# Patient Record
Sex: FEMALE | Race: WHITE | NOT HISPANIC OR LATINO | Employment: OTHER | URBAN - METROPOLITAN AREA
[De-identification: names, ages, dates, MRNs, and addresses within clinical notes are randomized per-mention and may not be internally consistent; named-entity substitution may affect disease eponyms.]

---

## 2017-01-17 ENCOUNTER — CONVERSION ENCOUNTER (OUTPATIENT)
Dept: MAMMOGRAPHY | Facility: CLINIC | Age: 62
End: 2017-01-17

## 2017-08-01 ENCOUNTER — CONVERSION ENCOUNTER (OUTPATIENT)
Dept: MAMMOGRAPHY | Facility: CLINIC | Age: 62
End: 2017-08-01

## 2018-05-22 ENCOUNTER — CONVERSION ENCOUNTER (OUTPATIENT)
Dept: RADIOLOGY | Facility: IMAGING CENTER | Age: 63
End: 2018-05-22

## 2018-10-09 ENCOUNTER — HOSPITAL ENCOUNTER (EMERGENCY)
Facility: HOSPITAL | Age: 63
Discharge: HOME | End: 2018-10-09
Attending: EMERGENCY MEDICINE | Admitting: EMERGENCY MEDICINE
Payer: COMMERCIAL

## 2018-10-09 ENCOUNTER — APPOINTMENT (EMERGENCY)
Dept: RADIOLOGY | Facility: HOSPITAL | Age: 63
End: 2018-10-09
Payer: COMMERCIAL

## 2018-10-09 VITALS
OXYGEN SATURATION: 92 % | RESPIRATION RATE: 26 BRPM | SYSTOLIC BLOOD PRESSURE: 141 MMHG | TEMPERATURE: 98.1 F | HEART RATE: 78 BPM | DIASTOLIC BLOOD PRESSURE: 67 MMHG

## 2018-10-09 DIAGNOSIS — D64.9 ANEMIA: Primary | ICD-10-CM

## 2018-10-09 DIAGNOSIS — Z51.89 ENCOUNTER FOR BLOOD TRANSFUSION: ICD-10-CM

## 2018-10-09 DIAGNOSIS — K59.00 CONSTIPATION: ICD-10-CM

## 2018-10-09 LAB
ABO GROUP BLD: NORMAL
ALBUMIN SERPL BCP-MCNC: 2.3 G/DL (ref 3.5–5)
ALP SERPL-CCNC: 224 U/L (ref 46–116)
ALT SERPL W P-5'-P-CCNC: 29 U/L (ref 12–78)
ANION GAP SERPL CALCULATED.3IONS-SCNC: 10 MMOL/L (ref 4–13)
AST SERPL W P-5'-P-CCNC: 17 U/L (ref 5–45)
ATRIAL RATE: 77 BPM
BASOPHILS # BLD MANUAL: 0 THOUSAND/UL (ref 0–0.1)
BASOPHILS NFR MAR MANUAL: 0 % (ref 0–1)
BILIRUB SERPL-MCNC: 0.3 MG/DL (ref 0.2–1)
BLD GP AB SCN SERPL QL: NEGATIVE
BUN SERPL-MCNC: 6 MG/DL (ref 5–25)
CALCIUM SERPL-MCNC: 8.5 MG/DL (ref 8.3–10.1)
CHLORIDE SERPL-SCNC: 96 MMOL/L (ref 100–108)
CO2 SERPL-SCNC: 27 MMOL/L (ref 21–32)
CREAT SERPL-MCNC: 0.91 MG/DL (ref 0.6–1.3)
EOSINOPHIL # BLD MANUAL: 0 THOUSAND/UL (ref 0–0.4)
EOSINOPHIL NFR BLD MANUAL: 0 % (ref 0–6)
ERYTHROCYTE [DISTWIDTH] IN BLOOD BY AUTOMATED COUNT: 15.9 % (ref 11.6–15.1)
GFR SERPL CREATININE-BSD FRML MDRD: 66 ML/MIN/1.73SQ M
GLUCOSE SERPL-MCNC: 127 MG/DL (ref 65–140)
HCT VFR BLD AUTO: 21.6 % (ref 34.8–46.1)
HGB BLD-MCNC: 7 G/DL (ref 11.5–15.4)
INR PPP: 1.38 (ref 0.86–1.17)
LYMPHOCYTES # BLD AUTO: 0.53 THOUSAND/UL (ref 0.6–4.47)
LYMPHOCYTES # BLD AUTO: 25 % (ref 14–44)
MCH RBC QN AUTO: 29.3 PG (ref 26.8–34.3)
MCHC RBC AUTO-ENTMCNC: 32.4 G/DL (ref 31.4–37.4)
MCV RBC AUTO: 90 FL (ref 82–98)
MONOCYTES # BLD AUTO: 0.4 THOUSAND/UL (ref 0–1.22)
MONOCYTES NFR BLD: 19 % (ref 4–12)
NEUTROPHILS # BLD MANUAL: 1.11 THOUSAND/UL (ref 1.85–7.62)
NEUTS BAND NFR BLD MANUAL: 7 % (ref 0–8)
NEUTS SEG NFR BLD AUTO: 46 % (ref 43–75)
NRBC BLD AUTO-RTO: 0 /100 WBCS
P AXIS: 37 DEGREES
PLATELET # BLD AUTO: 276 THOUSANDS/UL (ref 149–390)
PLATELET BLD QL SMEAR: ADEQUATE
PMV BLD AUTO: 11.6 FL (ref 8.9–12.7)
POTASSIUM SERPL-SCNC: 3.8 MMOL/L (ref 3.5–5.3)
PR INTERVAL: 130 MS
PROT SERPL-MCNC: 6 G/DL (ref 6.4–8.2)
PROTHROMBIN TIME: 16.8 SECONDS (ref 11.8–14.2)
QRS AXIS: 72 DEGREES
QRSD INTERVAL: 76 MS
QT INTERVAL: 398 MS
QTC INTERVAL: 450 MS
RBC # BLD AUTO: 2.39 MILLION/UL (ref 3.81–5.12)
RH BLD: NEGATIVE
SODIUM SERPL-SCNC: 133 MMOL/L (ref 136–145)
SPECIMEN EXPIRATION DATE: NORMAL
T WAVE AXIS: 18 DEGREES
T4 FREE SERPL-MCNC: 1.13 NG/DL (ref 0.76–1.46)
TOTAL CELLS COUNTED SPEC: 100
TSH SERPL DL<=0.05 MIU/L-ACNC: 4.07 UIU/ML (ref 0.36–3.74)
VARIANT LYMPHS # BLD AUTO: 3 %
VENTRICULAR RATE: 77 BPM
WBC # BLD AUTO: 2.1 THOUSAND/UL (ref 4.31–10.16)

## 2018-10-09 PROCEDURE — 86901 BLOOD TYPING SEROLOGIC RH(D): CPT | Performed by: EMERGENCY MEDICINE

## 2018-10-09 PROCEDURE — 93005 ELECTROCARDIOGRAM TRACING: CPT

## 2018-10-09 PROCEDURE — 36415 COLL VENOUS BLD VENIPUNCTURE: CPT | Performed by: EMERGENCY MEDICINE

## 2018-10-09 PROCEDURE — 86920 COMPATIBILITY TEST SPIN: CPT

## 2018-10-09 PROCEDURE — 85007 BL SMEAR W/DIFF WBC COUNT: CPT | Performed by: EMERGENCY MEDICINE

## 2018-10-09 PROCEDURE — 71046 X-RAY EXAM CHEST 2 VIEWS: CPT

## 2018-10-09 PROCEDURE — 93010 ELECTROCARDIOGRAM REPORT: CPT | Performed by: INTERNAL MEDICINE

## 2018-10-09 PROCEDURE — 84439 ASSAY OF FREE THYROXINE: CPT | Performed by: EMERGENCY MEDICINE

## 2018-10-09 PROCEDURE — 86900 BLOOD TYPING SEROLOGIC ABO: CPT | Performed by: EMERGENCY MEDICINE

## 2018-10-09 PROCEDURE — 85610 PROTHROMBIN TIME: CPT | Performed by: EMERGENCY MEDICINE

## 2018-10-09 PROCEDURE — 36430 TRANSFUSION BLD/BLD COMPNT: CPT

## 2018-10-09 PROCEDURE — 86850 RBC ANTIBODY SCREEN: CPT | Performed by: EMERGENCY MEDICINE

## 2018-10-09 PROCEDURE — P9021 RED BLOOD CELLS UNIT: HCPCS

## 2018-10-09 PROCEDURE — 85027 COMPLETE CBC AUTOMATED: CPT | Performed by: EMERGENCY MEDICINE

## 2018-10-09 PROCEDURE — 84443 ASSAY THYROID STIM HORMONE: CPT | Performed by: EMERGENCY MEDICINE

## 2018-10-09 PROCEDURE — 99285 EMERGENCY DEPT VISIT HI MDM: CPT

## 2018-10-09 PROCEDURE — 80053 COMPREHEN METABOLIC PANEL: CPT | Performed by: EMERGENCY MEDICINE

## 2018-10-09 RX ORDER — MIRTAZAPINE 15 MG/1
7.5 TABLET, FILM COATED ORAL
COMMUNITY

## 2018-10-09 RX ORDER — OMEPRAZOLE 20 MG/1
20 CAPSULE, DELAYED RELEASE ORAL DAILY
COMMUNITY
End: 2019-09-16 | Stop reason: ALTCHOICE

## 2018-10-09 RX ORDER — CHOLECALCIFEROL (VITAMIN D3) 125 MCG
1 CAPSULE ORAL DAILY
COMMUNITY

## 2018-10-09 RX ORDER — AMLODIPINE BESYLATE 10 MG/1
10 TABLET ORAL DAILY
COMMUNITY
End: 2021-10-07 | Stop reason: HOSPADM

## 2018-10-09 RX ORDER — AMOXICILLIN 250 MG
1 CAPSULE ORAL ONCE
Status: COMPLETED | OUTPATIENT
Start: 2018-10-09 | End: 2018-10-09

## 2018-10-09 RX ORDER — NICOTINE 21 MG/24HR
1 PATCH, TRANSDERMAL 24 HOURS TRANSDERMAL EVERY 24 HOURS
COMMUNITY
End: 2019-01-09 | Stop reason: ALTCHOICE

## 2018-10-09 RX ORDER — ATORVASTATIN CALCIUM 40 MG/1
40 TABLET, FILM COATED ORAL
COMMUNITY

## 2018-10-09 RX ORDER — ASPIRIN 81 MG/1
81 TABLET, CHEWABLE ORAL DAILY
COMMUNITY
End: 2021-10-07 | Stop reason: HOSPADM

## 2018-10-09 RX ORDER — METOPROLOL SUCCINATE 100 MG/1
50 TABLET, EXTENDED RELEASE ORAL DAILY
COMMUNITY
End: 2021-10-07 | Stop reason: HOSPADM

## 2018-10-09 RX ORDER — ESCITALOPRAM OXALATE 5 MG/1
5 TABLET ORAL DAILY
COMMUNITY
End: 2019-09-16 | Stop reason: ALTCHOICE

## 2018-10-09 RX ORDER — LISINOPRIL 40 MG/1
20 TABLET ORAL DAILY
COMMUNITY
End: 2019-09-16 | Stop reason: DRUGHIGH

## 2018-10-09 RX ORDER — AMOXICILLIN 250 MG
1 CAPSULE ORAL DAILY
Qty: 20 TABLET | Refills: 0 | Status: SHIPPED | OUTPATIENT
Start: 2018-10-09

## 2018-10-09 RX ORDER — ACYCLOVIR 800 MG/1
800 TABLET ORAL 2 TIMES DAILY
Status: ON HOLD | COMMUNITY
End: 2018-12-05 | Stop reason: ALTCHOICE

## 2018-10-09 RX ORDER — CHOLECALCIFEROL (VITAMIN D3) 25 MCG
1 TABLET ORAL
COMMUNITY
End: 2022-05-23

## 2018-10-09 RX ADMIN — SENNOSIDES AND DOCUSATE SODIUM 1 TABLET: 8.6; 5 TABLET ORAL at 19:42

## 2018-10-09 NOTE — ED PROVIDER NOTES
History  Chief Complaint   Patient presents with    Extremity Weakness     Pt brought in via EMS for evaluation for leg pain and weakness while on the cammode at home  States she sat there for 45 mins and then couldn't get up  IS currently undergoing chemo for lymphoma at [facility]  EMS reports she was notified today by Anshul Boswell that he Hgb was low, and might need a transfusion  41-year-old female patient currently being treated for cancer at [facility] presents emergency department for evaluation of increased fatigue  The patient was found with outpatient labs to be anemic and told to come here for evaluation  On physical exam patient does have a right-sided facial droop which he states is from a Bell's palsy  She states she has had imaging done for multiple times  The Bell's palsy is incomplete, however, and was initially concerning but since the patient states this is old and unchanged has been present and stable for over a month and a feel that reimaging it would be of benefit the patient  The patient will have a full workup done to assess for possible anemia the patient will be disposition based on labs  Differential diagnosis includes but is not limited to acute coronary syndrome, symptomatic anemia, dehydration  History provided by:  Patient   used: No    Fatigue   Severity:  Mild  Onset quality:  Gradual  Timing:  Constant  Progression:  Worsening  Chronicity:  New  Context: not alcohol use, not change in medication, not decreased sleep, not increased activity and not pinched nerve    Relieved by:  Nothing  Worsened by:  Nothing  Ineffective treatments:  None tried  Associated symptoms: drooling and dysphagia    Associated symptoms: no ataxia, no difficulty walking, no foul-smelling urine, no frequency, no lethargy and no near-syncope        Prior to Admission Medications   Prescriptions Last Dose Informant Patient Reported? Taking?    CHELATED MAGNESIUM PO   Yes Yes Sig: Take 4 tablets by mouth 3 (three) times a day   Cholecalciferol (VITAMIN D3) 2000 units TABS   Yes Yes   Sig: Take 1 tablet by mouth daily   Melatonin ER (MELADOX) 3 MG TBCR   Yes Yes   Sig: Take 1 tablet by mouth daily at bedtime   acyclovir (ZOVIRAX) 800 mg tablet   Yes Yes   Sig: Take 800 mg by mouth every 12 (twelve) hours   amLODIPine (NORVASC) 10 mg tablet   Yes Yes   Sig: Take 10 mg by mouth daily   aspirin 81 mg chewable tablet   Yes Yes   Sig: Chew 81 mg daily   atorvastatin (LIPITOR) 40 mg tablet   Yes Yes   Sig: Take 40 mg by mouth daily at bedtime   escitalopram (LEXAPRO) 5 mg tablet   Yes Yes   Sig: Take 5 mg by mouth daily   lisinopril (ZESTRIL) 40 mg tablet   Yes Yes   Sig: Take 40 mg by mouth daily   metoprolol succinate (TOPROL-XL) 100 mg 24 hr tablet   Yes Yes   Sig: Take 100 mg by mouth daily   mirtazapine (REMERON) 15 mg tablet   Yes Yes   Sig: Take 15 mg by mouth daily at bedtime   nicotine (NICODERM CQ) 14 mg/24hr TD 24 hr patch   Yes Yes   Sig: Place 1 patch on the skin every 24 hours   omeprazole (PriLOSEC) 20 mg delayed release capsule   Yes Yes   Sig: Take 20 mg by mouth daily   rivaroxaban (XARELTO) 10 mg tablet   Yes Yes   Sig: Take 10 mg by mouth daily with dinner      Facility-Administered Medications: None       No past medical history on file  No past surgical history on file  No family history on file  I have reviewed and agree with the history as documented  Social History   Substance Use Topics    Smoking status: Not on file    Smokeless tobacco: Not on file    Alcohol use Not on file        Review of Systems   Constitutional: Positive for fatigue  HENT: Positive for drooling  Cardiovascular: Negative for near-syncope  Gastrointestinal: Positive for dysphagia  Genitourinary: Negative for frequency  All other systems reviewed and are negative  Physical Exam  Physical Exam   Constitutional: She is oriented to person, place, and time   She appears well-developed and well-nourished  HENT:   Head: Normocephalic and atraumatic  Right Ear: External ear normal    Left Ear: External ear normal    Eyes: Conjunctivae and EOM are normal    Neck: No JVD present  No tracheal deviation present  No thyromegaly present  Cardiovascular: Normal rate  Pulmonary/Chest: Effort normal and breath sounds normal  No stridor  Abdominal: Soft  She exhibits no distension and no mass  There is no tenderness  There is no guarding  No hernia  Musculoskeletal: Normal range of motion  She exhibits no edema, tenderness or deformity  Lymphadenopathy:     She has no cervical adenopathy  Neurological: She is alert and oriented to person, place, and time  A cranial nerve deficit is present  She exhibits normal muscle tone  She displays no seizure activity  GCS eye subscore is 4  GCS verbal subscore is 5  GCS motor subscore is 5  Skin: Skin is warm  No rash noted  No erythema  No pallor  Psychiatric: She has a normal mood and affect  Her behavior is normal    Nursing note and vitals reviewed        Vital Signs  ED Triage Vitals [10/09/18 1132]   Temperature Pulse Respirations Blood Pressure SpO2   98 °F (36 7 °C) 79 16 100/51 96 %      Temp Source Heart Rate Source Patient Position - Orthostatic VS BP Location FiO2 (%)   Oral Monitor Sitting Right arm --      Pain Score       3           Vitals:    10/09/18 1728 10/09/18 1754 10/09/18 1929 10/09/18 1930   BP: 125/59 126/60 141/67 141/67   Pulse: 82 83 78 78   Patient Position - Orthostatic VS:           Visual Acuity      ED Medications  Medications   senna-docusate sodium (SENOKOT S) 8 6-50 mg per tablet 1 tablet (1 tablet Oral Given 10/9/18 1942)       Diagnostic Studies  Results Reviewed     Procedure Component Value Units Date/Time    T4, free [[id number]]  (Normal) Collected:  10/09/18 1148    Lab Status:  Final result Specimen:  Blood from Arm, Right Updated:  10/09/18 1852     Free T4 1 13 ng/dL     CBC and differential [[id number]]  (Abnormal) Collected:  10/09/18 1148    Lab Status:  Final result Specimen:  Blood from Arm, Right Updated:  10/09/18 1251     WBC 2 10 (L) Thousand/uL      RBC 2 39 (L) Million/uL      Hemoglobin 7 0 (L) g/dL      Hematocrit 21 6 (L) %      MCV 90 fL      MCH 29 3 pg      MCHC 32 4 g/dL      RDW 15 9 (H) %      MPV 11 6 fL      Platelets 070 Thousands/uL      nRBC 0 /100 WBCs     TSH, 3rd generation with Free T4 reflex [[id number]]  (Abnormal) Collected:  10/09/18 1148    Lab Status:  Final result Specimen:  Blood from Arm, Right Updated:  10/09/18 1224     TSH 3RD GENERATON 4 068 (H) uIU/mL     Narrative:         Patients undergoing fluorescein dye angiography may retain small amounts of fluorescein in the body for 48-72 hours post procedure  Samples containing fluorescein can produce falsely depressed TSH values  If the patient had this procedure,a specimen should be resubmitted post fluorescein clearance            The recommended reference ranges for TSH during pregnancy are as follows:  First trimester 0 1 to 2 5 uIU/mL  Second trimester  0 2 to 3 0 uIU/mL  Third trimester 0 3 to 3 0 uIU/m      Protime-INR [[id number]]  (Abnormal) Collected:  10/09/18 1148    Lab Status:  Final result Specimen:  Blood from Arm, Right Updated:  10/09/18 1218     Protime 16 8 (H) seconds      INR 1 38 (H)    Comprehensive metabolic panel [[id number]]  (Abnormal) Collected:  10/09/18 1148    Lab Status:  Final result Specimen:  Blood from Arm, Right Updated:  10/09/18 1216     Sodium 133 (L) mmol/L      Potassium 3 8 mmol/L      Chloride 96 (L) mmol/L      CO2 27 mmol/L      ANION GAP 10 mmol/L      BUN 6 mg/dL      Creatinine 0 91 mg/dL      Glucose 127 mg/dL      Calcium 8 5 mg/dL      AST 17 U/L      ALT 29 U/L      Alkaline Phosphatase 224 (H) U/L      Total Protein 6 0 (L) g/dL      Albumin 2 3 (L) g/dL      Total Bilirubin 0 30 mg/dL      eGFR 66 ml/min/1 73sq m     Narrative:         National Kidney Disease Education Program recommendations are as follows:  GFR calculation is accurate only with a steady state creatinine  Chronic Kidney disease less than 60 ml/min/1 73 sq  meters  Kidney failure less than 15 ml/min/1 73 sq  meters  XR dual energy   Final Result by Allie Lynn MD (10/09 1722)      No acute cardiopulmonary disease  Previously seen density was likely artifactual             Workstation performed: [id number]         XR chest 2 views   Final Result by Allie Lynn MD (10/09 1533)      Suggestion of focal ill-defined opacity in the left upper lobe  This appearance is not typical for pneumonia and the possibility of a lung nodule is not excluded  Follow-up chest for the x-ray is recommended to confirm a true nodule            I personally discussed this study with Zulma Glez on 10/9/2018 at 3:23 PM                   Workstation performed: [id number]                    Procedures  Procedures       Phone Contacts  ED Phone Contact    ED Course  ED Course as of Oct 12 1803   Tue Oct 09, 2018   1502 Blood consent obtained, signed by me, placed on pts chart                                MDM  Number of Diagnoses or Management Options  Anemia: new and requires workup  Constipation: new and requires workup  Encounter for blood transfusion: new and requires workup     Amount and/or Complexity of Data Reviewed  Clinical lab tests: ordered and reviewed  Tests in the radiology section of CPT®: ordered and reviewed  Decide to obtain previous medical records or to obtain history from someone other than the patient: yes  Review and summarize past medical records: yes    Patient Progress  Patient progress: stable    CritCare Time    Disposition  Final diagnoses:   Anemia   Encounter for blood transfusion   Constipation     Time reflects when diagnosis was documented in both MDM as applicable and the Disposition within this note     Time User Action Codes Description Comment    10/9/2018  6:41 PM Jose Alejandro Petra ALEJO Add [D64 9] Anemia     10/9/2018  6:41 PM Jose Blount Nena Add [Z51 [id number]] Encounter for blood transfusion     10/9/2018  7:33 PM AveryJose moe Nena Add [K59 00] Constipation       ED Disposition     ED Disposition Condition Comment    Discharge  Tia Spearing discharge to home/self care      Condition at discharge: Good        Follow-up Information     Follow up With Specialties Details Why Contact Info Additional [address] [facility] Emergency Department Emergency Medicine  If symptoms worsen: any change in your symptoms, or need for admission, lightheaded, fever/chills, vomiting, pain, etc [address] ED, [address] [place] [address], [place]    follow up w specialists at [facility]               Discharge Medication List as of 10/9/2018  6:43 PM      CONTINUE these medications which have NOT CHANGED    Details   acyclovir (ZOVIRAX) 800 mg tablet Take 800 mg by mouth every 12 (twelve) hours, Historical Med      amLODIPine (NORVASC) 10 mg tablet Take 10 mg by mouth daily, Historical Med      aspirin 81 mg chewable tablet Chew 81 mg daily, Historical Med      atorvastatin (LIPITOR) 40 mg tablet Take 40 mg by mouth daily at bedtime, Historical Med      CHELATED MAGNESIUM PO Take 4 tablets by mouth 3 (three) times a day, Historical Med      Cholecalciferol (VITAMIN D3) 2000 units TABS Take 1 tablet by mouth daily, Historical Med      escitalopram (LEXAPRO) 5 mg tablet Take 5 mg by mouth daily, Historical Med      lisinopril (ZESTRIL) 40 mg tablet Take 40 mg by mouth daily, Historical Med      Melatonin ER (MELADOX) 3 MG TBCR Take 1 tablet by mouth daily at bedtime, Historical Med      metoprolol succinate (TOPROL-XL) 100 mg 24 hr tablet Take 100 mg by mouth daily, Historical Med      mirtazapine (REMERON) 15 mg tablet Take 15 mg by mouth daily at bedtime, Historical Med      nicotine (NICODERM CQ) 14 mg/24hr TD 24 hr patch Place 1 patch on the skin every 24 hours, Historical Med      omeprazole (PriLOSEC) 20 mg delayed release capsule Take 20 mg by mouth daily, Historical Med      rivaroxaban (XARELTO) 10 mg tablet Take 10 mg by mouth daily with dinner, Historical Med           No discharge procedures on file      ED Provider  Electronically Signed by           Beba Erickson DO  10/12/18 9476

## 2018-10-09 NOTE — DISCHARGE INSTRUCTIONS
Anemia   WHAT YOU NEED TO KNOW:   What is anemia? Anemia is a low number of red blood cells or a low amount of hemoglobin in your red blood cells  Hemoglobin is a protein that helps carry oxygen throughout your body  Red blood cells use iron to create hemoglobin  Anemia may develop if your body does not have enough iron  It may also develop if your body does not make enough red blood cells or they die faster than your body can make them  What increases my risk for anemia? · Trauma or surgery that causes massive blood loss    · A gastrointestinal bleed    · A woman's monthly period    · A family history of blood disease or anemia    · Liver or kidney disease, cancer, rheumatoid arthritis, or hyperthyroidism    · Alcohol abuse    · Lack of foods that contain iron, folic acid, or vitamin B12  What are the signs and symptoms of anemia? · Chest pain or a fast heartbeat    · Lightheadedness, dizziness, or shortness of breath    · Cold or pale skin    · Tiredness, weakness, or confusion  How is anemia diagnosed? Blood tests will show if you have anemia  How is anemia treated? Treatment depends on the type of anemia you have  You may need any of the following:  · Iron or folic acid supplements  help increase your red blood cell and hemoglobin levels  · Vitamin B12 injections  may help boost your red blood cell count and decrease your symptoms  · A blood transfusion  may be needed if your body cannot replace the blood you have lost     · Surgery  may be needed to stop bleeding, or if your anemia is severe  How can I prevent anemia? Eat healthy foods rich in iron and vitamin C  Nuts, meat, dark leafy green vegetables, and beans are high in iron and protein  Vitamin C helps your body absorb iron  Foods rich in vitamin C include oranges and other citrus fruits  Ask your healthcare provider for a list of other foods that are high in iron or vitamin C  Ask if you need to be on a special diet     Call 911 or have someone call 911 for any of the following:   · You lose consciousness  · You have severe chest pain  When should I seek immediate care? · You have dark or bloody bowel movements  When should I contact my healthcare provider? · Your symptoms are worse, even after treatment  · You have questions or concerns about your condition or care  CARE AGREEMENT:   You have the right to help plan your care  Learn about your health condition and how it may be treated  Discuss treatment options with your caregivers to decide what care you want to receive  You always have the right to refuse treatment  The above information is an  only  It is not intended as medical advice for individual conditions or treatments  Talk to your doctor, nurse or pharmacist before following any medical regimen to see if it is safe and effective for you  © 2017 [employer] Information is for End User's use only and may not be sold, redistributed or otherwise used for commercial purposes  All illustrations and images included in CareNotes® are the copyrighted property of [employer]  or Arun Gerardo  Blood Transfusion   WHAT YOU NEED TO KNOW:   What do I need to know about a blood transfusion? A blood transfusion is used to give you blood through an IV  You may get only part of the blood, such as red blood cells, platelets, or plasma  The blood may be from you and stored for you to use later  The blood may instead be from another person  Donated blood is tested for HIV, hepatitis, syphilis, West Nile virus, and other diseases  How do I prepare for a blood transfusion? · Your healthcare provider will tell you how to prepare  He will tell you if you can eat or drink before the transfusion  Ask if you can drive yourself home  You may need to arrange for a ride  · Tell the healthcare provider if you ever had a fever, itching, swelling, or hives during a blood transfusion   You may be given medicines to help prevent an allergic reaction  · Healthcare providers will take a sample of your blood  They will check that the blood used in the transfusion is right for you  You can get sick if your immune system tries to destroy blood that is not right for you  This is called a blood transfusion reaction  Ask your healthcare provider for more information about blood transfusion reactions  · Your transfusion may last 1 to 4 hours  Ask what you can bring into the transfusion room  You may be able to eat, read, or watch TV  You may also be able to go to the restroom with help  What happens during a blood transfusion? · An IV will be placed into a large vein, usually in your arm  The bag that contains blood will hang next to your bed or chair  Tubing will connect the blood bag to your IV  · The healthcare provider will open a clamp so the blood can enter your IV  The blood transfusion will start slowly so healthcare providers can watch for signs of a reaction  Even a small amount of donor blood can cause a reaction  A healthcare provider will stay with you for at least 15 minutes after the transfusion starts  · Healthcare providers will check your vital signs at least once every hour  Tell them if you have signs of a reaction, such as pain, nausea, or itching  They will stop the transfusion immediately  What happens after a blood transfusion? You may need to have blood taken to check that your body accepted the donor blood  You will have to stay a short time after the transfusion ends so healthcare providers can watch for signs of a reaction  You may feel some pain or see bruises near the site for a few days after the transfusion  Apply ice to decrease pain and swelling  Use an ice pack, or put ice in a plastic bag and wrap a towel around it  Apply the ice pack or wrapped bag to your transfusion site for 20 minutes each hour or as directed  What are the risks of a blood transfusion? Fever, chills, or mild allergic reactions can happen within hours of a transfusion  You may develop shortness of breath or other breathing problems  A very rare allergic reaction called anaphylaxis may cause you to go into shock and stop breathing  Some reactions may happen days or weeks later  Examples include bruising, tiredness, or weakness  You may also have a reaction the next time you receive blood  CARE AGREEMENT:   You have the right to help plan your care  Learn about your health condition and how it may be treated  Discuss treatment options with your caregivers to decide what care you want to receive  You always have the right to refuse treatment  The above information is an  only  It is not intended as medical advice for individual conditions or treatments  Talk to your doctor, nurse or pharmacist before following any medical regimen to see if it is safe and effective for you  © 2017 [employer]  Information is for End User's use only and may not be sold, redistributed or otherwise used for commercial purposes  All illustrations and images included in CareNotes® are the copyrighted property of A [employer] M [employer] Inc  or Arun Carrillo

## 2018-10-10 LAB
ABO GROUP BLD BPU: NORMAL
ABO GROUP BLD BPU: NORMAL
BPU ID: NORMAL
BPU ID: NORMAL
CROSSMATCH: NORMAL
CROSSMATCH: NORMAL
UNIT DISPENSE STATUS: NORMAL
UNIT DISPENSE STATUS: NORMAL
UNIT PRODUCT CODE: NORMAL
UNIT PRODUCT CODE: NORMAL
UNIT RH: NORMAL
UNIT RH: NORMAL

## 2018-11-30 ENCOUNTER — APPOINTMENT (EMERGENCY)
Dept: RADIOLOGY | Facility: HOSPITAL | Age: 63
DRG: 315 | End: 2018-11-30
Payer: COMMERCIAL

## 2018-11-30 ENCOUNTER — APPOINTMENT (EMERGENCY)
Dept: ULTRASOUND IMAGING | Facility: HOSPITAL | Age: 63
DRG: 315 | End: 2018-11-30
Payer: COMMERCIAL

## 2018-11-30 ENCOUNTER — HOSPITAL ENCOUNTER (INPATIENT)
Facility: HOSPITAL | Age: 63
LOS: 5 days | DRG: 315 | End: 2018-12-05
Attending: EMERGENCY MEDICINE | Admitting: INTERNAL MEDICINE
Payer: COMMERCIAL

## 2018-11-30 ENCOUNTER — APPOINTMENT (EMERGENCY)
Dept: CT IMAGING | Facility: HOSPITAL | Age: 63
DRG: 315 | End: 2018-11-30
Payer: COMMERCIAL

## 2018-11-30 DIAGNOSIS — I99.8 ISCHEMIA OF LEFT LOWER EXTREMITY: Primary | ICD-10-CM

## 2018-11-30 DIAGNOSIS — I73.9 PERIPHERAL VASCULAR DISEASE (HCC): ICD-10-CM

## 2018-11-30 DIAGNOSIS — M79.673 FOOT PAIN: ICD-10-CM

## 2018-11-30 DIAGNOSIS — R29.898 WEAKNESS OF LOWER EXTREMITY, UNSPECIFIED LATERALITY: ICD-10-CM

## 2018-11-30 DIAGNOSIS — I10 BENIGN ESSENTIAL HYPERTENSION: ICD-10-CM

## 2018-11-30 DIAGNOSIS — C85.90 LYMPHOMA (HCC): ICD-10-CM

## 2018-11-30 DIAGNOSIS — I48.0 PAROXYSMAL ATRIAL FIBRILLATION (HCC): ICD-10-CM

## 2018-11-30 DIAGNOSIS — I21.4 NSTEMI (NON-ST ELEVATED MYOCARDIAL INFARCTION) (HCC): ICD-10-CM
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
## 2018-11-30 PROBLEM — F10.239 ALCOHOL DEPENDENCE WITH WITHDRAWAL (HCC): Status: ACTIVE | Noted: 2018-08-28

## 2018-11-30 PROBLEM — H53.8 BLURRED VISION, BILATERAL: Status: ACTIVE | Noted: 2018-08-26

## 2018-11-30 PROBLEM — E87.6 HYPOKALEMIA: Status: ACTIVE | Noted: 2018-08-28

## 2018-11-30 PROBLEM — R79.89 ELEVATED LACTIC ACID LEVEL: Status: ACTIVE | Noted: 2018-11-30

## 2018-11-30 PROBLEM — I48.91 ATRIAL FIBRILLATION WITH RAPID VENTRICULAR RESPONSE (HCC): Status: ACTIVE | Noted: 2018-08-26

## 2018-11-30 PROBLEM — R77.8 ELEVATED TROPONIN: Status: ACTIVE | Noted: 2018-08-28

## 2018-11-30 PROBLEM — J96.01 ACUTE RESPIRATORY FAILURE WITH HYPOXIA AND HYPERCAPNIA (HCC): Status: ACTIVE | Noted: 2018-08-28

## 2018-11-30 PROBLEM — E83.52 HYPERCALCEMIA: Status: ACTIVE | Noted: 2018-08-26

## 2018-11-30 PROBLEM — G51.0 BELL'S PALSY: Status: ACTIVE | Noted: 2018-10-05

## 2018-11-30 PROBLEM — J90 BILATERAL PLEURAL EFFUSION: Status: ACTIVE | Noted: 2018-08-28

## 2018-11-30 PROBLEM — E87.1 HYPONATREMIA: Status: ACTIVE | Noted: 2018-08-26

## 2018-11-30 PROBLEM — J96.02 ACUTE RESPIRATORY FAILURE WITH HYPOXIA AND HYPERCAPNIA (HCC): Status: ACTIVE | Noted: 2018-08-28

## 2018-11-30 PROBLEM — N17.9 AKI (ACUTE KIDNEY INJURY) (HCC): Status: ACTIVE | Noted: 2018-08-26

## 2018-11-30 PROBLEM — F41.8 DEPRESSION WITH ANXIETY: Status: ACTIVE | Noted: 2018-09-03

## 2018-11-30 PROBLEM — R50.81 NEUTROPENIC FEVER (HCC): Status: ACTIVE | Noted: 2018-09-21

## 2018-11-30 PROBLEM — D70.9 NEUTROPENIC FEVER (HCC): Status: ACTIVE | Noted: 2018-09-21

## 2018-11-30 LAB
ALBUMIN SERPL BCP-MCNC: 2.7 G/DL (ref 3.5–5)
ALP SERPL-CCNC: 137 U/L (ref 46–116)
ALT SERPL W P-5'-P-CCNC: 16 U/L (ref 12–78)
ANION GAP BLD CALC-SCNC: 18 MMOL/L (ref 4–13)
ANION GAP SERPL CALCULATED.3IONS-SCNC: 9 MMOL/L (ref 4–13)
APTT PPP: 33 SECONDS (ref 26–38)
AST SERPL W P-5'-P-CCNC: 15 U/L (ref 5–45)
ATRIAL RATE: 80 BPM
BACTERIA UR QL AUTO: ABNORMAL /HPF
BASOPHILS # BLD MANUAL: 0.11 THOUSAND/UL (ref 0–0.1)
BASOPHILS NFR MAR MANUAL: 1 % (ref 0–1)
BILIRUB DIRECT SERPL-MCNC: 0.07 MG/DL (ref 0–0.2)
BILIRUB SERPL-MCNC: 0.3 MG/DL (ref 0.2–1)
BILIRUB UR QL STRIP: NEGATIVE
BUN BLD-MCNC: <4 MG/DL (ref 5–25)
BUN SERPL-MCNC: 5 MG/DL (ref 5–25)
CA-I BLD-SCNC: 1.19 MMOL/L (ref 1.12–1.32)
CALCIUM SERPL-MCNC: 8.8 MG/DL (ref 8.3–10.1)
CHLORIDE BLD-SCNC: 97 MMOL/L (ref 100–108)
CHLORIDE SERPL-SCNC: 102 MMOL/L (ref 100–108)
CLARITY UR: CLEAR
CO2 SERPL-SCNC: 28 MMOL/L (ref 21–32)
COLOR UR: ABNORMAL
CREAT BLD-MCNC: 0.6 MG/DL (ref 0.6–1.3)
CREAT SERPL-MCNC: 0.73 MG/DL (ref 0.6–1.3)
EOSINOPHIL # BLD MANUAL: 0 THOUSAND/UL (ref 0–0.4)
EOSINOPHIL NFR BLD MANUAL: 0 % (ref 0–6)
ERYTHROCYTE [DISTWIDTH] IN BLOOD BY AUTOMATED COUNT: 23.9 % (ref 11.6–15.1)
GFR SERPL CREATININE-BSD FRML MDRD: 87 ML/MIN/1.73SQ M
GFR SERPL CREATININE-BSD FRML MDRD: 96 ML/MIN/1.73SQ M
GLUCOSE SERPL-MCNC: 118 MG/DL (ref 65–140)
GLUCOSE SERPL-MCNC: 120 MG/DL (ref 65–140)
GLUCOSE UR STRIP-MCNC: NEGATIVE MG/DL
HCT VFR BLD AUTO: 26.8 % (ref 34.8–46.1)
HCT VFR BLD CALC: 25 % (ref 34.8–46.1)
HGB BLD-MCNC: 8.6 G/DL (ref 11.5–15.4)
HGB BLDA-MCNC: 8.5 G/DL (ref 11.5–15.4)
HGB UR QL STRIP.AUTO: NEGATIVE
INR PPP: 1.3 (ref 0.86–1.17)
KETONES UR STRIP-MCNC: NEGATIVE MG/DL
LACTATE SERPL-SCNC: 2.3 MMOL/L (ref 0.5–2)
LACTATE SERPL-SCNC: 3.1 MMOL/L (ref 0.5–2)
LEUKOCYTE ESTERASE UR QL STRIP: ABNORMAL
LIPASE SERPL-CCNC: 103 U/L (ref 73–393)
LYMPHOCYTES # BLD AUTO: 0.66 THOUSAND/UL (ref 0.6–4.47)
LYMPHOCYTES # BLD AUTO: 6 % (ref 14–44)
MAGNESIUM SERPL-MCNC: 1.6 MG/DL (ref 1.6–2.6)
MCH RBC QN AUTO: 31.6 PG (ref 26.8–34.3)
MCHC RBC AUTO-ENTMCNC: 32.1 G/DL (ref 31.4–37.4)
MCV RBC AUTO: 99 FL (ref 82–98)
METAMYELOCYTES NFR BLD MANUAL: 3 % (ref 0–1)
MONOCYTES # BLD AUTO: 1.09 THOUSAND/UL (ref 0–1.22)
MONOCYTES NFR BLD: 10 % (ref 4–12)
NEUTROPHILS # BLD MANUAL: 8.64 THOUSAND/UL (ref 1.85–7.62)
NEUTS BAND NFR BLD MANUAL: 15 % (ref 0–8)
NEUTS SEG NFR BLD AUTO: 64 % (ref 43–75)
NITRITE UR QL STRIP: NEGATIVE
NON-SQ EPI CELLS URNS QL MICRO: ABNORMAL /HPF
NRBC BLD AUTO-RTO: 0 /100 WBCS
P AXIS: 50 DEGREES
PCO2 BLD: 26 MMOL/L (ref 21–32)
PH UR STRIP.AUTO: 7.5 [PH] (ref 4.5–8)
PLATELET # BLD AUTO: 330 THOUSANDS/UL (ref 149–390)
PLATELET BLD QL SMEAR: ADEQUATE
PMV BLD AUTO: 10.4 FL (ref 8.9–12.7)
POTASSIUM BLD-SCNC: 4.1 MMOL/L (ref 3.5–5.3)
POTASSIUM SERPL-SCNC: 4.4 MMOL/L (ref 3.5–5.3)
PR INTERVAL: 134 MS
PROT SERPL-MCNC: 5.9 G/DL (ref 6.4–8.2)
PROT UR STRIP-MCNC: NEGATIVE MG/DL
PROTHROMBIN TIME: 16 SECONDS (ref 11.8–14.2)
QRS AXIS: 80 DEGREES
QRSD INTERVAL: 86 MS
QT INTERVAL: 418 MS
QTC INTERVAL: 482 MS
RBC # BLD AUTO: 2.72 MILLION/UL (ref 3.81–5.12)
RBC #/AREA URNS AUTO: ABNORMAL /HPF
SODIUM BLD-SCNC: 137 MMOL/L (ref 136–145)
SODIUM SERPL-SCNC: 139 MMOL/L (ref 136–145)
SP GR UR STRIP.AUTO: 1.01 (ref 1–1.03)
SPECIMEN SOURCE: ABNORMAL
T WAVE AXIS: 61 DEGREES
TOTAL CELLS COUNTED SPEC: 100
TROPONIN I SERPL-MCNC: 0.05 NG/ML
TROPONIN I SERPL-MCNC: 0.06 NG/ML
UROBILINOGEN UR QL STRIP.AUTO: 0.2 E.U./DL
VARIANT LYMPHS # BLD AUTO: 1 %
VENTRICULAR RATE: 80 BPM
WBC # BLD AUTO: 10.94 THOUSAND/UL (ref 4.31–10.16)
WBC #/AREA URNS AUTO: ABNORMAL /HPF

## 2018-11-30 PROCEDURE — 36415 COLL VENOUS BLD VENIPUNCTURE: CPT | Performed by: EMERGENCY MEDICINE

## 2018-11-30 PROCEDURE — 85027 COMPLETE CBC AUTOMATED: CPT | Performed by: EMERGENCY MEDICINE

## 2018-11-30 PROCEDURE — 96365 THER/PROPH/DIAG IV INF INIT: CPT

## 2018-11-30 PROCEDURE — 83735 ASSAY OF MAGNESIUM: CPT | Performed by: EMERGENCY MEDICINE

## 2018-11-30 PROCEDURE — 84484 ASSAY OF TROPONIN QUANT: CPT | Performed by: NURSE PRACTITIONER

## 2018-11-30 PROCEDURE — 80047 BASIC METABLC PNL IONIZED CA: CPT

## 2018-11-30 PROCEDURE — 96361 HYDRATE IV INFUSION ADD-ON: CPT

## 2018-11-30 PROCEDURE — 84484 ASSAY OF TROPONIN QUANT: CPT | Performed by: EMERGENCY MEDICINE

## 2018-11-30 PROCEDURE — 83605 ASSAY OF LACTIC ACID: CPT | Performed by: EMERGENCY MEDICINE

## 2018-11-30 PROCEDURE — 85007 BL SMEAR W/DIFF WBC COUNT: CPT | Performed by: EMERGENCY MEDICINE

## 2018-11-30 PROCEDURE — 80076 HEPATIC FUNCTION PANEL: CPT | Performed by: EMERGENCY MEDICINE

## 2018-11-30 PROCEDURE — 85610 PROTHROMBIN TIME: CPT | Performed by: EMERGENCY MEDICINE

## 2018-11-30 PROCEDURE — 99223 1ST HOSP IP/OBS HIGH 75: CPT | Performed by: INTERNAL MEDICINE

## 2018-11-30 PROCEDURE — 99285 EMERGENCY DEPT VISIT HI MDM: CPT

## 2018-11-30 PROCEDURE — 73706 CT ANGIO LWR EXTR W/O&W/DYE: CPT

## 2018-11-30 PROCEDURE — 85730 THROMBOPLASTIN TIME PARTIAL: CPT | Performed by: EMERGENCY MEDICINE

## 2018-11-30 PROCEDURE — 80048 BASIC METABOLIC PNL TOTAL CA: CPT | Performed by: EMERGENCY MEDICINE

## 2018-11-30 PROCEDURE — 85014 HEMATOCRIT: CPT

## 2018-11-30 PROCEDURE — 83690 ASSAY OF LIPASE: CPT | Performed by: EMERGENCY MEDICINE

## 2018-11-30 PROCEDURE — 93926 LOWER EXTREMITY STUDY: CPT

## 2018-11-30 PROCEDURE — 71046 X-RAY EXAM CHEST 2 VIEWS: CPT

## 2018-11-30 PROCEDURE — 82550 ASSAY OF CK (CPK): CPT | Performed by: GENERAL PRACTICE

## 2018-11-30 PROCEDURE — 84443 ASSAY THYROID STIM HORMONE: CPT | Performed by: GENERAL PRACTICE

## 2018-11-30 PROCEDURE — 81001 URINALYSIS AUTO W/SCOPE: CPT | Performed by: EMERGENCY MEDICINE

## 2018-11-30 PROCEDURE — 93005 ELECTROCARDIOGRAM TRACING: CPT

## 2018-11-30 PROCEDURE — 93010 ELECTROCARDIOGRAM REPORT: CPT | Performed by: INTERNAL MEDICINE

## 2018-11-30 RX ORDER — HEPARIN SODIUM 10000 [USP'U]/100ML
3-30 INJECTION, SOLUTION INTRAVENOUS
Status: DISCONTINUED | OUTPATIENT
Start: 2018-11-30 | End: 2018-12-01

## 2018-11-30 RX ADMIN — IOHEXOL 100 ML: 350 INJECTION, SOLUTION INTRAVENOUS at 19:38

## 2018-11-30 RX ADMIN — HEPARIN SODIUM AND DEXTROSE 18 UNITS/KG/HR: 10000; 5 INJECTION INTRAVENOUS at 20:11

## 2018-11-30 RX ADMIN — SODIUM CHLORIDE 1000 ML: 0.9 INJECTION, SOLUTION INTRAVENOUS at 18:49

## 2018-11-30 NOTE — ED PROVIDER NOTES
History  Chief Complaint   Patient presents with    Extremity Weakness     pt co of L leg weakness onset last weeek pt w hx pvd  Patient is a 70year old female with past medical and surgical history significant for peripheral vascular disease with bilateral lower extremity bypass surgeries, active diffuse large-B-cell lymphoma currently on chemotherapy (last chemo 11/12, gets chemo every 21 days, due on 12/3), hypertension, Bell's palsy with right-sided facial droop, hysterectomy, presents to the emergency department complaining of left leg weakness and numbness/tingling for the past 1-2 weeks  Patient reports right before Thanksgiving, she started feeling weakness, pain and numbness/tingling in her left lower leg from below the knee down to her foot  She reports she is having more difficulty ambulating due to the numbness and tingling  She still is able to move the leg and wiggle the toes  She reports that today her physical therapist noted a blue discoloration to her lower leg and prompted evaluation in the ED  In addition, patient has had nonbloody diarrhea, a few episodes daily for the past 3 days and today had 1 episode of nonbilious and nonbloody vomiting shortly after eating  She denies any nausea currently  She denies routinely getting nausea, vomiting or diarrhea after chemo and states the symptoms are new  She denies any associated fever but reports she always is cold  Denies any rigors  Denies headache, dizziness or near syncope, URI symptoms, cough, hemoptysis, chest pain, palpitations, dyspnea, abdominal pain, blood per rectum or melena, dysuria, change in urinary frequency, hematuria, flank pain, skin rash, other focal neurologic deficits  Patient has baseline facial droop from Bell's palsy that was diagnosed in the summer of 2018  She is currently on Cipro and fluconazole for reasons unclear other than for treatment of yeast infection related to chemo    She is also on acyclovir and when asked if she is on this due to Bell's palsy, daughter is unsure and states they keep refilling it even though the initial script was only for 10 days  She used to follow with vascular surgeon from [facility], Dr Adriane Kuhn however he recently retired  She reports she used to go for routine ultrasound every 6 months and normally they can palpate a pulse in the left foot  Patient is on Xarelto but reports she is supposed to stop it right before chemotherapy  History provided by:  Patient   used: No    Extremity Weakness   Associated symptoms: diarrhea and vomiting    Associated symptoms: no abdominal pain, no chest pain, no congestion, no cough, no ear pain, no fever, no headaches, no nausea, no rash, no rhinorrhea, no shortness of breath, no sore throat and no wheezing        Prior to Admission Medications   Prescriptions Last Dose Informant Patient Reported? Taking?    CHELATED MAGNESIUM PO   Yes No   Sig: Take 4 tablets by mouth 3 (three) times a day   Cholecalciferol (VITAMIN D3) 2000 units TABS   Yes No   Sig: Take 1 tablet by mouth daily   Melatonin ER (MELADOX) 3 MG TBCR   Yes No   Sig: Take 1 tablet by mouth daily at bedtime   acyclovir (ZOVIRAX) 800 mg tablet   Yes No   Sig: Take 800 mg by mouth 2 (two) times a day     amLODIPine (NORVASC) 10 mg tablet   Yes No   Sig: Take 10 mg by mouth daily   aspirin 81 mg chewable tablet   Yes No   Sig: Chew 81 mg daily   atorvastatin (LIPITOR) 40 mg tablet   Yes No   Sig: Take 40 mg by mouth daily at bedtime   escitalopram (LEXAPRO) 5 mg tablet   Yes No   Sig: Take 5 mg by mouth daily   lisinopril (ZESTRIL) 40 mg tablet   Yes No   Sig: Take 40 mg by mouth daily   metoprolol succinate (TOPROL-XL) 100 mg 24 hr tablet   Yes No   Sig: Take 100 mg by mouth daily   mirtazapine (REMERON) 15 mg tablet   Yes No   Sig: Take 15 mg by mouth daily at bedtime   nicotine (NICODERM CQ) 14 mg/24hr TD 24 hr patch   Yes No   Sig: Place 1 patch on the skin every 24 hours   omeprazole (PriLOSEC) 20 mg delayed release capsule   Yes No   Sig: Take 20 mg by mouth daily   rivaroxaban (XARELTO) 10 mg tablet   Yes No   Sig: Take 10 mg by mouth daily with dinner   senna-docusate sodium (SENOKOT S) 8 6-50 mg per tablet   No No   Sig: Take 1 tablet by mouth daily As needed for constipation      Facility-Administered Medications: None       Past Medical History:   Diagnosis Date    Bell's palsy     Cancer ([place] [address] )     lymphoma    Hypertension     PAD (peripheral artery disease) ([place] [address] )     PVD (peripheral vascular disease) ([place] [address] )        Past Surgical History:   Procedure Laterality Date    CARDIAC SURGERY      CARPAL TUNNEL RELEASE      HYSTERECTOMY      TONSILLECTOMY         Family History   Problem Relation Age of Onset    Cancer Mother     Breast cancer Mother     Heart attack Father      I have reviewed and agree with the history as documented  Social History   Substance Use Topics    Smoking status: Current Some Day Smoker     Packs/day: 0 25     Years: 40 00    Smokeless tobacco: Never Used    Alcohol use No        Review of Systems   Constitutional: Negative for chills and fever  HENT: Negative for congestion, ear pain, rhinorrhea and sore throat  Eyes: Negative for pain and visual disturbance  Respiratory: Negative for cough, chest tightness, shortness of breath and wheezing  Cardiovascular: Negative for chest pain and palpitations  Gastrointestinal: Positive for diarrhea and vomiting  Negative for abdominal distention, abdominal pain, blood in stool, constipation and nausea  Genitourinary: Negative for dysuria, flank pain, frequency and hematuria  Musculoskeletal: Positive for extremity weakness  Negative for back pain, joint swelling, neck pain and neck stiffness  + Left leg pain   Skin: Positive for color change  Negative for rash  Allergic/Immunologic: Negative for immunocompromised state     Neurological: Positive for weakness and numbness  Negative for dizziness, syncope, speech difficulty, light-headedness and headaches         + Left leg weakness / paresthesia  Hematological: Negative for adenopathy  Psychiatric/Behavioral: Negative for confusion and decreased concentration  All other systems reviewed and are negative  Physical Exam  Physical Exam   Constitutional: She is oriented to person, place, and time  She appears well-developed and well-nourished  No distress  HENT:   Head: Normocephalic and atraumatic  Right Ear: External ear normal    Left Ear: External ear normal    Mouth/Throat: Oropharynx is clear and moist  No oropharyngeal exudate  Eyes: Pupils are equal, round, and reactive to light  Conjunctivae and EOM are normal    Neck: Normal range of motion  Neck supple  No JVD present  Cardiovascular: Normal rate, regular rhythm, normal heart sounds and intact distal pulses  Exam reveals no gallop and no friction rub  No murmur heard  Pulmonary/Chest: Effort normal and breath sounds normal  No respiratory distress  She has no wheezes  She has no rales  She exhibits no tenderness  Abdominal: Soft  Bowel sounds are normal  She exhibits no distension  There is no tenderness  There is no rebound and no guarding  Musculoskeletal: Normal range of motion  She exhibits no edema, tenderness or deformity  Unable to palpate left DP or PT pulse  Unable to obtain dopplerable pulse on the left foot  2+ palpable DP pulse on the right foot  Lymphadenopathy:     She has no cervical adenopathy  Neurological: She is alert and oriented to person, place, and time  Subjective decreased sensation over the medial left lower leg compared to the right  Gross sensation of bilateral feet and upper legs intact and equal   No gross motor deficits in bilateral upper or lower extremities  5/5 strength throughout  Skin: Skin is warm and dry  No rash noted  She is not diaphoretic  No erythema  No pallor     Left lower leg is bluish in color and left foot is cold to touch  Psychiatric: She has a normal mood and affect  Her behavior is normal    Nursing note and vitals reviewed        Vital Signs  ED Triage Vitals   Temperature Pulse Respirations Blood Pressure SpO2   11/30/18 1528 11/30/18 1523 11/30/18 1523 11/30/18 1523 11/30/18 1523   98 3 °F (36 8 °C) 83 16 91/54 100 %      Temp Source Heart Rate Source Patient Position - Orthostatic VS BP Location FiO2 (%)   11/30/18 1528 11/30/18 1523 11/30/18 1523 11/30/18 1523 --   Oral Monitor Sitting Right arm       Pain Score       11/30/18 1842       No Pain         Vitals:    11/30/18 1901 11/30/18 2056 11/30/18 2234 11/30/18 2352   BP:  92/55 105/58    BP Location:  Left arm Right arm    Pulse:  80 80    Resp:  16 18    Temp:       TempSrc:       SpO2:  96% 99%    Weight: 50 3 kg (110 lb 14 3 oz)      Height:    5' 2" (1 575 m)     Visual Acuity      ED Medications  Medications   heparin (porcine) 25,000 units in 250 mL infusion (premix) (18 Units/kg/hr × 50 kg (Order-Specific) Intravenous New Bag 11/30/18 2011)   amLODIPine (NORVASC) tablet 10 mg (not administered)   aspirin chewable tablet 81 mg (not administered)   atorvastatin (LIPITOR) tablet 40 mg (40 mg Oral Given 12/1/18 0113)   escitalopram (LEXAPRO) tablet 5 mg (not administered)   lisinopril (ZESTRIL) tablet 20 mg (not administered)   melatonin tablet 6 mg (6 mg Oral Given 12/1/18 0113)   magnesium oxide (MAG-OX) tablet 400 mg (not administered)   metoprolol succinate (TOPROL-XL) 24 hr tablet 100 mg (not administered)   mirtazapine (REMERON) tablet 7 5 mg (7 5 mg Oral Given 12/1/18 0113)   pantoprazole (PROTONIX) EC tablet 20 mg (not administered)   senna-docusate sodium (SENOKOT S) 8 6-50 mg per tablet 1 tablet (not administered)   sodium chloride 0 9 % infusion (125 mL/hr Intravenous New Bag 12/1/18 0113)   docusate sodium (COLACE) capsule 100 mg (not administered)   ondansetron (ZOFRAN) injection 4 mg (not administered) calcium carbonate (TUMS) chewable tablet 1,000 mg (not administered)   ciprofloxacin (CIPRO) tablet 500 mg (500 mg Oral Given 12/1/18 0113)   nicotine (NICODERM CQ) 7 mg/24hr TD 24 hr patch 7 mg (7 mg Transdermal Medication Applied 12/1/18 0115)   sodium chloride 0 9 % bolus 1,000 mL (0 mL Intravenous Stopped 11/30/18 2013)   iohexol (OMNIPAQUE) 350 MG/ML injection (SINGLE-DOSE) 100 mL (100 mL Intravenous Given 11/30/18 1938)       Diagnostic Studies  Results Reviewed     Procedure Component Value Units Date/Time    Troponin I [[id number]] Collected:  12/01/18 0153    Lab Status:  No result Specimen:  Blood from Arm, Left     Lactic acid, plasma [[id number]]  (Abnormal) Collected:  11/30/18 2156    Lab Status:  Final result Specimen:  Blood from Arm, Left Updated:  11/30/18 2254     LACTIC ACID 3 1 (HH) mmol/L     Narrative:         Result may be elevated if tourniquet was used during collection      Troponin I [[id number]]  (Abnormal) Collected:  11/30/18 2156    Lab Status:  Final result Specimen:  Blood from Arm, Left Updated:  11/30/18 2230     Troponin I 0 05 (H) ng/mL     Urine Microscopic [[id number]]  (Abnormal) Collected:  11/30/18 2056    Lab Status:  Final result Specimen:  Urine from Urine, Clean Catch Updated:  11/30/18 2115     RBC, UA None Seen /hpf      WBC, UA 4-10 (A) /hpf      Epithelial Cells Occasional /hpf      Bacteria, UA Occasional /hpf     UA w Reflex to Microscopic [[id number]]  (Abnormal) Collected:  11/30/18 2056    Lab Status:  Final result Specimen:  Urine from Urine, Clean Catch Updated:  11/30/18 2103     Color, UA Light Yellow     Clarity, UA Clear     Specific Gravity, UA 1 010     pH, UA 7 5     Leukocytes, UA Trace (A)     Nitrite, UA Negative     Protein, UA Negative mg/dl      Glucose, UA Negative mg/dl      Ketones, UA Negative mg/dl      Urobilinogen, UA 0 2 E U /dl      Bilirubin, UA Negative     Blood, UA Negative    Lactic acid, plasma [[id number]]  (Abnormal) Collected: 11/30/18 1849    Lab Status:  Final result Specimen:  Blood from Arm, Right Updated:  11/30/18 1951     LACTIC ACID 2 3 (HH) mmol/L     Narrative:         Result may be elevated if tourniquet was used during collection  CBC and differential [[id number]]  (Abnormal) Collected:  11/30/18 1849    Lab Status:  Final result Specimen:  Blood from Arm, Right Updated:  11/30/18 1947     WBC 10 94 (H) Thousand/uL      RBC 2 72 (L) Million/uL      Hemoglobin 8 6 (L) g/dL      Hematocrit 26 8 (L) %      MCV 99 (H) fL      MCH 31 6 pg      MCHC 32 1 g/dL      RDW 23 9 (H) %      MPV 10 4 fL      Platelets 978 Thousands/uL      nRBC 0 /100 WBCs     Narrative: This is an appended report  These results have been appended to a previously verified report      Troponin I [[id number]]  (Abnormal) Collected:  11/30/18 1849    Lab Status:  Final result Specimen:  Blood from Arm, Right Updated:  11/30/18 1942     Troponin I 0 06 (H) ng/mL     Hepatic function panel [[id number]]  (Abnormal) Collected:  11/30/18 1849    Lab Status:  Final result Specimen:  Blood from Arm, Right Updated:  11/30/18 1937     Total Bilirubin 0 30 mg/dL      Bilirubin, Direct 0 07 mg/dL      Alkaline Phosphatase 137 (H) U/L      AST 15 U/L      ALT 16 U/L      Total Protein 5 9 (L) g/dL      Albumin 2 7 (L) g/dL     Lipase [[id number]]  (Normal) Collected:  11/30/18 1849    Lab Status:  Final result Specimen:  Blood from Arm, Right Updated:  11/30/18 1937     Lipase 103 u/L     Magnesium [[id number]]  (Normal) Collected:  11/30/18 1849    Lab Status:  Final result Specimen:  Blood from Arm, Right Updated:  11/30/18 1937     Magnesium 1 6 mg/dL     Basic metabolic panel [[id number]] Collected:  11/30/18 1849    Lab Status:  Final result Specimen:  Blood from Arm, Right Updated:  11/30/18 1935     Sodium 139 mmol/L      Potassium 4 4 mmol/L      Chloride 102 mmol/L      CO2 28 mmol/L      ANION GAP 9 mmol/L      BUN 5 mg/dL      Creatinine 0 73 mg/dL Glucose 120 mg/dL      Calcium 8 8 mg/dL      eGFR 87 ml/min/1 73sq m     Narrative:         National Kidney Disease Education Program recommendations are as follows:  GFR calculation is accurate only with a steady state creatinine  Chronic Kidney disease less than 60 ml/min/1 73 sq  meters  Kidney failure less than 15 ml/min/1 73 sq  meters  Protime-INR [[id number]]  (Abnormal) Collected:  11/30/18 1849    Lab Status:  Final result Specimen:  Blood from Arm, Right Updated:  11/30/18 1934     Protime 16 0 (H) seconds      INR 1 30 (H)    APTT [[id number]]  (Normal) Collected:  11/30/18 1849    Lab Status:  Final result Specimen:  Blood from Arm, Right Updated:  11/30/18 1934     PTT 33 seconds     APTT [[id number]]     Lab Status:  No result Specimen:  Blood     POCT Chem 8+ [[id number]]  (Abnormal) Collected:  11/30/18 1859    Lab Status:  Final result Updated:  11/30/18 1904     SODIUM, I-STAT 137 mmol/l      Potassium, i-STAT 4 1 mmol/L      Chloride, istat 97 (L) mmol/L      CO2, i-STAT 26 mmol/L      Anion Gap, i-STAT 18 (H) mmol/L      Calcium, Ionized i-STAT 1 19 mmol/L      BUN, I-STAT <4 (L) mg/dl      Creatinine, i-STAT 0 6 mg/dl      eGFR 96 ml/min/1 73sq m      Glucose, i-STAT 118 mg/dl      Hct, i-STAT 25 (L) %      Hgb, i-STAT 8 5 (L) g/dl      Specimen Type VENOUS                 XR chest 2 views   Final Result by Issac Tomas MD (11/30 2201)      No acute cardiopulmonary disease              Workstation performed: [id number]         VAS lower limb arterial duplex, limited, unilateral    (Results Pending)   CTA lower extremity left w wo contrast    (Results Pending)              Procedures  ECG 12 Lead Documentation  Date/Time: 11/30/2018 8:38 PM  Performed by: Teagan Chandler by: Uri Le     ECG reviewed by me, the ED Provider: yes    Patient location:  ED  Previous ECG:     Previous ECG:  Compared to current    Comparison ECG info:  10-9-18; PACs are now present  Rate: ECG rate:  80    ECG rate assessment: normal    Rhythm:     Rhythm: sinus rhythm    Ectopy:     Ectopy: PAC    QRS:     QRS axis:  Normal    QRS intervals:  Normal  Conduction:     Conduction: normal    ST segments:     ST segments:  Normal  T waves:     T waves: normal             Phone Contacts  ED Phone Contact    ED Course  ED Course as of Dec 01 0205   Fri Nov 30, 2018   Yareli 1978 Unable to obtain dopplerable DP/PT pulse in left leg  Palpable DP pulse right foot  [id number] Vascular surgeon paged STAT via vascular center  [place] also made aware to obtain STAT arterial duplex  [phone] with Vascular surgery who advised CTA and that this is likely subacute given symptoms have been present for 1+ week  Motor intact so he does not think patient needs emergent transfer to [facility]  Will touch base with vascular again once US and CTA back  He did recommend starting heparin  1912 Confirmed with daughter and patient that last Xarelto dose was last night  1912 eGFR: 80   1913 Verbal report from [place]): High grade stenosis just proximal to graft anastamosis (80% occlusion rough estimate)  All 3 vessels going to lower leg are open  1930 Improved from 7 0 one month ago  Hemoglobin: (!) 8 6   1945 Patient denies chest pain and has unremarkable EKG  Troponin I: (!) 0 06   2005 Likely from dehydration but will recheck after IVF bolus  LACTIC ACID: (!!) 2 3   2020 Spoke with IR attending who looked at CTA and gave preliminary report that there is 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 with stenosis in between the 2 aneurysms  There is contrast all the way down to the foot  Spoke with vascular surgeon, Dr Wilson, who felt patient can be seen tomorrow at [facility] and recommended heparin drip  2025 SLIM paged for admission                                  MDM  Number of Diagnoses or Management Options  Diagnosis management comments: 68-year-old female presents with 1-2 weeks of progressively worsening weakness and numbness/tingling in the left leg with new bluish discoloration and history of peripheral vascular disease status post bypass  Most likely patient having acutely worsening peripheral vascular disease and acute embolus or thrombosis also considered  Will consult vascular surgery urgently and obtain a vascular arterial duplex and CTA of the left leg as well as blood work  Amount and/or Complexity of Data Reviewed  Clinical lab tests: ordered and reviewed  Tests in the radiology section of CPT®: ordered and reviewed  Tests in the medicine section of CPT®: ordered and reviewed  Obtain history from someone other than the patient: yes  Discuss the patient with other providers: yes  Independent visualization of images, tracings, or specimens: yes      CritCare Time    Disposition  Final diagnoses:   Ischemia of left lower extremity   Peripheral vascular disease ([facility] )     Time reflects when diagnosis was documented in both MDM as applicable and the Disposition within this note     Time User Action Codes Description Comment    11/30/2018  8:32 PM Starlette Pea E Add [I99 8] Ischemia of left lower extremity     11/30/2018  8:32 PM Starlette Pea E Add [I73 9] Peripheral vascular disease ([facility] )     11/30/2018  9:12 PM [place] Neri Add [C85 90] Lymphoma ([facility] )     11/30/2018  9:12 PM Yudy Neri Modify [C85 90] Lymphoma [facility])       ED Disposition     ED Disposition Condition Comment    Admit  Case was discussed with ELISEO and the patient's admission status was agreed to be Admission Status: inpatient status to the service of Dr Sharla Taylor           Follow-up Information    None         Current Discharge Medication List      CONTINUE these medications which have NOT CHANGED    Details   acyclovir (ZOVIRAX) 800 mg tablet Take 800 mg by mouth 2 (two) times a day        amLODIPine (NORVASC) 10 mg tablet Take 10 mg by mouth daily      aspirin 81 mg chewable tablet Chew 81 mg daily atorvastatin (LIPITOR) 40 mg tablet Take 40 mg by mouth daily at bedtime      CHELATED MAGNESIUM PO Take 4 tablets by mouth 3 (three) times a day      Cholecalciferol (VITAMIN D3) 2000 units TABS Take 1 tablet by mouth daily      escitalopram (LEXAPRO) 5 mg tablet Take 5 mg by mouth daily      lisinopril (ZESTRIL) 40 mg tablet Take 40 mg by mouth daily      Melatonin ER (MELADOX) 3 MG TBCR Take 1 tablet by mouth daily at bedtime      metoprolol succinate (TOPROL-XL) 100 mg 24 hr tablet Take 100 mg by mouth daily      mirtazapine (REMERON) 15 mg tablet Take 15 mg by mouth daily at bedtime      nicotine (NICODERM CQ) 14 mg/24hr TD 24 hr patch Place 1 patch on the skin every 24 hours      omeprazole (PriLOSEC) 20 mg delayed release capsule Take 20 mg by mouth daily      rivaroxaban (XARELTO) 10 mg tablet Take 10 mg by mouth daily with dinner      senna-docusate sodium (SENOKOT S) 8 6-50 mg per tablet Take 1 tablet by mouth daily As needed for constipation  Qty: 20 tablet, Refills: 0    Associated Diagnoses: Constipation           No discharge procedures on file      ED Provider  Electronically Signed by           Ana Poon DO  12/01/18 2142

## 2018-12-01 LAB
ANION GAP SERPL CALCULATED.3IONS-SCNC: 9 MMOL/L (ref 4–13)
APTT PPP: 111 SECONDS (ref 26–38)
APTT PPP: 84 SECONDS (ref 26–38)
BUN SERPL-MCNC: 3 MG/DL (ref 5–25)
CALCIUM SERPL-MCNC: 8.8 MG/DL (ref 8.3–10.1)
CHLORIDE SERPL-SCNC: 104 MMOL/L (ref 100–108)
CK SERPL-CCNC: 20 U/L (ref 26–192)
CO2 SERPL-SCNC: 28 MMOL/L (ref 21–32)
CREAT SERPL-MCNC: 0.66 MG/DL (ref 0.6–1.3)
ERYTHROCYTE [DISTWIDTH] IN BLOOD BY AUTOMATED COUNT: 24.5 % (ref 11.6–15.1)
GFR SERPL CREATININE-BSD FRML MDRD: 93 ML/MIN/1.73SQ M
GLUCOSE SERPL-MCNC: 98 MG/DL (ref 65–140)
HCT VFR BLD AUTO: 26.3 % (ref 34.8–46.1)
HGB BLD-MCNC: 8.3 G/DL (ref 11.5–15.4)
LACTATE SERPL-SCNC: 1.6 MMOL/L (ref 0.5–2)
LACTATE SERPL-SCNC: 2.3 MMOL/L (ref 0.5–2)
LACTATE SERPL-SCNC: 2.4 MMOL/L (ref 0.5–2)
MAGNESIUM SERPL-MCNC: 1.5 MG/DL (ref 1.6–2.6)
MCH RBC QN AUTO: 31.2 PG (ref 26.8–34.3)
MCHC RBC AUTO-ENTMCNC: 31.6 G/DL (ref 31.4–37.4)
MCV RBC AUTO: 99 FL (ref 82–98)
PHOSPHATE SERPL-MCNC: 4.9 MG/DL (ref 2.3–4.1)
PLATELET # BLD AUTO: 313 THOUSANDS/UL (ref 149–390)
PMV BLD AUTO: 10.7 FL (ref 8.9–12.7)
POTASSIUM SERPL-SCNC: 4.4 MMOL/L (ref 3.5–5.3)
PROCALCITONIN SERPL-MCNC: <0.05 NG/ML
RBC # BLD AUTO: 2.66 MILLION/UL (ref 3.81–5.12)
SODIUM SERPL-SCNC: 141 MMOL/L (ref 136–145)
TROPONIN I SERPL-MCNC: 0.06 NG/ML
TSH SERPL DL<=0.05 MIU/L-ACNC: 1.58 UIU/ML (ref 0.36–3.74)
WBC # BLD AUTO: 8.01 THOUSAND/UL (ref 4.31–10.16)

## 2018-12-01 PROCEDURE — 93922 UPR/L XTREMITY ART 2 LEVELS: CPT | Performed by: SURGERY

## 2018-12-01 PROCEDURE — 83605 ASSAY OF LACTIC ACID: CPT | Performed by: GENERAL PRACTICE

## 2018-12-01 PROCEDURE — 83735 ASSAY OF MAGNESIUM: CPT | Performed by: NURSE PRACTITIONER

## 2018-12-01 PROCEDURE — 85730 THROMBOPLASTIN TIME PARTIAL: CPT | Performed by: INTERNAL MEDICINE

## 2018-12-01 PROCEDURE — 84145 PROCALCITONIN (PCT): CPT | Performed by: GENERAL PRACTICE

## 2018-12-01 PROCEDURE — 85730 THROMBOPLASTIN TIME PARTIAL: CPT | Performed by: GENERAL PRACTICE

## 2018-12-01 PROCEDURE — 84484 ASSAY OF TROPONIN QUANT: CPT | Performed by: NURSE PRACTITIONER

## 2018-12-01 PROCEDURE — 93926 LOWER EXTREMITY STUDY: CPT | Performed by: SURGERY

## 2018-12-01 PROCEDURE — 99222 1ST HOSP IP/OBS MODERATE 55: CPT | Performed by: INTERNAL MEDICINE

## 2018-12-01 PROCEDURE — 99222 1ST HOSP IP/OBS MODERATE 55: CPT | Performed by: PHYSICIAN ASSISTANT

## 2018-12-01 PROCEDURE — 84100 ASSAY OF PHOSPHORUS: CPT | Performed by: NURSE PRACTITIONER

## 2018-12-01 PROCEDURE — 85027 COMPLETE CBC AUTOMATED: CPT | Performed by: NURSE PRACTITIONER

## 2018-12-01 PROCEDURE — 99232 SBSQ HOSP IP/OBS MODERATE 35: CPT | Performed by: GENERAL PRACTICE

## 2018-12-01 PROCEDURE — 80048 BASIC METABOLIC PNL TOTAL CA: CPT | Performed by: NURSE PRACTITIONER

## 2018-12-01 RX ORDER — AMLODIPINE BESYLATE 10 MG/1
10 TABLET ORAL DAILY
Status: DISCONTINUED | OUTPATIENT
Start: 2018-12-01 | End: 2018-12-05 | Stop reason: HOSPADM

## 2018-12-01 RX ORDER — NICOTINE 21 MG/24HR
1 PATCH, TRANSDERMAL 24 HOURS TRANSDERMAL EVERY 24 HOURS
Status: DISCONTINUED | OUTPATIENT
Start: 2018-12-01 | End: 2018-12-01

## 2018-12-01 RX ORDER — LANOLIN ALCOHOL/MO/W.PET/CERES
6 CREAM (GRAM) TOPICAL
Status: DISCONTINUED | OUTPATIENT
Start: 2018-12-01 | End: 2018-12-05 | Stop reason: HOSPADM

## 2018-12-01 RX ORDER — MIRTAZAPINE 15 MG/1
7.5 TABLET, FILM COATED ORAL
Status: DISCONTINUED | OUTPATIENT
Start: 2018-12-01 | End: 2018-12-05 | Stop reason: HOSPADM

## 2018-12-01 RX ORDER — LISINOPRIL 20 MG/1
20 TABLET ORAL DAILY
Status: DISCONTINUED | OUTPATIENT
Start: 2018-12-01 | End: 2018-12-05

## 2018-12-01 RX ORDER — CIPROFLOXACIN 500 MG/1
500 TABLET, FILM COATED ORAL EVERY 12 HOURS SCHEDULED
Status: DISCONTINUED | OUTPATIENT
Start: 2018-12-01 | End: 2018-12-03

## 2018-12-01 RX ORDER — METOPROLOL SUCCINATE 100 MG/1
100 TABLET, EXTENDED RELEASE ORAL DAILY
Status: DISCONTINUED | OUTPATIENT
Start: 2018-12-01 | End: 2018-12-05 | Stop reason: HOSPADM

## 2018-12-01 RX ORDER — PANTOPRAZOLE SODIUM 20 MG/1
20 TABLET, DELAYED RELEASE ORAL
Status: DISCONTINUED | OUTPATIENT
Start: 2018-12-01 | End: 2018-12-05 | Stop reason: HOSPADM

## 2018-12-01 RX ORDER — SODIUM CHLORIDE 9 MG/ML
125 INJECTION, SOLUTION INTRAVENOUS CONTINUOUS
Status: DISCONTINUED | OUTPATIENT
Start: 2018-12-01 | End: 2018-12-02

## 2018-12-01 RX ORDER — DOCUSATE SODIUM 100 MG/1
100 CAPSULE, LIQUID FILLED ORAL 2 TIMES DAILY
Status: DISCONTINUED | OUTPATIENT
Start: 2018-12-01 | End: 2018-12-05 | Stop reason: HOSPADM

## 2018-12-01 RX ORDER — MAGNESIUM SULFATE 1 G/100ML
1 INJECTION INTRAVENOUS ONCE
Status: COMPLETED | OUTPATIENT
Start: 2018-12-01 | End: 2018-12-01

## 2018-12-01 RX ORDER — ESCITALOPRAM OXALATE 10 MG/1
5 TABLET ORAL DAILY
Status: DISCONTINUED | OUTPATIENT
Start: 2018-12-01 | End: 2018-12-05 | Stop reason: HOSPADM

## 2018-12-01 RX ORDER — ONDANSETRON 2 MG/ML
4 INJECTION INTRAMUSCULAR; INTRAVENOUS EVERY 6 HOURS PRN
Status: DISCONTINUED | OUTPATIENT
Start: 2018-12-01 | End: 2018-12-05 | Stop reason: HOSPADM

## 2018-12-01 RX ORDER — CALCIUM CARBONATE 200(500)MG
1000 TABLET,CHEWABLE ORAL DAILY PRN
Status: DISCONTINUED | OUTPATIENT
Start: 2018-12-01 | End: 2018-12-05 | Stop reason: HOSPADM

## 2018-12-01 RX ORDER — ASPIRIN 81 MG/1
81 TABLET, CHEWABLE ORAL DAILY
Status: DISCONTINUED | OUTPATIENT
Start: 2018-12-01 | End: 2018-12-05 | Stop reason: HOSPADM

## 2018-12-01 RX ORDER — ATORVASTATIN CALCIUM 40 MG/1
40 TABLET, FILM COATED ORAL
Status: DISCONTINUED | OUTPATIENT
Start: 2018-12-01 | End: 2018-12-05 | Stop reason: HOSPADM

## 2018-12-01 RX ORDER — AMOXICILLIN 250 MG
1 CAPSULE ORAL DAILY
Status: DISCONTINUED | OUTPATIENT
Start: 2018-12-01 | End: 2018-12-05 | Stop reason: HOSPADM

## 2018-12-01 RX ADMIN — MELATONIN 6 MG: 3 TAB ORAL at 21:08

## 2018-12-01 RX ADMIN — CIPROFLOXACIN HYDROCHLORIDE 500 MG: 500 TABLET, FILM COATED ORAL at 09:32

## 2018-12-01 RX ADMIN — MAGNESIUM OXIDE TAB 400 MG (241.3 MG ELEMENTAL MG) 400 MG: 400 (241.3 MG) TAB at 09:32

## 2018-12-01 RX ADMIN — SODIUM CHLORIDE 125 ML/HR: 0.9 INJECTION, SOLUTION INTRAVENOUS at 01:13

## 2018-12-01 RX ADMIN — CIPROFLOXACIN HYDROCHLORIDE 500 MG: 500 TABLET, FILM COATED ORAL at 21:08

## 2018-12-01 RX ADMIN — ATORVASTATIN CALCIUM 40 MG: 40 TABLET, FILM COATED ORAL at 21:08

## 2018-12-01 RX ADMIN — CIPROFLOXACIN HYDROCHLORIDE 500 MG: 500 TABLET, FILM COATED ORAL at 01:13

## 2018-12-01 RX ADMIN — ATORVASTATIN CALCIUM 40 MG: 40 TABLET, FILM COATED ORAL at 01:13

## 2018-12-01 RX ADMIN — ASPIRIN 81 MG 81 MG: 81 TABLET ORAL at 09:33

## 2018-12-01 RX ADMIN — SODIUM CHLORIDE 125 ML/HR: 0.9 INJECTION, SOLUTION INTRAVENOUS at 09:40

## 2018-12-01 RX ADMIN — NICOTINE 7 MG: 7 PATCH TRANSDERMAL at 09:37

## 2018-12-01 RX ADMIN — MIRTAZAPINE 7.5 MG: 15 TABLET, FILM COATED ORAL at 21:07

## 2018-12-01 RX ADMIN — MELATONIN 6 MG: 3 TAB ORAL at 01:13

## 2018-12-01 RX ADMIN — PANTOPRAZOLE SODIUM 20 MG: 20 TABLET, DELAYED RELEASE ORAL at 16:52

## 2018-12-01 RX ADMIN — NICOTINE 7 MG: 7 PATCH TRANSDERMAL at 01:15

## 2018-12-01 RX ADMIN — SODIUM CHLORIDE 250 ML: 0.9 INJECTION, SOLUTION INTRAVENOUS at 15:40

## 2018-12-01 RX ADMIN — ESCITALOPRAM OXALATE 5 MG: 10 TABLET ORAL at 09:33

## 2018-12-01 RX ADMIN — RIVAROXABAN 10 MG: 10 TABLET, FILM COATED ORAL at 16:52

## 2018-12-01 RX ADMIN — HEPARIN SODIUM AND DEXTROSE 15 UNITS/KG/HR: 10000; 5 INJECTION INTRAVENOUS at 13:10

## 2018-12-01 RX ADMIN — MIRTAZAPINE 7.5 MG: 15 TABLET, FILM COATED ORAL at 01:13

## 2018-12-01 RX ADMIN — DOCUSATE SODIUM 100 MG: 100 CAPSULE, LIQUID FILLED ORAL at 17:01

## 2018-12-01 RX ADMIN — MAGNESIUM SULFATE HEPTAHYDRATE 1 G: 1 INJECTION, SOLUTION INTRAVENOUS at 16:53

## 2018-12-01 RX ADMIN — PANTOPRAZOLE SODIUM 20 MG: 20 TABLET, DELAYED RELEASE ORAL at 09:32

## 2018-12-01 RX ADMIN — SODIUM CHLORIDE 125 ML/HR: 0.9 INJECTION, SOLUTION INTRAVENOUS at 17:02

## 2018-12-01 RX ADMIN — MAGNESIUM OXIDE TAB 400 MG (241.3 MG ELEMENTAL MG) 400 MG: 400 (241.3 MG) TAB at 17:00

## 2018-12-01 NOTE — ASSESSMENT & PLAN NOTE
· Lactic elevated at 2 3, likely in the setting of chemotherapy  · IV fluids at 125  · Continue to trend

## 2018-12-01 NOTE — PROGRESS NOTES
Imaging studies and chart reviewed  Patient seen and examined  Full consult to follow  Case discussed with Dr Danica Castelan and Dr Jazzmine Mendoza  Pt with history of aorto-bifemoral bypass graft 2010 and bilateral femoral-below knee popliteal bypass grafts with in situ vein (R '13, L '16) @ [facility]  No evidence of acute ischemia  LLE symptoms likely related to chronic ischemia from L fem-pop graft stenosis  LLE does not appear to be source of lactic acidosis  Check repeat lactate  Continue medical workup  Will require angiogram to evaluate LLE bypass graft in setting of stenosis and decreased motor function  Will plan for LLE angiogram w/possible intervention in Hybrid room at \[facility]\"" next week as schedule allows which can be done as outpatient  Management per heparin drip/Xarelto at discretion of cardiology  No need for continuation of heparin drip related to vascular disease and graft as not evidence of embolic event        Avni Buckley PA-C  12/1/18  [facility], [phone]

## 2018-12-01 NOTE — PROGRESS NOTES
Progress Note - Lawrence Pals 1955, 70 y o  female MRN: [id number]    Unit/Bed#: -01 Encounter: [id number]    Primary Care Provider: No primary care provider on file  Date and time admitted to hospital: 11/30/2018  5:50 PM        Elevated lactic acid level   Assessment & Plan    · Lactic elevated at 2 3-possibly due to ischemia  · IV fluids at 125  · Continue to trend     Lymphoma [facility])   Assessment & Plan    · Patient is on active chemo flow plan at [facility]   · She receives chemo every 3 weeks, she is scheduled for chemo on December 3rd  · Recently admitted for diagnosis of DLBCL with diffuse metastasis who was started on R-CHOP (Sept 12 C1D1)  Bone marrow biopsy 9/4 with no evidence of lymphoma  LP 9/14 without evidence of CNS involvement and she was neutropenic at time of discharge   · was discharged with prophylactic ciprofloxacin, Augmentin, acyclovir, fluconazole, and daily Granix injections  · ANC   03 at admission, her most recent from 23:00 on Sept 23 is 3 22  On Sept 25 counts have improved  Essential hypertension   Assessment & Plan    · Continue amlodipine lisinopril and metoprolol with hold parameters  · Family doctor recently decrease lisinopril from 40 mg to 20 mg  · Monitor closely     Elevated troponin   Assessment & Plan    · Patient has a history of elevated troponin's  · Cardiology consult     Atrial fibrillation with rapid ventricular response (HCC)   Assessment & Plan    · Heart rate is controlled on monitor  · Patient takes Xarelto at home however she does have to stop it 3 days prior to chemo so she currently was not taking  · Heparin drip in place  ·      * Lower extremity weakness   Assessment & Plan    · Patient has a significant history for peripheral vascular disease      · ED physician discussed with vascular, there is no need to transfer the patient to [place] the instructed the ED to start patient on a heparin drip and vascular consult  · CTA of lower extremities results pending and on heparin gtt  · Ultrasound of left lower extremity completed, popliteal artery graft showing significant stenosis of approximately 75%  · Fem-pop bypass completed in approximately  per family  · Patient apparently has restarted smoking for family                 VTE Pharmacologic Prophylaxis:   Pharmacologic: Heparin Drip  Mechanical VTE Prophylaxis in Place: Yes    Patient Centered Rounds: I have performed bedside rounds with nursing staff today  Discussions with Specialists or Other Care Team Provider:     Education and Discussions with Family / Patient:     Time Spent for Care: 30 minutes  More than 50% of total time spent on counseling and coordination of care as described above  Current Length of Stay: 1 day(s)    Current Patient Status: Inpatient   Certification Statement: The patient will continue to require additional inpatient hospital stay due to arterial insufficiency left leg    Discharge Plan: pending vascular consult    Code Status: Level 1 - Full Code      Subjective:   Still with left leg pain  Still sleeping but comfortable    Objective:     Vitals:   Temp (24hrs), Av 2 °F (36 8 °C), Min:98 °F (36 7 °C), Max:98 3 °F (36 8 °C)    Temp:  [98 °F (36 7 °C)-98 3 °F (36 8 °C)] 98 °F (36 7 °C)  HR:  [72-83] 74  Resp:  [16-22] 22  BP: ()/(53-58) 119/58  SpO2:  [94 %-100 %] 94 %  Body mass index is 20 28 kg/m²  Input and Output Summary (last 24 hours):     No intake or output data in the 24 hours ending 18 0842    Physical Exam:     Physical Exam   Constitutional: She appears well-developed and well-nourished  HENT:   Head: Normocephalic and atraumatic  Eyes: Pupils are equal, round, and reactive to light  Cardiovascular:   irreg irreg   Pulmonary/Chest: Effort normal and breath sounds normal    Abdominal: Soft  Bowel sounds are normal    Musculoskeletal: She exhibits no edema     Left leg warm to touch       Additional Data: Labs:      Results from last 7 days  Lab Units 12/01/18  0505  11/30/18  1849   WBC Thousand/uL 8 01  --  10 94*   HEMOGLOBIN g/dL 8 3*  --  8 6*   I STAT HEMOGLOBIN   --   < >  --    HEMATOCRIT % 26 3*  --  26 8*   HEMATOCRIT, ISTAT   --   < >  --    PLATELETS Thousands/uL 313  --  330   BANDS PCT %  --   --  15*   LYMPHO PCT %  --   --  6*   MONO PCT %  --   --  10   EOS PCT %  --   --  0   < > = values in this interval not displayed  Results from last 7 days  Lab Units 12/01/18  0505  11/30/18  1849   SODIUM mmol/L 141  --  139   POTASSIUM mmol/L 4 4  --  4 4   CHLORIDE mmol/L 104  --  102   CO2 mmol/L 28  --  28   CO2, I-STAT   --   < >  --    BUN mg/dL 3*  --  5   CREATININE mg/dL 0 66  --  0 73   AGAP   --   < >  --    ANION GAP mmol/L 9  --  9   CALCIUM mg/dL 8 8  --  8 8   ALBUMIN g/dL  --   --  2 7*   TOTAL BILIRUBIN mg/dL  --   --  0 30   ALK PHOS U/L  --   --  137*   ALT U/L  --   --  16   AST U/L  --   --  15   GLUCOSE RANDOM mg/dL 98  --  120   < > = values in this interval not displayed  Results from last 7 days  Lab Units 11/30/18  1849   INR  1 30*               Results from last 7 days  Lab Units 11/30/18  2156 11/30/18  1849   LACTIC ACID mmol/L 3 1* 2 3*           * I Have Reviewed All Lab Data Listed Above  * Additional Pertinent Lab Tests Reviewed:  All Labs Within Last 24 Hours Reviewed    Imaging:    Imaging Reports Reviewed Today Include:   Imaging Personally Reviewed by Myself Includes:      Recent Cultures (last 7 days):           Last 24 Hours Medication List:     Current Facility-Administered Medications:  amLODIPine 10 mg Oral Daily Rosan Light, CRNP    aspirin 81 mg Oral Daily Rosan Light, CRNP    atorvastatin 40 mg Oral HS Rosan Light, CRNP    calcium carbonate 1,000 mg Oral Daily PRN Rosan Light, CRNP    ciprofloxacin 500 mg Oral Q12H [facility] Yahaira Matson, LAURA    docusate sodium 100 mg Oral BID Rosan Light, CRNP    escitalopram 5 mg Oral Daily Rosan Light, CRNP    heparin (porcine) 3-30 Units/kg/hr (Order-Specific) Intravenous Titrated Jerral Blakes, DO Last Rate: 18 Units/kg/hr (12/01/18 0000)   lisinopril 20 mg Oral Daily Yahaira Matson, CRNP    magnesium oxide 400 mg Oral BID Rosan Light, CRNP    melatonin 6 mg Oral HS Rosan Light, CRNP    metoprolol succinate 100 mg Oral Daily Rosan Light, CRNP    mirtazapine 7 5 mg Oral HS Rosan Light, CRNP    nicotine 7 mg Transdermal Daily Eddie Olivera MD    ondansetron 4 mg Intravenous Q6H PRN Rosan Light, CRNP    pantoprazole 20 mg Oral BID AC Rosan Light, CRNP    senna-docusate sodium 1 tablet Oral Daily Rosan Light, CRNP    sodium chloride 125 mL/hr Intravenous Continuous Rosan Light, CRNP Last Rate: 125 mL/hr (12/01/18 0113)        Today, Patient Was Seen By: Marcos Koehler MD    ** Please Note: Dictation voice to text software may have been used in the creation of this document   **

## 2018-12-01 NOTE — ASSESSMENT & PLAN NOTE
· Heart rate is controlled on monitor  · Patient takes Xarelto at home however she does have to stop it 3 days prior to chemo so she currently was not taking  · Heparin drip in place  ·

## 2018-12-01 NOTE — ASSESSMENT & PLAN NOTE
· Patient has a significant history for peripheral vascular disease  · ED physician discussed with vascular, there is no need to transfer the patient to [place] the instructed the ED to start patient on a heparin drip and vascular consult  · CTA of lower extremities results and vascular consult reviewed no need for heparin gtt; resume xarelto  · Ultrasound of left lower extremity completed, popliteal artery graft showing significant stenosis of approximately 75%      · Fem-pop bypass completed in approximately 2010 per family  · Patient apparently has restarted smoking for family   Lactic acidosis resolved  cpk normal  Ct l spinewith degenerative changes mri l spine ordered and neurology consult placed

## 2018-12-01 NOTE — ASSESSMENT & PLAN NOTE
15-year-old female smoker w/hx HTN, HLD, active DLBC lymphoma on chemo @ [facility], new onset PAF on Xarelto and severe aortoiliac and infrainguinal arterial occlusive disease, s/p aorto-bifemoral bypass graft '10 and bilateral fem-BK pop bypass w/vein (R '13, L '16) @ [facility] who presents w/LLE numbness and mild motor changes x 2 weeks and lactic acidosis  Diagnostics:  -limited ANITA 11/30:  Patent L fem-pop bypass graft with >75% stenosis proximal to inflow anastomosis  -CTA LLE 11/30:  Patent L fem-pop bypass graft w/2V AT, PT runoff,  2 aneurysms proximal portion of graft with short-segment high-grade 90% stenosis  No acute thrombus or occlusion    Plan:  -no acute vascular intervention indicated  Will require abdominal aortogram with left lower extremity runoff for assessment and possible intervention L fem-pop bypass graft (in Hybrid room @ [facility]) which can be performed as outpatient within next week  Scheduling to be readdressed on Monday  -no evidence of acute lower extremity ischemia, embolic event    Symptoms consistent with chronic ischemia  -LLE symptoms not source of lactic acidosis  -continue to trend lactate and continue medical workup per primary service  -no break in anticoagulation therapy identified as last dose of Xarelto 11/29/18 on day prior to admission  -continue ASA and statin therapy  -heparin drip at the discretion of cardiology related to PAF  -will need to hold Xarelto 48 hrs prior to scheduled angiogram  -will hold ACE 1 day prior and day of angiogram  -discussed at length with Dr Jose Anderson and CTA images reviewed by Dr Jose Anderson  -d/w Dr Donna Goldman

## 2018-12-01 NOTE — PROGRESS NOTES
CTA obtained from the ER of the left lower extremity: Skin changes, pain, decreased sensation left lower extremity  There is a bypass of left femoral to below knee left popliteal artery  Bypass is grossly patent, and there is at least 2 vessel run off via AT/PT  However, there is two aneurysmal segments in the proximal portion of the bypass, and in between the aneurysmal segments there is a short segment high grade stenosis of approximately 90%  Coronal reformats series 604 images 35-42  Findings discussed via telephone with Dr Lm Rajput at time of note creation

## 2018-12-01 NOTE — CONSULTS
Consultation - Cardiology  Gavino Nolan 70 y o  female MRN: [id number]  Unit/Bed#: -01 Encounter: [id number]    Consults    Physician Requesting Consult: Soumya García, *  Reason for Consult / Principal Problem: nstemi    Chief Complaint   Patient presents with    Extremity Weakness     pt co of L leg weakness onset last weeek pt w hx pvd  HPI: Cardiologist Dr Jc Alvarez is a 70y o  year old female who has a history of PVD, lymphoma with active chemotherapy, Bell's palsy, atrial fibrillation  Patient came to the emergency room with left lower extremity weakness  Patient states she has also had pain and tingling over the last couple of weeks  She denied any chest pain, shortness of breath  Patient had previously stop smoking but recently resumed smoking  Patient denies any chest pain, chest pressure, chest heaviness  She has no personal history of CAD  She is currently getting active chemotherapy at [facility]       REVIEW OF SYSTEMS:  Constitutional:  Denies fever or chills   Eyes:  Denies change in visual acuity   HENT:  Denies nasal congestion or sore throat   Respiratory:  Denies cough or shortness of breath   Cardiovascular:  Denies chest pain or edema   GI:  Denies abdominal pain, nausea, vomiting, bloody stools or diarrhea   :  Denies dysuria, frequency, difficulty in micturition and nocturia  Musculoskeletal: + left lower extremity weakness, pain, tingling Denies back pain or joint pain   Neurologic:  Denies headache, focal weakness or sensory changes   Endocrine:  Denies polyuria or polydipsia   Lymphatic:  Denies swollen glands   Psychiatric:  Denies depression or anxiety     Historical Information   Past Medical History:   Diagnosis Date    Bell's palsy     Cancer ([facility] )     lymphoma    Hypertension     PAD (peripheral artery disease) ([facility] )     PVD (peripheral vascular disease) ([facility] )      Past Surgical History:   Procedure Laterality Date    CARDIAC SURGERY      CARPAL TUNNEL RELEASE      HYSTERECTOMY      TONSILLECTOMY       History   Alcohol Use No     History   Drug Use No     History   Smoking Status    Current Some Day Smoker    Packs/day: 0 25    Years: 40 00   Smokeless Tobacco    Never Used     Family History:   Family History   Problem Relation Age of Onset    Cancer Mother     Breast cancer Mother     Heart attack Father        MEDS & ALLERGIES:  all current active meds have been reviewed and current meds:   Current Facility-Administered Medications   Medication Dose Route Frequency    amLODIPine (NORVASC) tablet 10 mg  10 mg Oral Daily    aspirin chewable tablet 81 mg  81 mg Oral Daily    atorvastatin (LIPITOR) tablet 40 mg  40 mg Oral HS    calcium carbonate (TUMS) chewable tablet 1,000 mg  1,000 mg Oral Daily PRN    ciprofloxacin (CIPRO) tablet 500 mg  500 mg Oral Q12H [address]    docusate sodium (COLACE) capsule 100 mg  100 mg Oral BID    escitalopram (LEXAPRO) tablet 5 mg  5 mg Oral Daily    heparin (porcine) 25,000 units in 250 mL infusion (premix)  3-30 Units/kg/hr (Order-Specific) Intravenous Titrated    lisinopril (ZESTRIL) tablet 20 mg  20 mg Oral Daily    magnesium oxide (MAG-OX) tablet 400 mg  400 mg Oral BID    melatonin tablet 6 mg  6 mg Oral HS    metoprolol succinate (TOPROL-XL) 24 hr tablet 100 mg  100 mg Oral Daily    mirtazapine (REMERON) tablet 7 5 mg  7 5 mg Oral HS    nicotine (NICODERM CQ) 7 mg/24hr TD 24 hr patch 7 mg  7 mg Transdermal Daily    ondansetron (ZOFRAN) injection 4 mg  4 mg Intravenous Q6H PRN    pantoprazole (PROTONIX) EC tablet 20 mg  20 mg Oral BID AC    senna-docusate sodium (SENOKOT S) 8 6-50 mg per tablet 1 tablet  1 tablet Oral Daily    sodium chloride 0 9 % infusion  125 mL/hr Intravenous Continuous       heparin (porcine) 3-30 Units/kg/hr (Order-Specific) Last Rate: 9 1 Units/kg/hr (12/01/18 1310)   sodium chloride 125 mL/hr Last Rate: 125 mL/hr (12/01/18 0940)     Allergies Allergen Reactions    Oxycodone-Acetaminophen Rash       OBJECTIVE:  Vitals:   Vitals:    12/01/18 1100   BP: 123/59   Pulse: 84   Resp: 18   Temp: 97 7 °F (36 5 °C)   SpO2: 100%     Body mass index is 20 28 kg/m²  Systolic (42JHQ), WQK:883 , Min:91 , GTL:673     Diastolic (03RHY), CNW:03, Min:53, Max:59      Intake/Output Summary (Last 24 hours) at 12/01/18 1316  Last data filed at 12/01/18 1100   Gross per 24 hour   Intake                0 ml   Output              150 ml   Net             -150 ml     Weight (last 2 days)     Date/Time   Weight    11/30/18 1901  50 3 (110 89)            Invasive Devices     Peripheral Intravenous Line            Peripheral IV 11/30/18 Right Antecubital less than 1 day                PHYSICAL EXAMS:  General:  +ill appearing Patient is not in acute distress, laying in the bed comfortably, awake, alert responding to commands  Head: Normocephalic, Atraumatic  HEENT: +right eye patch Both pupils normal-size atraumatic, normocephalic, nonicteric  Neck:  JVP not raised  Trachea central  Respiratory:  Decreased breath sounds bilaterally  Cardiovascular:  S1-S2 normal irregularly irregular without any murmur rails or rub  GI:  Abdomen soft nontender   Liver and spleen normal size  Musculoskeletal:  No edema, warm legs  Integument:  No skin rashes or ulceration  Lymphatic:  No cervical or inguinal lymphadenopathy  Neurologic:  Patient is awake alert, responding to command, well-oriented to time and place and person    LABORATORY RESULTS:    Results from last 7 days  Lab Units 12/01/18  0153 11/30/18  2156 11/30/18  1849   TROPONIN I ng/mL 0 06* 0 05* 0 06*     CBC with diff:   Results from last 7 days  Lab Units 12/01/18  0505 11/30/18  1859 11/30/18  1849   WBC Thousand/uL 8 01  --  10 94*   HEMOGLOBIN g/dL 8 3*  --  8 6*   I STAT HEMOGLOBIN g/dl  --  8 5*  --    HEMATOCRIT % 26 3*  --  26 8*   HEMATOCRIT, ISTAT %  --  25*  --    MCV fL 99*  --  99*   PLATELETS Thousands/uL 313  -- 330   MCH pg 31 2  --  31 6   MCHC g/dL 31 6  --  32 1   RDW % 24 5*  --  23 9*   MPV fL 10 7  --  10 4   NRBC AUTO /100 WBCs  --   --  0       CMP:  Results from last 7 days  Lab Units 12/01/18  0505 11/30/18  1859 11/30/18  1849   POTASSIUM mmol/L 4 4  --  4 4   CHLORIDE mmol/L 104  --  102   CO2 mmol/L 28  --  28   CO2, I-STAT mmol/L  --  26  --    BUN mg/dL 3*  --  5   CREATININE mg/dL 0 66  --  0 73   GLUCOSE, ISTAT mg/dl  --  118  --    CALCIUM mg/dL 8 8  --  8 8   AST U/L  --   --  15   ALT U/L  --   --  16   ALK PHOS U/L  --   --  137*   EGFR ml/min/1 73sq m 93 96 87       BMP:  Results from last 7 days  Lab Units 12/01/18  0505 11/30/18 1859 11/30/18  1849   POTASSIUM mmol/L 4 4  --  4 4   CHLORIDE mmol/L 104  --  102   CO2 mmol/L 28  --  28   CO2, I-STAT mmol/L  --  26  --    BUN mg/dL 3*  --  5   CREATININE mg/dL 0 66  --  0 73   GLUCOSE, ISTAT mg/dl  --  118  --    CALCIUM mg/dL 8 8  --  8 8              Results from last 7 days  Lab Units 12/01/18  0505 11/30/18  1849   MAGNESIUM mg/dL 1 5* 1 6               Results from last 7 days  Lab Units 11/30/18  1849   INR  1 30*       Lipid Profile:   No results found for: CHOL  No results found for: HDL  No results found for: LDLCALC  No results found for: TRIG    Cardiac testing:   No results found for this or any previous visit  No results found for this or any previous visit  No procedure found  No results found for this or any previous visit  Imaging: I have personally reviewed pertinent reports  EKG reviewed personally:   sr ,pac        Assessment/Plan:  1-elevated troponins of unclear significance, troponin 0 06/0 05/0 06  Will check echocardiogram to assess LV function and regional wall motion for abnormalities  Further recommendations based upon echocardiogram findings  No need for heparin from a cardiac standpoint  2-paroxysmal atrial fibrillation, stable  Continue medications  Continue with Xarelto    Continue with metoprolol  3-PVD with history of lower extremity bypass  management per vascular surgery  Code Status: Level 1 - Full Code    Thank you for allowing us to participate in this patient's care  This pt will follow up with Dr Jammie Bell once discharged  Counseling / Coordination of Care  Total floor / unit time spent today 35 minutes  Greater than 50% of total time was spent with the patient and / or family counseling and / or coordination of care  A description of the counseling / coordination of care: Review of history, current assessment, development of a plan  Lisa Peter PA-C  12/1/2018,1:16 PM    Portions of the record may have been created with voice recognition software   Occasional wrong word or "sound a like" substitutions may have occurred due to the inherent limitations of voice recognition software   Read the chart carefully and recognize, using context, where substitutions have occurred

## 2018-12-01 NOTE — ASSESSMENT & PLAN NOTE
New onset PAF dx'd Sept '18 @ [address]   -preserved EF 60% w/o RWMA  -on Xarelto which is usually held 3 days prior to chemotherapy  Last dose 11/29/18  -continue heparin drip  -LLE symptoms do not appear acute or related to embolic event    No evidence of LLE thrombosis or graft occlusion  -continue rate control  -cardiology following

## 2018-12-01 NOTE — ASSESSMENT & PLAN NOTE
· Patient is on active chemo flow plan at [facility]   · She receives chemo every 3 weeks, she is scheduled for chemo on December 3rd  · Recently admitted for diagnosis of DLBCL with diffuse metastasis who was started on R-CHOP (Sept 12 C1D1)  Bone marrow biopsy 9/4 with no evidence of lymphoma  LP 9/14 without evidence of CNS involvement and she was neutropenic at time of discharge   · was discharged with prophylactic ciprofloxacin, Augmentin, acyclovir, fluconazole, and daily Granix injections  · ANC   03 at admission, her most recent from 23:00 on Sept 23 is 3 22  On Sept 25 counts have improved

## 2018-12-01 NOTE — ASSESSMENT & PLAN NOTE
· Patient has a significant history for peripheral vascular disease  · ED physician discussed with vascular, there is no need to transfer the patient to [facility] the instructed the ED to start patient on a heparin drip and vascular will evaluate tomorrow  · CTA of lower extremities pending  · Ultrasound of left lower extremity completed, popliteal artery graft showing significant stenosis of approximately 75%  · Fem-pop bypass completed in approximately 2010 per family  · Patient apparently has restarted smoking for family  · Q for neurovascular checks    Patient does report numbness and tingling of left lower extremity

## 2018-12-01 NOTE — ASSESSMENT & PLAN NOTE
· Patient is on active chemo flow plan at [facility]   · She receives chemo every 3 weeks, she is scheduled for chemo on December 3rd  · Recently admitted for diagnosis of DLBCL with diffuse metastasis who was started on R-CHOP (Sept 12 C1D1)  Bone marrow biopsy 9/4 with no evidence of lymphoma  LP 9/14 without evidence of CNS involvement and she was neutropenic at time of discharge   · was discharged with prophylactic ciprofloxacin, Augmentin, acyclovir, fluconazole, and daily Granix injections  · ANC   03 at admission, her most recent from 23:00 on Sept 23 is 3 22  On Sept 25 counts have improved

## 2018-12-01 NOTE — ASSESSMENT & PLAN NOTE
· Heart rate is controlled on monitor  · Patient takes Xarelto at home however she does have to stop it 3 days prior to chemo so she currently was not taking  · Heparin drip running

## 2018-12-01 NOTE — H&P
[address] Internal [facility]  H&P- Bernadette Heck 1955, 70 y o  female MRN: [id number]    Unit/Bed#: ED 26 Encounter: [id number]    Primary Care Provider: No primary care provider on file  Date and time admitted to hospital: 11/30/2018  5:50 PM        * Lower extremity weakness   Assessment & Plan    · Patient has a significant history for peripheral vascular disease  · ED physician discussed with vascular, there is no need to transfer the patient to [place] the instructed the ED to start patient on a heparin drip and vascular will evaluate tomorrow  · CTA of lower extremities pending  · Ultrasound of left lower extremity completed, popliteal artery graft showing significant stenosis of approximately 75%  · Fem-pop bypass completed in approximately 2010 per family  · Patient apparently has restarted smoking for family  · Q for neurovascular checks    Patient does report numbness and tingling of left lower extremity     Elevated troponin   Assessment & Plan    · Patient has a history of elevated troponin's  · First troponin is 0 06, continue to trend     Elevated lactic acid level   Assessment & Plan    · Lactic elevated at 2 3, likely in the setting of chemotherapy  · IV fluids at 125  · Continue to trend     Atrial fibrillation with rapid ventricular response (HCC)   Assessment & Plan    · Heart rate is controlled on monitor  · Patient takes Xarelto at home however she does have to stop it 3 days prior to chemo so she currently was not taking  · Heparin drip running     Essential hypertension   Assessment & Plan    · Continue amlodipine lisinopril and metoprolol with hold parameters  · Family doctor recently decrease lisinopril from 40 mg to 20 mg  · Monitor closely     Lymphoma [facility])   Assessment & Plan    · Patient is on active chemo flow plan at [facility]   · She receives chemo every 3 weeks, she is scheduled for chemo on December 3rd  · Recently admitted for diagnosis of DLBCL with diffuse metastasis who was started on R-CHOP (Sept 12 C1D1)  Bone marrow biopsy 9/4 with no evidence of lymphoma  LP 9/14 without evidence of CNS involvement and she was neutropenic at time of discharge   · was discharged with prophylactic ciprofloxacin, Augmentin, acyclovir, fluconazole, and daily Granix injections  · ANC   03 at admission, her most recent from 23:00 on Sept 23 is 3 22  On Sept 25 counts have improved  VTE Prophylaxis: Heparin Drip  / sequential compression device   Code Status:  Full code  POLST: POLST form is not discussed and not completed at this time  Discussion with family:  Daughter at bedside    Anticipated Length of Stay:  Patient will be admitted on an Inpatient basis with an anticipated length of stay of  > 2 midnights  Justification for Hospital Stay:  Patient is found to have a cold lower extremity and will need further workup by vascular surgery, will also need heparin drip    Total Time for Visit, including Counseling / Coordination of Care: 30 minutes  Greater than 50% of this total time spent on direct patient counseling and coordination of care  Chief Complaint:   Left leg weakness    History of Present Illness:    Honorio Valverde is a 70 y o  female who presents with left lower extremity weakness  Patient has a significant past medical history of peripheral vascular disease, peripheral artery disease, cancer with active chemo planned, Bell's palsy, atrial fibrillation, she also had a fem-pop bypass in the last 5-8 years  She presented to the ED with increasing weakness pain and tingling over the last 2 weeks  She denies any chest pain chest tightness shortness of breath or difficulty breathing  She had previously stopped smoking however she is now resumed smoking  Or she denies any fevers chills nausea vomiting diarrhea  She reports that she has a cane and a walker to ambulate  She does live with family  No dietary restrictions    She is on active chemo plan she is every 3 weeks is a [facility]      Review of Systems:    Review of Systems    Past Medical and Surgical History:     Past Medical History:   Diagnosis Date    Bell's palsy     Cancer ([facility] )     lymphoma    Hypertension     PAD (peripheral artery disease) ([facility] )     PVD (peripheral vascular disease) ([facility] )        Past Surgical History:   Procedure Laterality Date    CARDIAC SURGERY      HYSTERECTOMY      TONSILLECTOMY         Meds/Allergies:    Prior to Admission medications    Medication Sig Start Date End Date Taking?  Authorizing Provider   acyclovir (ZOVIRAX) 800 mg tablet Take 800 mg by mouth 2 (two) times a day      Historical Provider, MD   amLODIPine (NORVASC) 10 mg tablet Take 10 mg by mouth daily    Historical Provider, MD   aspirin 81 mg chewable tablet Chew 81 mg daily    Historical Provider, MD   atorvastatin (LIPITOR) 40 mg tablet Take 40 mg by mouth daily at bedtime    Historical Provider, MD   CHELATED MAGNESIUM PO Take 4 tablets by mouth 3 (three) times a day    Historical Provider, MD   Cholecalciferol (VITAMIN D3) 2000 units TABS Take 1 tablet by mouth daily    Historical Provider, MD   escitalopram (LEXAPRO) 5 mg tablet Take 5 mg by mouth daily    Historical Provider, MD   lisinopril (ZESTRIL) 40 mg tablet Take 40 mg by mouth daily    Historical Provider, MD   Melatonin ER (MELADOX) 3 MG TBCR Take 1 tablet by mouth daily at bedtime    Historical Provider, MD   metoprolol succinate (TOPROL-XL) 100 mg 24 hr tablet Take 100 mg by mouth daily    Historical Provider, MD   mirtazapine (REMERON) 15 mg tablet Take 15 mg by mouth daily at bedtime    Historical Provider, MD   nicotine (NICODERM CQ) 14 mg/24hr TD 24 hr patch Place 1 patch on the skin every 24 hours    Historical Provider, MD   omeprazole (PriLOSEC) 20 mg delayed release capsule Take 20 mg by mouth daily    Historical Provider, MD   rivaroxaban (XARELTO) 10 mg tablet Take 10 mg by mouth daily with dinner Historical Provider, MD   senna-docusate sodium (SENOKOT S) 8 6-50 mg per tablet Take 1 tablet by mouth daily As needed for constipation 10/9/18   Jose Blount DO     I have reviewed home medications with patient personally  Allergies: Allergies   Allergen Reactions    Oxycodone-Acetaminophen Rash       Social History:     Marital Status:    Occupation:  Retired  Patient Pre-hospital Living Situation:  Private residence  Patient Pre-hospital Level of Mobility:  Cane or walker  Patient Pre-hospital Diet Restrictions:  None  Substance Use History:   History   Alcohol Use No     History   Smoking Status    Never Smoker   Smokeless Tobacco    Never Used     History   Drug Use No       Family History:    History reviewed  No pertinent family history  Physical Exam:     Vitals:   Blood Pressure: 92/55 (11/30/18 2056)  Pulse: 80 (11/30/18 2056)  Temperature: 98 3 °F (36 8 °C) (11/30/18 1528)  Temp Source: Oral (11/30/18 1528)  Respirations: 16 (11/30/18 2056)  Weight - Scale: 50 3 kg (110 lb 14 3 oz) (11/30/18 1901)  SpO2: 96 % (11/30/18 2056)    Physical Exam      Additional Data:     Lab Results: I have personally reviewed pertinent reports          Results from last 7 days  Lab Units 11/30/18 1859 11/30/18 1849   WBC Thousand/uL  --  10 94*   HEMOGLOBIN g/dL  --  8 6*   I STAT HEMOGLOBIN g/dl 8 5*  --    HEMATOCRIT %  --  26 8*   HEMATOCRIT, ISTAT % 25*  --    PLATELETS Thousands/uL  --  330   BANDS PCT %  --  15*   LYMPHO PCT %  --  6*   MONO PCT %  --  10   EOS PCT %  --  0       Results from last 7 days  Lab Units 11/30/18 1859 11/30/18  1849   SODIUM mmol/L  --  139   POTASSIUM mmol/L  --  4 4   CHLORIDE mmol/L  --  102   CO2 mmol/L  --  28   CO2, I-STAT mmol/L 26  --    BUN mg/dL  --  5   CREATININE mg/dL  --  0 73   AGAP mmol/L 18*  --    ANION GAP mmol/L  --  9   CALCIUM mg/dL  --  8 8   ALBUMIN g/dL  --  2 7*   TOTAL BILIRUBIN mg/dL  --  0 30   ALK PHOS U/L  --  137*   ALT U/L  -- 16   AST U/L  --  15   GLUCOSE RANDOM mg/dL  --  120       Results from last 7 days  Lab Units 11/30/18  1849   INR  1 30*               Results from last 7 days  Lab Units 11/30/18  1849   LACTIC ACID mmol/L 2 3*       Imaging: I have personally reviewed pertinent reports  VAS lower limb arterial duplex, limited, unilateral    (Results Pending)   CTA lower extremity left w wo contrast    (Results Pending)   XR chest 2 views    (Results Pending)       EKG, Pathology, and Other Studies Reviewed on Admission:   · EKG:  Not available    All[employer]ri\[facility] [place] [facility]\"" / [facility] Records Reviewed: Yes     ** Please Note: This note has been constructed using a voice recognition system   **

## 2018-12-01 NOTE — ASSESSMENT & PLAN NOTE
Diffuse large B-cell lymphoma  -on chemotherapy q 3 weeks   -followed at [facility]  -continue management per Oncology

## 2018-12-01 NOTE — ASSESSMENT & PLAN NOTE
· Continue amlodipine lisinopril and metoprolol with hold parameters  · Family doctor recently decrease lisinopril from 40 mg to 20 mg  · Monitor closely

## 2018-12-01 NOTE — ASSESSMENT & PLAN NOTE
-lactate 2 3--> 3 1--> 2 4--> 2 3  -asymptomatic w/stable hemodynamics  -unclear etiology  -no evidence of acute LE ischemia, mesenteric ischemia or embolic event  -continue workup per primary service  -lactic acid levels trending down today

## 2018-12-02 ENCOUNTER — APPOINTMENT (INPATIENT)
Dept: NON INVASIVE DIAGNOSTICS | Facility: HOSPITAL | Age: 63
DRG: 315 | End: 2018-12-02
Payer: COMMERCIAL

## 2018-12-02 ENCOUNTER — APPOINTMENT (INPATIENT)
Dept: CT IMAGING | Facility: HOSPITAL | Age: 63
DRG: 315 | End: 2018-12-02
Payer: COMMERCIAL

## 2018-12-02 LAB
ANION GAP SERPL CALCULATED.3IONS-SCNC: 9 MMOL/L (ref 4–13)
BASOPHILS # BLD MANUAL: 0 THOUSAND/UL (ref 0–0.1)
BASOPHILS NFR MAR MANUAL: 0 % (ref 0–1)
BUN SERPL-MCNC: 2 MG/DL (ref 5–25)
CALCIUM SERPL-MCNC: 8 MG/DL (ref 8.3–10.1)
CHLORIDE SERPL-SCNC: 107 MMOL/L (ref 100–108)
CHOLEST SERPL-MCNC: 86 MG/DL (ref 50–200)
CO2 SERPL-SCNC: 26 MMOL/L (ref 21–32)
CREAT SERPL-MCNC: 0.59 MG/DL (ref 0.6–1.3)
EOSINOPHIL # BLD MANUAL: 0.14 THOUSAND/UL (ref 0–0.4)
EOSINOPHIL NFR BLD MANUAL: 2 % (ref 0–6)
ERYTHROCYTE [DISTWIDTH] IN BLOOD BY AUTOMATED COUNT: 24.1 % (ref 11.6–15.1)
GFR SERPL CREATININE-BSD FRML MDRD: 96 ML/MIN/1.73SQ M
GLUCOSE SERPL-MCNC: 88 MG/DL (ref 65–140)
HCT VFR BLD AUTO: 23.7 % (ref 34.8–46.1)
HDLC SERPL-MCNC: 35 MG/DL (ref 40–60)
HGB BLD-MCNC: 7.7 G/DL (ref 11.5–15.4)
LDLC SERPL CALC-MCNC: 37 MG/DL (ref 0–100)
LYMPHOCYTES # BLD AUTO: 0.69 THOUSAND/UL (ref 0.6–4.47)
LYMPHOCYTES # BLD AUTO: 10 % (ref 14–44)
MACROCYTES BLD QL AUTO: PRESENT
MAGNESIUM SERPL-MCNC: 1.5 MG/DL (ref 1.6–2.6)
MCH RBC QN AUTO: 32.5 PG (ref 26.8–34.3)
MCHC RBC AUTO-ENTMCNC: 32.5 G/DL (ref 31.4–37.4)
MCV RBC AUTO: 100 FL (ref 82–98)
MONOCYTES # BLD AUTO: 0.48 THOUSAND/UL (ref 0–1.22)
MONOCYTES NFR BLD: 7 % (ref 4–12)
NEUTROPHILS # BLD MANUAL: 5.61 THOUSAND/UL (ref 1.85–7.62)
NEUTS BAND NFR BLD MANUAL: 4 % (ref 0–8)
NEUTS SEG NFR BLD AUTO: 77 % (ref 43–75)
NONHDLC SERPL-MCNC: 51 MG/DL
NRBC BLD AUTO-RTO: 0 /100 WBCS
PHOSPHATE SERPL-MCNC: 3.5 MG/DL (ref 2.3–4.1)
PLATELET # BLD AUTO: 300 THOUSANDS/UL (ref 149–390)
PLATELET BLD QL SMEAR: ADEQUATE
PMV BLD AUTO: 10.3 FL (ref 8.9–12.7)
POTASSIUM SERPL-SCNC: 3.7 MMOL/L (ref 3.5–5.3)
RBC # BLD AUTO: 2.37 MILLION/UL (ref 3.81–5.12)
SODIUM SERPL-SCNC: 142 MMOL/L (ref 136–145)
TOTAL CELLS COUNTED SPEC: 100
TRIGL SERPL-MCNC: 70 MG/DL
WBC # BLD AUTO: 6.92 THOUSAND/UL (ref 4.31–10.16)

## 2018-12-02 PROCEDURE — 85007 BL SMEAR W/DIFF WBC COUNT: CPT | Performed by: GENERAL PRACTICE

## 2018-12-02 PROCEDURE — 72131 CT LUMBAR SPINE W/O DYE: CPT

## 2018-12-02 PROCEDURE — 85027 COMPLETE CBC AUTOMATED: CPT | Performed by: GENERAL PRACTICE

## 2018-12-02 PROCEDURE — 83735 ASSAY OF MAGNESIUM: CPT | Performed by: GENERAL PRACTICE

## 2018-12-02 PROCEDURE — 80061 LIPID PANEL: CPT | Performed by: GENERAL PRACTICE

## 2018-12-02 PROCEDURE — 80048 BASIC METABOLIC PNL TOTAL CA: CPT | Performed by: GENERAL PRACTICE

## 2018-12-02 PROCEDURE — G8979 MOBILITY GOAL STATUS: HCPCS

## 2018-12-02 PROCEDURE — 84100 ASSAY OF PHOSPHORUS: CPT | Performed by: GENERAL PRACTICE

## 2018-12-02 PROCEDURE — 97163 PT EVAL HIGH COMPLEX 45 MIN: CPT

## 2018-12-02 PROCEDURE — G8978 MOBILITY CURRENT STATUS: HCPCS

## 2018-12-02 PROCEDURE — 99232 SBSQ HOSP IP/OBS MODERATE 35: CPT | Performed by: PHYSICIAN ASSISTANT

## 2018-12-02 PROCEDURE — 99232 SBSQ HOSP IP/OBS MODERATE 35: CPT | Performed by: GENERAL PRACTICE

## 2018-12-02 RX ORDER — ACETAMINOPHEN 325 MG/1
975 TABLET ORAL EVERY 6 HOURS PRN
Status: DISCONTINUED | OUTPATIENT
Start: 2018-12-02 | End: 2018-12-05 | Stop reason: HOSPADM

## 2018-12-02 RX ORDER — MAGNESIUM SULFATE 1 G/100ML
1 INJECTION INTRAVENOUS ONCE
Status: COMPLETED | OUTPATIENT
Start: 2018-12-02 | End: 2018-12-02

## 2018-12-02 RX ADMIN — NICOTINE 7 MG: 7 PATCH TRANSDERMAL at 08:53

## 2018-12-02 RX ADMIN — ESCITALOPRAM OXALATE 5 MG: 10 TABLET ORAL at 08:51

## 2018-12-02 RX ADMIN — RIVAROXABAN 10 MG: 10 TABLET, FILM COATED ORAL at 17:17

## 2018-12-02 RX ADMIN — ASPIRIN 81 MG 81 MG: 81 TABLET ORAL at 08:51

## 2018-12-02 RX ADMIN — ATORVASTATIN CALCIUM 40 MG: 40 TABLET, FILM COATED ORAL at 22:31

## 2018-12-02 RX ADMIN — ACETAMINOPHEN 975 MG: 325 TABLET, FILM COATED ORAL at 18:27

## 2018-12-02 RX ADMIN — MIRTAZAPINE 7.5 MG: 15 TABLET, FILM COATED ORAL at 22:31

## 2018-12-02 RX ADMIN — CIPROFLOXACIN HYDROCHLORIDE 500 MG: 500 TABLET, FILM COATED ORAL at 22:31

## 2018-12-02 RX ADMIN — SODIUM CHLORIDE 125 ML/HR: 0.9 INJECTION, SOLUTION INTRAVENOUS at 01:34

## 2018-12-02 RX ADMIN — MELATONIN 6 MG: 3 TAB ORAL at 22:31

## 2018-12-02 RX ADMIN — DOCUSATE SODIUM 100 MG: 100 CAPSULE, LIQUID FILLED ORAL at 08:50

## 2018-12-02 RX ADMIN — SODIUM CHLORIDE 125 ML/HR: 0.9 INJECTION, SOLUTION INTRAVENOUS at 09:39

## 2018-12-02 RX ADMIN — DOCUSATE SODIUM 100 MG: 100 CAPSULE, LIQUID FILLED ORAL at 17:18

## 2018-12-02 RX ADMIN — MAGNESIUM SULFATE HEPTAHYDRATE 1 G: 1 INJECTION, SOLUTION INTRAVENOUS at 18:28

## 2018-12-02 RX ADMIN — SENNOSIDES AND DOCUSATE SODIUM 1 TABLET: 8.6; 5 TABLET ORAL at 08:52

## 2018-12-02 RX ADMIN — CIPROFLOXACIN HYDROCHLORIDE 500 MG: 500 TABLET, FILM COATED ORAL at 08:51

## 2018-12-02 RX ADMIN — MAGNESIUM OXIDE TAB 400 MG (241.3 MG ELEMENTAL MG) 400 MG: 400 (241.3 MG) TAB at 17:17

## 2018-12-02 RX ADMIN — MAGNESIUM OXIDE TAB 400 MG (241.3 MG ELEMENTAL MG) 400 MG: 400 (241.3 MG) TAB at 08:51

## 2018-12-02 RX ADMIN — PANTOPRAZOLE SODIUM 20 MG: 20 TABLET, DELAYED RELEASE ORAL at 06:18

## 2018-12-02 RX ADMIN — PANTOPRAZOLE SODIUM 20 MG: 20 TABLET, DELAYED RELEASE ORAL at 17:18

## 2018-12-02 NOTE — PHYSICAL THERAPY NOTE
PT Evaluation (15min)  (10:05-10:20)    Past Medical History:   Diagnosis Date    Bell's palsy     [facility] ([facility] [facility] )     lymphoma    Hypertension     [facility]     [facility]VD [facility]peripheral vascular disease) [facility] )       12/02/18 1011   Note Type   Note type Eval only   Pain Assessment   Pain Assessment 0-10   Pain Score 4   Pain Type Acute pain   Pain Location Leg   Pain Orientation Left   Hospital Pain Intervention(s) Ambulation/increased activity   Home Living   Type of [address] Two level;Performs ADLs on one level; Able to live on main level with bedroom/bathroom;1/2 bath on main level  (2 [address]; 13 steps to shower)   Bathroom Equipment Shower chair;Commode;Grab bars in [address] [place] [address] bed;Walker;Cane;Grab bars   Additional Comments pt receiving HHPT   Prior Function   Level of [place] Independent with ADLs and functional mobility  (ambulates c RW)   Lives With Daughter  (pt lives c dtr)   Receives Help From Family   ADL Assistance Needs assistance   IADLs Needs assistance   Falls in the last 6 months 1 to 4   Vocational Retired   Comments (-) drive   Restrictions/Precautions   Braces or Orthoses Other (Comment)  (R eye patch 2* Bell's Palsy)   Other Precautions Bed Alarm; Chair Alarm;Multiple lines;Telemetry;O2;Fall Risk;Pain   General   Additional Pertinent History pt presents to [facility] c LE weakness + pain  dx: PAD  pt pending angiogram upon d/c 2* LE stenosis  PT consulted for mobility + d/c planning  up c (A)     Family/Caregiver Present No   Cognition   Orientation Level Oriented X4   RUE Assessment   RUE Assessment WFL  (4/5)   LUE Assessment   LUE Assessment WFL  (4/5)   RLE Assessment   RLE Assessment WFL  (4-/5)   LLE Assessment   LLE Assessment WFL  (4-/5)   Coordination   Sensation X   Light Touch   RLE Light Touch Grossly intact   LLE Light Touch Impaired   Bed Mobility   Supine to Sit 5  Supervision   Additional items Increased time required;Verbal cues   Sit to Supine 5  Supervision   Additional items Increased time required;Verbal cues   Transfers   Sit to Stand 5  Supervision   Additional items Verbal cues; Increased time required   Stand to Sit 5  Supervision   Additional items Increased time required;Verbal cues   Ambulation/Elevation   Gait pattern Narrow SAL; Forward Flexion;Decreased foot clearance; Antalgic   Gait Assistance 5  Supervision   Additional items Verbal cues   Assistive Device Rolling walker   Distance 15'; limited by lines   Balance   Static Sitting Good   Dynamic Sitting Good   Static Standing Fair   Dynamic Standing Fair   Ambulatory Fair   Activity Tolerance   Activity Tolerance Patient limited by fatigue;Patient limited by pain   Nurse Made Aware Janene   Assessment   Prognosis Good   Problem List Decreased strength; Impaired balance;Decreased endurance;Decreased mobility;Pain; Impaired vision   Assessment pt is a 62y/o f who presents to [facility] c LE pain + weakness  dx: PAD  pending angiogram upon d/c  PMH significant for BRYANT, Bell's Palsy, lymphoma, alcohol dependence c withdrawl, + PAD  at baseline, pt mod (I) c functional mobility c RW  resides c dtr in 2 story home c 1st floor set up + 2 [address]; has 13 steps to shower  currently presents c deficits in strength, balance, gait quality, pain, vision, + activity tolerance noted in PT exam above  Barthel Index 55/100  ambulated 15' c RW c (S) limited by multiple lines  would benefit from skilled PT to maximize functional mobility + return home safely  upon d/c, recommend hhpt  PT eval of high complexity 2* unstable med status c pt requiring ongoing medical management 2* PVD  pt pending angiogram  presents c mobility deficits above requiring use of RW  resides in 2 story home c dtr c 13 steps to access shower  remains c multiple lines including O2 which pt does not use at baseline  Barriers to Discharge Inaccessible home environment   Goals   Patient Goals "to be able to walk around my house"  STG Expiration Date 12/12/18   Short Term Goal #1 1  increase strength 1/2 grade to improve overall functional mobility, 2  perform bed mobility mod (I) to decrease caregiver burden, 3  perform transfers mod (I) to safely perform ADLs, 4  ambulate 150' mod (I) c RW to safely navigate home environment, 5  negotiate 13 stairs mod (I) to access shower   Plan   Treatment/Interventions Functional transfer training; Therapeutic exercise;Elevations;LE strengthening/ROM; Endurance training;Patient/family training;Bed mobility;Gait training;Spoke to nursing   PT Frequency 2-3x/wk   Recommendation   Recommendation Home PT   PT - OK to Discharge Yes   Barthel Index   Feeding 10   Bathing 0   Grooming Score 5   Dressing Score 5   Bladder Score 10   Bowels Score 10   Toilet Use Score 5   Transfers (Bed/Chair) Score 10   Mobility (Level Surface) Score 0   Stairs Score 0   Barthel Index Score 55     Anant Dawson, PT

## 2018-12-02 NOTE — PROGRESS NOTES
Progress Note - Traci Turner 1955, 70 y o  female MRN: [id number]    Unit/Bed#: -01 Encounter: [id number]    Primary Care Provider: No primary care provider on file  Date and time admitted to hospital: 11/30/2018  5:50 PM        Ischemia of left lower extremity   Assessment & Plan    See leg pain     Leg pain   Assessment & Plan    Cause for admission  Initially thought related to PVD and after multiple conversations with vascular surgery yesterday, ultimately it was decided that pain was related to PVD and pt is to have revascularization procedure performed at [place] on Thursday 12/6/18 in the special procedures room  She is to be transferred to [place] on Wednesday for procedure  Hold xarelto til then no need for heparin gtt per dr corrales  Neurology consulted; also with numbness/tingling in legs mri l  spine pending; ct head pending with lymphoma; PT/OT podiatry consulted; cpk normal     Elevated lactic acid level   Assessment & Plan    · resolved     Lymphoma [facility])   Assessment & Plan    · Patient is on active chemo flow plan at [facility]   · She receives chemo every 3 weeks, she is scheduled for chemo on December 3rd  · Recently admitted for diagnosis of DLBCL with diffuse metastasis who was started on R-CHOP (Sept 12 C1D1)  Bone marrow biopsy 9/4 with no evidence of lymphoma  LP 9/14 without evidence of CNS involvement and she was neutropenic at time of discharge   · was discharged with prophylactic ciprofloxacin, Augmentin, acyclovir, fluconazole, and daily Granix injections  · ANC   03 at admission, her most recent from 23:00 on Sept 23 is 3 22  On Sept 25 counts have improved      Has been seen by [facility]     Essential hypertension   Assessment & Plan    · Continue amlodipine lisinopril and metoprolol with hold parameters  · Family doctor recently decrease lisinopril from 40 mg to 20 mg  · Monitor closely     Atrial fibrillation with rapid ventricular response ([facility] [address] ) Assessment & Plan    · Heart rate is controlled on monitor  On xarelto but holding for vascular procedure thursday  On chemo at [facility] for lymphoma per erecords  was to receive chemo today-needs to hold xarelto 3 days prior to chemo           VTE Pharmacologic Prophylaxis:   Pharmacologic: Rivaroxaban (Xarelto)  Mechanical VTE Prophylaxis in Place: Yes    Patient Centered Rounds: I have performed bedside rounds with nursing staff today  Discussions with Specialists or Other Care Team Provider:     Education and Discussions with Family / Patient:     Time Spent for Care: 30 minutes  More than 50% of total time spent on counseling and coordination of care as described above  Current Length of Stay: 4 day(s)    Current Patient Status: Inpatient   Certification Statement: The patient will continue to require additional inpatient hospital stay due to leg weakness    Discharge Plan: pending PT consult    Code Status: Level 1 - Full Code      Subjective:   Less tingling in legs able to ambulate with walker  Objective:     Vitals:   Temp (24hrs), Av 4 °F (36 9 °C), Min:98 2 °F (36 8 °C), Max:98 6 °F (37 °C)    Temp:  [98 2 °F (36 8 °C)-98 6 °F (37 °C)] 98 4 °F (36 9 °C)  HR:  [] 79  Resp:  [17-19] 19  BP: (107-164)/(55-75) 164/75  SpO2:  [97 %-100 %] 97 %  Body mass index is 20 28 kg/m²  Input and Output Summary (last 24 hours): Intake/Output Summary (Last 24 hours) at 18 0711  Last data filed at 18 1736   Gross per 24 hour   Intake                0 ml   Output              700 ml   Net             -700 ml       Physical Exam:     Physical Exam   Constitutional: She is oriented to person, place, and time  She appears well-developed and well-nourished  HENT:   Head: Normocephalic and atraumatic  Eyes: Pupils are equal, round, and reactive to light  Cardiovascular: Normal rate and regular rhythm      Pulmonary/Chest: Effort normal and breath sounds normal    Abdominal: Soft  Bowel sounds are normal    Musculoskeletal: She exhibits no edema  Neurological: She is alert and oriented to person, place, and time  Additional Data:     Labs:      Results from last 7 days  Lab Units 12/04/18  0449   WBC Thousand/uL 6 72   HEMOGLOBIN g/dL 8 1*   HEMATOCRIT % 25 9*   PLATELETS Thousands/uL 324   BANDS PCT % 5   LYMPHO PCT % 7*   MONO PCT % 6   EOS PCT % 1       Results from last 7 days  Lab Units 12/04/18  0449  11/30/18  1849   SODIUM mmol/L 139  < > 139   POTASSIUM mmol/L 4 1  < > 4 4   CHLORIDE mmol/L 104  < > 102   CO2 mmol/L 27  < > 28   CO2, I-STAT   --   < >  --    BUN mg/dL 6  < > 5   CREATININE mg/dL 0 71  < > 0 73   AGAP   --   < >  --    ANION GAP mmol/L 8  < > 9   CALCIUM mg/dL 8 9  < > 8 8   ALBUMIN g/dL  --   --  2 7*   TOTAL BILIRUBIN mg/dL  --   --  0 30   ALK PHOS U/L  --   --  137*   ALT U/L  --   --  16   AST U/L  --   --  15   GLUCOSE RANDOM mg/dL 89  < > 120   < > = values in this interval not displayed  Results from last 7 days  Lab Units 12/03/18  1705   INR  1 11               Results from last 7 days  Lab Units 12/01/18  2114 12/01/18  1504 12/01/18  1119 11/30/18  2156   LACTIC ACID mmol/L 1 6 2 3* 2 4* 3 1*   PROCALCITONIN ng/ml  --  <0 05  --   --            * I Have Reviewed All Lab Data Listed Above  * Additional Pertinent Lab Tests Reviewed:  All Labs Within Last 24 Hours Reviewed    Imaging:    Imaging Reports Reviewed Today Include:   Imaging Personally Reviewed by Myself Includes:      Recent Cultures (last 7 days):           Last 24 Hours Medication List:     Current Facility-Administered Medications:  acetaminophen 975 mg Oral Q6H PRN Les García MD   amLODIPine 10 mg Oral Daily Brian Cheek, CRNP   aspirin 81 mg Oral Daily Brian Cheek, CRNP   atorvastatin 40 mg Oral HS Brian Cheek, CRNP   calcium carbonate 1,000 mg Oral Daily PRN Brian Cheek, CRNP   diphenhydrAMINE 25 mg Oral Q6H PRN Taiwo Rider MD docusate sodium 100 mg Oral BID Haritha De Santiago, CRNP   enoxaparin 40 mg Subcutaneous Q24H [facility] Manjit García MD   escitalopram 5 mg Oral Daily Haritha De Santiago, CRNP   lisinopril 20 mg Oral Daily Yahaira Matson, CRNP   LORazepam 1 mg Intravenous Once in imaging Levander Sandhoff, PA-C   magnesium oxide 400 mg Oral BID Haritha De Santiago, CRNP   melatonin 6 mg Oral HS Haritha De Santiago, CRNP   metoprolol succinate 100 mg Oral Daily Haritha De Santiago, CRNP   mirtazapine 7 5 mg Oral HS Haritha De Santiago, CRNP   nicotine 7 mg Transdermal Daily Daija Kinney MD   ondansetron 4 mg Intravenous Q6H PRN Haritha De Santiago, CRNP   pantoprazole 20 mg Oral BID AC Haritha De Santiago, CRNP   senna-docusate sodium 1 tablet Oral Daily Haritha De Santiago, CRNP   traMADol 50 mg Oral Q6H PRN Piotr Alonzo MD        Today, Patient Was Seen By: Piotr Alonzo MD    ** Please Note: Dictation voice to text software may have been used in the creation of this document   **

## 2018-12-02 NOTE — PLAN OF CARE
Problem: PHYSICAL THERAPY ADULT  Goal: Performs mobility at highest level of function for planned discharge setting  See evaluation for individualized goals  Treatment/Interventions: Functional transfer training, Therapeutic exercise, Elevations, LE strengthening/ROM, Endurance training, Patient/family training, Bed mobility, Gait training, Spoke to nursing          See flowsheet documentation for full assessment, interventions and recommendations  Prognosis: Good  Problem List: Decreased strength, Impaired balance, Decreased endurance, Decreased mobility, Pain, Impaired vision  Assessment: pt is a 62y/o f who presents to [facility] c LE pain + weakness  dx: PAD  pending angiogram upon d/c  PMH significant for BRYANT, Bell's Palsy, lymphoma, alcohol dependence c withdrawl, + PAD  at baseline, pt mod (I) c functional mobility c RW  resides c dtr in 2 story home c 1st floor set up + 2 [address]; has 13 steps to shower  currently presents c deficits in strength, balance, gait quality, pain, vision, + activity tolerance noted in PT exam above  Barthel Index 55/100  ambulated 15' c RW c (S) limited by multiple lines  would benefit from skilled PT to maximize functional mobility + return home safely  upon d/c, recommend hhpt  PT eval of high complexity 2* unstable med status c pt requiring ongoing medical management 2* PVD  pt pending angiogram  presents c mobility deficits above requiring use of RW  resides in 2 story home c dtr c 13 steps to access shower  remains c multiple lines including O2 which pt does not use at baseline  Barriers to Discharge: Inaccessible home environment     Recommendation: Home PT     PT - OK to Discharge: Yes    See flowsheet documentation for full assessment

## 2018-12-02 NOTE — ASSESSMENT & PLAN NOTE
Resolved  -lactate 2 3--> 3 1--> 2 4--> 2 3-->1 6  -asymptomatic w/stable hemodynamics  -unclear etiology  -no evidence of acute LE ischemia, mesenteric ischemia or embolic event  -continue workup per primary service

## 2018-12-02 NOTE — PROGRESS NOTES
Progress Note - Aaron Lawson 1955, 70 y o  female MRN: [id number]    Unit/Bed#: -01 Encounter: [id number]    Primary Care Provider: No primary care provider on file  Date and time admitted to hospital: 11/30/2018  5:50 PM    PAD (peripheral artery disease) [facility])   Assessment & Plan    78-year-old female smoker w/hx HTN, HLD, active DLBC lymphoma on chemo @ [place], new onset PAF on Xarelto and severe aortoiliac and infrainguinal arterial occlusive disease, s/p aorto-bifemoral bypass graft '10 and bilateral fem-BK pop bypass w/vein (R '13, L '16) @ [facility] who presents w/LLE numbness and mild motor changes x 2 weeks and lactic acidosis  Diagnostics:  -limited ANITA 11/30:  Patent L fem-pop bypass graft with >75% stenosis proximal to inflow anastomosis  -CTA LLE 11/30:  Patent L fem-pop bypass graft w/2V AT, PT runoff,  2 aneurysms proximal portion of graft with short-segment high-grade 90% stenosis  No acute thrombus or occlusion    Plan:  -lactic acidosis resolved w/fluid resuscitation  -no acute vascular intervention indicated  Will require abdominal aortogram with left lower extremity runoff for assessment and possible intervention L fem-pop bypass graft (in Hybrid room @ [facility]) which can be performed as outpatient within next week  Scheduling to be readdressed on Monday  -no evidence of acute lower extremity ischemia, embolic event    Symptoms consistent with chronic ischemia  -LLE symptoms not source of lactic acidosis  -continue medical workup per primary service  -continue ASA and statin therapy  -continue Xarelto related to PAF  -was scheduled for chemotherapy at [place] 12/3/18  -will need to hold Xarelto 48 hrs prior to scheduled angiogram  -will hold ACE 1 day prior and day of angiogram  -discussed at length with Dr Amrita Lo and CTA images reviewed by Dr Amrita Lo  -d/w SLIM, workup ongoing to r/o neurogenic/spinal source of LLE symptoms     Elevated lactic acid level   Assessment & Plan Resolved  -lactate 2 3--> 3 1--> 2 4--> 2 3-->1 6  -asymptomatic w/stable hemodynamics  -unclear etiology  -no evidence of acute LE ischemia, mesenteric ischemia or embolic event  -continue workup per primary service       Paroxysmal atrial fibrillation [facility])   Assessment & Plan    New onset PAF dx'd Sept '18 @ [facility]   -preserved EF 60% w/o RWMA, repeat TTE pending  -continue Xarelto  -LLE symptoms do not appear acute or related to embolic event  No evidence of LLE thrombosis or graft occlusion  -continue rate control  -cardiology following     Lymphoma ([facility] [address] )   Assessment & Plan    Diffuse large B-cell lymphoma  -on chemotherapy q 3 weeks   -followed at [facility]  Was scheduled for chemotherapy 12/3/2018  -continue management per Oncology     Essential hypertension   Assessment & Plan    -continue current medical regimen  -management per primary service       Subjective:  Patient without new complaints  Continues to complain of numbness of left lower extremity but sensation to light touch intact  Bilateral lower extremities warm, pink  Lactic acidosis resolved  Heparin drip discontinued and Xarelto resumed  VSS    Vitals:  /53 (BP Location: Left arm)   Pulse 81   Temp 98 5 °F (36 9 °C) (Oral)   Resp 17   Ht 5' 2" (1 575 m)   Wt 50 3 kg (110 lb 14 3 oz)   SpO2 99%   BMI 20 28 kg/m²     I/Os:  I/O last 3 completed shifts:   In: 1977 1 [I V :1977 1]  Out: 6016 [Urine:1350]  I/O this shift:  In: 1000 [I V :1000]  Out: 700 [Urine:700]    Lab Results and Cultures:   Lab Results   Component Value Date    WBC 6 92 12/02/2018    HGB 7 7 (L) 12/02/2018    HCT 23 7 (L) 12/02/2018     (H) 12/02/2018     12/02/2018     Lab Results   Component Value Date    GLUCOSE 118 11/30/2018    CALCIUM 8 0 (L) 12/02/2018    K 3 7 12/02/2018    CO2 26 12/02/2018     12/02/2018    BUN 2 (L) 12/02/2018    CREATININE 0 59 (L) 12/02/2018     Lab Results   Component Value Date    INR 1 30 (H) 11/30/2018 INR 1 38 (H) 10/09/2018    PROTIME 16 0 (H) 11/30/2018    PROTIME 16 8 (H) 10/09/2018        Blood Culture: No results found for: BLOODCX,   Urinalysis:   Lab Results   Component Value Date    COLORU Light Yellow 11/30/2018    CLARITYU Clear 11/30/2018    SPECGRAV 1 010 11/30/2018    PHUR 7 5 11/30/2018    LEUKOCYTESUR Trace (A) 11/30/2018    NITRITE Negative 11/30/2018    GLUCOSEU Negative 11/30/2018    KETONESU Negative 11/30/2018    BILIRUBINUR Negative 11/30/2018    BLOODU Negative 11/30/2018   ,   Urine Culture: No results found for: URINECX,   Wound Culure: No results found for: WOUNDCULT    Medications:  Current Facility-Administered Medications   Medication Dose Route Frequency    amLODIPine (NORVASC) tablet 10 mg  10 mg Oral Daily    aspirin chewable tablet 81 mg  81 mg Oral Daily    atorvastatin (LIPITOR) tablet 40 mg  40 mg Oral HS    calcium carbonate (TUMS) chewable tablet 1,000 mg  1,000 mg Oral Daily PRN    ciprofloxacin (CIPRO) tablet 500 mg  500 mg Oral Q12H [facility]    docusate sodium (COLACE) capsule 100 mg  100 mg Oral BID    escitalopram (LEXAPRO) tablet 5 mg  5 mg Oral Daily    lisinopril (ZESTRIL) tablet 20 mg  20 mg Oral Daily    magnesium oxide (MAG-OX) tablet 400 mg  400 mg Oral BID    melatonin tablet 6 mg  6 mg Oral HS    metoprolol succinate (TOPROL-XL) 24 hr tablet 100 mg  100 mg Oral Daily    mirtazapine (REMERON) tablet 7 5 mg  7 5 mg Oral HS    nicotine (NICODERM CQ) 7 mg/24hr TD 24 hr patch 7 mg  7 mg Transdermal Daily    ondansetron (ZOFRAN) injection 4 mg  4 mg Intravenous Q6H PRN    pantoprazole (PROTONIX) EC tablet 20 mg  20 mg Oral BID AC    rivaroxaban (XARELTO) tablet 10 mg  10 mg Oral Daily With Dinner    senna-docusate sodium (SENOKOT S) 8 6-50 mg per tablet 1 tablet  1 tablet Oral Daily    sodium chloride 0 9 % infusion  125 mL/hr Intravenous Continuous       Imaging:  No new imaging studies for review    TTE and CT lumbar spine pending    Physical Exam:    General appearance: alert and oriented, in no acute distress  Neurologic: Grossly normal with exception of mild L foot drop  Sensation intact bilateral lower extremities  Neck: no adenopathy, no carotid bruit, no JVD, supple, symmetrical, trachea midline and thyroid not enlarged, symmetric, no tenderness/mass/nodules  Lungs: clear to auscultation bilaterally  Heart: regular rate and rhythm, S1, S2 normal, no murmur, click, rub or gallop  Abdomen: soft, non-tender; bowel sounds normal; no masses,  no organomegaly and Well-healed midline surgical scar  Extremities: extremities normal, warm and well-perfused; no cyanosis, clubbing, or edema, no ulcers, gangrene or trophic changes and Decrease left foot dorsiflexion, plantar flexion left foot 4+/5  Muscle strength right lower extremity 5/5   2+ graft pulse right thigh and knee  Week palpable left thigh graft pulse with biphasic graft signals  Bilateral femoral grafts easily palpable in the groin  Well-healed bilateral groin, thigh and lower leg incisions  Pulse exam:  Radial: Right: 2+ Left[de-identified] 2+  Femoral: Right: 2+ Left: 2+  Popliteal: Right: non-palpable Left: non-palpable  DP: Right: 2+ Left: non-palpable  PT: Right: 1+ Left: doppler signal  Doppler signals:  Right:  Biphasic PT, peroneal   Biphasic DP, AT  Left:  Biphasic PT, peroneal   Monophasic AT    No dopplerable DP    Jacy Hawkins PA-C  12/2/2018  [facility], [phone]

## 2018-12-02 NOTE — ASSESSMENT & PLAN NOTE
59-year-old female smoker w/hx HTN, HLD, active DLBC lymphoma on chemo @ [place], new onset PAF on Xarelto and severe aortoiliac and infrainguinal arterial occlusive disease, s/p aorto-bifemoral bypass graft '10 and bilateral fem-BK pop bypass w/vein (R '13, L '16) @ [facility] who presents w/LLE numbness and mild motor changes x 2 weeks and lactic acidosis  Diagnostics:  -limited ANITA 11/30:  Patent L fem-pop bypass graft with >75% stenosis proximal to inflow anastomosis  -CTA LLE 11/30:  Patent L fem-pop bypass graft w/2V AT, PT runoff,  2 aneurysms proximal portion of graft with short-segment high-grade 90% stenosis  No acute thrombus or occlusion    Plan:  -lactic acidosis resolved w/fluid resuscitation  -no acute vascular intervention indicated  Will require abdominal aortogram with left lower extremity runoff for assessment and possible intervention L fem-pop bypass graft (in Hybrid room @ [facility]) which can be performed as outpatient within next week  Scheduling to be readdressed on Monday  -no evidence of acute lower extremity ischemia, embolic event    Symptoms consistent with chronic ischemia  -LLE symptoms not source of lactic acidosis  -continue medical workup per primary service  -continue ASA and statin therapy  -continue Xarelto related to PAF  -was scheduled for chemotherapy at [place] 12/3/18  -will need to hold Xarelto 48 hrs prior to scheduled angiogram  -will hold ACE 1 day prior and day of angiogram  -discussed at length with Dr Patsey Boas and CTA images reviewed by Dr Patsey Boas  -d/w SLIM, workup ongoing to r/o neurogenic/spinal source of LLE symptoms

## 2018-12-02 NOTE — ASSESSMENT & PLAN NOTE
New onset PAF dx'd Sept '18 @ [facility]   -preserved EF 60% w/o RWMA, repeat TTE pending  -continue Xarelto  -LLE symptoms do not appear acute or related to embolic event    No evidence of LLE thrombosis or graft occlusion  -continue rate control  -cardiology following

## 2018-12-02 NOTE — UTILIZATION REVIEW
Initial Clinical Review    Admission: Date/Time/Statement: 11/30/18 @ 2037     Orders Placed This Encounter   Procedures    Inpatient Admission (expected length of stay for this patient is greater than two midnights)     Standing Status:   Standing     Number of Occurrences:   1     Order Specific Question:   Admitting Physician     Answer:   Marcus Baldwin [[id number]]     Order Specific Question:   Level of Care     Answer:   Med Surg [[id number]]     Order Specific Question:   Bed request comments     Answer:   tele     Order Specific Question:   Estimated length of stay     Answer:   More than 2 Midnights     Order Specific Question:   Certification     Answer:   I certify that inpatient services are medically necessary for this patient for a duration of greater than two midnights  See H&P and MD Progress Notes for additional information about the patient's course of treatment  ED: Date/Time/Mode of Arrival:   ED Arrival Information     Expected Arrival Acuity Means of Arrival Escorted By Service Admission Type    - 11/30/2018 15:09 Urgent Wheelchair Family Member Hospitalist Urgent    Arrival Complaint    LEG WEAKNESS          Chief Complaint:   Chief Complaint   Patient presents with    Extremity Weakness     pt co of L leg weakness onset last weeek pt w hx pvd  History of Illness: Ava Tovar is a 70 y o  female who presents with left lower extremity weakness   She presented to the ED with increasing weakness pain and tingling over the last 2 weeks        PE : LLE is cool mottled and pale     ED Vital Signs:   ED Triage Vitals   Temperature Pulse Respirations Blood Pressure SpO2   11/30/18 1528 11/30/18 1523 11/30/18 1523 11/30/18 1523 11/30/18 1523   98 3 °F (36 8 °C) 83 16 91/54 100 %      Temp Source Heart Rate Source Patient Position - Orthostatic VS BP Location FiO2 (%)   11/30/18 1528 11/30/18 1523 11/30/18 1523 11/30/18 1523 --   Oral Monitor Sitting Right arm       Pain Score       11/30/18 1842       No Pain        Wt Readings from Last 1 Encounters:   11/30/18 50 3 kg (110 lb 14 3 oz)       Vital Signs (abnormal): wnl     Abnormal Labs/Diagnostic Test Results: lactic acid   2 3, wbc  10 94, H&H   8 6   26 8, trop   0 06, alk phos   137, total prot  5 9, alb   2 7, pt inr   16 0  1 30  CXR - wnl EKG- NSR       ED Treatment:   Medication Administration from 11/30/2018 1509 to 11/30/2018 2300       Date/Time Order Dose Route Action Action by Comments     11/30/2018 2013 sodium chloride 0 9 % bolus 1,000 mL 0 mL Intravenous Stopped Sara Cruz RN      11/30/2018 1849 sodium chloride 0 9 % bolus 1,000 mL 1,000 mL Intravenous [address] [facility]      11/30/2018 2011 heparin (porcine) 25,000 units in 250 mL infusion (premix) 18 Units/kg/hr Intravenous [address] [facility]      11/30/2018 1938 iohexol (OMNIPAQUE) 350 MG/ML injection (SINGLE-DOSE) 100 mL 100 mL Intravenous Given Minor Grills           Past Medical/Surgical History:    Active Ambulatory Problems     Diagnosis Date Noted    Constipation 10/09/2018    BRYANT (acute kidney injury) ([facility] ) 08/26/2018    Acute respiratory failure with hypoxia and hypercapnia (HCC) 08/28/2018    Alcohol dependence with withdrawal ([facility] ) 08/28/2018    Bell's palsy 10/05/2018    Bilateral pleural effusion 08/28/2018    Blurred vision, bilateral 08/26/2018    Carotid stenosis, asymptomatic, bilateral 04/26/2016    Depression with anxiety 09/03/2018    Benign essential hypertension 11/07/2013    Hypercalcemia 08/26/2018    Fall 08/26/2018    Hypokalemia 08/28/2018    Hyponatremia 08/26/2018    Tobacco dependency 11/27/2015    Neutropenic fever ([facility] ) 09/21/2018       Past Medical History:   Diagnosis Date    Bell's palsy     Cancer ([facility] )     Hypertension     [facility]     PVD (peripheral vascular disease) ([facility] )        Admitting Diagnosis: Lymphoma ([facility] ) [C85 90]  Peripheral vascular disease ([facility] ) [I73 9]  Lower extremity weakness [R29 898]  Ischemia of left lower extremity [I99 8]    Age/Sex: 70 y o  female    Assessment/Plan:   * Lower extremity weakness   Assessment & Plan     · Patient has a significant history for peripheral vascular disease  · ED physician discussed with vascular, there is no need to transfer the patient to [place] the instructed the ED to start patient on a heparin drip and vascular will evaluate tomorrow  · CTA of lower extremities pending  · Ultrasound of left lower extremity completed, popliteal artery graft showing significant stenosis of approximately 75%  · Fem-pop bypass completed in approximately 2010 per family  · Patient apparently has restarted smoking for family  · Q for neurovascular checks  Patient does report numbness and tingling of left lower extremity    Elevated troponin   Assessment & Plan     · Patient has a history of elevated troponin's  · First troponin is 0 06, continue to trend    Elevated lactic acid level   Assessment & Plan     · Lactic elevated at 2 3, likely in the setting of chemotherapy  · IV fluids at 125  · Continue to trend    Atrial fibrillation with rapid ventricular response (HCC)   Assessment & Plan     · Heart rate is controlled on monitor  · Patient takes Xarelto at home however she does have to stop it 3 days prior to chemo so she currently was not taking  · Heparin drip running    Essential hypertension   Assessment & Plan     · Continue amlodipine lisinopril and metoprolol with hold parameters  · Family doctor recently decrease lisinopril from 40 mg to 20 mg  · Monitor closely    Lymphoma [facility])   Assessment & Plan     · Patient is on active chemo flow plan at [facility]   · She receives chemo every 3 weeks, she is scheduled for chemo on December 3rd  · Recently admitted for diagnosis of DLBCL with diffuse metastasis who was started on R-CHOP (Sept 12 C1D1)  Bone marrow biopsy 9/4 with no evidence of lymphoma   LP 9/14 without evidence of CNS involvement and she was neutropenic at time of discharge   · was discharged with prophylactic ciprofloxacin, Augmentin, acyclovir, fluconazole, and daily Granix injections  · ANC   03 at admission, her most recent from 23:00 on Sept 23 is 3 22  On Sept 25 counts have improved        Anticipated Length of Stay:  Patient will be admitted on an Inpatient basis with an anticipated length of stay of  > 2 midnights  Justification for Hospital Stay:  Patient is found to have a cold lower extremity and will need further workup by vascular surgery, will also need heparin drip    Admission Orders:  Scheduled Meds:   Current Facility-Administered Medications:  amLODIPine 10 mg Oral Daily     aspirin 81 mg Oral Daily     atorvastatin 40 mg Oral HS     calcium carbonate 1,000 mg Oral Daily PRN     ciprofloxacin 500 mg Oral Q12H [facility]     docusate sodium 100 mg Oral BID     escitalopram 5 mg Oral Daily     lisinopril 20 mg Oral Daily     magnesium oxide 400 mg Oral BID     melatonin 6 mg Oral HS     metoprolol succinate 100 mg Oral Daily     mirtazapine 7 5 mg Oral HS     nicotine 7 mg Transdermal Daily     ondansetron 4 mg Intravenous Q6H PRN     pantoprazole 20 mg Oral BID AC     rivaroxaban 10 mg Oral Daily With Dinner     senna-docusate sodium 1 tablet Oral Daily     sodium chloride 125 mL/hr Intravenous Continuous  Last Rate: 125 mL/hr (12/02/18 0134)     Vascular sx and cardiology consults   Serial trop   Reg diet   Inc spirom   amb pt   PT OT eval  Up w/ assist   Oncology consult   Tele  Echo    12/1 phos , mg , lipid profile, bmp, cbc   H&H   7 7  23 7, BUN creat   2  0 59, karo  8 0, mg  1 5    Cardiology consult  12/1  1-elevated troponins of unclear significance, troponin 0 06/0 05/0 06  Will check echocardiogram to assess LV function and regional wall motion for abnormalities  Further recommendations based upon echocardiogram findings    No need for heparin from a cardiac standpoint    2-paroxysmal atrial fibrillation, stable  Continue medications  Continue with Xarelto  Continue with metoprolol    3-PVD with history of lower extremity bypass  management per vascular surgery  Vascular surgery consult  12/1       PAD (peripheral artery disease) [facility])   Assessment & Plan     77-year-old female smoker w/hx HTN, HLD, active DLBC lymphoma on chemo @ [place], new onset PAF on Xarelto and severe aortoiliac and infrainguinal arterial occlusive disease, s/p aorto-bifemoral bypass graft '10 and bilateral fem-BK pop bypass w/vein (R '13, L '16) @ [facility] who presents w/LLE numbness and mild motor changes x 2 weeks and lactic acidosis    Diagnostics:  -limited ANITA 11/30:  Patent L fem-pop bypass graft with >75% stenosis proximal to inflow anastomosis  -CTA LLE 11/30:  Patent L fem-pop bypass graft w/2V AT, PT runoff,  2 aneurysms proximal portion of graft with short-segment high-grade 90% stenosis  No acute thrombus or occlusion   Plan:  -no acute vascular intervention indicated  Will require abdominal aortogram with left lower extremity runoff for assessment and possible intervention L fem-pop bypass graft (in Hybrid room @ [facility]) which can be performed as outpatient within next week  Scheduling to be readdressed on Monday  -no evidence of acute lower extremity ischemia, embolic event    Symptoms consistent with chronic ischemia  -LLE symptoms not source of lactic acidosis  -continue to trend lactate and continue medical workup per primary service  -no break in anticoagulation therapy identified as last dose of Xarelto 11/29/18 on day prior to admission  -continue ASA and statin therapy  -heparin drip at the discretion of cardiology related to PAF  -will need to hold Xarelto 48 hrs prior to scheduled angiogram  -will hold ACE 1 day prior and day of angiogram  -discussed at length with Dr Cliff Parks and CTA images reviewed by Dr Cliff Parks  -d/w Dr Abraham De Santiago      Elevated lactic acid level Assessment & Plan     -lactate 2 3--> 3 1--> 2 4--> 2 3  -asymptomatic w/stable hemodynamics  -unclear etiology  -no evidence of acute LE ischemia, mesenteric ischemia or embolic event  -continue workup per primary service  -lactic acid levels trending down today      Paroxysmal atrial fibrillation [facility])   Assessment & Plan     New onset PAF dx'd Sept '18 @ [place]   -preserved EF 60% w/o RWMA  -on Xarelto which is usually held 3 days prior to chemotherapy  Last dose 11/29/18  -continue heparin drip  -LLE symptoms do not appear acute or related to embolic event    No evidence of LLE thrombosis or graft occlusion  -continue rate control  -cardiology following      Lymphoma ([facility] [address] )   Assessment & Plan     Diffuse large B-cell lymphoma  -on chemotherapy q 3 weeks   -followed at [place]  -continue management per Oncology      Essential hypertension   Assessment & Plan     -continue current medical regimen  -management per primary service

## 2018-12-02 NOTE — ASSESSMENT & PLAN NOTE
· Patient is on active chemo flow plan at [facility]   · She receives chemo every 3 weeks, she is scheduled for chemo on December 3rd  · Recently admitted for diagnosis of DLBCL with diffuse metastasis who was started on R-CHOP (Sept 12 C1D1)  Bone marrow biopsy 9/4 with no evidence of lymphoma  LP 9/14 without evidence of CNS involvement and she was neutropenic at time of discharge   · was discharged with prophylactic ciprofloxacin, Augmentin, acyclovir, fluconazole, and daily Granix injections  · ANC   03 at admission, her most recent from 23:00 on Sept 23 is 3 22  On Sept 25 counts have improved

## 2018-12-03 ENCOUNTER — APPOINTMENT (INPATIENT)
Dept: NON INVASIVE DIAGNOSTICS | Facility: HOSPITAL | Age: 63
DRG: 315 | End: 2018-12-03
Payer: COMMERCIAL

## 2018-12-03 ENCOUNTER — APPOINTMENT (INPATIENT)
Dept: RADIOLOGY | Facility: HOSPITAL | Age: 63
DRG: 315 | End: 2018-12-03
Payer: COMMERCIAL

## 2018-12-03 ENCOUNTER — TRANSCRIBE ORDERS (OUTPATIENT)
Dept: ADMINISTRATIVE | Facility: HOSPITAL | Age: 63
End: 2018-12-03

## 2018-12-03 ENCOUNTER — APPOINTMENT (INPATIENT)
Dept: CT IMAGING | Facility: HOSPITAL | Age: 63
DRG: 315 | End: 2018-12-03
Payer: COMMERCIAL

## 2018-12-03 DIAGNOSIS — C83.30 RETICULOSARCOMA (HCC): Primary | ICD-10-CM

## 2018-12-03 PROBLEM — I73.9 PERIPHERAL VASCULAR DISEASE (HCC): Status: ACTIVE | Noted: 2018-11-30

## 2018-12-03 PROBLEM — M79.606 LEG PAIN: Status: ACTIVE | Noted: 2018-12-03

## 2018-12-03 PROBLEM — R29.898 WEAKNESS OF LOWER EXTREMITY: Status: ACTIVE | Noted: 2018-11-30

## 2018-12-03 PROBLEM — M79.673 FOOT PAIN: Status: ACTIVE | Noted: 2018-11-30

## 2018-12-03 LAB
ANION GAP SERPL CALCULATED.3IONS-SCNC: 10 MMOL/L (ref 4–13)
BASOPHILS # BLD MANUAL: 0 THOUSAND/UL (ref 0–0.1)
BASOPHILS NFR MAR MANUAL: 0 % (ref 0–1)
BILIRUB UR QL STRIP: NEGATIVE
BUN SERPL-MCNC: 3 MG/DL (ref 5–25)
CALCIUM SERPL-MCNC: 8.5 MG/DL (ref 8.3–10.1)
CHLORIDE SERPL-SCNC: 106 MMOL/L (ref 100–108)
CLARITY UR: NORMAL
CO2 SERPL-SCNC: 26 MMOL/L (ref 21–32)
COLOR UR: YELLOW
CREAT SERPL-MCNC: 0.63 MG/DL (ref 0.6–1.3)
CRP SERPL QL: 4.5 MG/L
EOSINOPHIL # BLD MANUAL: 0.06 THOUSAND/UL (ref 0–0.4)
EOSINOPHIL NFR BLD MANUAL: 1 % (ref 0–6)
ERYTHROCYTE [DISTWIDTH] IN BLOOD BY AUTOMATED COUNT: 23.7 % (ref 11.6–15.1)
ERYTHROCYTE [DISTWIDTH] IN BLOOD BY AUTOMATED COUNT: 23.9 % (ref 11.6–15.1)
ERYTHROCYTE [SEDIMENTATION RATE] IN BLOOD: 60 MM/HOUR (ref 0–20)
GFR SERPL CREATININE-BSD FRML MDRD: 94 ML/MIN/1.73SQ M
GLUCOSE SERPL-MCNC: 92 MG/DL (ref 65–140)
GLUCOSE UR STRIP-MCNC: NEGATIVE MG/DL
HCT VFR BLD AUTO: 25.7 % (ref 34.8–46.1)
HCT VFR BLD AUTO: 26.2 % (ref 34.8–46.1)
HGB BLD-MCNC: 8.2 G/DL (ref 11.5–15.4)
HGB BLD-MCNC: 8.4 G/DL (ref 11.5–15.4)
HGB UR QL STRIP.AUTO: NEGATIVE
INR PPP: 1.11 (ref 0.86–1.17)
KETONES UR STRIP-MCNC: NEGATIVE MG/DL
LEUKOCYTE ESTERASE UR QL STRIP: NEGATIVE
LYMPHOCYTES # BLD AUTO: 0.42 THOUSAND/UL (ref 0.6–4.47)
LYMPHOCYTES # BLD AUTO: 7 % (ref 14–44)
MACROCYTES BLD QL AUTO: PRESENT
MAGNESIUM SERPL-MCNC: 1.7 MG/DL (ref 1.6–2.6)
MCH RBC QN AUTO: 31.7 PG (ref 26.8–34.3)
MCH RBC QN AUTO: 32.4 PG (ref 26.8–34.3)
MCHC RBC AUTO-ENTMCNC: 31.9 G/DL (ref 31.4–37.4)
MCHC RBC AUTO-ENTMCNC: 32.1 G/DL (ref 31.4–37.4)
MCV RBC AUTO: 102 FL (ref 82–98)
MCV RBC AUTO: 99 FL (ref 82–98)
METAMYELOCYTES NFR BLD MANUAL: 1 % (ref 0–1)
MONOCYTES # BLD AUTO: 0.66 THOUSAND/UL (ref 0–1.22)
MONOCYTES NFR BLD: 11 % (ref 4–12)
NEUTROPHILS # BLD MANUAL: 4.75 THOUSAND/UL (ref 1.85–7.62)
NEUTS BAND NFR BLD MANUAL: 4 % (ref 0–8)
NEUTS SEG NFR BLD AUTO: 75 % (ref 43–75)
NITRITE UR QL STRIP: NEGATIVE
NRBC BLD AUTO-RTO: 0 /100 WBCS
PH UR STRIP.AUTO: 7.5 [PH] (ref 4.5–8)
PLATELET # BLD AUTO: 302 THOUSANDS/UL (ref 149–390)
PLATELET # BLD AUTO: 329 THOUSANDS/UL (ref 149–390)
PLATELET BLD QL SMEAR: ADEQUATE
PMV BLD AUTO: 10.2 FL (ref 8.9–12.7)
PMV BLD AUTO: 9.8 FL (ref 8.9–12.7)
POTASSIUM SERPL-SCNC: 3.8 MMOL/L (ref 3.5–5.3)
PROT UR STRIP-MCNC: NEGATIVE MG/DL
PROTHROMBIN TIME: 14.2 SECONDS (ref 11.8–14.2)
RBC # BLD AUTO: 2.53 MILLION/UL (ref 3.81–5.12)
RBC # BLD AUTO: 2.65 MILLION/UL (ref 3.81–5.12)
SODIUM SERPL-SCNC: 142 MMOL/L (ref 136–145)
SP GR UR STRIP.AUTO: 1.02 (ref 1–1.03)
TOTAL CELLS COUNTED SPEC: 100
UROBILINOGEN UR QL STRIP.AUTO: 0.2 E.U./DL
VARIANT LYMPHS # BLD AUTO: 1 %
WBC # BLD AUTO: 6.01 THOUSAND/UL (ref 4.31–10.16)
WBC # BLD AUTO: 8.15 THOUSAND/UL (ref 4.31–10.16)

## 2018-12-03 PROCEDURE — 81003 URINALYSIS AUTO W/O SCOPE: CPT | Performed by: GENERAL PRACTICE

## 2018-12-03 PROCEDURE — 80048 BASIC METABOLIC PNL TOTAL CA: CPT | Performed by: GENERAL PRACTICE

## 2018-12-03 PROCEDURE — 70450 CT HEAD/BRAIN W/O DYE: CPT

## 2018-12-03 PROCEDURE — 93306 TTE W/DOPPLER COMPLETE: CPT | Performed by: INTERNAL MEDICINE

## 2018-12-03 PROCEDURE — 86140 C-REACTIVE PROTEIN: CPT | Performed by: GENERAL PRACTICE

## 2018-12-03 PROCEDURE — 99232 SBSQ HOSP IP/OBS MODERATE 35: CPT | Performed by: PHYSICIAN ASSISTANT

## 2018-12-03 PROCEDURE — 85027 COMPLETE CBC AUTOMATED: CPT | Performed by: GENERAL PRACTICE

## 2018-12-03 PROCEDURE — 99222 1ST HOSP IP/OBS MODERATE 55: CPT | Performed by: PSYCHIATRY & NEUROLOGY

## 2018-12-03 PROCEDURE — 83735 ASSAY OF MAGNESIUM: CPT | Performed by: GENERAL PRACTICE

## 2018-12-03 PROCEDURE — 73600 X-RAY EXAM OF ANKLE: CPT

## 2018-12-03 PROCEDURE — 73620 X-RAY EXAM OF FOOT: CPT

## 2018-12-03 PROCEDURE — 93306 TTE W/DOPPLER COMPLETE: CPT

## 2018-12-03 PROCEDURE — 85652 RBC SED RATE AUTOMATED: CPT | Performed by: GENERAL PRACTICE

## 2018-12-03 PROCEDURE — 85610 PROTHROMBIN TIME: CPT | Performed by: GENERAL PRACTICE

## 2018-12-03 PROCEDURE — 85007 BL SMEAR W/DIFF WBC COUNT: CPT | Performed by: GENERAL PRACTICE

## 2018-12-03 PROCEDURE — 99222 1ST HOSP IP/OBS MODERATE 55: CPT | Performed by: INTERNAL MEDICINE

## 2018-12-03 PROCEDURE — 99232 SBSQ HOSP IP/OBS MODERATE 35: CPT | Performed by: GENERAL PRACTICE

## 2018-12-03 RX ORDER — DIPHENHYDRAMINE HCL 25 MG
25 TABLET ORAL EVERY 6 HOURS PRN
Status: DISCONTINUED | OUTPATIENT
Start: 2018-12-03 | End: 2018-12-05 | Stop reason: HOSPADM

## 2018-12-03 RX ORDER — TRAMADOL HYDROCHLORIDE 50 MG/1
50 TABLET ORAL EVERY 6 HOURS PRN
Status: DISCONTINUED | OUTPATIENT
Start: 2018-12-03 | End: 2018-12-04

## 2018-12-03 RX ORDER — LORAZEPAM 2 MG/ML
1 INJECTION INTRAMUSCULAR
Status: COMPLETED | OUTPATIENT
Start: 2018-12-03 | End: 2018-12-04

## 2018-12-03 RX ORDER — HEPARIN SODIUM 10000 [USP'U]/100ML
3-20 INJECTION, SOLUTION INTRAVENOUS
Status: DISCONTINUED | OUTPATIENT
Start: 2018-12-03 | End: 2018-12-03

## 2018-12-03 RX ORDER — HEPARIN SODIUM 1000 [USP'U]/ML
1500 INJECTION, SOLUTION INTRAVENOUS; SUBCUTANEOUS AS NEEDED
Status: DISCONTINUED | OUTPATIENT
Start: 2018-12-03 | End: 2018-12-03

## 2018-12-03 RX ORDER — HEPARIN SODIUM 1000 [USP'U]/ML
3000 INJECTION, SOLUTION INTRAVENOUS; SUBCUTANEOUS AS NEEDED
Status: DISCONTINUED | OUTPATIENT
Start: 2018-12-03 | End: 2018-12-03

## 2018-12-03 RX ADMIN — PANTOPRAZOLE SODIUM 20 MG: 20 TABLET, DELAYED RELEASE ORAL at 09:53

## 2018-12-03 RX ADMIN — AMLODIPINE BESYLATE 10 MG: 10 TABLET ORAL at 09:53

## 2018-12-03 RX ADMIN — MAGNESIUM OXIDE TAB 400 MG (241.3 MG ELEMENTAL MG) 400 MG: 400 (241.3 MG) TAB at 17:14

## 2018-12-03 RX ADMIN — LISINOPRIL 20 MG: 20 TABLET ORAL at 09:53

## 2018-12-03 RX ADMIN — CIPROFLOXACIN HYDROCHLORIDE 500 MG: 500 TABLET, FILM COATED ORAL at 09:52

## 2018-12-03 RX ADMIN — METOPROLOL SUCCINATE 100 MG: 100 TABLET, FILM COATED, EXTENDED RELEASE ORAL at 09:53

## 2018-12-03 RX ADMIN — MAGNESIUM OXIDE TAB 400 MG (241.3 MG ELEMENTAL MG) 400 MG: 400 (241.3 MG) TAB at 09:53

## 2018-12-03 RX ADMIN — MIRTAZAPINE 7.5 MG: 15 TABLET, FILM COATED ORAL at 22:10

## 2018-12-03 RX ADMIN — ASPIRIN 81 MG 81 MG: 81 TABLET ORAL at 09:52

## 2018-12-03 RX ADMIN — DOCUSATE SODIUM 100 MG: 100 CAPSULE, LIQUID FILLED ORAL at 17:13

## 2018-12-03 RX ADMIN — TRAMADOL HYDROCHLORIDE 50 MG: 50 TABLET, COATED ORAL at 17:34

## 2018-12-03 RX ADMIN — PANTOPRAZOLE SODIUM 20 MG: 20 TABLET, DELAYED RELEASE ORAL at 17:13

## 2018-12-03 RX ADMIN — NICOTINE 7 MG: 7 PATCH TRANSDERMAL at 09:56

## 2018-12-03 RX ADMIN — MELATONIN 6 MG: 3 TAB ORAL at 22:11

## 2018-12-03 RX ADMIN — ATORVASTATIN CALCIUM 40 MG: 40 TABLET, FILM COATED ORAL at 22:11

## 2018-12-03 RX ADMIN — ESCITALOPRAM OXALATE 5 MG: 10 TABLET ORAL at 09:52

## 2018-12-03 NOTE — UTILIZATION REVIEW
Continued Stay Review    Date: 12/3    Vital Signs:   12/03/18 0743  98 2 °F (36 8 °C)   113  17  120/61  --  100 %  Nasal cannula  Sitting     Medications:   Scheduled Meds:   Current Facility-Administered Medications:  acetaminophen 975 mg Oral Q6H PRN    amLODIPine 10 mg Oral Daily    aspirin 81 mg Oral Daily    atorvastatin 40 mg Oral HS    calcium carbonate 1,000 mg Oral Daily PRN    ciprofloxacin 500 mg Oral Q12H [address]    docusate sodium 100 mg Oral BID    escitalopram 5 mg Oral Daily    lisinopril 20 mg Oral Daily    LORazepam 1 mg Intravenous Once in imaging    magnesium oxide 400 mg Oral BID    melatonin 6 mg Oral HS    metoprolol succinate 100 mg Oral Daily    mirtazapine 7 5 mg Oral HS    nicotine 7 mg Transdermal Daily    ondansetron 4 mg Intravenous Q6H PRN    pantoprazole 20 mg Oral BID AC    rivaroxaban 10 mg Oral Daily With Dinner    senna-docusate sodium 1 tablet Oral Daily        Abnormal Labs/Diagnostic Results: MRI lumbar spine- pending   L foot and L ankle  xray - wnl   CT head- wnl     Age/Sex: 70 y o  female     Assessment/Plan:   * Lower extremity weakness   Assessment & Plan     · Patient has a significant history for peripheral vascular disease  · ED physician discussed with vascular, there is no need to transfer the patient to [facility] the instructed the ED to start patient on a heparin drip and vascular consult  · CTA of lower extremities results and vascular consult reviewed no need for heparin gtt; resume xarelto  · Ultrasound of left lower extremity completed, popliteal artery graft showing significant stenosis of approximately 75%      · Fem-pop bypass completed in approximately 2010 per family  · Patient apparently has restarted smoking for family   Lactic acidosis resolved  cpk normal  Ct l spinewith degenerative changes mri l spine ordered and neurology consult placed      Discharge Plan: TBD     Neuro consult 12/3   Plan:  -MRI L-spine w and wo contrast  -XR left ankle and foot  -Vascular following, further vascular studies as outpatient  -Continue supportive care per primary team  -Continue to monitor and notify with changes

## 2018-12-03 NOTE — PROGRESS NOTES
Progress Note - Tia Spearing 1955, 70 y o  female MRN: [id number]    Unit/Bed#: -01 Encounter: [id number]    Primary Care Provider: No primary care provider on file  Date and time admitted to hospital: 11/30/2018  5:50 PM        Leg pain   Assessment & Plan    Cause for admission  Initially thought related to PVD but not per vascular  Neurology consulted; mri l  spine pending; ct head pending with lymphoma; PT/OT podiatry consulted; cpk normal     Elevated lactic acid level   Assessment & Plan    · resolved     PAD (peripheral artery disease) ([facility] [address] )   Assessment & Plan    vascular following and xarelto; no need for emergent intervention but consideration for folow up vascular studies this week per vascular surgery  May need transfer to [place] [id number] vascular surgery direction  She still has leg pain and numbness  procalcitonin normal  Ct l spine with degenerative changes  Neurology consult and mri l spine pending; consider further imaging pending neurology recommendations       Elevated troponin   Assessment & Plan    · Patient has a history of elevated troponin's  · Cardiology consult appreciated-likely nstemi 2     Atrial fibrillation with rapid ventricular response ([facility] [address] )   Assessment & Plan    · Heart rate is controlled on monitor  On xarelto  On chemo at [facility] for lymphoma per erecords  was to receive chemo today-needs to hold xarelto 3 days prior to chemo           VTE Pharmacologic Prophylaxis:   Pharmacologic: Rivaroxaban (Xarelto)  Mechanical VTE Prophylaxis in Place: Yes    Patient Centered Rounds: I have performed bedside rounds with nursing staff today  Discussions with Specialists or Other Care Team Provider:     Education and Discussions with Family / Patient:     Time Spent for Care: 30 minutes  More than 50% of total time spent on counseling and coordination of care as described above      Current Length of Stay: 3 day(s)    Current Patient Status: Inpatient Certification Statement: The patient will continue to require additional inpatient hospital stay due to foot pain    Discharge Plan: home    Code Status: Level 1 - Full Code      Subjective:   C/o continued left foot pain    Objective:     Vitals:   Temp (24hrs), Av 3 °F (36 8 °C), Min:98 2 °F (36 8 °C), Max:98 4 °F (36 9 °C)    Temp:  [98 2 °F (36 8 °C)-98 4 °F (36 9 °C)] 98 2 °F (36 8 °C)  HR:  [] 113  Resp:  [17-18] 17  BP: (120-125)/(60-61) 120/61  SpO2:  [100 %] 100 %  Body mass index is 20 28 kg/m²  Input and Output Summary (last 24 hours): Intake/Output Summary (Last 24 hours) at 18 1552  Last data filed at 18 0751   Gross per 24 hour   Intake              260 ml   Output             1950 ml   Net            -1690 ml       Physical Exam:     Physical Exam   Constitutional: She is oriented to person, place, and time  She appears well-developed and well-nourished  HENT:   Head: Normocephalic  Eyes: Pupils are equal, round, and reactive to light  Cardiovascular:   irreg irreg     Pulmonary/Chest: Breath sounds normal    Abdominal: Soft  Bowel sounds are normal    Musculoskeletal: She exhibits no edema  Neurological: She is alert and oriented to person, place, and time         Additional Data:     Labs:      Results from last 7 days  Lab Units 18  0855   WBC Thousand/uL 6 01   HEMOGLOBIN g/dL 8 2*   HEMATOCRIT % 25 7*   PLATELETS Thousands/uL 302   BANDS PCT % 4   LYMPHO PCT % 7*   MONO PCT % 11   EOS PCT % 1       Results from last 7 days  Lab Units 18  0458  18  1849   SODIUM mmol/L 142  < > 139   POTASSIUM mmol/L 3 8  < > 4 4   CHLORIDE mmol/L 106  < > 102   CO2 mmol/L 26  < > 28   CO2, I-STAT   --   < >  --    BUN mg/dL 3*  < > 5   CREATININE mg/dL 0 63  < > 0 73   AGAP   --   < >  --    ANION GAP mmol/L 10  < > 9   CALCIUM mg/dL 8 5  < > 8 8   ALBUMIN g/dL  --   --  2 7*   TOTAL BILIRUBIN mg/dL  --   --  0 30   ALK PHOS U/L  --   --  137*   ALT U/L --   --  16   AST U/L  --   --  15   GLUCOSE RANDOM mg/dL 92  < > 120   < > = values in this interval not displayed  Results from last 7 days  Lab Units 11/30/18  1849   INR  1 30*               Results from last 7 days  Lab Units 12/01/18  2114 12/01/18  1504 12/01/18  1119 11/30/18  2156   LACTIC ACID mmol/L 1 6 2 3* 2 4* 3 1*   PROCALCITONIN ng/ml  --  <0 05  --   --            * I Have Reviewed All Lab Data Listed Above  * Additional Pertinent Lab Tests Reviewed:  All Labs Within Last 24 Hours Reviewed    Imaging:    Imaging Reports Reviewed Today Include:   Imaging Personally Reviewed by Myself Includes:      Recent Cultures (last 7 days):           Last 24 Hours Medication List:     Current Facility-Administered Medications:  acetaminophen 975 mg Oral Q6H PRN Jonas García MD   amLODIPine 10 mg Oral Daily James Kingsley, LAURA   aspirin 81 mg Oral Daily James Kingsley, FADYNP   atorvastatin 40 mg Oral HS James Kingsley, FADYNP   calcium carbonate 1,000 mg Oral Daily PRN LAURA Oconnell   ciprofloxacin 500 mg Oral Q12H [address] LAURA Viera   docusate sodium 100 mg Oral BID James Kingsley, LAURA   escitalopram 5 mg Oral Daily James Kingsley, LAURA   lisinopril 20 mg Oral Daily LAURA Viera   LORazepam 1 mg Intravenous Once in imaging Yoko Escoto PA-C   magnesium oxide 400 mg Oral BID James Kingsley, LAURA   melatonin 6 mg Oral HS James Kingsley, LAURA   metoprolol succinate 100 mg Oral Daily James Kingsley, LAUAR   mirtazapine 7 5 mg Oral HS James Kingsley, LAURA   nicotine 7 mg Transdermal Daily Mariluz Archibald MD   ondansetron 4 mg Intravenous Q6H PRN LAURA Oconnell   pantoprazole 20 mg Oral BID AC LAURA Oconnell   rivaroxaban 10 mg Oral Daily With Dinner Shahbaz Russell MD   senna-docusate sodium 1 tablet Oral Daily LAURA Oconnell        Today, Patient Was Seen By: Shahbaz Russell MD    ** Please Note: Dictation voice to text software may have been used in the creation of this document   **

## 2018-12-03 NOTE — PROGRESS NOTES
Progress Note - Andry Solomon 1955, 70 y o  female MRN: [id number]    Unit/Bed#: -01 Encounter: [id number]    Primary Care Provider: No primary care provider on file  Date and time admitted to hospital: 11/30/2018  5:50 PM        PAD (peripheral artery disease) [facility])   Assessment & Plan    25-year-old female smoker w/hx HTN, HLD, active Diffuse B Cell lymphoma on chemo @ [place], new onset PAF on rivaroxaban 10 mg (??)  and severe aortoiliac and infrainguinal arterial occlusive disease, s/p aorto-bifemoral bypass graft '10 and bilateral fem-BK pop bypass w/vein (R '13, L '16) @ [facility] who presents w/LLE numbness, L foot pain and mild motor changes x 2 weeks and lactic acidosis  Lactic acidosis etiology unclear by improved after IV fluids  Diagnostics:  -limited ANITA 11/30:  Patent L fem-pop bypass graft with >75% stenosis proximal to inflow anastomosis  -CTA LLE 11/30:  Patent L fem-pop bypass graft w/2V AT, PT runoff,  2 aneurysms proximal portion of graft with short-segment high-grade 90% stenosis  No acute thrombus or occlusion    Plan:  -Will require abdominal aortogram with left lower extremity runoff for assessment and possible intervention L fem-pop bypass graft (in Hybrid room @ [facility]) which can be performed as outpatient within next week  -No evidence of acute lower extremity ischemia, embolic event  Symptoms consistent with chronic ischemia  -Neurologic workup in progress with MRI L-spine to rule out component of nerve root involvement and CT head     -LLE symptoms not source of lactic acidosis  -continue medical workup per primary service  -continue ASA and statin therapy  -On anticoagulation with rivaroxaban for PAF which would need to be held for 2 days prior to procedure    -Will hold ACE 1 day prior and day of angiogram  -Case/thoughts/recommendations were discussed in detail with SLIM (admitting team) and Neurology teams  -Our office is working on the scheduling for abdominal aortogram with left lower extremity runoff +/- intervention      ADDENDUM:  Patient is scheduled for LLE angiogram +/- intervention on 12/6/18 at B/Hybrid room with Dr Karla Bowie Doctor  The case was discussed with SLIM  She will need to hold rivaroxaban for 2 days  [facility]IM will work out transfer to [facility] for procedure  Elevated lactic acid level   Assessment & Plan    Resolved  -lactate 2 3--> 3 1--> 2 4--> 2 3-->1 6 (12/1/18)  -asymptomatic w/stable hemodynamics  -unclear etiology  -no evidence of acute LE ischemia, mesenteric ischemia or embolic event  -continue workup per primary service     Paroxysmal atrial fibrillation [facility])   Assessment & Plan    -PAF dx'd Sept '18 @ [facility]  -was placed on rivaroxaban 10 mg ?? (subtherapeutic dosing of anticoagulant possibly due to chem?)  -preserved EF 60% w/o RWMA, repeat TTE this admission is pending  -LLE symptoms do not appear acute or related to embolic event  No evidence of LLE thrombosis or graft occlusion  -would need to hold anticoagulation for 2 days prior to endovascular intervention     Lymphoma [facility])   Assessment & Plan    Diffuse large B-cell lymphoma  -on chemotherapy q 3 weeks at [facility]; was scheduled for chemotherapy 12/3/2018  -continue management per Oncology         Subjective:    HX: 75 y/o F B cell lymphoma on chem, AF on Xarelto (10 mg??), severe aortoiliac and infrainquinal PAD S/P Aorta bifem 2010 and bilat Fem-Pop bypasses (R '15 + L '16 [facility]) who presents with weeks of LLE pain, numbness and tingling  She reports that the entire LLE is numb and that the LEFT foot (ankle and distally) pain her  She chronically uses a walker  12/3/18: Patient complains of 2 to 3 weeks of LLE numbness and LEFT foot pain with ambulatory dysfx  No change in LEFT leg symptoms  No CP/SOB  No fevers  Plan for formal neuro eval today, +/- MRI lumbar spine (if patient agrees) and CT head for further evaluation of neuropathy      We discussed plan for LEFT LE angiogram +/- intervention as soon as it can be scheduled  I have contracted our office to set up the study to be performed at [address] [place] [address] in Hybrid Room  Vitals:  /55 (BP Location: Left arm)   Pulse 76   Temp 98 6 °F (37 °C) (Oral)   Resp 19   Ht 5' 2" (1 575 m)   Wt 50 3 kg (110 lb 14 3 oz)   SpO2 99%   BMI 20 28 kg/m²     I/Os:  I/O last 3 completed shifts:   In: 1260 [P O :260; I V :1000]  Out: 3150 [Urine:3150]  I/O this shift:  In: -   Out: 500 [Urine:500]    Lab Results and Cultures:   Lab Results   Component Value Date    WBC 6 01 12/03/2018    HGB 8 2 (L) 12/03/2018    HCT 25 7 (L) 12/03/2018     (H) 12/03/2018     12/03/2018     Lab Results   Component Value Date    GLUCOSE 118 11/30/2018    CALCIUM 8 5 12/03/2018    K 3 8 12/03/2018    CO2 26 12/03/2018     12/03/2018    BUN 3 (L) 12/03/2018    CREATININE 0 63 12/03/2018     Lab Results   Component Value Date    INR 1 30 (H) 11/30/2018    INR 1 38 (H) 10/09/2018    PROTIME 16 0 (H) 11/30/2018    PROTIME 16 8 (H) 10/09/2018        Blood Culture: No results found for: BLOODCX,   Urinalysis:   Lab Results   Component Value Date    COLORU Yellow 12/03/2018    CLARITYU Slightly Cloudy 12/03/2018    SPECGRAV 1 020 12/03/2018    PHUR 7 5 12/03/2018    LEUKOCYTESUR Negative 12/03/2018    NITRITE Negative 12/03/2018    GLUCOSEU Negative 12/03/2018    KETONESU Negative 12/03/2018    BILIRUBINUR Negative 12/03/2018    BLOODU Negative 12/03/2018   ,   Urine Culture: No results found for: URINECX,   Wound Culure: No results found for: WOUNDCULT    Medications:  Current Facility-Administered Medications   Medication Dose Route Frequency    acetaminophen (TYLENOL) tablet 975 mg  975 mg Oral Q6H PRN    amLODIPine (NORVASC) tablet 10 mg  10 mg Oral Daily    aspirin chewable tablet 81 mg  81 mg Oral Daily    atorvastatin (LIPITOR) tablet 40 mg  40 mg Oral HS    calcium carbonate (TUMS) chewable tablet 1,000 mg  1,000 mg Oral Daily PRN    docusate sodium (COLACE) capsule 100 mg  100 mg Oral BID    escitalopram (LEXAPRO) tablet 5 mg  5 mg Oral Daily    heparin (porcine) 25,000 units in 250 mL infusion (premix)  3-20 Units/kg/hr (Order-Specific) Intravenous Titrated    heparin (porcine) injection 1,500 Units  1,500 Units Intravenous PRN    heparin (porcine) injection 3,000 Units  3,000 Units Intravenous PRN    lisinopril (ZESTRIL) tablet 20 mg  20 mg Oral Daily    LORazepam (ATIVAN) 2 mg/mL injection 1 mg  1 mg Intravenous Once in imaging    magnesium oxide (MAG-OX) tablet 400 mg  400 mg Oral BID    melatonin tablet 6 mg  6 mg Oral HS    metoprolol succinate (TOPROL-XL) 24 hr tablet 100 mg  100 mg Oral Daily    mirtazapine (REMERON) tablet 7 5 mg  7 5 mg Oral HS    nicotine (NICODERM CQ) 7 mg/24hr TD 24 hr patch 7 mg  7 mg Transdermal Daily    ondansetron (ZOFRAN) injection 4 mg  4 mg Intravenous Q6H PRN    pantoprazole (PROTONIX) EC tablet 20 mg  20 mg Oral BID AC    senna-docusate sodium (SENOKOT S) 8 6-50 mg per tablet 1 tablet  1 tablet Oral Daily       Imaging:    CT heat w/o contrast 12/3/18: ordered by SLIM    MRI lumbar 12/3/18: ordered     CTA LEFT lower extremity w wo contrast 11/30/18      ANITA 11/30/18: Indications:  Unspecified atherosclerosis [I70 90]  Limb pain and numbness x 1  week  Patient with known bypass graft from [facility]       FINDINGS:     Left                   Impression  PSV  EDV    Inflow Anastomosis     >75%        731  240    High Thigh (Graft)                  19    8    Mid Thigh (Graft)                   38         Low Thigh (Graft)                   32   12    Outflow Anastomosis                 22    8    Common Femoral Artery               33         Prox Profunda                       18    4    Prox SFA                             0    0    Proximal Pop                        22    8    Distal Pop                          21    5    Prox Post Tibial                    25    7    Dist Post Tibial                    20    5    Prox  Ant  Tibial                   28    5    Dist  Ant  Tibial                   12    0    Prox Peroneal                       22    6             CONCLUSION:  Impression:  LEFT LOWER LIMB:  Widely patent common femoral artery to proximal popliteal artery bypass graft  but with signs of significant stenosis just proximal to the anastomosis  Stenosis is greater than 75% with post stenotic turbulence  Segmental pressures were refused due to patients pain  No prior exams to compare with current exam      SIGNATURE:  Electronically Signed by: Jose Sharp on 2018-12-01 04:51:52 PM        Physical Exam:    General appearance: alert and oriented, in no acute distress  Skin: Skin color, texture, turgor normal  No rashes or lesions  Neurologic: Grossly normal  Head: Normocephalic, without obvious abnormality, atraumatic  Eyes: non-icteric  Neck: no carotid bruit, no JVD and supple, symmetrical, trachea midline  Back: symmetric, no curvature  ROM normal  No CVA tenderness  Lungs: overall clear to auscultation bilaterally  Chest wall: no tenderness ; noted well-healed bx scar near chest (near neck)  Heart: RRRR S1S2 soft SM  Abdomen: soft, non-tender; bowel sounds normal; no masses,  no organomegaly ; well healed surgical scars  Extremities: extremities normal, warm and well-perfused; no cyanosis, clubbing, or edema  There are no foot wounds  Both feet are warm  LEFT: Motor function is a bit decreased in the LEFT foot 3/5 dorsiflexion; 4/5 plantar flexion   Palpable aneurysm? Medial thigh bypass graft   There are no palpable pulses in the LEFT foot  LEFT - Monophasic to biphasic PT and AT  Monophasic DP  RIGHT: + Pop/graft pulses and palpable pulses in feet      Pulse exam:  Radial: Right: 2+ Left[de-identified] 2+  Femoral: Right: 2+ Left: 2+  Popliteal: Right: 2+ Left: non-palpable  DP: Right: 2+ Left: doppler signal and non-palpable  PT: Right: 1+ Left: doppler signal and non-palpable       Madelin Field PA-C  12/3/2018  [facility]

## 2018-12-03 NOTE — CONSULTS
Patient: Brandi Phillip  Patient MRN: [id number]  Service date: 12/3/2018  Attending Physician:       CHIEF COMPLAIN  Chief Complaint   Patient presents with    Extremity Weakness     pt co of L leg weakness onset last weeek pt w hx pvd  Heme / Oncology history:  Brandi Phillip is a 70 y o  female     1, stage IV diffuse large B-cell lymphoma  - 8/2018: with diffuse lymphadenopathy, diffuse solid organ metastatic lesions including liver kidney pancreas GI track, biopsy showed diffuse large B-cell lymphoma, Ki-67 90 percent, no double hit   - received chemotherapy R [facility] with Neulasta support and intrathecal cytarabine to prevent CNS involvement, started in 09/2018  Received 4 treatments so far  - restaging imaging with PET-CT to be done  - clinically patient reported having a good response, as lymphadenopathy has been resolving  HISTORY OF PRESENT ILLNESS:  Brandi Phillip is a 70 y o  female who has complicated medical history, including history of peripheral vascular disease with at least 2 procedures, in 2013,  2016, Bell's palsy, presents with left lower extremity weakness, due to history of malignancy, Oncology was consulted for comanagement  Patient reported tolerating the chemotherapy well, again confirmed the tumor has been drinking clinically, imaging study to be done  She tolerated chemotherapy well no major issues  Otherwise no new headaches, vision changes, focal neuro deficits, no bleeding easy bruise, weight is stable no other constitutional symptoms            PROBLEM LIST:  Patient Active Problem List   Diagnosis    Constipation    Lower extremity weakness    BRYANT (acute kidney injury) ([address] )    Acute respiratory failure with hypoxia and hypercapnia (HCC)    Alcohol dependence with withdrawal ([address] )    Atrial fibrillation with rapid ventricular response (HCC)    Bell's palsy    Bilateral pleural effusion    Blurred vision, bilateral    Carotid stenosis, asymptomatic, bilateral    Depression with anxiety    Elevated troponin    Benign essential hypertension    Hypercalcemia    Essential hypertension    Fall    Hypokalemia    Hyponatremia    Tobacco dependency    Neutropenic fever (HCC)    Lymphoma (HCC)    PAD (peripheral artery disease) (HCC)    Paroxysmal atrial fibrillation (HCC)    Elevated lactic acid level    Leg pain    Ischemia of left lower extremity       ASSESSMENT/PLAN:  Traci Turner is a 70 y o  female with:    1) diffuse large B-cell lymphoma  - clinically there is no disease progression, again no lymphadenopathy, tolerating chemo well, no new headaches focal neuro deficits back pain  She will continue to follow with oncologist in [facility], continue chemotherapy, no additional workup needed as for now  2) left lower extremity weakness  - likely due to underlying prefer artery disease  Vascular surgery team and Neurology team has been following patient  3) anemia  - hemoglobin has been stable, macrocytic, this is likely due to chemotherapy, other contributing factor in 2 nutrition status, alcohol drinking habits chronic disease etc  Okay to continue observation , transfuse if needed to keep hemoglobin above 8      4) peripheral vascular disease  - on Xarelto  No bleeding  greater than 50% of the time spent in counseling or coordination of care including discussions of treatment instructions  All of the patient's questions were answered to their satisfactory during this discussion  Joe Mehta MD PhD  Hematology / Oncology                              PAST MEDICAL HISTORY:   has a past medical history of Bell's palsy; Cancer ([facility] ); Hypertension; PAD (peripheral artery disease) ([facility] ); and PVD (peripheral vascular disease) ([facility] [facility] )  PAST SURGICAL HISTORY:   has a past surgical history that includes Hysterectomy; Cardiac surgery; Tonsillectomy; and Carpal tunnel release      CURRENT MEDICATIONS  Scheduled Meds:  Current Facility-Administered Medications:  acetaminophen 975 mg Oral Q6H PRN Mik Tarango MD   amLODIPine 10 mg Oral Daily LAURA Bernstein   aspirin 81 mg Oral Daily LAURA Bernstein   atorvastatin 40 mg Oral HS LAURA Bernstein   calcium carbonate 1,000 mg Oral Daily PRN LAURA Bernstein   ciprofloxacin 500 mg Oral Q12H [facility] LAURA Bernstein   docusate sodium 100 mg Oral BID LAURA Bernstein   escitalopram 5 mg Oral Daily LAURA Bernstein   lisinopril 20 mg Oral Daily Yahaira Matson, LAURA   LORazepam 1 mg Intravenous Once in imaging Vilma Montana PA-C   magnesium oxide 400 mg Oral BID LAURA Bernstein   melatonin 6 mg Oral HS LAURA Bernstein   metoprolol succinate 100 mg Oral Daily LAURA Bernstein   mirtazapine 7 5 mg Oral HS LAURA Bernstein   nicotine 7 mg Transdermal Daily Violeta Olivares MD   ondansetron 4 mg Intravenous Q6H PRN LAURA Bernstein   pantoprazole 20 mg Oral BID AC LAURA Bernstein   rivaroxaban 10 mg Oral Daily With Dinner Mik Tarango MD   senna-docusate sodium 1 tablet Oral Daily LAURA Bernstein     Continuous Infusions:   PRN Meds:   acetaminophen    calcium carbonate    LORazepam    ondansetron    SOCIAL HISTORY:   reports that she has been smoking  She has a 10 00 pack-year smoking history  She has never used smokeless tobacco  She reports that she does not drink alcohol or use drugs  FAMILY HISTORY:  family history includes Breast cancer in her mother; Cancer in her mother; Heart attack in her father  ALLERGIES:  is allergic to oxycodone-acetaminophen      REVIEW OF SYSTEMS:  Please note that a 14-point review of systems was performed to include Constitutional, HEENT, Respiratory, CVS, GI, , Musculoskeletal, Integumentary, Neurologic, Rheumatologic, Endocrinologic, Psychiatric, Lymphatic, and Hematologic/Oncologic systems were reviewed and are negative unless otherwise stated in HPI  Positive and negative findings pertinent to this evaluation are incorporated into the history of present illness  PHYSICAL EXAMINATION:  Vital Signs: Temp:  [98 2 °F (36 8 °C)-98 4 °F (36 9 °C)] 98 2 °F (36 8 °C)  HR:  [] 113  Resp:  [17-18] 17  BP: (120-132)/(59-61) 120/61  Body mass index is 20 28 kg/m²  Body surface area is 1 49 meters squared  Constitutional: Alert and oriented  HEENT:  On eye cover  Chest: Decreased breathing sound bilaterally, No wheezes/rales/rhonchi  CVS: Regular rhythm  Normal rate  Abdomen: Soft, nontender, nondistended  No palpable organomegaly  Extremities: No cyanosis/clubbing/edema  Integumentary: No obvious rashes or bruises  Musculoskeletal: No obvious bony or joint deformities  Psychiatric: Appropriate affect and mood  Lymph Node Survey: No palpable preauricular, submandibular, cervical, supraclavicular, axillary, epitrochlear or inguinal lymphadenopathy  LABS:    Results from last 7 days  Lab Units 12/03/18  0855 12/02/18 0552 12/01/18  0505  11/30/18  1849   WBC Thousand/uL 6 01 6 92 8 01  --  10 94*   HEMATOCRIT % 25 7* 23 7* 26 3*  --  26 8*   HEMATOCRIT, ISTAT   --   --   --   < >  --    PLATELETS Thousands/uL 302 300 313  --  330   MONO PCT % 11 7  --   --  10   < > = values in this interval not displayed      Results from last 7 days  Lab Units 12/03/18  0458 12/02/18  0552 12/01/18  0505   POTASSIUM mmol/L 3 8 3 7 4 4   CHLORIDE mmol/L 106 107 104   CO2 mmol/L 26 26 28   BUN mg/dL 3* 2* 3*       Results from last 7 days  Lab Units 12/03/18  0458 12/02/18  0552 12/01/18  0505 11/30/18  1849   PHOSPHORUS mg/dL  --  3 5 4 9*  --    MAGNESIUM mg/dL 1 7 1 5* 1 5* 1 6   ALBUMIN g/dL  --   --   --  2 7*   ALK PHOS U/L  --   --   --  137*   ALT U/L  --   --   --  16   AST U/L  --   --   --  15       Results from last 7 days  Lab Units 12/01/18  1119 12/01/18  0505 11/30/18  1849   INR   --   --  1 30*   PTT seconds 111* 84* 33 Invalid input(s): TNI,  PCT              IMAGING:  XR ankle 2 vw left   Final Result      No acute osseous abnormality  Workstation performed: [id number]         XR foot 2 vw left   Final Result      No acute osseous abnormality  Workstation performed: [id number]         CT head wo contrast   Final Result      No acute intracranial abnormality  Workstation performed: [id number]         CT spine lumbar wo contrast   Final Result      There are degenerative changes as detailed above  There is moderate left-sided L5-S1 neural foraminal stenosis which mildly compresses the exiting nerve root  There is grade 1 degenerative anterolisthesis L5 on S1 due to bilateral facet arthropathy  No other significant stenoses are seen  Workstation performed: [id number]         VAS lower limb arterial duplex, limited, unilateral   Final Result      XR chest 2 views   Final Result      No acute cardiopulmonary disease              Workstation performed: [id number]         CTA lower extremity left w wo contrast    (Results Pending)   MRI inpatient order    (Results Pending)

## 2018-12-03 NOTE — ASSESSMENT & PLAN NOTE
25-year-old female smoker w/hx HTN, HLD, active Diffuse B Cell lymphoma on chemo @ [facility], new onset PAF on rivaroxaban 10 mg (??)  and severe aortoiliac and infrainguinal arterial occlusive disease, s/p aorto-bifemoral bypass graft '10 and bilateral fem-BK pop bypass w/vein (R '13, L '16) @ [facility] who presents w/LLE numbness, L foot pain and mild motor changes x 2 weeks and lactic acidosis  Lactic acidosis etiology unclear by improved after IV fluids  Diagnostics:  -limited ANITA 11/30:  Patent L fem-pop bypass graft with >75% stenosis proximal to inflow anastomosis  -CTA LLE 11/30:  Patent L fem-pop bypass graft w/2V AT, PT runoff,  2 aneurysms proximal portion of graft with short-segment high-grade 90% stenosis  No acute thrombus or occlusion    Plan:  -Will require abdominal aortogram with left lower extremity runoff for assessment and possible intervention L fem-pop bypass graft (in Hybrid room @ [facility]) which can be performed as outpatient within next week  -No evidence of acute lower extremity ischemia, embolic event  Symptoms consistent with chronic ischemia  -Neurologic workup in progress with MRI L-spine to rule out component of nerve root involvement and CT head  -LLE symptoms not source of lactic acidosis  -continue medical workup per primary service  -continue ASA and statin therapy  -On anticoagulation with rivaroxaban for PAF which would need to be held for 2 days prior to procedure    -Will hold ACE 1 day prior and day of angiogram  -Case/thoughts/recommendations were discussed in detail with SLIM (admitting team) and Neurology teams  -Our office is working on the scheduling for abdominal aortogram with left lower extremity runoff +/- intervention      ADDENDUM:  Patient is scheduled for LLE angiogram +/- intervention on 12/6/18 at [facility]Hybrid room with Dr Mary Marques Doctor  The case was discussed with SLIM  She will need to hold rivaroxaban for 2 days   ELISEO will work out transfer to [facility] for procedure

## 2018-12-03 NOTE — ASSESSMENT & PLAN NOTE
· Heart rate is controlled on monitor  On xarelto  On chemo at [facility] for lymphoma per eddieords  was to receive chemo today-needs to hold xarelto 3 days prior to chemo

## 2018-12-03 NOTE — ASSESSMENT & PLAN NOTE
Resolved  -lactate 2 3--> 3 1--> 2 4--> 2 3-->1 6 (12/1/18)  -asymptomatic w/stable hemodynamics  -unclear etiology  -no evidence of acute LE ischemia, mesenteric ischemia or embolic event  -continue workup per primary service

## 2018-12-03 NOTE — CONSULTS
Consultation - Neurology   Ava Tovar 70 y o  female MRN: [id number]  Unit/Bed#: -01 Encounter: [id number]      Assessment/Plan   Assessment:  Ava Tovar is a 70 y o  female with past medical history as below who presented with LLE weakness, pain, and numbness/tingling x2 weeks  Exam reveals distal extensor weakness of LLE with TTP throughout distal LLE  Decreased vibration b/l and decrease proprioception on LLE  CT head negative for acute intracranial abnormality  Distal LLE XR normal  Etiology unclear  Vascular disease definitely playing a roll given history as below, but unclear if symptoms are all from vascular disease alone vs vascular disease causing mononeuritis multiplex vs r/o lumbar disease  Plan:  -MRI L-spine w and wo contrast  -XR left ankle and foot  -Vascular following, further vascular studies as outpatient  -Continue supportive care per primary team  -Continue to monitor and notify with changes    History of Present Illness     Reason for Consult / Principal Problem: Numbness/tingling LLE    HPI: Ava Tovar is a 70 y o  female with past medical history of recently diagnosed diffuse B cell lymphoma (9/2018) with diffuse metastasis (no CNS involvement) currently receiving chemotherapy (last treatment approximately 3 weeks ago), Afib on Xarelto (although held for a few days as she needs to stop 3 days prior to chemo), PVD s/p left fem-pop bypass with >75% stenosis, right sided Bell's palsy, hypertension who presented to the ED on 11/30 with left lower extremity pain, tingling, and weakness x2 weeks  Patient denies trauma or history of the same  Patient herself reports numbness and tingling from left knee down (although initially states it is her whole leg), specifially front of LLE, and especially worse in the top of her foot  Patient states symptoms are present at rest and worsen with exertion   Patient reports she cannot feel her foot when she stands on it, but is able to ambulate short distances with a cane  She states her LLE becomes weak after walking short distances, ~15 feet  She also reports waking up during the night with distal LLE pain that gets better hanging it off th bed  She denies bowel or bladder incontinence  She denies lower back pain or any pain throughout spine  Denies pain in lower back or anywhere throughout spine  Today, patient is seen resting in bed comfortable  She appears frustrated/upset, understandably, with recent medical diagnoses  She is able to provide history as above  Exam reveals distal LLE extensor weakness and decreased vibration BLE and proprioception LLE  Denies CP, SOB, headache, dizziness, vision changes, N/V, abdominal pain, new weakness or numbness  Inpatient consult to Neurology  Consult performed by: Say Rebolledo  Consult ordered by: Camilo Mesa A          Review of Systems  12 point ROS performed, as stated above, all others negative  Historical Information   Past Medical History:   Diagnosis Date    Bell's palsy     Cancer ([facility] [address] )     lymphoma    Hypertension     PAD (peripheral artery disease) (HCC)     [facility]      Past Surgical History:   Procedure Laterality Date    CARDIAC SURGERY      CARPAL TUNNEL RELEASE      HYSTERECTOMY      TONSILLECTOMY       Social History   History   Alcohol Use No     History   Drug Use No     History   Smoking Status    Current Some Day Smoker    Packs/day: 0 25    Years: 40 00   Smokeless Tobacco    Never Used     Family History:   Family History   Problem Relation Age of Onset    Cancer Mother     Breast cancer Mother     Heart attack Father        Review of previous medical records was completed      Meds/Allergies   all current active meds have been reviewed and current meds:   Current Facility-Administered Medications   Medication Dose Route Frequency    acetaminophen (TYLENOL) tablet 975 mg  975 mg Oral Q6H PRN    amLODIPine (NORVASC) tablet 10 mg  10 mg Oral Daily    aspirin chewable tablet 81 mg  81 mg Oral Daily    atorvastatin (LIPITOR) tablet 40 mg  40 mg Oral HS    calcium carbonate (TUMS) chewable tablet 1,000 mg  1,000 mg Oral Daily PRN    ciprofloxacin (CIPRO) tablet 500 mg  500 mg Oral Q12H [facility]    docusate sodium (COLACE) capsule 100 mg  100 mg Oral BID    escitalopram (LEXAPRO) tablet 5 mg  5 mg Oral Daily    lisinopril (ZESTRIL) tablet 20 mg  20 mg Oral Daily    magnesium oxide (MAG-OX) tablet 400 mg  400 mg Oral BID    melatonin tablet 6 mg  6 mg Oral HS    metoprolol succinate (TOPROL-XL) 24 hr tablet 100 mg  100 mg Oral Daily    mirtazapine (REMERON) tablet 7 5 mg  7 5 mg Oral HS    nicotine (NICODERM CQ) 7 mg/24hr TD 24 hr patch 7 mg  7 mg Transdermal Daily    ondansetron (ZOFRAN) injection 4 mg  4 mg Intravenous Q6H PRN    pantoprazole (PROTONIX) EC tablet 20 mg  20 mg Oral BID AC    rivaroxaban (XARELTO) tablet 10 mg  10 mg Oral Daily With Dinner    senna-docusate sodium (SENOKOT S) 8 6-50 mg per tablet 1 tablet  1 tablet Oral Daily       Allergies   Allergen Reactions    Oxycodone-Acetaminophen Rash       Objective   Vitals:Blood pressure 120/61, pulse (!) 113, temperature 98 2 °F (36 8 °C), temperature source Oral, resp  rate 17, height 5' 2" (1 575 m), weight 50 3 kg (110 lb 14 3 oz), SpO2 100 %  ,Body mass index is 20 28 kg/m²  Intake/Output Summary (Last 24 hours) at 12/03/18 0902  Last data filed at 12/03/18 0751   Gross per 24 hour   Intake             1260 ml   Output             2350 ml   Net            -1090 ml       Invasive Devices: Invasive Devices     Peripheral Intravenous Line            Peripheral IV 11/30/18 Right Antecubital 2 days                Physical Exam   Constitutional: She is oriented to person, place, and time  Chronic ill and frail appearing female   HENT:   Head: Normocephalic  Wearing cap and right eye patch   Neck: Normal range of motion  Neck supple     Pulmonary/Chest: Effort normal and breath sounds normal    Musculoskeletal:   TTP left distal leg, ankle, and foot   Neurological: She is oriented to person, place, and time  She has a normal Finger-Nose-Finger Test    Reflex Scores:       Tricep reflexes are 1+ on the right side and 1+ on the left side  Bicep reflexes are 2+ on the right side and 2+ on the left side  Brachioradialis reflexes are 1+ on the right side and 1+ on the left side  Patellar reflexes are 0 on the right side and 0 on the left side  Achilles reflexes are 0 on the right side and 0 on the left side  Skin: Skin is warm and dry  Psychiatric: Her speech is normal    Flattened and frustrated affect     Neurologic Exam     Mental Status   Oriented to person, place, and time  Attention: normal  Concentration: normal    Speech: speech is normal   Level of consciousness: alert    Cranial Nerves   Chronic total right facial weakness  EMOI  PERRLA  Tongue midline  Hearing grossly intact  Shoulder shrug symmetric  Motor Exam   Right arm pronator drift: absent  Left arm pronator drift: absent  BUE strength symmetric, mildly weak throughout  BLE strength symmetric except:  Left dorsiflexion 4-/5 with limited ROM  Left toe extension with limited ROM , 4-/5  Decreased inversion and eversion of left foot       Sensory Exam   Sensation intact to light touch and pinprick throughout   Decreased temperature and vibration sensation distal BLE  Decrease proprioception left big toe     Gait, Coordination, and Reflexes     Coordination   Finger to nose coordination: normal    Reflexes   Right brachioradialis: 1+  Left brachioradialis: 1+  Right biceps: 2+  Left biceps: 2+  Right triceps: 1+  Left triceps: 1+  Right patellar: 0  Left patellar: 0  Right achilles: 0  Left achilles: 0  Right plantar: equivocal  Left plantar: equivocal  Slight ataxia LLE with heel to shin       Lab Results: I have personally reviewed pertinent reports       Recent Results (from the past 24 hour(s))   Magnesium    Collection Time: 12/03/18  4:58 AM   Result Value Ref Range    Magnesium 1 7 1 6 - 2 6 mg/dL     Imaging Studies: I have personally reviewed pertinent reports  and I have personally reviewed pertinent films in PACS  EKG, Pathology, and Other Studies: I have personally reviewed pertinent reports      VTE Prophylaxis: Xarelto

## 2018-12-03 NOTE — ASSESSMENT & PLAN NOTE
Diffuse large B-cell lymphoma  -on chemotherapy q 3 weeks at [facility]; was scheduled for chemotherapy 12/3/2018  -continue management per Oncology

## 2018-12-03 NOTE — ASSESSMENT & PLAN NOTE
Cause for admission  Initially thought related to PVD but not per vascular  Neurology consulted; mri l  spine pending; ct head pending with lymphoma; PT/OT podiatry consulted; cpk normal

## 2018-12-03 NOTE — ASSESSMENT & PLAN NOTE
-PAF dx'd Sept '18 @ [facility]  -was placed on rivaroxaban 10 mg ?? (subtherapeutic dosing of anticoagulant possibly due to chem?)  -preserved EF 60% w/o RWMA, repeat TTE this admission is pending  -LLE symptoms do not appear acute or related to embolic event    No evidence of LLE thrombosis or graft occlusion  -would need to hold anticoagulation for 2 days prior to endovascular intervention

## 2018-12-03 NOTE — ASSESSMENT & PLAN NOTE
vascular following and xarelto; no need for emergent intervention but consideration for folow up vascular studies this week per vascular surgery  May need transfer to [facility] vascular surgery direction  She still has leg pain and numbness  procalcitonin normal  Ct l spine with degenerative changes  Neurology consult and mri l spine pending; consider further imaging pending neurology recommendations

## 2018-12-04 ENCOUNTER — APPOINTMENT (INPATIENT)
Dept: ULTRASOUND IMAGING | Facility: HOSPITAL | Age: 63
DRG: 315 | End: 2018-12-04
Payer: COMMERCIAL

## 2018-12-04 ENCOUNTER — APPOINTMENT (INPATIENT)
Dept: MRI IMAGING | Facility: HOSPITAL | Age: 63
DRG: 315 | End: 2018-12-04
Payer: COMMERCIAL

## 2018-12-04 PROBLEM — I48.91 ATRIAL FIBRILLATION WITH RAPID VENTRICULAR RESPONSE (HCC): Status: RESOLVED | Noted: 2018-08-26 | Resolved: 2018-12-04

## 2018-12-04 LAB
ANION GAP SERPL CALCULATED.3IONS-SCNC: 8 MMOL/L (ref 4–13)
ANISOCYTOSIS BLD QL SMEAR: PRESENT
BASOPHILS # BLD MANUAL: 0.13 THOUSAND/UL (ref 0–0.1)
BASOPHILS NFR MAR MANUAL: 2 % (ref 0–1)
BUN SERPL-MCNC: 6 MG/DL (ref 5–25)
CALCIUM SERPL-MCNC: 8.9 MG/DL (ref 8.3–10.1)
CHLORIDE SERPL-SCNC: 104 MMOL/L (ref 100–108)
CO2 SERPL-SCNC: 27 MMOL/L (ref 21–32)
CREAT SERPL-MCNC: 0.71 MG/DL (ref 0.6–1.3)
EOSINOPHIL # BLD MANUAL: 0.07 THOUSAND/UL (ref 0–0.4)
EOSINOPHIL NFR BLD MANUAL: 1 % (ref 0–6)
ERYTHROCYTE [DISTWIDTH] IN BLOOD BY AUTOMATED COUNT: 23.3 % (ref 11.6–15.1)
GFR SERPL CREATININE-BSD FRML MDRD: 90 ML/MIN/1.73SQ M
GLUCOSE SERPL-MCNC: 89 MG/DL (ref 65–140)
HCT VFR BLD AUTO: 25.9 % (ref 34.8–46.1)
HGB BLD-MCNC: 8.1 G/DL (ref 11.5–15.4)
LYMPHOCYTES # BLD AUTO: 0.47 THOUSAND/UL (ref 0.6–4.47)
LYMPHOCYTES # BLD AUTO: 7 % (ref 14–44)
MCH RBC QN AUTO: 31.2 PG (ref 26.8–34.3)
MCHC RBC AUTO-ENTMCNC: 31.3 G/DL (ref 31.4–37.4)
MCV RBC AUTO: 100 FL (ref 82–98)
MONOCYTES # BLD AUTO: 0.4 THOUSAND/UL (ref 0–1.22)
MONOCYTES NFR BLD: 6 % (ref 4–12)
NEUTROPHILS # BLD MANUAL: 5.64 THOUSAND/UL (ref 1.85–7.62)
NEUTS BAND NFR BLD MANUAL: 5 % (ref 0–8)
NEUTS SEG NFR BLD AUTO: 79 % (ref 43–75)
NRBC BLD AUTO-RTO: 0 /100 WBCS
PLATELET # BLD AUTO: 324 THOUSANDS/UL (ref 149–390)
PLATELET BLD QL SMEAR: ADEQUATE
PMV BLD AUTO: 10.2 FL (ref 8.9–12.7)
POTASSIUM SERPL-SCNC: 4.1 MMOL/L (ref 3.5–5.3)
RBC # BLD AUTO: 2.6 MILLION/UL (ref 3.81–5.12)
SODIUM SERPL-SCNC: 139 MMOL/L (ref 136–145)
TOTAL CELLS COUNTED SPEC: 100
WBC # BLD AUTO: 6.72 THOUSAND/UL (ref 4.31–10.16)

## 2018-12-04 PROCEDURE — 99232 SBSQ HOSP IP/OBS MODERATE 35: CPT | Performed by: INTERNAL MEDICINE

## 2018-12-04 PROCEDURE — 93923 UPR/LXTR ART STDY 3+ LVLS: CPT

## 2018-12-04 PROCEDURE — 72148 MRI LUMBAR SPINE W/O DYE: CPT

## 2018-12-04 PROCEDURE — 85027 COMPLETE CBC AUTOMATED: CPT | Performed by: GENERAL PRACTICE

## 2018-12-04 PROCEDURE — 93923 UPR/LXTR ART STDY 3+ LVLS: CPT | Performed by: SURGERY

## 2018-12-04 PROCEDURE — 80048 BASIC METABOLIC PNL TOTAL CA: CPT | Performed by: GENERAL PRACTICE

## 2018-12-04 PROCEDURE — 85007 BL SMEAR W/DIFF WBC COUNT: CPT | Performed by: GENERAL PRACTICE

## 2018-12-04 RX ORDER — HYDROCODONE BITARTRATE AND ACETAMINOPHEN 5; 325 MG/1; MG/1
1 TABLET ORAL EVERY 6 HOURS PRN
Status: DISCONTINUED | OUTPATIENT
Start: 2018-12-04 | End: 2018-12-05 | Stop reason: HOSPADM

## 2018-12-04 RX ADMIN — AMLODIPINE BESYLATE 10 MG: 10 TABLET ORAL at 09:25

## 2018-12-04 RX ADMIN — METOPROLOL SUCCINATE 100 MG: 100 TABLET, FILM COATED, EXTENDED RELEASE ORAL at 09:25

## 2018-12-04 RX ADMIN — DOCUSATE SODIUM 100 MG: 100 CAPSULE, LIQUID FILLED ORAL at 17:28

## 2018-12-04 RX ADMIN — HYDROCODONE BITARTRATE AND ACETAMINOPHEN 1 TABLET: 5; 325 TABLET ORAL at 12:21

## 2018-12-04 RX ADMIN — MAGNESIUM OXIDE TAB 400 MG (241.3 MG ELEMENTAL MG) 400 MG: 400 (241.3 MG) TAB at 17:28

## 2018-12-04 RX ADMIN — MIRTAZAPINE 7.5 MG: 15 TABLET, FILM COATED ORAL at 22:22

## 2018-12-04 RX ADMIN — SENNOSIDES AND DOCUSATE SODIUM 1 TABLET: 8.6; 5 TABLET ORAL at 09:25

## 2018-12-04 RX ADMIN — DOCUSATE SODIUM 100 MG: 100 CAPSULE, LIQUID FILLED ORAL at 09:25

## 2018-12-04 RX ADMIN — LORAZEPAM 1 MG: 2 INJECTION INTRAMUSCULAR; INTRAVENOUS at 18:33

## 2018-12-04 RX ADMIN — PANTOPRAZOLE SODIUM 20 MG: 20 TABLET, DELAYED RELEASE ORAL at 17:28

## 2018-12-04 RX ADMIN — MAGNESIUM OXIDE TAB 400 MG (241.3 MG ELEMENTAL MG) 400 MG: 400 (241.3 MG) TAB at 09:25

## 2018-12-04 RX ADMIN — PANTOPRAZOLE SODIUM 20 MG: 20 TABLET, DELAYED RELEASE ORAL at 09:25

## 2018-12-04 RX ADMIN — MELATONIN 6 MG: 3 TAB ORAL at 22:22

## 2018-12-04 RX ADMIN — ENOXAPARIN SODIUM 40 MG: 40 INJECTION SUBCUTANEOUS at 12:21

## 2018-12-04 RX ADMIN — ATORVASTATIN CALCIUM 40 MG: 40 TABLET, FILM COATED ORAL at 22:22

## 2018-12-04 RX ADMIN — ASPIRIN 81 MG 81 MG: 81 TABLET ORAL at 09:25

## 2018-12-04 RX ADMIN — HYDROCODONE BITARTRATE AND ACETAMINOPHEN 1 TABLET: 5; 325 TABLET ORAL at 18:00

## 2018-12-04 RX ADMIN — NICOTINE 7 MG: 7 PATCH TRANSDERMAL at 09:28

## 2018-12-04 RX ADMIN — LISINOPRIL 20 MG: 20 TABLET ORAL at 09:25

## 2018-12-04 RX ADMIN — ESCITALOPRAM OXALATE 5 MG: 10 TABLET ORAL at 09:26

## 2018-12-04 NOTE — ASSESSMENT & PLAN NOTE
Scheduled for surgery tomorrow at [place] around noon time  Discussed with vascular surgery and they will take patient on their service  They would consult medical team for medical management

## 2018-12-04 NOTE — CONSULTS
Consult - Podiatry   Maryann Jarvis 70 y o  female MRN: [id number]  Unit/Bed#: -01 Encounter: [id number]    Assessment/Plan     Assessment:  Left foot pain  Numbness/tingling with weakness  Neuritic vs  vascular etiology  Probably elements of both  Peripheral vascular disease  Lumbar disc disease, osteoarthritis  Plan:  Reviewed chart consults and notes  Reviewed pathophysiology of left foot pain  Most likely dysvascular pain with or without element of more proximal neuritic origin  Recent CT shows disc disease and degenerative changes in the lower spine  The patient will be having a vascular procedure later this week  From a podiatric point of view treatment should be supportive and symptomatic  General skin precautions should be observed  Physical therapy has already been consulted and their plan includes evaluation and support of safe weight-bearing and ambulation  At this point pain appears to be dysvascular or neuritic in origin  If pain persists/worsens despite these issues being addressed and then a primary source of pain in the foot can be considered and appropriate tests and treatment can be implemented predicated by the clinical course and appearance  History of Present Illness     HPI:  Maryann Jarvis is a 70 y o  female with multiple medical problems who is seen for left foot pain  Patient reports numbness and tingling in the left leg and foot for several weeks  There is pain in the foot  Patient denies any specific injury or inciting event  Consults  Review of Systems   Constitutional: Negative  HENT:      Eyes:   Respiratory:      Cardiovascular:      Gastrointestinal:   Musculoskeletal:  Weakness in the left leg/foot  Skin:  No dermatologic conditions reported  Neurological: Negative  Psych: negative         Historical Information   Past Medical History:   Diagnosis Date    Bell's palsy     Cancer ([facility] [address] )     lymphoma    Hypertension     PAD (peripheral artery disease) ([address] [facility] )     PVD (peripheral vascular disease) ([address] )      Past Surgical History:   Procedure Laterality Date    CARDIAC SURGERY      CARPAL TUNNEL RELEASE      HYSTERECTOMY      TONSILLECTOMY       Social History   History   Alcohol Use No     History   Drug Use No     History   Smoking Status    Current Some Day Smoker    Packs/day: 0 25    Years: 40 00   Smokeless Tobacco    Never Used     Family History:   Family History   Problem Relation Age of Onset    Cancer Mother     Breast cancer Mother     Heart attack Father        Meds/Allergies   Prescriptions Prior to Admission   Medication    acyclovir (ZOVIRAX) 800 mg tablet    amLODIPine (NORVASC) 10 mg tablet    aspirin 81 mg chewable tablet    atorvastatin (LIPITOR) 40 mg tablet    CHELATED MAGNESIUM PO    Cholecalciferol (VITAMIN D3) 2000 units TABS    escitalopram (LEXAPRO) 5 mg tablet    lisinopril (ZESTRIL) 40 mg tablet    Melatonin ER (MELADOX) 3 MG TBCR    metoprolol succinate (TOPROL-XL) 100 mg 24 hr tablet    mirtazapine (REMERON) 15 mg tablet    nicotine (NICODERM CQ) 14 mg/24hr TD 24 hr patch    omeprazole (PriLOSEC) 20 mg delayed release capsule    rivaroxaban (XARELTO) 10 mg tablet    senna-docusate sodium (SENOKOT S) 8 6-50 mg per tablet     Allergies   Allergen Reactions    Oxycodone-Acetaminophen Rash       Objective   First Vitals:   Blood Pressure: 91/54 (11/30/18 1523)  Pulse: 83 (11/30/18 1523)  Temperature: 98 3 °F (36 8 °C) (11/30/18 1528)  Temp Source: Oral (11/30/18 1528)  Respirations: 16 (11/30/18 1523)  Height: 5' 2" (157 5 cm) (11/30/18 2352)  Weight - Scale: 50 3 kg (110 lb 14 3 oz) (11/30/18 1901)  SpO2: 100 % (11/30/18 1523)    Current Vitals:   Blood Pressure: 107/55 (12/03/18 1554)  Pulse: 76 (12/03/18 1554)  Temperature: 98 6 °F (37 °C) (12/03/18 1554)  Temp Source: Oral (12/03/18 1554)  Respirations: 19 (12/03/18 1554)  Height: 5' 2" (157 5 cm) (11/30/18 0834)  Weight - Scale: 50 3 kg (110 lb 14 3 oz) (11/30/18 1901)  SpO2: 99 % (12/03/18 1554)        /55 (BP Location: Left arm)   Pulse 76   Temp 98 6 °F (37 °C) (Oral)   Resp 19   Ht 5' 2" (1 575 m)   Wt 50 3 kg (110 lb 14 3 oz)   SpO2 99%   BMI 20 28 kg/m²      General Appearance:    Alert, cooperative, no distress   Head:    Normocephalic, without obvious abnormality, atraumatic   Eyes:    PERRL, conjunctiva/corneas clear, EOM's intact        Nose:   Moist mucous membranes   Neck:   Supple, symmetrical, trachea midline   Back:     Symmetric   Lungs:     Respirations unlabored   Heart:    Regular rate and rhythm, S1 and S2 normal, no murmur, rub   or gallop   Abdomen:     Soft, non-tender   Extremities: There is no acute deformity seen  There is a general weakness in the left lower extremity  There is weakness in both dorsiflexion and plantar flexion and inversion and eversion  There is a general pain to palpation in the left foot  There is no specific focal area of maximum pain  There is pain with attempted range of motion of the foot  Pulses:   Pedal pulses are diminished  Dorsalis pedis is +1/4 on the right and nonpalpable on the left  Posterior tibialis pulses are nonpalpable bilaterally  There is no distal cyanosis or gangrene  Both feet appear pink and warm  Skin:   The skin is warm and dry and intact bilaterally  There are no wounds or lesions  There are no rashes  Neurologic:   Gross sensation is intact  Protective sensation is preserved  Lab Results:   Admission on 11/30/2018   No results displayed because visit has over 200 results  Invalid input(s): LABAEARO            Imaging: I have personally reviewed pertinent films in PACS  EKG, Pathology, and Other Studies: I have personally reviewed pertinent reports        Code Status: Level 1 - Full Code  Advance Directive and Living Will:      Power of :    POLST:

## 2018-12-04 NOTE — ASSESSMENT & PLAN NOTE
Continue amlodipine and lisinopril, however, I have been told by vascular surgery that her lisinopril will be held    If her blood pressure goes up, could use p r n  IV antihypertensive medication

## 2018-12-04 NOTE — PLAN OF CARE
Problem: Nutrition/Hydration-ADULT  Goal: Nutrient/Hydration intake appropriate for improving, restoring or maintaining nutritional needs  Monitor and assess patient's nutrition/hydration status for malnutrition (ex- brittle hair, bruises, dry skin, pale skin and conjunctiva, muscle wasting, smooth red tongue, and disorientation)  Collaborate with interdisciplinary team and initiate plan and interventions as ordered  Monitor patient's weight and dietary intake as ordered or per policy  Utilize nutrition screening tool and intervene per policy  Determine patient's food preferences and provide high-protein, high-caloric foods as appropriate  INTERVENTIONS:  - Monitor oral intake, urinary output, labs, and treatment plans  - Assess nutrition and hydration status and recommend course of action  - Evaluate amount of meals eaten  - Assist patient with eating if necessary   - Allow adequate time for meals  - Recommend/ encourage appropriate diets, oral nutritional supplements, and vitamin/mineral supplements  - Order, calculate, and assess calorie counts as needed  - Recommend, monitor, and adjust tube feedings based on assessed needs  - Assess need for intravenous fluids  - Provide specific nutrition/hydration education as appropriate  - Include patient/family/caregiver in decisions related to nutrition    Outcome: Progressing  Continue diet and supplement as prescribed

## 2018-12-04 NOTE — PROGRESS NOTES
[facility] Progress Note  Patient: Nell Gosselin 70 y o  female   MRN: [id number]  PCP: No primary care provider on file  Unit/Bed#: -01 Encounter: [id number]  Date Of Visit: 12/04/18          * Ischemia of left lower extremity   Assessment & Plan    Patient has significant peripheral arterial disease and needs intervention  This is likely causing the pain  Her chronic anticoagulation on hold and she would be transferred to [place] for further surgery-vascular  Would give her some Norco as she is asking for more pain medication as Ultram does not help  Leg pain   Assessment & Plan    Plan as above     Lymphoma [facility])   Assessment & Plan    Patient getting chemo at [facility] for her lymphoma  Hematology/oncology service following here  Elevated troponin   Assessment & Plan    Chronically elevated  Cardiology following  Essential hypertension   Assessment & Plan    · Continue amlodipine lisinopril and metoprolol with hold parameters  · Family doctor recently decrease lisinopril from 40 mg to 20 mg  · Monitor closely     Peripheral vascular disease [facility])   Assessment & Plan    Plan as above for vascular intervention  Elevated lactic acid level   Assessment & Plan    · resolved     Paroxysmal atrial fibrillation (HCC)   Assessment & Plan    Heart rate and rhythm currently stable  Xarelto on hold for surgical intervention     PAD (peripheral artery disease) [facility])   Assessment & Plan    Plan for surgery as above on Thursday       Anemia-present on admission:?  Chronic    Monitor hemoglobin is needed      Present on Admission:   Elevated troponin   Essential hypertension   Lymphoma (HCC)   Elevated lactic acid level   PAD (peripheral artery disease) (HCC)   Leg pain   Peripheral vascular disease (HCC)   Paroxysmal atrial fibrillation (HCC)            VTE Pharmacologic Prophylaxis:   Pharmacologic: Enoxaparin (Lovenox)  Mechanical VTE Prophylaxis in Place: Yes    Patient Centered Rounds: I have performed bedside rounds with nursing staff today  Discussions with Specialists or Other Care Team Provider:  Yes    Education and Discussions with Family / Patient:  Yes    Current Length of Stay: 4 day(s)    Current Patient Status: Inpatient   Certification Statement: The patient will continue to require additional inpatient hospital stay due to Vascular surgery    Discharge Plan: To [facility] tomorrow    Code Status: Level 1 - Full Code      Subjective:   Complaining of pain in her left foot  No chest pain or shortness of breath  She covers the right eye because history of Bell's palsy  Denies any abdominal pain  Denies any nausea or vomiting  Denies any fever or chills  Objective:     Vitals:   Temp (24hrs), Av 4 °F (36 9 °C), Min:98 1 °F (36 7 °C), Max:98 6 °F (37 °C)    Temp:  [98 1 °F (36 7 °C)-98 6 °F (37 °C)] 98 1 °F (36 7 °C)  HR:  [74-82] 78  Resp:  [18-20] 18  BP: (107-164)/(55-75) 115/58  SpO2:  [96 %-99 %] 96 %  Body mass index is 20 28 kg/m²  Input and Output Summary (last 24 hours): Intake/Output Summary (Last 24 hours) at 18 1427  Last data filed at 18 0930   Gross per 24 hour   Intake              180 ml   Output              600 ml   Net             -420 ml           Physical Exam:     Vital signs reviewed as  Conjunctivae are pale, however, she has her right eye covered because of Bell's palsy  S1 and S2 audible  Anterolaterally chest clear to auscultation  There is no edema of her legs  Abdomen is soft  Nontender  Bowel sounds are audible  Awake and alert    Oriented x3  Quite upset about not getting pain medications      Additional Data:     Labs:      Results from last 7 days  Lab Units 18  0449   WBC Thousand/uL 6 72   HEMOGLOBIN g/dL 8 1*   HEMATOCRIT % 25 9*   PLATELETS Thousands/uL 324   LYMPHO PCT % 7*   MONO PCT % 6   EOS PCT % 1       Results from last 7 days  Lab Units 18  0449  18  1824 11/30/18  1849   POTASSIUM mmol/L 4 1  < >  --  4 4   CHLORIDE mmol/L 104  < >  --  102   CO2 mmol/L 27  < >  --  28   CO2, I-STAT mmol/L  --   --  26  --    BUN mg/dL 6  < >  --  5   CREATININE mg/dL 0 71  < >  --  0 73   CALCIUM mg/dL 8 9  < >  --  8 8   ALK PHOS U/L  --   --   --  137*   ALT U/L  --   --   --  16   AST U/L  --   --   --  15   GLUCOSE, ISTAT mg/dl  --   --  118  --    < > = values in this interval not displayed  Results from last 7 days  Lab Units 12/03/18  1705   INR  1 11       * I Have Reviewed All Lab Data Listed Above  * Additional Pertinent Lab Tests Reviewed:  All Labs Within Last 24 Hours Reviewed      Recent Cultures (last 7 days):           Last 24 Hours Medication List:     Current Facility-Administered Medications:  acetaminophen 975 mg Oral Q6H PRN Consuelo García MD   amLODIPine 10 mg Oral Daily Ronda Goring, FADYNP   aspirin 81 mg Oral Daily Ronda Goring, CRNP   atorvastatin 40 mg Oral HS Ronda Goring, CRNP   calcium carbonate 1,000 mg Oral Daily PRN Ronda Jose, LAURA   diphenhydrAMINE 25 mg Oral Q6H PRN Consuelo García MD   docusate sodium 100 mg Oral BID Ronda Rebeca, LAURA   enoxaparin 40 mg Subcutaneous Q24H [facility] Jocelin García MD   escitalopram 5 mg Oral Daily Ronda Lizzieing, LAURA   HYDROcodone-acetaminophen 1 tablet Oral Q6H PRN Aldair Mayorga MD   lisinopril 20 mg Oral Daily LAURA Viera   LORazepam 1 mg Intravenous Once in imaging Violet Chao PA-C   magnesium oxide 400 mg Oral BID Ronda Goring, CRNP   melatonin 6 mg Oral HS Ronda Goring, CRQUINTON   metoprolol succinate 100 mg Oral Daily Ronda Goring, CRNP   mirtazapine 7 5 mg Oral HS Ronda Goring, CRNP   nicotine 7 mg Transdermal Daily Manolo Carson MD   ondansetron 4 mg Intravenous Q6H PRN LAURA Oneil   pantoprazole 20 mg Oral BID AC LAURA Oneil   senna-docusate sodium 1 tablet Oral Daily LAURA Oneil Today, Patient Was Seen By: Gil Laboy MD    ** Please Note: Dragon 360 Dictation voice to text software may have been used in the creation of this document   **

## 2018-12-05 ENCOUNTER — HOSPITAL ENCOUNTER (INPATIENT)
Facility: HOSPITAL | Age: 63
LOS: 4 days | Discharge: HOME HEALTH | DRG: 253 | End: 2018-12-09
Attending: SURGERY | Admitting: SURGERY
Payer: COMMERCIAL

## 2018-12-05 ENCOUNTER — ANESTHESIA EVENT (INPATIENT)
Dept: PERIOP | Facility: HOSPITAL | Age: 63
DRG: 253 | End: 2018-12-05
Payer: COMMERCIAL

## 2018-12-05 VITALS
DIASTOLIC BLOOD PRESSURE: 54 MMHG | HEART RATE: 78 BPM | WEIGHT: 110.89 LBS | HEIGHT: 62 IN | TEMPERATURE: 98.9 F | OXYGEN SATURATION: 93 % | RESPIRATION RATE: 18 BRPM | BODY MASS INDEX: 20.41 KG/M2 | SYSTOLIC BLOOD PRESSURE: 108 MMHG

## 2018-12-05 DIAGNOSIS — I73.9 PAD (PERIPHERAL ARTERY DISEASE) (HCC): Primary | ICD-10-CM

## 2018-12-05 LAB
ABO GROUP BLD: NORMAL
ANION GAP SERPL CALCULATED.3IONS-SCNC: 5 MMOL/L (ref 4–13)
BLD GP AB SCN SERPL QL: NEGATIVE
BUN SERPL-MCNC: 11 MG/DL (ref 5–25)
CALCIUM SERPL-MCNC: 8.7 MG/DL (ref 8.3–10.1)
CHLORIDE SERPL-SCNC: 100 MMOL/L (ref 100–108)
CO2 SERPL-SCNC: 29 MMOL/L (ref 21–32)
CREAT SERPL-MCNC: 0.7 MG/DL (ref 0.6–1.3)
ERYTHROCYTE [DISTWIDTH] IN BLOOD BY AUTOMATED COUNT: 22.4 % (ref 11.6–15.1)
GFR SERPL CREATININE-BSD FRML MDRD: 91 ML/MIN/1.73SQ M
GLUCOSE SERPL-MCNC: 100 MG/DL (ref 65–140)
HCT VFR BLD AUTO: 26 % (ref 34.8–46.1)
HGB BLD-MCNC: 8.2 G/DL (ref 11.5–15.4)
INR PPP: 0.98 (ref 0.86–1.17)
MAGNESIUM SERPL-MCNC: 1.6 MG/DL (ref 1.6–2.6)
MCH RBC QN AUTO: 31.9 PG (ref 26.8–34.3)
MCHC RBC AUTO-ENTMCNC: 31.5 G/DL (ref 31.4–37.4)
MCV RBC AUTO: 101 FL (ref 82–98)
PLATELET # BLD AUTO: 334 THOUSANDS/UL (ref 149–390)
PMV BLD AUTO: 10.2 FL (ref 8.9–12.7)
POTASSIUM SERPL-SCNC: 4.5 MMOL/L (ref 3.5–5.3)
PROTHROMBIN TIME: 13.1 SECONDS (ref 11.8–14.2)
RBC # BLD AUTO: 2.57 MILLION/UL (ref 3.81–5.12)
RH BLD: NEGATIVE
SODIUM SERPL-SCNC: 134 MMOL/L (ref 136–145)
SPECIMEN EXPIRATION DATE: NORMAL
WBC # BLD AUTO: 9.96 THOUSAND/UL (ref 4.31–10.16)

## 2018-12-05 PROCEDURE — 86850 RBC ANTIBODY SCREEN: CPT | Performed by: ORTHOPAEDIC SURGERY

## 2018-12-05 PROCEDURE — 86920 COMPATIBILITY TEST SPIN: CPT

## 2018-12-05 PROCEDURE — 85027 COMPLETE CBC AUTOMATED: CPT | Performed by: ORTHOPAEDIC SURGERY

## 2018-12-05 PROCEDURE — 99233 SBSQ HOSP IP/OBS HIGH 50: CPT | Performed by: INTERNAL MEDICINE

## 2018-12-05 PROCEDURE — 86900 BLOOD TYPING SEROLOGIC ABO: CPT | Performed by: ORTHOPAEDIC SURGERY

## 2018-12-05 PROCEDURE — 85610 PROTHROMBIN TIME: CPT | Performed by: ORTHOPAEDIC SURGERY

## 2018-12-05 PROCEDURE — 86901 BLOOD TYPING SEROLOGIC RH(D): CPT | Performed by: ORTHOPAEDIC SURGERY

## 2018-12-05 PROCEDURE — 99233 SBSQ HOSP IP/OBS HIGH 50: CPT | Performed by: PSYCHIATRY & NEUROLOGY

## 2018-12-05 PROCEDURE — 80048 BASIC METABOLIC PNL TOTAL CA: CPT | Performed by: ORTHOPAEDIC SURGERY

## 2018-12-05 PROCEDURE — 83735 ASSAY OF MAGNESIUM: CPT | Performed by: ORTHOPAEDIC SURGERY

## 2018-12-05 PROCEDURE — 99233 SBSQ HOSP IP/OBS HIGH 50: CPT | Performed by: PHYSICIAN ASSISTANT

## 2018-12-05 PROCEDURE — 99239 HOSP IP/OBS DSCHRG MGMT >30: CPT | Performed by: INTERNAL MEDICINE

## 2018-12-05 RX ORDER — DOCUSATE SODIUM 100 MG/1
100 CAPSULE, LIQUID FILLED ORAL 2 TIMES DAILY
Status: CANCELLED | OUTPATIENT
Start: 2018-12-05

## 2018-12-05 RX ORDER — LANOLIN ALCOHOL/MO/W.PET/CERES
6 CREAM (GRAM) TOPICAL
Status: CANCELLED | OUTPATIENT
Start: 2018-12-05

## 2018-12-05 RX ORDER — MIRTAZAPINE 15 MG/1
15 TABLET, FILM COATED ORAL
Status: DISCONTINUED | OUTPATIENT
Start: 2018-12-05 | End: 2018-12-06

## 2018-12-05 RX ORDER — ACETAMINOPHEN 325 MG/1
975 TABLET ORAL EVERY 6 HOURS PRN
Status: CANCELLED | OUTPATIENT
Start: 2018-12-05

## 2018-12-05 RX ORDER — AMOXICILLIN 250 MG
1 CAPSULE ORAL DAILY
Status: CANCELLED | OUTPATIENT
Start: 2018-12-06

## 2018-12-05 RX ORDER — OXYCODONE HYDROCHLORIDE 5 MG/1
5 TABLET ORAL EVERY 4 HOURS PRN
Status: DISCONTINUED | OUTPATIENT
Start: 2018-12-05 | End: 2018-12-09 | Stop reason: HOSPADM

## 2018-12-05 RX ORDER — ASPIRIN 81 MG/1
81 TABLET, CHEWABLE ORAL DAILY
Status: CANCELLED | OUTPATIENT
Start: 2018-12-06

## 2018-12-05 RX ORDER — HYDROMORPHONE HCL/PF 1 MG/ML
0.5 SYRINGE (ML) INJECTION EVERY 4 HOURS PRN
Status: DISCONTINUED | OUTPATIENT
Start: 2018-12-05 | End: 2018-12-09 | Stop reason: HOSPADM

## 2018-12-05 RX ORDER — ASPIRIN 81 MG/1
81 TABLET, CHEWABLE ORAL DAILY
Status: DISCONTINUED | OUTPATIENT
Start: 2018-12-06 | End: 2018-12-09 | Stop reason: HOSPADM

## 2018-12-05 RX ORDER — HYDROCODONE BITARTRATE AND ACETAMINOPHEN 5; 325 MG/1; MG/1
1 TABLET ORAL EVERY 6 HOURS PRN
Status: CANCELLED | OUTPATIENT
Start: 2018-12-05

## 2018-12-05 RX ORDER — DIPHENHYDRAMINE HYDROCHLORIDE 50 MG/ML
12.5 INJECTION INTRAMUSCULAR; INTRAVENOUS EVERY 6 HOURS PRN
Status: DISCONTINUED | OUTPATIENT
Start: 2018-12-05 | End: 2018-12-09 | Stop reason: HOSPADM

## 2018-12-05 RX ORDER — ESCITALOPRAM OXALATE 10 MG/1
5 TABLET ORAL DAILY
Status: CANCELLED | OUTPATIENT
Start: 2018-12-06

## 2018-12-05 RX ORDER — PANTOPRAZOLE SODIUM 20 MG/1
20 TABLET, DELAYED RELEASE ORAL
Status: CANCELLED | OUTPATIENT
Start: 2018-12-05

## 2018-12-05 RX ORDER — DIPHENHYDRAMINE HCL 25 MG
25 TABLET ORAL EVERY 6 HOURS PRN
Status: CANCELLED | OUTPATIENT
Start: 2018-12-05

## 2018-12-05 RX ORDER — CALCIUM CARBONATE 200(500)MG
1000 TABLET,CHEWABLE ORAL DAILY PRN
Status: CANCELLED | OUTPATIENT
Start: 2018-12-05

## 2018-12-05 RX ORDER — DEXTROSE, SODIUM CHLORIDE, AND POTASSIUM CHLORIDE 5; .45; .15 G/100ML; G/100ML; G/100ML
75 INJECTION INTRAVENOUS CONTINUOUS
Status: DISCONTINUED | OUTPATIENT
Start: 2018-12-06 | End: 2018-12-07

## 2018-12-05 RX ORDER — ONDANSETRON 2 MG/ML
4 INJECTION INTRAMUSCULAR; INTRAVENOUS EVERY 6 HOURS PRN
Status: DISCONTINUED | OUTPATIENT
Start: 2018-12-05 | End: 2018-12-09 | Stop reason: HOSPADM

## 2018-12-05 RX ORDER — AMLODIPINE BESYLATE 10 MG/1
10 TABLET ORAL DAILY
Status: DISCONTINUED | OUTPATIENT
Start: 2018-12-06 | End: 2018-12-09 | Stop reason: HOSPADM

## 2018-12-05 RX ORDER — MELATONIN
2000 DAILY
Status: DISCONTINUED | OUTPATIENT
Start: 2018-12-06 | End: 2018-12-09 | Stop reason: HOSPADM

## 2018-12-05 RX ORDER — MIRTAZAPINE 15 MG/1
7.5 TABLET, FILM COATED ORAL
Status: CANCELLED | OUTPATIENT
Start: 2018-12-05

## 2018-12-05 RX ORDER — METOPROLOL SUCCINATE 100 MG/1
100 TABLET, EXTENDED RELEASE ORAL DAILY
Status: DISCONTINUED | OUTPATIENT
Start: 2018-12-06 | End: 2018-12-09 | Stop reason: HOSPADM

## 2018-12-05 RX ORDER — ATORVASTATIN CALCIUM 40 MG/1
40 TABLET, FILM COATED ORAL
Status: CANCELLED | OUTPATIENT
Start: 2018-12-05

## 2018-12-05 RX ORDER — PANTOPRAZOLE SODIUM 20 MG/1
20 TABLET, DELAYED RELEASE ORAL
Status: DISCONTINUED | OUTPATIENT
Start: 2018-12-06 | End: 2018-12-09 | Stop reason: HOSPADM

## 2018-12-05 RX ORDER — AMLODIPINE BESYLATE 10 MG/1
10 TABLET ORAL DAILY
Status: CANCELLED | OUTPATIENT
Start: 2018-12-06

## 2018-12-05 RX ORDER — ONDANSETRON 2 MG/ML
4 INJECTION INTRAMUSCULAR; INTRAVENOUS EVERY 6 HOURS PRN
Status: CANCELLED | OUTPATIENT
Start: 2018-12-05

## 2018-12-05 RX ORDER — ATORVASTATIN CALCIUM 20 MG/1
20 TABLET, FILM COATED ORAL
Status: DISCONTINUED | OUTPATIENT
Start: 2018-12-05 | End: 2018-12-09 | Stop reason: HOSPADM

## 2018-12-05 RX ORDER — DOCUSATE SODIUM 100 MG/1
100 CAPSULE, LIQUID FILLED ORAL 2 TIMES DAILY
Status: DISCONTINUED | OUTPATIENT
Start: 2018-12-05 | End: 2018-12-09 | Stop reason: HOSPADM

## 2018-12-05 RX ORDER — ESCITALOPRAM OXALATE 10 MG/1
5 TABLET ORAL DAILY
Status: DISCONTINUED | OUTPATIENT
Start: 2018-12-06 | End: 2018-12-09 | Stop reason: HOSPADM

## 2018-12-05 RX ORDER — LANOLIN ALCOHOL/MO/W.PET/CERES
6 CREAM (GRAM) TOPICAL
Status: DISCONTINUED | OUTPATIENT
Start: 2018-12-05 | End: 2018-12-09 | Stop reason: HOSPADM

## 2018-12-05 RX ORDER — METOPROLOL SUCCINATE 100 MG/1
100 TABLET, EXTENDED RELEASE ORAL DAILY
Status: CANCELLED | OUTPATIENT
Start: 2018-12-06

## 2018-12-05 RX ADMIN — OXYCODONE HYDROCHLORIDE 5 MG: 5 TABLET ORAL at 23:10

## 2018-12-05 RX ADMIN — MIRTAZAPINE 15 MG: 15 TABLET ORAL at 23:10

## 2018-12-05 RX ADMIN — PANTOPRAZOLE SODIUM 20 MG: 20 TABLET, DELAYED RELEASE ORAL at 07:18

## 2018-12-05 RX ADMIN — PANTOPRAZOLE SODIUM 20 MG: 20 TABLET, DELAYED RELEASE ORAL at 15:55

## 2018-12-05 RX ADMIN — HYDROCODONE BITARTRATE AND ACETAMINOPHEN 1 TABLET: 5; 325 TABLET ORAL at 09:52

## 2018-12-05 RX ADMIN — ESCITALOPRAM OXALATE 5 MG: 10 TABLET ORAL at 09:52

## 2018-12-05 RX ADMIN — METOPROLOL SUCCINATE 100 MG: 100 TABLET, FILM COATED, EXTENDED RELEASE ORAL at 09:51

## 2018-12-05 RX ADMIN — ENOXAPARIN SODIUM 40 MG: 40 INJECTION SUBCUTANEOUS at 09:51

## 2018-12-05 RX ADMIN — NICOTINE 7 MG: 7 PATCH TRANSDERMAL at 09:56

## 2018-12-05 RX ADMIN — ASPIRIN 81 MG 81 MG: 81 TABLET ORAL at 09:51

## 2018-12-05 RX ADMIN — MAGNESIUM OXIDE TAB 400 MG (241.3 MG ELEMENTAL MG) 400 MG: 400 (241.3 MG) TAB at 09:51

## 2018-12-05 RX ADMIN — ATORVASTATIN CALCIUM 20 MG: 20 TABLET, FILM COATED ORAL at 23:09

## 2018-12-05 RX ADMIN — HYDROCODONE BITARTRATE AND ACETAMINOPHEN 1 TABLET: 5; 325 TABLET ORAL at 15:55

## 2018-12-05 RX ADMIN — MAGNESIUM OXIDE TAB 400 MG (241.3 MG ELEMENTAL MG) 400 MG: 400 (241.3 MG) TAB at 17:14

## 2018-12-05 RX ADMIN — MELATONIN 6 MG: at 23:09

## 2018-12-05 RX ADMIN — AMLODIPINE BESYLATE 10 MG: 10 TABLET ORAL at 09:51

## 2018-12-05 NOTE — PROGRESS NOTES
[facility] Progress Note  Patient: Armaan Burns 70 y o  female   MRN: [id number]  PCP: No primary care provider on file  Unit/Bed#: -Andrea Encounter: [id number]  Date Of Visit: 12/05/18          * Ischemia of left lower extremity   Assessment & Plan    Scheduled for surgery [facility] around noon time  Discussed with vascular surgery and they will take patient on their service  They would consult medical team for medical management  Leg pain   Assessment & Plan    Secondary to ischemia  Better with current pain regimen     Lymphoma [facility])   Assessment & Plan    Patient getting chemo at [facility] for her lymphoma  Resume after surgery     Elevated troponin   Assessment & Plan    No further cardiac workup at this point     Essential hypertension   Assessment & Plan    Continue amlodipine and lisinopril, however, I have been told by vascular surgery that her lisinopril will be held  If her blood pressure goes up, could use p r n  IV antihypertensive medication     Peripheral vascular disease [facility])   Assessment & Plan    Plan as above for vascular intervention  Elevated lactic acid level   Assessment & Plan    · resolved     Paroxysmal atrial fibrillation (HCC)   Assessment & Plan    Heart rate and rhythm currently stable    Xarelto on hold for surgical intervention as above     PAD (peripheral artery disease) ([facility] [address] )   Assessment & Plan    Plan for surgery as above on Thursday-if transfer there     Bell's palsy   Assessment & Plan    She keeps her right eye covered         Present on Admission:   Elevated troponin   Essential hypertension   Lymphoma ([facility] [address] )   PAD (peripheral artery disease) ([facility] [address] )   Leg pain   Ischemia of left lower extremity   Paroxysmal atrial fibrillation (HCC)   Bell's palsy            VTE Pharmacologic Prophylaxis:   Pharmacologic: Enoxaparin (Lovenox)  Mechanical VTE Prophylaxis in Place: Yes    Patient Centered Rounds: I have performed bedside rounds with nursing staff today  Discussions with Specialists or Other Care Team Provider:  Yes    Education and Discussions with Family / Patient:  Yes    Time Spent for Care: 35+ minutes  More than 50% of total time spent on counseling and coordination of care as described above  Current Length of Stay: 5 day(s)    Current Patient Status: Inpatient   Certification Statement: The patient will continue to require additional inpatient hospital stay due to Vascular surgery    Discharge Plan: To [facility] hopefully tomorrow    Code Status: Level 1 - Full Code      Subjective:   She feels okay  Has less pain in her left foot/leg  No nausea, vomiting, diarrhea  No chest pain or shortness of breath  No fever or chills  Objective:     Vitals:   Temp (24hrs), Av 3 °F (36 8 °C), Min:98 2 °F (36 8 °C), Max:98 3 °F (36 8 °C)    Temp:  [98 2 °F (36 8 °C)-98 3 °F (36 8 °C)] 98 3 °F (36 8 °C)  HR:  [66-74] 71  Resp:  [18] 18  BP: ()/(52-59) 124/59  SpO2:  [92 %-97 %] 93 %  Body mass index is 20 28 kg/m²  Input and Output Summary (last 24 hours): Intake/Output Summary (Last 24 hours) at 18 1406  Last data filed at 18 1930   Gross per 24 hour   Intake              240 ml   Output                0 ml   Net              240 ml           Physical Exam:     Vital signs are reviewed as above  Constitutional:  Sitting up in the chair  Not in any respiratory distress  Eyes:  Right eye covered  Has pale conjunctiva left eye  HENT: Oropharynx are moist   Chronic asymmetry of her face  Neck: Neck is supple  Cardiac: I did not hear any rubs or gallop  Patient appears to be in sinus rhythm  Respiratory: Patient not in significant respiratory distress  Air entry in general is fair  GI: Abdomen is soft  It is grossly nontender  Bowel sounds are adequate  I was not able to appreciate any hepatosplenomegaly  Neurologic:  Patient is awake and alert   Neurological examination is grossly intact  No obvious focal neurological deficit noticed  Has chronic asymmetry of her face from Bell's pelvis  Skin: Skin is warm and dry  Psychiatric:  Very difficult to please  Musculoskeletal  Patient moving all extremities while sitting  Has pain in her left foot  Extremities: Patient has no significant cyanosis, clubbing, or lower extremity edema      Additional Data:     Labs:      Results from last 7 days  Lab Units 12/04/18  0449   WBC Thousand/uL 6 72   HEMOGLOBIN g/dL 8 1*   HEMATOCRIT % 25 9*   PLATELETS Thousands/uL 324   LYMPHO PCT % 7*   MONO PCT % 6   EOS PCT % 1       Results from last 7 days  Lab Units 12/04/18  0449  11/30/18  1859 11/30/18  1849   POTASSIUM mmol/L 4 1  < >  --  4 4   CHLORIDE mmol/L 104  < >  --  102   CO2 mmol/L 27  < >  --  28   CO2, I-STAT mmol/L  --   --  26  --    BUN mg/dL 6  < >  --  5   CREATININE mg/dL 0 71  < >  --  0 73   CALCIUM mg/dL 8 9  < >  --  8 8   ALK PHOS U/L  --   --   --  137*   ALT U/L  --   --   --  16   AST U/L  --   --   --  15   GLUCOSE, ISTAT mg/dl  --   --  118  --    < > = values in this interval not displayed  Results from last 7 days  Lab Units 12/03/18  1705   INR  1 11       * I Have Reviewed All Lab Data Listed Above  * Additional Pertinent Lab Tests Reviewed:  All Labs Within Last 24 Hours Reviewed        Recent Cultures (last 7 days):           Last 24 Hours Medication List:     Current Facility-Administered Medications:  acetaminophen 975 mg Oral Q6H PRN Micaela García MD   amLODIPine 10 mg Oral Daily LAURA Sidhu   aspirin 81 mg Oral Daily LAURA Sidhu   atorvastatin 40 mg Oral HS LAURA Sidhu   calcium carbonate 1,000 mg Oral Daily PRN LAURA Sidhu   diphenhydrAMINE 25 mg Oral Q6H PRN Micaela García MD   docusate sodium 100 mg Oral BID LAURA Sidhu   enoxaparin 40 mg Subcutaneous Q24H [address] Rachel Cornell MD   escitalopram 5 mg Oral Daily LAURA Sidhu HYDROcodone-acetaminophen 1 tablet Oral Q6H PRN Vilma Kamara MD   magnesium oxide 400 mg Oral BID LAURA Kramer   melatonin 6 mg Oral HS LAURA Kramer   metoprolol succinate 100 mg Oral Daily LAURA Kramer   mirtazapine 7 5 mg Oral HS LAURA Kramer   nicotine 7 mg Transdermal Daily Ralph Lei MD   ondansetron 4 mg Intravenous Q6H PRN LAURA Kramer   pantoprazole 20 mg Oral BID AC LAURA Kramer   senna-docusate sodium 1 tablet Oral Daily LAURA Kramer        Today, Patient Was Seen By: Vilma Kamara MD    ** Please Note: Dragon 360 Dictation voice to text software may have been used in the creation of this document   **

## 2018-12-05 NOTE — ASSESSMENT & PLAN NOTE
71 y/o F smoker w/hx HTN, HLD, active Diffuse B Cell lymphoma on chemo @ [facility], new dx PAF on rivaroxaban 10 mg (??)  and severe aortoiliac and infrainguinal arterial occlusive disease, s/p aorto-bifemoral bypass graft '10 and bilateral fem-BK pop bypass w/vein (R '13, L '16) @ [facility] who presents w/ L foot pain and LEFT LE numbness /weakness x 2 weeks and lactic acidosis  Lactic acidosis etiology unclear by improved after IV fluids  Exam:  1-2+ radial pulses bilat; 1+ ulnar pulses bilat  2+ Fem pulses; No distal pulses in L foot  Feet are warm and well-perfused; motor-sensation grossly intact  No wounds  Diagnostics:  -DANIEL 12/4/18: R 1 04/110/84; L 0 27/36/21  -limited ANITA 11/30:  Patent L fem-pop bypass graft;  >75% stenosis proximal to inflow anastomosis  -CTA LLE 11/30:  Patent L fem-pop bypass graft w/2V AT, PT runoff,  2 aneurysms proximal portion of graft with short-segment high-grade 90% stenosis  No acute thrombus or occlusion    Plan:  - LLE angiogram with intervention on 12/6/18 at [facility]/Hybrid room with Dr Ingram Labor Doctor  - We discussed the procedure and patient agrees to proceed  - NPO after MN for planned procedure  - Rivaroxaban for PAF is on hold for planned procedure  - No evidence of acute lower extremity ischemia, embolic event  Symptoms consistent with chronic ischemia  - Neurologic workup appreciated who recommends EMG studies if continued neuro complaints weeks after revascularization    - Continue ASA and statin therapy  - Lisinopril 20 was discontinued today and can be restarted 12/7 as per SLIM  BP mgt per SLIM    - Case/thoughts/recommendations were discussed in detail with SLIM

## 2018-12-05 NOTE — PROGRESS NOTES
Progress Note - Gladys Cortes 1955, 70 y o  female MRN: [id number]    Unit/Bed#: -01 Encounter: [id number]    Primary Care Provider: No primary care provider on file  Date and time admitted to hospital: 11/30/2018  5:50 PM        PAD (peripheral artery disease) [facility])   Assessment & Plan    71 y/o F smoker w/hx HTN, HLD, active Diffuse B Cell lymphoma on chemo @ [facility], new dx PAF on rivaroxaban 10 mg (??)  and severe aortoiliac and infrainguinal arterial occlusive disease, s/p aorto-bifemoral bypass graft '10 and bilateral fem-BK pop bypass w/vein (R '13, L '16) @ [facility] who presents w/ L foot pain and LEFT LE numbness /weakness x 2 weeks and lactic acidosis  Lactic acidosis etiology unclear by improved after IV fluids  Exam:  1-2+ radial pulses bilat; 1+ ulnar pulses bilat  2+ Fem pulses; No distal pulses in L foot  Feet are warm and well-perfused; motor-sensation grossly intact  No wounds  Diagnostics:  -DANIEL 12/4/18: R 1 04/110/84; L 0 27/36/21  -limited ANITA 11/30:  Patent L fem-pop bypass graft;  >75% stenosis proximal to inflow anastomosis  -CTA LLE 11/30:  Patent L fem-pop bypass graft w/2V AT, PT runoff,  2 aneurysms proximal portion of graft with short-segment high-grade 90% stenosis  No acute thrombus or occlusion    Plan:  - LLE angiogram with intervention on 12/6/18 at [facility]/Hybrid room with Dr Christianne Rubi Doctor  - We discussed the procedure and patient agrees to proceed  - NPO after MN for planned procedure  - Rivaroxaban for PAF is on hold for planned procedure  - No evidence of acute lower extremity ischemia, embolic event  Symptoms consistent with chronic ischemia  - Neurologic workup appreciated who recommends EMG studies if continued neuro complaints weeks after revascularization    - Continue ASA and statin therapy  - Lisinopril 20 was discontinued today and can be restarted 12/7 as per SLIM  BP mgt per SLIM    - Case/thoughts/recommendations were discussed in detail with SLIM     Paroxysmal atrial fibrillation [facility])   Assessment & Plan    - PAF dx'd Sept '18 @ [facility]  - placed on rivaroxaban 10 mg ?? (subtherapeutic dosing of anticoagulant possibly due to chem?)  - preserved EF 60% w/o RWMA 12/3/18  - LLE symptoms do not appear acute or related to embolic event  No evidence of LLE thrombosis or graft occlusion  - Holding anticoagulation for planned endovascular intervention     Lymphoma [facility])   Assessment & Plan    Diffuse large B-cell lymphoma  -on chemotherapy q 3 weeks at [facility]; was scheduled for chemotherapy 12/3/2018  -continue management per Oncology           Subjective:  RE:  LEFT foot pain and LEFT anterior leg/foot numbness and tingling x 2-3 weeks with general L leg weakness    Patient reports that she still has significant L foot pain  (She appears much more comfortable today )   No CP/SOB  No Fevers  We discussed results of vascular imaging studies and plans for re-vascularization which is scheduled for tomorrow, 12/6/18  Vitals:  /59 (BP Location: Left arm)   Pulse 71   Temp 98 3 °F (36 8 °C) (Oral)   Resp 18   Ht 5' 2" (1 575 m)   Wt 50 3 kg (110 lb 14 3 oz)   SpO2 93%   BMI 20 28 kg/m²     I/Os:  I/O last 3 completed shifts: In: 5 [P O :420]  Out: 400 [Urine:400]  No intake/output data recorded      Lab Results and Cultures:   Lab Results   Component Value Date    WBC 6 72 12/04/2018    HGB 8 1 (L) 12/04/2018    HCT 25 9 (L) 12/04/2018     (H) 12/04/2018     12/04/2018     Lab Results   Component Value Date    GLUCOSE 118 11/30/2018    CALCIUM 8 9 12/04/2018    K 4 1 12/04/2018    CO2 27 12/04/2018     12/04/2018    BUN 6 12/04/2018    CREATININE 0 71 12/04/2018     Lab Results   Component Value Date    INR 1 11 12/03/2018    INR 1 30 (H) 11/30/2018    INR 1 38 (H) 10/09/2018    PROTIME 14 2 12/03/2018    PROTIME 16 0 (H) 11/30/2018    PROTIME 16 8 (H) 10/09/2018        Blood Culture: No results found for: BLOODCX, Urinalysis:   Lab Results   Component Value Date    COLORU Yellow 12/03/2018    CLARITYU Slightly Cloudy 12/03/2018    SPECGRAV 1 020 12/03/2018    PHUR 7 5 12/03/2018    LEUKOCYTESUR Negative 12/03/2018    NITRITE Negative 12/03/2018    GLUCOSEU Negative 12/03/2018    KETONESU Negative 12/03/2018    BILIRUBINUR Negative 12/03/2018    BLOODU Negative 12/03/2018   ,   Urine Culture: No results found for: URINECX,   Wound Culure: No results found for: WOUNDCULT    Medications:  Current Facility-Administered Medications   Medication Dose Route Frequency    acetaminophen (TYLENOL) tablet 975 mg  975 mg Oral Q6H PRN    amLODIPine (NORVASC) tablet 10 mg  10 mg Oral Daily    aspirin chewable tablet 81 mg  81 mg Oral Daily    atorvastatin (LIPITOR) tablet 40 mg  40 mg Oral HS    calcium carbonate (TUMS) chewable tablet 1,000 mg  1,000 mg Oral Daily PRN    diphenhydrAMINE (BENADRYL) tablet 25 mg  25 mg Oral Q6H PRN    docusate sodium (COLACE) capsule 100 mg  100 mg Oral BID    enoxaparin (LOVENOX) subcutaneous injection 40 mg  40 mg Subcutaneous Q24H [address]    escitalopram (LEXAPRO) tablet 5 mg  5 mg Oral Daily    HYDROcodone-acetaminophen (NORCO) 5-325 mg per tablet 1 tablet  1 tablet Oral Q6H PRN    magnesium oxide (MAG-OX) tablet 400 mg  400 mg Oral BID    melatonin tablet 6 mg  6 mg Oral HS    metoprolol succinate (TOPROL-XL) 24 hr tablet 100 mg  100 mg Oral Daily    mirtazapine (REMERON) tablet 7 5 mg  7 5 mg Oral HS    nicotine (NICODERM CQ) 7 mg/24hr TD 24 hr patch 7 mg  7 mg Transdermal Daily    ondansetron (ZOFRAN) injection 4 mg  4 mg Intravenous Q6H PRN    pantoprazole (PROTONIX) EC tablet 20 mg  20 mg Oral BID AC    senna-docusate sodium (SENOKOT S) 8 6-50 mg per tablet 1 tablet  1 tablet Oral Daily       Imaging:    DANIEL 12/4/18  RIGHT LOWER LIMB  Ankle/Brachial Index: 1 04 which is in the normal range  PPG/PVR Tracings are dampened  Biphasic waveforms at the ankle    Metatarsal Pressure 110 mmHg  Great Toe Pressure: 84 mmHg, which limits for healing potential     LEFT LOWER LIMB  Ankle/Brachial Index: 0 27 which is in the rest pain/tissue loss range  PPG/PVR Tracings are dampened  Monophasic waveforms at the ankle  Metatarsal Pressure 36 mmHg  Great Toe Pressure: 21 mmHg, which is below the healing range  ANITA 11/30/218  LEFT LOWER LIMB:  Widely patent CFA to proximal popliteal artery bypass graft but with signs of significant stenosis just proximal to the anastomosis  Stenosis is greater than 75% with post stenotic turbulence  Segmental pressures were refused due to patients pain  CTA LE LEFT w wo contrast 11/30/18  Aortobifem and bilateral femoropopliteal bypass graft  Graft are patent    Up to 90% stenosis in short segment interposed segment between aortobifem anastomosis and femoropopliteal graft anastomosis        MRI Lumbar spine 12/4/18:  Multifocal lumbar degenerative disc disease and facet arthropathy  Canal stenosis most pronounced at L4-5, moderately severe with distortion of the thecal sac best seen on series 6 image 18  Only mild right foraminal narrowing is noted at this level      At L5-S1 there is anterior spondylolisthesis on the basis of facet arthropathy  Moderate canal stenosis and bilateral foraminal narrowing, left greater than right      Mild degenerative disc disease at the L2-3 and L3-4      There is a small T1 hyperintense lesion within the posterior midportion of the right kidney likely representing a small hemorrhagic cyst   Recommend renal ultrasound follow-up      The study was marked in EPIC for immediate notification       Physical Exam:    General appearance:  Wears patch over RIGHT eye;  alert and oriented, in no acute distress  Skin: Skin color, texture, turgor normal  No rashes or lesions  Neurologic: Grossly normal  Head: Normocephalic, without obvious abnormality, atraumatic  Eyes: eomi  Neck: no carotid bruit, no JVD and supple, symmetrical, trachea midline  Back: symmetric, no curvature  ROM normal  No CVA tenderness  Lungs: overall ctab  Chest wall: no tenderness  Heart: regular rate and rhythm, S1, S2 normal, no murmur, click, rub or gallop  Abdomen: well-healed mid-line surgical scar; soft, non-tender; bowel sounds normal; no masses,  no organomegaly  Extremities: extremities normal, warm and well-perfused; no cyanosis, clubbing, or edema     Feet are warm and grossly motor-sensation intact   Mildly decreased L foot strength    Pulse exam:  Radial: Right: 2+ Left[de-identified] 1+-2+  Ulnar: Right: 1+ Left[de-identified] 1+  Femoral: Right: 2+ Left: 2+  Popliteal: Right: 2+ Left: non-palpable  DP: Right: 2+ Left: non-palpable  PT: Right: 1+ Left: non-palpable      Esteban Harrington PA-C  12/5/2018  [facility]

## 2018-12-05 NOTE — ASSESSMENT & PLAN NOTE
- PAF dx'd Sept '18 @ [address]  - placed on rivaroxaban 10 mg ?? (subtherapeutic dosing of anticoagulant possibly due to chem?)  - preserved EF 60% w/o RWMA 12/3/18  - LLE symptoms do not appear acute or related to embolic event    No evidence of LLE thrombosis or graft occlusion  - Holding anticoagulation for planned endovascular intervention

## 2018-12-05 NOTE — DISCHARGE SUMMARY
Discharge Summary - [facility] Internal Medicine    Patient Information: Elijah Bernal 70 y o  female MRN: [id number]  Unit/Bed#: -01 Encounter: [id number]    Discharging Physician / Practitioner: Tatiana Samson MD  PCP: No primary care provider on file  Admission Date: 11/30/2018  Discharge Date: 12/05/18    Reason for Admission:  Lower extremity weakness-left leg    Discharge Diagnoses:     Principal Problem:    Ischemia of left lower extremity  Active Problems:    Leg pain    Lymphoma (HCC)    Elevated troponin    Essential hypertension    PAD (peripheral artery disease) (HCC)    Paroxysmal atrial fibrillation (HCC)    Elevated lactic acid level    Peripheral vascular disease ([address] )  Resolved Problems:    * No resolved hospital problems  *    Present on Admission:   Elevated troponin   Essential hypertension   Lymphoma (HCC)   Elevated lactic acid level   PAD (peripheral artery disease) (HCC)   Leg pain   Ischemia of left lower extremity   Peripheral vascular disease (HCC)   Paroxysmal atrial fibrillation ([facility] [address] )    Consultations During Hospital Stay:  · Cardiology  · Vascular  · Neurology  · Hematology/oncology  · Podiatry    Procedures Performed:     · CT head unremarkable      · Ultrasound DANIEL    Impression  RIGHT LOWER LIMB  Ankle/Brachial Index: 1 04 which is in the normal range  PPG/PVR Tracings are dampened  Biphasic waveforms at the ankle  Metatarsal Pressure 110 mmHg  Great Toe Pressure: 84 mmHg, which limits for healing potential     LEFT LOWER LIMB  Ankle/Brachial Index: 0 27 which is in the rest pain/tissue loss range  PPG/PVR Tracings are dampened  Monophasic waveforms at the ankle  Metatarsal Pressure 36 mmHg  Great Toe Pressure: 21 mmHg, which is below the healing range  · MRI lumbar spine      Multifocal lumbar degenerative disc disease and facet arthropathy    Canal stenosis most pronounced at L4-5, moderately severe with distortion of the thecal sac best seen on series 6 image 18  Only mild right foraminal narrowing is noted at this level      At L5-S1 there is anterior spondylolisthesis on the basis of facet arthropathy  Moderate canal stenosis and bilateral foraminal narrowing, left greater than right      Mild degenerative disc disease at the L2-3 and L3-4      There is a small T1 hyperintense lesion within the posterior midportion of the right kidney likely representing a small hemorrhagic cyst   Recommend renal ultrasound follow-up  Significant Findings:     · As above    Incidental Findings:   · As above     Test Results Pending at Discharge (will require follow up): · None     Outpatient Tests Requested:  · None    Complications:  None    Hospital Course:     Emile Sandoval is a 70 y o  female patient who originally presented to the hospital on 2018 due to weakness and pain in her left foot/leg  She had further workup done as above and was found to have significant peripheral arterial disease on the left side which would require intervention  She is now being transferred to [address] [place] [address] for vascular surgery planned for tomorrow  I have spoken with vascular surgery team-Dr Elsa Toussaint who has accepted patient on his service  Condition at Discharge: fair     Discharge Day Visit / Exam:     Please refer to my note from earlier today for other details      Subjective:   She feels okay  Has less pain in her left foot/leg  No nausea, vomiting, diarrhea  No chest pain or shortness of breath    No fever or chills  Objective:      Vitals:   Temp (24hrs), Av 3 °F (36 8 °C), Min:98 2 °F (36 8 °C), Max:98 3 °F (36 8 °C)     Temp:  [98 2 °F (36 8 °C)-98 3 °F (36 8 °C)] 98 3 °F (36 8 °C)  HR:  [66-74] 71  Resp:  [18] 18  BP: ()/(52-59) 124/59  SpO2:  [92 %-97 %] 93 %  Body mass index is 20 28 kg/m²          Input and Output Summary (last 24 hours):         Intake/Output Summary (Last 24 hours) at 18 1406  Last data filed at 18 1930    Gross per 24 hour   Intake              240 ml   Output                0 ml   Net              240 ml               Physical Exam:      Vital signs are reviewed as above  Constitutional:  Sitting up in the chair  Not in any respiratory distress  Eyes:  Right eye covered  Has pale conjunctiva left eye  HENT: Oropharynx are moist   Chronic asymmetry of her face  Neck: Neck is supple  Cardiac: I did not hear any rubs or gallop  Patient appears to be in sinus rhythm  Respiratory: Patient not in significant respiratory distress  Air entry in general is fair  GI: Abdomen is soft  It is grossly nontender  Bowel sounds are adequate  I was not able to appreciate any hepatosplenomegaly  Neurologic:  Patient is awake and alert  Neurological examination is grossly intact  No obvious focal neurological deficit noticed  Has chronic asymmetry of her face from Bell's pelvis  Skin: Skin is warm and dry  Psychiatric:  Very difficult to please  Musculoskeletal  Patient moving all extremities while sitting  Has pain in her left foot  Extremities: Patient has no significant cyanosis, clubbing, or lower extremity edema           Discharge instructions/Information to patient and family:   See after visit summary for information provided to patient and family  Provisions for Follow-Up Care:  See after visit summary for information related to follow-up care and any pertinent home health orders  Disposition:     [address] Transfer to [address] [place] [address],[address] to [facility] SNF:   · Not Applicable to this Patient - Not Applicable to this Patient    Planned Readmission:  None here, however, she is going to be readmitted to [facility] as above     Discharge Statement:  I spent 35+ minutes discharging the patient  This time was spent on the day of discharge  I had direct contact with the patient on the day of discharge   Greater than 50% of the total time was spent examining patient, answering all patient questions, arranging and discussing plan of care with patient as well as directly providing post-discharge instructions  Additional time then spent on discharge activities  Discharge Medications:  See after visit summary for reconciled discharge medications provided to patient and family  ** Please Note: Dragon 360 Dictation voice to text software may have been used in the creation of this document   **

## 2018-12-05 NOTE — PROGRESS NOTES
Progress Note - Neurology   Bernadette Heck 70 y o  female MRN: [id number]  Unit/Bed#: -01 Encounter: [id number]    Assessment:  Bernadette Heck is a 70 y o  female with past medical history of diffuse B cell lymphoma with diffuse metastasis (no CNS involvement) currently receiving chemotherapy (last treatment approximately 3 weeks ago), Afib on Xarelto (although held prior to admission for pending chemotherapy; needs to be held 3 days prior to treatment), PVD s/p left fem-pop bypass with 75% stenosis, right sided Bell's palsy, hypertension who presented to the ED on 11/30 with LLE pain, tingling, and weakness x2 weeks  Exam reveals distal LLE weakness in no clear pattern  CT head negative for acute intracranial abnormality  LLE XR negative for abnormality  MRI L-spine did show degenerative changes and some canal stenosis at L5-S1, but clear cause for weakness  Etiology of weakness likely secondary to vascular insuffiencey  Plan:  -Follow up with Vascular surgery as outpatient for further scheduled workup  -Continue supportive care per primary team  -No further inpatient neuro recs  Follow up as outpatient with neurology 4-6 weeks  Appointment requested  Subjective:   No acute events overnight  Patient continues to report distal LLE pain  MRI L-spine completed and did show some canal stenosis at L5-S1 and degenerative changes  Denies CP, SOB, headache, dizziness, vision changes, N/V, abdominal pain, new weakness or numbness  ROS:  12 point ROS performed, as stated above, all others negative  Medications:   All current active meds have been reviewed and current meds:  Scheduled Meds:  Current Facility-Administered Medications:  acetaminophen 975 mg Oral Q6H PRN Blake García MD   amLODIPine 10 mg Oral Daily Cori Massy, CRNP   aspirin 81 mg Oral Daily Cori Massy, CRNP   atorvastatin 40 mg Oral HS Cori Massy, CRNP   calcium carbonate 1,000 mg Oral Daily PRN Cori Massy, CRNP   diphenhydrAMINE 25 mg Oral Q6H PRN Anette García MD   docusate sodium 100 mg Oral BID [place], LAURA   enoxaparin 40 mg Subcutaneous Q24H [address] Anette García MD   escitalopram 5 mg Oral Daily [place], CRQUINTON   HYDROcodone-acetaminophen 1 tablet Oral Q6H PRN Wolfgang Valera MD   magnesium oxide 400 mg Oral BID [place], CRNP   melatonin 6 mg Oral HS [place], CRNP   metoprolol succinate 100 mg Oral Daily [place], CRNP   mirtazapine 7 5 mg Oral HS [place], CRNP   nicotine 7 mg Transdermal Daily Holly Peterson MD   ondansetron 4 mg Intravenous Q6H PRN [place], CRQUINTON   pantoprazole 20 mg Oral BID AC [place], CRNP   senna-docusate sodium 1 tablet Oral Daily [place], CRNP     Continuous Infusions:   PRN Meds:   acetaminophen    calcium carbonate    diphenhydrAMINE    HYDROcodone-acetaminophen    ondansetron     Vitals: Blood pressure 124/59, pulse 71, temperature 98 3 °F (36 8 °C), temperature source Oral, resp  rate 18, height 5' 2" (1 575 m), weight 50 3 kg (110 lb 14 3 oz), SpO2 93 %  ,Body mass index is 20 28 kg/m²  Physical Exam:   Physical Exam   Constitutional: She is oriented to person, place, and time  No distress  Ill-appearing, frail elderly female   HENT:   Head: Normocephalic and atraumatic  Cap in place   Neck: Normal range of motion  Neck supple  Cardiovascular: Normal rate, regular rhythm and intact distal pulses  Pulmonary/Chest: Effort normal and breath sounds normal    Musculoskeletal:   TTP left distal LE from mid shin to toes   Neurological: She is oriented to person, place, and time  Skin: Skin is warm and dry  She is not diaphoretic  Psychiatric: Her speech is normal and behavior is normal  Judgment and thought content normal    Flattened affect     Neurologic Exam     Mental Status   Oriented to person, place, and time     Attention: normal  Concentration: normal    Speech: speech is normal   Level of consciousness: alert    Cranial Nerves   Right Bell's palsy, chronic     Motor Exam     Strength   Strength 5/5 except as noted  Left:  Dorsiflexion 4/5  Eversion 4+/5  Decreased ROM at left ankle  Exam limited secondary to pain     Sensory Exam   Light touch normal        Lab Results: I have personally reviewed pertinent reports  No results found for this or any previous visit (from the past 24 hour(s))  Imaging Studies: I have personally reviewed pertinent reports and I have personally reviewed pertinent films in PACS  EKG, Pathology, and Other Studies: I have personally reviewed pertinent reports  VTE Prophylaxis: Enoxaparin (Lovenox)    Counseling / Coordination of Care  Total time spent today 20 minutes  Greater than 50% of total time was spent with the patient and/or family counseling and/or coordination of care  A description of the counseling/coordination of care:  Patient was seen and evaluated  Discussed with attending  Chart reviewed thoroughly including laboratory and imaging studies    Plan of care discussed with patient and primary team

## 2018-12-05 NOTE — ASSESSMENT & PLAN NOTE
Diffuse large B-cell lymphoma  -on chemotherapy q 3 weeks at [place]; was scheduled for chemotherapy 12/3/2018  -continue management per Oncology

## 2018-12-06 ENCOUNTER — APPOINTMENT (INPATIENT)
Dept: RADIOLOGY | Facility: HOSPITAL | Age: 63
DRG: 253 | End: 2018-12-06
Payer: COMMERCIAL

## 2018-12-06 ENCOUNTER — ANESTHESIA (INPATIENT)
Dept: PERIOP | Facility: HOSPITAL | Age: 63
DRG: 253 | End: 2018-12-06
Payer: COMMERCIAL

## 2018-12-06 LAB
ANION GAP SERPL CALCULATED.3IONS-SCNC: 5 MMOL/L (ref 4–13)
BUN SERPL-MCNC: 8 MG/DL (ref 5–25)
CALCIUM SERPL-MCNC: 9.5 MG/DL (ref 8.3–10.1)
CHLORIDE SERPL-SCNC: 102 MMOL/L (ref 100–108)
CO2 SERPL-SCNC: 27 MMOL/L (ref 21–32)
CREAT SERPL-MCNC: 0.62 MG/DL (ref 0.6–1.3)
ERYTHROCYTE [DISTWIDTH] IN BLOOD BY AUTOMATED COUNT: 22.5 % (ref 11.6–15.1)
GFR SERPL CREATININE-BSD FRML MDRD: 95 ML/MIN/1.73SQ M
GLUCOSE SERPL-MCNC: 89 MG/DL (ref 65–140)
GLUCOSE SERPL-MCNC: 92 MG/DL (ref 65–140)
HCT VFR BLD AUTO: 28.5 % (ref 34.8–46.1)
HGB BLD-MCNC: 9 G/DL (ref 11.5–15.4)
MAGNESIUM SERPL-MCNC: 1.7 MG/DL (ref 1.6–2.6)
MCH RBC QN AUTO: 32 PG (ref 26.8–34.3)
MCHC RBC AUTO-ENTMCNC: 31.6 G/DL (ref 31.4–37.4)
MCV RBC AUTO: 101 FL (ref 82–98)
PHOSPHATE SERPL-MCNC: 4.2 MG/DL (ref 2.3–4.1)
PLATELET # BLD AUTO: 322 THOUSANDS/UL (ref 149–390)
PMV BLD AUTO: 10.6 FL (ref 8.9–12.7)
POTASSIUM SERPL-SCNC: 4.2 MMOL/L (ref 3.5–5.3)
RBC # BLD AUTO: 2.81 MILLION/UL (ref 3.81–5.12)
SODIUM SERPL-SCNC: 134 MMOL/L (ref 136–145)
WBC # BLD AUTO: 10.66 THOUSAND/UL (ref 4.31–10.16)

## 2018-12-06 PROCEDURE — 047L3ZZ DILATION OF LEFT FEMORAL ARTERY, PERCUTANEOUS APPROACH: ICD-10-PCS | Performed by: SURGERY

## 2018-12-06 PROCEDURE — C1769 GUIDE WIRE: HCPCS | Performed by: SURGERY

## 2018-12-06 PROCEDURE — 75710 ARTERY X-RAYS ARM/LEG: CPT

## 2018-12-06 PROCEDURE — 80048 BASIC METABOLIC PNL TOTAL CA: CPT | Performed by: ORTHOPAEDIC SURGERY

## 2018-12-06 PROCEDURE — 84100 ASSAY OF PHOSPHORUS: CPT | Performed by: ORTHOPAEDIC SURGERY

## 2018-12-06 PROCEDURE — 82948 REAGENT STRIP/BLOOD GLUCOSE: CPT

## 2018-12-06 PROCEDURE — 99221 1ST HOSP IP/OBS SF/LOW 40: CPT | Performed by: INTERNAL MEDICINE

## 2018-12-06 PROCEDURE — 75625 CONTRAST EXAM ABDOMINL AORTA: CPT

## 2018-12-06 PROCEDURE — 047L3DZ DILATION OF LEFT FEMORAL ARTERY WITH INTRALUMINAL DEVICE, PERCUTANEOUS APPROACH: ICD-10-PCS | Performed by: SURGERY

## 2018-12-06 PROCEDURE — C1894 INTRO/SHEATH, NON-LASER: HCPCS | Performed by: SURGERY

## 2018-12-06 PROCEDURE — 37226 PR REVASCULARIZE FEM/POP ARTERY,ANGIOPLASTY/STENT: CPT | Performed by: SURGERY

## 2018-12-06 PROCEDURE — 76937 US GUIDE VASCULAR ACCESS: CPT

## 2018-12-06 PROCEDURE — C1725 CATH, TRANSLUMIN NON-LASER: HCPCS | Performed by: SURGERY

## 2018-12-06 PROCEDURE — C1725 CATH, TRANSLUMIN NON-LASER: HCPCS

## 2018-12-06 PROCEDURE — B410YZZ FLUOROSCOPY OF ABDOMINAL AORTA USING OTHER CONTRAST: ICD-10-PCS | Performed by: SURGERY

## 2018-12-06 PROCEDURE — 85027 COMPLETE CBC AUTOMATED: CPT | Performed by: ORTHOPAEDIC SURGERY

## 2018-12-06 PROCEDURE — C1876 STENT, NON-COA/NON-COV W/DEL: HCPCS | Performed by: SURGERY

## 2018-12-06 PROCEDURE — 83735 ASSAY OF MAGNESIUM: CPT | Performed by: ORTHOPAEDIC SURGERY

## 2018-12-06 DEVICE — IMPLANTABLE DEVICE: Type: IMPLANTABLE DEVICE | Site: ARTERIAL | Status: FUNCTIONAL

## 2018-12-06 RX ORDER — NICOTINE 21 MG/24HR
14 PATCH, TRANSDERMAL 24 HOURS TRANSDERMAL DAILY
Status: DISCONTINUED | OUTPATIENT
Start: 2018-12-06 | End: 2018-12-09 | Stop reason: HOSPADM

## 2018-12-06 RX ORDER — SODIUM CHLORIDE, SODIUM LACTATE, POTASSIUM CHLORIDE, CALCIUM CHLORIDE 600; 310; 30; 20 MG/100ML; MG/100ML; MG/100ML; MG/100ML
INJECTION, SOLUTION INTRAVENOUS CONTINUOUS PRN
Status: DISCONTINUED | OUTPATIENT
Start: 2018-12-06 | End: 2018-12-06 | Stop reason: SURG

## 2018-12-06 RX ORDER — FENTANYL CITRATE 50 UG/ML
INJECTION, SOLUTION INTRAMUSCULAR; INTRAVENOUS AS NEEDED
Status: DISCONTINUED | OUTPATIENT
Start: 2018-12-06 | End: 2018-12-06 | Stop reason: SURG

## 2018-12-06 RX ORDER — LIDOCAINE HYDROCHLORIDE 10 MG/ML
INJECTION, SOLUTION INFILTRATION; PERINEURAL AS NEEDED
Status: DISCONTINUED | OUTPATIENT
Start: 2018-12-06 | End: 2018-12-06 | Stop reason: HOSPADM

## 2018-12-06 RX ORDER — FENTANYL CITRATE/PF 50 MCG/ML
25 SYRINGE (ML) INJECTION
Status: DISCONTINUED | OUTPATIENT
Start: 2018-12-06 | End: 2018-12-06 | Stop reason: HOSPADM

## 2018-12-06 RX ORDER — SODIUM CHLORIDE 9 MG/ML
75 INJECTION, SOLUTION INTRAVENOUS CONTINUOUS
Status: DISCONTINUED | OUTPATIENT
Start: 2018-12-06 | End: 2018-12-07

## 2018-12-06 RX ORDER — HEPARIN SODIUM 1000 [USP'U]/ML
INJECTION, SOLUTION INTRAVENOUS; SUBCUTANEOUS AS NEEDED
Status: DISCONTINUED | OUTPATIENT
Start: 2018-12-06 | End: 2018-12-06 | Stop reason: SURG

## 2018-12-06 RX ORDER — MIRTAZAPINE 15 MG/1
7.5 TABLET, FILM COATED ORAL
Status: DISCONTINUED | OUTPATIENT
Start: 2018-12-06 | End: 2018-12-09 | Stop reason: HOSPADM

## 2018-12-06 RX ORDER — PROPOFOL 10 MG/ML
INJECTION, EMULSION INTRAVENOUS CONTINUOUS PRN
Status: DISCONTINUED | OUTPATIENT
Start: 2018-12-06 | End: 2018-12-06 | Stop reason: SURG

## 2018-12-06 RX ADMIN — DEXTROSE, SODIUM CHLORIDE, AND POTASSIUM CHLORIDE 75 ML/HR: 5; .45; .15 INJECTION INTRAVENOUS at 01:00

## 2018-12-06 RX ADMIN — PROPOFOL 50 MCG/KG/MIN: 10 INJECTION, EMULSION INTRAVENOUS at 12:53

## 2018-12-06 RX ADMIN — MIRTAZAPINE 7.5 MG: 15 TABLET ORAL at 22:30

## 2018-12-06 RX ADMIN — METOPROLOL SUCCINATE 100 MG: 100 TABLET, EXTENDED RELEASE ORAL at 10:20

## 2018-12-06 RX ADMIN — AMLODIPINE BESYLATE 10 MG: 10 TABLET ORAL at 10:20

## 2018-12-06 RX ADMIN — PANTOPRAZOLE SODIUM 20 MG: 20 TABLET, DELAYED RELEASE ORAL at 05:14

## 2018-12-06 RX ADMIN — OXYCODONE HYDROCHLORIDE 5 MG: 5 TABLET ORAL at 18:53

## 2018-12-06 RX ADMIN — FENTANYL CITRATE 25 MCG: 50 INJECTION, SOLUTION INTRAMUSCULAR; INTRAVENOUS at 13:54

## 2018-12-06 RX ADMIN — ENOXAPARIN SODIUM 40 MG: 40 INJECTION SUBCUTANEOUS at 11:31

## 2018-12-06 RX ADMIN — SODIUM CHLORIDE, SODIUM LACTATE, POTASSIUM CHLORIDE, AND CALCIUM CHLORIDE: .6; .31; .03; .02 INJECTION, SOLUTION INTRAVENOUS at 12:44

## 2018-12-06 RX ADMIN — FENTANYL CITRATE 50 MCG: 50 INJECTION, SOLUTION INTRAMUSCULAR; INTRAVENOUS at 13:09

## 2018-12-06 RX ADMIN — ASPIRIN 81 MG 81 MG: 81 TABLET ORAL at 10:20

## 2018-12-06 RX ADMIN — FENTANYL CITRATE 25 MCG: 50 INJECTION, SOLUTION INTRAMUSCULAR; INTRAVENOUS at 14:03

## 2018-12-06 RX ADMIN — DOCUSATE SODIUM 100 MG: 100 CAPSULE, LIQUID FILLED ORAL at 18:53

## 2018-12-06 RX ADMIN — ESCITALOPRAM OXALATE 5 MG: 10 TABLET ORAL at 10:21

## 2018-12-06 RX ADMIN — NICOTINE 14 MG: 14 PATCH TRANSDERMAL at 18:53

## 2018-12-06 RX ADMIN — ONDANSETRON 4 MG: 2 INJECTION INTRAMUSCULAR; INTRAVENOUS at 20:03

## 2018-12-06 RX ADMIN — OXYCODONE HYDROCHLORIDE 5 MG: 5 TABLET ORAL at 23:52

## 2018-12-06 RX ADMIN — HEPARIN SODIUM 5000 UNITS: 1000 INJECTION, SOLUTION INTRAVENOUS; SUBCUTANEOUS at 13:53

## 2018-12-06 RX ADMIN — OXYCODONE HYDROCHLORIDE 5 MG: 5 TABLET ORAL at 05:20

## 2018-12-06 RX ADMIN — ATORVASTATIN CALCIUM 20 MG: 20 TABLET, FILM COATED ORAL at 22:31

## 2018-12-06 RX ADMIN — SODIUM CHLORIDE 75 ML/HR: 0.9 INJECTION, SOLUTION INTRAVENOUS at 15:47

## 2018-12-06 NOTE — CONSULTS
Inpatient Medical Consultation - [facility]    Patient Information: Maryann Jarvis 70 y o  female MRN: [id number]  Unit/Bed#: [facility] 828-01 Encounter: [id number]  PCP: No primary care provider on file  Date of Admission:  12/5/2018  Date of Consultation: 12/06/18  Requesting Physician: Yoly Hector MD    Reason For Consultation:     PAD    Assessment/Plan:    1  Essential hypertension, acceptable BP, continue Norvasc andToprol XL, restart lisinopril 20 mg at time of discharge  2  Paroxysmal Atrial fibrillation, stable, cardiac echo with EF 60% and mild AS, evaluated by Cardiology, continue Toprol XL, restart Xarelto when okay with vascular  3  Stage IV diffuse large B-cell lymphoma, evaluated by Hematology oncology,  patient on active chemotherapy at [facility]  every 3 weeks  4  Abnormal lumbar spine MRI and canal stenosis, evaluated by Neurology, recommendation for EMG/NCS of LLE, as an outpatient in 4- 6 weeks  5  PAD with left foot numbness and pain, symptoms has resolved,  s/p  LLE arteriogram, management per vascular  6  History of right-sided Bell's palsy  7  Macrocytic Anemia, stable, normal TSH, check K63 and folic acid level,  monitor  8  Tobacco smoking, start nicotine patch  9  GERD, continue PPI    PT OT eval  From medical standpoint patient is stable  SLIM will see patient p r n  Please call with questions    VTE Prophylaxis: Enoxaparin (Lovenox)  / sequential compression device     Recommendations for Discharge:  · no    Counseling / Coordination of Care Time: 1 hour  Greater than 50% of total time spent on patient counseling and coordination of care  Collaboration of Care:  Were Recommendations Directly Discussed with Primary Treatment Team? - No     History of Present Illness:    Maryann Jarvis is a 70 y o  female who is originally admitted to vascular service on 12/5/2018 due to left foot numbness and pain, patient is  a transfer from [facility] to [facility] and possible intervention  [facility] are consulted for PAD  Patient with underlying hypertension, hyperlipidemia, diffuse B-cell lymphoma, paroxysmal atrial fibrillation, PAD status post bypass  She is status post LLE angiogram today, denied further symptoms  Currently eating supper no chest pain or shortness of breath  Daughter at bedside    Review of Systems:    Review of Systems   Constitutional: Negative for chills and fever  Respiratory: Negative for cough and shortness of breath  Cardiovascular: Negative for chest pain and palpitations  Gastrointestinal: Negative for abdominal pain, diarrhea, nausea and vomiting  Genitourinary: Negative for dysuria  Neurological: Negative for headaches  All other systems reviewed and are negative  Past Medical and Surgical History:     Past Medical History:   Diagnosis Date    Bell's palsy     Cancer ([facility] [address] )     lymphoma    Diffuse large B cell lymphoma ([facility] [address] [facility]     Hypertension     PAD (peripheral artery disease) (HCC)     PAF (paroxysmal atrial fibrillation) (HCC)     PVD (peripheral vascular disease) (HCC)        Past Surgical History:   Procedure Laterality Date    CARDIAC SURGERY      CARPAL TUNNEL RELEASE      HYSTERECTOMY      IR ABDOMINAL ANGIOGRAPHY / INTERVENTION  12/6/2018    TONSILLECTOMY         Meds/Allergies:    all medications and allergies reviewed    Allergies: Allergies   Allergen Reactions    Oxycodone-Acetaminophen Rash       Social History:     Marital Status:      Substance Use History:   History   Alcohol Use No     History   Smoking Status    Current Some Day Smoker    Packs/day: 0 25    Years: 40 00   Smokeless Tobacco    Never Used     History   Drug Use No       Family History:    non-contributory    Physical Exam:     Vitals:   Blood Pressure: 126/59 (12/06/18 1600)  Pulse: 75 (12/06/18 1600)  Temperature: 98 °F (36 7 °C) (12/06/18 1600)  Temp Source: Oral (12/06/18 1600)  Respirations: 18 (12/06/18 1600)  Height: 5' 2" (157 5 cm) (12/05/18 2025)  Weight - Scale: 48 kg (105 lb 12 8 oz) (12/06/18 0600)  SpO2: 96 % (12/06/18 1600)    Physical Exam     Patient is awake alert oriented in no acute distress  Eye patch over the right eye  Neck supple no JVD  Lung clear to auscultation bilateral  Heart positive S1-S2 no murmur  Abdomen soft nontender nondistended positive bowel sounds  Lower extremities no edema    Additional Data:     Lab Results: I have personally reviewed pertinent reports  Results from last 7 days  Lab Units 12/06/18  0520 12/04/18  0449   WBC Thousand/uL 10 66*  < > 6 72   HEMOGLOBIN g/dL 9 0*  < > 8 1*   HEMATOCRIT % 28 5*  < > 25 9*   PLATELETS Thousands/uL 322  < > 324   LYMPHO PCT %  --   --  7*   MONO PCT %  --   --  6   EOS PCT %  --   --  1   < > = values in this interval not displayed  Results from last 7 days  Lab Units 12/06/18  0520  11/30/18  1859 11/30/18  1849   POTASSIUM mmol/L 4 2  < >  --  4 4   CHLORIDE mmol/L 102  < >  --  102   CO2 mmol/L 27  < >  --  28   CO2, I-STAT mmol/L  --   --  26  --    BUN mg/dL 8  < >  --  5   CREATININE mg/dL 0 62  < >  --  0 73   CALCIUM mg/dL 9 5  < >  --  8 8   ALK PHOS U/L  --   --   --  137*   ALT U/L  --   --   --  16   AST U/L  --   --   --  15   GLUCOSE, ISTAT mg/dl  --   --  118  --    < > = values in this interval not displayed  Results from last 7 days  Lab Units 12/05/18  2131   INR  0 98       Imaging: I have personally reviewed pertinent reports  Xr Chest 2 Views    Result Date: 11/30/2018  Narrative: CHEST INDICATION:   low O2 sat  COMPARISON:  10/9/2018 EXAM PERFORMED/VIEWS:  XR CHEST PA & LATERAL FINDINGS: Heart shadow appears unremarkable  Atherosclerotic vascular calcifications are noted  The lungs are clear  No pneumothorax or pleural effusion  C-spine fixation plate is noted  Impression: No acute cardiopulmonary disease   Workstation performed: [id number]     Xr Ankle 2 Vw Left    Result Date: 12/3/2018  Narrative: LEFT ANKLE AND LEFT ANKLE INDICATION:   L foot pain  COMPARISON:  None VIEWS:  XR ANKLE 2 VW LEFT, XR FOOT 2 VW LEFT FINDINGS: There is no acute fracture or dislocation in the foot or ankle  Mild degenerative changes about the ankle and midfoot  No lytic or blastic lesions seen  Soft tissues are unremarkable  Impression: No acute osseous abnormality  Workstation performed: [id number]     Xr Foot 2 Vw Left    Result Date: 12/3/2018  Narrative: LEFT ANKLE AND LEFT ANKLE INDICATION:   L foot pain  COMPARISON:  None VIEWS:  XR ANKLE 2 VW LEFT, XR FOOT 2 VW LEFT FINDINGS: There is no acute fracture or dislocation in the foot or ankle  Mild degenerative changes about the ankle and midfoot  No lytic or blastic lesions seen  Soft tissues are unremarkable  Impression: No acute osseous abnormality  Workstation performed: [id number]     Ct Head Wo Contrast    Result Date: 12/3/2018  Narrative: CT BRAIN - WITHOUT CONTRAST INDICATION:   Confusion/delirium, altered LOC, unexplained  COMPARISON:  None  TECHNIQUE:  CT examination of the brain was performed  In addition to axial images, coronal 2D reformatted images were created and submitted for interpretation  Radiation dose length product (DLP) for this visit:  880 mGy-cm   This examination, like all CT scans performed in the [facility], was performed utilizing techniques to minimize radiation dose exposure, including the use of iterative reconstruction and automated exposure control  IMAGE QUALITY:  Diagnostic  FINDINGS: PARENCHYMA: Decreased attenuation is noted in periventricular and subcortical white matter demonstrating an appearance that is statistically most likely to represent mild microangiopathic change  No CT signs of acute infarction  No intracranial mass, mass effect or midline shift  No acute parenchymal hemorrhage  Chronic lacunar infarcts identified within the right periventricular white matter   VENTRICLES AND EXTRA-AXIAL SPACES:  Normal for the patient's age  VISUALIZED ORBITS AND PARANASAL SINUSES:  Unremarkable  CALVARIUM AND EXTRACRANIAL SOFT TISSUES:  Normal      Impression: No acute intracranial abnormality  Workstation performed: [id number]     Ct Spine Lumbar Wo Contrast    Result Date: 12/2/2018  Narrative: CT LUMBAR SPINE INDICATION:   T/L-spine trauma, minor-mod, low back pain  COMPARISON: None  TECHNIQUE:  Contiguous axial images through the lumbar spine were obtained  Sagittal and coronal reconstructions were performed  Radiation dose length product (DLP) for this visit:  518 mGy-cm   This examination, like all CT scans performed in the [facility], was performed utilizing techniques to minimize radiation dose exposure, including the use of iterative reconstruction and automated exposure control  IMAGE QUALITY:  Diagnostic  FINDINGS: ALIGNMENT:  There is grade 1 degenerative anterolisthesis L5 on S1 with uncovering of the disc  The remainder of the lumbar vertebrae and lower thoracic vertebrae are normally aligned  VERTEBRAL BODIES:  No fractures  No lytic or blastic bone lesions are seen  There is sclerosis of T11 and T12 vertebral bodies involving about half of the vertebral bodies secondary to severe degenerative changes of the disc  Osteophytic spurring noted ventrally as well  DEGENERATIVE CHANGES: Lower Thoracic spine:  See above] L1-2:  Normal disc height  No herniation  Normal facet joints  No canal or foraminal stenosis  L2-3:  There is a diffuse disc bulge at L2-L3 without significant central canal stenosis or significant foraminal stenosis  The facet joints are within normal limits  L3-4:  There is a mild diffuse disc bulge without significant central canal or compressive neural foraminal stenosis seen  Facet joints are within normal limits  L4-5:  There is a diffuse moderate disc bulge without significant central canal stenosis  The left neural foramen is patent   There appears to be mild narrowing of the right neural foramen although it does not appear compressive  Facet joints are within normal limits  L5-S1:  As above, there is degenerative anterolisthesis L5 on S1 secondary to facet arthropathy  There is mild right and moderate left neural foraminal stenosis  The left neural foramen appears to slightly impinge upon the exiting nerve root  No central canal stenosis appreciated  PARASPINAL SOFT TISSUES:  There are severe atherosclerotic disease of the aorta and branch vessels  This is most evident within the kidneys  Patient is status post aortoiliac bypass graft surgery which is incompletely imaged on this exam  No acute paraspinal soft tissue abnormalities are appreciated  Impression: There are degenerative changes as detailed above  There is moderate left-sided L5-S1 neural foraminal stenosis which mildly compresses the exiting nerve root  There is grade 1 degenerative anterolisthesis L5 on S1 due to bilateral facet arthropathy  No other significant stenoses are seen  Workstation performed: [id number]     Cta Lower Extremity Left W Wo Contrast    Result Date: 12/4/2018  Narrative: CT ANGIOGRAM OF THE AORTA AND LOWER EXTREMITIES WITH IV CONTRAST INDICATION: Known PVD, s/p bypass b/l LEs COMPARISON: None  TECHNIQUE:  CT angiogram examination of the abdomen, pelvis, and lower extremities was performed according to standard protocol with intravenous contrast  100 ml of Omnipaque 350 was injected  3D reconstructions were performed an independent workstation,  and are supplied for review  FINDINGS: VASCULAR STRUCTURES: Aorta: Abdominal aortic-bilateral femoral bypass is patent  Calcification and complete occlusion in native aorta is noted, extensive atherosclerotic disease extends into bilateral iliac arteries    Right lower extremity: Common femoral artery: Right common femoral artery is not completely visualized but the bypass graft does appear to be patent and extends into right lower leg where it appears to implant on the right popliteal artery  Native superficial femoral artery is completely occluded  Popliteal artery: Popliteal artery inferior to the bypass graft anastomosis is patent  There appears to be three-vessel runoff to right foot  Left lower extremity: Inferior to the aorto femoral bypass graft is an additional femoropopliteal bypass graft  There are 2 areas of aneurysmal dilatation involving the anastomotic sites of the various bypass grafts  Interposed between the aneurysmal segments of the left lower extremity bypass graft is short segment of up to 90% stenosis  Profunda femoral artery arises from the graft and is patent  The bypass graft distal to this 2nd aneurysm segment is patent  This extends the whole length of the thigh  Calcification in the native superficial femoral artery is noted  The graft inserts on the below knee popliteal artery just above the takeoff of the anterior tibial artery  There is patent three-vessel runoff to the left lower extremity  Abdomen: Visualized large and small bowel is normal in course and caliber with no evidence of ileus or obstruction, with exception of some enlargement of the gas filled and rectum, the adjacent sigmoid colon is normal in caliber and no soft tissue component is identified  Urinary bladder: Well distended  Urinary bladder wall thickness is normal  Reproductive organs  Patient appears to be status post hysterectomy  Additional findings: There is a fluid density 2 cm cyst in the left thigh series 2 image 160  OSSEOUS STRUCTURES: No acute fracture or destructive osseous lesion  I personally discussed this study with Lucila Taylor on November 30, 2018 shortly after time of image acquisition        Impression: Aortobifem and bilateral femoropopliteal bypass graft  Graft are patent  Up to 90% stenosis in short segment interposed segment between aortobifem anastomosis and femoropopliteal graft anastomosis  Workstation performed: [id number]     Mri Lumbar Spine Wo Contrast    Result Date: 12/4/2018  Narrative: MRI LUMBAR SPINE WITHOUT CONTRAST INDICATION: leg weakness  Bilateral leg weakness and paresthesias, 6-8 weeks  COMPARISON:  None  TECHNIQUE:  Sagittal T1, sagittal T2, sagittal inversion recovery, axial T1 and axial T2, coronal T2   IMAGE QUALITY:  Diagnostic FINDINGS: ALIGNMENT:  Grade 1 anterior spondylolisthesis of L5 upon S1 without spondylolysis  No compression fracture  No scoliosis  MARROW SIGNAL:  Degenerative marrow changes are noted at the T11-12 endplates  DISTAL CORD AND CONUS:  Normal size and signal within the distal cord and conus  The conus ends at the L1-L2 level  PARASPINAL SOFT TISSUES:  Small renal cysts  One of these cysts within the posterior midportion of the right kidney is hyperintense on T1-weighted imaging, possibly related to hemorrhagic cyst  Atherosclerotic disease of the abdominal aorta with prior bypass surgery  SACRUM:  Normal signal within the sacrum  No evidence of insufficiency or stress fracture  LOWER THORACIC DISC SPACES:  Mild degenerative disc disease at T11-12 without canal stenosis or foraminal narrowing  T12-L1 is unremarkable  LUMBAR DISC SPACES: L1-L2:  Normal  L2-L3:  Mild annular bulging without focal disc herniation  Minimal canal stenosis and foraminal narrowing without discrete nerve compression  L3-L4:  Mild annular bulging  Mild canal stenosis without foraminal narrowing  L4-L5:  Moderate diffuse annular bulging with a small central disc protrusion  There is moderate facet degenerative change with facet joint effusions and ligamentum flavum thickening resulting in moderately severe canal stenosis with distortion of the thecal sac and narrowing of the anterolateral recesses, best seen on series 6 image 18  Mild right foraminal narrowing   L5-S1:  As described above there is grade 1 anterior spondylolisthesis on the basis of facet degenerative change with small facet joint effusions  No spondylolysis  There is diffuse annular bulging with uncovering of the cephalad disc margin in moderate canal stenosis  Moderate bilateral foraminal narrowing, left greater than right with disc material abutting the undersurface of the exiting nerves  Impression: Multifocal lumbar degenerative disc disease and facet arthropathy  Canal stenosis most pronounced at L4-5, moderately severe with distortion of the thecal sac best seen on series 6 image 18  Only mild right foraminal narrowing is noted at this level  At L5-S1 there is anterior spondylolisthesis on the basis of facet arthropathy  Moderate canal stenosis and bilateral foraminal narrowing, left greater than right  Mild degenerative disc disease at the L2-3 and L3-4  There is a small T1 hyperintense lesion within the posterior midportion of the right kidney likely representing a small hemorrhagic cyst   Recommend renal ultrasound follow-up  The study was marked in [facility] for immediate notification  Workstation performed: [id number]     Vas Lower Limb Arterial Duplex, Limited, Unilateral    Result Date: 12/1/2018  Narrative:  THE VASCULAR CENTER REPORT CLINICAL: Indications:  Unspecified atherosclerosis [I70 90]  Limb pain and numbness x 1 week  Patient with known bypass graft from [facility]  FINDINGS:  Left                   Impression  PSV  EDV  Inflow Anastomosis     >75%        731  240  High Thigh (Graft)                  19    8  Mid Thigh (Graft)                   38       Low Thigh (Graft)                   32   12  Outflow Anastomosis                 22    8  Common Femoral Artery               33       Prox Profunda                       18    4  Prox SFA                             0    0  Proximal Pop                        22    8  Distal Pop                          21    5  Prox Post Tibial                    25    7  Dist Post Tibial                    20    5  Prox  Ant   Tibial [address]  Tibial                   12    0  Prox Peroneal                       22    6     CONCLUSION: Impression: LEFT LOWER LIMB: Widely patent common femoral artery to proximal popliteal artery bypass graft but with signs of significant stenosis just proximal to the anastomosis  Stenosis is greater than 75% with post stenotic turbulence  Segmental pressures were refused due to patients pain  No prior exams to compare with current exam   SIGNATURE: Electronically Signed by: Maryjo García on 2018-12-01 04:51:52 PM    Maynor Matthew & Waveform Analysis, Multiple Levels    Result Date: 12/4/2018  Narrative:  THE VASCULAR CENTER REPORT CLINICAL: Indications:  Left Atherosclerosis with Rest Pain [I70 229]  The patient presents with increasing left foot pain  She has known high grade stenosis proximal to inflow anastomosis of her left femoral-popliteal bypass graft  She is scheduled for an abdominal aortogram with left lower extremity runoff 12/6/2018 at [facility]  Operative History: Aorto-bifemoral bypass graft 2010 Left femoral-below knee popliteal bypass graft 2016 Right femoral-below knee popliteal bypass graft 2013 Risk Factors The patient has history of HTN and smoking (current) 0 5 ppd  Clinical Right Pressure: 111/ mm Hg, Left Pressure: 112/ mm Hg  FINDINGS:  Segment       Rig  Left                          P   P  Ant  Tibial   104  30  Post  Tibial  116  38  Ankle         116  38  Metatarsal    110  36  Great Toe      84  21     CONCLUSION: Impression RIGHT LOWER LIMB Ankle/Brachial Index: 1 04 which is in the normal range PPG/PVR Tracings are dampened  Biphasic waveforms at the ankle  Metatarsal Pressure 110 mmHg Great Toe Pressure: 84 mmHg, which limits for healing potential  LEFT LOWER LIMB Ankle/Brachial Index: 0 27 which is in the rest pain/tissue loss range PPG/PVR Tracings are dampened  Monophasic waveforms at the ankle   Metatarsal Pressure 36 mmHg Great Toe Pressure: 21 mmHg, which is below the healing range   SIGNATURE: Electronically Signed by: Marquis Catrachita MD, RPVI on 2018-12-04 03:22:17 PM    Ir Abdominal Angiography / Intervention    Result Date: 12/6/2018  Narrative: Date: December 6, 2018 Attending: Bri Pedersen MD Assistant: None Preoperative diagnosis: Atherosclerosis with left lower extremity rest pain Postoperative diagnosis: Same Anesthesia: Moderate Sedation; 1% lidocaine EBL: Minimal Fluoroscopy Time: 12 9 min Contrast: 60 cc Visipaque 320 Procedure: 1  Abdominal aortogram 2  Left lower extremity runoff 3  Left common femoral artery angioplasty with Bard ultra score balloon 5 x 40 mm 4  Left common femoral artery stent with 7 x 17 mm Visipro 5  Left common femoral artery and left limb angioplasty with 8 x 20 mm balloon Indications: Patient is a 69-year-old female with diffuse B-cell lymphoma currently undergoing chemotherapy, new onset atrial fibrillation, PAD with history of aortobifemoral bypass as well as bilateral femoral to below-knee popliteal artery bypasses with vein  Presents with left foot rest pain and finding of left common femoral artery high-grade stenosis on CTA  Description of Procedure: After informed consent was obtained, the patient was brought to the hybrid or and placed in the supine position  She was given moderate sedation by anesthesia  She was prepped and draped in usual sterile fashion exposing the left arm  A timeout was performed  Under direct ultrasound guidance, the left brachial artery was accessed with a micropuncture needle and wire was inserted  The needle was exchanged for the micropuncture sheath  The wire was exchanged for a Bentson wire and the sheath exchanged for a 5-Ivorian sheath  Using an angled glide catheter and the Bentson wire this was manipulated through the axillary and subclavian arteries and into the descending aorta  The catheter was exchanged for a pigtail catheter and placed in the distal thoracic aorta    An abdominal aortogram was performed  A [employer] wire was then reinserted and the catheter exchanged for an angled glide catheter which was manipulated into the left limb of the graft  With the catheter in the distal left limb, left lower extremity runoff was performed  The wire was then reinserted and passed into the left leg bypass  The catheter was removed and the sheath was exchanged for a 6 x 90 cm shuttle sheath  5000 units of IV heparin were given  A 5 x 40 mm Bard scoring balloon was selected and used to angioplasty the calcified stenotic segment of the common femoral artery  Repeat angiography revealed no change and lesion  Thus a 7 x 17 mm Visipro stent was selected and deployed across the lesion  Repeat angiography now revealed much improved flow through this area  The most proximal portion of the stent was flared using an 8 x 20 mm larger balloon  Completion angiography now revealed brisk flow throughout this area and maintained tibial runoff to the foot  The sheath was pulled back over a wire and removed  Manual pressure was held at the brachial artery access site until hemostasis was achieved  The patient was transferred to the PACU for post procedural care Findings: 1  Grossly patent celiac artery, superior mesenteric artery, bilateral renal arteries 2  Proximal anastomosis of the aortobifemoral bypass appears to be several centimeters below the level of the renal arteries; there is an approximately 30% stenosis near the anastomosis with bulky calcific disease; the bilateral graft limbs are widely patent without evidence of stenosis 3    The anastomosis of the left limb to the left common femoral artery appears widely patent possibly with some aneurysmal dilatation versus beveling of the graft; in the intervening segment of common femoral artery between the limb of the aortobifemoral  bypass and the takeoff of the femoral to popliteal bypass there is a near occlusive bulky calcific lesion approximately 1 to 2 cm proximal to the femoral bifurcation 4  Patent left profunda femoral artery; patent left femoral to below-knee popliteal bypass; the proximal anastomosis is widely patent with evidence of aneurysmal degeneration versus very large vein; the bypass appears widely patent without evidence of stenosis; the distal anastomosis is to the below knee popliteal artery and appears widely patent; there is some backfilling of the popliteal artery in a retrograde fashion which fills the geniculate 5  There is three-vessel tibial runoff; the posterior tibial artery appears to be the dominant runoff into the foot; the dorsalis pedis artery is patent at least to the level of the proximal foot; the peroneal artery becomes diminutive Complications: none Workstation performed: [id number]       EKG, Pathology, and Other Studies Reviewed on Admission:   · yes    ** Please Note: This note has been constructed using a voice recognition system   **

## 2018-12-06 NOTE — PHYSICAL THERAPY NOTE
Physical Therapy Cancellation Note    PT orders received, chart review performed  Pt is in IR for arteriogram this AM  PT to follow up when pt is medically appropriate for PT evaluation       Maggie Wheeler, PT, DPT

## 2018-12-06 NOTE — SOCIAL WORK
CM introduced self to pt and explained role  Pt currently lives with her daughter Rohith Britt [phone] who is also her emergency contact in a 2 SH with 2 phil through garage and 3-4 phil front door  Pt has no POA  PTA pt was independent with ADL's and ambulation  Pt walks with a walker and has access to a cane, hospital bed, shower seat, and commode  Pt denies etoh/drug abuse or treatment and no mental health dx  Pt has hx with [facility] and would like to continue their services  CM made referral to [facility] [address] in [facility] as per request of pt  PT is retired and does not drive at this time; reports that her daughter Rohith Britt will provide transportation when d/c     CM reviewed d/c planning process including the following: identifying help at home, patient preference for d/c planning needs, Discharge Lounge, [facility] Meds to Bed program, availability of treatment team to discuss questions or concerns patient and/or family may have regarding understanding medications and recognizing signs and symptoms once discharged  CM also encouraged patient to follow up with all recommended appointments after discharge  Patient advised of importance for patient and family to participate in managing patients medical well being  Patient/caregiver received discharge checklist  Content reviewed  Patient/caregiver encouraged to participate in discharge plan of care prior to discharge home      Robb Leach, [phone]

## 2018-12-06 NOTE — PROGRESS NOTES
Progress Note - Vascular Surgery   Emile Sandoval 70 y o  female MRN: [id number]  Unit/Bed#: [facility] 828-01 Encounter: [id number]    Assessment:  70 y o  female who presents with history of metastatic diffuse B cell lymphoma on chemo, Afib on Xarelto, PVD s/p left fem-pop bypass with 75% stenosis, right sided Bell's palsy, HTN, who presented to [facility] 11/30 with 2 weeks of worsening left foot pain    Plan:  Patient has been NPO since midnight  Pain control p r n  Plan for arteriogram with Dr Taya Samano Doctor today for left lower extremity    Subjective/Objective   Chief Complaint:     Subjective:  No acute events overnight  Patient states that her left lower extremity is sensitive to any touch  She denies fevers or chills, nausea or vomiting, shortness of breath or chest pain  She does have motor and sensory function in the foot  Objective:     Blood pressure 150/70, pulse 77, temperature 98 5 °F (36 9 °C), temperature source Oral, resp  rate 18, height 5' 2" (1 575 m), weight 47 6 kg (105 lb), SpO2 95 %  ,Body mass index is 19 2 kg/m²  No intake or output data in the 24 hours ending 12/06/18 0513    Invasive Devices     Peripheral Intravenous Line            Peripheral IV 12/05/18 Left; Lower Forearm less than 1 day                Physical Exam: General: AAOx3  Respiratory: BS b/l  Abdomen: Soft, NT, no rebound/guarding  Heart: RRR, S1s2  Ext:  Left lower extremity warm, motor and sensory intact  Left lower extremity with visible bypass incision scar well healed down medial aspect of left lower leg  Pulse exam:  Doppler DP, Doppler PT, Doppler peroneal    Lab, Imaging and other studies:  I have personally reviewed pertinent lab results    , CBC:   Lab Results   Component Value Date    WBC 9 96 12/05/2018    HGB 8 2 (L) 12/05/2018    HCT 26 0 (L) 12/05/2018     (H) 12/05/2018     12/05/2018    MCH 31 9 12/05/2018    MCHC 31 5 12/05/2018    RDW 22 4 (H) 12/05/2018    MPV 10 2 12/05/2018   , CMP: Lab Results   Component Value Date    SODIUM 134 (L) 12/05/2018    K 4 5 12/05/2018     12/05/2018    CO2 29 12/05/2018    BUN 11 12/05/2018    CREATININE 0 70 12/05/2018    CALCIUM 8 7 12/05/2018    EGFR 91 12/05/2018     VTE Pharmacologic Prophylaxis: Heparin  VTE Mechanical Prophylaxis: sequential compression device

## 2018-12-06 NOTE — ANESTHESIA POSTPROCEDURE EVALUATION
Post-Op Assessment Note      CV Status:  Stable    Mental Status:  Alert and awake    Hydration Status:  Euvolemic    PONV Controlled:  Controlled    Airway Patency:  Patent    Post Op Vitals Reviewed:  Yes              /71   Temp   99 6   Pulse 68   Resp 14   SpO2 94

## 2018-12-06 NOTE — UTILIZATION REVIEW
Initial Clinical Review    Admission: Date/Time/Statement: 12/5/18 @ 2029  TRANSFER FROM [facility] TO [facility] FOR HIGHER LEVEL OF CARE R/T POST OP VASCULAR SURGERY WORSENING FOOT PAIN    Orders Placed This Encounter   Procedures    Inpatient Admission     Standing Status:   Standing     Number of Occurrences:   1     Order Specific Question:   Admitting Physician     Answer:   Vicki Patiño [[id number]]     Order Specific Question:   Level of Care     Answer:   Med Surg [[id number]]     Order Specific Question:   Estimated length of stay     Answer:   More than 2 Midnights     Order Specific Question:   Certification     Answer:   I certify that inpatient services are medically necessary for this patient for a duration of greater than two midnights  See H&P and MD Progress Notes for additional information about the patient's course of treatment  History of Illness:   Jose F Butterfield is a 70 y o  female who presents with history of metastatic diffuse B cell lymphoma on chemotherapy (last treatment approximately 3 weeks ago), Afib on Xarelto (currently held for upcoming chemo) (of note pt denied history of a-fib today and states she "doesn't know where people are getting this"), PVD s/p left fem-pop bypass with 75% stenosis, right sided Bell's palsy, HTN, who presented to [facility] 11/30 with 2 weeks of worsening left foot pain  Work up was concerning for vascular insuffiency therefore patient was transferred to [facility] for Vascular Surgery evaluation with Elisa and possible intervention  On exam patient's left foot was warm and without cyanosis    She had a palpable DP and a biphasic PT on the left      ED Vital Signs:   ED Triage Vitals   Temperature Pulse Respirations Blood Pressure SpO2   12/05/18 2025 12/05/18 2025 12/05/18 2025 12/05/18 2025 12/05/18 2025   98 2 °F (36 8 °C) 76 18 141/65 93 %      Temp Source Heart Rate Source Patient Position - Orthostatic VS BP Location FiO2 (%)   12/05/18 2025 12/05/18 2301 12/05/18 2025 12/05/18 2025 --   Oral Monitor Lying Left arm       Pain Score       12/05/18 2025       6        Wt Readings from Last 1 Encounters:   12/06/18 48 kg (105 lb 12 8 oz)     Abnormal Labs:    Na 134, 135  Phos 4 2  WBC 10 66  H/H 8 2/26 0, 9 0/28 5    Diagnostic Test Results:     12/4 MRI Lumbar Spine - Multifocal lumbar degenerative disc disease and facet arthropathy  Canal stenosis most pronounced at L4-5, moderately severe with distortion of the thecal sac best seen on series 6 image 18  Only mild right foraminal narrowing is noted at this level  At L5-S1 there is anterior spondylolisthesis on the basis of facet arthropathy  Moderate canal stenosis and bilateral foraminal narrowing, left greater than right  Mild degenerative disc disease at the L2-3 and L3-4  There is a small T1 hyperintense lesion within the posterior midportion of the right kidney likely representing a small hemorrhagic cyst   Recommend renal ultrasound follow-up  12/4 Arterial duplex and waveform analysis -  LEFT LOWER LIMB  Ankle/Brachial Index: 0 27 which is in the rest pain/tissue loss range  PPG/PVR Tracings are dampened  Monophasic waveforms at the ankle  Metatarsal Pressure 36 mmHg  Great Toe Pressure: 21 mmHg, which is below the healing range  12/6 IR abd, angiography - pending     Past Medical/Surgical History:    Active Ambulatory Problems     Diagnosis Date Noted    Constipation 10/09/2018    Lower extremity weakness 11/30/2018    BRYANT [facility]acute kidney [facility]) [facility] ) 08/26/2018    Acute respiratory failure with hypoxia and hypercapnia (HCC) 08/28/2018    Alcohol dependence with withdrawal ([facility] ) 08/28/2018    Bell's palsy 10/05/2018    Bilateral pleural effusion 08/28/2018    Blurred vision, bilateral 08/26/2018    Carotid stenosis, asymptomatic, bilateral 04/26/2016    Depression with anxiety 09/03/2018    Elevated troponin 08/28/2018    Benign essential hypertension 11/07/2013    Hypercalcemia 08/26/2018    Essential hypertension 09/03/2018    Fall 08/26/2018    Hypokalemia 08/28/2018    Hyponatremia 08/26/2018    Tobacco dependency 11/27/2015    Neutropenic fever ([place] [address] ) 09/21/2018    Lymphoma ([place] [address] ) 09/03/2018    PAD (peripheral artery disease) ([place] [address] ) 11/27/2015    Paroxysmal atrial fibrillation (HCC) 09/03/2018    Elevated lactic acid level 11/30/2018    Leg pain 12/03/2018    Ischemia of left lower extremity     Peripheral vascular disease ([place] [address] ) 11/30/2018    Foot pain 11/30/2018    Weakness of lower extremity 11/30/2018     Resolved Ambulatory Problems     Diagnosis Date Noted    No Resolved Ambulatory Problems     Past Medical History:   Diagnosis Date    Bell's palsy     Cancer ([place] [address] )     Diffuse large B cell lymphoma ([place] [address] )     Hypertension     PAD (peripheral artery disease) (HCC)     PAF (paroxysmal atrial fibrillation) (HCC)     PVD (peripheral vascular disease) ([place] [address] )      Admitting Diagnosis: Ischemia of left lower extremity [I99 8]    Age/Sex: 70 y o  female    Assessment/Plan:   70 y o  female who presents with history of metastatic diffuse B cell lymphoma on chemo, Afib on Xarelto, PVD s/p left fem-pop bypass with 75% stenosis, right sided Bell's palsy, HTN, who presented to [facility] 11/30 with 2 weeks of worsening left foot pain     Plan:  - Diet Regular; Regular House  Diet NPO; Sips with meds  - OOBTC  - Pulmonary Toilet, IS  - PPx: LVX, PPI, Colace  - Pain and Nausea control PRN     Admission Orders:  Scheduled Meds:   Current Facility-Administered Medications:  amLODIPine 10 mg Oral Daily    aspirin 81 mg Oral Daily    atorvastatin 20 mg Oral HS    cholecalciferol 2,000 Units Oral Daily    dextrose 5 % and sodium chloride 0 45 % with KCl 20 mEq/L 75 mL/hr Intravenous Continuous Last Rate: 75 mL/hr (12/06/18 0100)   diphenhydrAMINE 12 5 mg Intravenous Q6H PRN    docusate sodium 100 mg Oral BID    enoxaparin 40 mg Subcutaneous Daily    escitalopram 5 mg Oral Daily    HYDROmorphone 0 5 mg Intravenous Q4H PRN    melatonin 6 mg Oral HS PRN    metoprolol succinate 100 mg Oral Daily    mirtazapine 15 mg Oral HS    ondansetron 4 mg Intravenous Q6H PRN    oxyCODONE 5 mg Oral Q4H PRN    pantoprazole 20 mg Oral Early Morning      Continuous Infusions:   dextrose 5 % and sodium chloride 0 45 % with KCl 20 mEq/L 75 mL/hr Last Rate: 75 mL/hr (12/06/18 0100)     PRN Meds: diphenhydrAMINE    HYDROmorphone    melatonin x1    ondansetron    oxyCODONE x 2 in last 24 hrs     SCDs  Oxygen NC   incent spirometry hourly   Up w/ assist   Daily wt  Diet NPO w/ sips  PT eval/tx   Arteriogram today 12/6       [address] [facility] Review Department  Phone: [phone]; Fax [phone]  [email or web address]  ATTENTION: Please call with any questions or concerns to [phone]  and carefully listen to the prompts so that you are directed to the right person  Send all requests for admission clinical reviews, approved or denied determinations and any other requests to fax [phone]   All voicemails are confidential

## 2018-12-06 NOTE — ANESTHESIA PREPROCEDURE EVALUATION
Review of Systems/Medical History  Patient summary reviewed  Chart reviewed  No history of anesthetic complications     Cardiovascular  EKG reviewed, Hypertension , Dysrhythmias , atrial fibrillation, PVD (s/p left fem-pop bypass with 75% stenosis),    Pulmonary  Smoker ,        GI/Hepatic  Negative GI/hepatic ROS          Negative  ROS        Endo/Other    Comment: ?hx alcohol abuse    GYN    Hysterectomy,        Hematology  Anemia ,  Coagulation disorder (xarelto) currently taking oral anticoagulants, Lymphoma (diffuse b-cell, currently on chemo)   Musculoskeletal  Negative musculoskeletal ROS        Neurology      Comment: Hx right bell's palsy Psychology   Anxiety, Depression ,              Physical Exam    Airway    Mallampati score: II  TM Distance: >3 FB       Dental   Comment: edentulous,     Cardiovascular      Pulmonary      Other Findings       Lab Results   Component Value Date    WBC 10 66 (H) 12/06/2018    HGB 9 0 (L) 12/06/2018     12/06/2018     Lab Results   Component Value Date    K 4 2 12/06/2018    BUN 8 12/06/2018    CREATININE 0 62 12/06/2018    GLUCOSE 118 11/30/2018     Lab Results   Component Value Date    INR 0 98 12/05/2018     Blood type AB-/antibody neg  TTE 12/3/18 SUMMARY     LEFT VENTRICLE:  Systolic function was normal  Ejection fraction was estimated to be 60 %  There were no regional wall motion abnormalities  There was mild concentric hypertrophy  Doppler parameters were consistent with abnormal left ventricular relaxation (grade 1 diastolic dysfunction)      LEFT ATRIUM:  The atrium was mildly dilated      MITRAL VALVE:  There was moderate annular calcification  There was mildly restricted mobility of the posterior leaflet  There was mild stenosis  There was mild regurgitation  Mean transmitral gradient was 4 3 mmHg      TRICUSPID VALVE:  There was mild regurgitation  Pulmonary artery systolic pressure was mildly increased    Estimated peak PA pressure was 40 mmHg  Anesthesia Plan  ASA Score- 3     Anesthesia Type- IV sedation with anesthesia with ASA Monitors  Additional Monitors:   Airway Plan:         Plan Factors-    Induction- intravenous  Postoperative Plan-     Informed Consent- Anesthetic plan and risks discussed with patient  I personally reviewed this patient with the CRNA  Discussed and agreed on the Anesthesia Plan with the CRNA  Ramin Hu

## 2018-12-06 NOTE — OCCUPATIONAL THERAPY NOTE
Occupational Therapy Cancellation Note    Orders received and chart reviewed  Pt plan for ateriogram today  Will follow-up s/p procedure  Will continue to follow to be seen for OT evaluation as appropriate/when medically cleared       Nikko Lord MS, OTR/L

## 2018-12-06 NOTE — PROGRESS NOTES
Paged vascular to see if patient should receive Lovenox injection prior to her 1215 surgery today  Awaiting callback

## 2018-12-06 NOTE — H&P
Consult Note - Vascular Surgery   [facility]: [phone]    Assessment:  70 y o  female who presents with history of metastatic diffuse B cell lymphoma on chemo, Afib on Xarelto, PVD s/p left fem-pop bypass with 75% stenosis, right sided Bell's palsy, HTN, who presented to [facility] 11/30 with 2 weeks of worsening left foot pain    Plan:  - Diet Regular; Regular House  Diet NPO; Sips with meds  - OOBTC  - Pulmonary Toilet, IS  - PPx: LVX, PPI, Colace  - Pain and Nausea control PRN    ______________________________________________________________________    Chief Complaint: Left foot pain    HPI: Tremaine Barkley is a 70 y o  female who presents with history of metastatic diffuse B cell lymphoma on chemotherapy (last treatment approximately 3 weeks ago), Afib on Xarelto (currently held for upcoming chemo) (of note pt denied history of a-fib today and states she "doesn't know where people are getting this"), PVD s/p left fem-pop bypass with 75% stenosis, right sided Bell's palsy, HTN, who presented to [facility] 11/30 with 2 weeks of worsening left foot pain  Work up was concerning for vascular insuffiency therefore patient was transferred to [facility] for Vascular Surgery evaluation with Agram and possible intervention  On exam patient's left foot was warm and without cyanosis  She had a palpable DP and a biphasic PT on the left  Review of Systems:  10/14 systems reviewed and negative except those mentioned in the HPI        Past Medical History:  Past Medical History:   Diagnosis Date    Bell's palsy     Cancer [facility] )     lymphoma    Diffuse large B cell lymphoma ([facility] )     Hypertension     PAD (peripheral artery disease) (HCC)     PAF (paroxysmal atrial fibrillation) (HCC)     PVD (peripheral vascular disease) (HCC)        Past Surgical History:  Past Surgical History:   Procedure Laterality Date    CARDIAC SURGERY      CARPAL TUNNEL RELEASE      HYSTERECTOMY      TONSILLECTOMY         Social History:  History   Alcohol Use No     History   Drug Use No     History   Smoking Status    Current Some Day Smoker    Packs/day: 0 25    Years: 40 00   Smokeless Tobacco    Never Used       Family History:  Family History   Problem Relation Age of Onset   Suellen Limestone Cancer Mother     Breast cancer Mother     Heart attack Father        Allergies: Allergies   Allergen Reactions    Oxycodone-Acetaminophen Rash       Medications:  Current Facility-Administered Medications   Medication Dose Route Frequency    [START ON 12/6/2018] amLODIPine (NORVASC) tablet 10 mg  10 mg Oral Daily    [START ON 12/6/2018] aspirin chewable tablet 81 mg  81 mg Oral Daily    atorvastatin (LIPITOR) tablet 20 mg  20 mg Oral HS    [START ON 12/6/2018] dextrose 5 % and sodium chloride 0 45 % with KCl 20 mEq/L infusion  75 mL/hr Intravenous Continuous    diphenhydrAMINE (BENADRYL) injection 12 5 mg  12 5 mg Intravenous Q6H PRN    docusate sodium (COLACE) capsule 100 mg  100 mg Oral BID    [START ON 12/6/2018] enoxaparin (LOVENOX) subcutaneous injection 40 mg  40 mg Subcutaneous Daily    [START ON 12/6/2018] escitalopram (LEXAPRO) tablet 5 mg  5 mg Oral Daily    HYDROmorphone (DILAUDID) injection 0 5 mg  0 5 mg Intravenous Q4H PRN    melatonin tablet 6 mg  6 mg Oral HS PRN    [START ON 12/6/2018] metoprolol succinate (TOPROL-XL) 24 hr tablet 100 mg  100 mg Oral Daily    mirtazapine (REMERON) tablet 15 mg  15 mg Oral HS    ondansetron (ZOFRAN) injection 4 mg  4 mg Intravenous Q6H PRN    oxyCODONE (ROXICODONE) IR tablet 5 mg  5 mg Oral Q4H PRN    [START ON 12/6/2018] pantoprazole (PROTONIX) EC tablet 20 mg  20 mg Oral Early Morning    [START ON 12/6/2018] Vitamin D3 TABS 2,000 Units  1 tablet Oral Daily       Vitals:  BP      Temp      Pulse     Resp      SpO2        I/Os:  No intake/output data recorded  No intake/output data recorded      Lab Results and Cultures:   CBC with diff:   Lab Results   Component Value Date    WBC 9 96 12/05/2018    HGB 8 2 (L) 12/05/2018    HCT 26 0 (L) 12/05/2018     (H) 12/05/2018     12/05/2018    MCH 31 9 12/05/2018    MCHC 31 5 12/05/2018    RDW 22 4 (H) 12/05/2018    MPV 10 2 12/05/2018    NRBC 0 12/04/2018   ,   BMP/CMP:  Lab Results   Component Value Date    K 4 5 12/05/2018     12/05/2018    CO2 29 12/05/2018    CO2 26 11/30/2018    BUN 11 12/05/2018    CREATININE 0 70 12/05/2018    GLUCOSE 118 11/30/2018    CALCIUM 8 7 12/05/2018    AST 15 11/30/2018    ALT 16 11/30/2018    ALKPHOS 137 (H) 11/30/2018    EGFR 91 12/05/2018    EGFR 96 11/30/2018   ,   Lipid Panel: No results found for: CHOL,   Coags:   Lab Results   Component Value Date     (H) 12/01/2018    INR 0 98 12/05/2018   ,     Blood Culture: No results found for: BLOODCX,   Urinalysis:   Lab Results   Component Value Date    COLORU Yellow 12/03/2018    CLARITYU Slightly Cloudy 12/03/2018    SPECGRAV 1 020 12/03/2018    PHUR 7 5 12/03/2018    LEUKOCYTESUR Negative 12/03/2018    NITRITE Negative 12/03/2018    GLUCOSEU Negative 12/03/2018    KETONESU Negative 12/03/2018    BILIRUBINUR Negative 12/03/2018    BLOODU Negative 12/03/2018   ,   Urine Culture: No results found for: URINECX,   Wound Culure: No results found for: WOUNDCULT    Imaging:  Procedure: Xr Ankle 2 Vw Left  Result Date: 12/3/2018  Impression: No acute osseous abnormality  Procedure: Xr Foot 2 Vw Left  Result Date: 12/3/2018  Impression: No acute osseous abnormality  Procedure: Ct Head Wo Contrast  Result Date: 12/3/2018  Impression: No acute intracranial abnormality  Procedure: Mri Lumbar Spine Wo Contrast  Result Date: 12/4/2018   Impression: Multifocal lumbar degenerative disc disease and facet arthropathy  Canal stenosis most pronounced at L4-5, moderately severe with distortion of the thecal sac best seen on series 6 image 18  Only mild right foraminal narrowing is noted at this level   At L5-S1 there is anterior spondylolisthesis on the basis of facet arthropathy  Moderate canal stenosis and bilateral foraminal narrowing, left greater than right  Mild degenerative disc disease at the L2-3 and L3-4  There is a small T1 hyperintense lesion within the posterior midportion of the right kidney likely representing a small hemorrhagic cyst   Recommend renal ultrasound follow-up  Procedure: Stepan Irwin & Waveform Analysis, Multiple Levels  Result Date: 12/4/2018  Narrative:  [facility] REPORT CLINICAL: Indications:  Left Atherosclerosis with Rest Pain [I70 229]  The patient presents with increasing left foot pain  She has known high grade stenosis proximal to inflow anastomosis of her left femoral-popliteal bypass graft  She is scheduled for an abdominal aortogram with left lower extremity runoff 12/6/2018 at [facility]  Operative History: Aorto-bifemoral bypass graft 2010 Left femoral-below knee popliteal bypass graft 2016 Right femoral-below knee popliteal bypass graft 2013 Risk Factors The patient has history of HTN and smoking (current) 0 5 ppd  Clinical Right Pressure: 111/ mm Hg, Left Pressure: 112/ mm Hg  FINDINGS:  Segment       Rig  Left                          P   P  Ant  Tibial   104  30  Post  Tibial  116  38  Ankle         116  38  Metatarsal    110  36  Great Toe      84  21       CONCLUSION: Impression   RIGHT LOWER LIMB Ankle/Brachial Index: 1 04 which is in the normal range PPG/PVR Tracings are dampened  Biphasic waveforms at the ankle  Metatarsal Pressure 110 mmHg Great Toe Pressure: 84 mmHg, which limits for healing potential    LEFT LOWER LIMB Ankle/Brachial Index: 0 27 which is in the rest pain/tissue loss range PPG/PVR Tracings are dampened  Monophasic waveforms at the ankle  Metatarsal Pressure 36 mmHg Great Toe Pressure: 21 mmHg, which is below the healing range         Physical Exam:    General appearance: alert and oriented, in no acute distress  Head: Normocephalic, without obvious abnormality, atraumatic  Eyes: Wears the eye patch over the right eye  Throat: Right-sided Bell's palsy  Neck: no JVD and supple, symmetrical, trachea midline  Back: negative  Lungs: Normal work of breathing, no respiratory distress  Chest wall: no tenderness  Heart: regular rate and rhythm  Abdomen: Soft, nontender, nondistended  Extremities: Left foot is tender to palpation, motor is intact but limited by pain, no edema  Skin: Skin color, texture, turgor normal  No rashes or lesions  Neurologic: Grossly normal, sensation intact to light touch throughout  Pulse exam:  Radial: Right: 2+ Left[de-identified] 2+  Femoral: Right: 2+ Left: 2+  DP: Right: 2+ Left: 2+  PT: Right: 2+ Left: doppler signal    Nathalie Wilson MD  12/5/2018

## 2018-12-07 LAB
ABO GROUP BLD BPU: NORMAL
ANION GAP SERPL CALCULATED.3IONS-SCNC: 6 MMOL/L (ref 4–13)
ANISOCYTOSIS BLD QL SMEAR: PRESENT
BASOPHILS # BLD MANUAL: 0.32 THOUSAND/UL (ref 0–0.1)
BASOPHILS NFR MAR MANUAL: 2 % (ref 0–1)
BPU ID: NORMAL
BUN SERPL-MCNC: 10 MG/DL (ref 5–25)
CALCIUM SERPL-MCNC: 8.9 MG/DL (ref 8.3–10.1)
CHLORIDE SERPL-SCNC: 104 MMOL/L (ref 100–108)
CO2 SERPL-SCNC: 26 MMOL/L (ref 21–32)
CREAT SERPL-MCNC: 0.75 MG/DL (ref 0.6–1.3)
CROSSMATCH: NORMAL
EOSINOPHIL # BLD MANUAL: 0 THOUSAND/UL (ref 0–0.4)
EOSINOPHIL NFR BLD MANUAL: 0 % (ref 0–6)
ERYTHROCYTE [DISTWIDTH] IN BLOOD BY AUTOMATED COUNT: 22.1 % (ref 11.6–15.1)
ERYTHROCYTE [DISTWIDTH] IN BLOOD BY AUTOMATED COUNT: 22.2 % (ref 11.6–15.1)
FOLATE SERPL-MCNC: 7.7 NG/ML (ref 3.1–17.5)
GFR SERPL CREATININE-BSD FRML MDRD: 84 ML/MIN/1.73SQ M
GLUCOSE SERPL-MCNC: 82 MG/DL (ref 65–140)
HCT VFR BLD AUTO: 20.4 % (ref 34.8–46.1)
HCT VFR BLD AUTO: 21.1 % (ref 34.8–46.1)
HCT VFR BLD AUTO: 25.3 % (ref 34.8–46.1)
HGB BLD-MCNC: 6.4 G/DL (ref 11.5–15.4)
HGB BLD-MCNC: 6.5 G/DL (ref 11.5–15.4)
HGB BLD-MCNC: 8 G/DL (ref 11.5–15.4)
LYMPHOCYTES # BLD AUTO: 1.94 THOUSAND/UL (ref 0.6–4.47)
LYMPHOCYTES # BLD AUTO: 12 % (ref 14–44)
MACROCYTES BLD QL AUTO: PRESENT
MCH RBC QN AUTO: 31.1 PG (ref 26.8–34.3)
MCH RBC QN AUTO: 32.2 PG (ref 26.8–34.3)
MCHC RBC AUTO-ENTMCNC: 30.8 G/DL (ref 31.4–37.4)
MCHC RBC AUTO-ENTMCNC: 31.4 G/DL (ref 31.4–37.4)
MCV RBC AUTO: 101 FL (ref 82–98)
MCV RBC AUTO: 103 FL (ref 82–98)
MICROCYTES BLD QL AUTO: PRESENT
MONOCYTES # BLD AUTO: 1.3 THOUSAND/UL (ref 0–1.22)
MONOCYTES NFR BLD: 8 % (ref 4–12)
NEUTROPHILS # BLD MANUAL: 12.63 THOUSAND/UL (ref 1.85–7.62)
NEUTS BAND NFR BLD MANUAL: 3 % (ref 0–8)
NEUTS SEG NFR BLD AUTO: 75 % (ref 43–75)
NRBC BLD AUTO-RTO: 0 /100 WBCS
PLATELET # BLD AUTO: 275 THOUSANDS/UL (ref 149–390)
PLATELET # BLD AUTO: 282 THOUSANDS/UL (ref 149–390)
PLATELET BLD QL SMEAR: ADEQUATE
PMV BLD AUTO: 10.6 FL (ref 8.9–12.7)
PMV BLD AUTO: 11.3 FL (ref 8.9–12.7)
POTASSIUM SERPL-SCNC: 4.2 MMOL/L (ref 3.5–5.3)
RBC # BLD AUTO: 1.99 MILLION/UL (ref 3.81–5.12)
RBC # BLD AUTO: 2.09 MILLION/UL (ref 3.81–5.12)
RBC MORPH BLD: PRESENT
SODIUM SERPL-SCNC: 136 MMOL/L (ref 136–145)
UNIT DISPENSE STATUS: NORMAL
UNIT PRODUCT CODE: NORMAL
UNIT RH: NORMAL
VIT B12 SERPL-MCNC: 2323 PG/ML (ref 100–900)
WBC # BLD AUTO: 13.88 THOUSAND/UL (ref 4.31–10.16)
WBC # BLD AUTO: 16.19 THOUSAND/UL (ref 4.31–10.16)

## 2018-12-07 PROCEDURE — 80048 BASIC METABOLIC PNL TOTAL CA: CPT | Performed by: PHYSICIAN ASSISTANT

## 2018-12-07 PROCEDURE — 85027 COMPLETE CBC AUTOMATED: CPT | Performed by: INTERNAL MEDICINE

## 2018-12-07 PROCEDURE — G8978 MOBILITY CURRENT STATUS: HCPCS

## 2018-12-07 PROCEDURE — 85014 HEMATOCRIT: CPT | Performed by: PHYSICIAN ASSISTANT

## 2018-12-07 PROCEDURE — 82746 ASSAY OF FOLIC ACID SERUM: CPT | Performed by: INTERNAL MEDICINE

## 2018-12-07 PROCEDURE — 99232 SBSQ HOSP IP/OBS MODERATE 35: CPT | Performed by: INTERNAL MEDICINE

## 2018-12-07 PROCEDURE — 85018 HEMOGLOBIN: CPT | Performed by: PHYSICIAN ASSISTANT

## 2018-12-07 PROCEDURE — G8979 MOBILITY GOAL STATUS: HCPCS

## 2018-12-07 PROCEDURE — 97163 PT EVAL HIGH COMPLEX 45 MIN: CPT

## 2018-12-07 PROCEDURE — P9040 RBC LEUKOREDUCED IRRADIATED: HCPCS

## 2018-12-07 PROCEDURE — 82607 VITAMIN B-12: CPT | Performed by: INTERNAL MEDICINE

## 2018-12-07 PROCEDURE — 30233N1 TRANSFUSION OF NONAUTOLOGOUS RED BLOOD CELLS INTO PERIPHERAL VEIN, PERCUTANEOUS APPROACH: ICD-10-PCS | Performed by: SURGERY

## 2018-12-07 PROCEDURE — 85007 BL SMEAR W/DIFF WBC COUNT: CPT | Performed by: INTERNAL MEDICINE

## 2018-12-07 PROCEDURE — 85027 COMPLETE CBC AUTOMATED: CPT | Performed by: PHYSICIAN ASSISTANT

## 2018-12-07 RX ORDER — SODIUM CHLORIDE 9 MG/ML
75 INJECTION, SOLUTION INTRAVENOUS CONTINUOUS
Status: DISCONTINUED | OUTPATIENT
Start: 2018-12-07 | End: 2018-12-07

## 2018-12-07 RX ADMIN — NICOTINE 14 MG: 14 PATCH TRANSDERMAL at 09:38

## 2018-12-07 RX ADMIN — OXYCODONE HYDROCHLORIDE 5 MG: 5 TABLET ORAL at 18:39

## 2018-12-07 RX ADMIN — OXYCODONE HYDROCHLORIDE 5 MG: 5 TABLET ORAL at 13:56

## 2018-12-07 RX ADMIN — SODIUM CHLORIDE 75 ML/HR: 0.9 INJECTION, SOLUTION INTRAVENOUS at 00:00

## 2018-12-07 RX ADMIN — OXYCODONE HYDROCHLORIDE 5 MG: 5 TABLET ORAL at 23:04

## 2018-12-07 RX ADMIN — ASPIRIN 81 MG 81 MG: 81 TABLET ORAL at 09:36

## 2018-12-07 RX ADMIN — VITAMIN D, TAB 1000IU (100/BT) 2000 UNITS: 25 TAB at 09:30

## 2018-12-07 RX ADMIN — ATORVASTATIN CALCIUM 20 MG: 20 TABLET, FILM COATED ORAL at 22:06

## 2018-12-07 RX ADMIN — OXYCODONE HYDROCHLORIDE 5 MG: 5 TABLET ORAL at 09:36

## 2018-12-07 RX ADMIN — MIRTAZAPINE 7.5 MG: 15 TABLET ORAL at 22:06

## 2018-12-07 RX ADMIN — ESCITALOPRAM OXALATE 5 MG: 10 TABLET ORAL at 09:40

## 2018-12-07 NOTE — PLAN OF CARE
Problem: PHYSICAL THERAPY ADULT  Goal: Performs mobility at highest level of function for planned discharge setting  See evaluation for individualized goals  Treatment/Interventions: Functional transfer training, LE strengthening/ROM, Therapeutic exercise, Endurance training, Bed mobility, Gait training, Spoke to nursing  Equipment Recommended: Merlin Sovereign       See flowsheet documentation for full assessment, interventions and recommendations  Prognosis: Good  Problem List: Decreased endurance, Impaired balance  Assessment: Pt is a 71 yo female presenting to [facility] with 2 weeks of worsening foot pain  PMH is significant for: metastatic diffuse B cell lymphoma on chemo, Afib on Xarelto, PVD s/p left fem-pop bypass with 75% stenosis, right sided Bell's palsy, HTN  Pt is s/p angiogram and left CFA stent via brachial approahc 12/6/18  Pt is supine at start of session and agreeable to therapy  Pt transferred supine <> sit with supervision  Pt transferred sit <> stand with supervision and RW  Pt ambulated 40 ft with supervision and RW, overall slowed dioni  Pt remained supine with all needs within reach at end of session  Pt is not yet safe to D/C home at this time, pt is anticipated safe to D/C home with family support pending increased ambulation endurance and stair trial  PT to follow up  Barriers to Discharge: Inaccessible home environment     Recommendation: Home with family support (Pending stair trial)     PT - OK to Discharge: No (Pending increased ambulation, stair trial)    See flowsheet documentation for full assessment         Comments: Lawanda Stein, PT, DPT  used

## 2018-12-07 NOTE — PROGRESS NOTES
Pt asked to be put in chair  Only sat out 5 minutes  States she has numbness of both her legs all the way top to bottom  Able to wiggle toes bilaterally and feet warm  Unable to assist us in getting back to bed  Needed to be lifted back to bed

## 2018-12-07 NOTE — PHYSICAL THERAPY NOTE
Physical Therapy Evaluation     Patient's Name: Elijah Bernal    Admitting Diagnosis  Ischemia of left lower extremity [I99 8]    Problem List  Patient Active Problem List   Diagnosis    Constipation    Lower extremity weakness    [facility]ANT [facility]) ([facility] )    Acute respiratory failure with hypoxia and hypercapnia (HCC)    Alcohol dependence with withdrawal ([facility])    Bell's palsy    Bilateral pleural effusion    Blurred vision, bilateral    Carotid stenosis, asymptomatic, bilateral    Depression with anxiety    Elevated troponin    Benign essential hypertension    Hypercalcemia    Essential hypertension    Fall    Hypokalemia    Hyponatremia    Tobacco dependency    Neutropenic fever (HCC)    Lymphoma ([facility] )    PAD (peripheral artery disease) ([facility])    Paroxysmal atrial fibrillation ([facility])    Elevated lactic acid level    Leg pain    Ischemia of left lower extremity    Peripheral vascular disease ([place] [id number] )    Foot pain    Weakness of lower extremity       Past Medical History  Past Medical History:   Diagnosis Date    Bell's palsy     Cancer ([place] [address] )     lymphoma    Diffuse large B cell lymphoma ([place] [address] )     Hypertension     PAD (peripheral artery disease) ([facility])     PAF (paroxysmal atrial fibrillation) ([place] [address] )     PVD (peripheral vascular disease) ([place] [address] )        Past Surgical History  Past Surgical History:   Procedure Laterality Date    ARTERIOGRAM Left 12/6/2018    Procedure: LEFT lower extremity angiography, with Left lower extremity run-off, stent and angioplasty of Left Common Femoral Artery (Left Brachial Access);   Surgeon: Michael Keys MD;  [facility]: [facility];  Service: Vascular    CARDIAC SURGERY      CARPAL TUNNEL RELEASE      HYSTERECTOMY      IR ABDOMINAL ANGIOGRAPHY / INTERVENTION  12/6/2018    TONSILLECTOMY          12/07/18 1100   Note Type   Note type Eval only   Pain Assessment   Pain Assessment No/denies pain   Pain Score No Pain   Home Living   Type of Home House  (4 LUIS FERNANDO from the front, 2 LUIS FERNANDO from back)   Home Layout Two level; Able to live on main level with bedroom/bathroom   [id number] [facility];Walker;Cane   Prior Function   Level of [place] Needs assistance with ADLs and functional mobility   Lives With Daughter  (pt resides with daughter and son in law)   Receives Help From Family   ADL Assistance Needs assistance   IADLs Needs assistance   Falls in the last 6 months 0   Vocational Retired   Restrictions/Precautions   [place] Bearing Precautions Per Order No   Other Precautions Telemetry; Fall Risk   General   Family/Caregiver Present No   Cognition   Overall Cognitive Status WFL   Arousal/Participation Alert   Orientation Level Oriented X4   Memory Within functional limits   Following Commands Follows all commands and directions without difficulty   RLE Assessment   RLE Assessment WFL   LLE Assessment   LLE Assessment WFL   Light Touch   RLE Light Touch Grossly intact   LLE Light Touch Grossly intact   Bed Mobility   Supine to Sit 5  Supervision   Sit to Supine 5  Supervision   Transfers   Sit to Stand 5  Supervision   Stand to Sit 5  Supervision   Ambulation/Elevation   Gait pattern Excessively slow; Short stride; Step to; Improper Weight shift   Gait Assistance 5  Supervision   Additional items Verbal cues   Assistive Device Rolling walker   Distance 40 ft   Balance   Static Sitting Good   Dynamic Sitting Good   Static Standing Fair +   Dynamic Standing Fair +   Ambulatory Fair +   Endurance Deficit   Endurance Deficit Yes   Endurance Deficit Description Fatigue   Activity Tolerance   Activity Tolerance Patient limited by fatigue;Patient tolerated treatment well   Nurse Made Aware Yes, Tiarra   Assessment   Prognosis Good   Problem List Decreased endurance; Impaired balance   Assessment Pt is a 71 yo female presenting to [facility] with 2 weeks of worsening foot pain   PMH is significant for: metastatic diffuse B cell lymphoma on chemo, Afib on Xarelto, PVD s/p left fem-pop bypass with 75% stenosis, right sided Bell's palsy, HTN  Pt is s/p angiogram and left CFA stent via brachial approc 12/6/18  Pt is supine at start of session and agreeable to therapy  Pt transferred supine <> sit with supervision  Pt transferred sit <> stand with supervision and RW  Pt ambulated 40 ft with supervision and RW, overall slowed dioni  Pt remained supine with all needs within reach at end of session  Pt is not yet safe to D/C home at this time, pt is anticipated safe to D/C home with family support pending increased ambulation endurance and stair trial  PT to follow up   Barriers to Discharge Inaccessible home environment   Goals   Patient Goals To go home   STG Expiration Date 12/17/18   Short Term Goal #1 1  Pt will transfer supine <> sit independently in 1-3 sessions  2  Pt will transfer sit <> stand with modified independence and LRAD in 1-3 sessions  3   Pt will ambulate >50 ft with modified independence and LRAD in 3-5 sessions  4  Pt will navigate 4 stairs with modified independence in 3-5 sessions  Treatment Day 0   Plan   Treatment/Interventions Functional transfer training;LE strengthening/ROM; Therapeutic exercise; Endurance training;Bed mobility;Gait training;Spoke to nursing   PT Frequency (3-5x/wk)   Recommendation   Recommendation Home with family support  (Pending stair trial)   Equipment Recommended Walker   PT - OK to Discharge No  (Pending increased ambulation, stair trial)   Modified [place] Scale   Modified [place] Scale 3   Barthel Index   Feeding 10   Bathing 0   Grooming Score 5   Dressing Score 5   Bladder Score 10   Bowels Score 10   Toilet Use Score 5   Transfers (Bed/Chair) Score 10   Mobility (Level Surface) Score 0   Stairs Score 0   Barthel Index Score 55         Rachana Blunt, PT, DPT

## 2018-12-07 NOTE — PROGRESS NOTES
Woke pt to see if she would try to stand so we could weigh her  She refused to try  She said she wants to sleep  Weighed on bed  Pt states she didn't sleep well  She DID sleep very well  She then proceded to try to get up oob, so that we could weigh her  Told her it was done  She became frustrated with staff  Refused her protonix

## 2018-12-07 NOTE — PROGRESS NOTES
I spoke with the  and the supervisor today concerning patient's 0600 labs today  The previous nurse Robbi Kc received a phone call from the lab concerning a low hemoglobin of 6 8 and requested a redraw  Per the lab because this is a redraw, Epic does not keep the 1st hemoglobin they called about  The redraw came back at hemoglobin 6 5  Per  and lab supervisor, we are to send a lab label and she will run the 1st speciman that was collected at 0600 and profile it for this time  This gives us two results to compare  I spoke with Dr Flex Telles to make him aware of this  He states this is fine  no

## 2018-12-07 NOTE — PROGRESS NOTES
[facility] Progress Note  Patient: Alondra Alcazar 70 y o  female   MRN: [id number]  PCP: No primary care provider on file  Unit/Bed#: [facility] 828-01 Encounter: [id number]  Date Of Visit: 12/07/18    Assessment:    Principal Problem:    PAD (peripheral artery disease) (HCC)  Active Problems:    Benign essential hypertension    Ischemia of left lower extremity    Peripheral vascular disease (HCC)    Foot pain    Weakness of lower extremity      Plan:    1  Acute blood loss anemia with leukocytosis, underlying macrocytic anemia, rule out secondary to recent arteriogram, no hematochezia or abdominal pain,  repeat stat H&H,  transfuse with 1 unit of packed red blood cells if hemoglobin below 7, follow on O31 and folic acid level,  check heme test stool  2  Essential hypertension, acceptable BP, continue Norvasc andToprol XL, restart lisinopril 20 mg at time of discharge  3  Paroxysmal Atrial fibrillation, stable, cardiac echo with EF 60% and mild AS, evaluated by Cardiology, continue Toprol XL, hold Xarelto for now given acute blood loss anemia  4  Stage IV diffuse large B-cell lymphoma, evaluated by Hematology oncology,  patient on active chemotherapy at [facility]  every 3 weeks  5  Abnormal lumbar spine MRI and canal stenosis, evaluated by Neurology, recommendation for EMG/NCS of LLE, as an outpatient in 4- 6 weeks  6  PAD with left foot numbness and pain, symptoms has resolved,  s/p  LLE arteriogram, management per vascular  7  History of right-sided Bell's palsy  8  Tobacco smoking, continue nicotine patch  9  GERD, continue PPI     VTE Pharmacologic Prophylaxis:  Per vascular    Mechanical VTE Prophylaxis in Place: Yes    Patient Centered Rounds: I have performed bedside rounds with nursing staff today  Discussions with Specialists or Other Care Team Provider:  Vascular    Education and Discussions with Family / Patient:  Patient    Time Spent for Care: 30 minutes    More than 50% of total time spent on counseling and coordination of care as described above  Current Length of Stay: 2 day(s)    Current Patient Status: Inpatient       Discharge Plan / Estimated Discharge Date:  Per primary    Code Status: Level 1 - Full Code      Subjective:   Patient seen and examined  Comfortable in bed  Anxious to go home  No chest pain or shortness of breath no abdominal pain  Denied black stool, no hematochezia    Objective:     Vitals:   Temp (24hrs), Av 6 °F (37 °C), Min:97 5 °F (36 4 °C), Max:99 9 °F (37 7 °C)    Temp:  [97 5 °F (36 4 °C)-99 9 °F (37 7 °C)] 99 1 °F (37 3 °C)  HR:  [] 84  Resp:  [13-18] 16  BP: ()/(54-71) 108/58  SpO2:  [96 %-100 %] 96 %  Body mass index is 19 28 kg/m²  Input and Output Summary (last 24 hours): Intake/Output Summary (Last 24 hours) at 18 1016  Last data filed at 18 0616   Gross per 24 hour   Intake             1190 ml   Output             1150 ml   Net               40 ml       Physical Exam:     Physical Exam     Patient is awake alert oriented in no acute distress  Eye patch over the right eye  Lung clear to auscultation bilateral  Heart positive S1-S2 no murmur  Abdomen soft nontender nondistended positive bowel sounds  Lower extremities no edema    Additional Data:     Labs:      Results from last 7 days  Lab Units 18  0858  18  0449   WBC Thousand/uL 13 88*  < > 6 72   HEMOGLOBIN g/dL 6 5*  < > 8 1*   HEMATOCRIT % 21 1*  < > 25 9*   PLATELETS Thousands/uL 282  < > 324   LYMPHO PCT %  --   --  7*   MONO PCT %  --   --  6   EOS PCT %  --   --  1   < > = values in this interval not displayed      Results from last 7 days  Lab Units 18  0520  18  1859 18  1849   POTASSIUM mmol/L 4 2  < >  --  4 4   CHLORIDE mmol/L 102  < >  --  102   CO2 mmol/L 27  < >  --  28   CO2, I-STAT mmol/L  --   --  26  --    BUN mg/dL 8  < >  --  5   CREATININE mg/dL 0 62  < >  --  0 73   CALCIUM mg/dL 9 5  < >  --  8 8   ALK PHOS U/L  --   --   --  137*   ALT U/L  --   --   --  16   AST U/L  --   --   --  15   GLUCOSE, ISTAT mg/dl  --   --  118  --    < > = values in this interval not displayed  Results from last 7 days  Lab Units 12/05/18 2131   INR  0 98       * I Have Reviewed All Lab Data Listed Above  * Additional Pertinent Lab Tests Reviewed: [facility] 64 Admission Reviewed    Imaging:    Imaging Reports Reviewed Today Include:   Imaging Personally Reviewed by Myself Includes:      Recent Cultures (last 7 days):           Last 24 Hours Medication List:     Current Facility-Administered Medications:  amLODIPine 10 mg Oral Daily Carlitos York MD    aspirin 81 mg Oral Daily Carlitos York MD    atorvastatin 20 mg Oral HS Carlitos York MD    cholecalciferol 2,000 Units Oral Daily Carlitos York MD    diphenhydrAMINE 12 5 mg Intravenous Q6H PRN Carlitos York MD    docusate sodium 100 mg Oral BID Carlitos York MD    escitalopram 5 mg Oral Daily Carlitos York MD    HYDROmorphone 0 5 mg Intravenous Q4H PRN Carlitos York MD    melatonin 6 mg Oral HS PRN Carlitos York MD    metoprolol succinate 100 mg Oral Daily Carlitos York MD    mirtazapine 7 5 mg Oral HS Jona Terrazas DO    nicotine 14 mg Transdermal Daily Jona Terrazas DO    ondansetron 4 mg Intravenous Q6H PRN Carlitos York MD    oxyCODONE 5 mg Oral Q4H PRN Carlitos York MD    pantoprazole 20 mg Oral Early Morning Carlitos York MD    rivaroxaban 20 mg Oral Daily With Breakfast Puma Pleitez MD    sodium chloride 75 mL/hr Intravenous Continuous Puma Pleitez MD Last Rate: 75 mL/hr (12/07/18 0000)        Today, Patient Was Seen By: Jona Terrazas DO    ** Please Note: This note has been constructed using a voice recognition system   **

## 2018-12-07 NOTE — PROGRESS NOTES
The blood bank called asking how many units we are transfusing for the patient  I spoke with Dr Ar Calderon and he did not know that vascular already put an order in for a unit of RBC's  Dr Ar Calderon put special requirements on the blood - irradidated/leukoreduced and would like the 1st order for blood deleted by Ruben Nguyen from Vascular  No type and screen is needed per the blood bank  Will send for blood now

## 2018-12-07 NOTE — PROGRESS NOTES
Progress Note - Vascular Surgery   [place] 70 y o  female MRN: [id number]  [facility] Encounter: [id number]    Assessment:  70 F with hx of of PAD and aortobifemoral bypass, and bilateral fem-pop bypass, with left foot anastomotic stenosis with numbness and pain now s/p angiogram and left CFA stent via brachial approahc 12/6    Plan:  Diet as tolerated  Saline lock  Monitor L arm  Appreciate SLIM assistance  Dispo planning    Subjective/Objective     Subjective: No acute events  Difficulty sleeping  Left foot symptoms improved  Objective:    Blood pressure 120/60, pulse 100, temperature 97 5 °F (36 4 °C), temperature source Oral, resp  rate 16, height 5' 2" (1 575 m), weight 48 kg (105 lb 12 8 oz), SpO2 100 %  ,Body mass index is 19 35 kg/m²  Intake/Output Summary (Last 24 hours) at 12/07/18 0622  Last data filed at 12/07/18 0616   Gross per 24 hour   Intake             1090 ml   Output             1150 ml   Net              -60 ml       Invasive Devices     Peripheral Intravenous Line            Peripheral IV 12/05/18 Left; Lower Forearm 1 day    Peripheral IV 12/06/18 Right Wrist less than 1 day                Physical Exam:   General: NAD, AAOx3  CV: RRR +S1/S2  Chest: breath sounds bilaterally  Abdomen: soft, NT ND  Extremities: Left foot dopplerable DP/ PT  Left arm ecchymosis, no hematoma noted        Results from last 7 days  Lab Units 12/06/18  0520 12/05/18 2131 12/04/18  0449   WBC Thousand/uL 10 66* 9 96 6 72   HEMOGLOBIN g/dL 9 0* 8 2* 8 1*   HEMATOCRIT % 28 5* 26 0* 25 9*   PLATELETS Thousands/uL 322 334 324       Results from last 7 days  Lab Units 12/06/18  0520 12/05/18  2131 12/04/18  0449  11/30/18  1859   POTASSIUM mmol/L 4 2 4 5 4 1  < >  --    CHLORIDE mmol/L 102 100 104  < >  --    CO2 mmol/L 27 29 27  < >  --    CO2, I-STAT mmol/L  --   --   --   --  26   BUN mg/dL 8 11 6  < >  --    CREATININE mg/dL 0 62 0 70 0 71  < >  --    GLUCOSE, ISTAT mg/dl  --   --   --   -- 118   CALCIUM mg/dL 9 5 8 7 8 9  < >  --    < > = values in this interval not displayed      Results from last 7 days  Lab Units 12/05/18  2131 12/03/18  1705 12/01/18  1119 12/01/18  0505 11/30/18  1849   INR  0 98 1 11  --   --  1 30*   PTT seconds  --   --  111* 84* 33

## 2018-12-07 NOTE — PROGRESS NOTES
Patient's hemoglobin is 6 5 this morning after a redraw  I spoke with Pedro Rasmussen from vascular and they will be transfusing patient today  Awaiting new order

## 2018-12-07 NOTE — PROGRESS NOTES
Patient refusing Xarelto at this time  States she is due for chemotherapy and they usually stop her Xarelto 3 days prior  Will find out from [facility] doctor when patient is scheduled and reassess  If patient does not take Xarelto today, vascular will be notified

## 2018-12-07 NOTE — PROGRESS NOTES
Resident with vascular came to see pt  He feels the numb feeling in pt's legs is related to reperfusion  Pt instructed she is not to get oob without someone  Bed alarm on

## 2018-12-08 LAB
ABO GROUP BLD BPU: NORMAL
ANION GAP SERPL CALCULATED.3IONS-SCNC: 7 MMOL/L (ref 4–13)
ANISOCYTOSIS BLD QL SMEAR: PRESENT
BASOPHILS # BLD MANUAL: 0.11 THOUSAND/UL (ref 0–0.1)
BASOPHILS NFR MAR MANUAL: 1 % (ref 0–1)
BPU ID: NORMAL
BUN SERPL-MCNC: 7 MG/DL (ref 5–25)
CALCIUM SERPL-MCNC: 9 MG/DL (ref 8.3–10.1)
CHLORIDE SERPL-SCNC: 104 MMOL/L (ref 100–108)
CO2 SERPL-SCNC: 25 MMOL/L (ref 21–32)
CREAT SERPL-MCNC: 0.7 MG/DL (ref 0.6–1.3)
CROSSMATCH: NORMAL
EOSINOPHIL # BLD MANUAL: 0.11 THOUSAND/UL (ref 0–0.4)
EOSINOPHIL NFR BLD MANUAL: 1 % (ref 0–6)
ERYTHROCYTE [DISTWIDTH] IN BLOOD BY AUTOMATED COUNT: 22.2 % (ref 11.6–15.1)
GFR SERPL CREATININE-BSD FRML MDRD: 91 ML/MIN/1.73SQ M
GLUCOSE SERPL-MCNC: 83 MG/DL (ref 65–140)
HCT VFR BLD AUTO: 24.5 % (ref 34.8–46.1)
HGB BLD-MCNC: 7.9 G/DL (ref 11.5–15.4)
LG PLATELETS BLD QL SMEAR: PRESENT
LYMPHOCYTES # BLD AUTO: 0.55 THOUSAND/UL (ref 0.6–4.47)
LYMPHOCYTES # BLD AUTO: 5 % (ref 14–44)
MCH RBC QN AUTO: 30.7 PG (ref 26.8–34.3)
MCHC RBC AUTO-ENTMCNC: 32.2 G/DL (ref 31.4–37.4)
MCV RBC AUTO: 95 FL (ref 82–98)
MONOCYTES # BLD AUTO: 1.11 THOUSAND/UL (ref 0–1.22)
MONOCYTES NFR BLD: 10 % (ref 4–12)
NEUTROPHILS # BLD MANUAL: 9.2 THOUSAND/UL (ref 1.85–7.62)
NEUTS SEG NFR BLD AUTO: 83 % (ref 43–75)
NRBC BLD AUTO-RTO: 0 /100 WBCS
OVALOCYTES BLD QL SMEAR: PRESENT
PLATELET # BLD AUTO: 237 THOUSANDS/UL (ref 149–390)
PLATELET BLD QL SMEAR: ADEQUATE
PMV BLD AUTO: 10.8 FL (ref 8.9–12.7)
POLYCHROMASIA BLD QL SMEAR: PRESENT
POTASSIUM SERPL-SCNC: 4.2 MMOL/L (ref 3.5–5.3)
RBC # BLD AUTO: 2.57 MILLION/UL (ref 3.81–5.12)
RBC MORPH BLD: PRESENT
SODIUM SERPL-SCNC: 136 MMOL/L (ref 136–145)
UNIT DISPENSE STATUS: NORMAL
UNIT PRODUCT CODE: NORMAL
UNIT RH: NORMAL
WBC # BLD AUTO: 11.08 THOUSAND/UL (ref 4.31–10.16)

## 2018-12-08 PROCEDURE — 80048 BASIC METABOLIC PNL TOTAL CA: CPT | Performed by: INTERNAL MEDICINE

## 2018-12-08 PROCEDURE — G8987 SELF CARE CURRENT STATUS: HCPCS

## 2018-12-08 PROCEDURE — 97166 OT EVAL MOD COMPLEX 45 MIN: CPT

## 2018-12-08 PROCEDURE — 99232 SBSQ HOSP IP/OBS MODERATE 35: CPT | Performed by: INTERNAL MEDICINE

## 2018-12-08 PROCEDURE — 85027 COMPLETE CBC AUTOMATED: CPT | Performed by: INTERNAL MEDICINE

## 2018-12-08 PROCEDURE — G8988 SELF CARE GOAL STATUS: HCPCS

## 2018-12-08 PROCEDURE — 99231 SBSQ HOSP IP/OBS SF/LOW 25: CPT | Performed by: SURGERY

## 2018-12-08 PROCEDURE — 85007 BL SMEAR W/DIFF WBC COUNT: CPT | Performed by: INTERNAL MEDICINE

## 2018-12-08 RX ORDER — SENNOSIDES 8.6 MG
1 TABLET ORAL
Status: DISCONTINUED | OUTPATIENT
Start: 2018-12-08 | End: 2018-12-09 | Stop reason: HOSPADM

## 2018-12-08 RX ADMIN — MIRTAZAPINE 7.5 MG: 15 TABLET ORAL at 21:05

## 2018-12-08 RX ADMIN — OXYCODONE HYDROCHLORIDE 5 MG: 5 TABLET ORAL at 16:04

## 2018-12-08 RX ADMIN — OXYCODONE HYDROCHLORIDE 5 MG: 5 TABLET ORAL at 09:58

## 2018-12-08 RX ADMIN — RIVAROXABAN 10 MG: 10 TABLET, FILM COATED ORAL at 16:02

## 2018-12-08 RX ADMIN — ASPIRIN 81 MG 81 MG: 81 TABLET ORAL at 09:58

## 2018-12-08 RX ADMIN — METOPROLOL SUCCINATE 100 MG: 100 TABLET, EXTENDED RELEASE ORAL at 09:58

## 2018-12-08 RX ADMIN — DOCUSATE SODIUM 100 MG: 100 CAPSULE, LIQUID FILLED ORAL at 17:34

## 2018-12-08 RX ADMIN — PANTOPRAZOLE SODIUM 20 MG: 20 TABLET, DELAYED RELEASE ORAL at 06:01

## 2018-12-08 RX ADMIN — AMLODIPINE BESYLATE 10 MG: 10 TABLET ORAL at 09:58

## 2018-12-08 RX ADMIN — SENNOSIDES 8.6 MG: 8.6 TABLET, FILM COATED ORAL at 21:05

## 2018-12-08 RX ADMIN — ATORVASTATIN CALCIUM 20 MG: 20 TABLET, FILM COATED ORAL at 21:05

## 2018-12-08 RX ADMIN — VITAMIN D, TAB 1000IU (100/BT) 2000 UNITS: 25 TAB at 09:58

## 2018-12-08 RX ADMIN — ESCITALOPRAM OXALATE 5 MG: 10 TABLET ORAL at 09:58

## 2018-12-08 RX ADMIN — DOCUSATE SODIUM 100 MG: 100 CAPSULE, LIQUID FILLED ORAL at 09:58

## 2018-12-08 RX ADMIN — OXYCODONE HYDROCHLORIDE 5 MG: 5 TABLET ORAL at 21:04

## 2018-12-08 NOTE — OCCUPATIONAL THERAPY NOTE
[address]  Evaluation     Patient Name: Traci Turner  YKRJM'N Date: 12/8/2018  Problem List  Patient Active Problem List   Diagnosis    Constipation    Lower extremity weakness    [facility]ANT (acute [facility] injury) ([facility] )    Acute respiratory failure with hypoxia and hypercapnia (HCC)    Alcohol dependence with withdrawal (HCC)    Bell's palsy    Bilateral pleural effusion    Blurred vision, bilateral    Carotid stenosis, asymptomatic, bilateral    Depression with anxiety    Elevated troponin    Benign essential hypertension    Hypercalcemia    Essential hypertension    Fall    Hypokalemia    Hyponatremia    Tobacco dependency    Neutropenic fever (HCC)    [facility] ([facility] )    PAD (peripheral artery disease) (HCC)    Paroxysmal atrial fibrillation (HCC)    Elevated lactic acid level    Leg pain    Ischemia of left lower extremity    Peripheral vascular disease (HCC)    Foot pain    Weakness of lower extremity     Past Medical History  Past Medical History:   Diagnosis Date    Bell's palsy     Cancer ([facility] )     lymphoma    Diffuse large B cell lymphoma ([facility] )     Hypertension     [facility]) [facility]     PAF (paroxysmal atrial fibrillation) ([facility] )     PVD (peripheral vascular disease) ([place] [id number] )      Past Surgical History  Past Surgical History:   Procedure Laterality Date    ARTERIOGRAM Left 12/6/2018    Procedure: LEFT lower extremity angiography, with Left lower extremity run-off, stent and angioplasty of Left Common Femoral Artery (Left Brachial Access);   Surgeon: Lisette Pedersen MD;  [facility]: [facility];  Service: Vascular    CARDIAC SURGERY      CARPAL TUNNEL RELEASE      HYSTERECTOMY      IR ABDOMINAL ANGIOGRAPHY / INTERVENTION  12/6/2018    TONSILLECTOMY             12/08/18 1351   Note Type   Note type Eval/Treat   Restrictions/Precautions   Weight Bearing Precautions Per Order No   Braces or Orthoses Other (Comment)  (R eye patch)   Other Precautions Fall Risk;Pain   Pain Assessment   Pain Assessment 0-10   Pain Score 6   Pain Type Acute pain;Surgical pain   Pain Location Arm; Foot   Pain Orientation Left   Pain Descriptors Aching;Discomfort   Pain Frequency Constant/continuous   Pain Onset Ongoing   Clinical Progression Not changed   Patient's Stated Pain Goal No pain   Hospital Pain Intervention(s) Repositioned; Ambulation/increased activity; Emotional support   Response to Interventions tolerated   Multiple Pain Sites Yes   Home Living   Type of [address] Two level;1/2 bath on main level;Bed/bath upstairs; Able to live on main level with bedroom/bathroom; Performs ADLs on one level;Stairs to enter with rails   Bathroom Shower/Tub Tub/shower unit   [employer] Grab bars in shower; Shower chair;Grab bars around [facility] [employer] [facility] Walker;Cane;Hospital bed   Additional Comments Pt reports living in 2 SH, 2 [address] garage, and 3-4 [address] front   Prior Function   Level of [place] Needs assistance with ADLs and functional mobility; Needs assistance with IADLs   Lives With Daughter; Other (Comment)  (son in law)   Receives Help From Family   ADL Assistance Needs assistance   IADLs Needs assistance   Falls in the last 6 months 0   Vocational Retired   Comments pt reports needing assist w/ ADLS, IADLS, and is Mod I w/ transfers and functional mobility PTA   Lifestyle   Autonomy pt reports needing assist w/ ADLS, IADLS, and is Mod I w/ transfers and functional mobility PTA   Reciprocal Relationships Pt lives w/ dght and son in law; recieves home health OT/PT 2x/wk and has visiting nurses; usually someone is always home   Service to Others Pt is retired   Intrinsic Gratification pt enjoys being active   Psychosocial   Psychosocial (WDL) [address]  5  Supervision/Setup   Grooming Assistance 5  Supervision/Setup   [address] 5  Supervision/Setup   LB [address] 4  Minimal Assistance   UB Dressing Assistance 5  Supervision/Setup   LB Dressing Assistance 4  Minimal Assistance   Toileting Assistance  5  Supervision/Setup   Functional Assistance 4  Minimal Assistance   Functional Deficit Steadying;Supervision/safety; Increased time to complete; Toilet transfer   Bed Mobility   Supine to Sit 5  Supervision   Additional items HOB elevated; Increased time required;Verbal cues   Sit to Supine 5  Supervision   Additional items Increased time required;Verbal cues;HOB elevated   Additional Comments Pt went from supine to sit w/ S for safety, HOB elevated for support   Transfers   Sit to Stand 5  Supervision   Additional items Verbal cues   Stand to Sit 5  Supervision   Additional items Verbal cues   Toilet transfer 4  Minimal assistance   Additional items Assist x 1; Increased time required;Verbal cues;Standard toilet   Additional Comments Pt performed sit-stand from EOB w/ S for safety and 1 toilet transfer to standard toilet w/ Min A x1 for mild force production into standing and use of grab bars for support   Functional Mobility   Functional Mobility 4  Minimal assistance   Additional Comments pt ambulated short household distance w/ CTG for safety/balance and VC for proper technique, RW for support/stability   Additional items Rolling walker   Balance   Static Sitting Fair   Dynamic Sitting Fair -   Static Standing Fair -   Ambulatory Fair -   Activity Tolerance   Activity Tolerance Patient limited by fatigue;Patient limited by pain   Nurse Made Aware yes   RUE Assessment   RUE Assessment WFL   LUE Assessment   LUE Assessment X  (limited by pain; 3-/5 grossly)   Hand Function   Gross Motor Coordination Impaired   Fine Motor Coordination Functional   Cognition   Overall Cognitive Status WFL   Arousal/Participation Responsive; Cooperative   Attention Within functional limits   Orientation Level Oriented X4   Memory Within functional limits   Following Commands Follows all commands and directions without difficulty   Comments Pt is pleasant and cooperative   Assessment   Limitation Decreased ADL status; Decreased UE strength;Decreased UE ROM; Decreased Safe judgement during ADL;Decreased endurance;Decreased self-care trans;Decreased high-level ADLs   Prognosis Fair   Assessment Pt is a 71 y/o female seen for OT eval s/p adm to [facility] as a transfer [facility] 11/30 w/ 2 weeks of worsening L foot pain  Pt is s/p LLE angiogram and L CFA stent via brachial approach on 12/6/18  Pt is dx'd w/m PAD  Comorbidities include a h/o bell's palsy, cancer, diffuse large B cell lymphoma, HTN, PAD, PAF, PVD  Pt with active OT orders and up with assistance  orders  Pt lives with dght and son in law in 2 SH, 2 [address] garage and 3-4 [address] front, 1/2 bath on 1st floor w/ hospital bed and 1st floor setup; pt's dght assists w/ getting to 2nd floor for a tub  Pt required assist w/ ADLS and IADLS, does not drive, & required use of DME PTA including hospital bed, shower chair, BSC, rw, and cane  Pt is currently demonstrating the following occupational deficits: S UB ADLS, Min A LB ADLS, Min A transfers and CTG functional mobility w/ RW  These deficits that are impacting pt's baseline areas of occupation are a result of the following impairments: pain, endurance, activity tolerance, functional mobility, forward functional reach, functional standing tolerance, decreased I w/ ADLS/IADLS, strength, ROM and decreased safety awareness  The following Occupational Performance Areas to address include: grooming, bathing/shower, toilet hygiene, dressing, socialization, health maintenance, functional mobility and clothing management  Pt scored overall 55/100 on the Barthel Index  Based on the aforementioned OT evaluation, functional performance deficits, and assessments, pt has been identified as a moderate complexity evaluation  Recommend home OT upon D/C when medically stable   Pt to continue to benefit from acute immediate OT services to address the following goals 3-5x/week to  w/in 7-10 days:    Goals   Patient Goals to go home   LTG Time Frame 7-10   Long Term Goal #1 see below listed goals   Plan   Treatment Interventions ADL retraining;Functional transfer training;UE strengthening/ROM; Endurance training;Cognitive reorientation;Patient/family training;Equipment evaluation/education; Compensatory technique education;Continued evaluation; Energy conservation; Activityengagement   Goal Expiration Date 18   OT Frequency 3-5x/wk   Recommendation   OT Discharge Recommendation Home OT   OT - OK to Discharge Yes  (when medically stable)   Barthel Index   Feeding 10   Bathing 0   Grooming Score 5   Dressing Score 5   Bladder Score 10   Bowels Score 10   Toilet Use Score 5   Transfers (Bed/Chair) Score 10   Mobility (Level Surface) Score 0   Stairs Score 0   Barthel Index Score 55   Modified [place]angel[place] Scale   Modified Langel[place] Scale 4        GOALS    1) Pt will improve activity tolerance to G for min 30 min txment sessions for increase engagement in functional tasks    2) Pt will complete UB/LB dressing/self care w/ S using adaptive device and DME as needed    3) Pt will complete bathing w/ S w/ use of AE and DME as needed    4) Pt will complete toileting w/ S w/ G hygiene/thoroughness using DME as needed    5) Pt will improve functional transfers to Mod I on/off all surfaces using DME as needed w/ G balance/safety     6) Pt will improve functional mobility during ADL/IADL/leisure tasks to Mod I using DME as needed w/ G balance/safety     7) Pt will be attentive 100% of the time during ongoing cognitive assessment w/ G participation to assist w/ safe d/c planning/recommendations    8) Pt will demonstrate G carryover of pt/caregiver education and training as appropriate w/o cues w/ good tolerance to increase safety during functional tasks    9) Pt will demonstrate 100% carryover of energy conservation techniques t/o functional I/ADL/leisure tasks w/o cues s/p skilled education to increase endurance during functional tasks       Cornell Abdi MS, OTR/L

## 2018-12-08 NOTE — PROGRESS NOTES
Progress Note - Vascular Surgery   Gavino Nolan 70 y o  female MRN: [id number]  Unit/Bed#: [facility] 828-01 Encounter: [id number]    Assessment:  70 F with hx of of PAD and aortobifemoral bypass, and bilateral fem-pop bypass, with left foot anastomotic stenosis with numbness and pain now s/p angiogram and left CFA stent via brachial approach 12/6  Plan:  - diet as tolerated  - continue to monitor L arm ecchymosis  - trend hemoglobin daily  - appreciate SLIM recommendations  - prn pain control  - will plan to restart xarelto once Hgb stable  - dispo planning  - PT/OT  - SCDs        Subjective/Objective     Subjective: transfused 1u prbc's yesterday, no overt signs of bleeding  Has not had BM yet to send for FOBT    Objective:    Blood pressure 110/68, pulse 89, temperature 99 °F (37 2 °C), temperature source Oral, resp  rate 16, height 5' 2" (1 575 m), weight 47 5 kg (104 lb 11 5 oz), SpO2 96 %  ,Body mass index is 19 15 kg/m²  Intake/Output Summary (Last 24 hours) at 12/08/18 0615  Last data filed at 12/08/18 0500   Gross per 24 hour   Intake          1703 33 ml   Output             1125 ml   Net           578 33 ml       Invasive Devices     Peripheral Intravenous Line            Peripheral IV 12/05/18 Left; Lower Forearm 2 days    Peripheral IV 12/06/18 Right Wrist 1 day                Physical Exam:   NAD, alert and oriented x3  Normocephalic, atraumatic  MMM, EOMI, PERRLA  Norm resp effort on RA  RRR  Abd soft, NT/ND  L brachial site w ecchymosis but no hematoma  Palp L radial, motor/sensation intact on left hand  Palp DP/PT b/l  No calf tenderness or peripheral edema  Motor/sensation intact in distal extremities  CN grossly intact  -rash/lesions          Results from last 7 days  Lab Units 12/07/18  1743 12/07/18  0858 12/07/18  0453 12/06/18  0520   WBC Thousand/uL  --  13 88* 16 19* 10 66*   HEMOGLOBIN g/dL 8 0* 6 5* 6 4* 9 0*   HEMATOCRIT % 25 3* 21 1* 20 4* 28 5*   PLATELETS Thousands/uL  --  282 275 322 Results from last 7 days  Lab Units 12/08/18  0455 12/07/18  0858 12/06/18  0520   POTASSIUM mmol/L 4 2 4 2 4 2   CHLORIDE mmol/L 104 104 102   CO2 mmol/L 25 26 27   BUN mg/dL 7 10 8   CREATININE mg/dL 0 70 0 75 0 62   CALCIUM mg/dL 9 0 8 9 9 5       Results from last 7 days  Lab Units 12/05/18  2131 12/03/18  1705 12/01/18  1119   INR  0 98 1 11  --    PTT seconds  --   --  111*

## 2018-12-08 NOTE — PROGRESS NOTES
[facility] Progress Note  Patient: Coralie Spatz 70 y o  female   MRN: [id number]  PCP: No primary care provider on file  [facility] Encounter: [id number]  Date Of Visit: 12/08/18    Assessment:    Principal Problem:    PAD (peripheral artery disease) (HCC)  Active Problems:    Benign essential hypertension    Ischemia of left lower extremity    Peripheral vascular disease (HCC)    Foot pain    Weakness of lower extremity      Plan:    1  Acute blood loss anemia with leukocytosis, underlying macrocytic anemia, M42 and folic acid result reviewed, suspect secondary to left arm ecchymosis/hematoma from  recent arteriogram, H&H improving after blood transfusion, no heme test stool was send yet, management per vascular  2  Essential hypertension, acceptable BP, continue Norvasc and Toprol XL, restart lisinopril 20 mg at time of discharge  3  Paroxysmal Atrial fibrillation, stable, cardiac echo with EF 60% and mild AS, evaluated by Cardiology, continue Toprol XL, Xarelto on hold  4  Stage IV diffuse large B-cell lymphoma, evaluated by Hematology oncology,  patient on [facility]  every 3 weeks  5  Abnormal lumbar spine MRI and canal stenosis, evaluated by Neurology, recommendation for EMG/NCS of LLE as an outpatient in 4- 6 weeks  6  PAD with left foot numbness and pain, symptoms has resolved,  s/p  LLE arteriogram, management per vascular  7  History of right-sided Bell's palsy  8  Tobacco smoking, continue nicotine patch  9  GERD, continue PPI     Patient is anxious to go home      VTE Pharmacologic Prophylaxis:  Per vascular    Mechanical VTE Prophylaxis in Place: Yes    Patient Centered Rounds: I have performed bedside rounds with nursing staff today  Discussions with Specialists or Other Care Team Provider:      Education and Discussions with Family / Patient:  Patient    Time Spent for Care: 30 minutes    More than 50% of total time spent on counseling and coordination of care as described above  Current Length of Stay: 3 day(s)    Current Patient Status: Inpatient       Discharge Plan / Estimated Discharge Date:  Per primary    Code Status: Level 1 - Full Code      Subjective:   Patient seen and examined  Comfortable in bed  Anxious to go home  No chest pain or shortness of breath, no abdominal pain  Tolerating oral diet      Objective:     Vitals:   Temp (24hrs), Av °F (37 2 °C), Min:98 2 °F (36 8 °C), Max:99 3 °F (37 4 °C)    Temp:  [98 2 °F (36 8 °C)-99 3 °F (37 4 °C)] 98 2 °F (36 8 °C)  HR:  [70-93] 84  Resp:  [16-18] 18  BP: ()/(50-68) 141/61  SpO2:  [95 %-97 %] 97 %  Body mass index is 19 15 kg/m²  Input and Output Summary (last 24 hours): Intake/Output Summary (Last 24 hours) at 18 5503  Last data filed at 18 0500   Gross per 24 hour   Intake          1133 33 ml   Output             1125 ml   Net             8 33 ml       Physical Exam:     Physical Exam     Patient is awake alert oriented in no acute distress  Eye patch over the right eye  Lung clear to auscultation bilateral  Heart positive S1-S2 no murmur  Abdomen soft nontender nondistended positive bowel sounds  Left brachial site ecchymosis with swelling and possible subcutaneous hematoma  Lower extremities no edema    Additional Data:     Labs:      Results from last 7 days  Lab Units 18  0455   WBC Thousand/uL 11 08*   HEMOGLOBIN g/dL 7 9*   HEMATOCRIT % 24 5*   PLATELETS Thousands/uL 237   LYMPHO PCT % 5*   MONO PCT % 10   EOS PCT % 1       Results from last 7 days  Lab Units 18  0455   POTASSIUM mmol/L 4 2   CHLORIDE mmol/L 104   CO2 mmol/L 25   BUN mg/dL 7   CREATININE mg/dL 0 70   CALCIUM mg/dL 9 0       Results from last 7 days  Lab Units 18  2131   INR  0 98       * I Have Reviewed All Lab Data Listed Above  * Additional Pertinent Lab Tests Reviewed:  [facility] Admission Reviewed    Imaging:    Imaging Reports Reviewed Today Include:   Imaging Personally Reviewed by Myself Includes:      Recent Cultures (last 7 days):           Last 24 Hours Medication List:     Current Facility-Administered Medications:  amLODIPine 10 mg Oral Daily Radha Evangelista MD   aspirin 81 mg Oral Daily Radha Evangelista MD   atorvastatin 20 mg Oral HS Radha Evangelista MD   cholecalciferol 2,000 Units Oral Daily Radha Evangelista MD   diphenhydrAMINE 12 5 mg Intravenous Q6H PRN Radha Evangelista MD   docusate sodium 100 mg Oral BID Radha Evangelista MD   escitalopram 5 mg Oral Daily Radha Evangelista MD   HYDROmorphone 0 5 mg Intravenous Q4H PRN Radha Evangelista MD   melatonin 6 mg Oral HS PRN Radha Evangelista MD   metoprolol succinate 100 mg Oral Daily Radha Evangelista MD   mirtazapine 7 5 mg Oral HS Jose Rafael Mauricio DO   nicotine 14 mg Transdermal Daily Darol DO Hang   ondansetron 4 mg Intravenous Q6H PRN Radha Evangelista MD   oxyCODONE 5 mg Oral Q4H PRN Radha Evangelista MD   pantoprazole 20 mg Oral Early Morning Radha Evangelista MD   senna 1 tablet Oral HS David Riddle MD        Today, Patient Was Seen By: Jose Rafael Mauricio DO    ** Please Note: This note has been constructed using a voice recognition system   **

## 2018-12-08 NOTE — PLAN OF CARE
Problem: OCCUPATIONAL THERAPY ADULT  Goal: Performs self-care activities at highest level of function for planned discharge setting  See evaluation for individualized goals  Treatment Interventions: ADL retraining, Functional transfer training, UE strengthening/ROM, Endurance training, Cognitive reorientation, Patient/family training, Equipment evaluation/education, Compensatory technique education, Continued evaluation, Energy conservation, Activityengagement          See flowsheet documentation for full assessment, interventions and recommendations  Limitation: Decreased ADL status, Decreased UE strength, Decreased UE ROM, Decreased Safe judgement during ADL, Decreased endurance, Decreased self-care trans, Decreased high-level ADLs  Prognosis: Fair  Assessment: Pt is a 69 y/o female seen for OT eval s/p adm to [facility] as a transfer from [facility] 11/30 w/ 2 weeks of worsening L foot pain  Pt is s/p LLE angiogram and L CFA stent via brachial approach on 12/6/18  Pt is dx'd w/m PAD  Comorbidities include a h/o bell's palsy, cancer, diffuse large B cell lymphoma, HTN, PAD, PAF, PVD  Pt with active OT orders and up with assistance  orders  Pt lives with dght and son in law in 2 SH, [address] garage and 3-4 [address] front, 1/2 bath on 1st floor w/ hospital bed and 1st floor setup; pt's dght assists w/ getting to 2nd floor for a tub  Pt required assist w/ ADLS and IADLS, does not drive, & required use of DME PTA including hospital bed, shower chair, BSC, rw, and cane  Pt is currently demonstrating the following occupational deficits: S UB ADLS, Min A LB ADLS, Min A transfers and CTG functional mobility w/ RW  These deficits that are impacting pt's baseline areas of occupation are a result of the following impairments: pain, endurance, activity tolerance, functional mobility, forward functional reach, functional standing tolerance, decreased I w/ ADLS/IADLS, strength, ROM and decreased safety awareness  The following Occupational Performance Areas to address include: grooming, bathing/shower, toilet hygiene, dressing, socialization, health maintenance, functional mobility and clothing management  Pt scored overall 55/100 on the Barthel Index  Based on the aforementioned OT evaluation, functional performance deficits, and assessments, pt has been identified as a moderate complexity evaluation  Recommend home OT upon D/C when medically stable   Pt to continue to benefit from acute immediate OT services to address the following goals 3-5x/week to  w/in 7-10 days:      OT Discharge Recommendation: Home OT  OT - OK to Discharge: Yes (when medically stable)      Comments: Kami Casarez MS, OTR/L

## 2018-12-09 VITALS
WEIGHT: 110.67 LBS | RESPIRATION RATE: 18 BRPM | DIASTOLIC BLOOD PRESSURE: 58 MMHG | OXYGEN SATURATION: 95 % | TEMPERATURE: 98.2 F | BODY MASS INDEX: 20.37 KG/M2 | HEART RATE: 77 BPM | SYSTOLIC BLOOD PRESSURE: 120 MMHG | HEIGHT: 62 IN

## 2018-12-09 LAB
ANION GAP SERPL CALCULATED.3IONS-SCNC: 5 MMOL/L (ref 4–13)
ANISOCYTOSIS BLD QL SMEAR: PRESENT
BASOPHILS # BLD MANUAL: 0.2 THOUSAND/UL (ref 0–0.1)
BASOPHILS NFR MAR MANUAL: 2 % (ref 0–1)
BUN SERPL-MCNC: 5 MG/DL (ref 5–25)
CALCIUM SERPL-MCNC: 9.2 MG/DL (ref 8.3–10.1)
CHLORIDE SERPL-SCNC: 102 MMOL/L (ref 100–108)
CO2 SERPL-SCNC: 27 MMOL/L (ref 21–32)
CREAT SERPL-MCNC: 0.66 MG/DL (ref 0.6–1.3)
EOSINOPHIL # BLD MANUAL: 0 THOUSAND/UL (ref 0–0.4)
EOSINOPHIL NFR BLD MANUAL: 0 % (ref 0–6)
ERYTHROCYTE [DISTWIDTH] IN BLOOD BY AUTOMATED COUNT: 22.1 % (ref 11.6–15.1)
GFR SERPL CREATININE-BSD FRML MDRD: 93 ML/MIN/1.73SQ M
GLUCOSE SERPL-MCNC: 76 MG/DL (ref 65–140)
HCT VFR BLD AUTO: 25.9 % (ref 34.8–46.1)
HGB BLD-MCNC: 8.3 G/DL (ref 11.5–15.4)
LG PLATELETS BLD QL SMEAR: PRESENT
LYMPHOCYTES # BLD AUTO: 0.51 THOUSAND/UL (ref 0.6–4.47)
LYMPHOCYTES # BLD AUTO: 5 % (ref 14–44)
MCH RBC QN AUTO: 31 PG (ref 26.8–34.3)
MCHC RBC AUTO-ENTMCNC: 32 G/DL (ref 31.4–37.4)
MCV RBC AUTO: 97 FL (ref 82–98)
MONOCYTES # BLD AUTO: 0.91 THOUSAND/UL (ref 0–1.22)
MONOCYTES NFR BLD: 9 % (ref 4–12)
NEUTROPHILS # BLD MANUAL: 8.5 THOUSAND/UL (ref 1.85–7.62)
NEUTS SEG NFR BLD AUTO: 84 % (ref 43–75)
NRBC BLD AUTO-RTO: 0 /100 WBCS
OVALOCYTES BLD QL SMEAR: PRESENT
PLATELET # BLD AUTO: 234 THOUSANDS/UL (ref 149–390)
PLATELET BLD QL SMEAR: ADEQUATE
PMV BLD AUTO: 11.1 FL (ref 8.9–12.7)
POLYCHROMASIA BLD QL SMEAR: PRESENT
POTASSIUM SERPL-SCNC: 4.1 MMOL/L (ref 3.5–5.3)
RBC # BLD AUTO: 2.68 MILLION/UL (ref 3.81–5.12)
RBC MORPH BLD: PRESENT
SODIUM SERPL-SCNC: 134 MMOL/L (ref 136–145)
WBC # BLD AUTO: 10.12 THOUSAND/UL (ref 4.31–10.16)

## 2018-12-09 PROCEDURE — 99231 SBSQ HOSP IP/OBS SF/LOW 25: CPT | Performed by: SURGERY

## 2018-12-09 PROCEDURE — 85007 BL SMEAR W/DIFF WBC COUNT: CPT | Performed by: STUDENT IN AN ORGANIZED HEALTH CARE EDUCATION/TRAINING PROGRAM

## 2018-12-09 PROCEDURE — 99232 SBSQ HOSP IP/OBS MODERATE 35: CPT | Performed by: INTERNAL MEDICINE

## 2018-12-09 PROCEDURE — 85027 COMPLETE CBC AUTOMATED: CPT | Performed by: STUDENT IN AN ORGANIZED HEALTH CARE EDUCATION/TRAINING PROGRAM

## 2018-12-09 PROCEDURE — 80048 BASIC METABOLIC PNL TOTAL CA: CPT | Performed by: STUDENT IN AN ORGANIZED HEALTH CARE EDUCATION/TRAINING PROGRAM

## 2018-12-09 RX ADMIN — VITAMIN D, TAB 1000IU (100/BT) 2000 UNITS: 25 TAB at 08:28

## 2018-12-09 RX ADMIN — ESCITALOPRAM OXALATE 5 MG: 10 TABLET ORAL at 08:28

## 2018-12-09 RX ADMIN — AMLODIPINE BESYLATE 10 MG: 10 TABLET ORAL at 08:28

## 2018-12-09 RX ADMIN — PANTOPRAZOLE SODIUM 20 MG: 20 TABLET, DELAYED RELEASE ORAL at 05:53

## 2018-12-09 RX ADMIN — OXYCODONE HYDROCHLORIDE 5 MG: 5 TABLET ORAL at 05:55

## 2018-12-09 RX ADMIN — METOPROLOL SUCCINATE 100 MG: 100 TABLET, EXTENDED RELEASE ORAL at 08:28

## 2018-12-09 RX ADMIN — DOCUSATE SODIUM 100 MG: 100 CAPSULE, LIQUID FILLED ORAL at 08:28

## 2018-12-09 RX ADMIN — ASPIRIN 81 MG 81 MG: 81 TABLET ORAL at 08:28

## 2018-12-09 NOTE — DISCHARGE INSTRUCTIONS
Please call your primary care provider to schedule an appointment to be seen within 1 week of discharge to discuss your recent hospitalization

## 2018-12-09 NOTE — PROGRESS NOTES
Progress Note - Vascular Surgery   Heidi Saucedo 70 y o  female MRN: [id number]  Unit/Bed#: [facility] 828-01 Encounter: [id number]    Assessment:  70 F with hx of of PAD and aortobifemoral bypass, and bilateral fem-pop bypass, with left foot anastomotic stenosis with numbness and pain now s/p angiogram and left CFA stent via brachial approach 12/6  Plan:  - diet as tolerated  - continue to monitor L arm ecchymosis  - trend hemoglobin daily  - appreciate SLIM recommendations  - prn pain control  - continue xarelto  - d/c with home OT today  - SCDs        Subjective/Objective     Subjective: wants to go home    Objective:    Blood pressure 137/64, pulse 72, temperature 98 1 °F (36 7 °C), temperature source Oral, resp  rate 18, height 5' 2" (1 575 m), weight 50 2 kg (110 lb 10 7 oz), SpO2 94 %  ,Body mass index is 20 24 kg/m²  Intake/Output Summary (Last 24 hours) at 12/09/18 0717  Last data filed at 12/09/18 0556   Gross per 24 hour   Intake              420 ml   Output             1600 ml   Net            -1180 ml       Invasive Devices     Peripheral Intravenous Line            Peripheral IV 12/05/18 Left; Lower Forearm 3 days    Peripheral IV 12/06/18 Right Wrist 2 days                Physical Exam:   NAD, alert and oriented x3  Normocephalic, atraumatic  MMM, EOMI, PERRLA  Norm resp effort on RA  RRR  L brachial site w ecchymosis which is improving, no hematoma  Palp L radial, motor/sensation intact on L hand  Palp DP/PT b/l  Abd soft, NT/ND  No calf tenderness or peripheral edema  Motor/sensation intact in distal extremities  CN grossly intact  -rash/lesions          Results from last 7 days  Lab Units 12/09/18  0554 12/08/18  0455 12/07/18  1743 12/07/18  0858   WBC Thousand/uL 10 12 11 08*  --  13 88*   HEMOGLOBIN g/dL 8 3* 7 9* 8 0* 6 5*   HEMATOCRIT % 25 9* 24 5* 25 3* 21 1*   PLATELETS Thousands/uL 234 237  --  282       Results from last 7 days  Lab Units 12/08/18  0455 12/07/18  0858 12/06/18  0520 POTASSIUM mmol/L 4 2 4 2 4 2   CHLORIDE mmol/L 104 104 102   CO2 mmol/L 25 26 27   BUN mg/dL 7 10 8   CREATININE mg/dL 0 70 0 75 0 62   CALCIUM mg/dL 9 0 8 9 9 5       Results from last 7 days  Lab Units 12/05/18  2131 12/03/18  1705   INR  0 98 1 11

## 2018-12-09 NOTE — PROGRESS NOTES
[facility] Internal Medicine Progress Note  Patient: Aaron Lawson 70 y o  female   MRN: [id number]  PCP: No primary care provider on file  Unit/Bed#: PPHP 828-01 Encounter: [id number]  Date Of Visit: 12/09/18    Assessment:    Principal Problem:    PAD (peripheral artery disease) (HCC)  Active Problems:    Benign essential hypertension    Ischemia of left lower extremity    Peripheral vascular disease (HCC)    Foot pain    Weakness of lower extremity      Plan:    1  Acute blood loss anemia with leukocytosis, likely secondary to left arm ecchymosis/hematoma from  recent arteriogram, stable H&H  2  Essential hypertension, acceptable BP, continue Norvasc and Toprol XL, restart lisinopril 20 mg at time of discharge  3  Paroxysmal Atrial fibrillation, EF 60% and mild AS, stable, continue Toprol XL and Xarelto on hold  4  Stage IV diffuse large B-cell lymphoma, on active chemotherapy at [facility]  every 3 weeks  5  Abnormal lumbar spine MRI and canal stenosis, outpatient EMG/NCS of LLE in 4- 6 weeks  6  PAD with left foot numbness and pain, symptoms has resolved, s/p  LLE arteriogram  7  History of right-sided Bell's palsy  8  Tobacco smoking, continue nicotine patch  9  GERD, continue PPI     SLIM will sign off   Please call with questions    VTE Pharmacologic Prophylaxis:  Xarelto    Mechanical VTE Prophylaxis in Place: Yes    Patient Centered Rounds: I have performed bedside rounds with nursing staff today  Discussions with Specialists or Other Care Team Provider:      Education and Discussions with Family / Patient:  Patient    Time Spent for Care: 30 minutes  More than 50% of total time spent on counseling and coordination of care as described above      Current Length of Stay: 4 day(s)    Current Patient Status: Inpatient       Discharge Plan / Estimated Discharge Date:  Per primary    Code Status: Level 1 - Full Code      Subjective:   Patient seen and examined  Comfortable in bed  Anxious to go home  Left arm pain  Tolerating oral diet      Objective:     Vitals:   Temp (24hrs), Av 3 °F (36 8 °C), Min:98 1 °F (36 7 °C), Max:98 5 °F (36 9 °C)    Temp:  [98 1 °F (36 7 °C)-98 5 °F (36 9 °C)] 98 2 °F (36 8 °C)  HR:  [72-77] 77  Resp:  [18] 18  BP: ()/(52-64) 120/58  SpO2:  [94 %-97 %] 95 %  Body mass index is 20 24 kg/m²  Input and Output Summary (last 24 hours): Intake/Output Summary (Last 24 hours) at 18 1142  Last data filed at 18 0800   Gross per 24 hour   Intake              660 ml   Output             2150 ml   Net            -1490 ml       Physical Exam:     Physical Exam     Patient is awake alert oriented in no acute distress  Eye patch over the right eye  Lung clear to auscultation bilateral  Heart positive S1-S2 no murmur  Abdomen soft nontender nondistended positive bowel sounds  Left brachial site ecchymosis with swelling and possible subcutaneous hematoma  Lower extremities no edema    Additional Data:     Labs:      Results from last 7 days  Lab Units 18  0554   WBC Thousand/uL 10 12   HEMOGLOBIN g/dL 8 3*   HEMATOCRIT % 25 9*   PLATELETS Thousands/uL 234   LYMPHO PCT % 5*   MONO PCT % 9   EOS PCT % 0       Results from last 7 days  Lab Units 18  0554   POTASSIUM mmol/L 4 1   CHLORIDE mmol/L 102   CO2 mmol/L 27   BUN mg/dL 5   CREATININE mg/dL 0 66   CALCIUM mg/dL 9 2       Results from last 7 days  Lab Units 18  2131   INR  0 98       * I Have Reviewed All Lab Data Listed Above  * Additional Pertinent Lab Tests Reviewed:  Manuel 66 Admission Reviewed    Imaging:    Imaging Reports Reviewed Today Include:   Imaging Personally Reviewed by Myself Includes:      Recent Cultures (last 7 days):           Last 24 Hours Medication List:     Current Facility-Administered Medications:  amLODIPine 10 mg Oral Daily Olga Bradford MD   aspirin 81 mg Oral Daily Olga Bradford MD   atorvastatin 20 mg Oral HS Olga Bradford MD   cholecalciferol 2,000 Units Oral Daily Carlitos York MD   diphenhydrAMINE 12 5 mg Intravenous Q6H PRN Carlitos York MD   docusate sodium 100 mg Oral BID Carlitos York MD   escitalopram 5 mg Oral Daily Carlitos York MD   HYDROmorphone 0 5 mg Intravenous Q4H PRN Carlitos York MD   melatonin 6 mg Oral HS PRN Carlitos York MD   metoprolol succinate 100 mg Oral Daily Carlitos York MD   mirtazapine 7 5 mg Oral HS Jona Terrazas DO   nicotine 14 mg Transdermal Daily Jona Terrazas DO   ondansetron 4 mg Intravenous Q6H PRN Carlitos York MD   oxyCODONE 5 mg Oral Q4H PRN Carlitos York MD   pantoprazole 20 mg Oral Early Morning Carlitos York MD   rivaroxaban 10 mg Oral Daily With Marine Gonzales MD   senna 1 tablet Oral HS Darshana Isidro MD        Today, Patient Was Seen By: Jona Terrazas DO    ** Please Note: This note has been constructed using a voice recognition system   **

## 2018-12-09 NOTE — DISCHARGE SUMMARY
Discharge Summary - Vascular Surgery   Swathi Barboza 70 y o  female MRN: [id number]  Unit/Bed#: PPHP 828-01 Encounter: [id number]    Admission Date:   Admission Orders     Ordered        12/05/18 2052  Inpatient Admission  Once                Discharge Date:  12/09/2018    Admitting Diagnosis: Ischemia of left lower extremity [I99 8]    Discharge Diagnosis:  Left lower extremity ischemia    Resolved Problems  Date Reviewed: 12/5/2018    None          Attending:  Dr Kameron Soares Physician(s):   Hospitalist    Procedures Performed: No orders of the defined types were placed in this encounter  12/6:  Agram with left common femoral artery stent placement    Pathology:  Not applicable    HPI  Swathi Barboza is a 70 y o  female who presents with history of metastatic diffuse B cell lymphoma on chemotherapy (last treatment approximately 3 weeks ago), Afib on Xarelto (currently held for upcoming chemo) (of note pt denied history of a-fib today and states she "doesn't know where people are getting this"), PVD s/p left fem-pop bypass with 75% stenosis, right sided Bell's palsy, HTN, who presented to [facility] 11/30 with 2 weeks of worsening left foot pain  Work up was concerning for vascular insuffiency therefore patient was transferred to [facility] for Vascular Surgery evaluation with Agram and possible intervention  On exam patient's left foot was warm and without cyanosis  She had a palpable DP and a biphasic PT on the left  Hospital Course: The patient was transferred to the vascular surgery service  She underwent a left common femoral artery angiogram with stent placement on 12/06/2018  She was noted to have symptomatic anemia on 12/07/2018 and she received 1 unit packed red blood cells  Hospitalist service was consulted for management of her chronic medical issues  She was started on aspirin postoperatively and Xarelto was resumed on 12/08/2018    Her hemoglobin remained stable for the remainder of her hospital course  She continues to do well and was seen by the Physical therapy and Occupational therapy Services who recommended discharge to home with home occupational therapy  She met discharge criteria on 12/09/2018 and will be seen in follow-up in 2-4 weeks  She is also instructed to follow up with a primary care provider within 1 week of discharge to discuss her recent hospitalization  Condition at Discharge: good     Discharge instructions/Information to patient and family:   See after visit summary for information provided to patient and family  Provisions for Follow-Up Care:  See after visit summary for information related to follow-up care and any pertinent home health orders  Disposition: See After Visit Summary for discharge disposition information  Planned Readmission: No    Discharge Statement   I spent 25 minutes discharging the patient  This time was spent on the day of discharge  I had direct contact with the patient on the day of discharge  Additional documentation is required if more than 30 minutes were spent on discharge  Discharge Medications:  See after visit summary for reconciled discharge medications provided to patient and family

## 2018-12-14 ENCOUNTER — APPOINTMENT (EMERGENCY)
Dept: ULTRASOUND IMAGING | Facility: HOSPITAL | Age: 63
DRG: 605 | End: 2018-12-14
Payer: COMMERCIAL

## 2018-12-14 ENCOUNTER — HOSPITAL ENCOUNTER (INPATIENT)
Facility: HOSPITAL | Age: 63
LOS: 3 days | Discharge: HOME HEALTH | DRG: 605 | End: 2018-12-17
Attending: EMERGENCY MEDICINE | Admitting: GENERAL PRACTICE
Payer: COMMERCIAL

## 2018-12-14 DIAGNOSIS — M79.89 LEFT ARM SWELLING: ICD-10-CM

## 2018-12-14 DIAGNOSIS — L03.116 LEFT LEG CELLULITIS: Primary | ICD-10-CM
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
## 2018-12-14 PROBLEM — L02.416 CELLULITIS AND ABSCESS OF LEFT LEG: Status: ACTIVE | Noted: 2018-12-14

## 2018-12-14 LAB
ANION GAP SERPL CALCULATED.3IONS-SCNC: 3 MMOL/L (ref 4–13)
APTT PPP: 40 SECONDS (ref 26–38)
BASOPHILS # BLD AUTO: 0.07 THOUSANDS/ΜL (ref 0–0.1)
BASOPHILS NFR BLD AUTO: 1 % (ref 0–1)
BUN SERPL-MCNC: 10 MG/DL (ref 5–25)
CALCIUM SERPL-MCNC: 9.2 MG/DL (ref 8.3–10.1)
CHLORIDE SERPL-SCNC: 100 MMOL/L (ref 100–108)
CO2 SERPL-SCNC: 29 MMOL/L (ref 21–32)
CREAT SERPL-MCNC: 0.83 MG/DL (ref 0.6–1.3)
CRP SERPL QL: 3.1 MG/L
EOSINOPHIL # BLD AUTO: 0.24 THOUSAND/ΜL (ref 0–0.61)
EOSINOPHIL NFR BLD AUTO: 4 % (ref 0–6)
ERYTHROCYTE [DISTWIDTH] IN BLOOD BY AUTOMATED COUNT: 19.5 % (ref 11.6–15.1)
GFR SERPL CREATININE-BSD FRML MDRD: 74 ML/MIN/1.73SQ M
GLUCOSE SERPL-MCNC: 108 MG/DL (ref 65–140)
HCT VFR BLD AUTO: 30.6 % (ref 34.8–46.1)
HGB BLD-MCNC: 9.7 G/DL (ref 11.5–15.4)
IMM GRANULOCYTES # BLD AUTO: 0.05 THOUSAND/UL (ref 0–0.2)
IMM GRANULOCYTES NFR BLD AUTO: 1 % (ref 0–2)
INR PPP: 1.89 (ref 0.86–1.17)
LACTATE SERPL-SCNC: 1.8 MMOL/L (ref 0.5–2)
LYMPHOCYTES # BLD AUTO: 0.88 THOUSANDS/ΜL (ref 0.6–4.47)
LYMPHOCYTES NFR BLD AUTO: 14 % (ref 14–44)
MCH RBC QN AUTO: 31.1 PG (ref 26.8–34.3)
MCHC RBC AUTO-ENTMCNC: 31.7 G/DL (ref 31.4–37.4)
MCV RBC AUTO: 98 FL (ref 82–98)
MONOCYTES # BLD AUTO: 1.32 THOUSAND/ΜL (ref 0.17–1.22)
MONOCYTES NFR BLD AUTO: 21 % (ref 4–12)
NEUTROPHILS # BLD AUTO: 3.85 THOUSANDS/ΜL (ref 1.85–7.62)
NEUTS SEG NFR BLD AUTO: 59 % (ref 43–75)
NRBC BLD AUTO-RTO: 0 /100 WBCS
PLATELET # BLD AUTO: 263 THOUSANDS/UL (ref 149–390)
PMV BLD AUTO: 10.3 FL (ref 8.9–12.7)
POTASSIUM SERPL-SCNC: 4 MMOL/L (ref 3.5–5.3)
PROTHROMBIN TIME: 21.5 SECONDS (ref 11.8–14.2)
RBC # BLD AUTO: 3.12 MILLION/UL (ref 3.81–5.12)
SODIUM SERPL-SCNC: 132 MMOL/L (ref 136–145)
WBC # BLD AUTO: 6.41 THOUSAND/UL (ref 4.31–10.16)

## 2018-12-14 PROCEDURE — 99291 CRITICAL CARE FIRST HOUR: CPT

## 2018-12-14 PROCEDURE — 76882 US LMTD JT/FCL EVL NVASC XTR: CPT

## 2018-12-14 PROCEDURE — 85610 PROTHROMBIN TIME: CPT | Performed by: EMERGENCY MEDICINE

## 2018-12-14 PROCEDURE — 99223 1ST HOSP IP/OBS HIGH 75: CPT | Performed by: HOSPITALIST

## 2018-12-14 PROCEDURE — 36415 COLL VENOUS BLD VENIPUNCTURE: CPT | Performed by: EMERGENCY MEDICINE

## 2018-12-14 PROCEDURE — 87040 BLOOD CULTURE FOR BACTERIA: CPT | Performed by: EMERGENCY MEDICINE

## 2018-12-14 PROCEDURE — 86140 C-REACTIVE PROTEIN: CPT | Performed by: EMERGENCY MEDICINE

## 2018-12-14 PROCEDURE — 96374 THER/PROPH/DIAG INJ IV PUSH: CPT

## 2018-12-14 PROCEDURE — 93971 EXTREMITY STUDY: CPT

## 2018-12-14 PROCEDURE — 83605 ASSAY OF LACTIC ACID: CPT | Performed by: EMERGENCY MEDICINE

## 2018-12-14 PROCEDURE — 85730 THROMBOPLASTIN TIME PARTIAL: CPT | Performed by: EMERGENCY MEDICINE

## 2018-12-14 PROCEDURE — 85025 COMPLETE CBC W/AUTO DIFF WBC: CPT | Performed by: EMERGENCY MEDICINE

## 2018-12-14 PROCEDURE — 80048 BASIC METABOLIC PNL TOTAL CA: CPT | Performed by: EMERGENCY MEDICINE

## 2018-12-14 RX ORDER — NICOTINE 21 MG/24HR
1 PATCH, TRANSDERMAL 24 HOURS TRANSDERMAL EVERY 24 HOURS
Status: DISCONTINUED | OUTPATIENT
Start: 2018-12-15 | End: 2018-12-17 | Stop reason: HOSPADM

## 2018-12-14 RX ORDER — ATORVASTATIN CALCIUM 40 MG/1
40 TABLET, FILM COATED ORAL
Status: DISCONTINUED | OUTPATIENT
Start: 2018-12-14 | End: 2018-12-17 | Stop reason: HOSPADM

## 2018-12-14 RX ORDER — CEFAZOLIN SODIUM 2 G/50ML
2000 SOLUTION INTRAVENOUS EVERY 8 HOURS
Status: DISCONTINUED | OUTPATIENT
Start: 2018-12-14 | End: 2018-12-17

## 2018-12-14 RX ORDER — NICOTINE 21 MG/24HR
1 PATCH, TRANSDERMAL 24 HOURS TRANSDERMAL DAILY
Status: DISCONTINUED | OUTPATIENT
Start: 2018-12-15 | End: 2018-12-15 | Stop reason: SDUPTHER

## 2018-12-14 RX ORDER — LISINOPRIL 20 MG/1
20 TABLET ORAL DAILY
Status: DISCONTINUED | OUTPATIENT
Start: 2018-12-15 | End: 2018-12-17 | Stop reason: HOSPADM

## 2018-12-14 RX ORDER — AMOXICILLIN 250 MG
1 CAPSULE ORAL DAILY
Status: DISCONTINUED | OUTPATIENT
Start: 2018-12-15 | End: 2018-12-17 | Stop reason: HOSPADM

## 2018-12-14 RX ORDER — ESCITALOPRAM OXALATE 10 MG/1
5 TABLET ORAL DAILY
Status: DISCONTINUED | OUTPATIENT
Start: 2018-12-15 | End: 2018-12-17 | Stop reason: HOSPADM

## 2018-12-14 RX ORDER — LANOLIN ALCOHOL/MO/W.PET/CERES
3 CREAM (GRAM) TOPICAL
Status: DISCONTINUED | OUTPATIENT
Start: 2018-12-14 | End: 2018-12-17 | Stop reason: HOSPADM

## 2018-12-14 RX ORDER — METOPROLOL SUCCINATE 100 MG/1
100 TABLET, EXTENDED RELEASE ORAL DAILY
Status: DISCONTINUED | OUTPATIENT
Start: 2018-12-15 | End: 2018-12-17 | Stop reason: HOSPADM

## 2018-12-14 RX ORDER — CIPROFLOXACIN 500 MG/1
TABLET, FILM COATED ORAL
Status: ON HOLD | COMMUNITY
Start: 2018-10-22 | End: 2018-12-14

## 2018-12-14 RX ORDER — MIRTAZAPINE 15 MG/1
7.5 TABLET, FILM COATED ORAL
Status: DISCONTINUED | OUTPATIENT
Start: 2018-12-14 | End: 2018-12-17 | Stop reason: HOSPADM

## 2018-12-14 RX ORDER — PANTOPRAZOLE SODIUM 40 MG/1
40 TABLET, DELAYED RELEASE ORAL
Status: DISCONTINUED | OUTPATIENT
Start: 2018-12-15 | End: 2018-12-17 | Stop reason: HOSPADM

## 2018-12-14 RX ORDER — AMLODIPINE BESYLATE 10 MG/1
10 TABLET ORAL DAILY
Status: DISCONTINUED | OUTPATIENT
Start: 2018-12-15 | End: 2018-12-17 | Stop reason: HOSPADM

## 2018-12-14 RX ORDER — MAGNESIUM OXIDE/MAG AA CHELATE 133 MG
2 TABLET ORAL DAILY
Status: DISCONTINUED | OUTPATIENT
Start: 2018-12-15 | End: 2018-12-15

## 2018-12-14 RX ORDER — VANCOMYCIN HYDROCHLORIDE 1 G/200ML
20 INJECTION, SOLUTION INTRAVENOUS ONCE
Status: COMPLETED | OUTPATIENT
Start: 2018-12-14 | End: 2018-12-15

## 2018-12-14 RX ORDER — ACYCLOVIR 800 MG/1
400 TABLET ORAL 2 TIMES DAILY
Status: DISCONTINUED | OUTPATIENT
Start: 2018-12-15 | End: 2018-12-17 | Stop reason: HOSPADM

## 2018-12-14 RX ORDER — ACYCLOVIR 400 MG/1
400 TABLET ORAL 2 TIMES DAILY
COMMUNITY
Start: 2018-12-13 | End: 2019-03-13

## 2018-12-14 RX ORDER — MELATONIN
2000 DAILY
Status: DISCONTINUED | OUTPATIENT
Start: 2018-12-15 | End: 2018-12-17 | Stop reason: HOSPADM

## 2018-12-14 RX ORDER — FENTANYL CITRATE 50 UG/ML
50 INJECTION, SOLUTION INTRAMUSCULAR; INTRAVENOUS ONCE
Status: COMPLETED | OUTPATIENT
Start: 2018-12-14 | End: 2018-12-14

## 2018-12-14 RX ORDER — MAGNESIUM OXIDE/MAG AA CHELATE 133 MG
2 TABLET ORAL 3 TIMES DAILY
COMMUNITY
Start: 2018-11-29 | End: 2021-10-07 | Stop reason: HOSPADM

## 2018-12-14 RX ORDER — ASPIRIN 81 MG/1
81 TABLET, CHEWABLE ORAL DAILY
Status: DISCONTINUED | OUTPATIENT
Start: 2018-12-15 | End: 2018-12-17 | Stop reason: HOSPADM

## 2018-12-14 RX ADMIN — FENTANYL CITRATE 50 MCG: 50 INJECTION INTRAMUSCULAR; INTRAVENOUS at 19:39

## 2018-12-14 RX ADMIN — CEFEPIME HYDROCHLORIDE 2000 MG: 2 INJECTION, POWDER, FOR SOLUTION INTRAVENOUS at 22:34

## 2018-12-14 RX ADMIN — SODIUM CHLORIDE 1000 ML: 0.9 INJECTION, SOLUTION INTRAVENOUS at 22:32

## 2018-12-15 ENCOUNTER — TELEPHONE (OUTPATIENT)
Dept: OTHER | Facility: OTHER | Age: 63
End: 2018-12-15

## 2018-12-15 PROBLEM — I82.409 DEEP VEIN THROMBOSIS (DVT) OF LOWER EXTREMITY (HCC): Status: ACTIVE | Noted: 2018-12-15

## 2018-12-15 PROBLEM — M79.89 LEFT ARM SWELLING: Status: RESOLVED | Noted: 2018-12-14 | Resolved: 2018-12-15

## 2018-12-15 LAB
ANION GAP SERPL CALCULATED.3IONS-SCNC: 8 MMOL/L (ref 4–13)
BASOPHILS # BLD AUTO: 0.1 THOUSANDS/ΜL (ref 0–0.1)
BASOPHILS NFR BLD AUTO: 2 % (ref 0–1)
BUN SERPL-MCNC: 8 MG/DL (ref 5–25)
CALCIUM SERPL-MCNC: 8.2 MG/DL (ref 8.3–10.1)
CHLORIDE SERPL-SCNC: 103 MMOL/L (ref 100–108)
CO2 SERPL-SCNC: 26 MMOL/L (ref 21–32)
CREAT SERPL-MCNC: 0.78 MG/DL (ref 0.6–1.3)
EOSINOPHIL # BLD AUTO: 0.22 THOUSAND/ΜL (ref 0–0.61)
EOSINOPHIL NFR BLD AUTO: 5 % (ref 0–6)
ERYTHROCYTE [DISTWIDTH] IN BLOOD BY AUTOMATED COUNT: 19.3 % (ref 11.6–15.1)
GFR SERPL CREATININE-BSD FRML MDRD: 80 ML/MIN/1.73SQ M
GLUCOSE SERPL-MCNC: 161 MG/DL (ref 65–140)
HCT VFR BLD AUTO: 25.7 % (ref 34.8–46.1)
HGB BLD-MCNC: 8.2 G/DL (ref 11.5–15.4)
IMM GRANULOCYTES # BLD AUTO: 0.03 THOUSAND/UL (ref 0–0.2)
IMM GRANULOCYTES NFR BLD AUTO: 1 % (ref 0–2)
LYMPHOCYTES # BLD AUTO: 0.69 THOUSANDS/ΜL (ref 0.6–4.47)
LYMPHOCYTES NFR BLD AUTO: 14 % (ref 14–44)
MAGNESIUM SERPL-MCNC: 1.6 MG/DL (ref 1.6–2.6)
MCH RBC QN AUTO: 31.3 PG (ref 26.8–34.3)
MCHC RBC AUTO-ENTMCNC: 31.9 G/DL (ref 31.4–37.4)
MCV RBC AUTO: 98 FL (ref 82–98)
MONOCYTES # BLD AUTO: 1.14 THOUSAND/ΜL (ref 0.17–1.22)
MONOCYTES NFR BLD AUTO: 23 % (ref 4–12)
NEUTROPHILS # BLD AUTO: 2.72 THOUSANDS/ΜL (ref 1.85–7.62)
NEUTS SEG NFR BLD AUTO: 55 % (ref 43–75)
NRBC BLD AUTO-RTO: 0 /100 WBCS
PLATELET # BLD AUTO: 219 THOUSANDS/UL (ref 149–390)
PMV BLD AUTO: 10.5 FL (ref 8.9–12.7)
POTASSIUM SERPL-SCNC: 3.8 MMOL/L (ref 3.5–5.3)
RBC # BLD AUTO: 2.62 MILLION/UL (ref 3.81–5.12)
SODIUM SERPL-SCNC: 137 MMOL/L (ref 136–145)
WBC # BLD AUTO: 4.9 THOUSAND/UL (ref 4.31–10.16)

## 2018-12-15 PROCEDURE — 93971 EXTREMITY STUDY: CPT | Performed by: SURGERY

## 2018-12-15 PROCEDURE — 80048 BASIC METABOLIC PNL TOTAL CA: CPT | Performed by: HOSPITALIST

## 2018-12-15 PROCEDURE — 99232 SBSQ HOSP IP/OBS MODERATE 35: CPT | Performed by: GENERAL PRACTICE

## 2018-12-15 PROCEDURE — 83735 ASSAY OF MAGNESIUM: CPT | Performed by: HOSPITALIST

## 2018-12-15 PROCEDURE — 85025 COMPLETE CBC W/AUTO DIFF WBC: CPT | Performed by: HOSPITALIST

## 2018-12-15 RX ORDER — HYDROMORPHONE HCL/PF 1 MG/ML
0.2 SYRINGE (ML) INJECTION EVERY 4 HOURS PRN
Status: DISCONTINUED | OUTPATIENT
Start: 2018-12-15 | End: 2018-12-17 | Stop reason: HOSPADM

## 2018-12-15 RX ORDER — ALPRAZOLAM 0.25 MG/1
0.25 TABLET ORAL 2 TIMES DAILY PRN
Status: DISCONTINUED | OUTPATIENT
Start: 2018-12-15 | End: 2018-12-17 | Stop reason: HOSPADM

## 2018-12-15 RX ADMIN — METOPROLOL SUCCINATE 100 MG: 100 TABLET, FILM COATED, EXTENDED RELEASE ORAL at 08:36

## 2018-12-15 RX ADMIN — ACYCLOVIR 400 MG: 800 TABLET ORAL at 08:35

## 2018-12-15 RX ADMIN — ATORVASTATIN CALCIUM 40 MG: 40 TABLET, FILM COATED ORAL at 01:38

## 2018-12-15 RX ADMIN — CEFAZOLIN SODIUM 2000 MG: 2 SOLUTION INTRAVENOUS at 01:38

## 2018-12-15 RX ADMIN — CEFAZOLIN SODIUM 2000 MG: 2 SOLUTION INTRAVENOUS at 17:16

## 2018-12-15 RX ADMIN — ATORVASTATIN CALCIUM 40 MG: 40 TABLET, FILM COATED ORAL at 22:47

## 2018-12-15 RX ADMIN — LISINOPRIL 20 MG: 20 TABLET ORAL at 08:35

## 2018-12-15 RX ADMIN — HYDROMORPHONE HYDROCHLORIDE 0.2 MG: 1 INJECTION, SOLUTION INTRAMUSCULAR; INTRAVENOUS; SUBCUTANEOUS at 22:50

## 2018-12-15 RX ADMIN — CEFAZOLIN SODIUM 2000 MG: 2 SOLUTION INTRAVENOUS at 08:44

## 2018-12-15 RX ADMIN — MIRTAZAPINE 7.5 MG: 15 TABLET, FILM COATED ORAL at 01:38

## 2018-12-15 RX ADMIN — NICOTINE 1 PATCH: 14 PATCH TRANSDERMAL at 08:37

## 2018-12-15 RX ADMIN — AMLODIPINE BESYLATE 10 MG: 10 TABLET ORAL at 08:35

## 2018-12-15 RX ADMIN — ASPIRIN 81 MG 81 MG: 81 TABLET ORAL at 08:35

## 2018-12-15 RX ADMIN — RIVAROXABAN 10 MG: 10 TABLET, FILM COATED ORAL at 17:16

## 2018-12-15 RX ADMIN — ACYCLOVIR 400 MG: 800 TABLET ORAL at 17:16

## 2018-12-15 RX ADMIN — PANTOPRAZOLE SODIUM 40 MG: 40 TABLET, DELAYED RELEASE ORAL at 06:54

## 2018-12-15 RX ADMIN — VITAMIN D, TAB 1000IU (100/BT) 2000 UNITS: 25 TAB at 08:36

## 2018-12-15 RX ADMIN — VANCOMYCIN HYDROCHLORIDE 1000 MG: 1 INJECTION, SOLUTION INTRAVENOUS at 04:16

## 2018-12-15 RX ADMIN — CEFAZOLIN SODIUM 2000 MG: 2 SOLUTION INTRAVENOUS at 23:56

## 2018-12-15 RX ADMIN — ESCITALOPRAM OXALATE 5 MG: 10 TABLET ORAL at 08:36

## 2018-12-15 RX ADMIN — MIRTAZAPINE 7.5 MG: 15 TABLET, FILM COATED ORAL at 22:47

## 2018-12-15 NOTE — UTILIZATION REVIEW
Initial Clinical Review    Admission: Date/Time/Statement: 12/14/18 @ 2141     Orders Placed This Encounter   Procedures    Inpatient Admission (expected length of stay for this patient is greater than two midnights)     Standing Status:   Standing     Number of Occurrences:   1     Order Specific Question:   Admitting Physician     Answer:   Viry Espinoza [[id number]]     Order Specific Question:   Level of Care     Answer:   Med Surg [[id number]]     Order Specific Question:   Estimated length of stay     Answer:   More than 2 Midnights     Order Specific Question:   Certification     Answer:   I certify that inpatient services are medically necessary for this patient for a duration of greater than two midnights  See H&P and MD Progress Notes for additional information about the patient's course of treatment  ED: Date/Time/Mode of Arrival:   ED Arrival Information     Expected Arrival Acuity Means of Arrival Escorted By Service Admission Type    - 12/14/2018 17:44 [facility] Emergency    Arrival Complaint    post op swelling          Chief Complaint:   Chief Complaint   Patient presents with    Post-op Problem     patient is 8 days s/p angioplasty  c/o left arm ecchymosis and pain  also c/o LLE pain and swelling  History of Illness: Ida Herr is a 70 y o  female who presents with left arm and leg pain and swelling  She recently was seen at [facility] about a week ago  She had a vascular study procedure with a went into her left arm and did angioplasty to the left arm as well as stenting to her left leg  Since then she has had some bruising to the left arm as well as some swelling proximal to the antecubital fossa  Left leg is also swollen and red  PE : edema - + 1 LLE , Has ecchymoses mostly in the forearm  And also an apparent hematoma proximal to the antecubital fossa    Left lower extremity    Between the knee and the ankle there is redness warmth and swelling    ED Vital Signs:   ED Triage Vitals   Temperature Pulse Respirations Blood Pressure SpO2   12/14/18 1757 12/14/18 1757 12/14/18 1757 12/14/18 1758 12/14/18 1757   98 6 °F (37 °C) 80 16 (!) 85/53 99 %      Temp Source Heart Rate Source Patient Position - Orthostatic VS BP Location FiO2 (%)   12/14/18 1757 12/14/18 1757 12/14/18 1818 12/14/18 1818 --   Oral Monitor Lying Right arm       Pain Score       12/14/18 1757       5        Wt Readings from Last 1 Encounters:   12/09/18 50 2 kg (110 lb 10 7 oz)       Vital Signs (abnormal):   12/14/18 2200  --  71  16   86/50  92 %  None (Room air)  Lying   12/14/18 2100  --  71  16  104/57  95 %  None (Room air)  Lying     Abnormal Labs/Diagnostic Test Results: CRP   3 1, Ptt  40, pt inr   21 5  1 89, na  132, an gap   3, H&H   9 7  30 6  Venous duplex tiffany upper limbs -wnl   Venous duplex tiffany LE - wnl   US ext      Collection as described   This is present at a site of recent vascular access   Differential includes hematoma, abscess, and thrombosed pseudoaneurysm          ED Treatment:   Medication Administration from 12/14/2018 1744 to 12/14/2018 2314       Date/Time Order Dose Route Action Action by Comments     12/14/2018 1939 fentanyl citrate (PF) 100 MCG/2ML 50 mcg 50 mcg Intravenous Given Niall Moulton RN      12/14/2018 2234 cefepime (MAXIPIME) 2,000 mg in dextrose 5 % 50 mL IVPB 2,000 mg Intravenous [address] Niall Moulton RN      12/14/2018 2232 sodium chloride 0 9 % bolus 1,000 mL 1,000 mL Intravenous New Bag Niall Moulton RN           Past Medical/Surgical History:    Active Ambulatory Problems     Diagnosis Date Noted    Constipation 10/09/2018    Lower extremity weakness 11/30/2018    BRYANT [facility]acute kidney [facility]) ([facility] [address] ) 08/26/2018    Acute respiratory failure with hypoxia and hypercapnia (HCC) 08/28/2018    Alcohol dependence with withdrawal (HCC) 08/28/2018    Bell's palsy 10/05/2018    Bilateral pleural effusion 08/28/2018    Blurred vision, bilateral 08/26/2018    Carotid stenosis, asymptomatic, bilateral 04/26/2016    Depression with anxiety 09/03/2018    Elevated troponin 08/28/2018    Benign essential hypertension 11/07/2013    Hypercalcemia 08/26/2018    Essential hypertension 09/03/2018    Fall 08/26/2018    Hypokalemia 08/28/2018    Hyponatremia 08/26/2018    Tobacco dependency 11/27/2015    Neutropenic fever ([facility] ) 09/21/2018    Lymphoma ([facility] ) 09/03/2018    PAD (peripheral artery disease) ([facility] [address] ) 11/27/2015    Paroxysmal atrial fibrillation (HCC) 09/03/2018    Elevated lactic acid level 11/30/2018    Leg pain 12/03/2018    Ischemia of left lower extremity     Peripheral vascular disease ([facility] ) 11/30/2018    Foot pain 11/30/2018    Weakness of lower extremity 11/30/2018       Past Medical History:   Diagnosis Date    Bell's palsy     Cancer ([facility] )     Diffuse large B cell lymphoma ([facility] )     Hypertension     PAD (peripheral artery disease) (HCC)     [facility]ox[facility]     PVD (peripheral vascular disease) ([facility] )        Admitting Diagnosis: Post-op pain [G89 18]  Left leg cellulitis [L03 116]  Left arm swelling [M79 89]    Age/Sex: 70 y o  female    Assessment/Plan:   * Cellulitis and abscess of left leg   Assessment & Plan     Start empically on Ancef  Will ask ID to eval  This looks mild  She does not appear septic      Left arm swelling   Assessment & Plan     This was following a vascular procedure at [facility]  She has significant swelling but really not any warmth  There does appear to be a pocket of fluid above her antecubital fossa  I think this likely represents a hematoma  I would like acute surgery to see her and see whether this needs to be drained    It does not look like an abscess to me             Admission Orders:  Scheduled Meds:   Current Facility-Administered Medications:  acyclovir 400 mg Oral BID     amLODIPine 10 mg Oral Daily     aspirin 81 mg Oral Daily atorvastatin 40 mg Oral HS     cefazolin 2,000 mg Intravenous Q8H  Last Rate: 2,000 mg (12/15/18 0138)   cholecalciferol 2,000 Units Oral Daily     escitalopram 5 mg Oral Daily     lisinopril 20 mg Oral Daily     magnesium (amino acid chelate) 2 tablet Oral Daily     melatonin 3 mg Oral HS PRN     metoprolol succinate 100 mg Oral Daily     mirtazapine 7 5 mg Oral HS     nicotine 1 patch Transdermal Q24H     pantoprazole 40 mg Oral Early Morning     rivaroxaban 10 mg Oral Daily With Dinner     senna-docusate sodium 1 tablet Oral Daily       acute care sx consult   Reg diet   ID consult   Up w/ assist   12/15 cbc ,mg , bmp   Gluc  161, karo  8 2, H&H   8 2  25 7

## 2018-12-15 NOTE — ED PROVIDER NOTES
History  Chief Complaint   Patient presents with    Post-op Problem     patient is 8 days s/p angioplasty  c/o left arm ecchymosis and pain  also c/o LLE pain and swelling  HPI   58-year-old chronically ill-appearing female with history of HLD, HTN, B-cell lymphoma on chemotherapy, paroxysmal AFib, significant vascular disease presents to the ED with left lower extremity swelling/erythema and left upper extremity swelling/ecchymosis 8 days status post left common femoral artery and left limb angioplasty  Patient states that she had ecchymosis of the entire left upper extremity with swelling just above the [facility] since the angioplasty  Over the past few days she has also noted increasing swelling, erythema, and warmth of the left lower extremity  She has not noted any modifying factors for her symptoms and denies having had similar symptoms in the past   She denies any associated weakness, numbness, or paresthesias  On ROS, she denies recent fevers, chills, chest pain, shortness of breath, abdominal pain, or complaints other than stated above  She reports compliance with all prescribed medications  Prior to Admission Medications   Prescriptions Last Dose Informant Patient Reported? Taking?    CHELATED MAGNESIUM PO   Yes No   Sig: Take 4 tablets by mouth 3 (three) times a day   Cholecalciferol (VITAMIN D3) 2000 units TABS   Yes No   Sig: Take 1 tablet by mouth daily   Melatonin ER (MELADOX) 3 MG TBCR   Yes No   Sig: Take 1 tablet by mouth daily at bedtime   amLODIPine (NORVASC) 10 mg tablet   Yes No   Sig: Take 10 mg by mouth daily   aspirin 81 mg chewable tablet   Yes No   Sig: Chew 81 mg daily   atorvastatin (LIPITOR) 40 mg tablet   Yes No   Sig: Take 20 mg by mouth daily at bedtime     escitalopram (LEXAPRO) 5 mg tablet   Yes No   Sig: Take 5 mg by mouth daily   lisinopril (ZESTRIL) 40 mg tablet  Child Yes No   Sig: Take 20 mg by mouth daily     metoprolol succinate (TOPROL-XL) 100 mg 24 hr tablet   Yes No Sig: Take 100 mg by mouth daily   mirtazapine (REMERON) 15 mg tablet   Yes No   Sig: Take 15 mg by mouth daily at bedtime   nicotine (NICODERM CQ) 14 mg/24hr TD 24 hr patch   Yes No   Sig: Place 1 patch on the skin every 24 hours   omeprazole (PriLOSEC) 20 mg delayed release capsule   Yes No   Sig: Take 20 mg by mouth daily   rivaroxaban (XARELTO) 10 mg tablet   Yes No   Sig: Take 10 mg by mouth daily with dinner   senna-docusate sodium (SENOKOT S) 8 6-50 mg per tablet   No No   Sig: Take 1 tablet by mouth daily As needed for constipation      Facility-Administered Medications: None       Past Medical History:   Diagnosis Date    Bell's palsy     Cancer ([facility] )     lymphoma    Diffuse large B cell lymphoma ([facility] )     Hypertension     PAD (peripheral artery disease) (HCC)     PAF (paroxysmal atrial fibrillation) (HCC)     PVD (peripheral vascular disease) (HCC)        Past Surgical History:   Procedure Laterality Date    ARTERIOGRAM Left 12/6/2018    Procedure: LEFT lower extremity angiography, with Left lower extremity run-off, stent and angioplasty of Left Common Femoral Artery (Left Brachial Access); Surgeon: Pankaj Pedersen MD;  [facility]: [facility];  Service: Vascular    CARDIAC SURGERY      CARPAL TUNNEL RELEASE      HYSTERECTOMY      IR ABDOMINAL ANGIOGRAPHY / INTERVENTION  12/6/2018    TONSILLECTOMY         Family History   Problem Relation Age of Onset    Cancer Mother     Breast cancer Mother     Heart attack Father      I have reviewed and agree with the history as documented  Social History   Substance Use Topics    Smoking status: Current Some Day Smoker     Packs/day: 0 25     Years: 40 00    Smokeless tobacco: Never Used    Alcohol use No        Review of Systems   Constitutional: Negative for chills and fever  Respiratory: Negative for shortness of breath  Cardiovascular: Positive for leg swelling  Gastrointestinal: Negative for abdominal pain, nausea and vomiting     Skin: Positive for color change  Allergic/Immunologic: Positive for immunocompromised state  Neurological: Negative for headaches  Hematological: Bruises/bleeds easily  Psychiatric/Behavioral: The patient is not nervous/anxious  All other systems reviewed and are negative  Physical Exam  Physical Exam   Constitutional: She is oriented to person, place, and time  She appears cachectic  She appears ill  No distress  HENT:   Head: Normocephalic and atraumatic  Eyes: EOM are normal    Neck: Normal range of motion  Neck supple  Cardiovascular: Normal rate and regular rhythm  Pulses:       Radial pulses are 2+ on the right side, and 2+ on the left side  Dorsalis pedis pulses are 2+ on the left side  Pulmonary/Chest: Effort normal and breath sounds normal  No respiratory distress  Abdominal: Soft  She exhibits no distension  There is no tenderness  Musculoskeletal: She exhibits edema  Neurological: She is alert and oriented to person, place, and time  Skin: Skin is warm and dry  Bruising noted  She is not diaphoretic  There is erythema  [place] ecchymosis along the entire left upper extremity, associated swelling along the medial aspect of the left upper arm  Erythema and swelling of the left lower leg with associated warmth    Neurovascularly intact throughout   Psychiatric: She has a normal mood and affect  Her behavior is normal    Nursing note and vitals reviewed        Vital Signs  ED Triage Vitals   Temperature Pulse Respirations Blood Pressure SpO2   12/14/18 1757 12/14/18 1757 12/14/18 1757 12/14/18 1758 12/14/18 1757   98 6 °F (37 °C) 80 16 (!) 85/53 99 %      Temp Source Heart Rate Source Patient Position - Orthostatic VS BP Location FiO2 (%)   12/14/18 1757 12/14/18 1757 12/14/18 1818 12/14/18 1818 --   Oral Monitor Lying Right arm       Pain Score       12/14/18 1757       5           Vitals:    12/14/18 1757 12/14/18 1758 12/14/18 1818 12/14/18 1900   BP:  (!) 85/53 110/58 126/59   Pulse: 80  73 79   Patient Position - Orthostatic VS:   Lying Lying       Visual Acuity      ED Medications  Medications   fentanyl citrate (PF) 100 MCG/2ML 50 mcg (not administered)       Diagnostic Studies  Results Reviewed     Procedure Component Value Units Date/Time    Blood culture [[id number]] Collected:  12/14/18 2229    Lab Status:  Preliminary result Specimen:  Blood from Arm, Right Updated:  12/17/18 1201     Blood Culture No Growth at 48 hrs  Blood culture [[id number]] Collected:  12/14/18 2230    Lab Status:  Preliminary result Specimen:  Blood from Hand, Right Updated:  12/17/18 1201     Blood Culture No Growth at 48 hrs  C-reactive protein [[id number]]  (Abnormal) Collected:  12/14/18 1929    Lab Status:  Final result Specimen:  Blood from Arm, Right Updated:  12/14/18 2332     CRP 3 1 (H) mg/L     Lactic acid, plasma [[id number]]  (Normal) Collected:  12/14/18 2232    Lab Status:  Final result Specimen:  Blood from Arm, Right Updated:  12/14/18 2324     LACTIC ACID 1 8 mmol/L     Narrative:         Result may be elevated if tourniquet was used during collection      APTT [[id number]]  (Abnormal) Collected:  12/14/18 1929    Lab Status:  Final result Specimen:  Blood from Arm, Right Updated:  12/14/18 1951     PTT 40 (H) seconds     Protime-INR [[id number]]  (Abnormal) Collected:  12/14/18 1929    Lab Status:  Final result Specimen:  Blood from Arm, Right Updated:  12/14/18 1951     Protime 21 5 (H) seconds      INR 1 89 (H)    Basic metabolic panel [[id number]]  (Abnormal) Collected:  12/14/18 1929    Lab Status:  Final result Specimen:  Blood from Arm, Right Updated:  12/14/18 1945     Sodium 132 (L) mmol/L      Potassium 4 0 mmol/L      Chloride 100 mmol/L      CO2 29 mmol/L      ANION GAP 3 (L) mmol/L      BUN 10 mg/dL      Creatinine 0 83 mg/dL      Glucose 108 mg/dL      Calcium 9 2 mg/dL      eGFR 74 ml/min/1 73sq m     Narrative:         National Kidney Disease Education Program recommendations are as follows:  GFR calculation is accurate only with a steady state creatinine  Chronic Kidney disease less than 60 ml/min/1 73 sq  meters  Kidney failure less than 15 ml/min/1 73 sq  meters  CBC and differential [[id number]]  (Abnormal) Collected:  12/14/18 1929    Lab Status:  Final result Specimen:  Blood from Arm, Right Updated:  12/14/18 1935     WBC 6 41 Thousand/uL      RBC 3 12 (L) Million/uL      Hemoglobin 9 7 (L) g/dL      Hematocrit 30 6 (L) %      MCV 98 fL      MCH 31 1 pg      MCHC 31 7 g/dL      RDW 19 5 (H) %      MPV 10 3 fL      Platelets 177 Thousands/uL      nRBC 0 /100 WBCs      Neutrophils Relative 59 %      Immat GRANS % 1 %      Lymphocytes Relative 14 %      Monocytes Relative 21 (H) %      Eosinophils Relative 4 %      Basophils Relative 1 %      Neutrophils Absolute 3 85 Thousands/µL      Immature Grans Absolute 0 05 Thousand/uL      Lymphocytes Absolute 0 88 Thousands/µL      Monocytes Absolute 1 32 (H) Thousand/µL      Eosinophils Absolute 0 24 Thousand/µL      Basophils Absolute 0 07 Thousands/µL                  VAS lower limb venous duplex study, unilateral/limited    (Results Pending)   VAS upper limb venous duplex scan, unilateral/limited    (Results Pending)              Procedures  Procedures       Phone Contacts  ED Phone Contact    ED Course  ED Course as of Dec 17 1750   Fri Dec 14, 2018   1935 8 3 five days ago  Hemoglobin: (!) 9 7                               MDM  CritCare Time    Disposition  Final diagnoses:   Left leg cellulitis   Left arm swelling     Time reflects when diagnosis was documented in both MDM as applicable and the Disposition within this note     Time User Action Codes Description Comment    12/14/2018  9:40 PM Anna Campuzano Add [Q02 535] Left leg cellulitis     12/14/2018  9:40 PM Erin Ghosh Add [M79 89] Left arm swelling     12/17/2018 12:15 PM Ronak García Dear Add [O28  XXXA] Fall       ED Disposition     ED Disposition Condition Comment    Admit  Case was discussed with Dr Luiza Lopez and the patient's admission status was agreed to be Admission Status: inpatient status to the service of Dr Luiza Lopez   Follow-up Information     Follow up With Specialties Details Why Winter Porter MD PhD Hematology, Hematology and Oncology, Oncology Schedule an appointment as soon as possible for a visit in 1 week(s)  [address] [facility]  [address] [facility] [address]  [phone]      Kaitlin Yee MD Vascular Surgery, Radiology Schedule an appointment as soon as possible for a visit in 1 week(s)  [address] [facility] [address] [place] [address]  [address], [address] [facility] Schedule an appointment as soon as possible for a visit in 1 day(s) pcp [address] [facility] [address] [employer]      Urbano Steinberg MD General Surgery Schedule an appointment as soon as possible for a visit in 1 week(s035 [phone]  LUIS FERNANDO [address]  [facility] [place] [address]  Follow up nurse please fax DCI to [facility] at [phone] phone# [phone]  FAX# [phone]          Patient's Medications   Discharge Prescriptions    No medications on file     No discharge procedures on file      ED Provider  Electronically Signed by           Valerie Lamb MD  12/17/18 9836

## 2018-12-15 NOTE — ASSESSMENT & PLAN NOTE
This was following a vascular procedure at [facility]  She has significant swelling but really not any warmth  There does appear to be a pocket of fluid above her antecubital fossa  I think this likely represents a hematoma  I would like acute surgery to see her and see whether this needs to be drained  It does not look like an abscess to me

## 2018-12-15 NOTE — TELEPHONE ENCOUNTER
[facility]  CONFIDENTIALTY NOTICE: This fax transmission is intended only for the addressee  It contains information that is legally privileged,  confidential or otherwise protected from use or disclosure  If you are not the intended recipient, you are strictly prohibited from reviewing,  disclosing, copying using or disseminating any of this information or taking any action in reliance on or regarding this information  If you have  received this fax in error, please notify us immediately by telephone so that we can arrange for its return to us  Page: 1  2  Call Id: [id number]  Health Call  Standard Call Report  Health Call  Patient Name:  The vascular center- The vascular  [facility]  Gender: Male  : (approximated)  Age:  Return Phone  Number: [phone] (Home)  Address:  City/State/Zip:  Practice Name: [address] [place] [address] St:  Physician:  [facility] Name:  Relationship To  Patient:  Return Phone Number: Unavailable  Presenting Problem: Routine Consult/ Nguyễn/Jalil  Service Type: Consults  Charged Service 1: Consults  Pharmacy Name and  Number:  Nurse Assessment  Nurse: Latonya Reyes Date/Time: 12/15/2018 6:43:55 PM  Type of assessment required:  ---Consult  DateTime called Zachery Tipton Name  ---12/15 @ 1402/Chiara Caballero of appointment:  ---Routine Consult  Facility/Unit/Room Number/Unit Phone Number  ---[facility]  320/ [phone]  Practice consulted:  ---Vascular Center  Requesting physician:  ---Dr Ellyn Deleon  Patient's name//Medical Record Number  ---Rachel Miranda 51/ [id number]  Admitting diagnosis/Reason for consult  ---Cellulitis and abcess / RA Cellulitis  Physician Notified/DateTime:  ---Dr Bridgett Santos 12-15 @ [phone]  [facility] The vascular [facility] (approximated)  CONFIDENTIALTY NOTICE: This fax transmission is intended only for the addressee   It contains information that is legally privileged,  confidential or otherwise protected from use or disclosure  If you are not the intended recipient, you are strictly prohibited from reviewing,  disclosing, copying using or disseminating any of this information or taking any action in reliance on or regarding this information  If you have  received this fax in error, please notify us immediately by telephone so that we can arrange for its return to us  Page: 2 of 2  Call Id: [id number]  Protocols  Protocol Title Nurse Date/Time  Disp   Time Disposition Final User  12/15/2018 6:53:02 PM Close Yes Heidi Hall

## 2018-12-15 NOTE — ASSESSMENT & PLAN NOTE
Start empically on Ancef; received vanco in ER check procalcitonin  Recent vascular procedure 1 week ago

## 2018-12-15 NOTE — PROGRESS NOTES
Progress Note - [place] 1955, 70 y o  female MRN: [id number]    Unit/Bed#: -01 Encounter: [id number]    Primary Care Provider: No primary care provider on file  Date and time admitted to hospital: 2018  6:09 PM        Deep vein thrombosis (DVT) of lower extremity ([facility] )   Assessment & Plan    On xarelto     Ischemia of left lower extremity   Assessment & Plan    S/p procedure 9 days ago-pain in left leg has resolved     Lymphoma ([facility] )   Assessment & Plan    Follows with oncology at [place] on chemo     Benign essential hypertension   Assessment & Plan    Continue home meds     Bell's palsy   Assessment & Plan    h/o     * Cellulitis and abscess of left leg   Assessment & Plan    Start empically on Ancef and vanco   Recent vascular procedure 1 week ago           VTE Pharmacologic Prophylaxis:   Pharmacologic: Rivaroxaban (Xarelto)  Mechanical VTE Prophylaxis in Place: Yes    Patient Centered Rounds: I have performed bedside rounds with nursing staff today  Discussions with Specialists or Other Care Team Provider:     Education and Discussions with Family / Patient:     Time Spent for Care: 30 minutes  More than 50% of total time spent on counseling and coordination of care as described above  Current Length of Stay: 1 day(s)    Current Patient Status: Inpatient   Certification Statement: The patient will continue to require additional inpatient hospital stay due to cellulitis    Discharge Plan: home    Code Status: Level 1 - Full Code      Subjective:   No c/o  HAs pain and swelling left arm    Objective:     Vitals:   Temp (24hrs), Av 7 °F (37 1 °C), Min:98 6 °F (37 °C), Max:98 9 °F (37 2 °C)    Temp:  [98 6 °F (37 °C)-98 9 °F (37 2 °C)] 98 6 °F (37 °C)  HR:  [69-80] 73  Resp:  [12-18] 18  BP: ()/(50-59) 116/56  SpO2:  [92 %-100 %] 96 %  There is no height or weight on file to calculate BMI       Input and Output Summary (last 24 hours):     No intake or output data in the 24 hours ending 12/15/18 1127    Physical Exam:     Physical Exam   Constitutional: She is oriented to person, place, and time  She appears well-developed and well-nourished  HENT:   Head: Normocephalic and atraumatic  Eyes: Pupils are equal, round, and reactive to light  Cardiovascular: Normal rate and regular rhythm  Pulmonary/Chest: Effort normal and breath sounds normal    Abdominal: Soft  Bowel sounds are normal    Musculoskeletal: She exhibits no edema  Neurological: She is alert and oriented to person, place, and time  Additional Data:     Labs:      Results from last 7 days  Lab Units 12/15/18  0517   WBC Thousand/uL 4 90   HEMOGLOBIN g/dL 8 2*   HEMATOCRIT % 25 7*   PLATELETS Thousands/uL 219   NEUTROS PCT % 55   LYMPHS PCT % 14   MONOS PCT % 23*   EOS PCT % 5       Results from last 7 days  Lab Units 12/15/18  0517   SODIUM mmol/L 137   POTASSIUM mmol/L 3 8   CHLORIDE mmol/L 103   CO2 mmol/L 26   BUN mg/dL 8   CREATININE mg/dL 0 78   ANION GAP mmol/L 8   CALCIUM mg/dL 8 2*   GLUCOSE RANDOM mg/dL 161*       Results from last 7 days  Lab Units 12/14/18  1929   INR  1 89*               Results from last 7 days  Lab Units 12/14/18  2232   LACTIC ACID mmol/L 1 8           * I Have Reviewed All Lab Data Listed Above  * Additional Pertinent Lab Tests Reviewed:  All Labs Within Last 24 Hours Reviewed    Imaging:    Imaging Reports Reviewed Today Include:   Imaging Personally Reviewed by Myself Includes:      Recent Cultures (last 7 days):           Last 24 Hours Medication List:     Current Facility-Administered Medications:  acyclovir 400 mg Oral BID Denny Prechtel, DO    amLODIPine 10 mg Oral Daily Denny Prechtel, DO    aspirin 81 mg Oral Daily Denny Prechtel, DO    atorvastatin 40 mg Oral HS Denny Prechtel, DO    cefazolin 2,000 mg Intravenous Q8H Denny Prechtel, DO Last Rate: 2,000 mg (12/15/18 0844)   cholecalciferol 2,000 Units Oral Daily Denny Prechtel, DO    escitalopram 5 mg Oral Daily Denny Prechtel, DO    lisinopril 20 mg Oral Daily Denny Prechtel, DO    melatonin 3 mg Oral HS PRN Denny Prechtel, DO    metoprolol succinate 100 mg Oral Daily Denny Prechtel, DO    mirtazapine 7 5 mg Oral HS Denny Prechtel, DO    nicotine 1 patch Transdermal Q24H Denny Prechtel, DO    pantoprazole 40 mg Oral Early Morning Denny Prechtel, DO    rivaroxaban 10 mg Oral Daily With [employer] Prechtel, DO    senna-docusate sodium 1 tablet Oral Daily Marai De Jesus Rosario DO         Today, Patient Was Seen By: Ankit Bonilla MD    ** Please Note: Dictation voice to text software may have been used in the creation of this document   **

## 2018-12-15 NOTE — H&P
H&P- Uri Wolfe 1955, 70 y o  female MRN: [id number]    Unit/Bed#: -01 Encounter: [id number]    Primary Care Provider: No primary care provider on file  Date and time admitted to hospital: 12/14/2018  6:09 PM        * Cellulitis and abscess of left leg   Assessment & Plan    Start empically on Ancef  Will ask ID to eval  This looks mild  She does not appear septic     Left arm swelling   Assessment & Plan    This was following a vascular procedure at [facility]  She has significant swelling but really not any warmth  There does appear to be a pocket of fluid above her antecubital fossa  I think this likely represents a hematoma  I would like acute surgery to see her and see whether this needs to be drained  It does not look like an abscess to me  Chief Complaint:   Left arm and leg pain and swelling      History of Present Illness:    Uri Wolfe is a 70 y o  female who presents with left arm and leg pain and swelling  She recently was seen at [facility] about a week ago  She had a vascular study procedure with a went into her left arm and did angioplasty to the left arm as well as stenting to her left leg  Since then she has had some bruising to the left arm as well as some swelling proximal to the antecubital fossa  An in her left leg is also swollen and red  No fever or chills  No recent antibiotics  She does not have follow-up with the vascular physician at [facility] for another 5 days therefore she came to [place]  Here it looks like she has a cellulitis left lower extremity and possibly hematoma to the left arm  No recent antibiotics         Review of Systems:    Review of Systems   Constitutional: Negative  HENT: Negative  Eyes: Negative  Respiratory: Negative  Cardiovascular: Negative  Gastrointestinal: Negative  Endocrine: Negative  Genitourinary: Negative  Musculoskeletal: Negative      Skin: Positive for color change  All other systems reviewed and are negative  Past Medical and Surgical History:     Past Medical History:   Diagnosis Date    Bell's palsy     Cancer ([facility] )     lymphoma    Diffuse large B cell lymphoma ([place] [address] )     Hypertension     PAD (peripheral artery disease) ([place] [address] )     PAF (paroxysmal atrial fibrillation) ([place] [address] )     PVD (peripheral vascular disease) ([place] [address] )        Past Surgical History:   Procedure Laterality Date    ARTERIOGRAM Left 12/6/2018    Procedure: LEFT lower extremity angiography, with Left lower extremity run-off, stent and angioplasty of Left Common Femoral Artery (Left Brachial Access); Surgeon: Adeola Burton MD;  [facility]: [facility];  Service: Vascular    CARDIAC SURGERY      CARPAL TUNNEL RELEASE      HYSTERECTOMY      IR ABDOMINAL ANGIOGRAPHY / INTERVENTION  12/6/2018    TONSILLECTOMY           Home Medications:    Prior to Admission medications    Medication Sig Start Date End Date Taking?  Authorizing Provider   acyclovir (ZOVIRAX) 400 MG tablet Take 400 mg by mouth 2 (two) times a day 12/13/18 3/13/19 Yes Historical Provider, MD   amLODIPine (NORVASC) 10 mg tablet Take 10 mg by mouth daily   Yes Historical Provider, MD   aspirin 81 mg chewable tablet Chew 81 mg daily   Yes Historical Provider, MD   atorvastatin (LIPITOR) 40 mg tablet Take 40 mg by mouth daily at bedtime     Yes Historical Provider, MD   Cholecalciferol (VITAMIN D3) 2000 units TABS Take 1 tablet by mouth daily   Yes Historical Provider, MD   escitalopram (LEXAPRO) 5 mg tablet Take 5 mg by mouth daily   Yes Historical Provider, MD   lisinopril (ZESTRIL) 40 mg tablet Take 20 mg by mouth daily     Yes Historical Provider, MD   Melatonin ER (MELADOX) 3 MG TBCR Take 1 tablet by mouth daily at bedtime   Yes Historical Provider, MD   metoprolol succinate (TOPROL-XL) 100 mg 24 hr tablet Take 100 mg by mouth daily   Yes Historical Provider, MD   mirtazapine (REMERON) 15 mg tablet Take 7 5 mg by mouth daily at bedtime     Yes Historical Provider, MD   nicotine (NICODERM CQ) 14 mg/24hr TD 24 hr patch Place 1 patch on the skin every 24 hours   Yes Historical Provider, MD   omeprazole (PriLOSEC) 20 mg delayed release capsule Take 20 mg by mouth daily   Yes Historical Provider, MD   rivaroxaban (XARELTO) 10 mg tablet Take 10 mg by mouth daily with dinner   Yes Historical Provider, MD   senna-docusate sodium (SENOKOT S) 8 6-50 mg per tablet Take 1 tablet by mouth daily As needed for constipation 10/9/18  Yes Loida Mei,    Specialty Vitamins Products (MAGNESIUM, AMINO ACID CHELATE,) 133 MG tablet Take 2 tablets by mouth Three times a day 11/29/18  Yes Historical Provider, MD   ciprofloxacin (CIPRO) 500 mg tablet TAKE 1 TABLET BY MOUTH TWICE A DAY FOR 10 DAYS 10/22/18 12/14/18 Yes Historical Provider, MD   CHELATED MAGNESIUM PO Take 4 tablets by mouth 3 (three) times a day    Historical Provider, MD     I have reviewed home medications with patient personally  Allergies: Allergies   Allergen Reactions    Oxycodone-Acetaminophen Rash         Social History:    Substance Use History:   History   Alcohol Use No     History   Smoking Status    Current Some Day Smoker    Packs/day: 0 25    Years: 40 00   Smokeless Tobacco    Never Used     History   Drug Use No         Family History:    non-contributory      Physical Exam:     Vitals:   Blood Pressure: 103/58 (12/14/18 2300)  Pulse: 76 (12/14/18 2300)  Temperature: 98 9 °F (37 2 °C) (12/14/18 2300)  Temp Source: Oral (12/14/18 2300)  Respirations: 18 (12/14/18 2300)  SpO2: 92 % (12/14/18 2300)    Physical Exam   HENT:   Head: Normocephalic and atraumatic  Eyes: Pupils are equal, round, and reactive to light  EOM are normal    Cardiovascular: Normal rate and regular rhythm  Exam reveals no gallop and no friction rub  No murmur heard  Pulmonary/Chest: Effort normal and breath sounds normal  She has no wheezes  She has no rales  Abdominal: Soft  Bowel sounds are normal  There is no tenderness  Musculoskeletal: She exhibits edema (1+ pitting edema left lower extremity)  Left upper extremity:  Has ecchymoses mostly in the forearm  And also an apparent hematoma proximal to the antecubital fossa  There is no warmth or redness to this area  It does not look like it is infected or an abscess  The area of hematoma in question is indurated    Left lower extremity  Between the knee and the ankle there is redness warmth and swelling  No significant pain  No obvious abscess  Nursing note and vitals reviewed  Additional Data:     Lab Results: I have personally reviewed pertinent reports  Results from last 7 days  Lab Units 12/14/18 1929   WBC Thousand/uL 6 41   HEMOGLOBIN g/dL 9 7*   HEMATOCRIT % 30 6*   PLATELETS Thousands/uL 263   NEUTROS PCT % 59   LYMPHS PCT % 14   MONOS PCT % 21*   EOS PCT % 4       Results from last 7 days  Lab Units 12/14/18 1929   POTASSIUM mmol/L 4 0   CHLORIDE mmol/L 100   CO2 mmol/L 29   BUN mg/dL 10   CREATININE mg/dL 0 83   CALCIUM mg/dL 9 2       Results from last 7 days  Lab Units 12/14/18 1929   INR  1 89*                 Imaging: I have personally reviewed pertinent reports  US extremity soft tissue   Final Result by Dia Stauffer MD (12/14 2054)      Collection as described  This is present at a site of recent vascular access  Differential includes hematoma, abscess, and thrombosed pseudoaneurysm  Please see concurrent vascular study for evaluation of the latter  The study was marked in [facility] for immediate notification        Workstation performed: [id number]         VAS lower limb venous duplex study, unilateral/limited    (Results Pending)   VAS upper limb venous duplex scan, unilateral/limited    (Results Pending)             VTE Prophylaxis: Rivaroxaban (Xarelto)        Anticipated Length of Stay:  Patient will be admitted on an Inpatient basis with an anticipated length of stay of greater than 2 midnights  Justification for Hospital Stay:  Patient is cellulitis left lower extremity likely hematoma left upper extremity  She needs IV antibiotics and surgical evaluation  Anticipate length of stay will be greater than 2 midnights      Total Time for Visit, including Counseling / Coordination of Care: 45 minutes  Greater than 50% of this total time spent on direct patient counseling and coordination of care  ** Please Note: This note has been constructed using a voice recognition system   **

## 2018-12-16 LAB
ANION GAP SERPL CALCULATED.3IONS-SCNC: 8 MMOL/L (ref 4–13)
BASOPHILS # BLD AUTO: 0.06 THOUSANDS/ΜL (ref 0–0.1)
BASOPHILS NFR BLD AUTO: 1 % (ref 0–1)
BUN SERPL-MCNC: 9 MG/DL (ref 5–25)
CALCIUM SERPL-MCNC: 8.6 MG/DL (ref 8.3–10.1)
CHLORIDE SERPL-SCNC: 103 MMOL/L (ref 100–108)
CO2 SERPL-SCNC: 27 MMOL/L (ref 21–32)
CREAT SERPL-MCNC: 0.97 MG/DL (ref 0.6–1.3)
EOSINOPHIL # BLD AUTO: 0.31 THOUSAND/ΜL (ref 0–0.61)
EOSINOPHIL NFR BLD AUTO: 7 % (ref 0–6)
ERYTHROCYTE [DISTWIDTH] IN BLOOD BY AUTOMATED COUNT: 18.7 % (ref 11.6–15.1)
GFR SERPL CREATININE-BSD FRML MDRD: 61 ML/MIN/1.73SQ M
GLUCOSE SERPL-MCNC: 91 MG/DL (ref 65–140)
HCT VFR BLD AUTO: 27.6 % (ref 34.8–46.1)
HGB BLD-MCNC: 8.6 G/DL (ref 11.5–15.4)
IMM GRANULOCYTES # BLD AUTO: 0.04 THOUSAND/UL (ref 0–0.2)
IMM GRANULOCYTES NFR BLD AUTO: 1 % (ref 0–2)
LYMPHOCYTES # BLD AUTO: 0.79 THOUSANDS/ΜL (ref 0.6–4.47)
LYMPHOCYTES NFR BLD AUTO: 17 % (ref 14–44)
MCH RBC QN AUTO: 30.5 PG (ref 26.8–34.3)
MCHC RBC AUTO-ENTMCNC: 31.2 G/DL (ref 31.4–37.4)
MCV RBC AUTO: 98 FL (ref 82–98)
MONOCYTES # BLD AUTO: 1.08 THOUSAND/ΜL (ref 0.17–1.22)
MONOCYTES NFR BLD AUTO: 23 % (ref 4–12)
NEUTROPHILS # BLD AUTO: 2.42 THOUSANDS/ΜL (ref 1.85–7.62)
NEUTS SEG NFR BLD AUTO: 51 % (ref 43–75)
NRBC BLD AUTO-RTO: 0 /100 WBCS
PLATELET # BLD AUTO: 237 THOUSANDS/UL (ref 149–390)
PMV BLD AUTO: 10.3 FL (ref 8.9–12.7)
POTASSIUM SERPL-SCNC: 4.1 MMOL/L (ref 3.5–5.3)
PROCALCITONIN SERPL-MCNC: 0.06 NG/ML
RBC # BLD AUTO: 2.82 MILLION/UL (ref 3.81–5.12)
SODIUM SERPL-SCNC: 138 MMOL/L (ref 136–145)
WBC # BLD AUTO: 4.7 THOUSAND/UL (ref 4.31–10.16)

## 2018-12-16 PROCEDURE — 99223 1ST HOSP IP/OBS HIGH 75: CPT | Performed by: SURGERY

## 2018-12-16 PROCEDURE — 85025 COMPLETE CBC W/AUTO DIFF WBC: CPT | Performed by: GENERAL PRACTICE

## 2018-12-16 PROCEDURE — 80048 BASIC METABOLIC PNL TOTAL CA: CPT | Performed by: GENERAL PRACTICE

## 2018-12-16 PROCEDURE — 99232 SBSQ HOSP IP/OBS MODERATE 35: CPT | Performed by: GENERAL PRACTICE

## 2018-12-16 PROCEDURE — 84145 PROCALCITONIN (PCT): CPT | Performed by: GENERAL PRACTICE

## 2018-12-16 PROCEDURE — 99222 1ST HOSP IP/OBS MODERATE 55: CPT | Performed by: NURSE PRACTITIONER

## 2018-12-16 RX ADMIN — VITAMIN D, TAB 1000IU (100/BT) 2000 UNITS: 25 TAB at 08:41

## 2018-12-16 RX ADMIN — PANTOPRAZOLE SODIUM 40 MG: 40 TABLET, DELAYED RELEASE ORAL at 06:32

## 2018-12-16 RX ADMIN — ACYCLOVIR 400 MG: 800 TABLET ORAL at 08:42

## 2018-12-16 RX ADMIN — ACYCLOVIR 400 MG: 800 TABLET ORAL at 17:31

## 2018-12-16 RX ADMIN — MIRTAZAPINE 7.5 MG: 15 TABLET, FILM COATED ORAL at 22:56

## 2018-12-16 RX ADMIN — CEFAZOLIN SODIUM 2000 MG: 2 SOLUTION INTRAVENOUS at 08:39

## 2018-12-16 RX ADMIN — NICOTINE 1 PATCH: 14 PATCH TRANSDERMAL at 08:41

## 2018-12-16 RX ADMIN — HYDROMORPHONE HYDROCHLORIDE 0.2 MG: 1 INJECTION, SOLUTION INTRAMUSCULAR; INTRAVENOUS; SUBCUTANEOUS at 16:10

## 2018-12-16 RX ADMIN — ASPIRIN 81 MG 81 MG: 81 TABLET ORAL at 08:41

## 2018-12-16 RX ADMIN — CEFAZOLIN SODIUM 2000 MG: 2 SOLUTION INTRAVENOUS at 16:02

## 2018-12-16 RX ADMIN — CEFAZOLIN SODIUM 2000 MG: 2 SOLUTION INTRAVENOUS at 22:56

## 2018-12-16 RX ADMIN — MELATONIN 3 MG: 3 TAB ORAL at 23:00

## 2018-12-16 RX ADMIN — HYDROMORPHONE HYDROCHLORIDE 0.2 MG: 1 INJECTION, SOLUTION INTRAMUSCULAR; INTRAVENOUS; SUBCUTANEOUS at 23:00

## 2018-12-16 RX ADMIN — ATORVASTATIN CALCIUM 40 MG: 40 TABLET, FILM COATED ORAL at 22:56

## 2018-12-16 RX ADMIN — HYDROMORPHONE HYDROCHLORIDE 0.2 MG: 1 INJECTION, SOLUTION INTRAMUSCULAR; INTRAVENOUS; SUBCUTANEOUS at 06:47

## 2018-12-16 RX ADMIN — RIVAROXABAN 10 MG: 10 TABLET, FILM COATED ORAL at 17:33

## 2018-12-16 RX ADMIN — ESCITALOPRAM OXALATE 5 MG: 10 TABLET ORAL at 08:41

## 2018-12-16 NOTE — PROGRESS NOTES
Progress Note - Lawrence Pals 1955, 70 y o  female MRN: [id number]    Unit/Bed#: -01 Encounter: [id number]    Primary Care Provider: No primary care provider on file  Date and time admitted to hospital: 2018  6:09 PM        Deep vein thrombosis (DVT) of lower extremity ([address] )   Assessment & Plan    On xarelto     Ischemia of left lower extremity   Assessment & Plan    S/p procedure recently at [facility] in left leg has resolved     Lymphoma [facility])   Assessment & Plan    Follows with oncology at [facility] on chemo last treatment was in October-November     Benign essential hypertension   Assessment & Plan    Continue home meds     Bell's palsy   Assessment & Plan    H/o noted     * Cellulitis and abscess of left leg   Assessment & Plan    Start empically on Ancef; received vanco in ER check procalcitonin pending  Recent vascular procedure 1 week ago           VTE Pharmacologic Prophylaxis:   Pharmacologic: Rivaroxaban (Xarelto)  Mechanical VTE Prophylaxis in Place: Yes    Patient Centered Rounds: I have performed bedside rounds with nursing staff today  Discussions with Specialists or Other Care Team Provider:     Education and Discussions with Family / Patient:     Time Spent for Care: 30 minutes  More than 50% of total time spent on counseling and coordination of care as described above      Current Length of Stay: 2 day(s)    Current Patient Status: Inpatient   Certification Statement: The patient will continue to require additional inpatient hospital stay due to cellulitis    Discharge Plan: home    Code Status: Level 1 - Full Code      Subjective:   Feels better-less arm and leg pain    Objective:     Vitals:   Temp (24hrs), Av 7 °F (37 1 °C), Min:98 5 °F (36 9 °C), Max:99 1 °F (37 3 °C)    Temp:  [98 5 °F (36 9 °C)-99 1 °F (37 3 °C)] 99 1 °F (37 3 °C)  HR:  [71-76] 71  Resp:  [18] 18  BP: (101-111)/(56-65) 101/58  SpO2:  [96 %-97 %] 96 %  There is no height or weight on file to calculate BMI  Input and Output Summary (last 24 hours):     No intake or output data in the 24 hours ending 12/16/18 0830    Physical Exam:     Physical Exam   Constitutional: She is oriented to person, place, and time  She appears well-developed and well-nourished  HENT:   Head: Normocephalic and atraumatic  Eyes: Pupils are equal, round, and reactive to light  Cardiovascular: Normal rate and regular rhythm  Pulmonary/Chest: Effort normal and breath sounds normal    Abdominal: Soft  Bowel sounds are normal    Musculoskeletal: She exhibits edema  Erythema left shin area; ecchymosis and hematomas of LUE both VERY much improved today; less painful to palpation   Neurological: She is alert and oriented to person, place, and time  Additional Data:     Labs:      Results from last 7 days  Lab Units 12/16/18  0439   WBC Thousand/uL 4 70   HEMOGLOBIN g/dL 8 6*   HEMATOCRIT % 27 6*   PLATELETS Thousands/uL 237   NEUTROS PCT % 51   LYMPHS PCT % 17   MONOS PCT % 23*   EOS PCT % 7*       Results from last 7 days  Lab Units 12/16/18  0439   SODIUM mmol/L 138   POTASSIUM mmol/L 4 1   CHLORIDE mmol/L 103   CO2 mmol/L 27   BUN mg/dL 9   CREATININE mg/dL 0 97   ANION GAP mmol/L 8   CALCIUM mg/dL 8 6   GLUCOSE RANDOM mg/dL 91       Results from last 7 days  Lab Units 12/14/18  1929   INR  1 89*               Results from last 7 days  Lab Units 12/14/18  2232   LACTIC ACID mmol/L 1 8           * I Have Reviewed All Lab Data Listed Above  * Additional Pertinent Lab Tests Reviewed:  All Labs Within Last 24 Hours Reviewed    Imaging:    Imaging Reports Reviewed Today Include:   Imaging Personally Reviewed by Myself Includes:      Recent Cultures (last 7 days):           Last 24 Hours Medication List:     Current Facility-Administered Medications:  acyclovir 400 mg Oral BID Denny Ortez DO    ALPRAZolam 0 25 mg Oral BID PRN Yeimy Dinero MD    amLODIPine 10 mg Oral Daily Denny Ortez DO aspirin 81 mg Oral Daily Denny Prechtel, DO    atorvastatin 40 mg Oral HS Denny Prechtel, DO    cefazolin 2,000 mg Intravenous Q8H Denny Prechtel, DO Last Rate: 2,000 mg (12/15/18 6467)   cholecalciferol 2,000 Units Oral Daily Denny Prechtel, DO    escitalopram 5 mg Oral Daily Denny Prechtel, DO    HYDROmorphone 0 2 mg Intravenous Q4H PRN Otoniel García MD    lisinopril 20 mg Oral Daily Denny Prechtel, DO    melatonin 3 mg Oral HS PRN Denny Prechtel, DO    metoprolol succinate 100 mg Oral Daily Denny Prechtel, DO    mirtazapine 7 5 mg Oral HS Denny Prechtel, DO    nicotine 1 patch Transdermal Q24H Denny Prechtel, DO    pantoprazole 40 mg Oral Early Morning Denny Prechtel, DO    rivaroxaban 10 mg Oral Daily With [employer] Prechtel, DO    senna-docusate sodium 1 tablet Oral Daily Diane Kimble DO         Today, Patient Was Seen By: Andres Gabriel MD    ** Please Note: Dictation voice to text software may have been used in the creation of this document   **

## 2018-12-16 NOTE — ASSESSMENT & PLAN NOTE
Start empically on Ancef; received vanco in ER check procalcitonin pending  Recent vascular procedure 1 week ago

## 2018-12-16 NOTE — ASSESSMENT & PLAN NOTE
68yo female with PMH HTN, smoker, PAD with extensive vascular history:  -s/p aorto-bifemoral graft (Bipin-Arun) @ [facility] 12/10/2010  -s/p R fem-BK pop bypass w/insitu GSV Wendy Reilly) @ [facility] 12/6/2013  -s/p L DFA-BK pop bypass w/GSV Lo Mejia) @ [facility] 1/20/2016  -s/p L CFA angioplasty with stent 12/6/18 by LMD (for acute ischemic changes to LLE)    -she has left leg swelling; LEV 12/15/18 negative for DVT (notable biphasic signals on LEV to left foot)  -she has palpable DP pulse to the Left foot, doppler signals to the PT/Peroneal  -foot warm, motor and sensory intact    Recommendations:  -patient likely has some reperfusion to the left foot/leg  She states the swelling has gotten much better, has some pain with walking but she contributes that to the edema   -continue medical management; ASA/Statin  -she is on Xarelto for PAF  -she has appointment with [facility] on 12/19   -questionable cellulitis to LLE    No appreciative signs on exam  Afebrile, WBC normal   ABX per primary team  Would consider d/c, defer to primary team

## 2018-12-16 NOTE — CONSULTS
Consult- Carine Johnson 1955, 70 y o  female MRN: [id number]    Unit/Bed#: -01 Encounter: [id number]    Primary Care Provider: No primary care provider on file  Date and time admitted to hospital: 12/14/2018  6:09 PM      Inpatient consult to Vascular Surgery  Consult performed by: Mae Elmore ordered by: Venson Severance          Left arm swelling   Assessment & Plan    71 yo female with PMH of HTN, HLD, active DLBC lymphoma on chemo @ [facility], new onset PAF on Xarelto and severe aortoiliac and infrainguinal arterial occlusive disease, s/p aorto-bifemoral bypass graft '10 and bilateral fem-BK pop bypass w/vein (R '13, L '16) @ [facility], and most recently a L CFA angioplasty with stent for acute ischemia by Dr Antonio Ruvalcaba Doctor  This procedure occurred through a Left Brachial artery access  Patient presents with swelling and palpable mass to the left upper arm     --12/14/18 Left Upper Extremity Venous Duplex:  No evidence of acute or chronic deep vein thrombosis  Superficial thrombophlebitis noted in the patients cephalic vein (distal to mid forearm) and basilic vein in the proximal forearm  Doppler evaluation shows a normal response to augmentation maneuvers  Tech note: There is a 7 7 x 4 2 complex collection in the patient's palpable area (mid upper arm)  There is peripheral vascularity noted  The patient's area is tender to touch  There does not appear to be any connection with surrounding arteries  --12/14/18 Soft tissue U/S LUE:  well-circumscribed collection in this area which measures 7 8 x 2 9 x 4 4 cm  This collection has heterogenous and hypoechoic contents, with peripheral vascularity  No internal vascularity  This is just superficial to a neurovascular bundle, however there is no communication with these vessels  Consider hematoma, abscess, and thrombosed pseudoaneurysm  On Exam:  LUE is ecchymotic    There is a mass measuring apprx 9cm length x 8 5cm width which is firm to palpation and tender to patient  There is a doppler signal throughout the mass  She is motor and sensory intact to LUE, denies pain  Recommendations:  -elevate the arm on 2 pillows  -if any changes to LUE such as decrease in motor, sensation, please call  -Discussed with Dr Stephanie Larios  Will obtain formal LUE pseudoaneurysm study  PAD (peripheral artery disease) ([facility] [address] )   Assessment & Plan    70yo female with PMH HTN, smoker, PAD with extensive vascular history:  -s/p aorto-bifemoral graft (BipinDavid) @ [facility] 12/10/2010  -s/p R fem-BK pop bypass w/insitu GSV Leopoldo Archer) @ [facility] 12/6/2013  -s/p L DFA-BK pop bypass w/GSV Leonela Veloz) @ [facility] 1/20/2016  -s/p L CFA angioplasty with stent 12/6/18 by LMD (for acute ischemic changes to LLE)    -she has left leg swelling; LEV 12/15/18 negative for DVT (notable biphasic signals on LEV to left foot)  -she has palpable DP pulse to the Left foot, doppler signals to the PT/Peroneal  -foot warm, motor and sensory intact    Recommendations:  -patient likely has some reperfusion to the left foot/leg  She states the swelling has gotten much better, has some pain with walking but she contributes that to the edema   -continue medical management; ASA/Statin  -she is on Xarelto for PAF  -she has appointment with [facility] on 12/19   -questionable cellulitis to LLE    No appreciative signs on exam  Afebrile, WBC normal   ABX per primary team  Would consider d/c, defer to primary team      Benign essential hypertension   Assessment & Plan    BP controlled on medical management  -continue management per primary team         Consult Note - Vascular Surgery     Consulting Service: SLIM    Chief Complaint:  Left arm swelling    HPI: Elijah Bernal is a 70 y o  female smoker w/hx HTN, HLD, nicotine dependence, active diffuse large B-cell lymphoma on chemotherapy Q 3 weeks followed by [facility], new onset PAF Sept '18 on Xarelto and severe aortoiliac and infrainguinal arterial occlusive disease, s/p aorto-bifemoral bypass graft '10 and bilateral fem-BK pop bypass grafts w/vein (R '13, L '16) @ [facility], who presented to [facility] 2 weeks ago with numbness, weakness and motor changes to the LLE underwent a left lower extremity angiogram via left brachial access, with common femoral artery angioplasty and stent placement on 12/06/2018 by Dr Karla Bowie Doctor who presents with left upper extremity swelling, left lower extremity swelling following recent angioplasty and intervention  Patient reports that she has had some left upper extremity pain directly above the antecubital as well as swelling to this arm  She reports significant swelling to the left lower extremity as well as some residual pain from the swelling  Patient denies any left upper extremity motor weakness, sensation changes, coolness or paleness to the left upper extremity  She denies any motor or sensory changes to the left lower extremity  She has been taking her medications as prescribed and continues with to contain patch inpatient (smoking at home)        Review of Systems:  General: positive for  - fatigue  Cardiovascular: no chest pain or dyspnea on exertion  Respiratory: no cough, shortness of breath, or wheezing  Gastrointestinal: no abdominal pain, change in bowel habits, or black or bloody stools  Genitourinary ROS: no dysuria, trouble voiding, or hematuria  Musculoskeletal ROS: positive for - swelling in arm - left and leg - left  Neurological ROS: positive for - Bell's palsy/right facial droop  Hematological and Lymphatic ROS: negative  Dermatological ROS: positive for Ecchymosis to left upper extremity with palpable mass above antecubital  Psychological ROS: negative  Ophthalmic ROS: negative  ENT ROS: negative    Past Medical History:  Past Medical History:   Diagnosis Date    Bell's palsy     Cancer [facility] [address] )     lymphoma    Diffuse large B cell lymphoma ([facility] [address] )     Hypertension     PAD (peripheral artery disease) (HCC)     PAF (paroxysmal atrial fibrillation) ([facility] )     PVD (peripheral vascular disease) ([facility] )        Past Surgical History:  Past Surgical History:   Procedure Laterality Date    ARTERIOGRAM Left 12/6/2018    Procedure: LEFT lower extremity angiography, with Left lower extremity run-off, stent and angioplasty of Left Common Femoral Artery (Left Brachial Access); Surgeon: Taya Pedersen MD;  [facility]: [facility];  Service: Vascular    CARDIAC SURGERY      CARPAL TUNNEL RELEASE      HYSTERECTOMY      IR ABDOMINAL ANGIOGRAPHY / INTERVENTION  12/6/2018    TONSILLECTOMY         Social History:  History   Alcohol Use No     History   Drug Use No     History   Smoking Status    Current Some Day Smoker    Packs/day: 0 25    Years: 40 00   Smokeless Tobacco    Never Used       Family History:  Family History   Problem Relation Age of Onset   Ardeth Needs Cancer Mother     Breast cancer Mother     Heart attack Father        Allergies:   Allergies   Allergen Reactions    Oxycodone-Acetaminophen Rash       Medications:  Current Facility-Administered Medications   Medication Dose Route Frequency    acyclovir (ZOVIRAX) tablet 400 mg  400 mg Oral BID    ALPRAZolam (XANAX) tablet 0 25 mg  0 25 mg Oral BID PRN    amLODIPine (NORVASC) tablet 10 mg  10 mg Oral Daily    aspirin chewable tablet 81 mg  81 mg Oral Daily    atorvastatin (LIPITOR) tablet 40 mg  40 mg Oral HS    ceFAZolin (ANCEF) IVPB (premix) 2,000 mg  2,000 mg Intravenous Q8H    cholecalciferol (VITAMIN D3) tablet 2,000 Units  2,000 Units Oral Daily    escitalopram (LEXAPRO) tablet 5 mg  5 mg Oral Daily    HYDROmorphone (DILAUDID) injection 0 2 mg  0 2 mg Intravenous Q4H PRN    lisinopril (ZESTRIL) tablet 20 mg  20 mg Oral Daily    melatonin tablet 3 mg  3 mg Oral HS PRN    metoprolol succinate (TOPROL-XL) 24 hr tablet 100 mg  100 mg Oral Daily    mirtazapine (REMERON) tablet 7 5 mg  7 5 mg Oral HS    nicotine (NICODERM CQ) 14 mg/24hr TD 24 hr patch 1 patch  1 patch Transdermal Q24H    pantoprazole (PROTONIX) EC tablet 40 mg  40 mg Oral Early Morning    rivaroxaban (XARELTO) tablet 10 mg  10 mg Oral Daily With Dinner    senna-docusate sodium (SENOKOT S) 8 6-50 mg per tablet 1 tablet  1 tablet Oral Daily       Vitals:  Vitals:    12/15/18 1500 12/15/18 2303 12/16/18 0736 12/16/18 0755   BP: 111/56 104/65 101/58    BP Location: Right arm Right arm Right arm    Pulse: 73 76 71    Resp: 18 18 18    Temp: 98 6 °F (37 °C) 98 5 °F (36 9 °C) 99 1 °F (37 3 °C)    TempSrc: Oral Oral Oral    SpO2: 97% 97% 96%    Weight:   49 4 kg (109 lb)    Height:   5' 2" (1 575 m) 5' 2" (1 575 m)       I/Os:  No intake/output data recorded    I/O this shift:  In: -   Out: 800 [Urine:800]    Lab Results and Cultures:   CBC with diff:   Lab Results   Component Value Date    WBC 4 70 12/16/2018    HGB 8 6 (L) 12/16/2018    HCT 27 6 (L) 12/16/2018    MCV 98 12/16/2018     12/16/2018    MCH 30 5 12/16/2018    MCHC 31 2 (L) 12/16/2018    RDW 18 7 (H) 12/16/2018    MPV 10 3 12/16/2018    NRBC 0 12/16/2018   ,   BMP/CMP:  Lab Results   Component Value Date    K 4 1 12/16/2018     12/16/2018    CO2 27 12/16/2018    CO2 26 11/30/2018    BUN 9 12/16/2018    CREATININE 0 97 12/16/2018    GLUCOSE 118 11/30/2018    CALCIUM 8 6 12/16/2018    AST 15 11/30/2018    ALT 16 11/30/2018    ALKPHOS 137 (H) 11/30/2018    EGFR 61 12/16/2018    EGFR 96 11/30/2018   ,   Lipid Panel: No results found for: CHOL,   Coags:   Lab Results   Component Value Date    PTT 40 (H) 12/14/2018    INR 1 89 (H) 12/14/2018   ,     Blood Culture:   Lab Results   Component Value Date    BLOODCX No Growth at 24 hrs  12/14/2018   ,   Urinalysis:   Lab Results   Component Value Date    COLORU Yellow 12/03/2018    CLARITYU Slightly Cloudy 12/03/2018    SPECGRAV 1 020 12/03/2018    PHUR 7 5 12/03/2018    LEUKOCYTESUR Negative 12/03/2018    NITRITE Negative 12/03/2018    GLUCOSEU Negative 12/03/2018    KETONESU Negative 12/03/2018 BILIRUBINUR Negative 2018    BLOODU Negative 2018   ,   Urine Culture: No results found for: URINECX,   Wound Culure: No results found for: WOUNDCULT    Imagin2018 upper extremity ultrasound/soft tissue:  FINDINGS:     Focused ultrasound performed in the left upper extremity at the site of palpable abnormality  There is a well-circumscribed collection in this area which measures 7 8 x 2 9 x 4 4 cm  This collection has heterogenous and hypoechoic contents, with   peripheral vascularity  No internal vascularity  This is just superficial to a neurovascular bundle, however there is no communication with these vessels  Additionally, the patient was tender at this site      IMPRESSION:     Collection as described  This is present at a site of recent vascular access  Differential includes hematoma, abscess, and thrombosed pseudoaneurysm  Please see concurrent vascular study for evaluation of the latter  2018 upper extremity Venous duplex:  Impression  RIGHT UPPER LIMB LIMITED:  Evaluation shows no evidence of thrombus in the internal jugular vein,  subclavian vein, and the brachiocephalic vein  LEFT UPPER LIMB:  No evidence of acute or chronic deep vein thrombosis  Superficial thrombophlebitis noted in the patients cephalic vein (distal to mid  forearm) and basilic vein in the proximal forearm  Doppler evaluation shows a normal response to augmentation maneuvers  Tech note: There is a 7 7 x 4 2 complex collection in the patient's palpable  area (mid upper arm)  There is peripheral vascularity noted  The patient's area  is tender to touch  There does not appear to be any connection with surrounding  Arteries  2018 lower extremity venous duplex  Impression:  RIGHT LOWER LIMB LIMITED:  Evaluation shows no evidence of thrombus in the common femoral vein  Doppler evaluation shows a normal response to augmentation maneuvers       LEFT LOWER LIMB:  No evidence of acute or chronic deep vein thrombosis  No evidence of superficial thrombophlebitis noted  Doppler evaluation shows a normal response to augmentation maneuvers  Popliteal, posterior tibial and peroneal arterial Doppler waveforms are  biphasic  Physical Exam:    General appearance: alert and oriented, in no acute distress, cooperative and no distress  Head: Normocephalic, without obvious abnormality, atraumatic  Eyes: EOMIs, PERRL   Neck: no adenopathy, no carotid bruit, no JVD, supple, symmetrical, trachea midline and thyroid not enlarged, symmetric, no tenderness/mass/nodules  Lungs: clear to auscultation bilaterally  Chest wall: no tenderness  Heart: regular rate and rhythm, S1, S2 normal, no murmur, click, rub or gallop  Abdomen: soft, non-tender; bowel sounds normal; no masses,  no organomegaly  Extremities: Left upper extremity with palpable mass on the upper arm, there is Doppler signal throughout the mass as well as brachial signal   Radial and ulnar signals were up so Doppler on the left upper extremity  Patient denies any motor sensory changes to the left upper extremity, extremity is warm finger tips cool  Left lower extremity with + 2 edema, Doppler signal to PT/peroneal with a palpable DP pulse, Doppler signal to bypass graft scar on lower leg as well  Right lower extremity with Doppler signal to PT/peroneal and palpable pulses to DP  Right upper extremity warm, dry intact    Skin: ecchymoses - arm(s) left, skin otherwise intact and warm  Neurologic: Grossly normal, right facial droop noted from [place] Palsy    Wound/Incision:    LUE at the upper arm, ecchymosis noted, large palpable firm mass with doppler signal throughout the mass       Pulse exam:  Radial: Right: 1+ Left[de-identified] doppler signal  Ulnar: Right: 1+ Left[de-identified] doppler signal  Femoral: Right: 2+ Left: 2+  Popliteal: Right: non-palpable Left: non-palpable  DP: Right: 2+ Left: 2+  PT: Right: doppler signal Left: doppler signal    Doppler signals to the Right and Left PT/Peroneal are biphasic        Leyda Lo, [address]  12/16/2018  [facility]  [phone]

## 2018-12-16 NOTE — ASSESSMENT & PLAN NOTE
Follows with oncology at [facility] on chemo last treatment was in October-November

## 2018-12-16 NOTE — CONSULTS
Consultation - General Surgery   Lawrence Bullock 70 y o  female MRN: [id number]  Unit/Bed#: -01 Encounter: [id number]    Assessment/Plan     Assessment:  Hematoma of the left arm, access for angioplasty of the left lower extremity  Swelling of the left lower extremity, improving  The swelling most likely reperfusion ischemia  Bell's palsy  History of diffuse large B-cell lymphoma  Hypertension  Peripheral vascular disease  Atrial fibrillation    Plan:  There is no evidence of infection from the left arm hematoma, I reviewed the ultrasound report  Swelling of the left lower extremity is improving, most likely due to reperfusion ischemia, recommend keep the left lower extremity elevated  There is no evidence of infection  No surgical intervention is needed for the arm or left lower extremity  History of Present Illness     HPI:  Lawrence Bullock is a 70 y o  female who presents to the hospital because of swelling of the left lower extremity and left arm  The patient had angioplasty with access via the left brachial artery for reperfusion of the left lower extremity for which she had angioplasty on December 6  The hematoma has been present since then  The patient denied having any pain from the left arm, redness, increased temperature  She stated that the swelling from the left lower extremity has improved, she denies having any numbness or increasing pain from the left lower extremity  Consults    Review of Systems   Constitutional: Negative for chills and fever  HENT: Negative for nosebleeds and sore throat  Eyes: Negative for pain and discharge  Respiratory: Negative for cough and shortness of breath  Cardiovascular: Negative for chest pain and palpitations  Gastrointestinal: Negative for abdominal pain, blood in stool, constipation, diarrhea and nausea  Endocrine: Negative for cold intolerance and heat intolerance  Genitourinary: Negative for dysuria and hematuria     Neurological: Negative for seizures and headaches  Hematological: Negative for adenopathy  Does not bruise/bleed easily  Psychiatric/Behavioral: Negative for confusion  The patient is nervous/anxious  Historical Information   Past Medical History:   Diagnosis Date    Bell's palsy     Cancer ([facility] )     lymphoma    Diffuse large B cell lymphoma ([facility] )     Hypertension     PAD (peripheral artery disease) (HCC)     PAF (paroxysmal atrial fibrillation) (HCC)     PVD (peripheral vascular disease) ([facility] )      Past Surgical History:   Procedure Laterality Date    ARTERIOGRAM Left 12/6/2018    Procedure: LEFT lower extremity angiography, with Left lower extremity run-off, stent and angioplasty of Left Common Femoral Artery (Left Brachial Access);   Surgeon: Willie Pedersen MD;  [facility];  Service: Vascular    CARDIAC SURGERY      CARPAL TUNNEL RELEASE      HYSTERECTOMY      IR ABDOMINAL ANGIOGRAPHY / INTERVENTION  12/6/2018    TONSILLECTOMY       Social History   History   Alcohol Use No     History   Drug Use No     History   Smoking Status    Current Some Day Smoker    Packs/day: 0 25    Years: 40 00   Smokeless Tobacco    Never Used     Family History: non-contributory    Meds/Allergies   all current active meds have been reviewed, current meds:   Current Facility-Administered Medications   Medication Dose Route Frequency    acyclovir (ZOVIRAX) tablet 400 mg  400 mg Oral BID    ALPRAZolam (XANAX) tablet 0 25 mg  0 25 mg Oral BID PRN    amLODIPine (NORVASC) tablet 10 mg  10 mg Oral Daily    aspirin chewable tablet 81 mg  81 mg Oral Daily    atorvastatin (LIPITOR) tablet 40 mg  40 mg Oral HS    ceFAZolin (ANCEF) IVPB (premix) 2,000 mg  2,000 mg Intravenous Q8H    cholecalciferol (VITAMIN D3) tablet 2,000 Units  2,000 Units Oral Daily    escitalopram (LEXAPRO) tablet 5 mg  5 mg Oral Daily    HYDROmorphone (DILAUDID) injection 0 2 mg  0 2 mg Intravenous Q4H PRN    lisinopril (ZESTRIL) tablet 20 mg  20 mg Oral Daily    melatonin tablet 3 mg  3 mg Oral HS PRN    metoprolol succinate (TOPROL-XL) 24 hr tablet 100 mg  100 mg Oral Daily    mirtazapine (REMERON) tablet 7 5 mg  7 5 mg Oral HS    nicotine (NICODERM CQ) 14 mg/24hr TD 24 hr patch 1 patch  1 patch Transdermal Q24H    pantoprazole (PROTONIX) EC tablet 40 mg  40 mg Oral Early Morning    rivaroxaban (XARELTO) tablet 10 mg  10 mg Oral Daily With Dinner    senna-docusate sodium (SENOKOT S) 8 6-50 mg per tablet 1 tablet  1 tablet Oral Daily    and PTA meds:   Prior to Admission Medications   Prescriptions Last Dose Informant Patient Reported? Taking?    CHELATED MAGNESIUM PO   Yes No   Sig: Take 4 tablets by mouth 3 (three) times a day   Cholecalciferol (VITAMIN D3) 2000 units TABS 12/14/2018 at Unknown time  Yes Yes   Sig: Take 1 tablet by mouth daily   Melatonin ER (MELADOX) 3 MG TBCR 12/13/2018 at Unknown time  Yes Yes   Sig: Take 1 tablet by mouth daily at bedtime   Specialty Vitamins Products (MAGNESIUM, AMINO ACID CHELATE,) 133 MG tablet   Yes Yes   Sig: Take 2 tablets by mouth Three times a day   acyclovir (ZOVIRAX) 400 MG tablet   Yes Yes   Sig: Take 400 mg by mouth 2 (two) times a day   amLODIPine (NORVASC) 10 mg tablet 12/14/2018 at Unknown time  Yes Yes   Sig: Take 10 mg by mouth daily   aspirin 81 mg chewable tablet 12/14/2018 at Unknown time  Yes Yes   Sig: Chew 81 mg daily   atorvastatin (LIPITOR) 40 mg tablet 12/13/2018 at Unknown time  Yes Yes   Sig: Take 40 mg by mouth daily at bedtime     escitalopram (LEXAPRO) 5 mg tablet 12/14/2018 at Unknown time  Yes Yes   Sig: Take 5 mg by mouth daily   lisinopril (ZESTRIL) 40 mg tablet 12/13/2018 at Unknown time Child Yes Yes   Sig: Take 20 mg by mouth daily     metoprolol succinate (TOPROL-XL) 100 mg 24 hr tablet 12/14/2018 at Unknown time  Yes Yes   Sig: Take 100 mg by mouth daily   mirtazapine (REMERON) 15 mg tablet 12/13/2018 at Unknown time  Yes Yes   Sig: Take 7 5 mg by mouth daily at bedtime     nicotine (NICODERM CQ) 14 mg/24hr TD 24 hr patch   Yes Yes   Sig: Place 1 patch on the skin every 24 hours   omeprazole (PriLOSEC) 20 mg delayed release capsule 12/14/2018 at Unknown time  Yes Yes   Sig: Take 20 mg by mouth daily   rivaroxaban (XARELTO) 10 mg tablet 12/14/2018 at Unknown time  Yes Yes   Sig: Take 10 mg by mouth daily with dinner   senna-docusate sodium (SENOKOT S) 8 6-50 mg per tablet Past Week at Unknown time  No Yes   Sig: Take 1 tablet by mouth daily As needed for constipation      Facility-Administered Medications: None     Allergies   Allergen Reactions    Oxycodone-Acetaminophen Rash       Objective   First Vitals:   Blood Pressure: (!) 85/53 (12/14/18 1758)  Pulse: 80 (12/14/18 1757)  Temperature: 98 6 °F (37 °C) (12/14/18 1757)  Temp Source: Oral (12/14/18 1757)  Respirations: 16 (12/14/18 1757)  Height: 5' 2" (157 5 cm) (12/16/18 0736)  Weight - Scale: 49 4 kg (109 lb) (12/16/18 0736)  SpO2: 99 % (12/14/18 1757)    Current Vitals:   Blood Pressure: 101/58 (12/16/18 0736)  Pulse: 71 (12/16/18 0736)  Temperature: 99 1 °F (37 3 °C) (12/16/18 0736)  Temp Source: Oral (12/16/18 0736)  Respirations: 18 (12/16/18 0736)  Height: 5' 2" (157 5 cm) (12/16/18 0755)  Weight - Scale: 49 4 kg (109 lb) (12/16/18 0736)  SpO2: 96 % (12/16/18 0736)      Intake/Output Summary (Last 24 hours) at 12/16/18 1240  Last data filed at 12/16/18 1101   Gross per 24 hour   Intake                0 ml   Output              800 ml   Net             -800 ml       Invasive Devices     Peripheral Intravenous Line            Peripheral IV 12/14/18 Right Forearm 1 day                Physical Exam   Constitutional: She is oriented to person, place, and time  She appears well-developed and well-nourished  No distress  Evidence of right-sided Bell's palsy   HENT:   Head: Normocephalic  Mouth/Throat: No oropharyngeal exudate  Eyes: Pupils are equal, round, and reactive to light  No scleral icterus     Neck: Normal range of motion  Neck supple  Cardiovascular: Normal rate and regular rhythm  No murmur heard  Pulmonary/Chest: Effort normal and breath sounds normal  No respiratory distress  Abdominal: Soft  She exhibits no mass  There is no tenderness  Musculoskeletal:   The left arm has a hematoma as described by the ultrasound, nontender to palpation and no evidence of infection  Left lower extremity has 3+ edema, without evidence of infection  Lymphadenopathy:     She has no cervical adenopathy  Neurological: She is alert and oriented to person, place, and time  Skin: No rash noted  No erythema  Psychiatric: She has a normal mood and affect  Her behavior is normal        Lab Results:   CBC:   Lab Results   Component Value Date    WBC 4 70 12/16/2018    HGB 8 6 (L) 12/16/2018    HCT 27 6 (L) 12/16/2018    MCV 98 12/16/2018     12/16/2018    MCH 30 5 12/16/2018    MCHC 31 2 (L) 12/16/2018    RDW 18 7 (H) 12/16/2018    MPV 10 3 12/16/2018    NRBC 0 12/16/2018   , CMP:   Lab Results   Component Value Date    SODIUM 138 12/16/2018    K 4 1 12/16/2018     12/16/2018    CO2 27 12/16/2018    BUN 9 12/16/2018    CREATININE 0 97 12/16/2018    CALCIUM 8 6 12/16/2018    EGFR 61 12/16/2018   , Coagulation: No results found for: PT, INR, APTT, Urinalysis: No results found for: [place]ed[place] Noon, SPECGRAV, PHUR, LEUKOCYTESUR, NITRITE, PROTEINUA, GLUCOSEU, KETONESU, BILIRUBINUR, BLOODU, Amylase: No results found for: AMYLASE, Lipase: No results found for: LIPASE  Imaging: I have personally reviewed pertinent reports  EKG, Pathology, and Other Studies: I have personally reviewed pertinent films in PACS       US extremity soft tissue [[id number]] Collected: 12/14/18 2047   Order Status: Completed Updated: 12/14/18 2056   Narrative:     Upper extremity ultrasound    INDICATION:  Swelling in the upper arm at site of vascular access site    COMPARISON:  None      FINDINGS:    Focused ultrasound performed in the left upper extremity at the site of palpable abnormality  Bree Hensley is a well-circumscribed collection in this area which measures 7 8 x 2 9 x 4 4 cm   This collection has heterogenous and hypoechoic contents, with   peripheral vascularity   No internal vascularity   This is just superficial to a neurovascular bundle, however there is no communication with these vessels   Additionally, the patient was tender at this site  Impression:       Collection as described   This is present at a site of recent vascular access   Differential includes hematoma, abscess, and thrombosed pseudoaneurysm  Please see concurrent vascular study for evaluation of the latter      The study was marked in [facility] for immediate notification

## 2018-12-16 NOTE — ASSESSMENT & PLAN NOTE
69 yo female with PMH of HTN, HLD, active DLBC lymphoma on chemo @ [facility], new onset PAF on Xarelto and severe aortoiliac and infrainguinal arterial occlusive disease, s/p aorto-bifemoral bypass graft '10 and bilateral fem-BK pop bypass w/vein (R '13, L '16) @ [facility], and most recently a L CFA angioplasty with stent for acute ischemia by Dr Dave Sweet Doctor  This procedure occurred through a Left Brachial artery access  Patient presents with swelling and palpable mass to the left upper arm     --12/14/18 Left Upper Extremity Venous Duplex:  No evidence of acute or chronic deep vein thrombosis  Superficial thrombophlebitis noted in the patients cephalic vein (distal to mid forearm) and basilic vein in the proximal forearm  Doppler evaluation shows a normal response to augmentation maneuvers  Tech note: There is a 7 7 x 4 2 complex collection in the patient's palpable area (mid upper arm)  There is peripheral vascularity noted  The patient's area is tender to touch  There does not appear to be any connection with surrounding arteries  --12/14/18 Soft tissue U/S LUE:  well-circumscribed collection in this area which measures 7 8 x 2 9 x 4 4 cm  This collection has heterogenous and hypoechoic contents, with peripheral vascularity  No internal vascularity  This is just superficial to a neurovascular bundle, however there is no communication with these vessels  Consider hematoma, abscess, and thrombosed pseudoaneurysm  On Exam:  LUE is ecchymotic  There is a mass measuring apprx 9cm length x 8 5cm width which is firm to palpation and tender to patient  There is a doppler signal throughout the mass  She is motor and sensory intact to LUE, denies pain  Recommendations:  -elevate the arm on 2 pillows  -if any changes to LUE such as decrease in motor, sensation, please call  -Discussed with Dr Stephanie Larios  Will obtain formal LUE pseudoaneurysm study

## 2018-12-17 VITALS
HEIGHT: 62 IN | RESPIRATION RATE: 18 BRPM | BODY MASS INDEX: 20.06 KG/M2 | TEMPERATURE: 98.4 F | DIASTOLIC BLOOD PRESSURE: 60 MMHG | HEART RATE: 77 BPM | WEIGHT: 109 LBS | OXYGEN SATURATION: 97 % | SYSTOLIC BLOOD PRESSURE: 127 MMHG

## 2018-12-17 PROBLEM — L02.416 CELLULITIS AND ABSCESS OF LEFT LEG: Status: RESOLVED | Noted: 2018-12-14 | Resolved: 2018-12-17

## 2018-12-17 PROBLEM — I82.409 DEEP VEIN THROMBOSIS (DVT) OF LOWER EXTREMITY (HCC): Status: RESOLVED | Noted: 2018-12-15 | Resolved: 2018-12-17

## 2018-12-17 PROBLEM — L03.116 CELLULITIS AND ABSCESS OF LEFT LEG: Status: RESOLVED | Noted: 2018-12-14 | Resolved: 2018-12-17

## 2018-12-17 PROCEDURE — 99222 1ST HOSP IP/OBS MODERATE 55: CPT | Performed by: INTERNAL MEDICINE

## 2018-12-17 PROCEDURE — 99231 SBSQ HOSP IP/OBS SF/LOW 25: CPT | Performed by: SURGERY

## 2018-12-17 PROCEDURE — 99239 HOSP IP/OBS DSCHRG MGMT >30: CPT | Performed by: GENERAL PRACTICE

## 2018-12-17 RX ADMIN — AMLODIPINE BESYLATE 10 MG: 10 TABLET ORAL at 10:00

## 2018-12-17 RX ADMIN — ESCITALOPRAM OXALATE 5 MG: 10 TABLET ORAL at 10:00

## 2018-12-17 RX ADMIN — CEFAZOLIN SODIUM 2000 MG: 2 SOLUTION INTRAVENOUS at 07:33

## 2018-12-17 RX ADMIN — NICOTINE 1 PATCH: 14 PATCH TRANSDERMAL at 09:58

## 2018-12-17 RX ADMIN — ACYCLOVIR 400 MG: 800 TABLET ORAL at 10:00

## 2018-12-17 RX ADMIN — METOPROLOL SUCCINATE 100 MG: 100 TABLET, FILM COATED, EXTENDED RELEASE ORAL at 10:00

## 2018-12-17 RX ADMIN — PANTOPRAZOLE SODIUM 40 MG: 40 TABLET, DELAYED RELEASE ORAL at 05:13

## 2018-12-17 RX ADMIN — ASPIRIN 81 MG 81 MG: 81 TABLET ORAL at 10:00

## 2018-12-17 RX ADMIN — HYDROMORPHONE HYDROCHLORIDE 0.2 MG: 1 INJECTION, SOLUTION INTRAMUSCULAR; INTRAVENOUS; SUBCUTANEOUS at 07:31

## 2018-12-17 RX ADMIN — LISINOPRIL 20 MG: 20 TABLET ORAL at 10:00

## 2018-12-17 RX ADMIN — VITAMIN D, TAB 1000IU (100/BT) 2000 UNITS: 25 TAB at 10:00

## 2018-12-17 NOTE — SOCIAL WORK
LOS 3  LACE 75  Yes 30 readmit  Patient was previously admitted to [facility] and had an angiogram procedure done there, discharged home  Patient visited her dtr and began tohave swelling on arm and legs  Patient came to this hospital to be treated  CM met with patient at bedside  Patient states she is currently temporarily staying with her dtrEmily in her two story home  Patient has been staying there since July 2018 when she was dx with cancer  There are three steps to enter the house from outside  Patient has a walker, can, hospital bed, shower chair and cammode  Patient has rehab hx in [place]  Patient is current with [facility]  [facility]  Patient has hx of anxiety and depression  Patient states she stopped smoking since she was dx with cancer in July  Patient states she does not have an advance directive  AD information is provided  Patient is is retired in Jan   Patient's dtr -Adilene Ann drives patient to doctor's appointments  Patient is cleared for discharge  Patient was informed of IMM and a copy of IMM is given  IMM is in the chart  CM phoned dtrEmily to inform of patient's discharge  Adilene Ann stated she will transport patient home today  Patient denied [facility] appointment because she is not in her own home and her PCP is currently far to travel  Patient states she will go back to her own home after she comletes her cancer treatments in two months where she will be closer to her pcp  CM will follow patient's needs nurse and SLIM notified  CM reviewed discharge planning process including the following: identifying help at home, patient preference for discharge planning needs, pharmacy preference, and availability of treatment team to discuss questions or concerns patient and/or family may have regarding understanding medications and recognizing signs and symptoms once discharged   CM also encouraged patient to follow up with all recommended appointments after discharge  Patient advised of importance for patient and family to participate in managing patients medical well being  CM name and role reviewed  Discharge Checklist reviewed and CM will continue to monitor for progress toward discharge goals in nursing and provider rounds

## 2018-12-17 NOTE — CONSULTS
Consultation - Infectious Disease   Elijah Bernal 70 y o  female MRN: [id number]  Unit/Bed#: -01 Encounter: [id number]      IMPRESSION & RECOMMENDATIONS:   Impression/Recommendations: This is a 70 y o  female, with severe PVD, status post left leg revascularization via left brachial artery access on 12/6, presented to the ER on 12/14 with left foot and leg pain, swelling and redness  She was admitted and started on IV cefazolin for presumptive cellulitis  Patient is much improved today  1  Left foot edema, erythema and pain  This is most likely secondary to reperfusion from recent revascularization  No clinical signs of cellulitis  Patient has no fever or leukocytosis  Procalcitonin was normal   Admission blood cultures are all negative  No antibiotic is needed for this  Discontinue antibiotic  Keep foot elevated to control edema  Serial exams  2  Left arm ecchymosis, secondary to recent access for revascularization  No clinical cellulitis  Conservative management  3  Left upper arm collection  This is most likely hematoma  Vascular surgery evaluation noted  Patient is minimally symptomatic  Monitor  Vascular surgery follow-up  4  Lymphoma  Patient is being followed by [facility]  At present, she is not neutropenic  Follow-up at [facility]  Hospitalization records reviewed in detail  Discussed with patient in detail regarding the above plan  Discussed with Dr Jessica Bowen from [facility] service  Okay for discharge from ID viewpoint  Thank you for this consultation  We will follow along with you  HISTORY OF PRESENT ILLNESS:  Reason for Consult:  Suspected left foot cellulitis  HPI: Elijah Bernal is a 70 y o  female, with multiple medical problems including PVD, status post left leg revascularization via the left brachial artery access earlier this month, came to the ER on 12/14 with left leg and foot swelling, pain and redness    On presentation, patient did not have fever or leukocytosis  She was admitted  Vancomycin was given for presumptive left leg and foot cellulitis  Patient was admitted  IV cefazolin was started  We are asked to evaluate the patient  Patient's left foot and leg swelling and pain is much improved now  In addition, she has extensive ecchymosis and mild pain in the left upper arm also  This is also getting better  No fever or chills  No other trauma to left arm or leg/foot  REVIEW OF SYSTEMS:  A complete 12 point system-based review was done  Except for what is noted in HPI above, ROS of systems is otherwise negative  PAST MEDICAL HISTORY:  Past Medical History:   Diagnosis Date    Bell's palsy     Cancer ([facility] )     lymphoma    Diffuse large B cell lymphoma ([place] [address] )     Hypertension     [facility]     PAF (paroxysmal atrial fibrillation) ([place] [address] )     PVD (peripheral vascular disease) ([place] [address] )      Past Surgical History:   Procedure Laterality Date    ARTERIOGRAM Left 12/6/2018    Procedure: LEFT lower extremity angiography, with Left lower extremity run-off, stent and angioplasty of Left Common Femoral Artery (Left Brachial Access); Surgeon: Delphine Pedersen MD;  [facility];  Service: Vascular    CARDIAC SURGERY      CARPAL TUNNEL RELEASE      HYSTERECTOMY      IR ABDOMINAL ANGIOGRAPHY / INTERVENTION  12/6/2018    TONSILLECTOMY       Problem list reviewed  FAMILY HISTORY:  Non-contributory    SOCIAL HISTORY:  History   Alcohol Use No     History   Drug Use No     History   Smoking Status    Current Some Day Smoker    Packs/day: 0 25    Years: 40 00   Smokeless Tobacco    Never Used       ALLERGIES:  Allergies   Allergen Reactions    Oxycodone-Acetaminophen Rash       MEDICATIONS:  All current active medications have been reviewed  Patient is currently on IV cefazolin      PHYSICAL EXAM:  Vitals:  Temp:  [98 3 °F (36 8 °C)-99 °F (37 2 °C)] 98 4 °F (36 9 °C)  HR:  [77] 77  Resp:  [16-18] 18  BP: (125-150)/(57-67) 127/60  SpO2:  [95 %-100 %] 97 %  Temp (24hrs), Av 6 °F (37 °C), Min:98 3 °F (36 8 °C), Max:99 °F (37 2 °C)  Current: Temperature: 98 4 °F (36 9 °C)     Physical Exam:  General:  Thin but not cachectic appearing, comfortable, nontoxic, in no acute distress  Awake, alert and oriented x 3  Eyes:  Conjunctive clear with no hemorrhages or effusions  Oropharynx:  No ulcers, no lesions, pharynx benign, no tonsillitis  Neck:  Supple, no lymphadenopathy, no mass, nontender  Lungs:  Expansion symmetric, no rales, no wheezing, no accessory muscle use  Cardiac:  Regular rate and rhythm, normal S1, normal S2, no murmurs  Abdomen:  Soft, nondistended, non-tender, no HSM  Extremities:  Left foot and leg with 1+ edema  No erythema or warmth  Minimal tenderness  DP pulses palpable  Left arm with ecchymosis but minimal tenderness  Skin:  No rashes, no ulcers  Neurological:  Moves all four extremities spontaneously, sensation grossly intact    LABS, IMAGING, & OTHER STUDIES:  Lab Results:  I have personally reviewed pertinent labs  Results from last 7 days  Lab Units 18  0439 12/15/18  0517 18  1929   POTASSIUM mmol/L 4 1 3 8 4 0   CHLORIDE mmol/L 103 103 100   CO2 mmol/L 27 26 29   BUN mg/dL 9 8 10   CREATININE mg/dL 0 97 0 78 0 83   EGFR ml/min/1 73sq m 61 80 74   CALCIUM mg/dL 8 6 8 2* 9 2       Results from last 7 days  Lab Units 18  0439 12/15/18  0517 18  1929   WBC Thousand/uL 4 70 4 90 6 41   HEMOGLOBIN g/dL 8 6* 8 2* 9 7*   PLATELETS Thousands/uL 237 219 263       Results from last 7 days  Lab Units 18  2230 18  2229   BLOOD CULTURE  No Growth at 48 hrs  No Growth at 48 hrs  Imaging Studies:   I have personally reviewed pertinent imaging study reports and images in PACS  Left arm ultrasound reviewed personally  Collection is noted  EKG, Pathology, and Other Studies:   I have personally reviewed pertinent reports

## 2018-12-17 NOTE — ASSESSMENT & PLAN NOTE
S/p procedure recently at [facility] in left leg has resolved  Vascular following for r/o pseudoaneurysm study today

## 2018-12-17 NOTE — UTILIZATION REVIEW
Continued Stay Review    Date: 12/16-17/2018    Vital Signs: /60 (BP Location: Right arm)   Pulse 77   Temp 98 4 °F (36 9 °C) (Oral)   Resp 18   Ht 5' 2" (1 575 m)   Wt 49 4 kg (109 lb)   SpO2 97%   BMI 19 94 kg/m²     Medications:   Scheduled Meds:   Current Facility-Administered Medications:  acyclovir 400 mg Oral BID   ALPRAZolam 0 25 mg Oral BID PRN   amLODIPine 10 mg Oral Daily   aspirin 81 mg Oral Daily   atorvastatin 40 mg Oral HS   cholecalciferol 2,000 Units Oral Daily   escitalopram 5 mg Oral Daily   HYDROmorphone 0 2 mg Intravenous Q4H PRN   lisinopril 20 mg Oral Daily   melatonin 3 mg Oral HS PRN   metoprolol succinate 100 mg Oral Daily   mirtazapine 7 5 mg Oral HS   nicotine 1 patch Transdermal Q24H   pantoprazole 40 mg Oral Early Morning   rivaroxaban 10 mg Oral Daily With Dinner   senna-docusate sodium 1 tablet Oral Daily     Continuous Infusions:    PRN Meds: ALPRAZolam    HYDROmorphone    melatonin    Abnormal Labs/Diagnostic Results:   Lab 12/16/18  0439   HEMOGLOBIN 8 6*   HEMATOCRIT 27 6*   MONOS PCT 23*   EOS PCT 7*      Lab 12/14/18  1929   INR 1 89*     Age/Sex: 70 y o  female     Assessment/Plan:   Deep vein thrombosis (DVT) of lower extremity (HCC)   Assessment & Plan     On xarelto      Ischemia of left lower extremity   Assessment & Plan     S/p procedure recently at [facility] in left leg has resolved      Lymphoma ([address] )   Assessment & Plan     Follows with oncology at [facility] on chemo last treatment was in October-November      Benign essential hypertension   Assessment & Plan     Continue home meds      Bell's palsy   Assessment & Plan     H/o noted      * Cellulitis and abscess of left leg   Assessment & Plan     Start empically on Ancef; received vanco in ER check procalcitonin pending  Recent vascular procedure 1 week ago            VTE Pharmacologic Prophylaxis:   Pharmacologic: Rivaroxaban (Xarelto)  Mechanical VTE Prophylaxis in Place: Yes    Current Patient Status: Inpatient   Certification Statement: The patient will continue to require additional inpatient hospital stay due to cellulitis     Discharge Plan: home    Musculoskeletal: She exhibits edema  Erythema left shin area; ecchymosis and hematomas of LUE both VERY much improved today; less painful to palpation      Consult- Yue Matthew 1955, 70 y o  female MRN: [id number]  Inpatient consult to Vascular Surgery        Left arm swelling   Assessment & Plan     71 yo female with PMH of HTN, HLD, active DLBC lymphoma on chemo @ [facility], new onset PAF on Xarelto and severe aortoiliac and infrainguinal arterial occlusive disease, s/p aorto-bifemoral bypass graft '10 and bilateral fem-BK pop bypass w/vein (R '13, L '16) @ [facility], and most recently a L CFA angioplasty with stent for acute ischemia by Dr Shanthi Collins Doctor  This procedure occurred through a Left Brachial artery access  Patient presents with swelling and palpable mass to the left upper arm      --12/14/18 Left Upper Extremity Venous Duplex:  No evidence of acute or chronic deep vein thrombosis  Superficial thrombophlebitis noted in the patients cephalic vein (distal to mid forearm) and basilic vein in the proximal forearm  Doppler evaluation shows a normal response to augmentation maneuvers  Tech note: There is a 7 7 x 4 2 complex collection in the patient's palpable area (mid upper arm)  There is peripheral vascularity noted  The patient's area is tender to touch  There does not appear to be any connection with surrounding arteries  --12/14/18 Soft tissue U/S LUE:  well-circumscribed collection in this area which measures 7 8 x 2 9 x 4 4 cm   This collection has heterogenous and hypoechoic contents, with peripheral vascularity   No internal vascularity   This is just superficial to a neurovascular bundle, however there is no communication with these vessels    Consider hematoma, abscess, and thrombosed pseudoaneurysm         On Exam:  LUE is ecchymotic  There is a mass measuring apprx 9cm length x 8 5cm width which is firm to palpation and tender to patient  There is a doppler signal throughout the mass  She is motor and sensory intact to LUE, denies pain      Recommendations:  -elevate the arm on 2 pillows  -if any changes to LUE such as decrease in motor, sensation, please call  -Discussed with Dr Emre Ramires  Will obtain formal LUE pseudoaneurysm study  PAD (peripheral artery disease) ([facility] [address] )   Assessment & Plan     68yo female with PMH HTN, smoker, PAD with extensive vascular history:  -s/p aorto-bifemoral graft (Bipin-Arun) @ [facility] 12/10/2010  -s/p R fem-BK pop bypass w/insitu G[facility] Orpha Se) @ [facility] 12/6/2013  -s/p L DFA-BK pop bypass w/GSV Yue Blair) @ [facility] 1/20/2016  -s/p L CFA angioplasty with stent 12/6/18 by LMD (for acute ischemic changes to LLE)     -she has left leg swelling; LEV 12/15/18 negative for DVT (notable biphasic signals on LEV to left foot)  -she has palpable DP pulse to the Left foot, doppler signals to the PT/Peroneal  -foot warm, motor and sensory intact     Recommendations:  -patient likely has some reperfusion to the left foot/leg  She states the swelling has gotten much better, has some pain with walking but she contributes that to the edema   -continue medical management; ASA/Statin  -she is on Xarelto for PAF  -she has appointment with [facility] on 12/19   -questionable cellulitis to LLE  No appreciative signs on exam  Afebrile, WBC normal   ABX per primary team  Would consider d/c, defer to primary team       Benign essential hypertension   Assessment & Plan     BP controlled on medical management  -continue management per primary team          Consultation - General Surgery   Gladys Cortes 70 y o  female MRN: [id number]  Assessment:  Hematoma of the left arm, access for angioplasty of the left lower extremity  Swelling of the left lower extremity, improving  The swelling most likely reperfusion ischemia    Bell's palsy  History of diffuse large B-cell lymphoma  Hypertension  Peripheral vascular disease  Atrial fibrillation     Plan:  There is no evidence of infection from the left arm hematoma, I reviewed the ultrasound report  Swelling of the left lower extremity is improving, most likely due to reperfusion ischemia, recommend keep the left lower extremity elevated  There is no evidence of infection  No surgical intervention is needed for the arm or left lower extremity      12/17/18 Discharge- Meg De La O 1955, 70 y o  female MRN: [id number]      Deep vein thrombosis (DVT) of lower extremity ([facility] [address] )   Assessment & Plan     On xarelto      Ischemia of left lower extremity   Assessment & Plan     S/p procedure recently at [facility] in left leg has resolved  Vascular following for r/o pseudoaneurysm study today      Lymphoma ([facility] [address] )   Assessment & Plan     Follows with oncology at [place] on chemo last treatment was in October-November      Benign essential hypertension   Assessment & Plan     Continue home meds      Bell's palsy   Assessment & Plan     H/o noted      * Cellulitis and abscess of left leg   Assessment & Plan     Start empically on Ancef; received vanco in ER check procalcitonin normal  Recent vascular procedure 1 week ago  Change to PO  Vascular consult and gen surg consult appreciated               Admission Date:         Admission Orders      Ordered         12/14/18 2141   Inpatient Admission (expected length of stay for this patient is greater than two midnights)  Once               Discharge Date: 12/17/18          Resolved Problems  Date Reviewed: 12/17/2018     None          Consultations During Hospital Stay:  · Gen surgery, vascular surgery     Significant Findings / Test Results:   · Cellulitis left leg  · R/o pseudoaneurysm left arm     Hospital Course:   Meg De La O is a 70 y o  female patient who originally presented to the hospital on 12/14/2018 due to left leg swelling and redness      Condition at Discharge: stable       [address] Utilization Review Department  Phone: [phone]; Fax [phone]  [email or web address]  ATTENTION: Please call with any questions or concerns to [phone]  and carefully listen to the prompts so that you are directed to the right person  Send all requests for admission clinical reviews, approved or denied determinations and any other requests to fax [phone]   All voicemails are confidential

## 2018-12-17 NOTE — DISCHARGE INSTRUCTIONS
Cellulitis   WHAT YOU NEED TO KNOW:   Cellulitis is a skin infection caused by bacteria  Cellulitis may go away on its own or you may need treatment  Your healthcare provider may draw a [place] around the outside edges of your cellulitis  If your cellulitis spreads, your healthcare provider will see it outside of the [place]  DISCHARGE INSTRUCTIONS:   Call 911 if:   · You have sudden trouble breathing or chest pain  Seek care immediately if:   · Your wound gets larger and more painful  · You feel a crackling under your skin when you touch it  · You have purple dots or bumps on your skin, or you see bleeding under your skin  · You have new swelling and pain in your legs  · The red, warm, swollen area gets larger  · You see red streaks coming from the infected area  Contact your healthcare provider if:   · You have a fever  · Your fever or pain does not go away or gets worse  · The area does not get smaller after 2 days of antibiotics  · Your skin is flaking or peeling off  · You have questions or concerns about your condition or care  Medicines:   · Antibiotics  help treat the bacterial infection  · NSAIDs , such as ibuprofen, help decrease swelling, pain, and fever  NSAIDs can cause stomach bleeding or kidney problems in certain people  If you take blood thinner medicine, always ask if NSAIDs are safe for you  Always read the medicine label and follow directions  Do not give these medicines to children under 10months of age without direction from your child's healthcare provider  · Acetaminophen  decreases pain and fever  It is available without a doctor's order  Ask how much to take and how often to take it  Follow directions  Read the labels of all other medicines you are using to see if they also contain acetaminophen, or ask your doctor or pharmacist  Acetaminophen can cause liver damage if not taken correctly   Do not use more than 4 grams (4,000 milligrams) total of acetaminophen in one day  · Take your medicine as directed  Contact your healthcare provider if you think your medicine is not helping or if you have side effects  Tell him or her if you are allergic to any medicine  Keep a list of the medicines, vitamins, and herbs you take  Include the amounts, and when and why you take them  Bring the list or the pill bottles to follow-up visits  Carry your medicine list with you in case of an emergency  Self-care:   · Elevate the area above the level of your heart  as often as you can  This will help decrease swelling and pain  Prop the area on pillows or blankets to keep it elevated comfortably  · Clean the area daily until the wound scabs over  Gently wash the area with soap and water  Pat dry  Use dressings as directed  · Place cool or warm, wet cloths on the area as directed  Use clean cloths and clean water  Leave it on the area until the cloth is room temperature  Pat the area dry with a clean, dry cloth  The cloths may help decrease pain  Prevent cellulitis:   · Do not scratch bug bites or areas of injury  You increase your risk for cellulitis by scratching these areas  · Do not share personal items, such as towels, clothing, and razors  · Clean exercise equipment  with germ-killing  before and after you use it  · Wash your hands often  Use soap and water  Wash your hands after you use the bathroom, change a child's diapers, or sneeze  Wash your hands before you prepare or eat food  Use lotion to prevent dry, cracked skin  · Wear pressure stockings as directed  You may be told to wear the stockings if you have peripheral edema  The stockings improve blood flow and decrease swelling  · Treat athlete's foot  This can help prevent the spread of a bacterial skin infection  Follow up with your healthcare provider within 3 days, or as directed: Your healthcare provider will check if your cellulitis is getting better   You may need different medicine  Write down your questions so you remember to ask them during your visits  © 2017 [address] Information is for End User's use only and may not be sold, redistributed or otherwise used for commercial purposes  All illustrations and images included in CareNotes® are the copyrighted property of A D A M [employer] Inc  or Arun Carrillo  The above information is an  only  It is not intended as medical advice for individual conditions or treatments  Talk to your doctor, nurse or pharmacist before following any medical regimen to see if it is safe and effective for you  How to Stop Smoking   WHAT YOU NEED TO KNOW:   You will improve your health and the health of others around you if you stop smoking  Your risk for heart and lung disease, cancer, stroke, heart attack, and vision problems will also decrease  You can benefit from quitting no matter how long you have smoked  DISCHARGE INSTRUCTIONS:   Prepare to stop smoking:  Nicotine is a highly addictive drug found in cigarettes  Withdrawal symptoms can happen when you stop smoking and make it hard to quit  These include anxiety, depression, irritability, trouble sleeping, and increased appetite  You increase your chances of success if you prepare to quit  · Set a quit date  Meg Larger a date that is within the next 2 weeks  Do not pick a day that you think may be stressful or busy  Write down the day or [place] it on your calender  · Tell friends and family that you plan to quit  Explain that you may have withdrawal symptoms when you try to quit  Ask them to support you  They may be able to encourage you and help reduce your stress to make it easier for you to quit  · Make a list of your reasons for quitting  Put the list somewhere you will see it every day, such as your refrigerator  You can look at the list when you have a craving       · Remove all tobacco and nicotine products from your home, car, and workplace  Also, remove anything else that will tempt you to smoke, such as lighters, matches, or ashtrays  Clean your car, home, and places at work that smell like smoke  The smell of smoke can trigger a craving  · Identify triggers that make you want to smoke  This may include activities, feelings, or people  Also write down 1 way you can deal with each of your triggers  For example, if you want to smoke as soon as you wake up, plan another activity during this time, such as exercise  · Make a plan for how you will quit  Learn about the tools that can help you quit, such as medicine, counseling, or nicotine replacement therapy  Choose at least 2 options to help you quit  Tools to help you stop smoking:   · Counseling  from a trained healthcare provider can provide you with support and skills to quit smoking  The provider will also teach you to manage your withdrawal symptoms and cravings  You may receive counseling from one counselor, in group therapy, or through phone therapy called a quit line  · Nicotine replacement therapy (NRT)  such as nicotine patches, gum, or lozenges may help reduce your nicotine cravings  You may get these without a doctor's order  Do not use e-cigarettes or smokeless tobacco in place of cigarettes or to help you quit  They still contain nicotine  · Prescription medicines  such as nasal sprays or nicotine inhalers may help reduce your withdrawal symptoms  Other medicines may also be used to reduce your urge to smoke  Ask your healthcare provider about these medicines  You may need to start certain medicines 2 weeks before your quit date for them to work well  · Hypnosis  is a practice that helps guide you through thoughts and feelings  Hypnosis may help decrease your cravings and make you more willing to quit  · Acupuncture therapy  uses very thin needles to balance energy channels in the body   This is thought to help decrease cravings and symptoms of nicotine withdrawal      · Support groups  let you talk to others who are trying to quit or have already quit  It may be helpful to speak with others about how they quit  Manage your cravings:   · Avoid situations, people, and places that tempt you to smoke  Go to nonsmoking places, such as libraries or restaurants  Understand what tempts you and try to avoid these things  · Keep your hands busy  Hold things such as a stress ball or pen  · Put candy or toothpicks in your mouth  Keep lollipops, sugarless gum, or toothpicks with you at all times  · Do not have alcohol or caffeine  These drinks may tempt you to smoke  Drink healthy liquids such as water or juice instead  · Reward yourself when you resist your cravings  Rewards will motivate you and help you stay positive  · Do an activity that distracts you from your craving  Examples include going for a walk, exercising, or cleaning  Prevent weight gain after you quit:  You may gain a few pounds after you quit smoking  It is healthier for you to gain a few pounds than to continue to smoke  The following can help you prevent weight gain:  · Eat healthy foods  These include fruits, vegetables, whole-grain breads, low-fat dairy products, beans, lean meats, and fish  Eat healthy snacks, such as low-fat yogurt, if you get hungry between meals  · Drink water before, during, and between meals  This will make your stomach feel full and help prevent you from overeating  Ask your healthcare provider how much liquid to drink each day and which liquids are best for you  · Exercise  Take a walk or do some kind of exercise every day  Ask your healthcare provider what exercise is right for you  This may help reduce your cravings and reduce stress  For support and more information:   · Smokefree  gov  Phone: 9- 551 - 043-4300  Web Address: www smokefree  gov  © 2017 [address] Information is for End User's use only and may not be sold, redistributed or otherwise used for commercial purposes  All illustrations and images included in CareNotes® are the copyrighted property of A D A M , Inc  or Arun Carrillo  The above information is an  only  It is not intended as medical advice for individual conditions or treatments  Talk to your doctor, nurse or pharmacist before following any medical regimen to see if it is safe and effective for you

## 2018-12-17 NOTE — UTILIZATION REVIEW
Talha Enciso RN Registered Nurse Signed   Utilization Review Date of Service: 12/15/2018  7:49 AM           Initial Clinical Review     Admission: Date/Time/Statement: 12/14/18 @ 2141            Orders Placed This Encounter   Procedures    Inpatient Admission (expected length of stay for this patient is greater than two midnights)       Standing Status:   Standing       Number of Occurrences:   1       Order Specific Question:   Admitting Physician       Answer:   Alma Alcantar [[id number]]       Order Specific Question:   Level of Care       Answer:   Med Surg [16]       Order Specific Question:   Estimated length of stay       Answer:   More than 2 Midnights       Order Specific Question:   Certification       Answer:   I certify that inpatient services are medically necessary for this patient for a duration of greater than two midnights  See H&P and MD Progress Notes for additional information about the patient's course of treatment             ED: Date/Time/Mode of Arrival:             ED Arrival Information      Expected Arrival Acuity Means of Arrival Escorted By Service Admission Type     - 12/14/2018 17:44 Emergent Walk-In Family Member Hospitalist Emergency     Arrival Complaint     post op swelling             Chief Complaint:        Chief Complaint   Patient presents with    Post-op Problem       patient is 8 days s/p angioplasty  c/o left arm ecchymosis and pain  also c/o LLE pain and swelling           History of Illness: Alec Devlin a 70 y  o  female who presents with left arm and leg pain and swelling   She recently was seen at [facility] about a week ago  Mata Pool had a vascular study procedure with a went into her left arm and did angioplasty to the left arm as well as stenting to her left leg   Since then she has had some bruising to the left arm as well as some swelling proximal to the antecubital fossa  Left leg is also swollen and red      PE : edema - + 1 LLE , Has ecchymoses mostly in the forearm   And also an apparent hematoma proximal to the antecubital fossa    Left lower extremity   Between the knee and the ankle there is redness warmth and swelling     ED Vital Signs:            ED Triage Vitals   Temperature Pulse Respirations Blood Pressure SpO2   12/14/18 1757 12/14/18 1757 12/14/18 1757 12/14/18 1758 12/14/18 1757   98 6 °F (37 °C) 80 16 (!) 85/53 99 %       Temp Source Heart Rate Source Patient Position - Orthostatic VS BP Location FiO2 (%)   12/14/18 1757 12/14/18 1757 12/14/18 1818 12/14/18 1818 --   Oral Monitor Lying Right arm         Pain Score           12/14/18 1757           5            Wt Readings from Last 1 Encounters:   12/09/18 50 2 kg (110 lb 10 7 oz)         Vital Signs (abnormal):   12/14/18 2200   --   71   16    86/50   92 %   None (Room air)   Lying   12/14/18 2100   --   71   16   104/57   95 %   None (Room air)   Lying      Abnormal Labs/Diagnostic Test Results: CRP   3 1, Ptt  40, pt inr   21 5  1 89, na  132, an gap   3, H&H   9 7  30 6  Venous duplex tiffany upper limbs -wnl   Venous duplex tiffany LE - wnl   US ext       Collection as described   This is present at a site of recent vascular access   Differential includes hematoma, abscess, and thrombosed pseudoaneurysm            ED Treatment:              Medication Administration from 12/14/2018 1744 to 12/14/2018 2314        Date/Time Order Dose Route Action Action by Comments       12/14/2018 1939 fentanyl citrate (PF) 100 MCG/2ML 50 mcg 50 mcg Intravenous Given Leroy Barajas RN         12/14/2018 2234 cefepime (MAXIPIME) 2,000 mg in dextrose 5 % 50 mL IVPB 2,000 mg Intravenous New Bag Leroy Barajas RN         12/14/2018 2232 sodium chloride 0 9 % bolus 1,000 mL 1,000 mL Intravenous New Bag Leroy Barajas RN               Past Medical/Surgical History:         Active Ambulatory Problems     Diagnosis Date Noted    Constipation 10/09/2018    Lower extremity weakness 11/30/2018    BRYANT (acute kidney injury) ([place] [address] ) 2018    Acute respiratory failure with hypoxia and hypercapnia ([place] [address] ) 2018    Alcohol dependence with withdrawal ([place] [address] ) 2018    Bell's palsy 10/05/2018    Bilateral pleural effusion 2018    Blurred vision, bilateral 2018    Carotid stenosis, asymptomatic, bilateral 2016    Depression with anxiety 2018    Elevated troponin 2018    Benign essential hypertension 2013    Hypercalcemia 2018    Essential hypertension 2018    Fall 2018    Hypokalemia 2018    Hyponatremia 2018    Tobacco dependency 2015    Neutropenic fever ([place] [address] ) 2018    Lymphoma ([place] [address] ) 2018    PAD (peripheral artery disease) ([place] [address] ) 2015    Paroxysmal atrial fibrillation (HCC) 2018    Elevated lactic acid level 2018    Leg pain 2018    Ischemia of left lower extremity      Peripheral vascular disease ([place] [address] ) 2018    Foot pain 2018    Weakness of lower extremity 2018              Past Medical History:   Diagnosis Date    Bell's palsy      Cancer ([place] [address] )      Diffuse large B cell lymphoma ([place] [address] )      Hypertension      PAD (peripheral artery disease) (HCC)      PAF (paroxysmal atrial fibrillation) (HCC)      PVD (peripheral vascular disease) ([facility])           Admitting Diagnosis: Post-op pain [G89 18]  Left leg cellulitis [L03 116]  Left arm swelling [M79 89]     Age/Sex: 70 y o  female     Assessment/Plan:       * Cellulitis and abscess of left leg   Assessment & Plan     Start empically on Ancef  Will ask ID to eval  This looks mild    She does not appear septic      Left arm swelling   Assessment & Plan     This was following a vascular procedure at [place] [address]  Eleni Hagan has significant swelling but really not any warmth  Rubina  does appear to be a pocket of fluid above her antecubital fossa   I think this likely represents a hematoma   I would like acute surgery to see her and see whether this needs to be drained   It does not look like an abscess to me             Admission Orders:  Scheduled Meds:   Current Facility-Administered Medications:  acyclovir 400 mg Oral BID       amLODIPine 10 mg Oral Daily       aspirin 81 mg Oral Daily       atorvastatin 40 mg Oral HS       cefazolin 2,000 mg Intravenous Q8H   Last Rate: 2,000 mg (12/15/18 0138)   cholecalciferol 2,000 Units Oral Daily       escitalopram 5 mg Oral Daily       lisinopril 20 mg Oral Daily       magnesium (amino acid chelate) 2 tablet Oral Daily       melatonin 3 mg Oral HS PRN       metoprolol succinate 100 mg Oral Daily       mirtazapine 7 5 mg Oral HS       nicotine 1 patch Transdermal Q24H       pantoprazole 40 mg Oral Early Morning       rivaroxaban 10 mg Oral Daily With Dinner       senna-docusate sodium 1 tablet Oral Daily          acute care sx consult   Reg diet   ID consult   Up w/ assist   12/15 cbc ,mg , bmp   Gluc  161, karo  8 2, H&H   8 2  25 7        [facility] Review Department  Phone: [phone]; Fax [phone]  [email or web address]  ATTENTION: Please call with any questions or concerns to [phone]  and carefully listen to the prompts so that you are directed to the right person  Send all requests for admission clinical reviews, approved or denied determinations and any other requests to fax [phone]   All voicemails are confidential

## 2018-12-17 NOTE — ASSESSMENT & PLAN NOTE
Start empically on Ancef; received vanco in ER check procalcitonin normal  Recent vascular procedure 1 week ago  Change to PO  Vascular consult and gen surg consult appreciated

## 2018-12-17 NOTE — PROGRESS NOTES
Progress Note - Vascular Surgery       Assessment / Plan:  Left upper extremity hematoma secondary to left brachial access angiogram performed on December 6, 2018 by my partner Dr Iris Kelley Doctor  I reviewed the duplex that was performed and also discuss with the ultrasound/vascular   There appears to be no communication with the brachial artery and there is no internal flow within the hematoma itself to suggest any pseudoaneurysm  So we can go ahead and cancel the duplex for today  I would recommend a repeat duplex prior to office visit follow up with Dr Iris Kelley  Recommend arm elevation and Ace bandage and ice packs for symptom relief  Left lower extremity swelling is likely secondary to reperfusion of a chronically ischemic leg  On exam she has a 2+ dorsalis pedis pulse indicating successful revascularization of the left common femoral artery  From vascular surgery standpoint she is clear for discharge home   ______________________________________________________________________    Subjective:  My left arm feels better  My left foot swelling has improved  I want to go home  Vitals:  /60 (BP Location: Right arm)   Pulse 77   Temp 98 4 °F (36 9 °C) (Oral)   Resp 18   Ht 5' 2" (1 575 m)   Wt 49 4 kg (109 lb)   SpO2 97%   BMI 19 94 kg/m²     I/Os:  I/O last 3 completed shifts:   In: 240 [P O :240]  Out: 1800 [Urine:1800]  I/O this shift:  In: 280 [P O :280]  Out: 750 [Urine:750]    Lab Results and Cultures:   CBC with diff:   Lab Results   Component Value Date    WBC 4 70 12/16/2018    HGB 8 6 (L) 12/16/2018    HCT 27 6 (L) 12/16/2018    MCV 98 12/16/2018     12/16/2018    MCH 30 5 12/16/2018    MCHC 31 2 (L) 12/16/2018    RDW 18 7 (H) 12/16/2018    MPV 10 3 12/16/2018    NRBC 0 12/16/2018   ,   BMP/CMP:  Lab Results   Component Value Date    K 4 1 12/16/2018     12/16/2018    CO2 27 12/16/2018    CO2 26 11/30/2018    BUN 9 12/16/2018    CREATININE 0 97 12/16/2018    GLUCOSE 118 11/30/2018    CALCIUM 8 6 12/16/2018    AST 15 11/30/2018    ALT 16 11/30/2018    ALKPHOS 137 (H) 11/30/2018    EGFR 61 12/16/2018    EGFR 96 11/30/2018   ,   Lipid Panel: No results found for: CHOL,   Coags:   Lab Results   Component Value Date    PTT 40 (H) 12/14/2018    INR 1 89 (H) 12/14/2018   ,     Blood Culture:   Lab Results   Component Value Date    BLOODCX No Growth at 24 hrs  12/14/2018   ,   Urinalysis:   Lab Results   Component Value Date    COLORU Yellow 12/03/2018    CLARITYU Slightly Cloudy 12/03/2018    SPECGRAV 1 020 12/03/2018    PHUR 7 5 12/03/2018    LEUKOCYTESUR Negative 12/03/2018    NITRITE Negative 12/03/2018    GLUCOSEU Negative 12/03/2018    KETONESU Negative 12/03/2018    BILIRUBINUR Negative 12/03/2018    BLOODU Negative 12/03/2018   ,   Urine Culture: No results found for: URINECX,   Wound Culure: No results found for: WOUNDCULT    Medications:  Current Facility-Administered Medications   Medication Dose Route Frequency    acyclovir (ZOVIRAX) tablet 400 mg  400 mg Oral BID    ALPRAZolam (XANAX) tablet 0 25 mg  0 25 mg Oral BID PRN    amLODIPine (NORVASC) tablet 10 mg  10 mg Oral Daily    aspirin chewable tablet 81 mg  81 mg Oral Daily    atorvastatin (LIPITOR) tablet 40 mg  40 mg Oral HS    ceFAZolin (ANCEF) IVPB (premix) 2,000 mg  2,000 mg Intravenous Q8H    cholecalciferol (VITAMIN D3) tablet 2,000 Units  2,000 Units Oral Daily    escitalopram (LEXAPRO) tablet 5 mg  5 mg Oral Daily    HYDROmorphone (DILAUDID) injection 0 2 mg  0 2 mg Intravenous Q4H PRN    lisinopril (ZESTRIL) tablet 20 mg  20 mg Oral Daily    melatonin tablet 3 mg  3 mg Oral HS PRN    metoprolol succinate (TOPROL-XL) 24 hr tablet 100 mg  100 mg Oral Daily    mirtazapine (REMERON) tablet 7 5 mg  7 5 mg Oral HS    nicotine (NICODERM CQ) 14 mg/24hr TD 24 hr patch 1 patch  1 patch Transdermal Q24H    pantoprazole (PROTONIX) EC tablet 40 mg  40 mg Oral Early Morning    rivaroxaban (XARELTO) tablet 10 mg  10 mg Oral Daily With Dinner    senna-docusate sodium (SENOKOT S) 8 6-50 mg per tablet 1 tablet  1 tablet Oral Daily       Imaging:  Venous duplex revealed no evidence of DVT in the left lower extremity  Left upper extremity duplex also reviewed by me which shows no evidence of the pseudoaneurysm from the brachial artery  There is a large hematoma overlying the brachial artery  Physical Exam:    General:  No acute distress, resting comfortably in the bed  CV:  Regular in rate and rhythm  Respiratory:  Nonlabored respirations  Abdominal:  Soft  Extremities:  Mild left lower extremity swelling  Palpable 2+ dorsalis pedis pulse  Neurologic:  Grossly intact  No numbness or motor deficits in the left hand  Brisk cap refill in the left finger  2+ radial pulse on the left upper extremity  Wound/Incision:  Ecchymosis and induration of the left upper extremity secondary to brachial access angiogram   Nonpulsatile mass overlying the puncture site suggestive of hematoma          Kaitlin Yee MD  12/17/2018  [facility]: [phone]

## 2018-12-17 NOTE — DISCHARGE SUMMARY
Discharge- Gladys Cortes 1955, 70 y o  female MRN: [id number]    Unit/Bed#: -01 Encounter: [id number]    Primary Care Provider: No primary care provider on file  Date and time admitted to hospital: 12/14/2018  6:09 PM        Deep vein thrombosis (DVT) of lower extremity ([address] )   Assessment & Plan    On xarelto     Ischemia of left lower extremity   Assessment & Plan    S/p procedure recently at [facility] in left leg has resolved  Vascular following for r/o pseudoaneurysm study today     Lymphoma [facility])   Assessment & Plan    Follows with oncology at [facility] on chemo last treatment was in October-November     Benign essential hypertension   Assessment & Plan    Continue home meds     Bell's palsy   Assessment & Plan    H/o noted     * Cellulitis and abscess of left leg   Assessment & Plan    Start empically on Ancef; received vanco in ER check procalcitonin normal  Recent vascular procedure 1 week ago  Change to PO  Vascular consult and gen surg consult appreciated               Discharging Physician / Practitioner: Spenser Oro MD  PCP: No primary care provider on file  Admission Date:   Admission Orders     Ordered        12/14/18 2141  Inpatient Admission (expected length of stay for this patient is greater than two midnights)  Once             Discharge Date: 12/17/18    Resolved Problems  Date Reviewed: 12/17/2018    None          Consultations During Hospital Stay:  · Gen surgery, vascular surgery    Procedures Performed:     ·     Significant Findings / Test Results:     · Cellulitis left leg  · R/o pseudoaneurysm left arm    Incidental Findings:   ·      Test Results Pending at Discharge (will require follow up):   ·      Outpatient Tests Requested:  ·     Complications:      Reason for Admission:     Hospital Course:     Gladys Cortes is a 70 y o  female patient who originally presented to the hospital on 12/14/2018 due to left leg swelling and redness      Please see above list of diagnoses and related plan for additional information  Condition at Discharge: stable     Discharge Day Visit / Exam:     Subjective:  Feels better-less left arma dn leg pain  Vitals: Blood Pressure: 127/60 (12/17/18 0705)  Pulse: 77 (12/17/18 0705)  Temperature: 98 4 °F (36 9 °C) (12/17/18 0705)  Temp Source: Oral (12/17/18 0705)  Respirations: 18 (12/17/18 0705)  Height: 5' 2" (157 5 cm) (12/16/18 0755)  Weight - Scale: 49 4 kg (109 lb) (12/16/18 0736)  SpO2: 97 % (12/17/18 0705)  Exam:   Physical Exam   Constitutional: She appears well-developed and well-nourished  HENT:   Head: Normocephalic and atraumatic  Eyes: Pupils are equal, round, and reactive to light  Cardiovascular: Normal rate and regular rhythm  Pulmonary/Chest: Effort normal and breath sounds normal    Abdominal: Soft  Bowel sounds are normal    Musculoskeletal: She exhibits edema  Left leg with improved erythema and edema       Discussion with Family:     Discharge instructions/Information to patient and family:   See after visit summary for information provided to patient and family  Provisions for Follow-Up Care:  See after visit summary for information related to follow-up care and any pertinent home health orders  Disposition:     Home    For Discharges to [facility] SNF:   · Not Applicable to this Patient - Not Applicable to this Patient    Planned Readmission:      Discharge Statement:  I spent 40 minutes discharging the patient  This time was spent on the day of discharge  I had direct contact with the patient on the day of discharge  Greater than 50% of the total time was spent examining patient, answering all patient questions, arranging and discussing plan of care with patient as well as directly providing post-discharge instructions  Additional time then spent on discharge activities      Discharge Medications:  See after visit summary for reconciled discharge medications provided to patient and family        ** Please Note: This note has been constructed using a voice recognition system **

## 2018-12-18 NOTE — PLAN OF CARE
Problem: DISCHARGE PLANNING - CARE MANAGEMENT  Goal: Discharge to post-acute care or home with appropriate resources  INTERVENTIONS:  - Conduct assessment to determine patient/family and health care team treatment goals, and need for post-acute services based on payer coverage, community resources, and patient preferences, and barriers to discharge  - Address psychosocial, clinical, and financial barriers to discharge as identified in assessment in conjunction with the patient/family and health care team  - Arrange appropriate level of post-acute services according to patient's   needs and preference and payer coverage in collaboration with the physician and health care team  - Communicate with and update the patient/family, physician, and health care team regarding progress on the discharge plan  - Arrange appropriate transportation to post-acute venues  Outcome: Progressing  LOS 3  LACE 75  Yes 30 readmit  Patient was previously admitted to [facility] and had an angiogram procedure done there, discharged home  Patient visited her dtr and began tohave swelling on arm and legs  Patient came to this hospital to be treated  CM met with patient at bedside  Patient states she is currently temporarily staying with her dtr-Fely in her two story home  Patient has been staying there since July 2018 when she was dx with cancer  There are three steps to enter the house from outside  Patient has a walker, can, hospital bed, shower chair and cammode  Patient has rehab hx in [place]  Patient is current with [facility]  [facility]  Patient has hx of anxiety and depression  Patient states she stopped smoking since she was dx with cancer in July  Patient states she does not have an advance directive  AD information is provided  Patient is is retired in Jan   Patient's dtr -Chico Jama drives patient to doctor's appointments  Patient is cleared for discharge    Patient was informed of IMM and a copy of IMM is given  IMM is in the chart  CM phoned [facility] to inform of patient's discharge  Chriss Floyd stated she will transport patient home today  Patient denied [facility] appointment because she is not in her own home and her PCP is currently far to travel  Patient states she will go back to her own home after she comletes her cancer treatments in two months where she will be closer to her pcp  CM will follow patient's needs nurse and SLIM notified

## 2018-12-19 ENCOUNTER — TELEPHONE (OUTPATIENT)
Dept: NEUROLOGY | Facility: CLINIC | Age: 63
End: 2018-12-19

## 2018-12-19 NOTE — TELEPHONE ENCOUNTER
----- Message from Shira James PA-C sent at 12/5/2018 10:33 AM EST -----  Regarding: HFU    Diagnosis/Reason for follow-up: LLE weakness  Subspecialty for follow-up: [facility] team3  Recommended timing for HFU: 4-6 weeks  Existing neurologist: none  Tests/Labs/Imaging ordered: none  Orders placed electronically: none  Additional notes: none    Thank you!

## 2018-12-20 ENCOUNTER — HOSPITAL ENCOUNTER (OUTPATIENT)
Dept: RADIOLOGY | Age: 63
Discharge: HOME | End: 2018-12-20
Payer: COMMERCIAL

## 2018-12-20 DIAGNOSIS — C83.30 RETICULOSARCOMA (HCC): ICD-10-CM

## 2018-12-20 LAB
BACTERIA BLD CULT: NORMAL
BACTERIA BLD CULT: NORMAL
GLUCOSE SERPL-MCNC: 80 MG/DL (ref 65–140)

## 2018-12-20 PROCEDURE — 82948 REAGENT STRIP/BLOOD GLUCOSE: CPT

## 2018-12-20 PROCEDURE — A9552 F18 FDG: HCPCS

## 2018-12-20 PROCEDURE — 78815 PET IMAGE W/CT SKULL-THIGH: CPT

## 2019-01-09 ENCOUNTER — OFFICE VISIT (OUTPATIENT)
Dept: VASCULAR SURGERY | Facility: CLINIC | Age: 64
End: 2019-01-09
Payer: COMMERCIAL

## 2019-01-09 VITALS
SYSTOLIC BLOOD PRESSURE: 120 MMHG | WEIGHT: 116 LBS | BODY MASS INDEX: 21.35 KG/M2 | TEMPERATURE: 97.5 F | HEART RATE: 78 BPM | HEIGHT: 62 IN | DIASTOLIC BLOOD PRESSURE: 70 MMHG

## 2019-01-09 DIAGNOSIS — C82.90 FOLLICULAR LYMPHOMA, UNSPECIFIED GRADE, UNSPECIFIED BODY REGION (HCC): ICD-10-CM

## 2019-01-09 DIAGNOSIS — I89.0 LYMPHEDEMA OF BOTH LOWER EXTREMITIES: ICD-10-CM

## 2019-01-09 DIAGNOSIS — I74.09 AORTOILIAC OCCLUSIVE DISEASE (HCC): ICD-10-CM

## 2019-01-09 DIAGNOSIS — I65.23 CAROTID STENOSIS, ASYMPTOMATIC, BILATERAL: ICD-10-CM

## 2019-01-09 DIAGNOSIS — M79.604 PAIN IN BOTH LOWER EXTREMITIES: ICD-10-CM

## 2019-01-09 DIAGNOSIS — I73.9 PAD (PERIPHERAL ARTERY DISEASE) (HCC): Primary | ICD-10-CM

## 2019-01-09 DIAGNOSIS — I48.0 PAROXYSMAL ATRIAL FIBRILLATION (HCC): ICD-10-CM

## 2019-01-09 DIAGNOSIS — M79.605 PAIN IN BOTH LOWER EXTREMITIES: ICD-10-CM

## 2019-01-09 PROCEDURE — 99215 OFFICE O/P EST HI 40 MIN: CPT | Performed by: SURGERY

## 2019-01-09 RX ORDER — CIPROFLOXACIN 500 MG/1
500 TABLET, FILM COATED ORAL 2 TIMES DAILY
COMMUNITY
Start: 2019-01-04 | End: 2019-01-09 | Stop reason: SDUPTHER

## 2019-01-09 RX ORDER — FLUCONAZOLE 100 MG/1
100 TABLET ORAL DAILY
COMMUNITY
Start: 2019-01-04 | End: 2019-01-11

## 2019-01-09 RX ORDER — CIPROFLOXACIN 500 MG/1
500 TABLET, FILM COATED ORAL 2 TIMES DAILY
Refills: 0 | COMMUNITY
Start: 2019-01-04 | End: 2019-09-16 | Stop reason: ALTCHOICE

## 2019-01-09 RX ORDER — PREDNISONE 50 MG/1
100 TABLET ORAL DAILY
COMMUNITY
Start: 2019-01-04 | End: 2019-09-16 | Stop reason: ALTCHOICE

## 2019-01-09 RX ORDER — ALPRAZOLAM 0.5 MG/1
0.25 TABLET ORAL
COMMUNITY
Start: 2019-01-04 | End: 2019-02-03

## 2019-01-09 NOTE — PROGRESS NOTES
Assessment/Plan:    Patient is a 71yo F with diffuse B-cell lymphoma currently undergoing chemotherapy, new onset atrial fibrillation (on Xarelto), hx of Bell's Palsy, hx of DVT, HTN, PAD with hx aortobifemoral bypass as well as bilateral femoral to below-knee popliteal artery bypasses with vein (all [facility] Gila Glaser)    Presented with L foot rest pain s/p angiogram with L CFA PTA/stent    PAD (peripheral artery disease) (HCC)  Aortoiliac occlusive disease (HCC)  -hx of ABF bypass  -hx of B fem-BK pop bypasses w/ vein  -presented with LLE rest pain  -reviewed pre-procedure LEADs R: 1 04/110/84 and L: 0 27/36/21  -s/p successful intervention for high grade bulky L CFA plaque (true CFA between the ABF limb and the takeoff of the fem-pop) with scoring balloon and stent 12/6/18 by Me  -L brachial hematoma now resolved  -L foot rest pain resolved but continues to have L>R edema    Carotid stenosis, asymptomatic, bilateral  -no bruits on exam  -patient following with [facility]    Follicular lymphoma, unspecified grade, unspecified body region [facility])  Lymphedema of both lower extremities  Pain in both lower extremities  -     Compression Stocking  -getting chemotherapy  -edema likely secondary to combination of lymphedema and reperfusion swelling  -instructed her to wear compression daily and remove at night for sleep; elevate legs whenever possible    Smoking  -discussed with patient importance of quitting for atherosclerosis, healing, etc in the setting of severe PAD and cancer; she is trying to but down; 1/2 PPD    Medications  -on Xarelto for afib/hx DVT/graft patency  -cont ASA/statin for life    Followup:  -patient prefers to be seen at [address] [place] [address] where she has had all of her prior vascular surgery, Dr Gila Glaser (recently retired)  -told patient to request op note and disk of recent intervention to bring to this appt (early March)  -also recommended LEADs/AOIL for new baseline after procedure      Subjective:      Patient ID: Brandi Phillip is a 75 y o  female  Pt is s/p A-gram 12/6  She had initial improvement with the pain and swelling in the L foot after the procedure  She has some rest pain in the L foot  She also recently suffered a fall and not sure if her increased pain is from that  She denies any open wounds  She takes ASA, Xarelto and statin daily  HPI:    Patient presents as f/u after angiogram     Patient presented acutely with LLE pain, edema  Found to have significant L CFA stenosis  She was treated with angiogram and intervention  Continues to get chemo for lymphoma  Had a recent fall  She notes relief of her rest pain, however still has pain in both legs and swelling, worse on the left  She has compression but wears them rarely  She elevates sometimes  Her light sensitivity is improved and she no longer needs her eye patch  The following portions of the patient's history were reviewed and updated as appropriate: allergies, current medications, past family history, past medical history, past social history, past surgical history and problem list     Review of Systems   Constitutional: Negative  HENT: Negative  Eyes: Positive for photophobia  Respiratory: Positive for cough  Cardiovascular: Positive for leg swelling (L>R)  Gastrointestinal: Positive for diarrhea  Endocrine: Negative  Genitourinary: Negative  Musculoskeletal: Positive for gait problem  Leg pain B   Skin: Negative  Negative for wound  Allergic/Immunologic: Negative  Neurological: Negative for speech difficulty and numbness  Hematological: Positive for adenopathy  Psychiatric/Behavioral: Negative  Objective:      /70 (BP Location: Right arm, Patient Position: Sitting, Cuff Size: Adult)   Pulse 78   Temp 97 5 °F (36 4 °C) (Tympanic)   Ht 5' 2" (1 575 m)   Wt 52 6 kg (116 lb)   BMI 21 22 kg/m²          Physical Exam   Constitutional: She is oriented to person, place, and time   She appears well-developed and well-nourished  HENT:   Head: Normocephalic and atraumatic  Eyes: Conjunctivae are normal    Neck: Normal range of motion  Neck supple  Cardiovascular: Normal rate, regular rhythm and normal heart sounds  No murmur heard  Pulses:       Radial pulses are 2+ on the right side, and 2+ on the left side  Femoral pulses are 2+ on the right side, and 2+ on the left side  Dorsalis pedis pulses are 2+ on the right side, and 2+ on the left side  Posterior tibial pulses are 0 on the right side, and 0 on the left side  2+ L brachial; no hematoma or ecchymosis, no expansile pulse   Pulmonary/Chest: Effort normal and breath sounds normal  No respiratory distress  She has no wheezes  Abdominal: Soft  She exhibits no distension  There is no tenderness  There is no rebound  Musculoskeletal: Normal range of motion  She exhibits edema (moderate pitting of L lower leg throughout; R moderate pitting at ankle and foot only)  Neurological: She is alert and oriented to person, place, and time  Skin: Skin is warm and dry  Skin shininess and hairless ness of B lower legs   Psychiatric: She has a normal mood and affect  Her behavior is normal    Nursing note and vitals reviewed          Vitals:    01/09/19 1337   BP: 120/70   BP Location: Right arm   Patient Position: Sitting   Cuff Size: Adult   Pulse: 78   Temp: 97 5 °F (36 4 °C)   TempSrc: Tympanic   Weight: 52 6 kg (116 lb)   Height: 5' 2" (1 575 m)       Patient Active Problem List   Diagnosis    Constipation    Lower extremity weakness    [facility]ANT [facility]) ([facility] )    Acute respiratory failure with hypoxia and hypercapnia (HCC)    Alcohol dependence with withdrawal (HCC)    Bell's palsy    Bilateral pleural effusion    Blurred vision, bilateral    Carotid stenosis, asymptomatic, bilateral    Depression with anxiety    Elevated troponin    Benign essential hypertension    Hypercalcemia    Essential hypertension    Fall    Hypokalemia    Hyponatremia    Tobacco dependency    Neutropenic fever (HCC)    Lymphoma (HCC)    PAD (peripheral artery disease) (HCC)    Paroxysmal atrial fibrillation (HCC)    Elevated lactic acid level    Leg pain    Peripheral vascular disease (HCC)    Foot pain    Weakness of lower extremity    Left arm swelling    Aortoiliac occlusive disease (HCC)    Lymphedema of both lower extremities       Past Surgical History:   Procedure Laterality Date    ARTERIOGRAM Left 12/6/2018    Procedure: LEFT lower extremity angiography, with Left lower extremity run-off, stent and angioplasty of Left Common Femoral Artery (Left Brachial Access); Surgeon: Abdoul Pedersen MD;  [facility];  Service: Vascular    CARDIAC SURGERY      CARPAL TUNNEL RELEASE      HYSTERECTOMY      IR ABDOMINAL ANGIOGRAPHY / INTERVENTION  12/6/2018    TONSILLECTOMY         Family History   Problem Relation Age of Onset    Cancer Mother     Breast cancer Mother     Heart attack Father        Social History     Social History    Marital status:      Spouse name: N/A    Number of children: N/A    Years of education: N/A     Occupational History    Not on file       Social History Main Topics    Smoking status: Current Some Day Smoker     Packs/day: 0 25     Years: 40 00    Smokeless tobacco: Never Used    Alcohol use No    Drug use: No    Sexual activity: Not on file     Other Topics Concern    Not on file     Social History Narrative    No narrative on file       Allergies   Allergen Reactions    Oxycodone-Acetaminophen Rash         Current Outpatient Prescriptions:     acyclovir (ZOVIRAX) 400 MG tablet, Take 400 mg by mouth 2 (two) times a day, Disp: , Rfl:     ALPRAZolam (XANAX) 0 5 mg tablet, Take 0 25 mg by mouth, Disp: , Rfl:     amLODIPine (NORVASC) 10 mg tablet, Take 10 mg by mouth daily, Disp: , Rfl:     aspirin 81 mg chewable tablet, Chew 81 mg daily, Disp: , Rfl:   atorvastatin (LIPITOR) 40 mg tablet, Take 40 mg by mouth daily at bedtime  , Disp: , Rfl:     Cholecalciferol (VITAMIN D3) 2000 units TABS, Take 1 tablet by mouth daily, Disp: , Rfl:     ciprofloxacin (CIPRO) 500 mg tablet, Take 500 mg by mouth 2 (two) times a day, Disp: , Rfl: 0    escitalopram (LEXAPRO) 5 mg tablet, Take 5 mg by mouth daily, Disp: , Rfl:     fluconazole (DIFLUCAN) 100 mg tablet, Take 100 mg by mouth daily, Disp: , Rfl:     lisinopril (ZESTRIL) 40 mg tablet, Take 20 mg by mouth daily  , Disp: , Rfl:     Melatonin ER (MELADOX) 3 MG TBCR, Take 1 tablet by mouth daily at bedtime, Disp: , Rfl:     metoprolol succinate (TOPROL-XL) 100 mg 24 hr tablet, Take 50 mg by mouth daily  , Disp: , Rfl:     mirtazapine (REMERON) 15 mg tablet, Take 7 5 mg by mouth daily at bedtime  , Disp: , Rfl:     omeprazole (PriLOSEC) 20 mg delayed release capsule, Take 20 mg by mouth daily, Disp: , Rfl:     rivaroxaban (XARELTO) 10 mg tablet, Take 10 mg by mouth daily with dinner, Disp: , Rfl:     senna-docusate sodium (SENOKOT S) 8 6-50 mg per tablet, Take 1 tablet by mouth daily As needed for constipation, Disp: 20 tablet, Rfl: 0    Specialty Vitamins Products (MAGNESIUM, AMINO ACID CHELATE,) 133 MG tablet, Take 2 tablets by mouth Three times a day, Disp: , Rfl:     predniSONE 50 mg tablet, Take 100 mg by mouth daily, Disp: , Rfl:

## 2019-01-09 NOTE — PATIENT INSTRUCTIONS
1) PAD/aortoiliac disease  -you had a successful procedure to treat the blockage in the artery in the left groin area  -you blood flow is now improved  -I would like you to get another ultrasound test of the legs and pelvis; if you would prefer to do this testing with your vascular surgeon at [facility], that is fine; please obtain your operative note and disc with pictures to show your surgeon    2) Leg swelling  -this is probably due to a combination of lymphedema and increased blood flow after your procedure  -please wear compression stockings daily and remove at night for sleep; elevate your legs as much as possible    3) Smoking  -it is incredibly important that you stop smoking to avoid worsening blockages in your blood vessels as well as to aid in healing

## 2019-04-10 ENCOUNTER — TRANSCRIBE ORDERS (OUTPATIENT)
Dept: ADMINISTRATIVE | Facility: HOSPITAL | Age: 64
End: 2019-04-10

## 2019-04-10 DIAGNOSIS — C83.38 DIFFUSE LARGE B-CELL LYMPHOMA OF LYMPH NODES OF MULTIPLE REGIONS (HCC): Primary | ICD-10-CM

## 2019-04-24 ENCOUNTER — HOSPITAL ENCOUNTER (OUTPATIENT)
Dept: MRI IMAGING | Facility: CLINIC | Age: 64
Discharge: HOME | End: 2019-04-24
Payer: COMMERCIAL

## 2019-04-24 DIAGNOSIS — C83.38 DIFFUSE LARGE B-CELL LYMPHOMA OF LYMPH NODES OF MULTIPLE REGIONS (HCC): ICD-10-CM

## 2019-04-24 PROCEDURE — A9585 GADOBUTROL INJECTION: HCPCS | Performed by: RADIOLOGY

## 2019-04-24 PROCEDURE — 70553 MRI BRAIN STEM W/O & W/DYE: CPT

## 2019-04-24 RX ADMIN — GADOBUTROL 5 ML: 604.72 INJECTION INTRAVENOUS at 12:02

## 2019-07-26 ENCOUNTER — TRANSCRIBE ORDERS (OUTPATIENT)
Dept: ADMINISTRATIVE | Facility: HOSPITAL | Age: 64
End: 2019-07-26

## 2019-07-26 DIAGNOSIS — D48.0: Primary | ICD-10-CM

## 2019-08-06 ENCOUNTER — HOSPITAL ENCOUNTER (OUTPATIENT)
Dept: CT IMAGING | Facility: HOSPITAL | Age: 64
Discharge: HOME | End: 2019-08-06
Payer: COMMERCIAL

## 2019-08-06 DIAGNOSIS — D48.0: ICD-10-CM

## 2019-08-06 PROCEDURE — 74177 CT ABD & PELVIS W/CONTRAST: CPT

## 2019-08-06 PROCEDURE — 71260 CT THORAX DX C+: CPT

## 2019-08-06 RX ADMIN — IOHEXOL 100 ML: 350 INJECTION, SOLUTION INTRAVENOUS at 14:35

## 2019-08-28 ENCOUNTER — TRANSCRIBE ORDERS (OUTPATIENT)
Dept: ADMINISTRATIVE | Facility: HOSPITAL | Age: 64
End: 2019-08-28

## 2019-08-28 DIAGNOSIS — C83.30 RETICULOSARCOMA (HCC): ICD-10-CM

## 2019-08-28 DIAGNOSIS — C83.30 DIFFUSE LARGE B-CELL LYMPHOMA, UNSPECIFIED BODY REGION (HCC): Primary | ICD-10-CM

## 2019-09-09 ENCOUNTER — APPOINTMENT (OUTPATIENT)
Dept: LAB | Facility: CLINIC | Age: 64
End: 2019-09-09
Payer: COMMERCIAL

## 2019-09-09 DIAGNOSIS — C83.30 DIFFUSE LARGE B-CELL LYMPHOMA, UNSPECIFIED BODY REGION (HCC): ICD-10-CM

## 2019-09-09 LAB
ALBUMIN SERPL BCP-MCNC: 3.6 G/DL (ref 3.5–5)
ALP SERPL-CCNC: 119 U/L (ref 46–116)
ALT SERPL W P-5'-P-CCNC: 19 U/L (ref 12–78)
ANION GAP SERPL CALCULATED.3IONS-SCNC: 7 MMOL/L (ref 4–13)
AST SERPL W P-5'-P-CCNC: 16 U/L (ref 5–45)
BASOPHILS # BLD AUTO: 0.06 THOUSANDS/ΜL (ref 0–0.1)
BASOPHILS NFR BLD AUTO: 1 % (ref 0–1)
BILIRUB SERPL-MCNC: 0.52 MG/DL (ref 0.2–1)
BUN SERPL-MCNC: 13 MG/DL (ref 5–25)
CALCIUM SERPL-MCNC: 9.3 MG/DL (ref 8.3–10.1)
CHLORIDE SERPL-SCNC: 95 MMOL/L (ref 100–108)
CO2 SERPL-SCNC: 23 MMOL/L (ref 21–32)
CREAT SERPL-MCNC: 0.9 MG/DL (ref 0.6–1.3)
EOSINOPHIL # BLD AUTO: 0.34 THOUSAND/ΜL (ref 0–0.61)
EOSINOPHIL NFR BLD AUTO: 5 % (ref 0–6)
ERYTHROCYTE [DISTWIDTH] IN BLOOD BY AUTOMATED COUNT: 15.1 % (ref 11.6–15.1)
GFR SERPL CREATININE-BSD FRML MDRD: 67 ML/MIN/1.73SQ M
GLUCOSE SERPL-MCNC: 83 MG/DL (ref 65–140)
HCT VFR BLD AUTO: 37.9 % (ref 34.8–46.1)
HGB BLD-MCNC: 12.7 G/DL (ref 11.5–15.4)
IMM GRANULOCYTES # BLD AUTO: 0.04 THOUSAND/UL (ref 0–0.2)
IMM GRANULOCYTES NFR BLD AUTO: 1 % (ref 0–2)
LYMPHOCYTES # BLD AUTO: 0.74 THOUSANDS/ΜL (ref 0.6–4.47)
LYMPHOCYTES NFR BLD AUTO: 10 % (ref 14–44)
MAGNESIUM SERPL-MCNC: 1.9 MG/DL (ref 1.6–2.6)
MCH RBC QN AUTO: 31.6 PG (ref 26.8–34.3)
MCHC RBC AUTO-ENTMCNC: 33.5 G/DL (ref 31.4–37.4)
MCV RBC AUTO: 94 FL (ref 82–98)
MONOCYTES # BLD AUTO: 1.28 THOUSAND/ΜL (ref 0.17–1.22)
MONOCYTES NFR BLD AUTO: 18 % (ref 4–12)
NEUTROPHILS # BLD AUTO: 4.69 THOUSANDS/ΜL (ref 1.85–7.62)
NEUTS SEG NFR BLD AUTO: 65 % (ref 43–75)
NRBC BLD AUTO-RTO: 0 /100 WBCS
PLATELET # BLD AUTO: 254 THOUSANDS/UL (ref 149–390)
PMV BLD AUTO: 10.8 FL (ref 8.9–12.7)
POTASSIUM SERPL-SCNC: 4.2 MMOL/L (ref 3.5–5.3)
PROT SERPL-MCNC: 7 G/DL (ref 6.4–8.2)
RBC # BLD AUTO: 4.02 MILLION/UL (ref 3.81–5.12)
SODIUM SERPL-SCNC: 125 MMOL/L (ref 136–145)
WBC # BLD AUTO: 7.15 THOUSAND/UL (ref 4.31–10.16)

## 2019-09-09 PROCEDURE — 85025 COMPLETE CBC W/AUTO DIFF WBC: CPT

## 2019-09-09 PROCEDURE — 83735 ASSAY OF MAGNESIUM: CPT

## 2019-09-09 PROCEDURE — 36415 COLL VENOUS BLD VENIPUNCTURE: CPT

## 2019-09-09 PROCEDURE — 80053 COMPREHEN METABOLIC PANEL: CPT

## 2019-09-10 ENCOUNTER — HOSPITAL ENCOUNTER (OUTPATIENT)
Dept: RADIOLOGY | Age: 64
Discharge: HOME | End: 2019-09-10
Payer: COMMERCIAL

## 2019-09-10 DIAGNOSIS — C83.30 RETICULOSARCOMA (HCC): ICD-10-CM

## 2019-09-10 LAB — GLUCOSE SERPL-MCNC: 102 MG/DL (ref 65–140)

## 2019-09-10 PROCEDURE — 78815 PET IMAGE W/CT SKULL-THIGH: CPT

## 2019-09-10 PROCEDURE — 82948 REAGENT STRIP/BLOOD GLUCOSE: CPT

## 2019-09-10 PROCEDURE — A9552 F18 FDG: HCPCS

## 2019-09-16 ENCOUNTER — OFFICE VISIT (OUTPATIENT)
Dept: PULMONOLOGY | Facility: CLINIC | Age: 64
End: 2019-09-16
Payer: COMMERCIAL

## 2019-09-16 VITALS
DIASTOLIC BLOOD PRESSURE: 64 MMHG | SYSTOLIC BLOOD PRESSURE: 110 MMHG | HEART RATE: 103 BPM | OXYGEN SATURATION: 99 % | HEIGHT: 63 IN | TEMPERATURE: 97.7 F | BODY MASS INDEX: 21.55 KG/M2 | WEIGHT: 121.6 LBS

## 2019-09-16 DIAGNOSIS — R91.1 LUNG NODULE: Primary | ICD-10-CM

## 2019-09-16 PROBLEM — E87.6 HYPOKALEMIA: Status: RESOLVED | Noted: 2018-08-28 | Resolved: 2019-09-16

## 2019-09-16 PROBLEM — D70.9 NEUTROPENIC FEVER (HCC): Status: RESOLVED | Noted: 2018-09-21 | Resolved: 2019-09-16

## 2019-09-16 PROBLEM — E83.52 HYPERCALCEMIA: Status: RESOLVED | Noted: 2018-08-26 | Resolved: 2019-09-16

## 2019-09-16 PROBLEM — R79.89 ELEVATED LACTIC ACID LEVEL: Status: RESOLVED | Noted: 2018-11-30 | Resolved: 2019-09-16

## 2019-09-16 PROBLEM — J90 BILATERAL PLEURAL EFFUSION: Status: RESOLVED | Noted: 2018-08-28 | Resolved: 2019-09-16

## 2019-09-16 PROBLEM — E87.1 HYPONATREMIA: Status: RESOLVED | Noted: 2018-08-26 | Resolved: 2019-09-16

## 2019-09-16 PROBLEM — M79.89 LEFT ARM SWELLING: Status: RESOLVED | Noted: 2018-12-14 | Resolved: 2019-09-16

## 2019-09-16 PROBLEM — F10.239 ALCOHOL DEPENDENCE WITH WITHDRAWAL (HCC): Status: RESOLVED | Noted: 2018-08-28 | Resolved: 2019-09-16

## 2019-09-16 PROBLEM — R50.81 NEUTROPENIC FEVER (HCC): Status: RESOLVED | Noted: 2018-09-21 | Resolved: 2019-09-16

## 2019-09-16 PROBLEM — R77.8 ELEVATED TROPONIN: Status: RESOLVED | Noted: 2018-08-28 | Resolved: 2019-09-16

## 2019-09-16 PROBLEM — H53.8 BLURRED VISION, BILATERAL: Status: RESOLVED | Noted: 2018-08-26 | Resolved: 2019-09-16

## 2019-09-16 PROCEDURE — 99204 OFFICE O/P NEW MOD 45 MIN: CPT | Performed by: INTERNAL MEDICINE

## 2019-09-16 RX ORDER — ALPRAZOLAM 0.5 MG/1
0.25 TABLET ORAL
COMMUNITY
Start: 2019-01-30

## 2019-09-16 RX ORDER — LISINOPRIL 20 MG/1
20 TABLET ORAL DAILY
Refills: 3 | COMMUNITY
Start: 2019-08-16 | End: 2021-10-07 | Stop reason: HOSPADM

## 2019-09-16 RX ORDER — METOPROLOL TARTRATE 100 MG/1
TABLET ORAL
COMMUNITY
End: 2019-09-16 | Stop reason: DRUGHIGH

## 2019-09-16 RX ORDER — ACYCLOVIR 400 MG/1
400 TABLET ORAL 2 TIMES DAILY
Refills: 2 | COMMUNITY
Start: 2019-09-05 | End: 2021-10-07 | Stop reason: HOSPADM

## 2019-09-16 NOTE — ASSESSMENT & PLAN NOTE
8x7 RLL pulmonary nodule  SVU 1 1 - relatively low, but in a patient with risk factors, we will proceed to CT-guided biopsy for tissue evaluation  If negative, no further workup needed, and will re-image at appropriate intervals  If positive for lymphoma, will return to care of Oncologist for treatment decisions  If positive for other malignancy, will likely need mediastinal staging via EBUS

## 2019-09-16 NOTE — PROGRESS NOTES
Pulmonary Consultation   Kiesha Combs 75 y o  female MRN: [id number]  9/16/2019      Assessment:    Lung nodule  8x7 RLL pulmonary nodule  SVU 1 1 - relatively low, but in a patient with risk factors, we will proceed to CT-guided biopsy for tissue evaluation  If negative, no further workup needed, and will re-image at appropriate intervals  If positive for lymphoma, will return to care of Oncologist for treatment decisions  If positive for other malignancy, will likely need mediastinal staging via EBUS  Plan:    Diagnoses and all orders for this visit:    Lung nodule  -     CT needle biopsy lung; within one week  There does not seem in be in indication to perform spirometry in this patient who is asymptomatic from a pulmonary perspective  I will be following up with her by phone with the results of her pathology  The etiology of her lung nodule will determine whether any to follow with her again, or whether she should be referred back to her medical oncologist at [place]  History of Present Illness   HPI:  Kiesha Combs is a 75 y o  female who was referred for findings of an abnormal PET scan  She is a 66-year-old female who was diagnosed with diffuse large B-cell lymphoma in July of 2018 when she developed a right-sided Bell's palsy  Lied in August, she underwent a CT scan of her chest abdomen and pelvis which showed extensive lymphadenopathy consistent with widespread metastatic disease  Ultimately, she had a complicated medical course including fall endotracheal intubation and atrial fibrillation with rapid ventricular response  She underwent an ultrasound-guided biopsy of a left chest wall lesion which showed an aggressive B-cell lymphoma  This lymphoma was also insensitive regions including the retro-orbital region, and she was [facility] for additional management    She appears to have received 1 dose of intrathecal cytarabine, followed by dexamethasone, followed by 6 cycles of R-CHOP and 4 cycles of intrathecal cytarabine  She was last seen by her oncologist on July 25th, when a CT scan of the chest abdomen and pelvis was performed, revealing an 8 mm right lower lobe pulmonary nodule, which was further evaluated with PET-CT, revealing the nodule had an SUV of 1 1  She is thus referred to us for definitive diagnosis  Presently, she does not have any acute complaints related to the CT finding  She denies significant cough, shortness of breath, or chest pain  She is extremely tearful regarding this finding, having hoped that she may be entirely cancer free at this point  Shamika Medrano is an active smoker, having quit for a period of 4 months back in 2018, but having repeat taken up the habit after her diagnosis of lymphoma  We did not extend to much in to her smoking history, but I did offer to her any help with quitting that I could provide  She denies being limited by shortness of breath, and reports that she has been tested with spirometry in the past, although this was not in our system  She denies having a diagnosis of asthma or COPD  She has never needed a rescue inhaler  She has significant exercise intolerance, more related to the lower extremity peripheral arterial disease than to shortness of breath  She does not notice significant wheezing  Review of Systems   Constitutional: Positive for fatigue  Negative for chills, fever and unexpected weight change  Respiratory: Negative for cough, chest tightness, shortness of breath and wheezing  Cardiovascular: Negative for chest pain  All other systems reviewed and are negative      Historical Information   Past Medical History:   Diagnosis Date    Bell's palsy     [facility]     lymphoma    Diffuse large [facility]     Hypertension     PAD (peripheral artery disease) (HCC)     [facility]     PVD [facility]peripheral vascular disease) ([facility] ) Past Surgical History:   Procedure Laterality Date    ARTERIOGRAM Left 12/6/2018    Procedure: LEFT lower extremity angiography, with Left lower extremity run-off, stent and angioplasty of Left Common Femoral Artery (Left Brachial Access);   Surgeon: Nguyễn Pedersen MD;  Location[facility];  Service: Vascular    CARDIAC SURGERY      CARPAL TUNNEL RELEASE      HYSTERECTOMY      IR ABDOMINAL ANGIOGRAPHY / INTERVENTION  12/6/2018    TONSILLECTOMY       Family History   Problem Relation Age of Onset    Cancer Mother     Breast cancer Mother     Heart attack Father      Meds/Allergies     Current Outpatient Medications:     acyclovir (ZOVIRAX) 400 MG tablet, Take 400 mg by mouth 2 (two) times a day, Disp: , Rfl: 2    ALPRAZolam (XANAX) 0 5 mg tablet, Take 0 25 mg by mouth, Disp: , Rfl:     amLODIPine (NORVASC) 10 mg tablet, Take 10 mg by mouth daily, Disp: , Rfl:     aspirin 81 mg chewable tablet, Chew 81 mg daily, Disp: , Rfl:     atorvastatin (LIPITOR) 40 mg tablet, Take 40 mg by mouth daily at bedtime  , Disp: , Rfl:     Cholecalciferol (VITAMIN D3) 2000 units TABS, Take 1 tablet by mouth daily, Disp: , Rfl:     glycerin-hypromellose- (ARTIFICIAL TEARS) 0 2-0 2-1 % SOLN, 1 drop, Disp: , Rfl:     lisinopril (ZESTRIL) 20 mg tablet, Take 20 mg by mouth daily, Disp: , Rfl: 3    Melatonin ER (MELADOX) 3 MG TBCR, Take 1 tablet by mouth daily at bedtime, Disp: , Rfl:     metoprolol succinate (TOPROL-XL) 100 mg 24 hr tablet, Take 50 mg by mouth daily  , Disp: , Rfl:     mirtazapine (REMERON) 15 mg tablet, Take 7 5 mg by mouth daily at bedtime  , Disp: , Rfl:     senna-docusate sodium (SENOKOT S) 8 6-50 mg per tablet, Take 1 tablet by mouth daily As needed for constipation, Disp: 20 tablet, Rfl: 0    Specialty Vitamins Products (MAGNESIUM, AMINO ACID CHELATE,) 133 MG tablet, Take 2 tablets by mouth Three times a day, Disp: , Rfl:   Allergies   Allergen Reactions    Oxycodone-Acetaminophen Rash and Itching     Itching, rash    Itching, rash  Itching, rash     Vitals: Weight 55 2 kg (121 lb 9 6 oz)  Body mass index is 22 24 kg/m²  Oxygen Therapy  Oxygen Therapy: None (Room air)    Physical Exam  Physical Exam   Constitutional: She is oriented to person, place, and time  She appears well-developed and well-nourished  She appears distressed  HENT:   Head: Normocephalic and atraumatic  Mouth/Throat: No oropharyngeal exudate  Eyes: Pupils are equal, round, and reactive to light  EOM are normal    Neck: Neck supple  No JVD present  No thyromegaly present  Cardiovascular: Normal rate, regular rhythm and normal heart sounds  Exam reveals no gallop and no friction rub  No murmur heard  Few scattered ectopic beats noted, but was in overall sinus rhythm  Pulmonary/Chest: Effort normal and breath sounds normal  No respiratory distress  She has no wheezes  She has no rales  She exhibits no tenderness  Musculoskeletal: She exhibits edema  She exhibits no tenderness or deformity  Neurological: She is alert and oriented to person, place, and time  Skin: Skin is warm and dry  Rash noted  Rubor noted over bilateral lower extremities  Labs: I have personally reviewed pertinent lab results  Lab Results   Component Value Date    WBC 7 15 09/09/2019    HGB 12 7 09/09/2019    HCT 37 9 09/09/2019    MCV 94 09/09/2019     09/09/2019     Lab Results   Component Value Date    GLUCOSE 118 11/30/2018    CALCIUM 9 3 09/09/2019    K 4 2 09/09/2019    CO2 23 09/09/2019    CL 95 (L) 09/09/2019    BUN 13 09/09/2019    CREATININE 0 90 09/09/2019     No results found for: IGE  Lab Results   Component Value Date    ALT 19 09/09/2019    AST 16 09/09/2019    ALKPHOS 119 (H) 09/09/2019       Imaging and other studies: I have personally reviewed pertinent films in PACS  RLL 8x7mm nodule noted near periphery with SVU 1 1  There is enlarged lymphadenopathy on PET - more likely reactive      Pulmonary function testing:   None available  EKG, Pathology, and Other Studies: I have personally reviewed pertinent films in PACS    [facility]

## 2019-09-24 ENCOUNTER — APPOINTMENT (OUTPATIENT)
Dept: LAB | Facility: CLINIC | Age: 64
End: 2019-09-24
Payer: COMMERCIAL

## 2019-09-24 DIAGNOSIS — C83.30 DIFFUSE LARGE B-CELL LYMPHOMA, UNSPECIFIED BODY REGION (HCC): ICD-10-CM

## 2019-09-24 DIAGNOSIS — C83.30 RETICULOSARCOMA (HCC): ICD-10-CM

## 2019-09-24 LAB
ALBUMIN SERPL BCP-MCNC: 4 G/DL (ref 3.5–5)
ALP SERPL-CCNC: 123 U/L (ref 46–116)
ALT SERPL W P-5'-P-CCNC: 23 U/L (ref 12–78)
ANION GAP SERPL CALCULATED.3IONS-SCNC: 6 MMOL/L (ref 4–13)
AST SERPL W P-5'-P-CCNC: 25 U/L (ref 5–45)
BASOPHILS # BLD AUTO: 0.07 THOUSANDS/ΜL (ref 0–0.1)
BASOPHILS NFR BLD AUTO: 1 % (ref 0–1)
BILIRUB SERPL-MCNC: 0.58 MG/DL (ref 0.2–1)
BUN SERPL-MCNC: 9 MG/DL (ref 5–25)
CALCIUM SERPL-MCNC: 9.4 MG/DL (ref 8.3–10.1)
CHLORIDE SERPL-SCNC: 95 MMOL/L (ref 100–108)
CO2 SERPL-SCNC: 22 MMOL/L (ref 21–32)
CREAT SERPL-MCNC: 0.9 MG/DL (ref 0.6–1.3)
EOSINOPHIL # BLD AUTO: 0.34 THOUSAND/ΜL (ref 0–0.61)
EOSINOPHIL NFR BLD AUTO: 6 % (ref 0–6)
ERYTHROCYTE [DISTWIDTH] IN BLOOD BY AUTOMATED COUNT: 14.8 % (ref 11.6–15.1)
GFR SERPL CREATININE-BSD FRML MDRD: 67 ML/MIN/1.73SQ M
GLUCOSE SERPL-MCNC: 90 MG/DL (ref 65–140)
HCT VFR BLD AUTO: 40.7 % (ref 34.8–46.1)
HGB BLD-MCNC: 13.4 G/DL (ref 11.5–15.4)
IMM GRANULOCYTES # BLD AUTO: 0.03 THOUSAND/UL (ref 0–0.2)
IMM GRANULOCYTES NFR BLD AUTO: 1 % (ref 0–2)
LYMPHOCYTES # BLD AUTO: 0.84 THOUSANDS/ΜL (ref 0.6–4.47)
LYMPHOCYTES NFR BLD AUTO: 14 % (ref 14–44)
MAGNESIUM SERPL-MCNC: 2.1 MG/DL (ref 1.6–2.6)
MCH RBC QN AUTO: 30.8 PG (ref 26.8–34.3)
MCHC RBC AUTO-ENTMCNC: 32.9 G/DL (ref 31.4–37.4)
MCV RBC AUTO: 94 FL (ref 82–98)
MONOCYTES # BLD AUTO: 0.85 THOUSAND/ΜL (ref 0.17–1.22)
MONOCYTES NFR BLD AUTO: 14 % (ref 4–12)
NEUTROPHILS # BLD AUTO: 3.97 THOUSANDS/ΜL (ref 1.85–7.62)
NEUTS SEG NFR BLD AUTO: 64 % (ref 43–75)
NRBC BLD AUTO-RTO: 0 /100 WBCS
PLATELET # BLD AUTO: 246 THOUSANDS/UL (ref 149–390)
PMV BLD AUTO: 10.7 FL (ref 8.9–12.7)
POTASSIUM SERPL-SCNC: 4.7 MMOL/L (ref 3.5–5.3)
PROT SERPL-MCNC: 7.7 G/DL (ref 6.4–8.2)
RBC # BLD AUTO: 4.35 MILLION/UL (ref 3.81–5.12)
SODIUM SERPL-SCNC: 123 MMOL/L (ref 136–145)
WBC # BLD AUTO: 6.1 THOUSAND/UL (ref 4.31–10.16)

## 2019-09-24 PROCEDURE — 83735 ASSAY OF MAGNESIUM: CPT

## 2019-09-24 PROCEDURE — 85025 COMPLETE CBC W/AUTO DIFF WBC: CPT

## 2019-09-24 PROCEDURE — 80053 COMPREHEN METABOLIC PANEL: CPT

## 2019-09-24 PROCEDURE — 36415 COLL VENOUS BLD VENIPUNCTURE: CPT

## 2019-10-08 ENCOUNTER — HOSPITAL ENCOUNTER (OUTPATIENT)
Dept: CT IMAGING | Facility: HOSPITAL | Age: 64
Discharge: HOME | End: 2019-10-08
Payer: COMMERCIAL

## 2019-10-08 VITALS
BODY MASS INDEX: 21.62 KG/M2 | DIASTOLIC BLOOD PRESSURE: 56 MMHG | HEIGHT: 62 IN | SYSTOLIC BLOOD PRESSURE: 102 MMHG | RESPIRATION RATE: 20 BRPM | OXYGEN SATURATION: 96 % | HEART RATE: 79 BPM | WEIGHT: 117.5 LBS | TEMPERATURE: 98.2 F

## 2019-10-08 DIAGNOSIS — R91.1 LUNG NODULE: ICD-10-CM

## 2019-10-08 LAB
ALBUMIN SERPL BCP-MCNC: 3.4 G/DL (ref 3.5–5)
ALP SERPL-CCNC: 109 U/L (ref 46–116)
ALT SERPL W P-5'-P-CCNC: 17 U/L (ref 12–78)
ANION GAP SERPL CALCULATED.3IONS-SCNC: 9 MMOL/L (ref 4–13)
AST SERPL W P-5'-P-CCNC: 22 U/L (ref 5–45)
BILIRUB SERPL-MCNC: 0.5 MG/DL (ref 0.2–1)
BUN SERPL-MCNC: 9 MG/DL (ref 5–25)
CALCIUM SERPL-MCNC: 9.4 MG/DL (ref 8.3–10.1)
CHLORIDE SERPL-SCNC: 95 MMOL/L (ref 100–108)
CO2 SERPL-SCNC: 26 MMOL/L (ref 21–32)
CREAT SERPL-MCNC: 0.78 MG/DL (ref 0.6–1.3)
GFR SERPL CREATININE-BSD FRML MDRD: 79 ML/MIN/1.73SQ M
GLUCOSE P FAST SERPL-MCNC: 90 MG/DL (ref 65–99)
GLUCOSE SERPL-MCNC: 90 MG/DL (ref 65–140)
INR PPP: 1 (ref 0.84–1.19)
POTASSIUM SERPL-SCNC: 5.3 MMOL/L (ref 3.5–5.3)
PROT SERPL-MCNC: 7.1 G/DL (ref 6.4–8.2)
PROTHROMBIN TIME: 13.2 SECONDS (ref 11.6–14.5)
SODIUM SERPL-SCNC: 130 MMOL/L (ref 136–145)

## 2019-10-08 PROCEDURE — 88333 PATH CONSLTJ SURG CYTO XM 1: CPT | Performed by: PATHOLOGY

## 2019-10-08 PROCEDURE — 99152 MOD SED SAME PHYS/QHP 5/>YRS: CPT | Performed by: RADIOLOGY

## 2019-10-08 PROCEDURE — 85610 PROTHROMBIN TIME: CPT | Performed by: STUDENT IN AN ORGANIZED HEALTH CARE EDUCATION/TRAINING PROGRAM

## 2019-10-08 PROCEDURE — 47000 NEEDLE BIOPSY OF LIVER PERQ: CPT | Performed by: RADIOLOGY

## 2019-10-08 PROCEDURE — 88342 IMHCHEM/IMCYTCHM 1ST ANTB: CPT | Performed by: PATHOLOGY

## 2019-10-08 PROCEDURE — 99152 MOD SED SAME PHYS/QHP 5/>YRS: CPT

## 2019-10-08 PROCEDURE — 99153 MOD SED SAME PHYS/QHP EA: CPT

## 2019-10-08 PROCEDURE — 88312 SPECIAL STAINS GROUP 1: CPT | Performed by: PATHOLOGY

## 2019-10-08 PROCEDURE — 77012 CT SCAN FOR NEEDLE BIOPSY: CPT

## 2019-10-08 PROCEDURE — 80053 COMPREHEN METABOLIC PANEL: CPT | Performed by: STUDENT IN AN ORGANIZED HEALTH CARE EDUCATION/TRAINING PROGRAM

## 2019-10-08 PROCEDURE — 32405 HB PERCUT BX LUNG/MEDIASTINUM: CPT

## 2019-10-08 PROCEDURE — 88305 TISSUE EXAM BY PATHOLOGIST: CPT | Performed by: PATHOLOGY

## 2019-10-08 PROCEDURE — 77012 CT SCAN FOR NEEDLE BIOPSY: CPT | Performed by: RADIOLOGY

## 2019-10-08 RX ORDER — SODIUM CHLORIDE 9 MG/ML
50 INJECTION, SOLUTION INTRAVENOUS CONTINUOUS
Status: DISCONTINUED | OUTPATIENT
Start: 2019-10-08 | End: 2019-10-12 | Stop reason: HOSPADM

## 2019-10-08 RX ORDER — LIDOCAINE HYDROCHLORIDE 10 MG/ML
INJECTION, SOLUTION INFILTRATION; PERINEURAL CODE/TRAUMA/SEDATION MEDICATION
Status: COMPLETED | OUTPATIENT
Start: 2019-10-08 | End: 2019-10-08

## 2019-10-08 RX ORDER — FENTANYL CITRATE 50 UG/ML
INJECTION, SOLUTION INTRAMUSCULAR; INTRAVENOUS CODE/TRAUMA/SEDATION MEDICATION
Status: COMPLETED | OUTPATIENT
Start: 2019-10-08 | End: 2019-10-08

## 2019-10-08 RX ORDER — DIPHENHYDRAMINE HYDROCHLORIDE 50 MG/ML
INJECTION INTRAMUSCULAR; INTRAVENOUS CODE/TRAUMA/SEDATION MEDICATION
Status: COMPLETED | OUTPATIENT
Start: 2019-10-08 | End: 2019-10-08

## 2019-10-08 RX ORDER — MIDAZOLAM HYDROCHLORIDE 1 MG/ML
INJECTION INTRAMUSCULAR; INTRAVENOUS CODE/TRAUMA/SEDATION MEDICATION
Status: COMPLETED | OUTPATIENT
Start: 2019-10-08 | End: 2019-10-08

## 2019-10-08 RX ADMIN — FENTANYL CITRATE 25 MCG: 50 INJECTION, SOLUTION INTRAMUSCULAR; INTRAVENOUS at 09:24

## 2019-10-08 RX ADMIN — DIPHENHYDRAMINE HYDROCHLORIDE 25 MG: 50 INJECTION, SOLUTION INTRAMUSCULAR; INTRAVENOUS at 09:27

## 2019-10-08 RX ADMIN — MIDAZOLAM HYDROCHLORIDE 0.5 MG: 1 INJECTION, SOLUTION INTRAMUSCULAR; INTRAVENOUS at 09:29

## 2019-10-08 RX ADMIN — LIDOCAINE HYDROCHLORIDE 5 ML: 10 INJECTION, SOLUTION INFILTRATION; PERINEURAL at 09:25

## 2019-10-08 RX ADMIN — DIPHENHYDRAMINE HYDROCHLORIDE 25 MG: 50 INJECTION, SOLUTION INTRAMUSCULAR; INTRAVENOUS at 09:24

## 2019-10-08 RX ADMIN — MIDAZOLAM HYDROCHLORIDE 0.5 MG: 1 INJECTION, SOLUTION INTRAMUSCULAR; INTRAVENOUS at 09:23

## 2019-10-08 NOTE — DISCHARGE INSTRUCTIONS
Needle Biopsy of the Lung    WHAT YOU NEED TO KNOW:  A needle biopsy of the lung is a procedure to remove cells or tissue from your lung  You may have a fine needle aspiration biopsy (FNAB), or a core needle biopsy (CNB)  A FNAB is used to remove cells through a thin needle  CNB uses a thicker needle to remove lung tissue  The samples are collected and tested for inflammation, infection, or cancer  DISCHARGE INSTRUCTIONS:   Resume your normal diet  Small sips of flat soda will help with nausea  Limit your activity for 24 hours  Wound Care:      - Remove band aid in 24 hours      Contact Interventional Radiology at [phone] [facility] PATIENTS: Contact Interventional Radiology at [phone]) [facility] PATIENTS: Contact Interventional Radiology at [phone]) if any of the following occur:    - You have a fever greater than 101*    - You cough up large amounts of bright red blood     - You have chest pain with breathing    - You have shortness of breath    -You have persistent nausea and vomiting    - You have pus, redness or swelling around your biopsy site    - You have questions or concerns about your condition or care

## 2019-10-08 NOTE — BRIEF OP NOTE (RAD/CATH)
CT NEEDLE BIOPSY LUNG  Procedure Note    PATIENT NAME: [facility]  : 1955  MRN: [id number]     Pre-op Diagnosis:   1  Lung nodule      Post-op Diagnosis:   1  Lung nodule        Surgeon:   Elba Tomas MD    Estimated Blood Loss: 1 mL    Findings: Successful RLL lung nodule biopsy  Specimens: 5 core needle samples placed into formalin      Complications:  None    Anesthesia: Conscious sedation and Local    Elba Tomas MD     Date: 10/8/2019  Time: 9:57 AM

## 2019-10-08 NOTE — H&P
IR H&P    HPI:  75year old female with history of DLBCL was found to have RLL lung nodule and is referred for biopsy  PMH:  DLBCL  HTN  PAD  PAF  PVD    PSH:  Cardiac surgery  Carpal tunnel release  Hysterectomy    Physical exam:  /56   Pulse 76   Temp 98 7 °F (37 1 °C) (Temporal)   Resp 21   Ht 5' 2" (1 575 m)   Wt 53 3 kg (117 lb 8 1 oz)   SpO2 97%   BMI 21 49 kg/m²   Gen: NAD  Pulm: No resp distress    A/P:  75year old female with history of DLBCL was found to have RLL lung nodule      - Lung biopsy

## 2019-10-21 ENCOUNTER — TELEPHONE (OUTPATIENT)
Dept: PULMONOLOGY | Facility: CLINIC | Age: 64
End: 2019-10-21

## 2019-10-21 NOTE — TELEPHONE ENCOUNTER
Pt's daughter calling for results of pt's lung biopsy   Pt's daughter would like a call back at [phone]

## 2019-10-22 NOTE — TELEPHONE ENCOUNTER
I called and left a message at the number provided  I was not able to reach her at either listed number  The pathology had been pending for a while as cultures for AFB/fungi were still in process  The biopsy showed granulomatous inflammation without positive cultures, likely representing response to prior infection  I advised that I do not think they will be of any clinical consequence and that we will be likely inadvertantly surveilling them as she is followed for her lymphoma  Gave her my call back number if I can explain anything further

## 2019-11-04 ENCOUNTER — TRANSCRIBE ORDERS (OUTPATIENT)
Dept: ADMINISTRATIVE | Facility: HOSPITAL | Age: 64
End: 2019-11-04

## 2019-11-04 DIAGNOSIS — C83.38 RETICULOSARCOMA OF LYMPH NODES OF MULTIPLE SITES (HCC): Primary | ICD-10-CM

## 2019-11-27 ENCOUNTER — HOSPITAL ENCOUNTER (OUTPATIENT)
Dept: MRI IMAGING | Facility: HOSPITAL | Age: 64
Discharge: HOME | End: 2019-11-27
Payer: COMMERCIAL

## 2019-11-27 DIAGNOSIS — C83.38 RETICULOSARCOMA OF LYMPH NODES OF MULTIPLE SITES (HCC): ICD-10-CM

## 2019-11-27 PROCEDURE — 70553 MRI BRAIN STEM W/O & W/DYE: CPT

## 2019-11-27 PROCEDURE — A9585 GADOBUTROL INJECTION: HCPCS | Performed by: RADIOLOGY

## 2019-11-27 RX ADMIN — GADOBUTROL 5 ML: 604.72 INJECTION INTRAVENOUS at 14:33

## 2019-12-06 ENCOUNTER — APPOINTMENT (OUTPATIENT)
Dept: LAB | Facility: CLINIC | Age: 64
End: 2019-12-06
Payer: COMMERCIAL

## 2019-12-06 ENCOUNTER — TRANSCRIBE ORDERS (OUTPATIENT)
Dept: LAB | Facility: CLINIC | Age: 64
End: 2019-12-06

## 2019-12-06 DIAGNOSIS — C83.30 DIFFUSE LARGE B-CELL LYMPHOMA, UNSPECIFIED BODY REGION (HCC): Primary | ICD-10-CM

## 2019-12-06 DIAGNOSIS — C83.30 DIFFUSE LARGE B-CELL LYMPHOMA, UNSPECIFIED BODY REGION (HCC): ICD-10-CM

## 2019-12-06 LAB
ALBUMIN SERPL BCP-MCNC: 4 G/DL (ref 3.5–5)
ALP SERPL-CCNC: 123 U/L (ref 46–116)
ALT SERPL W P-5'-P-CCNC: 31 U/L (ref 12–78)
ANION GAP SERPL CALCULATED.3IONS-SCNC: 8 MMOL/L (ref 4–13)
AST SERPL W P-5'-P-CCNC: 26 U/L (ref 5–45)
BASOPHILS # BLD AUTO: 0.07 THOUSANDS/ΜL (ref 0–0.1)
BASOPHILS NFR BLD AUTO: 1 % (ref 0–1)
BILIRUB SERPL-MCNC: 0.44 MG/DL (ref 0.2–1)
BUN SERPL-MCNC: 10 MG/DL (ref 5–25)
CALCIUM SERPL-MCNC: 9.6 MG/DL (ref 8.3–10.1)
CHLORIDE SERPL-SCNC: 100 MMOL/L (ref 100–108)
CO2 SERPL-SCNC: 25 MMOL/L (ref 21–32)
CREAT SERPL-MCNC: 0.85 MG/DL (ref 0.6–1.3)
EOSINOPHIL # BLD AUTO: 0.39 THOUSAND/ΜL (ref 0–0.61)
EOSINOPHIL NFR BLD AUTO: 6 % (ref 0–6)
ERYTHROCYTE [DISTWIDTH] IN BLOOD BY AUTOMATED COUNT: 16.2 % (ref 11.6–15.1)
GFR SERPL CREATININE-BSD FRML MDRD: 72 ML/MIN/1.73SQ M
GLUCOSE SERPL-MCNC: 97 MG/DL (ref 65–140)
HCT VFR BLD AUTO: 39.5 % (ref 34.8–46.1)
HGB BLD-MCNC: 13.1 G/DL (ref 11.5–15.4)
IMM GRANULOCYTES # BLD AUTO: 0.01 THOUSAND/UL (ref 0–0.2)
IMM GRANULOCYTES NFR BLD AUTO: 0 % (ref 0–2)
LYMPHOCYTES # BLD AUTO: 1.09 THOUSANDS/ΜL (ref 0.6–4.47)
LYMPHOCYTES NFR BLD AUTO: 16 % (ref 14–44)
MAGNESIUM SERPL-MCNC: 1.9 MG/DL (ref 1.6–2.6)
MCH RBC QN AUTO: 31.6 PG (ref 26.8–34.3)
MCHC RBC AUTO-ENTMCNC: 33.2 G/DL (ref 31.4–37.4)
MCV RBC AUTO: 95 FL (ref 82–98)
MONOCYTES # BLD AUTO: 1.13 THOUSAND/ΜL (ref 0.17–1.22)
MONOCYTES NFR BLD AUTO: 16 % (ref 4–12)
NEUTROPHILS # BLD AUTO: 4.28 THOUSANDS/ΜL (ref 1.85–7.62)
NEUTS SEG NFR BLD AUTO: 61 % (ref 43–75)
NRBC BLD AUTO-RTO: 0 /100 WBCS
PLATELET # BLD AUTO: 255 THOUSANDS/UL (ref 149–390)
PMV BLD AUTO: 11 FL (ref 8.9–12.7)
POTASSIUM SERPL-SCNC: 4.2 MMOL/L (ref 3.5–5.3)
PROT SERPL-MCNC: 6.9 G/DL (ref 6.4–8.2)
RBC # BLD AUTO: 4.15 MILLION/UL (ref 3.81–5.12)
SODIUM SERPL-SCNC: 133 MMOL/L (ref 136–145)
WBC # BLD AUTO: 6.97 THOUSAND/UL (ref 4.31–10.16)

## 2019-12-06 PROCEDURE — 85025 COMPLETE CBC W/AUTO DIFF WBC: CPT

## 2019-12-06 PROCEDURE — 83735 ASSAY OF MAGNESIUM: CPT

## 2019-12-06 PROCEDURE — 80053 COMPREHEN METABOLIC PANEL: CPT

## 2019-12-06 PROCEDURE — 36415 COLL VENOUS BLD VENIPUNCTURE: CPT

## 2020-01-09 ENCOUNTER — TRANSCRIBE ORDERS (OUTPATIENT)
Dept: LAB | Facility: CLINIC | Age: 65
End: 2020-01-09

## 2020-01-09 ENCOUNTER — APPOINTMENT (OUTPATIENT)
Dept: LAB | Facility: CLINIC | Age: 65
End: 2020-01-09
Payer: COMMERCIAL

## 2020-01-09 DIAGNOSIS — C83.30 DIFFUSE LARGE B-CELL LYMPHOMA, UNSPECIFIED BODY REGION (HCC): Primary | ICD-10-CM

## 2020-01-09 DIAGNOSIS — C83.30 DIFFUSE LARGE B-CELL LYMPHOMA, UNSPECIFIED BODY REGION (HCC): ICD-10-CM

## 2020-01-09 LAB
ALBUMIN SERPL BCP-MCNC: 4.2 G/DL (ref 3.5–5)
ALP SERPL-CCNC: 131 U/L (ref 46–116)
ALT SERPL W P-5'-P-CCNC: 24 U/L (ref 12–78)
ANION GAP SERPL CALCULATED.3IONS-SCNC: 7 MMOL/L (ref 4–13)
AST SERPL W P-5'-P-CCNC: 19 U/L (ref 5–45)
BASOPHILS # BLD AUTO: 0.07 THOUSANDS/ΜL (ref 0–0.1)
BASOPHILS NFR BLD AUTO: 1 % (ref 0–1)
BILIRUB SERPL-MCNC: 0.64 MG/DL (ref 0.2–1)
BUN SERPL-MCNC: 9 MG/DL (ref 5–25)
CALCIUM SERPL-MCNC: 10 MG/DL (ref 8.3–10.1)
CHLORIDE SERPL-SCNC: 96 MMOL/L (ref 100–108)
CO2 SERPL-SCNC: 22 MMOL/L (ref 21–32)
CREAT SERPL-MCNC: 0.9 MG/DL (ref 0.6–1.3)
EOSINOPHIL # BLD AUTO: 0.32 THOUSAND/ΜL (ref 0–0.61)
EOSINOPHIL NFR BLD AUTO: 4 % (ref 0–6)
ERYTHROCYTE [DISTWIDTH] IN BLOOD BY AUTOMATED COUNT: 15.6 % (ref 11.6–15.1)
GFR SERPL CREATININE-BSD FRML MDRD: 66 ML/MIN/1.73SQ M
GLUCOSE SERPL-MCNC: 119 MG/DL (ref 65–140)
HCT VFR BLD AUTO: 42.2 % (ref 34.8–46.1)
HGB BLD-MCNC: 14 G/DL (ref 11.5–15.4)
IMM GRANULOCYTES # BLD AUTO: 0.04 THOUSAND/UL (ref 0–0.2)
IMM GRANULOCYTES NFR BLD AUTO: 1 % (ref 0–2)
LYMPHOCYTES # BLD AUTO: 0.83 THOUSANDS/ΜL (ref 0.6–4.47)
LYMPHOCYTES NFR BLD AUTO: 10 % (ref 14–44)
MAGNESIUM SERPL-MCNC: 2 MG/DL (ref 1.6–2.6)
MCH RBC QN AUTO: 31.4 PG (ref 26.8–34.3)
MCHC RBC AUTO-ENTMCNC: 33.2 G/DL (ref 31.4–37.4)
MCV RBC AUTO: 95 FL (ref 82–98)
MONOCYTES # BLD AUTO: 0.9 THOUSAND/ΜL (ref 0.17–1.22)
MONOCYTES NFR BLD AUTO: 11 % (ref 4–12)
NEUTROPHILS # BLD AUTO: 5.8 THOUSANDS/ΜL (ref 1.85–7.62)
NEUTS SEG NFR BLD AUTO: 73 % (ref 43–75)
NRBC BLD AUTO-RTO: 0 /100 WBCS
PLATELET # BLD AUTO: 314 THOUSANDS/UL (ref 149–390)
PMV BLD AUTO: 10.6 FL (ref 8.9–12.7)
POTASSIUM SERPL-SCNC: 4.3 MMOL/L (ref 3.5–5.3)
PROT SERPL-MCNC: 7.8 G/DL (ref 6.4–8.2)
RBC # BLD AUTO: 4.46 MILLION/UL (ref 3.81–5.12)
SODIUM SERPL-SCNC: 125 MMOL/L (ref 136–145)
WBC # BLD AUTO: 7.96 THOUSAND/UL (ref 4.31–10.16)

## 2020-01-09 PROCEDURE — 80053 COMPREHEN METABOLIC PANEL: CPT

## 2020-01-09 PROCEDURE — 83735 ASSAY OF MAGNESIUM: CPT

## 2020-01-09 PROCEDURE — 85025 COMPLETE CBC W/AUTO DIFF WBC: CPT

## 2020-01-09 PROCEDURE — 36415 COLL VENOUS BLD VENIPUNCTURE: CPT

## 2020-02-06 ENCOUNTER — APPOINTMENT (OUTPATIENT)
Dept: LAB | Facility: CLINIC | Age: 65
End: 2020-02-06
Payer: COMMERCIAL

## 2020-02-06 DIAGNOSIS — C83.30 DIFFUSE LARGE B-CELL LYMPHOMA, UNSPECIFIED BODY REGION (HCC): ICD-10-CM

## 2020-02-06 LAB
ALBUMIN SERPL BCP-MCNC: 3.5 G/DL (ref 3.5–5)
ALP SERPL-CCNC: 144 U/L (ref 46–116)
ALT SERPL W P-5'-P-CCNC: 28 U/L (ref 12–78)
ANION GAP SERPL CALCULATED.3IONS-SCNC: 4 MMOL/L (ref 4–13)
AST SERPL W P-5'-P-CCNC: 24 U/L (ref 5–45)
BASOPHILS # BLD AUTO: 0.07 THOUSANDS/ΜL (ref 0–0.1)
BASOPHILS NFR BLD AUTO: 1 % (ref 0–1)
BILIRUB SERPL-MCNC: 0.77 MG/DL (ref 0.2–1)
BUN SERPL-MCNC: 11 MG/DL (ref 5–25)
CALCIUM SERPL-MCNC: 9.4 MG/DL (ref 8.3–10.1)
CHLORIDE SERPL-SCNC: 98 MMOL/L (ref 100–108)
CO2 SERPL-SCNC: 25 MMOL/L (ref 21–32)
CREAT SERPL-MCNC: 0.95 MG/DL (ref 0.6–1.3)
EOSINOPHIL # BLD AUTO: 0.29 THOUSAND/ΜL (ref 0–0.61)
EOSINOPHIL NFR BLD AUTO: 4 % (ref 0–6)
ERYTHROCYTE [DISTWIDTH] IN BLOOD BY AUTOMATED COUNT: 14.8 % (ref 11.6–15.1)
GFR SERPL CREATININE-BSD FRML MDRD: 62 ML/MIN/1.73SQ M
GLUCOSE P FAST SERPL-MCNC: 90 MG/DL (ref 65–99)
HCT VFR BLD AUTO: 40.2 % (ref 34.8–46.1)
HGB BLD-MCNC: 13.3 G/DL (ref 11.5–15.4)
IMM GRANULOCYTES # BLD AUTO: 0.05 THOUSAND/UL (ref 0–0.2)
IMM GRANULOCYTES NFR BLD AUTO: 1 % (ref 0–2)
LYMPHOCYTES # BLD AUTO: 1.27 THOUSANDS/ΜL (ref 0.6–4.47)
LYMPHOCYTES NFR BLD AUTO: 16 % (ref 14–44)
MAGNESIUM SERPL-MCNC: 1.8 MG/DL (ref 1.6–2.6)
MCH RBC QN AUTO: 31.7 PG (ref 26.8–34.3)
MCHC RBC AUTO-ENTMCNC: 33.1 G/DL (ref 31.4–37.4)
MCV RBC AUTO: 96 FL (ref 82–98)
MONOCYTES # BLD AUTO: 1.53 THOUSAND/ΜL (ref 0.17–1.22)
MONOCYTES NFR BLD AUTO: 19 % (ref 4–12)
NEUTROPHILS # BLD AUTO: 4.82 THOUSANDS/ΜL (ref 1.85–7.62)
NEUTS SEG NFR BLD AUTO: 59 % (ref 43–75)
NRBC BLD AUTO-RTO: 0 /100 WBCS
PLATELET # BLD AUTO: 331 THOUSANDS/UL (ref 149–390)
PMV BLD AUTO: 10.5 FL (ref 8.9–12.7)
POTASSIUM SERPL-SCNC: 4 MMOL/L (ref 3.5–5.3)
PROT SERPL-MCNC: 7.3 G/DL (ref 6.4–8.2)
RBC # BLD AUTO: 4.2 MILLION/UL (ref 3.81–5.12)
SODIUM SERPL-SCNC: 127 MMOL/L (ref 136–145)
WBC # BLD AUTO: 8.03 THOUSAND/UL (ref 4.31–10.16)

## 2020-02-06 PROCEDURE — 85025 COMPLETE CBC W/AUTO DIFF WBC: CPT

## 2020-02-06 PROCEDURE — 36415 COLL VENOUS BLD VENIPUNCTURE: CPT

## 2020-02-06 PROCEDURE — 80053 COMPREHEN METABOLIC PANEL: CPT

## 2020-02-06 PROCEDURE — 83735 ASSAY OF MAGNESIUM: CPT

## 2020-06-26 ENCOUNTER — TRANSCRIBE ORDERS (OUTPATIENT)
Dept: ADMINISTRATIVE | Facility: HOSPITAL | Age: 65
End: 2020-06-26

## 2020-06-26 DIAGNOSIS — C83.30 DIFFUSE LARGE B-CELL LYMPHOMA, UNSPECIFIED BODY REGION (HCC): Primary | ICD-10-CM

## 2020-07-21 ENCOUNTER — HOSPITAL ENCOUNTER (OUTPATIENT)
Dept: CT IMAGING | Facility: HOSPITAL | Age: 65
Discharge: HOME | End: 2020-07-21
Payer: COMMERCIAL

## 2020-07-21 ENCOUNTER — HOSPITAL ENCOUNTER (OUTPATIENT)
Dept: MRI IMAGING | Facility: HOSPITAL | Age: 65
Discharge: HOME | End: 2020-07-21
Payer: COMMERCIAL

## 2020-07-21 DIAGNOSIS — C83.30 DIFFUSE LARGE B-CELL LYMPHOMA, UNSPECIFIED BODY REGION (HCC): ICD-10-CM

## 2020-07-21 PROCEDURE — 74177 CT ABD & PELVIS W/CONTRAST: CPT

## 2020-07-21 PROCEDURE — A9585 GADOBUTROL INJECTION: HCPCS | Performed by: RADIOLOGY

## 2020-07-21 PROCEDURE — 70553 MRI BRAIN STEM W/O & W/DYE: CPT

## 2020-07-21 PROCEDURE — 71260 CT THORAX DX C+: CPT

## 2020-07-21 RX ADMIN — GADOBUTROL 5 ML: 604.72 INJECTION INTRAVENOUS at 15:56

## 2020-07-21 RX ADMIN — IOHEXOL 120 ML: 350 INJECTION, SOLUTION INTRAVENOUS at 14:58

## 2020-09-10 ENCOUNTER — TRANSCRIBE ORDERS (OUTPATIENT)
Dept: ADMINISTRATIVE | Facility: HOSPITAL | Age: 65
End: 2020-09-10

## 2020-09-10 DIAGNOSIS — Z78.0 MENOPAUSE: ICD-10-CM

## 2020-09-10 DIAGNOSIS — Z12.31 ENCOUNTER FOR SCREENING MAMMOGRAM FOR MALIGNANT NEOPLASM OF BREAST: Primary | ICD-10-CM

## 2020-09-23 ENCOUNTER — HOSPITAL ENCOUNTER (OUTPATIENT)
Dept: RADIOLOGY | Facility: IMAGING CENTER | Age: 65
Discharge: HOME | End: 2020-09-23
Payer: COMMERCIAL

## 2020-09-23 VITALS — BODY MASS INDEX: 20.61 KG/M2 | HEIGHT: 62 IN | WEIGHT: 112 LBS

## 2020-09-23 DIAGNOSIS — Z12.31 ENCOUNTER FOR SCREENING MAMMOGRAM FOR MALIGNANT NEOPLASM OF BREAST: ICD-10-CM

## 2020-09-23 DIAGNOSIS — Z78.0 MENOPAUSE: ICD-10-CM

## 2020-09-23 PROCEDURE — 77067 SCR MAMMO BI INCL CAD: CPT

## 2020-09-23 PROCEDURE — 77080 DXA BONE DENSITY AXIAL: CPT

## 2020-10-19 ENCOUNTER — LAB (OUTPATIENT)
Dept: LAB | Facility: CLINIC | Age: 65
End: 2020-10-19
Payer: COMMERCIAL

## 2020-10-19 ENCOUNTER — TRANSCRIBE ORDERS (OUTPATIENT)
Dept: ADMINISTRATIVE | Facility: HOSPITAL | Age: 65
End: 2020-10-19

## 2020-10-19 ENCOUNTER — TRANSCRIBE ORDERS (OUTPATIENT)
Dept: LAB | Facility: CLINIC | Age: 65
End: 2020-10-19

## 2020-10-19 DIAGNOSIS — M81.0 SENILE OSTEOPOROSIS: Primary | ICD-10-CM

## 2020-10-19 DIAGNOSIS — H25.11 NUCLEAR SCLEROTIC CATARACT OF RIGHT EYE: Primary | ICD-10-CM

## 2020-10-19 DIAGNOSIS — M81.0 SENILE OSTEOPOROSIS: ICD-10-CM

## 2020-10-19 LAB — 25(OH)D3 SERPL-MCNC: 62.9 NG/ML (ref 30–100)

## 2020-10-19 PROCEDURE — 36415 COLL VENOUS BLD VENIPUNCTURE: CPT

## 2020-10-19 PROCEDURE — 82306 VITAMIN D 25 HYDROXY: CPT

## 2021-02-24 ENCOUNTER — TRANSCRIBE ORDERS (OUTPATIENT)
Dept: ADMINISTRATIVE | Facility: HOSPITAL | Age: 66
End: 2021-02-24

## 2021-02-24 DIAGNOSIS — C83.30 LYMPHOMA, LARGE-CELL, DIFFUSE (HCC): Primary | ICD-10-CM

## 2021-03-14 ENCOUNTER — HOSPITAL ENCOUNTER (OUTPATIENT)
Dept: CT IMAGING | Facility: HOSPITAL | Age: 66
Discharge: HOME | End: 2021-03-14
Payer: COMMERCIAL

## 2021-03-14 ENCOUNTER — HOSPITAL ENCOUNTER (OUTPATIENT)
Dept: MRI IMAGING | Facility: HOSPITAL | Age: 66
Discharge: HOME | End: 2021-03-14
Payer: COMMERCIAL

## 2021-03-14 DIAGNOSIS — C83.30 LYMPHOMA, LARGE-CELL, DIFFUSE (HCC): ICD-10-CM

## 2021-03-14 PROCEDURE — 74178 CT ABD&PLV WO CNTR FLWD CNTR: CPT

## 2021-03-14 PROCEDURE — 70553 MRI BRAIN STEM W/O & W/DYE: CPT

## 2021-03-14 PROCEDURE — G1004 CDSM NDSC: HCPCS

## 2021-03-14 PROCEDURE — A9585 GADOBUTROL INJECTION: HCPCS | Performed by: RADIOLOGY

## 2021-03-14 RX ADMIN — IOHEXOL 100 ML: 350 INJECTION, SOLUTION INTRAVENOUS at 14:30

## 2021-03-14 RX ADMIN — GADOBUTROL 5 ML: 604.72 INJECTION INTRAVENOUS at 11:48

## 2021-04-01 ENCOUNTER — TRANSCRIBE ORDERS (OUTPATIENT)
Dept: ADMINISTRATIVE | Facility: HOSPITAL | Age: 66
End: 2021-04-01

## 2021-04-01 DIAGNOSIS — C83.30 DIFFUSE LARGE B-CELL LYMPHOMA, UNSPECIFIED SITE (HCC): Primary | ICD-10-CM

## 2021-04-25 ENCOUNTER — HOSPITAL ENCOUNTER (OUTPATIENT)
Dept: CT IMAGING | Facility: HOSPITAL | Age: 66
Discharge: HOME | End: 2021-04-25
Payer: COMMERCIAL

## 2021-04-25 DIAGNOSIS — C83.30 DIFFUSE LARGE B-CELL LYMPHOMA, UNSPECIFIED SITE (HCC): ICD-10-CM

## 2021-04-25 PROCEDURE — G1004 CDSM NDSC: HCPCS

## 2021-04-25 PROCEDURE — 71250 CT THORAX DX C-: CPT

## 2021-07-21 ENCOUNTER — APPOINTMENT (OUTPATIENT)
Dept: LAB | Facility: CLINIC | Age: 66
End: 2021-07-21
Payer: COMMERCIAL

## 2021-07-21 DIAGNOSIS — I10 BENIGN HYPERTENSION: ICD-10-CM

## 2021-07-21 DIAGNOSIS — E87.1 HYPONATREMIA: ICD-10-CM

## 2021-07-21 DIAGNOSIS — Z79.899 ENCOUNTER FOR LONG-TERM (CURRENT) USE OF OTHER MEDICATIONS: ICD-10-CM

## 2021-07-21 LAB
ANION GAP SERPL CALCULATED.3IONS-SCNC: 7 MMOL/L (ref 4–13)
BUN SERPL-MCNC: 8 MG/DL (ref 5–25)
CALCIUM SERPL-MCNC: 9.5 MG/DL (ref 8.3–10.1)
CHLORIDE SERPL-SCNC: 98 MMOL/L (ref 100–108)
CO2 SERPL-SCNC: 26 MMOL/L (ref 21–32)
CREAT SERPL-MCNC: 0.88 MG/DL (ref 0.6–1.3)
GFR SERPL CREATININE-BSD FRML MDRD: 68 ML/MIN/1.73SQ M
GLUCOSE SERPL-MCNC: 82 MG/DL (ref 65–140)
POTASSIUM SERPL-SCNC: 4.1 MMOL/L (ref 3.5–5.3)
SODIUM SERPL-SCNC: 131 MMOL/L (ref 136–145)

## 2021-07-21 PROCEDURE — 80048 BASIC METABOLIC PNL TOTAL CA: CPT

## 2021-07-21 PROCEDURE — 36415 COLL VENOUS BLD VENIPUNCTURE: CPT

## 2021-07-27 ENCOUNTER — HOSPITAL ENCOUNTER (OUTPATIENT)
Dept: RADIOLOGY | Facility: HOSPITAL | Age: 66
Discharge: HOME | End: 2021-07-27
Payer: COMMERCIAL

## 2021-07-27 DIAGNOSIS — C83.30 DIFFUSE LARGE B-CELL LYMPHOMA, UNSPECIFIED BODY REGION (HCC): ICD-10-CM

## 2021-07-27 PROCEDURE — 71260 CT THORAX DX C+: CPT

## 2021-07-27 PROCEDURE — 74177 CT ABD & PELVIS W/CONTRAST: CPT

## 2021-07-27 RX ADMIN — IOHEXOL 100 ML: 350 INJECTION, SOLUTION INTRAVENOUS at 16:55

## 2021-09-28 ENCOUNTER — HOSPITAL ENCOUNTER (INPATIENT)
Facility: HOSPITAL | Age: 66
LOS: 9 days | Discharge: NURSING, REHAB OR LONG-TERM CARE | DRG: 481 | End: 2021-10-07
Attending: EMERGENCY MEDICINE | Admitting: INTERNAL MEDICINE
Payer: COMMERCIAL

## 2021-09-28 ENCOUNTER — APPOINTMENT (EMERGENCY)
Dept: RADIOLOGY | Facility: HOSPITAL | Age: 66
DRG: 481 | End: 2021-09-28
Payer: COMMERCIAL

## 2021-09-28 DIAGNOSIS — J96.01 ACUTE RESPIRATORY FAILURE WITH HYPOXEMIA (HCC): ICD-10-CM

## 2021-09-28 DIAGNOSIS — E87.1 HYPONATREMIA: ICD-10-CM

## 2021-09-28 DIAGNOSIS — S79.919A HIP INJURY, UNSPECIFIED LATERALITY, INITIAL ENCOUNTER: Primary | ICD-10-CM

## 2021-09-28 DIAGNOSIS — S31.809A BUTTOCK WOUND: ICD-10-CM

## 2021-09-28 DIAGNOSIS — I48.91 ATRIAL FIBRILLATION WITH RVR (HCC): ICD-10-CM

## 2021-09-28 DIAGNOSIS — Z03.89 CORONARY ARTERY DISEASE (CAD) EXCLUDED: ICD-10-CM

## 2021-09-28 DIAGNOSIS — S72.002D CLOSED FRACTURE OF LEFT HIP WITH ROUTINE HEALING, SUBSEQUENT ENCOUNTER: ICD-10-CM

## 2021-09-28 DIAGNOSIS — S72.142A CLOSED INTERTROCHANTERIC FRACTURE OF HIP, LEFT, INITIAL ENCOUNTER (HCC): ICD-10-CM

## 2021-09-28 DIAGNOSIS — M79.604 PAIN IN BOTH LOWER EXTREMITIES: ICD-10-CM

## 2021-09-28 DIAGNOSIS — I95.9 HYPOTENSION, UNSPECIFIED HYPOTENSION TYPE: ICD-10-CM

## 2021-09-28 DIAGNOSIS — M79.605 PAIN IN BOTH LOWER EXTREMITIES: ICD-10-CM

## 2021-09-28 DIAGNOSIS — I48.0 PAROXYSMAL ATRIAL FIBRILLATION (HCC): ICD-10-CM

## 2021-09-28 PROBLEM — G47.00 INSOMNIA: Status: ACTIVE | Noted: 2021-09-28

## 2021-09-28 LAB
ALBUMIN SERPL BCP-MCNC: 3.9 G/DL (ref 3.5–5.7)
ALP SERPL-CCNC: 63 U/L (ref 55–165)
ALT SERPL W P-5'-P-CCNC: 10 U/L (ref 7–52)
ANION GAP SERPL CALCULATED.3IONS-SCNC: 9 MMOL/L (ref 4–13)
APTT PPP: 30 SECONDS (ref 23–37)
AST SERPL W P-5'-P-CCNC: 18 U/L (ref 13–39)
BASOPHILS # BLD AUTO: 0.1 THOUSANDS/ΜL (ref 0–0.1)
BASOPHILS NFR BLD AUTO: 1 % (ref 0–2)
BILIRUB SERPL-MCNC: 0.9 MG/DL (ref 0.2–1)
BUN SERPL-MCNC: 8 MG/DL (ref 7–25)
CALCIUM SERPL-MCNC: 8.9 MG/DL (ref 8.6–10.5)
CHLORIDE SERPL-SCNC: 94 MMOL/L (ref 98–107)
CO2 SERPL-SCNC: 24 MMOL/L (ref 21–31)
CREAT SERPL-MCNC: 0.99 MG/DL (ref 0.6–1.2)
EOSINOPHIL # BLD AUTO: 0.3 THOUSAND/ΜL (ref 0–0.61)
EOSINOPHIL NFR BLD AUTO: 3 % (ref 0–5)
ERYTHROCYTE [DISTWIDTH] IN BLOOD BY AUTOMATED COUNT: 15.4 % (ref 11.5–14.5)
GFR SERPL CREATININE-BSD FRML MDRD: 59 ML/MIN/1.73SQ M
GLUCOSE SERPL-MCNC: 104 MG/DL (ref 65–99)
HCT VFR BLD AUTO: 42.7 % (ref 42–47)
HGB BLD-MCNC: 14.2 G/DL (ref 12–16)
INR PPP: 0.98 (ref 0.84–1.19)
LYMPHOCYTES # BLD AUTO: 0.9 THOUSANDS/ΜL (ref 0.6–4.47)
LYMPHOCYTES NFR BLD AUTO: 9 % (ref 21–51)
MCH RBC QN AUTO: 30.4 PG (ref 26–34)
MCHC RBC AUTO-ENTMCNC: 33.3 G/DL (ref 31–37)
MCV RBC AUTO: 91 FL (ref 81–99)
MONOCYTES # BLD AUTO: 0.9 THOUSAND/ΜL (ref 0.17–1.22)
MONOCYTES NFR BLD AUTO: 9 % (ref 2–12)
NEUTROPHILS # BLD AUTO: 8 THOUSANDS/ΜL (ref 1.4–6.5)
NEUTS SEG NFR BLD AUTO: 79 % (ref 42–75)
PLATELET # BLD AUTO: 195 THOUSANDS/UL (ref 149–390)
PMV BLD AUTO: 8.3 FL (ref 8.6–11.7)
POTASSIUM SERPL-SCNC: 4.3 MMOL/L (ref 3.5–5.5)
PROT SERPL-MCNC: 6.1 G/DL (ref 6.4–8.9)
PROTHROMBIN TIME: 13.1 SECONDS (ref 11.6–14.5)
RBC # BLD AUTO: 4.68 MILLION/UL (ref 3.9–5.2)
SODIUM SERPL-SCNC: 127 MMOL/L (ref 134–143)
WBC # BLD AUTO: 10.2 THOUSAND/UL (ref 4.8–10.8)

## 2021-09-28 PROCEDURE — 99223 1ST HOSP IP/OBS HIGH 75: CPT | Performed by: INTERNAL MEDICINE

## 2021-09-28 PROCEDURE — 85025 COMPLETE CBC W/AUTO DIFF WBC: CPT | Performed by: EMERGENCY MEDICINE

## 2021-09-28 PROCEDURE — 93005 ELECTROCARDIOGRAM TRACING: CPT

## 2021-09-28 PROCEDURE — 96374 THER/PROPH/DIAG INJ IV PUSH: CPT

## 2021-09-28 PROCEDURE — 71045 X-RAY EXAM CHEST 1 VIEW: CPT

## 2021-09-28 PROCEDURE — 99285 EMERGENCY DEPT VISIT HI MDM: CPT

## 2021-09-28 PROCEDURE — 85730 THROMBOPLASTIN TIME PARTIAL: CPT | Performed by: EMERGENCY MEDICINE

## 2021-09-28 PROCEDURE — 73502 X-RAY EXAM HIP UNI 2-3 VIEWS: CPT

## 2021-09-28 PROCEDURE — 85610 PROTHROMBIN TIME: CPT | Performed by: EMERGENCY MEDICINE

## 2021-09-28 PROCEDURE — 36415 COLL VENOUS BLD VENIPUNCTURE: CPT | Performed by: EMERGENCY MEDICINE

## 2021-09-28 PROCEDURE — 99285 EMERGENCY DEPT VISIT HI MDM: CPT | Performed by: EMERGENCY MEDICINE

## 2021-09-28 PROCEDURE — 80053 COMPREHEN METABOLIC PANEL: CPT | Performed by: EMERGENCY MEDICINE

## 2021-09-28 RX ORDER — ATORVASTATIN CALCIUM 40 MG/1
40 TABLET, FILM COATED ORAL
Status: DISCONTINUED | OUTPATIENT
Start: 2021-09-28 | End: 2021-10-07 | Stop reason: HOSPADM

## 2021-09-28 RX ORDER — ONDANSETRON 2 MG/ML
4 INJECTION INTRAMUSCULAR; INTRAVENOUS EVERY 4 HOURS PRN
Status: DISCONTINUED | OUTPATIENT
Start: 2021-09-28 | End: 2021-09-29

## 2021-09-28 RX ORDER — MORPHINE SULFATE 4 MG/ML
2 INJECTION, SOLUTION INTRAMUSCULAR; INTRAVENOUS EVERY 4 HOURS PRN
Status: DISCONTINUED | OUTPATIENT
Start: 2021-09-28 | End: 2021-09-29

## 2021-09-28 RX ORDER — AMOXICILLIN 250 MG
1 CAPSULE ORAL DAILY
Status: DISCONTINUED | OUTPATIENT
Start: 2021-09-28 | End: 2021-09-29

## 2021-09-28 RX ORDER — SODIUM CHLORIDE 9 MG/ML
75 INJECTION, SOLUTION INTRAVENOUS CONTINUOUS
Status: DISCONTINUED | OUTPATIENT
Start: 2021-09-28 | End: 2021-09-30

## 2021-09-28 RX ORDER — ONDANSETRON 2 MG/ML
4 INJECTION INTRAMUSCULAR; INTRAVENOUS EVERY 6 HOURS PRN
Status: DISCONTINUED | OUTPATIENT
Start: 2021-09-28 | End: 2021-09-28

## 2021-09-28 RX ORDER — ALPRAZOLAM 0.25 MG/1
0.25 TABLET ORAL
Status: DISCONTINUED | OUTPATIENT
Start: 2021-09-28 | End: 2021-09-28

## 2021-09-28 RX ORDER — TRAMADOL HYDROCHLORIDE 50 MG/1
50 TABLET ORAL EVERY 6 HOURS PRN
Status: DISCONTINUED | OUTPATIENT
Start: 2021-09-28 | End: 2021-09-29

## 2021-09-28 RX ORDER — ACETAMINOPHEN 325 MG/1
650 TABLET ORAL EVERY 4 HOURS PRN
Status: DISCONTINUED | OUTPATIENT
Start: 2021-09-28 | End: 2021-09-28

## 2021-09-28 RX ORDER — MELATONIN
2000 DAILY
Status: DISCONTINUED | OUTPATIENT
Start: 2021-09-28 | End: 2021-10-07 | Stop reason: HOSPADM

## 2021-09-28 RX ORDER — ACYCLOVIR 400 MG/1
400 TABLET ORAL 2 TIMES DAILY
Status: DISCONTINUED | OUTPATIENT
Start: 2021-09-28 | End: 2021-09-28

## 2021-09-28 RX ORDER — ACETAMINOPHEN 325 MG/1
975 TABLET ORAL EVERY 6 HOURS PRN
Status: DISCONTINUED | OUTPATIENT
Start: 2021-09-28 | End: 2021-09-29

## 2021-09-28 RX ORDER — LANOLIN ALCOHOL/MO/W.PET/CERES
3 CREAM (GRAM) TOPICAL
Status: DISCONTINUED | OUTPATIENT
Start: 2021-09-28 | End: 2021-10-02

## 2021-09-28 RX ORDER — MORPHINE SULFATE 4 MG/ML
2 INJECTION, SOLUTION INTRAMUSCULAR; INTRAVENOUS ONCE
Status: COMPLETED | OUTPATIENT
Start: 2021-09-28 | End: 2021-09-28

## 2021-09-28 RX ORDER — GABAPENTIN 100 MG/1
100 CAPSULE ORAL
Status: DISCONTINUED | OUTPATIENT
Start: 2021-09-28 | End: 2021-10-07 | Stop reason: HOSPADM

## 2021-09-28 RX ORDER — NICOTINE 21 MG/24HR
1 PATCH, TRANSDERMAL 24 HOURS TRANSDERMAL DAILY
Status: DISCONTINUED | OUTPATIENT
Start: 2021-09-28 | End: 2021-10-07 | Stop reason: HOSPADM

## 2021-09-28 RX ORDER — MIRTAZAPINE 15 MG/1
7.5 TABLET, FILM COATED ORAL
Status: DISCONTINUED | OUTPATIENT
Start: 2021-09-28 | End: 2021-10-07 | Stop reason: HOSPADM

## 2021-09-28 RX ADMIN — MIRTAZAPINE 7.5 MG: 15 TABLET, FILM COATED ORAL at 22:17

## 2021-09-28 RX ADMIN — SODIUM CHLORIDE 500 ML: 0.9 INJECTION, SOLUTION INTRAVENOUS at 18:16

## 2021-09-28 RX ADMIN — SODIUM CHLORIDE 75 ML/HR: 0.9 INJECTION, SOLUTION INTRAVENOUS at 15:24

## 2021-09-28 RX ADMIN — MORPHINE SULFATE 2 MG: 4 INJECTION INTRAVENOUS at 18:15

## 2021-09-28 RX ADMIN — TRAMADOL HYDROCHLORIDE 50 MG: 50 TABLET, FILM COATED ORAL at 16:23

## 2021-09-28 RX ADMIN — ATORVASTATIN CALCIUM 40 MG: 40 TABLET, FILM COATED ORAL at 22:18

## 2021-09-28 RX ADMIN — NICOTINE 1 PATCH: 21 PATCH, EXTENDED RELEASE TRANSDERMAL at 15:21

## 2021-09-28 RX ADMIN — MORPHINE SULFATE 2 MG: 4 INJECTION INTRAVENOUS at 14:16

## 2021-09-28 RX ADMIN — DOCUSATE SODIUM AND SENNOSIDES 1 TABLET: 8.6; 5 TABLET ORAL at 15:21

## 2021-09-29 ENCOUNTER — ANESTHESIA (INPATIENT)
Dept: PERIOP | Facility: HOSPITAL | Age: 66
DRG: 481 | End: 2021-09-29
Payer: COMMERCIAL

## 2021-09-29 ENCOUNTER — APPOINTMENT (INPATIENT)
Dept: RADIOLOGY | Facility: HOSPITAL | Age: 66
DRG: 481 | End: 2021-09-29
Payer: COMMERCIAL

## 2021-09-29 ENCOUNTER — ANESTHESIA EVENT (INPATIENT)
Dept: PERIOP | Facility: HOSPITAL | Age: 66
DRG: 481 | End: 2021-09-29
Payer: COMMERCIAL

## 2021-09-29 PROBLEM — J96.01 ACUTE RESPIRATORY FAILURE WITH HYPOXEMIA (HCC): Status: ACTIVE | Noted: 2021-09-29

## 2021-09-29 LAB
ABO GROUP BLD: NORMAL
ANION GAP SERPL CALCULATED.3IONS-SCNC: 8 MMOL/L (ref 4–13)
ANION GAP SERPL CALCULATED.3IONS-SCNC: 8 MMOL/L (ref 4–13)
ARTERIAL PATENCY WRIST A: YES
ATRIAL RATE: 64 BPM
BASE EXCESS BLDA CALC-SCNC: -9.2 MMOL/L (ref -2–3)
BASOPHILS # BLD AUTO: 0.1 THOUSANDS/ΜL (ref 0–0.1)
BASOPHILS NFR BLD AUTO: 1 % (ref 0–2)
BLD GP AB SCN SERPL QL: NEGATIVE
BNP SERPL-MCNC: 338 PG/ML (ref 1–100)
BUN SERPL-MCNC: 6 MG/DL (ref 7–25)
BUN SERPL-MCNC: 8 MG/DL (ref 7–25)
CALCIUM SERPL-MCNC: 7.4 MG/DL (ref 8.6–10.5)
CALCIUM SERPL-MCNC: 8.3 MG/DL (ref 8.6–10.5)
CHLORIDE SERPL-SCNC: 103 MMOL/L (ref 98–107)
CHLORIDE SERPL-SCNC: 99 MMOL/L (ref 98–107)
CO2 SERPL-SCNC: 20 MMOL/L (ref 21–31)
CO2 SERPL-SCNC: 22 MMOL/L (ref 21–31)
CREAT SERPL-MCNC: 0.87 MG/DL (ref 0.6–1.2)
CREAT SERPL-MCNC: 0.91 MG/DL (ref 0.6–1.2)
EOSINOPHIL # BLD AUTO: 0.1 THOUSAND/ΜL (ref 0–0.61)
EOSINOPHIL NFR BLD AUTO: 2 % (ref 0–5)
ERYTHROCYTE [DISTWIDTH] IN BLOOD BY AUTOMATED COUNT: 15.3 % (ref 11.5–14.5)
ERYTHROCYTE [DISTWIDTH] IN BLOOD BY AUTOMATED COUNT: 15.6 % (ref 11.5–14.5)
GFR SERPL CREATININE-BSD FRML MDRD: 65 ML/MIN/1.73SQ M
GFR SERPL CREATININE-BSD FRML MDRD: 69 ML/MIN/1.73SQ M
GLUCOSE SERPL-MCNC: 126 MG/DL (ref 65–99)
GLUCOSE SERPL-MCNC: 93 MG/DL (ref 65–99)
HCO3 BLDA-SCNC: 17.5 MMOL/L (ref 22–28)
HCT VFR BLD AUTO: 33.6 % (ref 42–47)
HCT VFR BLD AUTO: 37 % (ref 42–47)
HGB BLD-MCNC: 11 G/DL (ref 12–16)
HGB BLD-MCNC: 12.1 G/DL (ref 12–16)
LYMPHOCYTES # BLD AUTO: 0.6 THOUSANDS/ΜL (ref 0.6–4.47)
LYMPHOCYTES NFR BLD AUTO: 8 % (ref 21–51)
MCH RBC QN AUTO: 30 PG (ref 26–34)
MCH RBC QN AUTO: 30.2 PG (ref 26–34)
MCHC RBC AUTO-ENTMCNC: 32.7 G/DL (ref 31–37)
MCHC RBC AUTO-ENTMCNC: 32.7 G/DL (ref 31–37)
MCV RBC AUTO: 92 FL (ref 81–99)
MCV RBC AUTO: 92 FL (ref 81–99)
MONOCYTES # BLD AUTO: 1 THOUSAND/ΜL (ref 0.17–1.22)
MONOCYTES NFR BLD AUTO: 12 % (ref 2–12)
NASAL CANNULA: 4
NEUTROPHILS # BLD AUTO: 6.2 THOUSANDS/ΜL (ref 1.4–6.5)
NEUTS SEG NFR BLD AUTO: 78 % (ref 42–75)
O2 CT BLDA-SCNC: 12.4 ML/DL
OXYHGB MFR BLDA: 85.3 % (ref 94–100)
P AXIS: 52 DEGREES
PCO2 BLDA: 42.4 MM HG (ref 35–45)
PH BLDA: 7.24 [PH] (ref 7.35–7.45)
PLATELET # BLD AUTO: 176 THOUSANDS/UL (ref 149–390)
PLATELET # BLD AUTO: 183 THOUSANDS/UL (ref 149–390)
PMV BLD AUTO: 7.9 FL (ref 8.6–11.7)
PMV BLD AUTO: 9.1 FL (ref 8.6–11.7)
PO2 BLDA: 64 MM HG (ref 80–100)
POTASSIUM SERPL-SCNC: 3.9 MMOL/L (ref 3.5–5.5)
POTASSIUM SERPL-SCNC: 3.9 MMOL/L (ref 3.5–5.5)
PR INTERVAL: 168 MS
QRS AXIS: 95 DEGREES
QRSD INTERVAL: 72 MS
QT INTERVAL: 430 MS
QTC INTERVAL: 443 MS
RBC # BLD AUTO: 3.64 MILLION/UL (ref 3.9–5.2)
RBC # BLD AUTO: 4.04 MILLION/UL (ref 3.9–5.2)
RH BLD: NEGATIVE
SODIUM SERPL-SCNC: 129 MMOL/L (ref 134–143)
SODIUM SERPL-SCNC: 131 MMOL/L (ref 134–143)
SPECIMEN EXPIRATION DATE: NORMAL
SPECIMEN SOURCE: ABNORMAL
T WAVE AXIS: 81 DEGREES
VENTRICULAR RATE: 64 BPM
WBC # BLD AUTO: 8.1 THOUSAND/UL (ref 4.8–10.8)
WBC # BLD AUTO: 8.9 THOUSAND/UL (ref 4.8–10.8)

## 2021-09-29 PROCEDURE — 86901 BLOOD TYPING SEROLOGIC RH(D): CPT | Performed by: ANESTHESIOLOGY

## 2021-09-29 PROCEDURE — 36600 WITHDRAWAL OF ARTERIAL BLOOD: CPT

## 2021-09-29 PROCEDURE — 73502 X-RAY EXAM HIP UNI 2-3 VIEWS: CPT | Performed by: ORTHOPAEDIC SURGERY

## 2021-09-29 PROCEDURE — C1713 ANCHOR/SCREW BN/BN,TIS/BN: HCPCS | Performed by: ORTHOPAEDIC SURGERY

## 2021-09-29 PROCEDURE — 85027 COMPLETE CBC AUTOMATED: CPT | Performed by: NURSE PRACTITIONER

## 2021-09-29 PROCEDURE — 86850 RBC ANTIBODY SCREEN: CPT | Performed by: ANESTHESIOLOGY

## 2021-09-29 PROCEDURE — 94668 MNPJ CHEST WALL SBSQ: CPT

## 2021-09-29 PROCEDURE — 99232 SBSQ HOSP IP/OBS MODERATE 35: CPT | Performed by: NURSE PRACTITIONER

## 2021-09-29 PROCEDURE — 86900 BLOOD TYPING SEROLOGIC ABO: CPT | Performed by: ANESTHESIOLOGY

## 2021-09-29 PROCEDURE — 94760 N-INVAS EAR/PLS OXIMETRY 1: CPT

## 2021-09-29 PROCEDURE — 82805 BLOOD GASES W/O2 SATURATION: CPT | Performed by: NURSE PRACTITIONER

## 2021-09-29 PROCEDURE — 93010 ELECTROCARDIOGRAM REPORT: CPT | Performed by: INTERNAL MEDICINE

## 2021-09-29 PROCEDURE — 99222 1ST HOSP IP/OBS MODERATE 55: CPT | Performed by: ORTHOPAEDIC SURGERY

## 2021-09-29 PROCEDURE — 80048 BASIC METABOLIC PNL TOTAL CA: CPT | Performed by: NURSE PRACTITIONER

## 2021-09-29 PROCEDURE — 27245 TREAT THIGH FRACTURE: CPT | Performed by: ORTHOPAEDIC SURGERY

## 2021-09-29 PROCEDURE — 85025 COMPLETE CBC W/AUTO DIFF WBC: CPT | Performed by: INTERNAL MEDICINE

## 2021-09-29 PROCEDURE — 71045 X-RAY EXAM CHEST 1 VIEW: CPT

## 2021-09-29 PROCEDURE — 73502 X-RAY EXAM HIP UNI 2-3 VIEWS: CPT

## 2021-09-29 PROCEDURE — 80048 BASIC METABOLIC PNL TOTAL CA: CPT | Performed by: INTERNAL MEDICINE

## 2021-09-29 PROCEDURE — C1769 GUIDE WIRE: HCPCS | Performed by: ORTHOPAEDIC SURGERY

## 2021-09-29 PROCEDURE — 0QH706Z INSERTION OF INTRAMEDULLARY INTERNAL FIXATION DEVICE INTO LEFT UPPER FEMUR, OPEN APPROACH: ICD-10-PCS | Performed by: INTERNAL MEDICINE

## 2021-09-29 PROCEDURE — 83880 ASSAY OF NATRIURETIC PEPTIDE: CPT | Performed by: NURSE PRACTITIONER

## 2021-09-29 PROCEDURE — 94664 DEMO&/EVAL PT USE INHALER: CPT

## 2021-09-29 DEVICE — TFNA FENESTRATED HELICAL BLADE 90MM - STERILE
Type: IMPLANTABLE DEVICE | Site: LEG | Status: FUNCTIONAL
Brand: TFN-ADVANCE

## 2021-09-29 DEVICE — 10MM/130 DEG TI CANN TFNA 170MM - STERILE
Type: IMPLANTABLE DEVICE | Site: LEG | Status: FUNCTIONAL
Brand: TFN-ADVANCE

## 2021-09-29 DEVICE — 5.0MM TI LOCKING SCREW W/T25 STARDRIVE 32MM F/IM NAIL-STER: Type: IMPLANTABLE DEVICE | Site: LEG | Status: FUNCTIONAL

## 2021-09-29 RX ORDER — CEFAZOLIN SODIUM 1 G/50ML
SOLUTION INTRAVENOUS AS NEEDED
Status: DISCONTINUED | OUTPATIENT
Start: 2021-09-29 | End: 2021-09-29

## 2021-09-29 RX ORDER — METHOCARBAMOL 500 MG/1
500 TABLET, FILM COATED ORAL EVERY 8 HOURS SCHEDULED
Status: DISCONTINUED | OUTPATIENT
Start: 2021-09-29 | End: 2021-10-02

## 2021-09-29 RX ORDER — FENTANYL CITRATE 50 UG/ML
INJECTION, SOLUTION INTRAMUSCULAR; INTRAVENOUS AS NEEDED
Status: DISCONTINUED | OUTPATIENT
Start: 2021-09-29 | End: 2021-09-29

## 2021-09-29 RX ORDER — ACETAMINOPHEN 325 MG/1
975 TABLET ORAL EVERY 8 HOURS SCHEDULED
Status: DISCONTINUED | OUTPATIENT
Start: 2021-09-29 | End: 2021-10-02

## 2021-09-29 RX ORDER — HYDROMORPHONE HCL/PF 1 MG/ML
0.5 SYRINGE (ML) INJECTION
Status: DISCONTINUED | OUTPATIENT
Start: 2021-09-29 | End: 2021-10-01

## 2021-09-29 RX ORDER — HYDROMORPHONE HCL IN WATER/PF 6 MG/30 ML
0.2 PATIENT CONTROLLED ANALGESIA SYRINGE INTRAVENOUS EVERY 4 HOURS PRN
Status: DISCONTINUED | OUTPATIENT
Start: 2021-09-29 | End: 2021-09-29

## 2021-09-29 RX ORDER — MIDAZOLAM HYDROCHLORIDE 2 MG/2ML
INJECTION, SOLUTION INTRAMUSCULAR; INTRAVENOUS AS NEEDED
Status: DISCONTINUED | OUTPATIENT
Start: 2021-09-29 | End: 2021-09-29

## 2021-09-29 RX ORDER — LIDOCAINE HYDROCHLORIDE 20 MG/ML
INJECTION, SOLUTION EPIDURAL; INFILTRATION; INTRACAUDAL; PERINEURAL AS NEEDED
Status: DISCONTINUED | OUTPATIENT
Start: 2021-09-29 | End: 2021-09-29

## 2021-09-29 RX ORDER — BUPIVACAINE HYDROCHLORIDE AND EPINEPHRINE 5; 5 MG/ML; UG/ML
INJECTION, SOLUTION PERINEURAL AS NEEDED
Status: DISCONTINUED | OUTPATIENT
Start: 2021-09-29 | End: 2021-09-29 | Stop reason: HOSPADM

## 2021-09-29 RX ORDER — PROPOFOL 10 MG/ML
INJECTION, EMULSION INTRAVENOUS AS NEEDED
Status: DISCONTINUED | OUTPATIENT
Start: 2021-09-29 | End: 2021-09-29

## 2021-09-29 RX ORDER — AMOXICILLIN 250 MG
2 CAPSULE ORAL 2 TIMES DAILY
Status: DISCONTINUED | OUTPATIENT
Start: 2021-09-29 | End: 2021-10-07 | Stop reason: HOSPADM

## 2021-09-29 RX ORDER — POLYETHYLENE GLYCOL 3350 17 G/17G
17 POWDER, FOR SOLUTION ORAL DAILY PRN
Status: DISCONTINUED | OUTPATIENT
Start: 2021-09-29 | End: 2021-10-07 | Stop reason: HOSPADM

## 2021-09-29 RX ORDER — OXYCODONE HYDROCHLORIDE 5 MG/1
5 TABLET ORAL EVERY 4 HOURS PRN
Status: DISCONTINUED | OUTPATIENT
Start: 2021-09-29 | End: 2021-09-30

## 2021-09-29 RX ORDER — POVIDONE-IODINE 10 MG/G
OINTMENT TOPICAL AS NEEDED
Status: DISCONTINUED | OUTPATIENT
Start: 2021-09-29 | End: 2021-09-29 | Stop reason: HOSPADM

## 2021-09-29 RX ORDER — HYDROMORPHONE HCL IN WATER/PF 6 MG/30 ML
0.2 PATIENT CONTROLLED ANALGESIA SYRINGE INTRAVENOUS
Status: DISCONTINUED | OUTPATIENT
Start: 2021-09-29 | End: 2021-10-01

## 2021-09-29 RX ORDER — ONDANSETRON 2 MG/ML
INJECTION INTRAMUSCULAR; INTRAVENOUS AS NEEDED
Status: DISCONTINUED | OUTPATIENT
Start: 2021-09-29 | End: 2021-09-29

## 2021-09-29 RX ORDER — HYDROMORPHONE HCL/PF 1 MG/ML
1 SYRINGE (ML) INJECTION EVERY 4 HOURS PRN
Status: DISCONTINUED | OUTPATIENT
Start: 2021-09-29 | End: 2021-10-01

## 2021-09-29 RX ORDER — OXYCODONE HYDROCHLORIDE 5 MG/1
2.5 TABLET ORAL EVERY 4 HOURS PRN
Status: DISCONTINUED | OUTPATIENT
Start: 2021-09-29 | End: 2021-09-30

## 2021-09-29 RX ORDER — CEFAZOLIN SODIUM 2 G/50ML
2000 SOLUTION INTRAVENOUS EVERY 8 HOURS
Status: COMPLETED | OUTPATIENT
Start: 2021-09-29 | End: 2021-09-30

## 2021-09-29 RX ORDER — ONDANSETRON 2 MG/ML
4 INJECTION INTRAMUSCULAR; INTRAVENOUS EVERY 4 HOURS PRN
Status: DISCONTINUED | OUTPATIENT
Start: 2021-09-29 | End: 2021-10-07 | Stop reason: HOSPADM

## 2021-09-29 RX ORDER — LIDOCAINE 50 MG/G
1 PATCH TOPICAL DAILY
Status: ACTIVE | OUTPATIENT
Start: 2021-09-29 | End: 2021-09-30

## 2021-09-29 RX ORDER — FONDAPARINUX SODIUM 2.5 MG/.5ML
2.5 INJECTION SUBCUTANEOUS EVERY 24 HOURS
Status: DISCONTINUED | OUTPATIENT
Start: 2021-09-30 | End: 2021-10-01

## 2021-09-29 RX ADMIN — METHOCARBAMOL 500 MG: 500 TABLET ORAL at 22:42

## 2021-09-29 RX ADMIN — CEFAZOLIN SODIUM 1000 MG: 1 SOLUTION INTRAVENOUS at 15:26

## 2021-09-29 RX ADMIN — FENTANYL CITRATE 50 MCG: 50 INJECTION INTRAMUSCULAR; INTRAVENOUS at 15:37

## 2021-09-29 RX ADMIN — MORPHINE SULFATE 2 MG: 4 INJECTION INTRAVENOUS at 05:37

## 2021-09-29 RX ADMIN — MORPHINE SULFATE 2 MG: 4 INJECTION INTRAVENOUS at 01:37

## 2021-09-29 RX ADMIN — Medication 3 MG: at 22:42

## 2021-09-29 RX ADMIN — SODIUM CHLORIDE 75 ML/HR: 0.9 INJECTION, SOLUTION INTRAVENOUS at 12:35

## 2021-09-29 RX ADMIN — ACETAMINOPHEN 975 MG: 325 TABLET ORAL at 22:42

## 2021-09-29 RX ADMIN — SODIUM CHLORIDE 1000 ML: 0.9 INJECTION, SOLUTION INTRAVENOUS at 18:45

## 2021-09-29 RX ADMIN — HYDROMORPHONE HYDROCHLORIDE 0.5 MG: 1 INJECTION, SOLUTION INTRAMUSCULAR; INTRAVENOUS; SUBCUTANEOUS at 16:45

## 2021-09-29 RX ADMIN — MIDAZOLAM HYDROCHLORIDE 2 MG: 1 INJECTION, SOLUTION INTRAMUSCULAR; INTRAVENOUS at 15:26

## 2021-09-29 RX ADMIN — SODIUM CHLORIDE: 0.9 INJECTION, SOLUTION INTRAVENOUS at 16:35

## 2021-09-29 RX ADMIN — GABAPENTIN 100 MG: 100 CAPSULE ORAL at 22:42

## 2021-09-29 RX ADMIN — FENTANYL CITRATE 50 MCG: 50 INJECTION INTRAMUSCULAR; INTRAVENOUS at 16:10

## 2021-09-29 RX ADMIN — SODIUM CHLORIDE 75 ML/HR: 0.9 INJECTION, SOLUTION INTRAVENOUS at 20:18

## 2021-09-29 RX ADMIN — HYDROMORPHONE HYDROCHLORIDE 0.2 MG: 0.2 INJECTION, SOLUTION INTRAMUSCULAR; INTRAVENOUS; SUBCUTANEOUS at 20:24

## 2021-09-29 RX ADMIN — MIRTAZAPINE 7.5 MG: 15 TABLET, FILM COATED ORAL at 22:42

## 2021-09-29 RX ADMIN — HYDROMORPHONE HYDROCHLORIDE 0.2 MG: 0.2 INJECTION, SOLUTION INTRAMUSCULAR; INTRAVENOUS; SUBCUTANEOUS at 10:23

## 2021-09-29 RX ADMIN — ONDANSETRON 4 MG: 2 INJECTION INTRAMUSCULAR; INTRAVENOUS at 16:33

## 2021-09-29 RX ADMIN — OXYCODONE HYDROCHLORIDE 2.5 MG: 5 TABLET ORAL at 22:41

## 2021-09-29 RX ADMIN — PHENYLEPHRINE HYDROCHLORIDE 50 MCG/MIN: 10 INJECTION INTRAVENOUS at 15:40

## 2021-09-29 RX ADMIN — PROPOFOL 150 MG: 10 INJECTION, EMULSION INTRAVENOUS at 15:37

## 2021-09-29 RX ADMIN — CEFAZOLIN SODIUM 2000 MG: 2 SOLUTION INTRAVENOUS at 23:07

## 2021-09-29 RX ADMIN — ATORVASTATIN CALCIUM 40 MG: 40 TABLET, FILM COATED ORAL at 22:42

## 2021-09-29 RX ADMIN — HYDROMORPHONE HYDROCHLORIDE 0.5 MG: 1 INJECTION, SOLUTION INTRAMUSCULAR; INTRAVENOUS; SUBCUTANEOUS at 13:26

## 2021-09-29 RX ADMIN — HYDROMORPHONE HYDROCHLORIDE 0.5 MG: 1 INJECTION, SOLUTION INTRAMUSCULAR; INTRAVENOUS; SUBCUTANEOUS at 16:36

## 2021-09-29 RX ADMIN — NICOTINE 1 PATCH: 21 PATCH, EXTENDED RELEASE TRANSDERMAL at 09:09

## 2021-09-29 RX ADMIN — LIDOCAINE HYDROCHLORIDE 100 MG: 20 INJECTION, SOLUTION EPIDURAL; INFILTRATION; INTRACAUDAL at 15:37

## 2021-09-29 NOTE — ANESTHESIA PREPROCEDURE EVALUATION
Procedure:  INSERTION NAIL IM FEMUR ANTEGRADE (TROCHANTERIC) (Left Leg Upper)    Relevant Problems   CARDIO   (+) Aortoiliac occlusive disease (HCC)   (+) Benign essential hypertension   (+) Essential hypertension   (+) Paroxysmal atrial fibrillation (HCC)      /RENAL   (+) BRYANT (acute kidney injury) (HCC)      HEMATOLOGY   (+) Lymphoma (HCC)      NEURO/PSYCH   (+) Depression with anxiety   (+) Lower extremity weakness   (+) Weakness of lower extremity      PULMONARY   (+) Acute respiratory failure with hypoxia        Physical Exam    Airway    Mallampati score: II  TM Distance: >3 FB  Neck ROM: full     Dental   upper dentures and lower dentures,     Cardiovascular  Rhythm: regular, Rate: normal,     Pulmonary  Breath sounds clear to auscultation, Rhonchi, Decreased breath sounds,     Other Findings        Anesthesia Plan  ASA Score- 3     Anesthesia Type- general with ASA Monitors  Additional Monitors:   Airway Plan: LMA  Plan Factors-Exercise tolerance (METS): <4 METS  Chart reviewed  EKG reviewed  Existing labs reviewed  Patient summary reviewed  Patient is a current smoker  Patient instructed to abstain from smoking on day of procedure  Patient did not smoke on day of surgery  Induction- intravenous  Postoperative Plan- Plan for postoperative opioid use  Informed Consent- Anesthetic plan and risks discussed with patient  I personally reviewed this patient with the CRNA  Discussed and agreed on the Anesthesia Plan with the CRNA  Alexus Payan

## 2021-09-29 NOTE — ANESTHESIA POSTPROCEDURE EVALUATION
Post-Op Assessment Note    CV Status:  Stable  Pain Score: 2    Pain management: adequate     Mental Status:  Arousable and sleepy   Hydration Status:  Stable   PONV Controlled:  None   Airway Patency:  Patent  Airway: intubated   Two or more mitigation strategies used for obstructive sleep apnea   Post Op Vitals Reviewed: Yes      Staff: Anesthesiologist         No complications documented      BP  92/68    Temp      Pulse  92   Resp      SpO2   /96

## 2021-09-30 ENCOUNTER — APPOINTMENT (INPATIENT)
Dept: NON INVASIVE DIAGNOSTICS | Facility: HOSPITAL | Age: 66
DRG: 481 | End: 2021-09-30
Payer: COMMERCIAL

## 2021-09-30 PROBLEM — D64.9 ANEMIA: Status: ACTIVE | Noted: 2021-09-30

## 2021-09-30 PROBLEM — I95.9 HYPOTENSION: Status: ACTIVE | Noted: 2021-09-30

## 2021-09-30 LAB
ANION GAP SERPL CALCULATED.3IONS-SCNC: 4 MMOL/L (ref 4–13)
BASOPHILS # BLD AUTO: 0 THOUSANDS/ΜL (ref 0–0.1)
BASOPHILS NFR BLD AUTO: 1 % (ref 0–2)
BUN SERPL-MCNC: 7 MG/DL (ref 7–25)
CALCIUM SERPL-MCNC: 6.7 MG/DL (ref 8.6–10.5)
CHLORIDE SERPL-SCNC: 107 MMOL/L (ref 98–107)
CO2 SERPL-SCNC: 21 MMOL/L (ref 21–31)
CREAT SERPL-MCNC: 0.88 MG/DL (ref 0.6–1.2)
EOSINOPHIL # BLD AUTO: 0.1 THOUSAND/ΜL (ref 0–0.61)
EOSINOPHIL NFR BLD AUTO: 2 % (ref 0–5)
ERYTHROCYTE [DISTWIDTH] IN BLOOD BY AUTOMATED COUNT: 15.6 % (ref 11.5–14.5)
GFR SERPL CREATININE-BSD FRML MDRD: 68 ML/MIN/1.73SQ M
GLUCOSE SERPL-MCNC: 96 MG/DL (ref 65–99)
HCT VFR BLD AUTO: 24.4 % (ref 42–47)
HCT VFR BLD AUTO: 25.4 % (ref 42–47)
HGB BLD-MCNC: 8 G/DL (ref 12–16)
HGB BLD-MCNC: 8.4 G/DL (ref 12–16)
LYMPHOCYTES # BLD AUTO: 0.8 THOUSANDS/ΜL (ref 0.6–4.47)
LYMPHOCYTES NFR BLD AUTO: 12 % (ref 21–51)
MCH RBC QN AUTO: 30.8 PG (ref 26–34)
MCHC RBC AUTO-ENTMCNC: 33.2 G/DL (ref 31–37)
MCV RBC AUTO: 93 FL (ref 81–99)
MONOCYTES # BLD AUTO: 0.9 THOUSAND/ΜL (ref 0.17–1.22)
MONOCYTES NFR BLD AUTO: 15 % (ref 2–12)
NEUTROPHILS # BLD AUTO: 4.6 THOUSANDS/ΜL (ref 1.4–6.5)
NEUTS SEG NFR BLD AUTO: 71 % (ref 42–75)
PLATELET # BLD AUTO: 138 THOUSANDS/UL (ref 149–390)
PMV BLD AUTO: 8.2 FL (ref 8.6–11.7)
POTASSIUM SERPL-SCNC: 4 MMOL/L (ref 3.5–5.5)
RBC # BLD AUTO: 2.74 MILLION/UL (ref 3.9–5.2)
SODIUM SERPL-SCNC: 132 MMOL/L (ref 134–143)
WBC # BLD AUTO: 6.5 THOUSAND/UL (ref 4.8–10.8)

## 2021-09-30 PROCEDURE — 99024 POSTOP FOLLOW-UP VISIT: CPT | Performed by: PHYSICIAN ASSISTANT

## 2021-09-30 PROCEDURE — 80048 BASIC METABOLIC PNL TOTAL CA: CPT | Performed by: NURSE PRACTITIONER

## 2021-09-30 PROCEDURE — 97167 OT EVAL HIGH COMPLEX 60 MIN: CPT

## 2021-09-30 PROCEDURE — 85018 HEMOGLOBIN: CPT | Performed by: INTERNAL MEDICINE

## 2021-09-30 PROCEDURE — 93321 DOPPLER ECHO F-UP/LMTD STD: CPT | Performed by: INTERNAL MEDICINE

## 2021-09-30 PROCEDURE — 99232 SBSQ HOSP IP/OBS MODERATE 35: CPT | Performed by: INTERNAL MEDICINE

## 2021-09-30 PROCEDURE — 94760 N-INVAS EAR/PLS OXIMETRY 1: CPT

## 2021-09-30 PROCEDURE — 93306 TTE W/DOPPLER COMPLETE: CPT

## 2021-09-30 PROCEDURE — 85025 COMPLETE CBC W/AUTO DIFF WBC: CPT | Performed by: NURSE PRACTITIONER

## 2021-09-30 PROCEDURE — 93308 TTE F-UP OR LMTD: CPT | Performed by: INTERNAL MEDICINE

## 2021-09-30 PROCEDURE — 97163 PT EVAL HIGH COMPLEX 45 MIN: CPT

## 2021-09-30 PROCEDURE — 85014 HEMATOCRIT: CPT | Performed by: INTERNAL MEDICINE

## 2021-09-30 PROCEDURE — 93325 DOPPLER ECHO COLOR FLOW MAPG: CPT | Performed by: INTERNAL MEDICINE

## 2021-09-30 RX ORDER — ALBUMIN, HUMAN INJ 5% 5 %
12.5 SOLUTION INTRAVENOUS ONCE
Status: COMPLETED | OUTPATIENT
Start: 2021-09-30 | End: 2021-09-30

## 2021-09-30 RX ADMIN — Medication 3 MG: at 21:39

## 2021-09-30 RX ADMIN — SODIUM CHLORIDE 250 ML: 0.9 INJECTION, SOLUTION INTRAVENOUS at 03:21

## 2021-09-30 RX ADMIN — HYDROMORPHONE HYDROCHLORIDE 0.5 MG: 1 INJECTION, SOLUTION INTRAMUSCULAR; INTRAVENOUS; SUBCUTANEOUS at 21:43

## 2021-09-30 RX ADMIN — ACETAMINOPHEN 975 MG: 325 TABLET ORAL at 21:39

## 2021-09-30 RX ADMIN — NICOTINE 1 PATCH: 21 PATCH, EXTENDED RELEASE TRANSDERMAL at 08:22

## 2021-09-30 RX ADMIN — FONDAPARINUX SODIUM 2.5 MG: 2.5 INJECTION, SOLUTION SUBCUTANEOUS at 00:00

## 2021-09-30 RX ADMIN — MIRTAZAPINE 7.5 MG: 15 TABLET, FILM COATED ORAL at 21:39

## 2021-09-30 RX ADMIN — ACETAMINOPHEN 975 MG: 325 TABLET ORAL at 14:30

## 2021-09-30 RX ADMIN — ALBUMIN (HUMAN) 12.5 G: 12.5 INJECTION, SOLUTION INTRAVENOUS at 10:18

## 2021-09-30 RX ADMIN — HYDROMORPHONE HYDROCHLORIDE 0.2 MG: 0.2 INJECTION, SOLUTION INTRAMUSCULAR; INTRAVENOUS; SUBCUTANEOUS at 08:55

## 2021-09-30 RX ADMIN — ATORVASTATIN CALCIUM 40 MG: 40 TABLET, FILM COATED ORAL at 21:39

## 2021-09-30 RX ADMIN — HYDROMORPHONE HYDROCHLORIDE 0.2 MG: 0.2 INJECTION, SOLUTION INTRAMUSCULAR; INTRAVENOUS; SUBCUTANEOUS at 05:24

## 2021-09-30 RX ADMIN — DOCUSATE SODIUM AND SENNOSIDES 2 TABLET: 8.6; 5 TABLET ORAL at 18:50

## 2021-09-30 RX ADMIN — SODIUM CHLORIDE 500 ML: 0.9 INJECTION, SOLUTION INTRAVENOUS at 06:57

## 2021-09-30 RX ADMIN — Medication 2000 UNITS: at 08:20

## 2021-09-30 RX ADMIN — METHOCARBAMOL 500 MG: 500 TABLET ORAL at 14:30

## 2021-09-30 RX ADMIN — Medication 2000 UNITS: at 00:03

## 2021-09-30 RX ADMIN — SODIUM CHLORIDE 250 ML: 0.9 INJECTION, SOLUTION INTRAVENOUS at 04:33

## 2021-09-30 RX ADMIN — METHOCARBAMOL 500 MG: 500 TABLET ORAL at 05:23

## 2021-09-30 RX ADMIN — METHOCARBAMOL 500 MG: 500 TABLET ORAL at 21:39

## 2021-09-30 RX ADMIN — CEFAZOLIN SODIUM 2000 MG: 2 SOLUTION INTRAVENOUS at 07:44

## 2021-09-30 RX ADMIN — DOCUSATE SODIUM AND SENNOSIDES 2 TABLET: 8.6; 5 TABLET ORAL at 08:20

## 2021-09-30 RX ADMIN — CEFAZOLIN SODIUM 2000 MG: 2 SOLUTION INTRAVENOUS at 15:14

## 2021-09-30 RX ADMIN — GABAPENTIN 100 MG: 100 CAPSULE ORAL at 21:39

## 2021-09-30 RX ADMIN — ACETAMINOPHEN 975 MG: 325 TABLET ORAL at 05:23

## 2021-09-30 RX ADMIN — HYDROMORPHONE HYDROCHLORIDE 0.2 MG: 0.2 INJECTION, SOLUTION INTRAMUSCULAR; INTRAVENOUS; SUBCUTANEOUS at 01:39

## 2021-09-30 RX ADMIN — HYDROMORPHONE HYDROCHLORIDE 0.2 MG: 0.2 INJECTION, SOLUTION INTRAMUSCULAR; INTRAVENOUS; SUBCUTANEOUS at 15:14

## 2021-10-01 ENCOUNTER — APPOINTMENT (INPATIENT)
Dept: RADIOLOGY | Facility: HOSPITAL | Age: 66
DRG: 481 | End: 2021-10-01
Payer: COMMERCIAL

## 2021-10-01 PROBLEM — I48.91 ATRIAL FIBRILLATION WITH RVR (HCC): Status: ACTIVE | Noted: 2021-10-01

## 2021-10-01 LAB
ANION GAP SERPL CALCULATED.3IONS-SCNC: 7 MMOL/L (ref 4–13)
ATRIAL RATE: 234 BPM
BUN SERPL-MCNC: 6 MG/DL (ref 7–25)
CALCIUM SERPL-MCNC: 7.3 MG/DL (ref 8.6–10.5)
CHLORIDE SERPL-SCNC: 107 MMOL/L (ref 98–107)
CO2 SERPL-SCNC: 21 MMOL/L (ref 21–31)
CREAT SERPL-MCNC: 0.85 MG/DL (ref 0.6–1.2)
ERYTHROCYTE [DISTWIDTH] IN BLOOD BY AUTOMATED COUNT: 15.4 % (ref 11.5–14.5)
GFR SERPL CREATININE-BSD FRML MDRD: 71 ML/MIN/1.73SQ M
GLUCOSE SERPL-MCNC: 88 MG/DL (ref 65–99)
HCT VFR BLD AUTO: 27.4 % (ref 42–47)
HGB BLD-MCNC: 9 G/DL (ref 12–16)
MAGNESIUM SERPL-MCNC: 1.5 MG/DL (ref 1.9–2.7)
MCH RBC QN AUTO: 30.4 PG (ref 26–34)
MCHC RBC AUTO-ENTMCNC: 32.7 G/DL (ref 31–37)
MCV RBC AUTO: 93 FL (ref 81–99)
PLATELET # BLD AUTO: 153 THOUSANDS/UL (ref 149–390)
PMV BLD AUTO: 8.4 FL (ref 8.6–11.7)
POTASSIUM SERPL-SCNC: 3.9 MMOL/L (ref 3.5–5.5)
QRS AXIS: 104 DEGREES
QRSD INTERVAL: 72 MS
QT INTERVAL: 264 MS
QTC INTERVAL: 422 MS
RBC # BLD AUTO: 2.95 MILLION/UL (ref 3.9–5.2)
SODIUM SERPL-SCNC: 135 MMOL/L (ref 134–143)
T WAVE AXIS: 92 DEGREES
VENTRICULAR RATE: 154 BPM
WBC # BLD AUTO: 7.8 THOUSAND/UL (ref 4.8–10.8)

## 2021-10-01 PROCEDURE — 93005 ELECTROCARDIOGRAM TRACING: CPT

## 2021-10-01 PROCEDURE — 94664 DEMO&/EVAL PT USE INHALER: CPT

## 2021-10-01 PROCEDURE — 99291 CRITICAL CARE FIRST HOUR: CPT | Performed by: ANESTHESIOLOGY

## 2021-10-01 PROCEDURE — 83735 ASSAY OF MAGNESIUM: CPT | Performed by: INTERNAL MEDICINE

## 2021-10-01 PROCEDURE — 99233 SBSQ HOSP IP/OBS HIGH 50: CPT | Performed by: HOSPITALIST

## 2021-10-01 PROCEDURE — 85027 COMPLETE CBC AUTOMATED: CPT | Performed by: INTERNAL MEDICINE

## 2021-10-01 PROCEDURE — 94760 N-INVAS EAR/PLS OXIMETRY 1: CPT

## 2021-10-01 PROCEDURE — 71045 X-RAY EXAM CHEST 1 VIEW: CPT

## 2021-10-01 PROCEDURE — 80048 BASIC METABOLIC PNL TOTAL CA: CPT | Performed by: INTERNAL MEDICINE

## 2021-10-01 PROCEDURE — 97530 THERAPEUTIC ACTIVITIES: CPT

## 2021-10-01 PROCEDURE — 99232 SBSQ HOSP IP/OBS MODERATE 35: CPT | Performed by: INTERNAL MEDICINE

## 2021-10-01 PROCEDURE — 93010 ELECTROCARDIOGRAM REPORT: CPT | Performed by: INTERNAL MEDICINE

## 2021-10-01 PROCEDURE — 99024 POSTOP FOLLOW-UP VISIT: CPT | Performed by: ORTHOPAEDIC SURGERY

## 2021-10-01 RX ORDER — DILTIAZEM HYDROCHLORIDE 5 MG/ML
10 INJECTION INTRAVENOUS ONCE
Status: COMPLETED | OUTPATIENT
Start: 2021-10-01 | End: 2021-10-01

## 2021-10-01 RX ORDER — ALBUMIN, HUMAN INJ 5% 5 %
12.5 SOLUTION INTRAVENOUS ONCE
Status: COMPLETED | OUTPATIENT
Start: 2021-10-01 | End: 2021-10-01

## 2021-10-01 RX ORDER — METOPROLOL SUCCINATE 50 MG/1
50 TABLET, EXTENDED RELEASE ORAL DAILY
Status: DISCONTINUED | OUTPATIENT
Start: 2021-10-02 | End: 2021-10-01

## 2021-10-01 RX ORDER — METOPROLOL SUCCINATE 50 MG/1
50 TABLET, EXTENDED RELEASE ORAL DAILY
Status: DISCONTINUED | OUTPATIENT
Start: 2021-10-01 | End: 2021-10-01

## 2021-10-01 RX ORDER — OXYCODONE HYDROCHLORIDE 5 MG/1
2.5 TABLET ORAL EVERY 4 HOURS PRN
Status: DISCONTINUED | OUTPATIENT
Start: 2021-10-01 | End: 2021-10-07 | Stop reason: HOSPADM

## 2021-10-01 RX ORDER — METOPROLOL TARTRATE 5 MG/5ML
5 INJECTION INTRAVENOUS ONCE
Status: COMPLETED | OUTPATIENT
Start: 2021-10-01 | End: 2021-10-01

## 2021-10-01 RX ORDER — HYDROMORPHONE HCL IN WATER/PF 6 MG/30 ML
0.2 PATIENT CONTROLLED ANALGESIA SYRINGE INTRAVENOUS EVERY 4 HOURS PRN
Status: DISCONTINUED | OUTPATIENT
Start: 2021-10-01 | End: 2021-10-02

## 2021-10-01 RX ORDER — LIDOCAINE 50 MG/G
1 PATCH TOPICAL DAILY
Status: COMPLETED | OUTPATIENT
Start: 2021-10-01 | End: 2021-10-01

## 2021-10-01 RX ORDER — MAGNESIUM SULFATE HEPTAHYDRATE 40 MG/ML
4 INJECTION, SOLUTION INTRAVENOUS ONCE
Status: COMPLETED | OUTPATIENT
Start: 2021-10-01 | End: 2021-10-01

## 2021-10-01 RX ADMIN — METOPROLOL TARTRATE 25 MG: 25 TABLET, FILM COATED ORAL at 22:33

## 2021-10-01 RX ADMIN — NICOTINE 1 PATCH: 21 PATCH, EXTENDED RELEASE TRANSDERMAL at 09:32

## 2021-10-01 RX ADMIN — DEXTROSE 150 MG: 50 INJECTION, SOLUTION INTRAVENOUS at 08:49

## 2021-10-01 RX ADMIN — SODIUM CHLORIDE 250 ML: 0.9 INJECTION, SOLUTION INTRAVENOUS at 10:31

## 2021-10-01 RX ADMIN — MAGNESIUM SULFATE HEPTAHYDRATE 4 G: 40 INJECTION, SOLUTION INTRAVENOUS at 08:01

## 2021-10-01 RX ADMIN — GABAPENTIN 100 MG: 100 CAPSULE ORAL at 22:34

## 2021-10-01 RX ADMIN — GLYCERIN, HYPROMELLOSE, POLYETHYLENE GLYCOL 1 DROP: .2; .2; 1 LIQUID OPHTHALMIC at 22:41

## 2021-10-01 RX ADMIN — METHOCARBAMOL 500 MG: 500 TABLET ORAL at 22:33

## 2021-10-01 RX ADMIN — ALBUMIN (HUMAN) 12.5 G: 12.5 INJECTION, SOLUTION INTRAVENOUS at 06:16

## 2021-10-01 RX ADMIN — ACETAMINOPHEN 975 MG: 325 TABLET ORAL at 14:41

## 2021-10-01 RX ADMIN — OXYCODONE HYDROCHLORIDE 2.5 MG: 5 TABLET ORAL at 20:28

## 2021-10-01 RX ADMIN — FONDAPARINUX SODIUM 2.5 MG: 2.5 INJECTION, SOLUTION SUBCUTANEOUS at 00:57

## 2021-10-01 RX ADMIN — ACETAMINOPHEN 975 MG: 325 TABLET ORAL at 22:32

## 2021-10-01 RX ADMIN — MIRTAZAPINE 7.5 MG: 15 TABLET, FILM COATED ORAL at 22:35

## 2021-10-01 RX ADMIN — DOCUSATE SODIUM AND SENNOSIDES 2 TABLET: 8.6; 5 TABLET ORAL at 17:08

## 2021-10-01 RX ADMIN — Medication 2000 UNITS: at 09:37

## 2021-10-01 RX ADMIN — METHOCARBAMOL 500 MG: 500 TABLET ORAL at 05:38

## 2021-10-01 RX ADMIN — METOPROLOL TARTRATE 5 MG: 5 INJECTION INTRAVENOUS at 04:13

## 2021-10-01 RX ADMIN — METHOCARBAMOL 500 MG: 500 TABLET ORAL at 14:41

## 2021-10-01 RX ADMIN — METOPROLOL TARTRATE 25 MG: 25 TABLET, FILM COATED ORAL at 10:00

## 2021-10-01 RX ADMIN — ATORVASTATIN CALCIUM 40 MG: 40 TABLET, FILM COATED ORAL at 22:34

## 2021-10-01 RX ADMIN — LIDOCAINE 1 PATCH: 50 PATCH TOPICAL at 09:31

## 2021-10-01 RX ADMIN — DOCUSATE SODIUM AND SENNOSIDES 2 TABLET: 8.6; 5 TABLET ORAL at 09:37

## 2021-10-01 RX ADMIN — APIXABAN 5 MG: 5 TABLET, FILM COATED ORAL at 17:09

## 2021-10-01 RX ADMIN — DILTIAZEM HYDROCHLORIDE 10 MG: 5 INJECTION INTRAVENOUS at 06:44

## 2021-10-01 RX ADMIN — ACETAMINOPHEN 975 MG: 325 TABLET ORAL at 05:38

## 2021-10-01 RX ADMIN — METOPROLOL TARTRATE 25 MG: 25 TABLET, FILM COATED ORAL at 17:00

## 2021-10-01 RX ADMIN — Medication 3 MG: at 22:34

## 2021-10-01 RX ADMIN — DEXTROSE 150 MG: 50 INJECTION, SOLUTION INTRAVENOUS at 08:01

## 2021-10-02 PROBLEM — S72.002A CLOSED LEFT HIP FRACTURE (HCC): Status: ACTIVE | Noted: 2021-09-28

## 2021-10-02 LAB
ALBUMIN SERPL BCP-MCNC: 3.2 G/DL (ref 3.5–5.7)
ALP SERPL-CCNC: 72 U/L (ref 55–165)
ALT SERPL W P-5'-P-CCNC: 8 U/L (ref 7–52)
ANION GAP SERPL CALCULATED.3IONS-SCNC: 7 MMOL/L (ref 4–13)
AST SERPL W P-5'-P-CCNC: 52 U/L (ref 13–39)
BACTERIA UR QL AUTO: ABNORMAL /HPF
BASOPHILS # BLD AUTO: 0.1 THOUSANDS/ΜL (ref 0–0.1)
BASOPHILS NFR BLD AUTO: 1 % (ref 0–2)
BILIRUB SERPL-MCNC: 0.7 MG/DL (ref 0.2–1)
BILIRUB UR QL STRIP: NEGATIVE
BUN SERPL-MCNC: 8 MG/DL (ref 7–25)
CALCIUM ALBUM COR SERPL-MCNC: 8.2 MG/DL (ref 8.3–10.1)
CALCIUM SERPL-MCNC: 7.6 MG/DL (ref 8.6–10.5)
CHLORIDE SERPL-SCNC: 105 MMOL/L (ref 98–107)
CLARITY UR: CLEAR
CO2 SERPL-SCNC: 18 MMOL/L (ref 21–31)
COLOR UR: YELLOW
CREAT SERPL-MCNC: 0.82 MG/DL (ref 0.6–1.2)
CREAT UR-MCNC: 65.2 MG/DL
EOSINOPHIL # BLD AUTO: 0.2 THOUSAND/ΜL (ref 0–0.61)
EOSINOPHIL NFR BLD AUTO: 3 % (ref 0–5)
ERYTHROCYTE [DISTWIDTH] IN BLOOD BY AUTOMATED COUNT: 15.5 % (ref 11.5–14.5)
GFR SERPL CREATININE-BSD FRML MDRD: 74 ML/MIN/1.73SQ M
GLUCOSE SERPL-MCNC: 105 MG/DL (ref 65–99)
GLUCOSE UR STRIP-MCNC: NEGATIVE MG/DL
HCT VFR BLD AUTO: 25.7 % (ref 42–47)
HGB BLD-MCNC: 8.5 G/DL (ref 12–16)
HGB UR QL STRIP.AUTO: NEGATIVE
KETONES UR STRIP-MCNC: NEGATIVE MG/DL
LEUKOCYTE ESTERASE UR QL STRIP: NEGATIVE
LYMPHOCYTES # BLD AUTO: 0.4 THOUSANDS/ΜL (ref 0.6–4.47)
LYMPHOCYTES NFR BLD AUTO: 5 % (ref 21–51)
MCH RBC QN AUTO: 30.3 PG (ref 26–34)
MCHC RBC AUTO-ENTMCNC: 33 G/DL (ref 31–37)
MCV RBC AUTO: 92 FL (ref 81–99)
MONOCYTES # BLD AUTO: 0.8 THOUSAND/ΜL (ref 0.17–1.22)
MONOCYTES NFR BLD AUTO: 10 % (ref 2–12)
NEUTROPHILS # BLD AUTO: 7 THOUSANDS/ΜL (ref 1.4–6.5)
NEUTS SEG NFR BLD AUTO: 82 % (ref 42–75)
NITRITE UR QL STRIP: NEGATIVE
NON-SQ EPI CELLS URNS QL MICRO: ABNORMAL /HPF
OSMOLALITY UR: 377 MMOL/KG
PH UR STRIP.AUTO: 6 [PH]
PLATELET # BLD AUTO: 184 THOUSANDS/UL (ref 149–390)
PMV BLD AUTO: 8.3 FL (ref 8.6–11.7)
POTASSIUM SERPL-SCNC: 3.9 MMOL/L (ref 3.5–5.5)
PROT SERPL-MCNC: 5.3 G/DL (ref 6.4–8.9)
PROT UR STRIP-MCNC: NEGATIVE MG/DL
RBC # BLD AUTO: 2.8 MILLION/UL (ref 3.9–5.2)
RBC #/AREA URNS AUTO: ABNORMAL /HPF
SODIUM 24H UR-SCNC: 61 MOL/L
SODIUM SERPL-SCNC: 130 MMOL/L (ref 134–143)
SP GR UR STRIP.AUTO: 1.01 (ref 1–1.03)
UROBILINOGEN UR QL STRIP.AUTO: 0.2 E.U./DL
UUN 24H UR-MCNC: 340 MG/DL
WBC # BLD AUTO: 8.6 THOUSAND/UL (ref 4.8–10.8)
WBC #/AREA URNS AUTO: ABNORMAL /HPF

## 2021-10-02 PROCEDURE — 97530 THERAPEUTIC ACTIVITIES: CPT

## 2021-10-02 PROCEDURE — 80053 COMPREHEN METABOLIC PANEL: CPT | Performed by: HOSPITALIST

## 2021-10-02 PROCEDURE — 99232 SBSQ HOSP IP/OBS MODERATE 35: CPT | Performed by: PHYSICIAN ASSISTANT

## 2021-10-02 PROCEDURE — 83935 ASSAY OF URINE OSMOLALITY: CPT | Performed by: PHYSICIAN ASSISTANT

## 2021-10-02 PROCEDURE — 84300 ASSAY OF URINE SODIUM: CPT | Performed by: PHYSICIAN ASSISTANT

## 2021-10-02 PROCEDURE — 82570 ASSAY OF URINE CREATININE: CPT | Performed by: PHYSICIAN ASSISTANT

## 2021-10-02 PROCEDURE — 85025 COMPLETE CBC W/AUTO DIFF WBC: CPT | Performed by: HOSPITALIST

## 2021-10-02 PROCEDURE — 99222 1ST HOSP IP/OBS MODERATE 55: CPT | Performed by: PHYSICIAN ASSISTANT

## 2021-10-02 PROCEDURE — 97116 GAIT TRAINING THERAPY: CPT

## 2021-10-02 PROCEDURE — 99232 SBSQ HOSP IP/OBS MODERATE 35: CPT | Performed by: HOSPITALIST

## 2021-10-02 PROCEDURE — 81001 URINALYSIS AUTO W/SCOPE: CPT | Performed by: PHYSICIAN ASSISTANT

## 2021-10-02 PROCEDURE — NC001 PR NO CHARGE: Performed by: PHYSICIAN ASSISTANT

## 2021-10-02 PROCEDURE — 84540 ASSAY OF URINE/UREA-N: CPT | Performed by: PHYSICIAN ASSISTANT

## 2021-10-02 RX ORDER — ACETAMINOPHEN 325 MG/1
650 TABLET ORAL EVERY 6 HOURS PRN
Status: DISCONTINUED | OUTPATIENT
Start: 2021-10-02 | End: 2021-10-07 | Stop reason: HOSPADM

## 2021-10-02 RX ORDER — METOPROLOL TARTRATE 50 MG/1
50 TABLET, FILM COATED ORAL EVERY 12 HOURS SCHEDULED
Status: DISCONTINUED | OUTPATIENT
Start: 2021-10-02 | End: 2021-10-07 | Stop reason: HOSPADM

## 2021-10-02 RX ORDER — SODIUM CHLORIDE, SODIUM GLUCONATE, SODIUM ACETATE, POTASSIUM CHLORIDE, MAGNESIUM CHLORIDE, SODIUM PHOSPHATE, DIBASIC, AND POTASSIUM PHOSPHATE .53; .5; .37; .037; .03; .012; .00082 G/100ML; G/100ML; G/100ML; G/100ML; G/100ML; G/100ML; G/100ML
75 INJECTION, SOLUTION INTRAVENOUS CONTINUOUS
Status: DISCONTINUED | OUTPATIENT
Start: 2021-10-02 | End: 2021-10-04

## 2021-10-02 RX ADMIN — APIXABAN 5 MG: 5 TABLET, FILM COATED ORAL at 08:46

## 2021-10-02 RX ADMIN — METOPROLOL TARTRATE 25 MG: 25 TABLET, FILM COATED ORAL at 04:14

## 2021-10-02 RX ADMIN — OXYCODONE HYDROCHLORIDE 2.5 MG: 5 TABLET ORAL at 12:26

## 2021-10-02 RX ADMIN — SODIUM CHLORIDE, SODIUM GLUCONATE, SODIUM ACETATE, POTASSIUM CHLORIDE, MAGNESIUM CHLORIDE, SODIUM PHOSPHATE, DIBASIC, AND POTASSIUM PHOSPHATE 75 ML/HR: .53; .5; .37; .037; .03; .012; .00082 INJECTION, SOLUTION INTRAVENOUS at 22:27

## 2021-10-02 RX ADMIN — Medication 2000 UNITS: at 08:46

## 2021-10-02 RX ADMIN — SODIUM CHLORIDE, SODIUM GLUCONATE, SODIUM ACETATE, POTASSIUM CHLORIDE, MAGNESIUM CHLORIDE, SODIUM PHOSPHATE, DIBASIC, AND POTASSIUM PHOSPHATE 75 ML/HR: .53; .5; .37; .037; .03; .012; .00082 INJECTION, SOLUTION INTRAVENOUS at 22:39

## 2021-10-02 RX ADMIN — SODIUM CHLORIDE, SODIUM GLUCONATE, SODIUM ACETATE, POTASSIUM CHLORIDE, MAGNESIUM CHLORIDE, SODIUM PHOSPHATE, DIBASIC, AND POTASSIUM PHOSPHATE 75 ML/HR: .53; .5; .37; .037; .03; .012; .00082 INJECTION, SOLUTION INTRAVENOUS at 08:45

## 2021-10-02 RX ADMIN — ACETAMINOPHEN 975 MG: 325 TABLET ORAL at 06:11

## 2021-10-02 RX ADMIN — APIXABAN 5 MG: 5 TABLET, FILM COATED ORAL at 17:00

## 2021-10-02 RX ADMIN — METHOCARBAMOL 500 MG: 500 TABLET ORAL at 06:11

## 2021-10-02 RX ADMIN — MIRTAZAPINE 7.5 MG: 15 TABLET, FILM COATED ORAL at 22:14

## 2021-10-02 RX ADMIN — NICOTINE 1 PATCH: 21 PATCH, EXTENDED RELEASE TRANSDERMAL at 08:46

## 2021-10-02 RX ADMIN — METOPROLOL TARTRATE 50 MG: 50 TABLET, FILM COATED ORAL at 22:15

## 2021-10-03 LAB
ALBUMIN SERPL BCP-MCNC: 3.3 G/DL (ref 3.5–5.7)
ALP SERPL-CCNC: 77 U/L (ref 55–165)
ALT SERPL W P-5'-P-CCNC: 7 U/L (ref 7–52)
ANION GAP SERPL CALCULATED.3IONS-SCNC: 8 MMOL/L (ref 4–13)
AST SERPL W P-5'-P-CCNC: 36 U/L (ref 13–39)
BASOPHILS # BLD AUTO: 0.1 THOUSANDS/ΜL (ref 0–0.1)
BASOPHILS NFR BLD AUTO: 1 % (ref 0–2)
BILIRUB SERPL-MCNC: 0.9 MG/DL (ref 0.2–1)
BUN SERPL-MCNC: 7 MG/DL (ref 7–25)
CALCIUM ALBUM COR SERPL-MCNC: 8.3 MG/DL (ref 8.3–10.1)
CALCIUM SERPL-MCNC: 7.7 MG/DL (ref 8.6–10.5)
CHLORIDE SERPL-SCNC: 102 MMOL/L (ref 98–107)
CO2 SERPL-SCNC: 22 MMOL/L (ref 21–31)
CREAT SERPL-MCNC: 0.67 MG/DL (ref 0.6–1.2)
EOSINOPHIL # BLD AUTO: 0.1 THOUSAND/ΜL (ref 0–0.61)
EOSINOPHIL NFR BLD AUTO: 1 % (ref 0–5)
ERYTHROCYTE [DISTWIDTH] IN BLOOD BY AUTOMATED COUNT: 15.3 % (ref 11.5–14.5)
GFR SERPL CREATININE-BSD FRML MDRD: 91 ML/MIN/1.73SQ M
GLUCOSE SERPL-MCNC: 111 MG/DL (ref 65–99)
HCT VFR BLD AUTO: 26.4 % (ref 42–47)
HGB BLD-MCNC: 8.9 G/DL (ref 12–16)
LYMPHOCYTES # BLD AUTO: 0.6 THOUSANDS/ΜL (ref 0.6–4.47)
LYMPHOCYTES NFR BLD AUTO: 7 % (ref 21–51)
MCH RBC QN AUTO: 30.7 PG (ref 26–34)
MCHC RBC AUTO-ENTMCNC: 33.8 G/DL (ref 31–37)
MCV RBC AUTO: 91 FL (ref 81–99)
MONOCYTES # BLD AUTO: 0.9 THOUSAND/ΜL (ref 0.17–1.22)
MONOCYTES NFR BLD AUTO: 12 % (ref 2–12)
NEUTROPHILS # BLD AUTO: 6.2 THOUSANDS/ΜL (ref 1.4–6.5)
NEUTS SEG NFR BLD AUTO: 79 % (ref 42–75)
PLATELET # BLD AUTO: 236 THOUSANDS/UL (ref 149–390)
PMV BLD AUTO: 8.6 FL (ref 8.6–11.7)
POTASSIUM SERPL-SCNC: 3.8 MMOL/L (ref 3.5–5.5)
PROT SERPL-MCNC: 5.6 G/DL (ref 6.4–8.9)
RBC # BLD AUTO: 2.91 MILLION/UL (ref 3.9–5.2)
SODIUM SERPL-SCNC: 132 MMOL/L (ref 134–143)
WBC # BLD AUTO: 7.9 THOUSAND/UL (ref 4.8–10.8)

## 2021-10-03 PROCEDURE — 84540 ASSAY OF URINE/UREA-N: CPT | Performed by: PHYSICIAN ASSISTANT

## 2021-10-03 PROCEDURE — 80053 COMPREHEN METABOLIC PANEL: CPT | Performed by: HOSPITALIST

## 2021-10-03 PROCEDURE — 85025 COMPLETE CBC W/AUTO DIFF WBC: CPT | Performed by: HOSPITALIST

## 2021-10-03 PROCEDURE — 99232 SBSQ HOSP IP/OBS MODERATE 35: CPT | Performed by: HOSPITALIST

## 2021-10-03 PROCEDURE — 99231 SBSQ HOSP IP/OBS SF/LOW 25: CPT | Performed by: PHYSICIAN ASSISTANT

## 2021-10-03 PROCEDURE — 82570 ASSAY OF URINE CREATININE: CPT | Performed by: PHYSICIAN ASSISTANT

## 2021-10-03 PROCEDURE — 94760 N-INVAS EAR/PLS OXIMETRY 1: CPT

## 2021-10-03 PROCEDURE — 84300 ASSAY OF URINE SODIUM: CPT | Performed by: PHYSICIAN ASSISTANT

## 2021-10-03 PROCEDURE — 94664 DEMO&/EVAL PT USE INHALER: CPT

## 2021-10-03 PROCEDURE — 99232 SBSQ HOSP IP/OBS MODERATE 35: CPT | Performed by: PHYSICIAN ASSISTANT

## 2021-10-03 PROCEDURE — 94668 MNPJ CHEST WALL SBSQ: CPT

## 2021-10-03 RX ORDER — LIDOCAINE 50 MG/G
1 PATCH TOPICAL DAILY
Status: DISCONTINUED | OUTPATIENT
Start: 2021-10-03 | End: 2021-10-07 | Stop reason: HOSPADM

## 2021-10-03 RX ORDER — DILTIAZEM HYDROCHLORIDE 5 MG/ML
15 INJECTION INTRAVENOUS ONCE
Status: COMPLETED | OUTPATIENT
Start: 2021-10-03 | End: 2021-10-03

## 2021-10-03 RX ORDER — DIGOXIN 0.25 MG/ML
125 INJECTION INTRAMUSCULAR; INTRAVENOUS 2 TIMES DAILY
Status: COMPLETED | OUTPATIENT
Start: 2021-10-04 | End: 2021-10-04

## 2021-10-03 RX ORDER — DIGOXIN 125 MCG
125 TABLET ORAL DAILY
Status: DISCONTINUED | OUTPATIENT
Start: 2021-10-05 | End: 2021-10-07 | Stop reason: HOSPADM

## 2021-10-03 RX ORDER — ALBUTEROL SULFATE 2.5 MG/3ML
2.5 SOLUTION RESPIRATORY (INHALATION) EVERY 6 HOURS PRN
Status: DISCONTINUED | OUTPATIENT
Start: 2021-10-03 | End: 2021-10-07 | Stop reason: HOSPADM

## 2021-10-03 RX ORDER — DIGOXIN 0.25 MG/ML
250 INJECTION INTRAMUSCULAR; INTRAVENOUS ONCE
Status: COMPLETED | OUTPATIENT
Start: 2021-10-03 | End: 2021-10-03

## 2021-10-03 RX ORDER — METOPROLOL TARTRATE 5 MG/5ML
5 INJECTION INTRAVENOUS EVERY 6 HOURS PRN
Status: DISCONTINUED | OUTPATIENT
Start: 2021-10-03 | End: 2021-10-07 | Stop reason: HOSPADM

## 2021-10-03 RX ORDER — DIGOXIN 0.25 MG/ML
125 INJECTION INTRAMUSCULAR; INTRAVENOUS DAILY
Status: DISCONTINUED | OUTPATIENT
Start: 2021-10-03 | End: 2021-10-03

## 2021-10-03 RX ADMIN — METOPROLOL TARTRATE 5 MG: 5 INJECTION INTRAVENOUS at 08:56

## 2021-10-03 RX ADMIN — DIGOXIN 250 MCG: 0.25 INJECTION INTRAMUSCULAR; INTRAVENOUS at 15:51

## 2021-10-03 RX ADMIN — DILTIAZEM HYDROCHLORIDE 15 MG: 5 INJECTION INTRAVENOUS at 09:52

## 2021-10-03 RX ADMIN — DILTIAZEM HYDROCHLORIDE 30 MG: 30 TABLET, FILM COATED ORAL at 17:13

## 2021-10-03 RX ADMIN — METOPROLOL TARTRATE 50 MG: 50 TABLET, FILM COATED ORAL at 08:36

## 2021-10-03 RX ADMIN — MIRTAZAPINE 7.5 MG: 15 TABLET, FILM COATED ORAL at 22:30

## 2021-10-03 RX ADMIN — APIXABAN 5 MG: 5 TABLET, FILM COATED ORAL at 08:36

## 2021-10-03 RX ADMIN — METOPROLOL TARTRATE 50 MG: 50 TABLET, FILM COATED ORAL at 22:29

## 2021-10-03 RX ADMIN — LIDOCAINE 1 PATCH: 50 PATCH TOPICAL at 11:23

## 2021-10-03 RX ADMIN — DILTIAZEM HYDROCHLORIDE 30 MG: 30 TABLET, FILM COATED ORAL at 11:22

## 2021-10-03 RX ADMIN — ATORVASTATIN CALCIUM 40 MG: 40 TABLET, FILM COATED ORAL at 22:30

## 2021-10-03 RX ADMIN — NICOTINE 1 PATCH: 21 PATCH, EXTENDED RELEASE TRANSDERMAL at 08:36

## 2021-10-03 RX ADMIN — Medication 2000 UNITS: at 08:36

## 2021-10-03 RX ADMIN — DILTIAZEM HYDROCHLORIDE 30 MG: 30 TABLET, FILM COATED ORAL at 23:43

## 2021-10-03 RX ADMIN — GABAPENTIN 100 MG: 100 CAPSULE ORAL at 22:29

## 2021-10-03 RX ADMIN — APIXABAN 5 MG: 5 TABLET, FILM COATED ORAL at 17:13

## 2021-10-03 RX ADMIN — SODIUM CHLORIDE, SODIUM GLUCONATE, SODIUM ACETATE, POTASSIUM CHLORIDE, MAGNESIUM CHLORIDE, SODIUM PHOSPHATE, DIBASIC, AND POTASSIUM PHOSPHATE 75 ML/HR: .53; .5; .37; .037; .03; .012; .00082 INJECTION, SOLUTION INTRAVENOUS at 12:40

## 2021-10-03 RX ADMIN — DIGOXIN 250 MCG: 0.25 INJECTION INTRAMUSCULAR; INTRAVENOUS at 09:23

## 2021-10-04 LAB
ANION GAP SERPL CALCULATED.3IONS-SCNC: 7 MMOL/L (ref 4–13)
BASOPHILS # BLD AUTO: 0.1 THOUSANDS/ΜL (ref 0–0.1)
BASOPHILS NFR BLD AUTO: 1 % (ref 0–2)
BUN SERPL-MCNC: 6 MG/DL (ref 7–25)
CALCIUM SERPL-MCNC: 8.1 MG/DL (ref 8.6–10.5)
CHLORIDE SERPL-SCNC: 98 MMOL/L (ref 98–107)
CO2 SERPL-SCNC: 26 MMOL/L (ref 21–31)
CREAT SERPL-MCNC: 0.65 MG/DL (ref 0.6–1.2)
CREAT UR-MCNC: 37.7 MG/DL
EOSINOPHIL # BLD AUTO: 0.2 THOUSAND/ΜL (ref 0–0.61)
EOSINOPHIL NFR BLD AUTO: 3 % (ref 0–5)
ERYTHROCYTE [DISTWIDTH] IN BLOOD BY AUTOMATED COUNT: 15.5 % (ref 11.5–14.5)
GFR SERPL CREATININE-BSD FRML MDRD: 92 ML/MIN/1.73SQ M
GLUCOSE SERPL-MCNC: 105 MG/DL (ref 65–99)
HCT VFR BLD AUTO: 28.1 % (ref 42–47)
HGB BLD-MCNC: 9.4 G/DL (ref 12–16)
LYMPHOCYTES # BLD AUTO: 0.7 THOUSANDS/ΜL (ref 0.6–4.47)
LYMPHOCYTES NFR BLD AUTO: 9 % (ref 21–51)
MCH RBC QN AUTO: 30.2 PG (ref 26–34)
MCHC RBC AUTO-ENTMCNC: 33.3 G/DL (ref 31–37)
MCV RBC AUTO: 91 FL (ref 81–99)
MONOCYTES # BLD AUTO: 1.1 THOUSAND/ΜL (ref 0.17–1.22)
MONOCYTES NFR BLD AUTO: 14 % (ref 2–12)
NEUTROPHILS # BLD AUTO: 5.6 THOUSANDS/ΜL (ref 1.4–6.5)
NEUTS SEG NFR BLD AUTO: 73 % (ref 42–75)
PLATELET # BLD AUTO: 303 THOUSANDS/UL (ref 149–390)
PMV BLD AUTO: 7.9 FL (ref 8.6–11.7)
POTASSIUM SERPL-SCNC: 3.8 MMOL/L (ref 3.5–5.5)
RBC # BLD AUTO: 3.1 MILLION/UL (ref 3.9–5.2)
SODIUM 24H UR-SCNC: 113 MOL/L
SODIUM SERPL-SCNC: 131 MMOL/L (ref 134–143)
UUN 24H UR-MCNC: 202 MG/DL
WBC # BLD AUTO: 7.7 THOUSAND/UL (ref 4.8–10.8)

## 2021-10-04 PROCEDURE — 97110 THERAPEUTIC EXERCISES: CPT

## 2021-10-04 PROCEDURE — 99232 SBSQ HOSP IP/OBS MODERATE 35: CPT | Performed by: PHYSICIAN ASSISTANT

## 2021-10-04 PROCEDURE — 94760 N-INVAS EAR/PLS OXIMETRY 1: CPT

## 2021-10-04 PROCEDURE — 80048 BASIC METABOLIC PNL TOTAL CA: CPT | Performed by: HOSPITALIST

## 2021-10-04 PROCEDURE — 97530 THERAPEUTIC ACTIVITIES: CPT

## 2021-10-04 PROCEDURE — 94668 MNPJ CHEST WALL SBSQ: CPT

## 2021-10-04 PROCEDURE — 99232 SBSQ HOSP IP/OBS MODERATE 35: CPT | Performed by: INTERNAL MEDICINE

## 2021-10-04 PROCEDURE — 85025 COMPLETE CBC W/AUTO DIFF WBC: CPT | Performed by: HOSPITALIST

## 2021-10-04 PROCEDURE — 97116 GAIT TRAINING THERAPY: CPT

## 2021-10-04 RX ORDER — DILTIAZEM HYDROCHLORIDE 60 MG/1
60 TABLET, FILM COATED ORAL EVERY 6 HOURS SCHEDULED
Status: DISCONTINUED | OUTPATIENT
Start: 2021-10-04 | End: 2021-10-05

## 2021-10-04 RX ADMIN — APIXABAN 5 MG: 5 TABLET, FILM COATED ORAL at 09:16

## 2021-10-04 RX ADMIN — DOCUSATE SODIUM AND SENNOSIDES 2 TABLET: 8.6; 5 TABLET ORAL at 17:27

## 2021-10-04 RX ADMIN — METOPROLOL TARTRATE 50 MG: 50 TABLET, FILM COATED ORAL at 09:16

## 2021-10-04 RX ADMIN — DIGOXIN 125 MCG: 0.25 INJECTION INTRAMUSCULAR; INTRAVENOUS at 22:00

## 2021-10-04 RX ADMIN — ATORVASTATIN CALCIUM 40 MG: 40 TABLET, FILM COATED ORAL at 22:47

## 2021-10-04 RX ADMIN — NICOTINE 1 PATCH: 21 PATCH, EXTENDED RELEASE TRANSDERMAL at 09:15

## 2021-10-04 RX ADMIN — DILTIAZEM HYDROCHLORIDE 30 MG: 30 TABLET, FILM COATED ORAL at 06:36

## 2021-10-04 RX ADMIN — SODIUM CHLORIDE, SODIUM GLUCONATE, SODIUM ACETATE, POTASSIUM CHLORIDE, MAGNESIUM CHLORIDE, SODIUM PHOSPHATE, DIBASIC, AND POTASSIUM PHOSPHATE 75 ML/HR: .53; .5; .37; .037; .03; .012; .00082 INJECTION, SOLUTION INTRAVENOUS at 00:02

## 2021-10-04 RX ADMIN — DILTIAZEM HYDROCHLORIDE 60 MG: 60 TABLET, FILM COATED ORAL at 17:27

## 2021-10-04 RX ADMIN — APIXABAN 5 MG: 5 TABLET, FILM COATED ORAL at 17:27

## 2021-10-04 RX ADMIN — OXYCODONE HYDROCHLORIDE 2.5 MG: 5 TABLET ORAL at 22:45

## 2021-10-04 RX ADMIN — MIRTAZAPINE 7.5 MG: 15 TABLET, FILM COATED ORAL at 22:46

## 2021-10-04 RX ADMIN — GABAPENTIN 100 MG: 100 CAPSULE ORAL at 22:46

## 2021-10-04 RX ADMIN — DIGOXIN 125 MCG: 0.25 INJECTION INTRAMUSCULAR; INTRAVENOUS at 09:17

## 2021-10-04 RX ADMIN — DOCUSATE SODIUM AND SENNOSIDES 2 TABLET: 8.6; 5 TABLET ORAL at 09:16

## 2021-10-04 RX ADMIN — Medication 2000 UNITS: at 09:16

## 2021-10-04 RX ADMIN — METOPROLOL TARTRATE 50 MG: 50 TABLET, FILM COATED ORAL at 22:00

## 2021-10-04 RX ADMIN — DILTIAZEM HYDROCHLORIDE 60 MG: 60 TABLET, FILM COATED ORAL at 13:47

## 2021-10-05 LAB
ABO GROUP BLD: NORMAL
ANION GAP SERPL CALCULATED.3IONS-SCNC: 6 MMOL/L (ref 4–13)
BLD GP AB SCN SERPL QL: NEGATIVE
BUN SERPL-MCNC: 7 MG/DL (ref 7–25)
CALCIUM SERPL-MCNC: 7.8 MG/DL (ref 8.6–10.5)
CHLORIDE SERPL-SCNC: 99 MMOL/L (ref 98–107)
CO2 SERPL-SCNC: 27 MMOL/L (ref 21–31)
CREAT SERPL-MCNC: 0.72 MG/DL (ref 0.6–1.2)
ERYTHROCYTE [DISTWIDTH] IN BLOOD BY AUTOMATED COUNT: 15.3 % (ref 11.5–14.5)
GFR SERPL CREATININE-BSD FRML MDRD: 86 ML/MIN/1.73SQ M
GLUCOSE SERPL-MCNC: 94 MG/DL (ref 65–99)
HCT VFR BLD AUTO: 22.4 % (ref 42–47)
HCT VFR BLD AUTO: 26.7 % (ref 42–47)
HGB BLD-MCNC: 7.6 G/DL (ref 12–16)
HGB BLD-MCNC: 8.8 G/DL (ref 12–16)
MAGNESIUM SERPL-MCNC: 1.6 MG/DL (ref 1.9–2.7)
MCH RBC QN AUTO: 30.5 PG (ref 26–34)
MCHC RBC AUTO-ENTMCNC: 33.9 G/DL (ref 31–37)
MCV RBC AUTO: 90 FL (ref 81–99)
PLATELET # BLD AUTO: 296 THOUSANDS/UL (ref 149–390)
PMV BLD AUTO: 8.4 FL (ref 8.6–11.7)
POTASSIUM SERPL-SCNC: 3.6 MMOL/L (ref 3.5–5.5)
RBC # BLD AUTO: 2.5 MILLION/UL (ref 3.9–5.2)
RH BLD: NEGATIVE
SODIUM SERPL-SCNC: 132 MMOL/L (ref 134–143)
SPECIMEN EXPIRATION DATE: NORMAL
WBC # BLD AUTO: 5.7 THOUSAND/UL (ref 4.8–10.8)

## 2021-10-05 PROCEDURE — 87077 CULTURE AEROBIC IDENTIFY: CPT | Performed by: FAMILY MEDICINE

## 2021-10-05 PROCEDURE — 85027 COMPLETE CBC AUTOMATED: CPT | Performed by: INTERNAL MEDICINE

## 2021-10-05 PROCEDURE — 87070 CULTURE OTHR SPECIMN AEROBIC: CPT | Performed by: FAMILY MEDICINE

## 2021-10-05 PROCEDURE — 83735 ASSAY OF MAGNESIUM: CPT | Performed by: INTERNAL MEDICINE

## 2021-10-05 PROCEDURE — 85018 HEMOGLOBIN: CPT | Performed by: INTERNAL MEDICINE

## 2021-10-05 PROCEDURE — 97530 THERAPEUTIC ACTIVITIES: CPT

## 2021-10-05 PROCEDURE — 99232 SBSQ HOSP IP/OBS MODERATE 35: CPT | Performed by: INTERNAL MEDICINE

## 2021-10-05 PROCEDURE — 85014 HEMATOCRIT: CPT | Performed by: INTERNAL MEDICINE

## 2021-10-05 PROCEDURE — 86901 BLOOD TYPING SEROLOGIC RH(D): CPT | Performed by: INTERNAL MEDICINE

## 2021-10-05 PROCEDURE — 97110 THERAPEUTIC EXERCISES: CPT

## 2021-10-05 PROCEDURE — 80048 BASIC METABOLIC PNL TOTAL CA: CPT | Performed by: INTERNAL MEDICINE

## 2021-10-05 PROCEDURE — 86850 RBC ANTIBODY SCREEN: CPT | Performed by: INTERNAL MEDICINE

## 2021-10-05 PROCEDURE — 99232 SBSQ HOSP IP/OBS MODERATE 35: CPT | Performed by: NURSE PRACTITIONER

## 2021-10-05 PROCEDURE — 87186 SC STD MICRODIL/AGAR DIL: CPT | Performed by: FAMILY MEDICINE

## 2021-10-05 PROCEDURE — 99232 SBSQ HOSP IP/OBS MODERATE 35: CPT | Performed by: PHYSICIAN ASSISTANT

## 2021-10-05 PROCEDURE — 86900 BLOOD TYPING SEROLOGIC ABO: CPT | Performed by: INTERNAL MEDICINE

## 2021-10-05 PROCEDURE — 87205 SMEAR GRAM STAIN: CPT | Performed by: FAMILY MEDICINE

## 2021-10-05 RX ORDER — MAGNESIUM SULFATE HEPTAHYDRATE 40 MG/ML
2 INJECTION, SOLUTION INTRAVENOUS ONCE
Status: COMPLETED | OUTPATIENT
Start: 2021-10-05 | End: 2021-10-05

## 2021-10-05 RX ORDER — POTASSIUM CHLORIDE 20 MEQ/1
20 TABLET, EXTENDED RELEASE ORAL 2 TIMES DAILY
Status: COMPLETED | OUTPATIENT
Start: 2021-10-05 | End: 2021-10-05

## 2021-10-05 RX ORDER — DILTIAZEM HYDROCHLORIDE 240 MG/1
240 CAPSULE, COATED, EXTENDED RELEASE ORAL DAILY
Status: DISCONTINUED | OUTPATIENT
Start: 2021-10-06 | End: 2021-10-07 | Stop reason: HOSPADM

## 2021-10-05 RX ORDER — FUROSEMIDE 10 MG/ML
20 INJECTION INTRAMUSCULAR; INTRAVENOUS ONCE
Status: COMPLETED | OUTPATIENT
Start: 2021-10-05 | End: 2021-10-05

## 2021-10-05 RX ORDER — DILTIAZEM HYDROCHLORIDE 60 MG/1
60 TABLET, FILM COATED ORAL EVERY 6 HOURS SCHEDULED
Status: COMPLETED | OUTPATIENT
Start: 2021-10-05 | End: 2021-10-06

## 2021-10-05 RX ADMIN — POTASSIUM CHLORIDE 20 MEQ: 1500 TABLET, EXTENDED RELEASE ORAL at 18:39

## 2021-10-05 RX ADMIN — METOPROLOL TARTRATE 50 MG: 50 TABLET, FILM COATED ORAL at 09:19

## 2021-10-05 RX ADMIN — DIGOXIN 125 MCG: 125 TABLET ORAL at 09:18

## 2021-10-05 RX ADMIN — ATORVASTATIN CALCIUM 40 MG: 40 TABLET, FILM COATED ORAL at 22:46

## 2021-10-05 RX ADMIN — APIXABAN 5 MG: 5 TABLET, FILM COATED ORAL at 18:39

## 2021-10-05 RX ADMIN — DILTIAZEM HYDROCHLORIDE 60 MG: 60 TABLET, FILM COATED ORAL at 05:49

## 2021-10-05 RX ADMIN — SILVER SULFADIAZINE: 10 CREAM TOPICAL at 20:08

## 2021-10-05 RX ADMIN — DILTIAZEM HYDROCHLORIDE 60 MG: 60 TABLET, FILM COATED ORAL at 18:40

## 2021-10-05 RX ADMIN — NICOTINE 1 PATCH: 21 PATCH, EXTENDED RELEASE TRANSDERMAL at 09:18

## 2021-10-05 RX ADMIN — MAGNESIUM SULFATE HEPTAHYDRATE 2 G: 40 INJECTION, SOLUTION INTRAVENOUS at 15:55

## 2021-10-05 RX ADMIN — FUROSEMIDE 20 MG: 10 INJECTION, SOLUTION INTRAMUSCULAR; INTRAVENOUS at 13:15

## 2021-10-05 RX ADMIN — POTASSIUM CHLORIDE 20 MEQ: 1500 TABLET, EXTENDED RELEASE ORAL at 13:15

## 2021-10-05 RX ADMIN — MAGNESIUM SULFATE HEPTAHYDRATE 2 G: 40 INJECTION, SOLUTION INTRAVENOUS at 09:18

## 2021-10-05 RX ADMIN — MIRTAZAPINE 7.5 MG: 15 TABLET, FILM COATED ORAL at 22:46

## 2021-10-05 RX ADMIN — APIXABAN 5 MG: 5 TABLET, FILM COATED ORAL at 09:19

## 2021-10-05 RX ADMIN — DILTIAZEM HYDROCHLORIDE 60 MG: 60 TABLET, FILM COATED ORAL at 13:15

## 2021-10-05 RX ADMIN — DILTIAZEM HYDROCHLORIDE 60 MG: 60 TABLET, FILM COATED ORAL at 00:25

## 2021-10-05 RX ADMIN — METOPROLOL TARTRATE 50 MG: 50 TABLET, FILM COATED ORAL at 22:46

## 2021-10-05 RX ADMIN — Medication 2000 UNITS: at 09:18

## 2021-10-06 ENCOUNTER — APPOINTMENT (INPATIENT)
Dept: RADIOLOGY | Facility: HOSPITAL | Age: 66
DRG: 481 | End: 2021-10-06
Payer: COMMERCIAL

## 2021-10-06 LAB
ANION GAP SERPL CALCULATED.3IONS-SCNC: 7 MMOL/L (ref 4–13)
BUN SERPL-MCNC: 9 MG/DL (ref 7–25)
CALCIUM SERPL-MCNC: 8.5 MG/DL (ref 8.6–10.5)
CHLORIDE SERPL-SCNC: 96 MMOL/L (ref 98–107)
CO2 SERPL-SCNC: 28 MMOL/L (ref 21–31)
CREAT SERPL-MCNC: 0.78 MG/DL (ref 0.6–1.2)
ERYTHROCYTE [DISTWIDTH] IN BLOOD BY AUTOMATED COUNT: 15.5 % (ref 11.5–14.5)
GFR SERPL CREATININE-BSD FRML MDRD: 78 ML/MIN/1.73SQ M
GLUCOSE SERPL-MCNC: 113 MG/DL (ref 65–99)
HCT VFR BLD AUTO: 26.9 % (ref 42–47)
HGB BLD-MCNC: 8.9 G/DL (ref 12–16)
MAGNESIUM SERPL-MCNC: 2 MG/DL (ref 1.9–2.7)
MCH RBC QN AUTO: 30 PG (ref 26–34)
MCHC RBC AUTO-ENTMCNC: 33 G/DL (ref 31–37)
MCV RBC AUTO: 91 FL (ref 81–99)
PHOSPHATE SERPL-MCNC: 1.9 MG/DL (ref 3–5.5)
PLATELET # BLD AUTO: 380 THOUSANDS/UL (ref 149–390)
PMV BLD AUTO: 8.2 FL (ref 8.6–11.7)
POTASSIUM SERPL-SCNC: 3.9 MMOL/L (ref 3.5–5.5)
RBC # BLD AUTO: 2.97 MILLION/UL (ref 3.9–5.2)
SARS-COV-2 RNA RESP QL NAA+PROBE: NEGATIVE
SODIUM SERPL-SCNC: 131 MMOL/L (ref 134–143)
TSH SERPL DL<=0.05 MIU/L-ACNC: 4.83 UIU/ML (ref 0.45–5.33)
WBC # BLD AUTO: 6.7 THOUSAND/UL (ref 4.8–10.8)

## 2021-10-06 PROCEDURE — 80048 BASIC METABOLIC PNL TOTAL CA: CPT | Performed by: NURSE PRACTITIONER

## 2021-10-06 PROCEDURE — U0003 INFECTIOUS AGENT DETECTION BY NUCLEIC ACID (DNA OR RNA); SEVERE ACUTE RESPIRATORY SYNDROME CORONAVIRUS 2 (SARS-COV-2) (CORONAVIRUS DISEASE [COVID-19]), AMPLIFIED PROBE TECHNIQUE, MAKING USE OF HIGH THROUGHPUT TECHNOLOGIES AS DESCRIBED BY CMS-2020-01-R: HCPCS | Performed by: INTERNAL MEDICINE

## 2021-10-06 PROCEDURE — 85027 COMPLETE CBC AUTOMATED: CPT | Performed by: INTERNAL MEDICINE

## 2021-10-06 PROCEDURE — 99232 SBSQ HOSP IP/OBS MODERATE 35: CPT | Performed by: INTERNAL MEDICINE

## 2021-10-06 PROCEDURE — 84443 ASSAY THYROID STIM HORMONE: CPT | Performed by: NURSE PRACTITIONER

## 2021-10-06 PROCEDURE — 94760 N-INVAS EAR/PLS OXIMETRY 1: CPT

## 2021-10-06 PROCEDURE — 94668 MNPJ CHEST WALL SBSQ: CPT

## 2021-10-06 PROCEDURE — 84100 ASSAY OF PHOSPHORUS: CPT | Performed by: NURSE PRACTITIONER

## 2021-10-06 PROCEDURE — 97530 THERAPEUTIC ACTIVITIES: CPT

## 2021-10-06 PROCEDURE — 99232 SBSQ HOSP IP/OBS MODERATE 35: CPT | Performed by: NURSE PRACTITIONER

## 2021-10-06 PROCEDURE — 94664 DEMO&/EVAL PT USE INHALER: CPT

## 2021-10-06 PROCEDURE — 71045 X-RAY EXAM CHEST 1 VIEW: CPT

## 2021-10-06 PROCEDURE — U0005 INFEC AGEN DETEC AMPLI PROBE: HCPCS | Performed by: INTERNAL MEDICINE

## 2021-10-06 PROCEDURE — 83735 ASSAY OF MAGNESIUM: CPT | Performed by: NURSE PRACTITIONER

## 2021-10-06 RX ORDER — BUMETANIDE 1 MG/1
0.5 TABLET ORAL DAILY
Status: DISCONTINUED | OUTPATIENT
Start: 2021-10-06 | End: 2021-10-07 | Stop reason: HOSPADM

## 2021-10-06 RX ORDER — METHOCARBAMOL 500 MG/1
500 TABLET, FILM COATED ORAL EVERY 6 HOURS PRN
Status: DISCONTINUED | OUTPATIENT
Start: 2021-10-06 | End: 2021-10-07 | Stop reason: HOSPADM

## 2021-10-06 RX ADMIN — MIRTAZAPINE 7.5 MG: 15 TABLET, FILM COATED ORAL at 22:39

## 2021-10-06 RX ADMIN — DILTIAZEM HYDROCHLORIDE 240 MG: 240 CAPSULE, EXTENDED RELEASE ORAL at 09:18

## 2021-10-06 RX ADMIN — METOPROLOL TARTRATE 50 MG: 50 TABLET, FILM COATED ORAL at 22:39

## 2021-10-06 RX ADMIN — SILVER SULFADIAZINE: 10 CREAM TOPICAL at 18:24

## 2021-10-06 RX ADMIN — DIBASIC SODIUM PHOSPHATE, MONOBASIC POTASSIUM PHOSPHATE AND MONOBASIC SODIUM PHOSPHATE 1 TABLET: 852; 155; 130 TABLET ORAL at 15:59

## 2021-10-06 RX ADMIN — ATORVASTATIN CALCIUM 40 MG: 40 TABLET, FILM COATED ORAL at 22:39

## 2021-10-06 RX ADMIN — SILVER SULFADIAZINE: 10 CREAM TOPICAL at 09:21

## 2021-10-06 RX ADMIN — NICOTINE 1 PATCH: 21 PATCH, EXTENDED RELEASE TRANSDERMAL at 09:21

## 2021-10-06 RX ADMIN — APIXABAN 5 MG: 5 TABLET, FILM COATED ORAL at 18:24

## 2021-10-06 RX ADMIN — APIXABAN 5 MG: 5 TABLET, FILM COATED ORAL at 09:18

## 2021-10-06 RX ADMIN — BUMETANIDE 0.5 MG: 1 TABLET ORAL at 15:59

## 2021-10-06 RX ADMIN — DIGOXIN 125 MCG: 125 TABLET ORAL at 09:18

## 2021-10-06 RX ADMIN — DILTIAZEM HYDROCHLORIDE 60 MG: 60 TABLET, FILM COATED ORAL at 00:37

## 2021-10-06 RX ADMIN — Medication 2000 UNITS: at 09:18

## 2021-10-06 RX ADMIN — GABAPENTIN 100 MG: 100 CAPSULE ORAL at 22:39

## 2021-10-06 RX ADMIN — METHOCARBAMOL 500 MG: 500 TABLET ORAL at 18:24

## 2021-10-06 RX ADMIN — METHOCARBAMOL 500 MG: 500 TABLET ORAL at 04:34

## 2021-10-06 RX ADMIN — METHOCARBAMOL 500 MG: 500 TABLET ORAL at 12:01

## 2021-10-06 RX ADMIN — METOPROLOL TARTRATE 50 MG: 50 TABLET, FILM COATED ORAL at 09:18

## 2021-10-07 VITALS
WEIGHT: 130.07 LBS | HEART RATE: 86 BPM | OXYGEN SATURATION: 100 % | RESPIRATION RATE: 18 BRPM | HEIGHT: 62 IN | SYSTOLIC BLOOD PRESSURE: 119 MMHG | BODY MASS INDEX: 23.94 KG/M2 | TEMPERATURE: 97.9 F | DIASTOLIC BLOOD PRESSURE: 77 MMHG

## 2021-10-07 PROBLEM — S31.809A BUTTOCK WOUND: Status: ACTIVE | Noted: 2021-10-07

## 2021-10-07 LAB
ANION GAP SERPL CALCULATED.3IONS-SCNC: 6 MMOL/L (ref 4–13)
BASOPHILS # BLD AUTO: 0 THOUSANDS/ΜL (ref 0–0.1)
BASOPHILS NFR BLD AUTO: 1 % (ref 0–2)
BUN SERPL-MCNC: 21 MG/DL (ref 7–25)
CALCIUM SERPL-MCNC: 8.4 MG/DL (ref 8.6–10.5)
CHLORIDE SERPL-SCNC: 94 MMOL/L (ref 98–107)
CO2 SERPL-SCNC: 31 MMOL/L (ref 21–31)
CREAT SERPL-MCNC: 0.79 MG/DL (ref 0.6–1.2)
EOSINOPHIL # BLD AUTO: 0.3 THOUSAND/ΜL (ref 0–0.61)
EOSINOPHIL NFR BLD AUTO: 4 % (ref 0–5)
ERYTHROCYTE [DISTWIDTH] IN BLOOD BY AUTOMATED COUNT: 15.3 % (ref 11.5–14.5)
FERRITIN SERPL-MCNC: 421 NG/ML (ref 8–388)
GFR SERPL CREATININE-BSD FRML MDRD: 77 ML/MIN/1.73SQ M
GLUCOSE SERPL-MCNC: 102 MG/DL (ref 65–99)
HCT VFR BLD AUTO: 24.9 % (ref 42–47)
HGB BLD-MCNC: 8.2 G/DL (ref 12–16)
IRON SATN MFR SERPL: 23 % (ref 15–50)
IRON SERPL-MCNC: 54 UG/DL (ref 50–170)
LYMPHOCYTES # BLD AUTO: 0.8 THOUSANDS/ΜL (ref 0.6–4.47)
LYMPHOCYTES NFR BLD AUTO: 11 % (ref 21–51)
MAGNESIUM SERPL-MCNC: 1.5 MG/DL (ref 1.9–2.7)
MCH RBC QN AUTO: 29.8 PG (ref 26–34)
MCHC RBC AUTO-ENTMCNC: 33.1 G/DL (ref 31–37)
MCV RBC AUTO: 90 FL (ref 81–99)
MONOCYTES # BLD AUTO: 1 THOUSAND/ΜL (ref 0.17–1.22)
MONOCYTES NFR BLD AUTO: 14 % (ref 2–12)
NEUTROPHILS # BLD AUTO: 5.2 THOUSANDS/ΜL (ref 1.4–6.5)
NEUTS SEG NFR BLD AUTO: 71 % (ref 42–75)
PLATELET # BLD AUTO: 390 THOUSANDS/UL (ref 149–390)
PMV BLD AUTO: 7.7 FL (ref 8.6–11.7)
POTASSIUM SERPL-SCNC: 3.8 MMOL/L (ref 3.5–5.5)
RBC # BLD AUTO: 2.77 MILLION/UL (ref 3.9–5.2)
SODIUM SERPL-SCNC: 131 MMOL/L (ref 134–143)
TIBC SERPL-MCNC: 237 UG/DL (ref 250–450)
WBC # BLD AUTO: 7.3 THOUSAND/UL (ref 4.8–10.8)

## 2021-10-07 PROCEDURE — 83735 ASSAY OF MAGNESIUM: CPT | Performed by: NURSE PRACTITIONER

## 2021-10-07 PROCEDURE — 99239 HOSP IP/OBS DSCHRG MGMT >30: CPT | Performed by: INTERNAL MEDICINE

## 2021-10-07 PROCEDURE — 97530 THERAPEUTIC ACTIVITIES: CPT

## 2021-10-07 PROCEDURE — 99232 SBSQ HOSP IP/OBS MODERATE 35: CPT | Performed by: NURSE PRACTITIONER

## 2021-10-07 PROCEDURE — 82728 ASSAY OF FERRITIN: CPT | Performed by: NURSE PRACTITIONER

## 2021-10-07 PROCEDURE — 80048 BASIC METABOLIC PNL TOTAL CA: CPT | Performed by: NURSE PRACTITIONER

## 2021-10-07 PROCEDURE — 97116 GAIT TRAINING THERAPY: CPT

## 2021-10-07 PROCEDURE — 83550 IRON BINDING TEST: CPT | Performed by: NURSE PRACTITIONER

## 2021-10-07 PROCEDURE — 85025 COMPLETE CBC W/AUTO DIFF WBC: CPT | Performed by: INTERNAL MEDICINE

## 2021-10-07 PROCEDURE — 83540 ASSAY OF IRON: CPT | Performed by: NURSE PRACTITIONER

## 2021-10-07 RX ORDER — LANOLIN ALCOHOL/MO/W.PET/CERES
100 CREAM (GRAM) TOPICAL DAILY
Status: DISCONTINUED | OUTPATIENT
Start: 2021-10-07 | End: 2021-10-07 | Stop reason: HOSPADM

## 2021-10-07 RX ORDER — OXYCODONE HYDROCHLORIDE 5 MG/1
2.5 TABLET ORAL EVERY 6 HOURS PRN
Qty: 10 TABLET | Refills: 0 | Status: SHIPPED | OUTPATIENT
Start: 2021-10-07 | End: 2021-10-17

## 2021-10-07 RX ORDER — DILTIAZEM HYDROCHLORIDE 240 MG/1
240 CAPSULE, COATED, EXTENDED RELEASE ORAL DAILY
Refills: 0
Start: 2021-10-08 | End: 2021-11-02 | Stop reason: SDUPTHER

## 2021-10-07 RX ORDER — FOLIC ACID 1 MG/1
1 TABLET ORAL DAILY
Status: DISCONTINUED | OUTPATIENT
Start: 2021-10-07 | End: 2021-10-07 | Stop reason: HOSPADM

## 2021-10-07 RX ORDER — POTASSIUM CHLORIDE 20 MEQ/1
20 TABLET, EXTENDED RELEASE ORAL ONCE
Status: COMPLETED | OUTPATIENT
Start: 2021-10-07 | End: 2021-10-07

## 2021-10-07 RX ORDER — BUMETANIDE 0.5 MG/1
0.5 TABLET ORAL DAILY
Refills: 0
Start: 2021-10-08 | End: 2021-11-02 | Stop reason: SDUPTHER

## 2021-10-07 RX ORDER — DIGOXIN 125 MCG
125 TABLET ORAL DAILY
Refills: 0
Start: 2021-10-08 | End: 2021-11-02 | Stop reason: SDUPTHER

## 2021-10-07 RX ORDER — ALBUTEROL SULFATE 2.5 MG/3ML
2.5 SOLUTION RESPIRATORY (INHALATION) EVERY 6 HOURS PRN
Refills: 0
Start: 2021-10-07 | End: 2022-05-23

## 2021-10-07 RX ORDER — METOPROLOL TARTRATE 50 MG/1
50 TABLET, FILM COATED ORAL EVERY 12 HOURS SCHEDULED
Refills: 0
Start: 2021-10-07 | End: 2021-11-02 | Stop reason: SDUPTHER

## 2021-10-07 RX ORDER — THIAMINE MONONITRATE (VIT B1) 100 MG
100 TABLET ORAL DAILY
Refills: 0
Start: 2021-10-08 | End: 2022-05-23

## 2021-10-07 RX ORDER — FOLIC ACID 1 MG/1
1 TABLET ORAL DAILY
Refills: 0
Start: 2021-10-08 | End: 2022-05-23

## 2021-10-07 RX ORDER — GABAPENTIN 100 MG/1
100 CAPSULE ORAL
Refills: 0
Start: 2021-10-07 | End: 2022-05-23

## 2021-10-07 RX ORDER — DOXYCYCLINE HYCLATE 100 MG/1
100 CAPSULE ORAL EVERY 12 HOURS SCHEDULED
Qty: 13 CAPSULE | Refills: 0
Start: 2021-10-07 | End: 2021-10-14

## 2021-10-07 RX ORDER — MAGNESIUM SULFATE HEPTAHYDRATE 40 MG/ML
2 INJECTION, SOLUTION INTRAVENOUS ONCE
Status: COMPLETED | OUTPATIENT
Start: 2021-10-07 | End: 2021-10-07

## 2021-10-07 RX ORDER — DOXYCYCLINE HYCLATE 100 MG/1
100 CAPSULE ORAL EVERY 12 HOURS SCHEDULED
Status: DISCONTINUED | OUTPATIENT
Start: 2021-10-07 | End: 2021-10-07 | Stop reason: HOSPADM

## 2021-10-07 RX ADMIN — APIXABAN 5 MG: 5 TABLET, FILM COATED ORAL at 08:51

## 2021-10-07 RX ADMIN — DILTIAZEM HYDROCHLORIDE 240 MG: 240 CAPSULE, EXTENDED RELEASE ORAL at 08:53

## 2021-10-07 RX ADMIN — NICOTINE 1 PATCH: 21 PATCH, EXTENDED RELEASE TRANSDERMAL at 09:08

## 2021-10-07 RX ADMIN — POTASSIUM CHLORIDE 20 MEQ: 1500 TABLET, EXTENDED RELEASE ORAL at 09:08

## 2021-10-07 RX ADMIN — MAGNESIUM GLUCONATE 500 MG ORAL TABLET 400 MG: 500 TABLET ORAL at 09:08

## 2021-10-07 RX ADMIN — DOXYCYCLINE 100 MG: 100 CAPSULE ORAL at 09:08

## 2021-10-07 RX ADMIN — METHOCARBAMOL 500 MG: 500 TABLET ORAL at 00:29

## 2021-10-07 RX ADMIN — THIAMINE HCL TAB 100 MG 100 MG: 100 TAB at 09:08

## 2021-10-07 RX ADMIN — SILVER SULFADIAZINE 1 APPLICATION: 10 CREAM TOPICAL at 09:10

## 2021-10-07 RX ADMIN — MAGNESIUM SULFATE HEPTAHYDRATE 2 G: 40 INJECTION, SOLUTION INTRAVENOUS at 09:08

## 2021-10-07 RX ADMIN — FOLIC ACID 1 MG: 1 TABLET ORAL at 09:08

## 2021-10-07 RX ADMIN — METHOCARBAMOL 500 MG: 500 TABLET ORAL at 08:51

## 2021-10-07 RX ADMIN — METOPROLOL TARTRATE 50 MG: 50 TABLET, FILM COATED ORAL at 08:53

## 2021-10-07 RX ADMIN — DIBASIC SODIUM PHOSPHATE, MONOBASIC POTASSIUM PHOSPHATE AND MONOBASIC SODIUM PHOSPHATE 1 TABLET: 852; 155; 130 TABLET ORAL at 08:52

## 2021-10-07 RX ADMIN — DIGOXIN 125 MCG: 125 TABLET ORAL at 08:53

## 2021-10-07 RX ADMIN — Medication 2000 UNITS: at 08:52

## 2021-10-08 LAB
BACTERIA WND AEROBE CULT: ABNORMAL
GRAM STN SPEC: ABNORMAL

## 2021-10-11 ENCOUNTER — TELEPHONE (OUTPATIENT)
Dept: OBGYN CLINIC | Facility: HOSPITAL | Age: 66
End: 2021-10-11

## 2021-10-14 ENCOUNTER — TELEPHONE (OUTPATIENT)
Dept: OBGYN CLINIC | Facility: MEDICAL CENTER | Age: 66
End: 2021-10-14

## 2021-10-18 ENCOUNTER — OFFICE VISIT (OUTPATIENT)
Dept: OBGYN CLINIC | Facility: CLINIC | Age: 66
End: 2021-10-18

## 2021-10-18 VITALS
HEIGHT: 62 IN | DIASTOLIC BLOOD PRESSURE: 53 MMHG | HEART RATE: 64 BPM | SYSTOLIC BLOOD PRESSURE: 87 MMHG | BODY MASS INDEX: 23.79 KG/M2

## 2021-10-18 DIAGNOSIS — Z87.81 S/P ORIF (OPEN REDUCTION INTERNAL FIXATION) FRACTURE: Primary | ICD-10-CM

## 2021-10-18 DIAGNOSIS — Z98.890 S/P ORIF (OPEN REDUCTION INTERNAL FIXATION) FRACTURE: Primary | ICD-10-CM

## 2021-10-18 PROCEDURE — 99024 POSTOP FOLLOW-UP VISIT: CPT | Performed by: ORTHOPAEDIC SURGERY

## 2021-11-02 ENCOUNTER — PROCEDURE VISIT (OUTPATIENT)
Dept: CARDIOLOGY CLINIC | Facility: CLINIC | Age: 66
End: 2021-11-02
Payer: COMMERCIAL

## 2021-11-02 ENCOUNTER — OFFICE VISIT (OUTPATIENT)
Dept: CARDIOLOGY CLINIC | Facility: CLINIC | Age: 66
End: 2021-11-02
Payer: COMMERCIAL

## 2021-11-02 VITALS
WEIGHT: 112 LBS | DIASTOLIC BLOOD PRESSURE: 60 MMHG | SYSTOLIC BLOOD PRESSURE: 100 MMHG | HEART RATE: 60 BPM | BODY MASS INDEX: 20.61 KG/M2 | HEIGHT: 62 IN

## 2021-11-02 DIAGNOSIS — I48.0 PAROXYSMAL ATRIAL FIBRILLATION (HCC): Primary | ICD-10-CM

## 2021-11-02 DIAGNOSIS — I74.09 AORTOILIAC OCCLUSIVE DISEASE (HCC): ICD-10-CM

## 2021-11-02 DIAGNOSIS — R60.0 BILATERAL LOWER EXTREMITY EDEMA: ICD-10-CM

## 2021-11-02 DIAGNOSIS — J96.01 ACUTE RESPIRATORY FAILURE WITH HYPOXEMIA (HCC): ICD-10-CM

## 2021-11-02 DIAGNOSIS — I48.91 ATRIAL FIBRILLATION WITH RVR (HCC): ICD-10-CM

## 2021-11-02 DIAGNOSIS — D64.9 ANEMIA, UNSPECIFIED TYPE: ICD-10-CM

## 2021-11-02 DIAGNOSIS — I10 BENIGN ESSENTIAL HYPERTENSION: ICD-10-CM

## 2021-11-02 DIAGNOSIS — I48.0 PAF (PAROXYSMAL ATRIAL FIBRILLATION) (HCC): ICD-10-CM

## 2021-11-02 DIAGNOSIS — I73.9 PAD (PERIPHERAL ARTERY DISEASE) (HCC): ICD-10-CM

## 2021-11-02 PROCEDURE — 99214 OFFICE O/P EST MOD 30 MIN: CPT | Performed by: INTERNAL MEDICINE

## 2021-11-02 PROCEDURE — 93242 EXT ECG>48HR<7D RECORDING: CPT | Performed by: INTERNAL MEDICINE

## 2021-11-02 RX ORDER — DIGOXIN 125 MCG
125 TABLET ORAL DAILY
Qty: 30 TABLET | Refills: 3
Start: 2021-11-02 | End: 2021-11-03 | Stop reason: SDUPTHER

## 2021-11-02 RX ORDER — DILTIAZEM HYDROCHLORIDE 240 MG/1
240 CAPSULE, COATED, EXTENDED RELEASE ORAL DAILY
Qty: 30 CAPSULE | Refills: 3
Start: 2021-11-02 | End: 2021-11-03 | Stop reason: SDUPTHER

## 2021-11-02 RX ORDER — NICOTINE 14MG/24HR
1 PATCH, TRANSDERMAL 24 HOURS TRANSDERMAL EVERY 24 HOURS
COMMUNITY
Start: 2021-10-27 | End: 2022-05-23

## 2021-11-02 RX ORDER — METOPROLOL TARTRATE 50 MG/1
50 TABLET, FILM COATED ORAL EVERY 12 HOURS SCHEDULED
Qty: 60 TABLET | Refills: 3
Start: 2021-11-02 | End: 2022-01-20 | Stop reason: SDUPTHER

## 2021-11-02 RX ORDER — BUMETANIDE 0.5 MG/1
0.5 TABLET ORAL DAILY
Qty: 30 TABLET | Refills: 3
Start: 2021-11-02 | End: 2021-11-03 | Stop reason: SDUPTHER

## 2021-11-03 DIAGNOSIS — J96.01 ACUTE RESPIRATORY FAILURE WITH HYPOXEMIA (HCC): ICD-10-CM

## 2021-11-03 DIAGNOSIS — I48.91 ATRIAL FIBRILLATION WITH RVR (HCC): ICD-10-CM

## 2021-11-03 RX ORDER — DIGOXIN 125 MCG
125 TABLET ORAL DAILY
Qty: 30 TABLET | Refills: 5 | Status: SHIPPED | OUTPATIENT
Start: 2021-11-03 | End: 2022-03-14 | Stop reason: ALTCHOICE

## 2021-11-03 RX ORDER — DILTIAZEM HYDROCHLORIDE 240 MG/1
240 CAPSULE, COATED, EXTENDED RELEASE ORAL DAILY
Qty: 30 CAPSULE | Refills: 5 | Status: SHIPPED | OUTPATIENT
Start: 2021-11-03 | End: 2022-05-04

## 2021-11-03 RX ORDER — BUMETANIDE 0.5 MG/1
0.5 TABLET ORAL DAILY
Qty: 30 TABLET | Refills: 5 | Status: SHIPPED | OUTPATIENT
Start: 2021-11-03 | End: 2022-05-23 | Stop reason: SDUPTHER

## 2021-11-08 ENCOUNTER — APPOINTMENT (OUTPATIENT)
Dept: LAB | Facility: CLINIC | Age: 66
End: 2021-11-08
Payer: COMMERCIAL

## 2021-11-08 ENCOUNTER — TRANSCRIBE ORDERS (OUTPATIENT)
Dept: LAB | Facility: CLINIC | Age: 66
End: 2021-11-08

## 2021-11-08 DIAGNOSIS — D50.0 IRON DEFICIENCY ANEMIA SECONDARY TO BLOOD LOSS (CHRONIC): ICD-10-CM

## 2021-11-08 DIAGNOSIS — E78.2 MIXED HYPERLIPIDEMIA: ICD-10-CM

## 2021-11-08 DIAGNOSIS — I73.9 PAD (PERIPHERAL ARTERY DISEASE) (HCC): ICD-10-CM

## 2021-11-08 DIAGNOSIS — I10 BENIGN ESSENTIAL HYPERTENSION: ICD-10-CM

## 2021-11-08 DIAGNOSIS — I10 ESSENTIAL HYPERTENSION, MALIGNANT: Primary | ICD-10-CM

## 2021-11-08 DIAGNOSIS — M89.00 ALGONEURODYSTROPHY: ICD-10-CM

## 2021-11-08 DIAGNOSIS — R60.0 BILATERAL LOWER EXTREMITY EDEMA: ICD-10-CM

## 2021-11-08 DIAGNOSIS — I48.0 PAROXYSMAL ATRIAL FIBRILLATION (HCC): ICD-10-CM

## 2021-11-08 DIAGNOSIS — I10 ESSENTIAL HYPERTENSION, MALIGNANT: ICD-10-CM

## 2021-11-08 DIAGNOSIS — C83.31 RETICULOSARCOMA OF LYMPH NODES OF HEAD, FACE, AND NECK (HCC): ICD-10-CM

## 2021-11-08 DIAGNOSIS — H05.89 ORBITAL FLOOR SYNDROME: ICD-10-CM

## 2021-11-08 LAB
25(OH)D3 SERPL-MCNC: 49 NG/ML (ref 30–100)
ALBUMIN SERPL BCP-MCNC: 3.3 G/DL (ref 3.5–5)
ALP SERPL-CCNC: 172 U/L (ref 46–116)
ALT SERPL W P-5'-P-CCNC: 21 U/L (ref 12–78)
ANION GAP SERPL CALCULATED.3IONS-SCNC: 6 MMOL/L (ref 4–13)
AST SERPL W P-5'-P-CCNC: 15 U/L (ref 5–45)
BASOPHILS # BLD AUTO: 0.07 THOUSANDS/ΜL (ref 0–0.1)
BASOPHILS NFR BLD AUTO: 1 % (ref 0–1)
BILIRUB SERPL-MCNC: 0.47 MG/DL (ref 0.2–1)
BUN SERPL-MCNC: 9 MG/DL (ref 5–25)
CALCIUM ALBUM COR SERPL-MCNC: 10.1 MG/DL (ref 8.3–10.1)
CALCIUM SERPL-MCNC: 9.5 MG/DL (ref 8.3–10.1)
CHLORIDE SERPL-SCNC: 99 MMOL/L (ref 100–108)
CO2 SERPL-SCNC: 28 MMOL/L (ref 21–32)
CREAT SERPL-MCNC: 0.97 MG/DL (ref 0.6–1.3)
DIGOXIN SERPL-MCNC: 1.4 NG/ML (ref 0.8–2)
EOSINOPHIL # BLD AUTO: 0.37 THOUSAND/ΜL (ref 0–0.61)
EOSINOPHIL NFR BLD AUTO: 7 % (ref 0–6)
ERYTHROCYTE [DISTWIDTH] IN BLOOD BY AUTOMATED COUNT: 14.9 % (ref 11.6–15.1)
FOLATE SERPL-MCNC: >20 NG/ML (ref 3.1–17.5)
GFR SERPL CREATININE-BSD FRML MDRD: 60 ML/MIN/1.73SQ M
GLUCOSE SERPL-MCNC: 103 MG/DL (ref 65–140)
HCT VFR BLD AUTO: 40.6 % (ref 34.8–46.1)
HGB BLD-MCNC: 12.9 G/DL (ref 11.5–15.4)
IMM GRANULOCYTES # BLD AUTO: 0.02 THOUSAND/UL (ref 0–0.2)
IMM GRANULOCYTES NFR BLD AUTO: 0 % (ref 0–2)
IRON SERPL-MCNC: 80 UG/DL (ref 50–170)
LDH SERPL-CCNC: 214 U/L (ref 81–234)
LYMPHOCYTES # BLD AUTO: 0.94 THOUSANDS/ΜL (ref 0.6–4.47)
LYMPHOCYTES NFR BLD AUTO: 18 % (ref 14–44)
MAGNESIUM SERPL-MCNC: 1.7 MG/DL (ref 1.6–2.6)
MCH RBC QN AUTO: 29.2 PG (ref 26.8–34.3)
MCHC RBC AUTO-ENTMCNC: 31.8 G/DL (ref 31.4–37.4)
MCV RBC AUTO: 92 FL (ref 82–98)
MONOCYTES # BLD AUTO: 0.64 THOUSAND/ΜL (ref 0.17–1.22)
MONOCYTES NFR BLD AUTO: 12 % (ref 4–12)
NEUTROPHILS # BLD AUTO: 3.26 THOUSANDS/ΜL (ref 1.85–7.62)
NEUTS SEG NFR BLD AUTO: 62 % (ref 43–75)
NRBC BLD AUTO-RTO: 0 /100 WBCS
PLATELET # BLD AUTO: 235 THOUSANDS/UL (ref 149–390)
PMV BLD AUTO: 11.6 FL (ref 8.9–12.7)
POTASSIUM SERPL-SCNC: 4.4 MMOL/L (ref 3.5–5.3)
PROT SERPL-MCNC: 6.3 G/DL (ref 6.4–8.2)
RBC # BLD AUTO: 4.42 MILLION/UL (ref 3.81–5.12)
SODIUM SERPL-SCNC: 133 MMOL/L (ref 136–145)
VIT B12 SERPL-MCNC: 214 PG/ML (ref 100–900)
WBC # BLD AUTO: 5.3 THOUSAND/UL (ref 4.31–10.16)

## 2021-11-08 PROCEDURE — 82607 VITAMIN B-12: CPT

## 2021-11-08 PROCEDURE — 82746 ASSAY OF FOLIC ACID SERUM: CPT

## 2021-11-08 PROCEDURE — 83540 ASSAY OF IRON: CPT

## 2021-11-08 PROCEDURE — 83735 ASSAY OF MAGNESIUM: CPT

## 2021-11-08 PROCEDURE — 80053 COMPREHEN METABOLIC PANEL: CPT

## 2021-11-08 PROCEDURE — 80162 ASSAY OF DIGOXIN TOTAL: CPT | Performed by: INTERNAL MEDICINE

## 2021-11-08 PROCEDURE — 36415 COLL VENOUS BLD VENIPUNCTURE: CPT

## 2021-11-08 PROCEDURE — 83615 LACTATE (LD) (LDH) ENZYME: CPT

## 2021-11-08 PROCEDURE — 82306 VITAMIN D 25 HYDROXY: CPT

## 2021-11-08 PROCEDURE — 85025 COMPLETE CBC W/AUTO DIFF WBC: CPT

## 2021-11-22 ENCOUNTER — OFFICE VISIT (OUTPATIENT)
Dept: OBGYN CLINIC | Facility: CLINIC | Age: 66
End: 2021-11-22

## 2021-11-22 VITALS — BODY MASS INDEX: 20.61 KG/M2 | WEIGHT: 112 LBS | HEIGHT: 62 IN

## 2021-11-22 DIAGNOSIS — Z98.890 S/P ORIF (OPEN REDUCTION INTERNAL FIXATION) FRACTURE: Primary | ICD-10-CM

## 2021-11-22 DIAGNOSIS — Z87.81 S/P ORIF (OPEN REDUCTION INTERNAL FIXATION) FRACTURE: Primary | ICD-10-CM

## 2021-11-22 PROCEDURE — 99024 POSTOP FOLLOW-UP VISIT: CPT | Performed by: ORTHOPAEDIC SURGERY

## 2021-11-23 ENCOUNTER — TELEPHONE (OUTPATIENT)
Dept: CARDIOLOGY CLINIC | Facility: CLINIC | Age: 66
End: 2021-11-23

## 2021-11-23 ENCOUNTER — CLINICAL SUPPORT (OUTPATIENT)
Dept: CARDIOLOGY CLINIC | Facility: CLINIC | Age: 66
End: 2021-11-23
Payer: COMMERCIAL

## 2021-11-23 DIAGNOSIS — I48.0 PAROXYSMAL ATRIAL FIBRILLATION (HCC): ICD-10-CM

## 2021-11-23 DIAGNOSIS — I10 BENIGN ESSENTIAL HYPERTENSION: ICD-10-CM

## 2021-11-23 DIAGNOSIS — I48.91 ATRIAL FIBRILLATION WITH RVR (HCC): ICD-10-CM

## 2021-11-23 DIAGNOSIS — R60.0 BILATERAL LOWER EXTREMITY EDEMA: ICD-10-CM

## 2021-11-23 DIAGNOSIS — I73.9 PAD (PERIPHERAL ARTERY DISEASE) (HCC): ICD-10-CM

## 2021-11-23 PROCEDURE — 93244 EXT ECG>48HR<7D REV&INTERPJ: CPT | Performed by: INTERNAL MEDICINE

## 2021-11-26 ENCOUNTER — TELEPHONE (OUTPATIENT)
Dept: NON INVASIVE DIAGNOSTICS | Facility: HOSPITAL | Age: 66
End: 2021-11-26

## 2021-11-26 DIAGNOSIS — I47.2 NSVT (NONSUSTAINED VENTRICULAR TACHYCARDIA) (HCC): Primary | ICD-10-CM

## 2021-11-26 DIAGNOSIS — R06.83 SNORES: ICD-10-CM

## 2021-11-30 ENCOUNTER — TELEPHONE (OUTPATIENT)
Dept: OBGYN CLINIC | Facility: HOSPITAL | Age: 66
End: 2021-11-30

## 2021-12-03 ENCOUNTER — TELEPHONE (OUTPATIENT)
Dept: CARDIOLOGY CLINIC | Facility: CLINIC | Age: 66
End: 2021-12-03

## 2021-12-06 ENCOUNTER — TELEPHONE (OUTPATIENT)
Dept: CARDIOLOGY CLINIC | Facility: CLINIC | Age: 66
End: 2021-12-06

## 2021-12-06 ENCOUNTER — APPOINTMENT (OUTPATIENT)
Dept: LAB | Facility: CLINIC | Age: 66
End: 2021-12-06
Payer: COMMERCIAL

## 2021-12-06 DIAGNOSIS — R74.8 ABNORMAL SERUM LEVEL OF ALKALINE PHOSPHATASE: ICD-10-CM

## 2021-12-06 LAB — GGT SERPL-CCNC: 100 U/L (ref 5–85)

## 2021-12-06 PROCEDURE — 84080 ASSAY ALKALINE PHOSPHATASES: CPT

## 2021-12-06 PROCEDURE — 84075 ASSAY ALKALINE PHOSPHATASE: CPT

## 2021-12-06 PROCEDURE — 36415 COLL VENOUS BLD VENIPUNCTURE: CPT

## 2021-12-06 PROCEDURE — 82977 ASSAY OF GGT: CPT

## 2021-12-09 LAB
ALP BONE CFR SERPL: 45 % (ref 14–68)
ALP INTEST CFR SERPL: 3 % (ref 0–18)
ALP LIVER CFR SERPL: 52 % (ref 18–85)
ALP SERPL-CCNC: 136 IU/L (ref 44–121)

## 2021-12-20 ENCOUNTER — OFFICE VISIT (OUTPATIENT)
Dept: OBGYN CLINIC | Facility: CLINIC | Age: 66
End: 2021-12-20

## 2021-12-20 VITALS
SYSTOLIC BLOOD PRESSURE: 106 MMHG | HEIGHT: 62 IN | DIASTOLIC BLOOD PRESSURE: 64 MMHG | HEART RATE: 59 BPM | BODY MASS INDEX: 20.49 KG/M2

## 2021-12-20 DIAGNOSIS — Z87.81 S/P ORIF (OPEN REDUCTION INTERNAL FIXATION) FRACTURE: Primary | ICD-10-CM

## 2021-12-20 DIAGNOSIS — Z98.890 S/P ORIF (OPEN REDUCTION INTERNAL FIXATION) FRACTURE: Primary | ICD-10-CM

## 2021-12-20 PROCEDURE — 99024 POSTOP FOLLOW-UP VISIT: CPT | Performed by: ORTHOPAEDIC SURGERY

## 2021-12-30 ENCOUNTER — APPOINTMENT (OUTPATIENT)
Dept: LAB | Facility: CLINIC | Age: 66
End: 2021-12-30
Payer: COMMERCIAL

## 2021-12-30 DIAGNOSIS — I10 ESSENTIAL HYPERTENSION, MALIGNANT: ICD-10-CM

## 2021-12-30 LAB
ALBUMIN SERPL BCP-MCNC: 3.5 G/DL (ref 3.5–5)
ALP SERPL-CCNC: 131 U/L (ref 46–116)
ALT SERPL W P-5'-P-CCNC: 17 U/L (ref 12–78)
ANION GAP SERPL CALCULATED.3IONS-SCNC: 7 MMOL/L (ref 4–13)
AST SERPL W P-5'-P-CCNC: 19 U/L (ref 5–45)
BILIRUB SERPL-MCNC: 0.44 MG/DL (ref 0.2–1)
BUN SERPL-MCNC: 12 MG/DL (ref 5–25)
CALCIUM SERPL-MCNC: 9.6 MG/DL (ref 8.3–10.1)
CHLORIDE SERPL-SCNC: 91 MMOL/L (ref 100–108)
CO2 SERPL-SCNC: 29 MMOL/L (ref 21–32)
CREAT SERPL-MCNC: 1.12 MG/DL (ref 0.6–1.3)
GFR SERPL CREATININE-BSD FRML MDRD: 50 ML/MIN/1.73SQ M
GLUCOSE SERPL-MCNC: 106 MG/DL (ref 65–140)
POTASSIUM SERPL-SCNC: 4.3 MMOL/L (ref 3.5–5.3)
PROT SERPL-MCNC: 6.4 G/DL (ref 6.4–8.2)
SODIUM SERPL-SCNC: 127 MMOL/L (ref 136–145)

## 2021-12-30 PROCEDURE — 80053 COMPREHEN METABOLIC PANEL: CPT

## 2021-12-30 PROCEDURE — 36415 COLL VENOUS BLD VENIPUNCTURE: CPT

## 2022-01-01 RX ORDER — SODIUM CHLORIDE 9 MG/ML
125 INJECTION, SOLUTION INTRAVENOUS CONTINUOUS
Status: CANCELLED | OUTPATIENT
Start: 2022-01-01

## 2022-01-10 ENCOUNTER — HOSPITAL ENCOUNTER (OUTPATIENT)
Dept: MRI IMAGING | Facility: HOSPITAL | Age: 67
Discharge: HOME | End: 2022-01-10
Payer: COMMERCIAL

## 2022-01-10 DIAGNOSIS — C83.31 DIFFUSE LARGE B-CELL LYMPHOMA, LYMPH NODES OF HEAD, FACE, AND NECK (HCC): ICD-10-CM

## 2022-01-10 PROCEDURE — A9585 GADOBUTROL INJECTION: HCPCS | Performed by: RADIOLOGY

## 2022-01-10 PROCEDURE — 70553 MRI BRAIN STEM W/O & W/DYE: CPT

## 2022-01-10 RX ADMIN — GADOBUTROL 5 ML: 604.72 INJECTION INTRAVENOUS at 13:54

## 2022-01-20 DIAGNOSIS — I48.91 ATRIAL FIBRILLATION WITH RVR (HCC): ICD-10-CM

## 2022-01-20 RX ORDER — METOPROLOL TARTRATE 50 MG/1
50 TABLET, FILM COATED ORAL EVERY 12 HOURS SCHEDULED
Qty: 180 TABLET | Refills: 3 | Status: SHIPPED | OUTPATIENT
Start: 2022-01-20 | End: 2022-02-01 | Stop reason: ALTCHOICE

## 2022-01-24 ENCOUNTER — OFFICE VISIT (OUTPATIENT)
Dept: OBGYN CLINIC | Facility: CLINIC | Age: 67
End: 2022-01-24
Payer: COMMERCIAL

## 2022-01-24 VITALS
SYSTOLIC BLOOD PRESSURE: 105 MMHG | HEIGHT: 62 IN | BODY MASS INDEX: 20.49 KG/M2 | DIASTOLIC BLOOD PRESSURE: 58 MMHG | HEART RATE: 63 BPM

## 2022-01-24 DIAGNOSIS — Z98.890 S/P ORIF (OPEN REDUCTION INTERNAL FIXATION) FRACTURE: Primary | ICD-10-CM

## 2022-01-24 DIAGNOSIS — Z87.81 S/P ORIF (OPEN REDUCTION INTERNAL FIXATION) FRACTURE: Primary | ICD-10-CM

## 2022-01-24 PROCEDURE — 99213 OFFICE O/P EST LOW 20 MIN: CPT | Performed by: ORTHOPAEDIC SURGERY

## 2022-01-24 NOTE — LETTER
January 24, 2022     Patient: Brandi Phillip   YOB: 1955   Date of Visit: 1/24/2022       To Whom it May Concern:    Luis Alfredo Hawkins is under my professional care  She was seen in my office on 1/24/2022  She can return to weight bearing as tolerated without restrictions  If you have any questions or concerns, please don't hesitate to call           Sincerely,          Harshal Clayton DO        CC: No Recipients

## 2022-01-24 NOTE — PROGRESS NOTES
Assessment/Plan:      S/P Left hip TFN nail fixation performed 9/29/2021  Fracture is healing well compared to previous films  She was advised that she can weightbear as tolerated  She can continue to use the walker for ambulation purposes  She was advised to continue therapy  She was advised that if home therapy discharge is her to outpatient physical therapy, she is to give us a call and a script can be placed  We will see her back in 6 weeks for re-evaluation and x-rays of the left hip two views  Clinically, the fracture is healed  This was confirmed radiographically  Her pain is quite minimal   Strength and motion improving  At this point, the patient can fully weightbear  Follow-up in 6 weeks for re-evaluation with new x-rays of left hip-two views  If her condition changes, she will not hesitate to let us know      Subjective:      Patient ID: Traci Turner is a 69 y o  female  HPI    The patient is status post TFN nail fixation of her left hip from September 29th  Her pain is quite minimal   She has been partial weight-bearing at 50%  She offers no major complaints at this time  She is participating in rehabilitation at home  The following portions of the patient's history were reviewed and updated as appropriate: allergies, current medications, past family history, past medical history, past social history, past surgical history and problem list     Review of Systems   Constitutional: Negative for chills, fever and unexpected weight change  HENT: Negative for hearing loss, nosebleeds and sore throat  Eyes: Negative for pain, redness and visual disturbance  Respiratory: Negative for cough, shortness of breath and wheezing  Cardiovascular: Negative for chest pain, palpitations and leg swelling  Gastrointestinal: Negative for abdominal pain, nausea and vomiting  Endocrine: Negative for polydipsia and polyuria  Genitourinary: Negative for dysuria and hematuria  Musculoskeletal: Positive for arthralgias, gait problem and myalgias  Negative for back pain, joint swelling, neck pain and neck stiffness  As noted in HPI   Skin: Negative for rash and wound  Neurological: Negative for dizziness, numbness and headaches  Psychiatric/Behavioral: Negative for decreased concentration and suicidal ideas  The patient is not nervous/anxious  Objective:      /58 (BP Location: Left arm, Patient Position: Sitting, Cuff Size: Standard)   Pulse 63   Ht 5' 2" (1 575 m)   BMI 20 49 kg/m²          Physical Exam      Left lower extremity is neurovascular intact  Incision is well healed no signs or symptoms of infection  Toes are pink and mobile  Compartments are soft  Range of motion of her hip was fairly intact  No major tenderness  Mild weakness with hip flexion  Negative Homans      X-ray show the fracture to be in good alignment with progression of healing    Scribe Attestation    I,:  Xin Urias PA-C am acting as a scribe while in the presence of the attending physician :       I,:  Nikki Bach DO personally performed the services described in this documentation    as scribed in my presence :

## 2022-01-25 ENCOUNTER — TELEPHONE (OUTPATIENT)
Dept: CARDIOLOGY CLINIC | Facility: CLINIC | Age: 67
End: 2022-01-25

## 2022-01-25 DIAGNOSIS — I48.0 PAF (PAROXYSMAL ATRIAL FIBRILLATION) (HCC): Primary | ICD-10-CM

## 2022-01-25 NOTE — TELEPHONE ENCOUNTER
Patient's daughter called  She picked up refill of metoprolol tartrate at pharmacy and it states on the bottle take 50 mg one tablet bid, however, her daughter states that she has been taking 50 mg one a day since 9/8/21 (prescribed by Dr Anton Delgadillo)  Can she take the 50 mg of metoprolol tartrate twice a day or stay on the 50 mg daily? Apparently it was changed during her last hospitalization  Please clarify

## 2022-02-01 ENCOUNTER — TELEPHONE (OUTPATIENT)
Dept: CARDIOLOGY CLINIC | Facility: CLINIC | Age: 67
End: 2022-02-01

## 2022-02-01 DIAGNOSIS — I48.0 PAF (PAROXYSMAL ATRIAL FIBRILLATION) (HCC): Primary | ICD-10-CM

## 2022-02-01 RX ORDER — METOPROLOL SUCCINATE 50 MG/1
50 TABLET, EXTENDED RELEASE ORAL DAILY
Qty: 30 TABLET | Refills: 3 | Status: SHIPPED | OUTPATIENT
Start: 2022-02-01 | End: 2022-04-12 | Stop reason: SDUPTHER

## 2022-02-01 NOTE — TELEPHONE ENCOUNTER
Pt's home health therapy nurse, Cali Byrd 148 called and wanted to give you an update  She stated the daughter called her today saying her mother was not feeling herself  She has been tired with lack of appetite for the last 2-3 days  The daughter feels this is ever since starting her on metoprolol twice daily  The nurse saw Ramin Espinal today for therapy and took her vitals  She stated at rest her HR is 52 and bp is 104/60  When she got her up and moving her HR was 56-58 with a  bp of 110/62  She stated this is lower than her baseline

## 2022-02-01 NOTE — TELEPHONE ENCOUNTER
Called spoke with patient's daughter after received message from visiting nurse that patient was feeling sluggish over last couple days and heart rates were in the 50s even with ambulation with blood pressure in the 134-107 mmHg systolic range  They had noted that this was a change after patient began to take metoprolol tartrate 50 mg twice daily instead of what she was on once daily prior  In that setting will transition patient to metoprolol succinate 50 mg daily and discontinue metoprolol tartrate 50 mg twice daily  I told the patient's daughter to monitor and if blood pressures remain soft or heart rates do not improve will either reduce metoprolol further or discontinue  Will check alter monitor and will also check laboratory studies including BMP monitor renal function electrolytes and digoxin level  Patient's daughter noted she will get lab work done tomorrow  Patient will not take her evening dose of metoprolol this evening  I instructed patient started that if patient were to have any warning or alarm type symptoms he should seek emergency medical care immediately  Patient's daughter noted complete understanding and will follow up with plan as listed above

## 2022-02-03 ENCOUNTER — TELEPHONE (OUTPATIENT)
Dept: NON INVASIVE DIAGNOSTICS | Facility: HOSPITAL | Age: 67
End: 2022-02-03

## 2022-02-03 DIAGNOSIS — N17.9 AKI (ACUTE KIDNEY INJURY) (HCC): Primary | ICD-10-CM

## 2022-02-03 NOTE — TELEPHONE ENCOUNTER
Attempted to call patient and her daughter to go results of recent laboratory studies  Unfortunately I was not able to reach her but did leave a message on your phone with my name and office number to contact with better time to speak

## 2022-02-03 NOTE — TELEPHONE ENCOUNTER
Called spoke with patient's daughter about recent laboratory studies which showed digoxin level 1 9, sodium 133, potassium 3 8, chloride 98, bicarb 26, BUN 11, creatinine 1 30, glucose 91, albumin 3 4  After discussion with the patient's daughter she notes that her mom's been feeling better since reducing the metoprolol dosing and that wall they did not  the succinate from the pharmacy yet she was taking the metoprolol tartrate once daily at home and feels better  They deny any shortness of breath, current lower extremity swelling, weight gain or other issue  As patient's creatinine has been slowly up trending I had was in the 0 7 range back in October of 2021 I will hold Bumex at this time and will hold digoxin at this time as well and repeat BMP on 02/07/2022 to monitor renal function and electrolytes as well as repeat digoxin level  I will also put in Nephrology referral for assistance with hyponatremia and BRYANT and will have office staff attempt to assist patient and her daughter in scheduling this appointment  The patient's daughter notes she does not want any invasive cardiac testing at this time and that is why stress test was discontinued  Will continue monitor and I will also have my office staff set up appointment with me is since possible slight N/C the patient for further evaluation

## 2022-02-07 ENCOUNTER — TELEPHONE (OUTPATIENT)
Dept: CARDIOLOGY CLINIC | Facility: CLINIC | Age: 67
End: 2022-02-07

## 2022-02-07 NOTE — TELEPHONE ENCOUNTER
Attempted to call to speak with patient and her daughter about medications as well as scheduling Holter monitor and laboratory studies that need to be performed  Unfortunately I was not able to reach them but did leave a message with my name and office number on their phone for better time to call back and speak

## 2022-02-07 NOTE — TELEPHONE ENCOUNTER
Patient just received the new metoprolol medication you previously prescribed 50mg 1x daily XL, because patient could not get it right away due to the pharmacies delay, and then the bad weather that occurred last week  Mundo Peña her daughter wants to know if they should reschedule the holter apt because she will just be starting the metoprolol 50mg tomorrow now   she said you wanted her to be taking it for two weeks before the holter was placed  Please advise, thanks

## 2022-02-07 NOTE — TELEPHONE ENCOUNTER
- I was able to call and speak with patient's daughter Dana Hardy about upcoming Holter monitor which will be done next week on current medical therapy with metoprolol succinate  Patient's daughter notes that the patient has not had any significant shortness of breath or swelling since discontinuing digoxin and holding Bumex and while she has fatigue denies any other significant issues like orthopnea  Will get laboratory studies done which were scheduled for today but patient and her daughter will not be able to get them done today therefore will get them done tomorrow and I will call and speak with them about results as to whether we can reinitiate digoxin or Bumex and at what frequency and patient does have appointment with Nephrology scheduled for March   -I informed patient started that if she were to have any warning or alarm type symptoms she should seek emergency medical care immediately the patient's daughter noted complete understanding with plan and will proceed  I also have office staff contacted patient's daughter to confirm

## 2022-02-10 ENCOUNTER — APPOINTMENT (OUTPATIENT)
Dept: LAB | Facility: CLINIC | Age: 67
End: 2022-02-10
Payer: COMMERCIAL

## 2022-02-10 DIAGNOSIS — N17.9 AKI (ACUTE KIDNEY INJURY) (HCC): ICD-10-CM

## 2022-02-10 DIAGNOSIS — I48.0 PAF (PAROXYSMAL ATRIAL FIBRILLATION) (HCC): ICD-10-CM

## 2022-02-10 LAB
ANION GAP SERPL CALCULATED.3IONS-SCNC: 6 MMOL/L (ref 4–13)
BUN SERPL-MCNC: 11 MG/DL (ref 5–25)
CALCIUM SERPL-MCNC: 9.8 MG/DL (ref 8.3–10.1)
CHLORIDE SERPL-SCNC: 100 MMOL/L (ref 100–108)
CO2 SERPL-SCNC: 26 MMOL/L (ref 21–32)
CREAT SERPL-MCNC: 1.22 MG/DL (ref 0.6–1.3)
DIGOXIN SERPL-MCNC: 0.6 NG/ML (ref 0.8–2)
GFR SERPL CREATININE-BSD FRML MDRD: 45 ML/MIN/1.73SQ M
GLUCOSE SERPL-MCNC: 90 MG/DL (ref 65–140)
POTASSIUM SERPL-SCNC: 4.4 MMOL/L (ref 3.5–5.3)
SODIUM SERPL-SCNC: 132 MMOL/L (ref 136–145)

## 2022-02-10 PROCEDURE — 80048 BASIC METABOLIC PNL TOTAL CA: CPT

## 2022-02-10 PROCEDURE — 80162 ASSAY OF DIGOXIN TOTAL: CPT | Performed by: INTERNAL MEDICINE

## 2022-02-10 PROCEDURE — 82397 CHEMILUMINESCENT ASSAY: CPT

## 2022-02-10 PROCEDURE — 36415 COLL VENOUS BLD VENIPUNCTURE: CPT

## 2022-02-13 LAB — DIGITOXIN SERPL-MCNC: <5 NG/ML (ref 10–25)

## 2022-02-14 ENCOUNTER — TELEPHONE (OUTPATIENT)
Dept: CARDIOLOGY CLINIC | Facility: CLINIC | Age: 67
End: 2022-02-14

## 2022-02-14 NOTE — TELEPHONE ENCOUNTER
I called spoke with patient's daughter regarding recent laboratory studies  Renal function appears relatively stable and patient's daughter denies any significant symptoms of the patient from lower extremity swelling, shortness breath, orthopnea or weight gain  In that setting will have patient do Bumex p r n  for weight is greater than 115 lb, lower extremity swelling, or shortness of breath  Will hold digoxin at this time in the setting of significant renal issues and need to see Nephrology  Will continue current medical therapy and will have patient seen in the office within the next month for evaluation

## 2022-02-14 NOTE — TELEPHONE ENCOUNTER
Attempted to call patient and her daughter to go recent laboratory study results  Unfortunately I was not able to reach them but did leave a message with my name and office number on their phone to call back for better time to speak

## 2022-02-23 ENCOUNTER — TELEPHONE (OUTPATIENT)
Dept: OBGYN CLINIC | Facility: HOSPITAL | Age: 67
End: 2022-02-23

## 2022-02-23 NOTE — TELEPHONE ENCOUNTER
Mary Kate from [facility] is calling to advise Dr Camilo Cleveland that patient is being discharged from home physical therapy  Patient would like to continue outpatient PT @ [facility] office  Please place the order in patient's chart so she can continue  Her goals have been met  She's been walking more with her rollator  She can go up and down the steps with standby assistance  If you have any questions, Ree Delatorre can be reached at [phone]

## 2022-03-03 ENCOUNTER — TELEPHONE (OUTPATIENT)
Dept: OBGYN CLINIC | Facility: MEDICAL CENTER | Age: 67
End: 2022-03-03

## 2022-03-10 ENCOUNTER — EVALUATION (OUTPATIENT)
Dept: PHYSICAL THERAPY | Facility: CLINIC | Age: 67
End: 2022-03-10
Payer: COMMERCIAL

## 2022-03-10 DIAGNOSIS — Z98.890 S/P ORIF (OPEN REDUCTION INTERNAL FIXATION) FRACTURE: Primary | ICD-10-CM

## 2022-03-10 DIAGNOSIS — Z87.81 S/P ORIF (OPEN REDUCTION INTERNAL FIXATION) FRACTURE: Primary | ICD-10-CM

## 2022-03-10 PROCEDURE — 97161 PT EVAL LOW COMPLEX 20 MIN: CPT | Performed by: PHYSICAL THERAPIST

## 2022-03-10 PROCEDURE — 97112 NEUROMUSCULAR REEDUCATION: CPT | Performed by: PHYSICAL THERAPIST

## 2022-03-10 PROCEDURE — 97110 THERAPEUTIC EXERCISES: CPT | Performed by: PHYSICAL THERAPIST

## 2022-03-10 NOTE — PROGRESS NOTES
PT Evaluation     Today's date: 3/10/2022  Patient name: Swathi Barboza  : 1955  MRN: [id number]  Referring provider: Rocky Carr DO  Dx:   Encounter Diagnosis     ICD-10-CM    1  S/P ORIF (open reduction internal fixation) fracture  Z98 [id number]     Z87 81                   Assessment  Assessment details: Swathi Barboza is a 69 y o  female referred with primary diagnosis of S/P ORIF (open reduction internal fixation) fracture  (primary encounter diagnosis)   Patient presents with the following functional limitations: Fatigued walking > 50', negotiates stairs step-to with CG, and not currently doing housework  Patient has significant weakness in bilateral LE musculature which effects the quality of her gait, ability to transfer, and negotiation of stairs  She scored a 28/56 on the [employer] Scale  Treatment to include: Manual therapy techniques, extremity/core strengthening, neuromuscular control exercises, balance/proprioception training as appropriate, gait training, instruction in a comprehensive HEP, and modalities as needed  They will benefit from skilled PT services to address the above functional deficits and to decrease pain to promote a return to their premorbid level of function  Impairments: abnormal gait, abnormal or restricted ROM, activity intolerance, impaired balance and impaired physical strength  Functional limitations: Fatigued walking > 50', negotiates stairs step-to with CG, and not currently doing housework, Understanding of Dx/Px/POC: good   Prognosis: good    Goals  STG (6 weeks)  1  Patient will report pain as a 0-1/10 at worst with community ambulation  2  Patient will ambulate 100 feet before onset of fatigue  LTG (12 weeks)  1  Patient will demonstrate bilateral LE strength 4/5 to facilitate improved transfers  2  Patient will ambulate community distances with a SPC  3   Patient return to living independently at home    Plan  Patient would benefit from: skilled physical therapy  Planned therapy interventions: manual therapy, patient education, strengthening, stretching, therapeutic activities, therapeutic exercise and home exercise program  Frequency: 2x week  Duration in weeks: 12  Plan of Care beginning date: 3/10/2022  Plan of Care expiration date: 2022  Treatment plan discussed with: patient        Subjective Evaluation    History of Present Illness  Date of onset: 2021  Date of surgery: 2021  Mechanism of injury: Patient had a fall at home on 21  Went to the ED and x-rays showed left femoral fracture  Underwent left hip ORIF on 21 She was sent to [address] [place] [address] for 2 weeks  Upon DC, she receive home PT services x 8 weeks  Home PT finished 2 weeks ago  Patient was [facility] for 3 months  Patient is referred now to outpatient PT services  She is currently staying with her daughter  She plans to return to her home when she is more independent  Pain  Current pain ratin  At best pain ratin  At worst pain ratin  Location: lateral anterolateral hip  Quality: dull ache    Social Support  Steps to enter house: yes (3 steps to enter)  Lives in: multiple-level home      Diagnostic Tests  X-ray: abnormal    FCE comments: Patient states she was using a SPC prior to her fall  Uses electric scooter for shopping (previously walked)  Her home is single level  She has 3 steps to enter with a left HR  Was going up stairs step-to previously  Has a hospital bed and commode on first floor of daughter's home  Goes up stairs to use shower  Has a shower bench  Treatments  Previous treatment: physical therapy  Current treatment: physical therapy  Patient Goals  Patient goals for therapy: independence with ADLs/IADLs  Patient goal: To return to her own home and live independently  Objective     Static Posture     Hip   Hip (Left): Internally rotated  Hip (Right): Internally rotated  Knee   Genu valgus  Ankle/Foot   Ankle/Foot (Left): Pes planus  Ankle/Foot (Right): Pes planus  Neurological Testing     Sensation     Hip   Left Hip   Intact: light touch    Right Hip   Intact: light touch    Active Range of Motion   Left Hip   Flexion: WFL  Extension: 0 degrees   Abduction: WFL    Right Hip   Flexion: WFL    Passive Range of Motion   Left Hip   Normal passive range of motion  Flexion: with pain  Abduction: with pain  Internal rotation (90/90): with pain    Strength/Myotome Testing     Left Hip   Planes of Motion   Flexion: 3-  Abduction: 2+    Right Hip   Planes of Motion   Flexion: 3+  Abduction: 2+    Left Knee   Flexion: 3+  Extension: 4-    Right Knee   Flexion: 4-  Extension: 4-    Left Ankle/Foot   Dorsiflexion: 4-    Right Ankle/Foot   Dorsiflexion: 4-    Ambulation   Weight-Bearing Status   Weight-Bearing Status (Left): weight-bearing as tolerated   Assistive device used: two-wheeled walker    Ambulation: Level Surfaces   Ambulation with assistive device: independent  Ambulation without assistive device: unable    Ambulation: Stairs   Ascend stairs: contact guard assist  Pattern: non-reciprocal  Railings: one rail  Descend stairs: contact guard assist  Pattern: non-reciprocal  Railings: one rail    Observational Gait   Decreased walking speed, stride length, left step length and right step length     Left foot contact pattern: foot flat  Right foot contact pattern: foot flat             Precautions: WBAT      Manuals 3/10            PROM left hip                                                    Neuro Re-Ed             Tandem stance             Foam FT EO             Foam FA EC                                                                 Ther Ex             Nustep             Matrix HS             Matrix quads             Standing hip ext and abd Instructed            Supine bridges Instructed            Supine HL t-band clamshells                                       Ther Activity             Squats Instructed            Step-ups F/L Gait Training                                       Modalities

## 2022-03-14 ENCOUNTER — OFFICE VISIT (OUTPATIENT)
Dept: OBGYN CLINIC | Facility: CLINIC | Age: 67
End: 2022-03-14
Payer: COMMERCIAL

## 2022-03-14 ENCOUNTER — OFFICE VISIT (OUTPATIENT)
Dept: CARDIOLOGY CLINIC | Facility: CLINIC | Age: 67
End: 2022-03-14
Payer: COMMERCIAL

## 2022-03-14 VITALS
HEIGHT: 62 IN | SYSTOLIC BLOOD PRESSURE: 134 MMHG | BODY MASS INDEX: 20.49 KG/M2 | OXYGEN SATURATION: 94 % | HEART RATE: 90 BPM | DIASTOLIC BLOOD PRESSURE: 70 MMHG

## 2022-03-14 VITALS
SYSTOLIC BLOOD PRESSURE: 160 MMHG | WEIGHT: 118 LBS | DIASTOLIC BLOOD PRESSURE: 76 MMHG | HEIGHT: 62 IN | BODY MASS INDEX: 21.71 KG/M2 | HEART RATE: 80 BPM

## 2022-03-14 DIAGNOSIS — I74.09 AORTOILIAC OCCLUSIVE DISEASE (HCC): ICD-10-CM

## 2022-03-14 DIAGNOSIS — I48.0 PAROXYSMAL ATRIAL FIBRILLATION (HCC): Primary | ICD-10-CM

## 2022-03-14 DIAGNOSIS — I10 BENIGN ESSENTIAL HYPERTENSION: ICD-10-CM

## 2022-03-14 DIAGNOSIS — Z98.890 S/P ORIF (OPEN REDUCTION INTERNAL FIXATION) FRACTURE: Primary | ICD-10-CM

## 2022-03-14 DIAGNOSIS — F17.200 TOBACCO DEPENDENCY: ICD-10-CM

## 2022-03-14 DIAGNOSIS — Z87.81 S/P ORIF (OPEN REDUCTION INTERNAL FIXATION) FRACTURE: Primary | ICD-10-CM

## 2022-03-14 DIAGNOSIS — I10 ESSENTIAL HYPERTENSION: ICD-10-CM

## 2022-03-14 PROCEDURE — 99213 OFFICE O/P EST LOW 20 MIN: CPT | Performed by: ORTHOPAEDIC SURGERY

## 2022-03-14 PROCEDURE — 99214 OFFICE O/P EST MOD 30 MIN: CPT | Performed by: INTERNAL MEDICINE

## 2022-03-14 NOTE — PROGRESS NOTES
Daily Note     Today's date: 3/14/2022  Patient name: Jennifer Hickman  : 1955  MRN: [id number]  Referring provider: Celso Gibson DO  Dx:   Encounter Diagnosis     ICD-10-CM    1  S/P ORIF (open reduction internal fixation) fracture  Z98 [id number]     Z87 [id number]                   Subjective: 3/ 10 p! Today patient noted that she is in pretty much pain when she tried to WB fully on her L LE  Objective: See treatment diary below      Assessment:  Continued with treatment session, due to having p! With full WB on LLE modifications made this visit  Pt was able to performed exercises below with increased rest breaks when needed in seated  Tolerated treatment fair  Patient exhibited good technique with therapeutic exercises and would benefit from continued PT s/p treatment, Pt noted no changes  DOMS reviewed and acknowledged by patient may have 24 to 48 hours of muscle soreness following treatment session         Plan: Continue per plan of care  Precautions: WBAT      Manuals 3/10 3/15           PROM left hip  SC                                                  Neuro Re-Ed             Tandem stance             Foam FT EO             Foam FA EC                                                                 Ther Ex             Nustep  L1 10 min            Matrix HS             Matrix quads             Standing hip ext and abd Instructed L only 10x ea    B/L           Supine bridges Instructed 2x 10            Supine HL t-band clamshells   no TB too hard   3" 10x                                      Ther Activity             Squats Instructed            Step-ups F/L  6" F only 2x 10            Gait Training                                       Modalities                                       1 on 1 time for 29 minutes on 3/15/22

## 2022-03-14 NOTE — PROGRESS NOTES
Cardiology Follow up    Ida Herr  [id number]  1955  [facility] [address] [id number]      1  Paroxysmal atrial fibrillation (HCC)     2  Benign essential hypertension     3  Essential hypertension     4  Aortoiliac occlusive disease ([address] )     5  Tobacco dependency         Discussion/Summary:  1  Paroxysmal atrial fibrillation  2  Hypertension  3  Peripheral artery disease  4  Mitral annular calcification and mild mitral stenosis  5  Heart failure with preserved ejection fraction      -transthoracic echocardiogram 09/30/2021 showing technically difficult study however overall left ventricular systolic function appeared preserved with marked mitral annular calcification and mild mitral stenosis with mild mitral regurgitation  -patient blood pressure elevated in the office today however they note much better control at home will continue monitor and bring log with her to next office visit I did inform them that if her home blood pressure readings are above 140/90 mmHg consistently to let our office know and we will uptitrate therapy  -will continue off digoxin at this time  -after discussion with the patient she understands but does not wish to have any further invasive treatment or testing and therefore does not wish to undergo pharmacologic stress testing, sleep evaluation for obstructive sleep apnea or any significant implantable long-term event monitors  -states that overall this time she feels fine on current regimen  -she notes her home weights have been stable around the 115 lb range  -in that setting will continue current medical therapy with Eliquis 5 mg twice daily, diltiazem 240 mg daily, metoprolol succinate 50 mg daily  -will get 48 hour Holter monitor  -patient will continue Bumex 0 5 mg daily p r n   For weight greater than 115 lb or if this persistent swelling, shortness of breath orthopnea  -counseled patient her daughter on dietary lifestyle modifications including following a low-sodium less than 2000 mg a day and fluid restriction less than 1800 mL of fluid daily  -while patient does have trace lower extremity edema in the right and 1+ lower extremity median in the left lower extremity patient her daughter note this is usual for her since she has been on her feet a lot today and goes away after elevation at night without any significant symptoms  -will see patient in 3 months or sooner if necessary  -patient and her daughter counseled if she were to have any warning or alarm type symptoms she is to seek emergency medical care immediately  History of Present Illness:  - patient is a 58-year-old female with hypertension, history of lymphoma s/p chemotherapy in April of 2019 currently in remission, with history of tobacco use since October of 2021 who was hospitalized in September of 2021 found at that time to have left hip fracture after tripping over her dog along with significant peripheral vascular disease and severe aortoiliac and arterial occlusive disease with aorto bi femoral bypass in 2010 bilateral fem-pop bypass in 2013 and 2016 along with recent left CFA angioplasty with stent along with paroxysmal atrial fibrillation on oral anticoagulation who presents to the office today for scheduled follow-up  -since we last spoke patient denies any chest pain, palpitations, lightheadedness or dizziness, loss of consciousness, falls, lower extremity swelling  She notes that she has gained some weight but states this is because she is eating more and has had improvement in her appetite        Patient Active Problem List   Diagnosis    Constipation    Lower extremity weakness    [facility]) [facility] [address] [facility]    Bell's palsy    Carotid stenosis, asymptomatic, bilateral    Depression with anxiety    Benign essential hypertension  Essential hypertension    Fall    Hyponatremia    Tobacco dependency    Lymphoma (HCC)    PAD (peripheral artery disease) (HCC)    Paroxysmal atrial fibrillation (HCC)    Leg pain    Peripheral vascular disease (HCC)    Foot pain    Weakness of lower extremity    Aortoiliac occlusive disease (HCC)    Lymphedema of both lower extremities    Lung nodule    Closed left hip fracture (HCC)    Insomnia    Acute respiratory failure with hypoxia    Hypotension    Anemia    Atrial fibrillation with RVR (HCC)    Buttock wound     Past Medical History:   Diagnosis Date    Bell's palsy     Cancer ([facility] [address] )     lymphoma    Diffuse large B cell lymphoma (HCC)     Hyperlipidemia     Hypertension     PAD (peripheral artery disease) (HCC)     PAF (paroxysmal atrial fibrillation) (HCC)     PVD (peripheral vascular disease) (HCC)      Social History     Socioeconomic History    Marital status:      Spouse name: Not on file    Number of children: Not on file    Years of education: Not on file    Highest education level: Not on file   Occupational History    Not on file   Tobacco Use    Smoking status: Current Some Day Smoker     Packs/day: 1 00     Years: 40 00     Pack years: 40 00     Types: Cigarettes    Smokeless tobacco: Never Used   Vaping Use    Vaping Use: Never used   Substance and Sexual Activity    Alcohol use:  Yes    Drug use: No     Comment: medical marijuana 10/7/19    Sexual activity: Not on file   Other Topics Concern    Not on file   Social History Narrative    Not on file     Social Determinants of Health     Financial Resource Strain: Not on file   Food Insecurity: Not on file   Transportation Needs: Not on file   Physical Activity: Not on file   Stress: Not on file   Social Connections: Not on file   Intimate Partner Violence: Not on file   Housing Stability: Not on file      Family History   Problem Relation Age of Onset    Cancer Mother     Breast cancer Mother  Heart attack Father     No Known Problems Sister     No Known Problems Daughter     No Known Problems Maternal Grandmother     No Known Problems Maternal Grandfather     No Known Problems Paternal Grandmother     No Known Problems Paternal Grandfather     Prostate cancer Brother     No Known Problems Brother     No Known Problems Brother     No Known Problems Maternal Aunt     No Known Problems Maternal Aunt     No Known Problems Maternal Aunt     No Known Problems Maternal Aunt      Past Surgical History:   Procedure Laterality Date    ARTERIOGRAM Left 12/6/2018    Procedure: LEFT lower extremity angiography, with Left lower extremity run-off, stent and angioplasty of Left Common Femoral Artery (Left Brachial Access); Surgeon: Akshat Whitfield MD;  [facility]: [facility];  Service: Vascular    CARDIAC SURGERY      CARPAL TUNNEL RELEASE      CT NEEDLE BIOPSY LUNG  10/8/2019    HYSTERECTOMY      IR AORTAGRAM WITH RUN-OFF  12/6/2018    OOPHORECTOMY      [place] OPEN RX FEMUR FX+INTRAMED LISA Left 9/29/2021    Procedure: INSERTION NAIL IM FEMUR ANTEGRADE (TROCHANTERIC);   Surgeon: Lamar Puentes DO;  Location: [address] MAIN OR;  Service: Orthopedics    TONSILLECTOMY         Current Outpatient Medications:     albuterol (2 5 mg/3 mL) 0 083 % nebulizer solution, Take 3 mL (2 5 mg total) by nebulization every 6 (six) hours as needed for wheezing, Disp: , Rfl: 0    ALPRAZolam (XANAX) 0 5 mg tablet, Take 0 25 mg by mouth, Disp: , Rfl:     apixaban (ELIQUIS) 5 mg, Take 1 tablet (5 mg total) by mouth 2 (two) times a day, Disp: 60 tablet, Rfl: 5    atorvastatin (LIPITOR) 40 mg tablet, Take 40 mg by mouth daily at bedtime  , Disp: , Rfl:     bumetanide (BUMEX) 0 5 MG tablet, Take 1 tablet (0 5 mg total) by mouth daily, Disp: 30 tablet, Rfl: 5    Cholecalciferol (VITAMIN D3) 2000 units TABS, Take 1 tablet by mouth daily, Disp: , Rfl:     [employer] NICOTINE TRANSDERMAL SYS 14 MG/24HR TD 24 hr patch, Place 1 patch on the skin every 24 hours  , Disp: , Rfl:     digoxin (LANOXIN) 0 125 mg tablet, Take 1 tablet (125 mcg total) by mouth daily, Disp: 30 tablet, Rfl: 5    diltiazem (CARDIZEM CD) 240 mg 24 hr capsule, Take 1 capsule (240 mg total) by mouth daily, Disp: 30 capsule, Rfl: 5    folic acid (FOLVITE) 1 mg tablet, Take 1 tablet (1 mg total) by mouth daily, Disp: , Rfl: 0    gabapentin (NEURONTIN) 100 mg capsule, Take 1 capsule (100 mg total) by mouth daily at bedtime, Disp: , Rfl: 0    glycerin-hypromellose- (ARTIFICIAL TEARS) 0 2-0 2-1 % SOLN, 1 drop, Disp: , Rfl:     Melatonin ER (MELADOX) 3 MG TBCR, Take 1 tablet by mouth daily at bedtime, Disp: , Rfl:     metoprolol succinate (TOPROL-XL) 50 mg 24 hr tablet, Take 1 tablet (50 mg total) by mouth daily, Disp: 30 tablet, Rfl: 3    mirtazapine (REMERON) 15 mg tablet, Take 7 5 mg by mouth daily at bedtime  , Disp: , Rfl:     senna-docusate sodium (SENOKOT S) 8 6-50 mg per tablet, Take 1 tablet by mouth daily As needed for constipation, Disp: 20 tablet, Rfl: 0    silver sulfadiazine (SILVADENE,SSD) 1 % cream, Apply topically 2 (two) times a day Buttock wound, Disp: 50 g, Rfl: 0    thiamine (VITAMIN B1) 100 mg tablet, Take 1 tablet (100 mg total) by mouth daily, Disp: , Rfl: 0    magnesium oxide (MAG-OX) 400 mg, Take 1 tablet (400 mg total) by mouth 2 (two) times a day for 7 days, Disp: , Rfl: 0  Allergies   Allergen Reactions    Oxycodone-Acetaminophen Itching and Rash     Itching, rash         Labs:  Appointment on 02/10/2022   Component Date Value    Sodium 02/10/2022 132*    Potassium 02/10/2022 4 4     Chloride 02/10/2022 100     CO2 02/10/2022 26     ANION GAP 02/10/2022 6     BUN 02/10/2022 11     Creatinine 02/10/2022 1 22     Glucose 02/10/2022 90     Calcium 02/10/2022 9 8     eGFR 02/10/2022 45     Digitoxin Lvl 02/10/2022 <5 0*   Telephone on 02/01/2022   Component Date Value    Digoxin Lvl 02/10/2022 0 6*        Imaging: No results found       Review of Systems:  Review of Systems   Constitutional: Negative for chills, diaphoresis, fatigue, fever and unexpected weight change  HENT: Negative for trouble swallowing and voice change  Eyes: Negative for pain and redness  Respiratory: Negative for shortness of breath and wheezing  Cardiovascular: Negative for chest pain, palpitations and leg swelling  Gastrointestinal: Negative for abdominal pain, constipation, diarrhea, nausea and vomiting  Genitourinary: Negative for dysuria  Musculoskeletal: Positive for arthralgias  Negative for neck pain and neck stiffness  Neurological: Negative for dizziness, syncope, light-headedness and headaches  Psychiatric/Behavioral: Negative for agitation and confusion  All other systems reviewed and are negative  Vitals:    03/14/22 1546   BP: 160/76   Pulse: 80   Weight: 53 5 kg (118 lb)   Height: 5' 2" (1 575 m)     Vitals:    03/14/22 1546   Weight: 53 5 kg (118 lb)     Height: 5' 2" (157 5 cm)     Physical Exam:  Physical Exam  Vitals reviewed  Constitutional:       General: She is not in acute distress  Appearance: Normal appearance  She is not diaphoretic  HENT:      Head: Normocephalic and atraumatic  Eyes:      General:         Right eye: No discharge  Left eye: No discharge  Neck:      Comments: Trachea midline, no JVD present  Cardiovascular:      Rate and Rhythm: Normal rate and regular rhythm  Heart sounds: No friction rub  Pulmonary:      Effort: Pulmonary effort is normal  No respiratory distress  Breath sounds: No wheezing  Comments: Decreased breath sounds bilaterally  Abdominal:      General: Bowel sounds are normal       Palpations: Abdomen is soft  Tenderness: There is no abdominal tenderness  Musculoskeletal:      Right lower leg: Edema (Trace) present  Left lower leg: Edema ( 1+) present  Skin:     General: Skin is warm and dry     Neurological:      Mental Status: She is alert        Comments: Awake, alert, able to answer questions appropriately, able to move extremities bilaterally   Psychiatric:         Mood and Affect: Mood normal          Behavior: Behavior normal

## 2022-03-14 NOTE — PROGRESS NOTES
ASSESSMENT/PLAN:    Diagnoses and all orders for this visit:    S/P ORIF (open reduction internal fixation) fracture  -     XR hip/pelv 2-3 vws left if performed; Future        X-rays of the patient's left hip show the fracture to be completely healed  The hardware is in good alignment  There are no signs of loosening  There are no dislocations  The patient should continue physical therapy to work on strengthening, stretching and range of motion  She will follow up with our office 3 months with new x-rays of her left hip two views  The patient is acceptable to this plan  Return in about 3 months (around 6/14/2022)  The fracture has healed  She may weight bear as tolerate  Unfortunate, she only participated in 1 outpatient physical therapy sessions since the last time I saw her  Patient is participate in this form of rehabilitation at least 2 to 3 times a week  Return back in 3 months evaluation with final x-rays of left hip-two views  If her condition changes, she will not hesitate to let us know      _____________________________________________________  CHIEF COMPLAINT:  Chief Complaint   Patient presents with    Left Hip - Follow-up         SUBJECTIVE:  Swathi Barboza is a 69 y o  female who presents to our office for a follow-up visit  The patient is status post ORIF left hip from 09/29/2021  She has been participating in therapy  She denies any significant pain  She denies any numbness or tingling  She denies any fever or chills  She is ambulating with a walker        The following portions of the patient's history were reviewed and updated as appropriate: allergies, current medications, past family history, past medical history, past social history, past surgical history and problem list     PAST MEDICAL HISTORY:  Past Medical History:   Diagnosis Date    Bell's palsy     Cancer ([facility] [address] )     lymphoma    Diffuse large B cell lymphoma ([facility] [address] )     Hyperlipidemia     Hypertension     PAD (peripheral artery disease) (HCC)     PAF (paroxysmal atrial fibrillation) (HCC)     PVD (peripheral vascular disease) ([facility] [address] )        PAST SURGICAL HISTORY:  Past Surgical History:   Procedure Laterality Date    ARTERIOGRAM Left 12/6/2018    Procedure: LEFT lower extremity angiography, with Left lower extremity run-off, stent and angioplasty of Left Common Femoral Artery (Left Brachial Access); Surgeon: Yordan Viera MD;  Location:  MAIN OR;  Service: Vascular    CARDIAC SURGERY      CARPAL TUNNEL RELEASE      CT NEEDLE BIOPSY LUNG  10/8/2019    HYSTERECTOMY      IR AORTAGRAM WITH RUN-OFF  12/6/2018    OOPHORECTOMY      [place] OPEN RX FEMUR FX+INTRAMED LISA Left 9/29/2021    Procedure: INSERTION NAIL IM FEMUR ANTEGRADE (TROCHANTERIC); Surgeon: Asha Rossi DO;  Location: [address] [place] [address] MAIN OR;  Service: Orthopedics    TONSILLECTOMY         FAMILY HISTORY:  Family History   Problem Relation Age of Onset    Cancer Mother     Breast cancer Mother     Heart attack Father     No Known Problems Sister     No Known Problems Daughter     No Known Problems Maternal Grandmother     No Known Problems Maternal Grandfather     No Known Problems Paternal Grandmother     No Known Problems Paternal Grandfather     Prostate cancer Brother     No Known Problems Brother     No Known Problems Brother     No Known Problems Maternal Aunt     No Known Problems Maternal Aunt     No Known Problems Maternal Aunt     No Known Problems Maternal Aunt        SOCIAL HISTORY:  Social History     Tobacco Use    Smoking status: Current Some Day Smoker     Packs/day: 1 00     Years: 40 00     Pack years: 40 00     Types: Cigarettes    Smokeless tobacco: Never Used   Vaping Use    Vaping Use: Never used   Substance Use Topics    Alcohol use:  Yes    Drug use: No     Comment: medical marijuana 10/7/19       MEDICATIONS:    Current Outpatient Medications:     albuterol (2 5 mg/3 mL) 0 083 % nebulizer solution, Take 3 mL (2 5 mg total) by nebulization every 6 (six) hours as needed for wheezing, Disp: , Rfl: 0    ALPRAZolam (XANAX) 0 5 mg tablet, Take 0 25 mg by mouth, Disp: , Rfl:     apixaban (ELIQUIS) 5 mg, Take 1 tablet (5 mg total) by mouth 2 (two) times a day, Disp: 60 tablet, Rfl: 5    atorvastatin (LIPITOR) 40 mg tablet, Take 40 mg by mouth daily at bedtime  , Disp: , Rfl:     Cholecalciferol (VITAMIN D3) 2000 units TABS, Take 1 tablet by mouth daily, Disp: , Rfl:     CVS NICOTINE TRANSDERMAL SYS 14 MG/24HR TD 24 hr patch, Place 1 patch on the skin every 24 hours  , Disp: , Rfl:     diltiazem (CARDIZEM CD) 240 mg 24 hr capsule, Take 1 capsule (240 mg total) by mouth daily, Disp: 30 capsule, Rfl: 5    folic acid (FOLVITE) 1 mg tablet, Take 1 tablet (1 mg total) by mouth daily, Disp: , Rfl: 0    gabapentin (NEURONTIN) 100 mg capsule, Take 1 capsule (100 mg total) by mouth daily at bedtime, Disp: , Rfl: 0    glycerin-hypromellose- (ARTIFICIAL TEARS) 0 2-0 2-1 % SOLN, 1 drop, Disp: , Rfl:     Melatonin ER (MELADOX) 3 MG TBCR, Take 1 tablet by mouth daily at bedtime, Disp: , Rfl:     metoprolol succinate (TOPROL-XL) 50 mg 24 hr tablet, Take 1 tablet (50 mg total) by mouth daily, Disp: 30 tablet, Rfl: 3    mirtazapine (REMERON) 15 mg tablet, Take 7 5 mg by mouth daily at bedtime  , Disp: , Rfl:     senna-docusate sodium (SENOKOT S) 8 6-50 mg per tablet, Take 1 tablet by mouth daily As needed for constipation, Disp: 20 tablet, Rfl: 0    thiamine (VITAMIN B1) 100 mg tablet, Take 1 tablet (100 mg total) by mouth daily, Disp: , Rfl: 0    bumetanide (BUMEX) 0 5 MG tablet, Take 1 tablet (0 5 mg total) by mouth daily (Patient not taking: Reported on 3/14/2022 ), Disp: 30 tablet, Rfl: 5    digoxin (LANOXIN) 0 125 mg tablet, Take 1 tablet (125 mcg total) by mouth daily (Patient not taking: Reported on 3/14/2022 ), Disp: 30 tablet, Rfl: 5    magnesium oxide (MAG-OX) 400 mg, Take 1 tablet (400 mg total) by mouth 2 (two) times a day for 7 days, Disp: , Rfl: 0    silver sulfadiazine (SILVADENE,SSD) 1 % cream, Apply topically 2 (two) times a day Buttock wound (Patient not taking: Reported on 11/22/2021 ), Disp: 50 g, Rfl: 0    ALLERGIES:  Allergies   Allergen Reactions    Oxycodone-Acetaminophen Itching and Rash     Itching, rash       ROS:  Review of Systems     Constitutional: Negative for fatigue, fever or loss of appetite  HENT: Negative  Respiratory: Negative for shortness of breath, dyspnea  Cardiovascular: Negative for chest pain/tightness  Gastrointestinal: Negative for abdominal pain, N/V  Endocrine: Negative for cold/heat intolerance, unexplained weight loss/gain  Genitourinary: Negative for flank pain, dysuria, hematuria  Musculoskeletal:  Negative for arthralgia   Skin: Negative for rash  Neurological: Negative for numbness or tingling  Psychiatric/Behavioral: Negative for agitation  _____________________________________________________  PHYSICAL EXAMINATION:    Blood pressure 134/70, pulse 90, height 5' 2" (1 575 m), SpO2 94 %  Constitutional: Oriented to person, place, and time  Appears well-developed and well-nourished  No distress  HENT:   Head: Normocephalic  Eyes: Conjunctivae are normal  Right eye exhibits no discharge  Left eye exhibits no discharge  No scleral icterus  Cardiovascular: Normal rate  Pulmonary/Chest: Effort normal    Neurological: Alert and oriented to person, place, and time  Skin: Skin is warm and dry  No rash noted  Not diaphoretic  No erythema  No pallor  Psychiatric: Normal mood and affect   Behavior is normal  Judgment and thought content normal       MUSCULOSKELETAL EXAMINATION:   Physical Exam  Ortho Exam    Left lower extremity is neurovascularly intact  Toes are pink and mobile  Compartments are soft  Good range of motion of hip  No tenderness to palpation  Brisk cap refill  Sensation intact  Strength improved left lower extremity    Objective:  BP Readings from Last 1 Encounters:   03/14/22 134/70      Wt Readings from Last 1 Encounters:   11/22/21 50 8 kg (112 lb)        BMI:   Estimated body mass index is 20 49 kg/m² as calculated from the following:    Height as of this encounter: 5' 2" (1 575 m)  Weight as of 11/22/21: 50 8 kg (112 lb)  Radiographs:  _____________________________________________________  STUDIES REVIEWED:  I have personally reviewed pertinent films and reports in PACS  X-rays of the patient's left hip show the fracture to be completely healed  The hardware is in good alignment  There are no signs of loosening  There are no dislocations          Siva Farrell PA-C

## 2022-03-15 ENCOUNTER — OFFICE VISIT (OUTPATIENT)
Dept: PHYSICAL THERAPY | Facility: CLINIC | Age: 67
End: 2022-03-15
Payer: COMMERCIAL

## 2022-03-15 DIAGNOSIS — Z98.890 S/P ORIF (OPEN REDUCTION INTERNAL FIXATION) FRACTURE: Primary | ICD-10-CM

## 2022-03-15 DIAGNOSIS — Z87.81 S/P ORIF (OPEN REDUCTION INTERNAL FIXATION) FRACTURE: Primary | ICD-10-CM

## 2022-03-15 PROCEDURE — 97140 MANUAL THERAPY 1/> REGIONS: CPT

## 2022-03-15 PROCEDURE — 97110 THERAPEUTIC EXERCISES: CPT

## 2022-03-18 ENCOUNTER — OFFICE VISIT (OUTPATIENT)
Dept: PHYSICAL THERAPY | Facility: CLINIC | Age: 67
End: 2022-03-18
Payer: COMMERCIAL

## 2022-03-18 DIAGNOSIS — Z98.890 S/P ORIF (OPEN REDUCTION INTERNAL FIXATION) FRACTURE: Primary | ICD-10-CM

## 2022-03-18 DIAGNOSIS — Z87.81 S/P ORIF (OPEN REDUCTION INTERNAL FIXATION) FRACTURE: Primary | ICD-10-CM

## 2022-03-18 PROCEDURE — 97110 THERAPEUTIC EXERCISES: CPT

## 2022-03-18 PROCEDURE — 97140 MANUAL THERAPY 1/> REGIONS: CPT

## 2022-03-18 NOTE — PROGRESS NOTES
Daily Note     Today's date: 3/18/2022  Patient name: Aaron Lawson  : 1955  MRN: [id number]  Referring provider: Veronica Martin DO  Dx:   Encounter Diagnosis     ICD-10-CM    1  S/P ORIF (open reduction internal fixation) fracture  Z98 [id number]     Z87 81        Start Time: 945  Stop Time: 1030  Total time in clinic (min): 45 minutes    Subjective:  Pt noted that her back has been hurting this morning  Pt unaware if she slept in the wrong way last time to flare her back up  Pt noted that her L hip is just sore and has no p!        Objective: See treatment diary below      Assessment: Continued with treatment session, Due to patients fatiguing quickly required frequent rest breaks  Tolerated treatment fair  Patient exhibited good technique with therapeutic exercises and would benefit from continued PT  S/p treatment session, Pt noted feeling sore  And having LBP s/p treatment session     Plan: Continue per plan of care  Precautions: WBAT      Manuals 3/10 3/15 3/18          PROM left hip  SC SC                                                 Neuro Re-Ed             Tandem stance             Foam FT EO             Foam FA EC                                                                 Ther Ex             Nustep  L1 10 min  L1 10 min           Matrix HS   HS with RTB in seated 10x           Matrix quads   LAQ 2 x 10          Standing hip ext and abd Instructed L only 10x ea              Supine bridges Instructed 2x 10  2 x 10 v/c hip abd           Supine HL t-band clamshells   no TB too hard   3" 10x  5" 10x           Supine HL hip abd isometric    5" 10x                        Ther Activity             Squats Instructed  STS 10x           Step-ups F/L  6" F only 2x 10  6" 2x 10 F only           Gait Training                                       Modalities                                       1 on 1 time for 29 minutes on 3/15/22

## 2022-03-25 ENCOUNTER — TELEPHONE (OUTPATIENT)
Dept: NEPHROLOGY | Facility: CLINIC | Age: 67
End: 2022-03-25

## 2022-03-25 NOTE — TELEPHONE ENCOUNTER
Appointment Confirmation   Person confirmed appointment with  If not patient, name of the person left message to confirm    Date and time of appointment 3/28/22 at 3pm    Patient acknowledged and will be at appointment? no    Did you advise the patient that they will need a urine sample if they are a new patient?  N/A    Did you advise the patient to bring their current medications for verification? (including any OTC) Yes    Additional Information

## 2022-03-28 ENCOUNTER — CONSULT (OUTPATIENT)
Dept: NEPHROLOGY | Facility: CLINIC | Age: 67
End: 2022-03-28
Payer: COMMERCIAL

## 2022-03-28 VITALS
BODY MASS INDEX: 20.85 KG/M2 | WEIGHT: 114 LBS | HEART RATE: 63 BPM | DIASTOLIC BLOOD PRESSURE: 68 MMHG | SYSTOLIC BLOOD PRESSURE: 122 MMHG | OXYGEN SATURATION: 95 %

## 2022-03-28 DIAGNOSIS — N18.31 STAGE 3A CHRONIC KIDNEY DISEASE (HCC): Primary | ICD-10-CM

## 2022-03-28 DIAGNOSIS — I10 ESSENTIAL HYPERTENSION: ICD-10-CM

## 2022-03-28 DIAGNOSIS — N17.9 AKI (ACUTE KIDNEY INJURY) (HCC): ICD-10-CM

## 2022-03-28 DIAGNOSIS — I10 BENIGN ESSENTIAL HYPERTENSION: ICD-10-CM

## 2022-03-28 DIAGNOSIS — I73.9 PAD (PERIPHERAL ARTERY DISEASE) (HCC): ICD-10-CM

## 2022-03-28 DIAGNOSIS — I48.91 ATRIAL FIBRILLATION WITH RVR (HCC): ICD-10-CM

## 2022-03-28 DIAGNOSIS — E87.1 HYPONATREMIA: ICD-10-CM

## 2022-03-28 PROCEDURE — 99204 OFFICE O/P NEW MOD 45 MIN: CPT | Performed by: INTERNAL MEDICINE

## 2022-03-28 NOTE — ASSESSMENT & PLAN NOTE
She very  Likely has SIADH due to smoking, however, she has a very high dilute fluid intake  Will check urine sodium , osmolality urine creatinine and urine urea nitrogen to evaluate her sodium   She needs to limit fluids to 60 ounces a day She is at increased risk for falling due to low serum sodium

## 2022-03-28 NOTE — ASSESSMENT & PLAN NOTE
She had previously normal kidney function but developed atrial fibrillation last fall  At that time her kidney function did decline to a creatinine of 1 1-1 2 (EGFR 45-50 mils per minute) I explained the meaning of creatinine and GFR to them and they understood  Will check urine protein studies and obtain a kidney ultrasound  I have reviewed her medications for potential nephrotoxins  This may be just on a hemodynamic basis  Bumex was held at this time which should help with fluid status

## 2022-03-29 NOTE — PROGRESS NOTES
Daily Note     Today's date: 3/29/2022  Patient name: Carine Johnson  : 1955  MRN: [id number]  Referring provider: Reanna May DO  Dx:   Encounter Diagnosis     ICD-10-CM    1  S/P ORIF (open reduction internal fixation) fracture  Z98 890     Z87 81                   Subjective: Patient doesn't feel like she has gotten much stronger yet  Still unable to walk without a [address]     Objective: See treatment diary below      Assessment: Tolerated treatment well  Patient would benefit from continued PT  Difficulty with Foam FTEC and required min assist to recover LOB  Very fatigued after treatment with addition of new TE for strengthening  Plan: Continue per plan of care  Precautions: WBAT      Manuals 3/10 3/15 3/18 3/30         PROM left hip  SC SC JF                                                Neuro Re-Ed             Tandem stance             Foam FT EO    x1'         Foam FA EC    x1'                                                             Ther Ex             Nustep  L1 10 min  L1 10 min  L2 10 min         Matrix HS   HS with RTB in seated 10x  Matrix 10# 2x10         Matrix quads   LAQ 2 x 10 Matrix 10# 2x10         Standing hip ext and abd Instructed L only 10x ea              Supine bridges Instructed 2x 10  2 x 10 v/c hip abd           Supine HL t-band clamshells   no TB too hard   3" 10x  5" 10x           Supine HL hip abd isometric    5" 10x                        Ther Activity             Squats Instructed  STS 10x  TG L22 x15         Step-ups F/L  6" F only 2x 10  6" 2x 10 F only           Gait Training                                       Modalities                                       1 on 1 time for 29 minutes on 3/15/22

## 2022-03-30 ENCOUNTER — OFFICE VISIT (OUTPATIENT)
Dept: PHYSICAL THERAPY | Facility: CLINIC | Age: 67
End: 2022-03-30
Payer: COMMERCIAL

## 2022-03-30 DIAGNOSIS — Z87.81 S/P ORIF (OPEN REDUCTION INTERNAL FIXATION) FRACTURE: Primary | ICD-10-CM

## 2022-03-30 DIAGNOSIS — Z98.890 S/P ORIF (OPEN REDUCTION INTERNAL FIXATION) FRACTURE: Primary | ICD-10-CM

## 2022-03-30 PROCEDURE — 97112 NEUROMUSCULAR REEDUCATION: CPT | Performed by: PHYSICAL THERAPIST

## 2022-03-30 PROCEDURE — 97110 THERAPEUTIC EXERCISES: CPT | Performed by: PHYSICAL THERAPIST

## 2022-03-30 PROCEDURE — 97140 MANUAL THERAPY 1/> REGIONS: CPT | Performed by: PHYSICAL THERAPIST

## 2022-03-31 NOTE — PROGRESS NOTES
Daily Note     Today's date: 3/31/2022  Patient name: Cheri Valiente  : 1955  MRN: [id number]  Referring provider: Michael Diaz DO  Dx:   Encounter Diagnosis     ICD-10-CM    1  S/P ORIF (open reduction internal fixation) fracture  Z98 890     Z87 81                   Subjective: Patient reports her bilateral thigh muscles were very sore after last session  Objective: See treatment diary below      Assessment: Tolerated treatment well  Patient demonstrated fatigue post treatment, exhibited good technique with therapeutic exercises and would benefit from continued PT  Pain with first attempt of resisted hip abduction in hooklying, but symptoms resolved as she continued  Requested stopping early due to fatigue  Plan: Continue per plan of care  Precautions: WBAT      Manuals 3/10 3/15 3/18 3/30 4/1        PROM left hip  SC SC JF JF                                               Neuro Re-Ed             Tandem stance             Foam FT EO    x1'         Foam FA EC    x1'                                                             Ther Ex             Nustep  L1 10 min  L1 10 min  L2 10 min L2 10 min        Matrix HS   HS with RTB in seated 10x  Matrix 10# 2x10 Matrix 10# 2x10        Matrix quads   LAQ 2 x 10 Matrix 10# 2x10 Matrix 10# 2x10        Standing hip ext and abd Instructed L only 10x ea              Supine bridges Instructed 2x 10  2 x 10 v/c hip abd  2x10 2x10        Supine HL t-band clamshells   no TB too hard   3" 10x  5" 10x    YTB 2x10        Supine HL hip abd isometric    5" 10x                        Ther Activity             Squats Instructed  STS 10x  TG L22 x15 TG L22 2x10        Step-ups F/L  6" F only 2x 10  6" 2x 10 F only           Gait Training                                       Modalities                                       1 on 1 time for 29 minutes on 3/15/22

## 2022-04-01 ENCOUNTER — OFFICE VISIT (OUTPATIENT)
Dept: PHYSICAL THERAPY | Facility: CLINIC | Age: 67
End: 2022-04-01
Payer: COMMERCIAL

## 2022-04-01 DIAGNOSIS — Z98.890 S/P ORIF (OPEN REDUCTION INTERNAL FIXATION) FRACTURE: Primary | ICD-10-CM

## 2022-04-01 DIAGNOSIS — Z87.81 S/P ORIF (OPEN REDUCTION INTERNAL FIXATION) FRACTURE: Primary | ICD-10-CM

## 2022-04-01 PROCEDURE — 97140 MANUAL THERAPY 1/> REGIONS: CPT | Performed by: PHYSICAL THERAPIST

## 2022-04-01 PROCEDURE — 97110 THERAPEUTIC EXERCISES: CPT | Performed by: PHYSICAL THERAPIST

## 2022-04-06 ENCOUNTER — OFFICE VISIT (OUTPATIENT)
Dept: PHYSICAL THERAPY | Facility: CLINIC | Age: 67
End: 2022-04-06
Payer: COMMERCIAL

## 2022-04-06 DIAGNOSIS — Z87.81 S/P ORIF (OPEN REDUCTION INTERNAL FIXATION) FRACTURE: Primary | ICD-10-CM

## 2022-04-06 DIAGNOSIS — Z98.890 S/P ORIF (OPEN REDUCTION INTERNAL FIXATION) FRACTURE: Primary | ICD-10-CM

## 2022-04-06 PROCEDURE — 97110 THERAPEUTIC EXERCISES: CPT | Performed by: PHYSICAL THERAPIST

## 2022-04-06 PROCEDURE — 97140 MANUAL THERAPY 1/> REGIONS: CPT | Performed by: PHYSICAL THERAPIST

## 2022-04-06 NOTE — PROGRESS NOTES
Daily Note     Today's date: 2022  Patient name: Selin Liz  : 1955  MRN: [id number]  Referring provider: General Gabriel DO  Dx:   Encounter Diagnosis     ICD-10-CM    1  S/P ORIF (open reduction internal fixation) fracture  Z98 890     Z87 81        Start Time: 1402  Stop Time: 1450  Total time in clinic (min): 48 minutes    Subjective: Patient reports that she is tired from not sleeping last night because her granddaughter kept her up  Objective: See treatment diary below      Assessment: Tolerated treatment well  Patient demonstrated fatigue post treatment and would benefit from continued PT  Patient unable to progress standing hip abduction to resistance band secondary to hip weakness and pain  Plan: Continue per plan of care  Progress treatment as tolerated  Precautions: WBAT      Manuals 3/10 3/15 3/18 3/30 4/1 4/6       PROM left hip  SC SC JF JF GR                                              Neuro Re-Ed             Tandem stance             Foam FT EO    x1'         Foam FA EC    x1'                                                             Ther Ex             Nustep  L1 10 min  L1 10 min  L2 10 min L2 10 min L2 10 min       Matrix HS   HS with RTB in seated 10x  Matrix 10# 2x10 Matrix 10# 2x10        Matrix quads   LAQ 2 x 10 Matrix 10# 2x10 Matrix 10# 2x10        Standing hip ext and abd Instructed L only 10x ea              Supine bridges Instructed 2x 10  2 x 10 v/c hip abd  2x10 2x10 2x10       Supine HL t-band clamshells   no TB too hard   3" 10x  5" 10x    YTB 2x10 YTB 25x       Supine HL hip abd isometric    5" 10x           Standing marches      2x10       Ther Activity             Squats Instructed  STS 10x  TG L22 x15 TG L22 2x10 TG L22 2x10       Step-ups F/L  6" F only 2x 10  6" 2x 10 F only    6" 2x10 F only       Gait Training                                       Modalities                                       1 on 1 time for 29 minutes on 3/15/22

## 2022-04-07 ENCOUNTER — HOSPITAL ENCOUNTER (OUTPATIENT)
Dept: NON INVASIVE DIAGNOSTICS | Facility: CLINIC | Age: 67
Discharge: HOME | End: 2022-04-07
Payer: COMMERCIAL

## 2022-04-07 DIAGNOSIS — I48.0 PAF (PAROXYSMAL ATRIAL FIBRILLATION) (HCC): ICD-10-CM

## 2022-04-07 PROCEDURE — 93226 XTRNL ECG REC<48 HR SCAN A/R: CPT

## 2022-04-07 PROCEDURE — 93225 XTRNL ECG REC<48 HRS REC: CPT

## 2022-04-08 ENCOUNTER — OFFICE VISIT (OUTPATIENT)
Dept: PHYSICAL THERAPY | Facility: CLINIC | Age: 67
End: 2022-04-08
Payer: COMMERCIAL

## 2022-04-08 ENCOUNTER — APPOINTMENT (OUTPATIENT)
Dept: LAB | Facility: CLINIC | Age: 67
End: 2022-04-08
Payer: COMMERCIAL

## 2022-04-08 DIAGNOSIS — Z98.890 S/P ORIF (OPEN REDUCTION INTERNAL FIXATION) FRACTURE: Primary | ICD-10-CM

## 2022-04-08 DIAGNOSIS — Z87.81 S/P ORIF (OPEN REDUCTION INTERNAL FIXATION) FRACTURE: Primary | ICD-10-CM

## 2022-04-08 DIAGNOSIS — N17.9 AKI (ACUTE KIDNEY INJURY) (HCC): ICD-10-CM

## 2022-04-08 DIAGNOSIS — E87.1 HYPONATREMIA: ICD-10-CM

## 2022-04-08 LAB
ANION GAP SERPL CALCULATED.3IONS-SCNC: 7 MMOL/L (ref 4–13)
BUN SERPL-MCNC: 15 MG/DL (ref 5–25)
CALCIUM SERPL-MCNC: 9.9 MG/DL (ref 8.3–10.1)
CHLORIDE SERPL-SCNC: 101 MMOL/L (ref 100–108)
CO2 SERPL-SCNC: 26 MMOL/L (ref 21–32)
CREAT SERPL-MCNC: 1.61 MG/DL (ref 0.6–1.3)
GFR SERPL CREATININE-BSD FRML MDRD: 32 ML/MIN/1.73SQ M
GLUCOSE P FAST SERPL-MCNC: 112 MG/DL (ref 65–99)
OSMOLALITY UR/SERPL-RTO: 294 MMOL/KG (ref 282–298)
POTASSIUM SERPL-SCNC: 4.3 MMOL/L (ref 3.5–5.3)
SODIUM SERPL-SCNC: 134 MMOL/L (ref 136–145)
T4 FREE SERPL-MCNC: 1.34 NG/DL (ref 0.76–1.46)
TSH SERPL DL<=0.05 MIU/L-ACNC: 3.36 UIU/ML (ref 0.45–4.5)

## 2022-04-08 PROCEDURE — 36415 COLL VENOUS BLD VENIPUNCTURE: CPT

## 2022-04-08 PROCEDURE — 97110 THERAPEUTIC EXERCISES: CPT

## 2022-04-08 PROCEDURE — 83930 ASSAY OF BLOOD OSMOLALITY: CPT

## 2022-04-08 PROCEDURE — 80048 BASIC METABOLIC PNL TOTAL CA: CPT

## 2022-04-08 PROCEDURE — 84443 ASSAY THYROID STIM HORMONE: CPT

## 2022-04-08 PROCEDURE — 97530 THERAPEUTIC ACTIVITIES: CPT

## 2022-04-08 PROCEDURE — 84439 ASSAY OF FREE THYROXINE: CPT

## 2022-04-08 PROCEDURE — 97140 MANUAL THERAPY 1/> REGIONS: CPT

## 2022-04-08 NOTE — PROGRESS NOTES
Daily Note     Today's date: 2022  Patient name: Saadia Maki  : 1955  MRN: [id number]  Referring provider: Rigo Ham DO  Dx:   Encounter Diagnosis     ICD-10-CM    1  S/P ORIF (open reduction internal fixation) fracture  Z98 890     Z87 81                   Subjective: Patient reports that she was sore after last session and continues to be today  Reports that she would like to move back into her house as she lived independently prior to falling  Objective: See treatment diary below  Left Hip        Planes of Motion   Flexion: 4-          ROM WFL  Abduction: 3    ROM WFL  ER                      ROM 10 degrees  IR                        ROM 15 degrees      Left Knee   Flexion: 4  Extension: 4     Left Ankle/Foot   Dorsiflexion: 4-         Assessment: Tolerated treatment well  Reassessment performed this session as patient would like to return back home  She demonstrated improved strength assessments as well as WFL  hip ROM measurements  Educated patient on proper mechanics with curb negotiation for improved safety to get in and out of her home  Performed 3 curb transfers with good safety and mechanics demonstrated throughout  Plan: Per patient request, she will be DC to comprehensive HEP at this time  Precautions: WBAT      Manuals 3/10 3/15 3/18 3/30 4/1 4/6 4/8      PROM left hip  SC SC JF JF GR BE                                             Neuro Re-Ed             Tandem stance             Foam FT EO    x1'         Foam FA EC    x1'                                                             Ther Ex             Nustep  L1 10 min  L1 10 min  L2 10 min L2 10 min L2 10 min L2 10 min      Matrix HS   HS with RTB in seated 10x  Matrix 10# 2x10 Matrix 10# 2x10        Matrix quads   LAQ 2 x 10 Matrix 10# 2x10 Matrix 10# 2x10        Standing hip ext and abd Instructed L only 10x ea              Supine bridges Instructed 2x 10  2 x 10 v/c hip abd  2x10 2x10 2x10       Supine HL t-band clamshells   no TB too hard   3" 10x  5" 10x    YTB 2x10 YTB 25x       Supine HL hip abd isometric    5" 10x           Standing marches      2x10       Reassessment       BE      Ther Activity             Squats Instructed  STS 10x  TG L22 x15 TG L22 2x10 TG L22 2x10 TG L22   2x10      Step-ups F/L  6" F only 2x 10  6" 2x 10 F only    6" 2x10 F only       Curb negotiation        x1 CGA   x2 CS     education on safety      Gait Training                                       Modalities

## 2022-04-11 ENCOUNTER — HOSPITAL ENCOUNTER (OUTPATIENT)
Dept: ULTRASOUND IMAGING | Facility: HOSPITAL | Age: 67
Discharge: HOME | End: 2022-04-11
Attending: INTERNAL MEDICINE
Payer: COMMERCIAL

## 2022-04-11 ENCOUNTER — APPOINTMENT (OUTPATIENT)
Dept: LAB | Facility: HOSPITAL | Age: 67
End: 2022-04-11
Attending: INTERNAL MEDICINE
Payer: COMMERCIAL

## 2022-04-11 DIAGNOSIS — N17.9 ACUTE RENAL FAILURE, UNSPECIFIED ACUTE RENAL FAILURE TYPE (HCC): Primary | ICD-10-CM

## 2022-04-11 DIAGNOSIS — E87.1 HYPOSMOLALITY SYNDROME: ICD-10-CM

## 2022-04-11 DIAGNOSIS — N17.9 AKI (ACUTE KIDNEY INJURY) (HCC): ICD-10-CM

## 2022-04-11 LAB — SODIUM 24H UR-SCNC: 32 MOL/L

## 2022-04-11 PROCEDURE — 84300 ASSAY OF URINE SODIUM: CPT

## 2022-04-11 PROCEDURE — 76770 US EXAM ABDO BACK WALL COMP: CPT

## 2022-04-12 DIAGNOSIS — I48.0 PAF (PAROXYSMAL ATRIAL FIBRILLATION) (HCC): ICD-10-CM

## 2022-04-12 RX ORDER — METOPROLOL SUCCINATE 50 MG/1
50 TABLET, EXTENDED RELEASE ORAL DAILY
Qty: 90 TABLET | Refills: 3 | Status: SHIPPED | OUTPATIENT
Start: 2022-04-12

## 2022-04-13 DIAGNOSIS — N17.9 AKI (ACUTE KIDNEY INJURY) (HCC): Primary | ICD-10-CM

## 2022-04-14 PROCEDURE — 93227 XTRNL ECG REC<48 HR R&I: CPT | Performed by: INTERNAL MEDICINE

## 2022-04-29 ENCOUNTER — APPOINTMENT (OUTPATIENT)
Dept: LAB | Facility: CLINIC | Age: 67
End: 2022-04-29
Payer: COMMERCIAL

## 2022-04-29 DIAGNOSIS — C83.30: ICD-10-CM

## 2022-04-29 LAB
ALBUMIN SERPL BCP-MCNC: 3.9 G/DL (ref 3.5–5)
ALP SERPL-CCNC: 108 U/L (ref 46–116)
ALT SERPL W P-5'-P-CCNC: 17 U/L (ref 12–78)
ANION GAP SERPL CALCULATED.3IONS-SCNC: 7 MMOL/L (ref 4–13)
AST SERPL W P-5'-P-CCNC: 20 U/L (ref 5–45)
BASOPHILS # BLD AUTO: 0.08 THOUSANDS/ΜL (ref 0–0.1)
BASOPHILS NFR BLD AUTO: 1 % (ref 0–1)
BILIRUB SERPL-MCNC: 0.89 MG/DL (ref 0.2–1)
BUN SERPL-MCNC: 17 MG/DL (ref 5–25)
CALCIUM SERPL-MCNC: 9.7 MG/DL (ref 8.3–10.1)
CHLORIDE SERPL-SCNC: 100 MMOL/L (ref 100–108)
CO2 SERPL-SCNC: 26 MMOL/L (ref 21–32)
CREAT SERPL-MCNC: 1.4 MG/DL (ref 0.6–1.3)
EOSINOPHIL # BLD AUTO: 0.26 THOUSAND/ΜL (ref 0–0.61)
EOSINOPHIL NFR BLD AUTO: 4 % (ref 0–6)
ERYTHROCYTE [DISTWIDTH] IN BLOOD BY AUTOMATED COUNT: 18 % (ref 11.6–15.1)
GFR SERPL CREATININE-BSD FRML MDRD: 38 ML/MIN/1.73SQ M
GLUCOSE SERPL-MCNC: 96 MG/DL (ref 65–140)
HCT VFR BLD AUTO: 46.1 % (ref 34.8–46.1)
HGB BLD-MCNC: 14.8 G/DL (ref 11.5–15.4)
IMM GRANULOCYTES # BLD AUTO: 0.02 THOUSAND/UL (ref 0–0.2)
IMM GRANULOCYTES NFR BLD AUTO: 0 % (ref 0–2)
LDH SERPL-CCNC: 212 U/L (ref 81–234)
LYMPHOCYTES # BLD AUTO: 1.47 THOUSANDS/ΜL (ref 0.6–4.47)
LYMPHOCYTES NFR BLD AUTO: 23 % (ref 14–44)
MCH RBC QN AUTO: 27.4 PG (ref 26.8–34.3)
MCHC RBC AUTO-ENTMCNC: 32.1 G/DL (ref 31.4–37.4)
MCV RBC AUTO: 85 FL (ref 82–98)
MONOCYTES # BLD AUTO: 1.01 THOUSAND/ΜL (ref 0.17–1.22)
MONOCYTES NFR BLD AUTO: 16 % (ref 4–12)
NEUTROPHILS # BLD AUTO: 3.68 THOUSANDS/ΜL (ref 1.85–7.62)
NEUTS SEG NFR BLD AUTO: 56 % (ref 43–75)
NRBC BLD AUTO-RTO: 0 /100 WBCS
PLATELET # BLD AUTO: 208 THOUSANDS/UL (ref 149–390)
PMV BLD AUTO: 11 FL (ref 8.9–12.7)
POTASSIUM SERPL-SCNC: 4.1 MMOL/L (ref 3.5–5.3)
PROT SERPL-MCNC: 7.5 G/DL (ref 6.4–8.2)
RBC # BLD AUTO: 5.4 MILLION/UL (ref 3.81–5.12)
SODIUM SERPL-SCNC: 133 MMOL/L (ref 136–145)
WBC # BLD AUTO: 6.52 THOUSAND/UL (ref 4.31–10.16)

## 2022-04-29 PROCEDURE — 80053 COMPREHEN METABOLIC PANEL: CPT

## 2022-04-29 PROCEDURE — 85025 COMPLETE CBC W/AUTO DIFF WBC: CPT

## 2022-04-29 PROCEDURE — 36415 COLL VENOUS BLD VENIPUNCTURE: CPT

## 2022-04-29 PROCEDURE — 83615 LACTATE (LD) (LDH) ENZYME: CPT

## 2022-05-23 ENCOUNTER — OFFICE VISIT (OUTPATIENT)
Dept: NEPHROLOGY | Facility: CLINIC | Age: 67
End: 2022-05-23
Payer: COMMERCIAL

## 2022-05-23 VITALS
DIASTOLIC BLOOD PRESSURE: 68 MMHG | OXYGEN SATURATION: 97 % | WEIGHT: 116 LBS | HEART RATE: 79 BPM | SYSTOLIC BLOOD PRESSURE: 128 MMHG | HEIGHT: 62 IN | BODY MASS INDEX: 21.35 KG/M2

## 2022-05-23 DIAGNOSIS — E87.1 HYPONATREMIA: ICD-10-CM

## 2022-05-23 DIAGNOSIS — N25.81 SECONDARY HYPERPARATHYROIDISM (HCC): ICD-10-CM

## 2022-05-23 DIAGNOSIS — I10 BENIGN ESSENTIAL HYPERTENSION: ICD-10-CM

## 2022-05-23 DIAGNOSIS — N18.31 STAGE 3A CHRONIC KIDNEY DISEASE (HCC): Primary | ICD-10-CM

## 2022-05-23 DIAGNOSIS — J96.01 ACUTE RESPIRATORY FAILURE WITH HYPOXEMIA (HCC): ICD-10-CM

## 2022-05-23 DIAGNOSIS — E55.9 VITAMIN D DEFICIENCY: ICD-10-CM

## 2022-05-23 DIAGNOSIS — R60.0 BILATERAL LOWER EXTREMITY EDEMA: ICD-10-CM

## 2022-05-23 PROCEDURE — 99214 OFFICE O/P EST MOD 30 MIN: CPT | Performed by: NURSE PRACTITIONER

## 2022-05-23 RX ORDER — BUMETANIDE 0.5 MG/1
0.5 TABLET ORAL AS NEEDED
Qty: 30 TABLET | Refills: 5 | Status: SHIPPED | OUTPATIENT
Start: 2022-05-23 | End: 2022-07-07

## 2022-05-23 NOTE — PATIENT INSTRUCTIONS
Use bumex as needed for extra swelling    Continue your fluid restriction, especially before blood draw

## 2022-05-23 NOTE — PROGRESS NOTES
Nephrology   Office Follow-Up  Sintia Garcia 69 y o  female MRN: [id number]    Encounter: [id number]        Assessment & Plan    Sintia Garcia was seen in the [place] office today  All diagnoses and orders for visit:     1  Stage 3a chronic kidney disease (HCC)  · Elevated creatinine noted in April 1 6->1 4 mg/dL  Typically around 1 1-1 2 mg/dL  Some urine protein noted  No obstruction on kidney ultrasound however right kidney smaller than left kidney  Denies NSAID use  No ace/arb  Has not used diuretic in over a month  · She will continue to focus on improvement diet and lifestyle  Have recommended decrease smoking/drinking alcohol and focus on appropriate nutrition  Will repeat lab work in 6 months     -     CBC and differential; Future; Expected date: 11/01/2022   -     Comprehensive metabolic panel; Future; Expected date: 11/01/2022   -     Microalbumin / creatinine urine ratio; Future; Expected date: 11/01/2022   -     Urinalysis with microscopic; Future; Expected date: 11/01/2022   -     Protein / creatinine ratio, urine; Future; Expected date: 11/01/2022  2  Bilateral lower extremity edema  · bumex as needed  3  Hyponatremia  · Fairly well controlled  Discussed importance of fluid restriction 1 8 liters per day, low sodium diet  She does admit to over-hydrating prior to lab draw  4  Vitamin D deficiency   -     Vitamin D 25 hydroxy; Future; Expected date: 11/01/2022  5  Secondary hyperparathyroidism (HCC)   -     PTH, intact; Future; Expected date: 11/01/2022  6  Benign essential hypertension  · Blood pressure appropriate, no changes made      HPI: Sintia Garcia is a 69 y o  female who is here for scheduled follow-up regarding CKD 3, hyponatremia  Other pertinent problems include DLBCL, PAF on eliquis, hypertension, HFpEF, mild mitral stenosis, PAD  Patient's creatinine elevated from typical but overall improving  Examines euvolemic and denies NSAID or other nephrotoxin exposure   She will continue to focus on low sodium, fluid restricted diet and appropriate nutrition  Patient and support person opted for less frequent follow-up due to home stressors, therefore will order lab work for 6 months and then return to office  If any changes, she will call office for closer follow-up  ROS:   Review of Systems   Constitutional: Positive for fatigue  Negative for chills and fever  HENT: Negative for ear pain and sore throat  Eyes: Negative for pain and visual disturbance  Respiratory: Negative for cough and shortness of breath  Cardiovascular: Negative for chest pain and palpitations  Gastrointestinal: Negative for abdominal pain and vomiting  Genitourinary: Negative for dysuria and hematuria  Musculoskeletal: Positive for arthralgias  Negative for back pain  Skin: Negative for color change and rash  Neurological: Negative for seizures and syncope  Psychiatric/Behavioral:        Stress   All other systems reviewed and are negative        Allergies: Oxycodone-acetaminophen    Medications:   Current Outpatient Medications:     ALPRAZolam (XANAX) 0 5 mg tablet, Take 0 25 mg by mouth, Disp: , Rfl:     atorvastatin (LIPITOR) 40 mg tablet, Take 40 mg by mouth daily at bedtime  , Disp: , Rfl:     bumetanide (BUMEX) 0 5 MG tablet, Take 1 tablet (0 5 mg total) by mouth if needed (for swelling, bloating, weight gain), Disp: 30 tablet, Rfl: 5    Cholecalciferol (VITAMIN D3) 2000 units TABS, Take 1 tablet by mouth daily, Disp: , Rfl:     diltiazem (CARDIZEM CD) 240 mg 24 hr capsule, TAKE 1 CAPSULE BY MOUTH EVERY DAY, Disp: 30 capsule, Rfl: 5    Eliquis 5 MG, TAKE 1 TABLET BY MOUTH TWICE A DAY, Disp: 60 tablet, Rfl: 5    glycerin-hypromellose- (ARTIFICIAL TEARS) 0 2-0 2-1 % SOLN, 1 drop, Disp: , Rfl:     metoprolol succinate (TOPROL-XL) 50 mg 24 hr tablet, Take 1 tablet (50 mg total) by mouth daily, Disp: 90 tablet, Rfl: 3    mirtazapine (REMERON) 15 mg tablet, Take 7 5 mg by mouth daily at bedtime  , Disp: , Rfl:     senna-docusate sodium (SENOKOT S) 8 6-50 mg per tablet, Take 1 tablet by mouth daily As needed for constipation, Disp: 20 tablet, Rfl: 0    Past Medical History:   Diagnosis Date    Bell's palsy     [facility] ([facility] )     lymphoma    Diffuse large B cell lymphoma ([place] [address] )     Hyperlipidemia     Hypertension     PAD (peripheral artery disease) (HCC)     PAF (paroxysmal atrial fibrillation) ([place] [address] )     PVD (peripheral vascular disease) ([place] [address] )      Past Surgical History:   Procedure Laterality Date    ARTERIOGRAM Left 12/6/2018    Procedure: LEFT lower extremity angiography, with Left lower extremity run-off, stent and angioplasty of Left Common Femoral Artery (Left Brachial Access); Surgeon: Allen Hernandez MD;  Location:  MAIN OR;  Service: Vascular    CARDIAC SURGERY      CARPAL TUNNEL RELEASE      CT NEEDLE BIOPSY LUNG  10/8/2019    HYSTERECTOMY      IR AORTAGRAM WITH RUN-OFF  12/6/2018    OOPHORECTOMY      AK OPEN RX FEMUR FX+INTRAMED LISA Left 9/29/2021    Procedure: INSERTION NAIL IM FEMUR ANTEGRADE (TROCHANTERIC); Surgeon: Bertin Valenzuela DO;  Location: [facility] MAIN OR;  Service: Orthopedics    TONSILLECTOMY       Family History   Problem Relation Age of Onset    Cancer Mother     Breast cancer Mother     Heart attack Father     No Known Problems Sister     No Known Problems Daughter     No Known Problems Maternal Grandmother     No Known Problems Maternal Grandfather     No Known Problems Paternal Grandmother     No Known Problems Paternal Grandfather     Prostate cancer Brother     No Known Problems Brother     No Known Problems Brother     No Known Problems Maternal Aunt     No Known Problems Maternal Aunt     No Known Problems Maternal Aunt     No Known Problems Maternal Aunt       reports that she has been smoking cigarettes  She has a 40 00 pack-year smoking history  She has never used smokeless tobacco  She reports current alcohol use   She reports that she does not use drugs  Physical Exam:   Vitals:    05/23/22 1359   BP: 128/68   Pulse: 79   SpO2: 97%   Weight: 52 6 kg (116 lb)   Height: 5' 2" (1 575 m)     Body mass index is 21 22 kg/m²  General: conscious, cooperative, in no acute distress, appears stated age  Eyes: conjunctivae pale, anicteric sclerae  ENT: lips and mucous membranes moist  Neck: supple, no JVD, no masses  Chest:  essentially clear breath sounds bilaterally, no crackles, ronchus or wheezings  CVS: S1 & S2, normal rate, regular rhythm  Abdomen: soft, non-tender, non-distended, normoactive bowel sounds, rounded  Extremities: trace edema of both legs  Skin: no rash   Neuro: awake, alert, oriented       Diagnostic Data:  Lab: I have personally reviewed pertinent lab results  ,   CBC:       CMP: No results found for: SODIUM, K, CL, CO2, ANIONGAP, BUN, CREATININE, GLUCOSE, CALCIUM, AST, ALT, ALKPHOS, PROT, BILITOT, EGFR,   PT/INR: No results found for: PT, INR,   Magnesium: No components found for: MAG,  Phosphorous: No results found for: PHOS    Patient Instructions   Use bumex as needed for extra swelling    Continue your fluid restriction, especially before blood draw        Portions of the record may have been created with voice recognition software  Occasional wrong word or "sound a like" substitutions may have occurred due to the inherent limitations of voice recognition software  Read the chart carefully and recognize, using context, where substitutions have occurred  If you have any questions, please contact the dictating provider

## 2022-05-27 ENCOUNTER — TELEPHONE (OUTPATIENT)
Dept: OBGYN CLINIC | Facility: CLINIC | Age: 67
End: 2022-05-27

## 2022-06-06 ENCOUNTER — OFFICE VISIT (OUTPATIENT)
Dept: CARDIOLOGY CLINIC | Facility: CLINIC | Age: 67
End: 2022-06-06
Payer: COMMERCIAL

## 2022-06-06 VITALS
HEART RATE: 60 BPM | SYSTOLIC BLOOD PRESSURE: 120 MMHG | BODY MASS INDEX: 21.35 KG/M2 | WEIGHT: 116 LBS | HEIGHT: 62 IN | DIASTOLIC BLOOD PRESSURE: 70 MMHG

## 2022-06-06 DIAGNOSIS — R60.0 BILATERAL LOWER EXTREMITY EDEMA: ICD-10-CM

## 2022-06-06 DIAGNOSIS — I10 ESSENTIAL HYPERTENSION: Primary | ICD-10-CM

## 2022-06-06 DIAGNOSIS — I73.9 PAD (PERIPHERAL ARTERY DISEASE) (HCC): ICD-10-CM

## 2022-06-06 DIAGNOSIS — I48.0 PAROXYSMAL ATRIAL FIBRILLATION (HCC): ICD-10-CM

## 2022-06-06 DIAGNOSIS — I74.09 AORTOILIAC OCCLUSIVE DISEASE (HCC): ICD-10-CM

## 2022-06-06 PROCEDURE — 99214 OFFICE O/P EST MOD 30 MIN: CPT | Performed by: INTERNAL MEDICINE

## 2022-06-06 NOTE — PROGRESS NOTES
Cardiology Follow up    Sarita Vásquez  [id number]  1955  [facility] [address] [address]      No diagnosis found  Discussion/Summary:  1  Paroxysmal atrial fibrillation  2  Hypertension  3  Peripheral vascular disease  4  Mitral annular calcification with mild mitral stenosis  5  Heart failure with preserved ejection fraction  6  Chronic kidney disease    -transthoracic echocardiogram 09/30/2021 showing left ventricular systolic function normal estimated LVEF 65% with marked mitral annular calcification mild mitral stenosis, mild mitral regurgitation   -Holter monitor 04/07/2022 showing predominantly sinus rhythm with average heart rate 70 beats per minute rare supraventricular ectopic activity with no significant atrial fibrillation seen during monitored time frame had no symptoms listed  -after discussion with the patient she does not wish to have invasive or aggressive testing performed at this time does not wish to undergo stress testing however is agreeable to medical therapy as she tolerates   -in the setting of significant bilateral lower extremity edema left slightly greater than right will have patient take Bumex 0 5 mg daily for next 3 days and monitor response I will repeat BMP at that time to monitor renal function and electrolytes and determine if potassium supplementation needs to be prescribed  I will also have patient contact her nephrologist for assistance with recommendations and monitoring as well as if this is unsuccessful at improving patient's symptoms may need to increase Bumex therapy at that time  -counseled patient on tobacco cessation  -will check left lower extremity venous Doppler as while patient has been on oral oral anticoagulation and been taking medical therapy will attempt to rule out DVT    -counseled patient on dietary and lifestyle modifications including following salt restricted diet to less than 1800 mg of sodium daily and fluid restricted diet to less than 1800 mL of fluid daily with monitoring standing daily weights  -if lower extremity Doppler is relatively unrevealing patient may benefit from compression stockings however would have her check with her vascular team to determine if this is okay from their standpoint  -I will see patient in 1 month or sooner if necessary  -patient counseled if she were to have any warning or alarm type symptoms she is to seek emergency medical care immediately  History of Present Illness:  - patient is a 53-year-old female with hypertension, history of lymphoma s/p chemotherapy in April 2019 currently in remission, current tobacco use who was hospitalized in September of 2021 found at that time to have left hip fracture after tripping over her dog along with significant peripheral vascular disease and severe aortoiliac an arterial occlusive disease with aortobifemoral bypass in 2010, bilateral fem-pop bypass 2013 and 2016 along with recent left CFA angioplasty with stent along with paroxysmal atrial fibrillation on oral anticoagulation who presents to the office today for scheduled follow-up  -per patient her daughter patient is had significant lower extremity edema worsening over the past 5 days however has only taken 1 oral dose of her p r n  Diuretic without such/significant improvement  Patient denies any significant chest pain, orthopnea, palpitations, lightheadedness or dizziness, loss of consciousness or shortness of breath but has noticed even throughout the day worsening of her lower extremity edema with some improvement in the morning after having her legs elevated during the evening      Patient Active Problem List   Diagnosis    Constipation    Lower extremity weakness    [facility]acute kidney [facility]) [facility] )    Bell's palsy    Carotid stenosis, asymptomatic, bilateral    Depression with anxiety    Benign essential hypertension    Essential hypertension    Fall    Hyponatremia    Tobacco dependency    Lymphoma (HCC)    PAD (peripheral artery disease) (HCC)    Paroxysmal atrial fibrillation (HCC)    Leg pain    Peripheral vascular disease (HCC)    Foot pain    Weakness of lower extremity    Aortoiliac occlusive disease (HCC)    Bilateral lower extremity edema    Lung nodule    Closed left hip fracture (HCC)    Insomnia    Acute respiratory failure with hypoxia    Hypotension    Anemia    Atrial fibrillation with RVR (HCC)    Buttock wound    Stage 3a chronic kidney disease (HCC)    Vitamin D deficiency    Secondary hyperparathyroidism ([facility] )     Past Medical History:   Diagnosis Date    Bell's palsy     Cancer ([facility] )     lymphoma    Diffuse large B cell lymphoma ([facility] )     Hyperlipidemia     Hypertension     PAD (peripheral artery disease) (HCC)     PAF (paroxysmal atrial fibrillation) (HCC)     PVD [facility]peripheral vascular disease) ([facility]      Social History     Socioeconomic History    Marital status:      Spouse name: Not on file    Number of children: Not on file    Years of education: Not on file    Highest education level: Not on file   Occupational History    Not on file   Tobacco Use    Smoking status: Current Every Day Smoker     Packs/day: 1 00     Years: 40 00     Pack years: 40 00     Types: Cigarettes    Smokeless tobacco: Never Used   Vaping Use    Vaping Use: Never used   Substance and Sexual Activity    Alcohol use:  Yes    Drug use: No     Comment: medical marijuana 10/7/19    Sexual activity: Not on file   Other Topics Concern    Not on file   Social History Narrative    Not on file     Social Determinants of Health     Financial Resource Strain: Not on file   Food Insecurity: Not on file   Transportation Needs: Not on file   Physical Activity: Not on file   Stress: Not on file   Social Connections: Not on file   Intimate Partner Violence: Not on file   Housing Stability: Not on file      Family History   Problem Relation Age of Onset    Cancer Mother     Breast cancer Mother     Heart attack Father     No Known Problems Sister     No Known Problems Daughter     No Known Problems Maternal Grandmother     No Known Problems Maternal Grandfather     No Known Problems Paternal Grandmother     No Known Problems Paternal Grandfather     Prostate cancer Brother     No Known Problems Brother     No Known Problems Brother     No Known Problems Maternal Aunt     No Known Problems Maternal Aunt     No Known Problems Maternal Aunt     No Known Problems Maternal Aunt      Past Surgical History:   Procedure Laterality Date    ARTERIOGRAM Left 12/6/2018    Procedure: LEFT lower extremity angiography, with Left lower extremity run-off, stent and angioplasty of Left Common Femoral Artery (Left Brachial Access); Surgeon: Pavan Renae MD;  Location:  MAIN OR;  Service: Vascular    CARDIAC SURGERY      CARPAL TUNNEL RELEASE      CT NEEDLE BIOPSY LUNG  10/8/2019    HYSTERECTOMY      IR AORTAGRAM WITH RUN-OFF  12/6/2018    OOPHORECTOMY      [place] OPEN RX FEMUR FX+INTRAMED LISA Left 9/29/2021    Procedure: INSERTION NAIL IM FEMUR ANTEGRADE (TROCHANTERIC);   Surgeon: Destiny Duarte DO;  Location: [facility] MAIN OR;  Service: Orthopedics    TONSILLECTOMY         Current Outpatient Medications:     ALPRAZolam (XANAX) 0 5 mg tablet, Take 0 25 mg by mouth, Disp: , Rfl:     atorvastatin (LIPITOR) 40 mg tablet, Take 40 mg by mouth daily at bedtime  , Disp: , Rfl:     bumetanide (BUMEX) 0 5 MG tablet, Take 1 tablet (0 5 mg total) by mouth if needed (for swelling, bloating, weight gain), Disp: 30 tablet, Rfl: 5    Cholecalciferol (VITAMIN D3) 2000 units TABS, Take 1 tablet by mouth daily, Disp: , Rfl:     diltiazem (CARDIZEM CD) 240 mg 24 hr capsule, TAKE 1 CAPSULE BY MOUTH EVERY DAY, Disp: 30 capsule, Rfl: 5    Eliquis 5 MG, TAKE 1 TABLET BY MOUTH TWICE A DAY, Disp: 60 tablet, Rfl: 5    glycerin-hypromellose- (ARTIFICIAL TEARS) 0 2-0 2-1 % SOLN, 1 drop, Disp: , Rfl:     metoprolol succinate (TOPROL-XL) 50 mg 24 hr tablet, Take 1 tablet (50 mg total) by mouth daily, Disp: 90 tablet, Rfl: 3    mirtazapine (REMERON) 15 mg tablet, Take 7 5 mg by mouth daily at bedtime  , Disp: , Rfl:     senna-docusate sodium (SENOKOT S) 8 6-50 mg per tablet, Take 1 tablet by mouth daily As needed for constipation, Disp: 20 tablet, Rfl: 0  Allergies   Allergen Reactions    Oxycodone-Acetaminophen Itching and Rash     Itching, rash         Labs:  Appointment on 04/29/2022   Component Date Value    WBC 04/29/2022 6 52     RBC 04/29/2022 5 40 (A)    Hemoglobin 04/29/2022 14 8     Hematocrit 04/29/2022 46 1     MCV 04/29/2022 85     MCH 04/29/2022 27 4     MCHC 04/29/2022 32 1     RDW 04/29/2022 18 0 (A)    MPV 04/29/2022 11 0     Platelets 99/18/6546 208     nRBC 04/29/2022 0     Neutrophils Relative 04/29/2022 56     Immat GRANS % 04/29/2022 0     Lymphocytes Relative 04/29/2022 23     Monocytes Relative 04/29/2022 16 (A)    Eosinophils Relative 04/29/2022 4     Basophils Relative 04/29/2022 1     Neutrophils Absolute 04/29/2022 3 68     Immature Grans Absolute 04/29/2022 0 02     Lymphocytes Absolute 04/29/2022 1 47     Monocytes Absolute 04/29/2022 1 01     Eosinophils Absolute 04/29/2022 0 26     Basophils Absolute 04/29/2022 0 08     Sodium 04/29/2022 133 (A)    Potassium 04/29/2022 4 1     Chloride 04/29/2022 100     CO2 04/29/2022 26     ANION GAP 04/29/2022 7     BUN 04/29/2022 17     Creatinine 04/29/2022 1 40 (A)    Glucose 04/29/2022 96     Calcium 04/29/2022 9 7     AST 04/29/2022 20     ALT 04/29/2022 17     Alkaline Phosphatase 04/29/2022 108     Total Protein 04/29/2022 7 5     Albumin 04/29/2022 3 9     Total Bilirubin 04/29/2022 0 89     eGFR 04/29/2022 38     LD 04/29/2022 212 Appointment on 04/11/2022   Component Date Value    Sodium, Ur 04/11/2022 32    Transcribe Orders on 04/11/2022   Component Date Value    Clarity, UA 04/11/2022 Slightly Cloudy (A)    Color, UA 04/11/2022 Yellow     Specific Gravity, UA 04/11/2022 1 010     pH, UA 04/11/2022 6 5     Glucose, UA 04/11/2022 Negative     Ketones, UA 04/11/2022 Negative     Blood, UA 04/11/2022 Negative     Protein, UA 04/11/2022 Negative     Nitrite, UA 04/11/2022 Negative     Bilirubin, UA 04/11/2022 Negative     Urobilinogen, UA 04/11/2022 0 2     Leukocytes, UA 04/11/2022 Negative     WBC, UA 04/11/2022 None Seen     RBC, UA 04/11/2022 0-1 (A)    Bacteria, UA 04/11/2022 Moderate (A)    Epithelial Cells 04/11/2022 Occasional     Osmolality, Ur 04/11/2022 198 (A)    Creatinine, Ur 04/11/2022 42 1     Microalbum  ,U,Random 04/11/2022 38 7 (A)    Microalb Creat Ratio 04/11/2022 92 (A)    Creatinine, Ur 04/11/2022 42 1     Protein Urine Random 04/11/2022 17     Prot/Creat Ratio, Ur 04/11/2022 0 40 (A)   Appointment on 04/08/2022   Component Date Value    Osmolality Serum 04/08/2022 294     Sodium 04/08/2022 134 (A)    Potassium 04/08/2022 4 3     Chloride 04/08/2022 101     CO2 04/08/2022 26     ANION GAP 04/08/2022 7     BUN 04/08/2022 15     Creatinine 04/08/2022 1 61 (A)    Glucose, Fasting 04/08/2022 112 (A)    Calcium 04/08/2022 9 9     eGFR 04/08/2022 32     TSH 3RD GENERATON 04/08/2022 3 360     Free T4 04/08/2022 1 34         Imaging: No results found  Review of Systems:  Review of Systems   Constitutional: Negative for chills, diaphoresis, fatigue, fever and unexpected weight change  HENT: Negative for trouble swallowing and voice change  Eyes: Negative for pain and redness  Respiratory: Negative for shortness of breath and wheezing  Cardiovascular: Positive for leg swelling  Negative for chest pain and palpitations     Gastrointestinal: Negative for abdominal pain, constipation, diarrhea, nausea and vomiting  Genitourinary: Negative for dysuria  Musculoskeletal: Negative for neck pain and neck stiffness  Neurological: Negative for dizziness, syncope, light-headedness and headaches  Psychiatric/Behavioral: Negative for agitation and confusion  All other systems reviewed and are negative  Vitals:    06/06/22 1306   BP: 120/70   Pulse: 60   Weight: 52 6 kg (116 lb)   Height: 5' 2" (1 575 m)     Vitals:    06/06/22 1306   Weight: 52 6 kg (116 lb)     Height: 5' 2" (157 5 cm)     Physical Exam:  Physical Exam  Vitals reviewed  Constitutional:       General: She is not in acute distress  Appearance: She is not diaphoretic  Comments: Frail-appearing   HENT:      Head: Normocephalic  Eyes:      General:         Right eye: No discharge  Left eye: No discharge  Neck:      Comments: Trachea midline, JVD present  Cardiovascular:      Rate and Rhythm: Normal rate and regular rhythm  Heart sounds: Murmur heard  No friction rub  Pulmonary:      Effort: Pulmonary effort is normal  No respiratory distress  Breath sounds: No wheezing  Comments: Decreased breath sounds bilaterally  Abdominal:      General: Bowel sounds are normal       Palpations: Abdomen is soft  Tenderness: There is no abdominal tenderness  Musculoskeletal:      Right lower leg: Edema (1-2 +) present  Left lower leg: Edema ( 2+) present  Skin:     General: Skin is warm and dry  Neurological:      Mental Status: She is alert  Comments: Awake, alert, able to answer questions appropriately, able to move extremities bilaterally     Psychiatric:         Mood and Affect: Mood normal          Behavior: Behavior normal

## 2022-06-09 ENCOUNTER — APPOINTMENT (OUTPATIENT)
Dept: LAB | Facility: CLINIC | Age: 67
End: 2022-06-09
Payer: COMMERCIAL

## 2022-06-09 DIAGNOSIS — R60.0 BILATERAL LOWER EXTREMITY EDEMA: ICD-10-CM

## 2022-06-09 LAB
ANION GAP SERPL CALCULATED.3IONS-SCNC: 10 MMOL/L (ref 4–13)
BUN SERPL-MCNC: 13 MG/DL (ref 5–25)
CALCIUM SERPL-MCNC: 9.3 MG/DL (ref 8.3–10.1)
CHLORIDE SERPL-SCNC: 95 MMOL/L (ref 100–108)
CO2 SERPL-SCNC: 24 MMOL/L (ref 21–32)
CREAT SERPL-MCNC: 1.39 MG/DL (ref 0.6–1.3)
GFR SERPL CREATININE-BSD FRML MDRD: 38 ML/MIN/1.73SQ M
GLUCOSE P FAST SERPL-MCNC: 109 MG/DL (ref 65–99)
POTASSIUM SERPL-SCNC: 3.8 MMOL/L (ref 3.5–5.3)
SODIUM SERPL-SCNC: 129 MMOL/L (ref 136–145)

## 2022-06-09 PROCEDURE — 36415 COLL VENOUS BLD VENIPUNCTURE: CPT

## 2022-06-09 PROCEDURE — 80048 BASIC METABOLIC PNL TOTAL CA: CPT

## 2022-06-10 ENCOUNTER — TELEPHONE (OUTPATIENT)
Dept: CARDIOLOGY CLINIC | Facility: CLINIC | Age: 67
End: 2022-06-10

## 2022-06-10 NOTE — TELEPHONE ENCOUNTER
Attempted to call patient about recent laboratory study results  Unfortunately I was not able to reach the patient but did leave a message on her phone with my name and office number to call back for better time to speak

## 2022-06-10 NOTE — TELEPHONE ENCOUNTER
Called spoke with patient  She notes that while she has been taking the diuretic regimen over the last several days her lower extremity edema has significantly improved  She denies any significant symptoms at this time  I informed her the laboratory studies appear similar to previous in April of 2022  Again counseled her on dietary lifestyle modifications including sodium and fluid restriction  Patient did not reach out to her nephrologist to let them know about more consistent dosing of Bumex  I will send message to patient's Nephrology team and will last that they reach out to patient to discuss  I informed patient that if she does not hear from them by Monday to contact our office

## 2022-06-13 ENCOUNTER — TELEPHONE (OUTPATIENT)
Dept: NEPHROLOGY | Facility: CLINIC | Age: 67
End: 2022-06-13

## 2022-06-13 DIAGNOSIS — N18.31 STAGE 3A CHRONIC KIDNEY DISEASE (HCC): Primary | ICD-10-CM

## 2022-06-22 ENCOUNTER — APPOINTMENT (OUTPATIENT)
Dept: LAB | Facility: CLINIC | Age: 67
End: 2022-06-22
Payer: COMMERCIAL

## 2022-06-22 ENCOUNTER — TELEPHONE (OUTPATIENT)
Dept: NEPHROLOGY | Facility: CLINIC | Age: 67
End: 2022-06-22

## 2022-06-22 DIAGNOSIS — N18.31 STAGE 3A CHRONIC KIDNEY DISEASE (HCC): ICD-10-CM

## 2022-06-22 LAB
ANION GAP SERPL CALCULATED.3IONS-SCNC: 4 MMOL/L (ref 4–13)
BUN SERPL-MCNC: 13 MG/DL (ref 5–25)
CALCIUM SERPL-MCNC: 9.8 MG/DL (ref 8.3–10.1)
CHLORIDE SERPL-SCNC: 102 MMOL/L (ref 100–108)
CO2 SERPL-SCNC: 28 MMOL/L (ref 21–32)
CREAT SERPL-MCNC: 1.47 MG/DL (ref 0.6–1.3)
GFR SERPL CREATININE-BSD FRML MDRD: 36 ML/MIN/1.73SQ M
GLUCOSE P FAST SERPL-MCNC: 87 MG/DL (ref 65–99)
POTASSIUM SERPL-SCNC: 5 MMOL/L (ref 3.5–5.3)
SODIUM SERPL-SCNC: 134 MMOL/L (ref 136–145)

## 2022-06-22 PROCEDURE — 36415 COLL VENOUS BLD VENIPUNCTURE: CPT

## 2022-06-22 PROCEDURE — 80048 BASIC METABOLIC PNL TOTAL CA: CPT

## 2022-06-22 NOTE — TELEPHONE ENCOUNTER
Patient's daughter Gabriel Ley called stating that the patient is very fatigue  She states that the patient slept all day yesterday and today when she is talking to her at times she zones out  Patient's daughter states that she did take her for blood work this afternoon

## 2022-06-24 ENCOUNTER — HOSPITAL ENCOUNTER (OUTPATIENT)
Dept: RADIOLOGY | Facility: IMAGING CENTER | Age: 67
Discharge: HOME | End: 2022-06-24
Payer: COMMERCIAL

## 2022-06-24 VITALS — BODY MASS INDEX: 21.34 KG/M2 | HEIGHT: 62 IN | WEIGHT: 115.96 LBS

## 2022-06-24 DIAGNOSIS — Z12.31 VISIT FOR SCREENING MAMMOGRAM: ICD-10-CM

## 2022-06-24 PROCEDURE — 77067 SCR MAMMO BI INCL CAD: CPT

## 2022-06-24 PROCEDURE — 77063 BREAST TOMOSYNTHESIS BI: CPT

## 2022-06-28 ENCOUNTER — APPOINTMENT (OUTPATIENT)
Dept: RADIOLOGY | Facility: CLINIC | Age: 67
End: 2022-06-28
Payer: COMMERCIAL

## 2022-06-28 ENCOUNTER — OFFICE VISIT (OUTPATIENT)
Dept: OBGYN CLINIC | Facility: CLINIC | Age: 67
End: 2022-06-28
Payer: COMMERCIAL

## 2022-06-28 VITALS — WEIGHT: 115 LBS | BODY MASS INDEX: 21.16 KG/M2 | HEIGHT: 62 IN

## 2022-06-28 DIAGNOSIS — Z87.81 S/P ORIF (OPEN REDUCTION INTERNAL FIXATION) FRACTURE: ICD-10-CM

## 2022-06-28 DIAGNOSIS — Z98.890 S/P ORIF (OPEN REDUCTION INTERNAL FIXATION) FRACTURE: Primary | ICD-10-CM

## 2022-06-28 DIAGNOSIS — Z98.890 S/P ORIF (OPEN REDUCTION INTERNAL FIXATION) FRACTURE: ICD-10-CM

## 2022-06-28 DIAGNOSIS — Z87.81 S/P ORIF (OPEN REDUCTION INTERNAL FIXATION) FRACTURE: Primary | ICD-10-CM

## 2022-06-28 PROCEDURE — 99213 OFFICE O/P EST LOW 20 MIN: CPT | Performed by: ORTHOPAEDIC SURGERY

## 2022-06-28 PROCEDURE — 73502 X-RAY EXAM HIP UNI 2-3 VIEWS: CPT

## 2022-06-28 NOTE — PROGRESS NOTES
Assessment:   Diagnosis ICD-10-CM Associated Orders   1  S/P ORIF (open reduction internal fixation) fracture  Z98 [id number] XR hip/pelv 2-3 vws left if performed    Z87 81        Plan:  · Patient's x-rays today reveal a left hip fracture that remains in stable alignment position and is well healed  Orthopedic hardware remains in good alignment position as well  There are no signs of loosening  · She may perform activities as tolerated  She may be weight-bearing as tolerated with use of the rolling walker for stabilization  · OTC meds and ice p r n  for pain relief  To do next visit:  Return if symptoms worsen or fail to improve  The above stated was discussed in layman's terms and the patient expressed understanding  All questions were answered to the patient's satisfaction  The patient has mild complaints of pain along her left hip  There is full strength full motion  The fracture is completely healed radiographically  Would recommend continuation of home exercise program   Encouraged ambulation  Follow up on an as-needed basis  If her condition changes, she will not hesitate to let us know    Scribe Attestation    I,:  Eusebia Leon am acting as a scribe while in the presence of the attending physician :       I,:  Karyle Sites, DO personally performed the services described in this documentation    as scribed in my presence :             Subjective:   Crystal Titus is a 69 y o  female who presents today for follow-up visit for her left femur  She underwent a left IM nail insertion on 09/29/2021  Patient reports that she has been continuing with home exercise program with some benefit  She denies any significant pain about the left leg on a daily basis  She is currently ambulating with a walker without difficulty  She states that she is currently not very active throughout the day  Overall, she states that she is happy with the progress so far postoperatively    No numbness or tingling  No fevers or chills  Review of systems negative unless otherwise specified in HPI  Review of Systems   Constitutional: Negative for chills, fever and unexpected weight change  HENT: Negative for hearing loss, nosebleeds and sore throat  Eyes: Negative for pain, redness and visual disturbance  Respiratory: Negative for cough, shortness of breath and wheezing  Cardiovascular: Negative for chest pain, palpitations and leg swelling  Gastrointestinal: Negative for abdominal distention, nausea and vomiting  Endocrine: Negative for polydipsia and polyuria  Genitourinary: Negative for dysuria and hematuria  Skin: Negative for rash and wound  Neurological: Negative for dizziness, numbness and headaches  Psychiatric/Behavioral: Negative for decreased concentration and suicidal ideas  Past Medical History:   Diagnosis Date    Bell's palsy     Cancer ([facility] )     lymphoma    Diffuse large B cell lymphoma ([facility] )     Hyperlipidemia     Hypertension     PAD (peripheral artery disease) (HCC)     PAF (paroxysmal atrial fibrillation) (HCC)     PVD (peripheral vascular disease) ([facility] )        Past Surgical History:   Procedure Laterality Date    ARTERIOGRAM Left 12/6/2018    Procedure: LEFT lower extremity angiography, with Left lower extremity run-off, stent and angioplasty of Left Common Femoral Artery (Left Brachial Access); Surgeon: Nora Pollack MD;  Location:  MAIN OR;  Service: Vascular    CARDIAC SURGERY      CARPAL TUNNEL RELEASE      CT NEEDLE BIOPSY LUNG  10/8/2019    HYSTERECTOMY      IR AORTAGRAM WITH RUN-OFF  12/6/2018    OOPHORECTOMY      [place] OPEN RX FEMUR FX+INTRAMED LISA Left 9/29/2021    Procedure: INSERTION NAIL IM FEMUR ANTEGRADE (TROCHANTERIC);   Surgeon: Terri Niño DO;  Location: [facility] MAIN OR;  Service: Orthopedics    TONSILLECTOMY         Family History   Problem Relation Age of Onset    Cancer Mother     Breast cancer Mother     Heart attack Father  No Known Problems Sister     No Known Problems Daughter     No Known Problems Maternal Grandmother     No Known Problems Maternal Grandfather     No Known Problems Paternal Grandmother     No Known Problems Paternal Grandfather     Prostate cancer Brother     No Known Problems Maternal Aunt     No Known Problems Maternal Aunt     No Known Problems Maternal Aunt     No Known Problems Maternal Aunt     No Known Problems Paternal Aunt     No Known Problems Paternal Uncle        Social History     Occupational History    Not on file   Tobacco Use    Smoking status: Current Every Day Smoker     Packs/day: 1 00     Years: 40 00     Pack years: 40 00     Types: Cigarettes    Smokeless tobacco: Never Used   Vaping Use    Vaping Use: Never used   Substance and Sexual Activity    Alcohol use:  Yes    Drug use: No     Comment: medical marijuana 10/7/19    Sexual activity: Not on file         Current Outpatient Medications:     ALPRAZolam (XANAX) 0 5 mg tablet, Take 0 25 mg by mouth, Disp: , Rfl:     atorvastatin (LIPITOR) 40 mg tablet, Take 40 mg by mouth daily at bedtime  , Disp: , Rfl:     bumetanide (BUMEX) 0 5 MG tablet, Take 1 tablet (0 5 mg total) by mouth if needed (for swelling, bloating, weight gain), Disp: 30 tablet, Rfl: 5    Cholecalciferol (VITAMIN D3) 2000 units TABS, Take 1 tablet by mouth daily, Disp: , Rfl:     diltiazem (CARDIZEM CD) 240 mg 24 hr capsule, TAKE 1 CAPSULE BY MOUTH EVERY DAY, Disp: 30 capsule, Rfl: 5    Eliquis 5 MG, TAKE 1 TABLET BY MOUTH TWICE A DAY, Disp: 60 tablet, Rfl: 5    glycerin-hypromellose- (ARTIFICIAL TEARS) 0 2-0 2-1 % SOLN, 1 drop, Disp: , Rfl:     metoprolol succinate (TOPROL-XL) 50 mg 24 hr tablet, Take 1 tablet (50 mg total) by mouth daily, Disp: 90 tablet, Rfl: 3    mirtazapine (REMERON) 15 mg tablet, Take 7 5 mg by mouth daily at bedtime  , Disp: , Rfl:     senna-docusate sodium (SENOKOT S) 8 6-50 mg per tablet, Take 1 tablet by mouth daily As needed for constipation, Disp: 20 tablet, Rfl: 0    Allergies   Allergen Reactions    Oxycodone-Acetaminophen Itching and Rash     Itching, rash          There were no vitals filed for this visit  Objective:                    Ortho Exam   Left lower extremity is neurovascularly intact  Toes are pink and mobile   Compartments are soft  No warmth, erythema or ecchymosis  Good range of the hip without pain  No tenderness to palpation  Brisk capillary refill  Sensations intact  Strength has improved to the left lower extremity since her last visit  Diagnostics, reviewed and taken today if performed as documented: The attending physician has personally reviewed the pertinent films in PACS and interpretation is as follows:    X-rays left hip 6/28/2022 reveals fracture to be completely healed with orthopedic hardware remaining in stable alignment and position  There are no signs of loosening  There are no dislocations  Procedures, if performed today:    Procedures    None performed      Portions of the record may have been created with voice recognition software  Occasional wrong word or "sound a like" substitutions may have occurred due to the inherent limitations of voice recognition software  Read the chart carefully and recognize, using context, where substitutions have occurred

## 2022-07-12 ENCOUNTER — OFFICE VISIT (OUTPATIENT)
Dept: CARDIOLOGY CLINIC | Facility: CLINIC | Age: 67
End: 2022-07-12
Payer: COMMERCIAL

## 2022-07-12 VITALS
HEART RATE: 60 BPM | DIASTOLIC BLOOD PRESSURE: 80 MMHG | HEIGHT: 62 IN | BODY MASS INDEX: 20.8 KG/M2 | SYSTOLIC BLOOD PRESSURE: 110 MMHG | WEIGHT: 113 LBS

## 2022-07-12 DIAGNOSIS — R60.0 BILATERAL LOWER EXTREMITY EDEMA: ICD-10-CM

## 2022-07-12 DIAGNOSIS — F17.200 TOBACCO DEPENDENCY: ICD-10-CM

## 2022-07-12 DIAGNOSIS — I48.0 PAROXYSMAL ATRIAL FIBRILLATION (HCC): Primary | ICD-10-CM

## 2022-07-12 DIAGNOSIS — I10 BENIGN ESSENTIAL HYPERTENSION: ICD-10-CM

## 2022-07-12 PROCEDURE — 99214 OFFICE O/P EST MOD 30 MIN: CPT | Performed by: INTERNAL MEDICINE

## 2022-07-12 RX ORDER — ACETAMINOPHEN 160 MG
2000 TABLET,DISINTEGRATING ORAL DAILY
COMMUNITY
Start: 2022-06-13 | End: 2022-10-28 | Stop reason: ALTCHOICE

## 2022-07-12 NOTE — PROGRESS NOTES
Cardiology Follow Up    Carine Johnson  [id number]  1955  [facility] [address] [address]      1  Paroxysmal atrial fibrillation (HCC)     2  Benign essential hypertension     3  Tobacco dependency     4  Bilateral lower extremity edema         Discussion/Summary:  1  Paroxysmal atrial fibrillation on oral anticoagulation  2  Hypertension  3  Heart failure with preserved ejection fraction  4  Peripheral vascular disease  5  Mitral annular calcification with mild mitral stenosis  6  Chronic kidney disease    -transthoracic echocardiogram 09/30/2021 showing left ventricular systolic function normal estimated LVEF 65% with marked mitral annular calcification, mild mitral stenosis, mild mitral regurgitation   -after several discussions with the patient as well as her daughter patient did not want any invasive or aggressive testing performed and did not even wish to undergo stress testing however was agreeable to continued medical therapy   -as patient notes good control blood pressures at home and overall good control with her current regimen will continue Bumex 0 5 mg daily p r n   For lower extremity edema, waking greater than 3 lb in 1 day or 5 lb in 1 week, shortness of breath, orthopnea  -will also continue atorvastatin 40 mg daily, diltiazem 240 mg daily, Eliquis 5 mg twice daily at this time however will need to continue to monitor renal function as this may have to change dosing based on the fact that patient is also less than 60 kg in weight  -patient will follow-up with her nephrologist as well  -counseled patient on dietary lifestyle modifications including following fluid restriction to less than 1800 mL of fluid daily and sodium restriction less than 1800 mg of sodium daily along with monitoring daily weights at home and blood pressures  -will see patient in 3 months or sooner if necessary  -patient counseled if she were to have any warning or alarm type symptoms she is to seek emergency medical care immediately  History of Present Illness:  -77-year-old female with hypertension, history of lymphoma s/p chemotherapy in April of 2019, current tobacco use hospitalized in September of 2021 found at that time to have left hip fracture after tripping over her dog along with significant peripheral vascular disease and severe aortoiliac arterial occlusive disease with aortobifemoral bypass in 2010, bilateral fem-pop bypass in 2013 and 2016 along with recent left CFA angioplasty with stent as well as paroxysmal atrial fibrillation on oral anticoagulation who presents to the office today for scheduled follow-up   -patient notes that she has been following p r n  Diuretic regimen which has been maintaining her reasonably well and she notes that she takes this approximately 2-3 times per week or less   -she notes that she did take some this morning due to some lower extremity edema that began today but denies any orthopnea, current shortness of breath, chest pain, lightheadedness or dizziness, loss of consciousness or other issue at this time      Patient Active Problem List   Diagnosis    Constipation    Lower extremity weakness    [facility]) [facility]    Bell's palsy    Carotid stenosis, asymptomatic, bilateral    Depression with anxiety    Benign essential hypertension    Essential hypertension    Fall    Hyponatremia    Tobacco dependency    Lymphoma ([facility]    PAD (peripheral artery disease) (HCC)    Paroxysmal atrial fibrillation (HCC)    Leg pain    Peripheral vascular disease (HCC)    Foot pain    Weakness of lower extremity    Aortoiliac occlusive disease (HCC)    Bilateral lower extremity edema    Lung nodule    Closed left hip fracture (HCC)    Insomnia    Acute respiratory failure with hypoxia    Hypotension    Anemia  Atrial fibrillation with RVR (HCC)    Buttock wound    Stage 3a chronic kidney disease (HCC)    Vitamin D deficiency    Secondary hyperparathyroidism ([place] [address] )     Past Medical History:   Diagnosis Date    Bell's palsy     Cancer ([place] [address] )     lymphoma    Diffuse large B cell lymphoma ([place] [address] )     Hyperlipidemia     Hypertension     PAD (peripheral artery disease) (HCC)     PAF (paroxysmal atrial fibrillation) ([place] [address] )     PVD (peripheral vascular disease) ([place] [address] )      Social History     Socioeconomic History    Marital status:      Spouse name: Not on file    Number of children: Not on file    Years of education: Not on file    Highest education level: Not on file   Occupational History    Not on file   Tobacco Use    Smoking status: Current Every Day Smoker     Packs/day: 1 00     Years: 40 00     Pack years: 40 00     Types: Cigarettes    Smokeless tobacco: Never Used   Vaping Use    Vaping Use: Never used   Substance and Sexual Activity    Alcohol use:  Yes    Drug use: No     Comment: medical marijuana 10/7/19    Sexual activity: Not on file   Other Topics Concern    Not on file   Social History Narrative    Not on file     Social Determinants of Health     Financial Resource Strain: Not on file   Food Insecurity: Not on file   Transportation Needs: Not on file   Physical Activity: Not on file   Stress: Not on file   Social Connections: Not on file   Intimate Partner Violence: Not on file   Housing Stability: Not on file      Family History   Problem Relation Age of Onset    Cancer Mother     Breast cancer Mother     Heart attack Father     No Known Problems Sister     No Known Problems Daughter     No Known Problems Maternal Grandmother     No Known Problems Maternal Grandfather     No Known Problems Paternal Grandmother     No Known Problems Paternal Grandfather     Prostate cancer Brother     No Known Problems Maternal Aunt     No Known Problems Maternal Aunt     No Known Problems Maternal Aunt     No Known Problems Maternal Aunt     No Known Problems Paternal Aunt     No Known Problems Paternal Uncle      Past Surgical History:   Procedure Laterality Date    ARTERIOGRAM Left 12/6/2018    Procedure: LEFT lower extremity angiography, with Left lower extremity run-off, stent and angioplasty of Left Common Femoral Artery (Left Brachial Access); Surgeon: El Anne MD;  [facility];  Service: Vascular    CARDIAC SURGERY      CARPAL TUNNEL RELEASE      CT NEEDLE BIOPSY LUNG  10/8/2019    HYSTERECTOMY      IR AORTAGRAM WITH RUN-OFF  12/6/2018    OOPHORECTOMY      [place] OPEN RX FEMUR FX+INTRAMED LISA Left 9/29/2021    Procedure: INSERTION NAIL IM FEMUR ANTEGRADE (TROCHANTERIC);   Surgeon: Sherlyn Ga DO;  Location: [address] MAIN OR;  Service: Orthopedics    TONSILLECTOMY         Current Outpatient Medications:     ALPRAZolam (XANAX) 0 5 mg tablet, Take 0 25 mg by mouth, Disp: , Rfl:     atorvastatin (LIPITOR) 40 mg tablet, Take 40 mg by mouth daily at bedtime  , Disp: , Rfl:     bumetanide (BUMEX) 0 5 MG tablet, TAKE 1 TABLET (0 5 MG TOTAL) BY MOUTH IF NEEDED (FOR SWELLING, BLOATING, WEIGHT GAIN), Disp: 90 tablet, Rfl: 2    Cholecalciferol (VITAMIN D3) 2000 units TABS, Take 1 tablet by mouth daily, Disp: , Rfl:     Cholecalciferol (Vitamin D3) 50 MCG (2000 UT) capsule, Take 2,000 Units by mouth daily, Disp: , Rfl:     diltiazem (CARDIZEM CD) 240 mg 24 hr capsule, TAKE 1 CAPSULE BY MOUTH EVERY DAY, Disp: 30 capsule, Rfl: 5    Eliquis 5 MG, TAKE 1 TABLET BY MOUTH TWICE A DAY, Disp: 60 tablet, Rfl: 5    glycerin-hypromellose- (ARTIFICIAL TEARS) 0 2-0 2-1 % SOLN, 1 drop, Disp: , Rfl:     metoprolol succinate (TOPROL-XL) 50 mg 24 hr tablet, Take 1 tablet (50 mg total) by mouth daily, Disp: 90 tablet, Rfl: 3    mirtazapine (REMERON) 15 mg tablet, Take 7 5 mg by mouth daily at bedtime  , Disp: , Rfl:     senna-docusate sodium (SENOKOT S) 8 6-50 mg per tablet, Take 1 tablet by mouth daily As needed for constipation, Disp: 20 tablet, Rfl: 0  Allergies   Allergen Reactions    Oxycodone-Acetaminophen Itching and Rash     Itching, rash         Labs:  Appointment on 06/22/2022   Component Date Value    Sodium 06/22/2022 134 (A)    Potassium 06/22/2022 5 0     Chloride 06/22/2022 102     CO2 06/22/2022 28     ANION GAP 06/22/2022 4     BUN 06/22/2022 13     Creatinine 06/22/2022 1 47 (A)    Glucose, Fasting 06/22/2022 87     Calcium 06/22/2022 9 8     eGFR 06/22/2022 36    Appointment on 06/09/2022   Component Date Value    Sodium 06/09/2022 129 (A)    Potassium 06/09/2022 3 8     Chloride 06/09/2022 95 (A)    CO2 06/09/2022 24     ANION GAP 06/09/2022 10     BUN 06/09/2022 13     Creatinine 06/09/2022 1 39 (A)    Glucose, Fasting 06/09/2022 109 (A)    Calcium 06/09/2022 9 3     eGFR 06/09/2022 38         Imaging: XR hip/pelv 2-3 vws left if performed    Result Date: 7/1/2022  Narrative: LEFT HIP INDICATION:   [id number]: Other specified postprocedural states Z87 81: Personal history of (healed) traumatic fracture  COMPARISON:  3/14/2022 VIEWS:  XR HIP/PELV 2-3 VWS LEFT  W PELVIS IF PERFORMED Images: 2 FINDINGS: Stable appearance intramedullary shelby and dynamic nail fixation mildly comminuted intertrochanteric fracture left hip, unchanged Persistent mild degenerative arthritis both hips No lytic or blastic osseous lesion  There are atherosclerotic calcifications  Soft tissues are otherwise unremarkable  Impression: Stable appearance of comminuted left hip fracture status post ORIF Workstation performed: [id number]     Mammo screening bilateral w 3d & cad    Result Date: 6/28/2022  Narrative: DIAGNOSIS: Visit for screening mammogram TECHNIQUE: Digital screening mammography was performed  Computer Aided Detection (CAD) analyzed all applicable images   COMPARISONS: Prior breast imaging dated: 09/23/2020, 05/22/2018, 08/01/2017, 01/17/2017, and 11/23/2016 RELEVANT HISTORY: Family Breast Cancer History: History of breast cancer in Mother  Family Medical History: Family medical history includes breast cancer in mother  Personal History: No known relevant hormone history  Surgical history includes hysterectomy and oophorectomy  No known relevant medical history  The patient is scheduled in a reminder system for screening mammography  8-10% of cancers will be missed on mammography  Management of a palpable abnormality must be based on clinical grounds  Patients will be notified of their results via letter from our facility  Accredited by [employer] of [employer] and FDA  RISK ASSESSMENT: 5 Year Tyrer-Cuzick: 1 67 % 10 Year Tyrer-Cuzick: 3 47 % Lifetime Tyrer-Cuzick: 5 89 % TISSUE DENSITY: The breasts are heterogeneously dense, which may obscure small masses  INDICATION: Coralie Spatz is a 69 y o  female presenting for screening mammography  FINDINGS: There are no suspicious masses, grouped microcalcifications or areas of architectural distortion  The skin and nipple areolar complex are unremarkable  Impression: No mammographic evidence of malignancy  ASSESSMENT/BI-RADS CATEGORY: Left: 1 - Negative Right: 1 - Negative Overall: 1 - Negative RECOMMENDATION:      - Routine screening mammogram in 1 year for both breasts  Workstation ID: [id number]       Review of Systems:  Review of Systems   Constitutional: Negative for chills, diaphoresis, fatigue and fever  HENT: Negative for trouble swallowing and voice change  Eyes: Negative for pain and redness  Respiratory: Negative for shortness of breath and wheezing  Cardiovascular: Positive for leg swelling  Negative for chest pain and palpitations  Gastrointestinal: Negative for abdominal pain, constipation, diarrhea, nausea and vomiting  Genitourinary: Negative for dysuria  Musculoskeletal: Positive for arthralgias  Negative for neck pain and neck stiffness  Skin: Negative for rash     Neurological: Negative for dizziness, syncope, light-headedness and headaches  Psychiatric/Behavioral: Negative for agitation and confusion  All other systems reviewed and are negative  Vitals:    07/12/22 1144   BP: 110/80   Pulse: 60   Weight: 51 3 kg (113 lb)   Height: 5' 2" (1 575 m)     Vitals:    07/12/22 1144   Weight: 51 3 kg (113 lb)     Height: 5' 2" (157 5 cm)     Physical Exam:  Physical Exam  Vitals reviewed  Constitutional:       General: She is not in acute distress  Appearance: She is not diaphoretic  Comments: Frail-appearing   HENT:      Head: Normocephalic and atraumatic  Eyes:      General:         Right eye: No discharge  Left eye: No discharge  Neck:      Comments: Trachea midline, no JVD present  Cardiovascular:      Rate and Rhythm: Normal rate and regular rhythm  Heart sounds: Murmur heard  No friction rub  Pulmonary:      Effort: Pulmonary effort is normal  No respiratory distress  Breath sounds: No wheezing  Comments: Decreased breath sounds bilaterally  Abdominal:      General: Bowel sounds are normal       Palpations: Abdomen is soft  Tenderness: There is no abdominal tenderness  There is no rebound  Musculoskeletal:      Right lower leg: Edema (Trace) present  Left lower leg: Edema ( trace) present  Skin:     General: Skin is warm and dry  Neurological:      Mental Status: She is alert  Comments: Awake, alert, able answer questions appropriately, able to move extremities bilaterally     Psychiatric:         Mood and Affect: Mood normal          Behavior: Behavior normal

## 2022-08-02 ENCOUNTER — APPOINTMENT (OUTPATIENT)
Dept: LAB | Facility: CLINIC | Age: 67
End: 2022-08-02
Payer: COMMERCIAL

## 2022-08-02 DIAGNOSIS — C83.31 DIFFUSE LARGE B-CELL LYMPHOMA, LYMPH NODES OF HEAD, FACE, AND NECK (HCC): ICD-10-CM

## 2022-08-02 DIAGNOSIS — C83.30 RETICULOSARCOMA (HCC): ICD-10-CM

## 2022-08-02 DIAGNOSIS — H05.89 OTHER DISORDERS OF ORBIT: ICD-10-CM

## 2022-08-02 LAB
ALBUMIN SERPL BCP-MCNC: 3.3 G/DL (ref 3.5–5)
ALP SERPL-CCNC: 106 U/L (ref 46–116)
ALT SERPL W P-5'-P-CCNC: 16 U/L (ref 12–78)
ANION GAP SERPL CALCULATED.3IONS-SCNC: 7 MMOL/L (ref 4–13)
AST SERPL W P-5'-P-CCNC: 15 U/L (ref 5–45)
BASOPHILS # BLD AUTO: 0.07 THOUSANDS/ΜL (ref 0–0.1)
BASOPHILS NFR BLD AUTO: 1 % (ref 0–1)
BILIRUB SERPL-MCNC: 0.64 MG/DL (ref 0.2–1)
BUN SERPL-MCNC: 18 MG/DL (ref 5–25)
CALCIUM ALBUM COR SERPL-MCNC: 10.2 MG/DL (ref 8.3–10.1)
CALCIUM SERPL-MCNC: 9.6 MG/DL (ref 8.3–10.1)
CHLORIDE SERPL-SCNC: 102 MMOL/L (ref 96–108)
CO2 SERPL-SCNC: 24 MMOL/L (ref 21–32)
CREAT SERPL-MCNC: 1.57 MG/DL (ref 0.6–1.3)
EOSINOPHIL # BLD AUTO: 0.25 THOUSAND/ΜL (ref 0–0.61)
EOSINOPHIL NFR BLD AUTO: 4 % (ref 0–6)
ERYTHROCYTE [DISTWIDTH] IN BLOOD BY AUTOMATED COUNT: 15.7 % (ref 11.6–15.1)
GFR SERPL CREATININE-BSD FRML MDRD: 33 ML/MIN/1.73SQ M
GLUCOSE SERPL-MCNC: 150 MG/DL (ref 65–140)
HCT VFR BLD AUTO: 44.5 % (ref 34.8–46.1)
HGB BLD-MCNC: 14.9 G/DL (ref 11.5–15.4)
IMM GRANULOCYTES # BLD AUTO: 0.03 THOUSAND/UL (ref 0–0.2)
IMM GRANULOCYTES NFR BLD AUTO: 1 % (ref 0–2)
LDH SERPL-CCNC: 187 U/L (ref 81–234)
LYMPHOCYTES # BLD AUTO: 1.28 THOUSANDS/ΜL (ref 0.6–4.47)
LYMPHOCYTES NFR BLD AUTO: 20 % (ref 14–44)
MCH RBC QN AUTO: 29 PG (ref 26.8–34.3)
MCHC RBC AUTO-ENTMCNC: 33.5 G/DL (ref 31.4–37.4)
MCV RBC AUTO: 87 FL (ref 82–98)
MONOCYTES # BLD AUTO: 0.82 THOUSAND/ΜL (ref 0.17–1.22)
MONOCYTES NFR BLD AUTO: 13 % (ref 4–12)
NEUTROPHILS # BLD AUTO: 3.83 THOUSANDS/ΜL (ref 1.85–7.62)
NEUTS SEG NFR BLD AUTO: 61 % (ref 43–75)
NRBC BLD AUTO-RTO: 0 /100 WBCS
PLATELET # BLD AUTO: 271 THOUSANDS/UL (ref 149–390)
PMV BLD AUTO: 11 FL (ref 8.9–12.7)
POTASSIUM SERPL-SCNC: 3.9 MMOL/L (ref 3.5–5.3)
PROT SERPL-MCNC: 7 G/DL (ref 6.4–8.4)
RBC # BLD AUTO: 5.13 MILLION/UL (ref 3.81–5.12)
SODIUM SERPL-SCNC: 133 MMOL/L (ref 135–147)
WBC # BLD AUTO: 6.28 THOUSAND/UL (ref 4.31–10.16)

## 2022-08-02 PROCEDURE — 80053 COMPREHEN METABOLIC PANEL: CPT

## 2022-08-02 PROCEDURE — 83615 LACTATE (LD) (LDH) ENZYME: CPT

## 2022-08-02 PROCEDURE — 36415 COLL VENOUS BLD VENIPUNCTURE: CPT

## 2022-08-02 PROCEDURE — 85025 COMPLETE CBC W/AUTO DIFF WBC: CPT

## 2022-08-04 ENCOUNTER — HOSPITAL ENCOUNTER (OUTPATIENT)
Dept: MRI IMAGING | Facility: HOSPITAL | Age: 67
Discharge: HOME | End: 2022-08-04
Payer: COMMERCIAL

## 2022-08-04 DIAGNOSIS — H05.89 OTHER DISORDERS OF ORBIT: ICD-10-CM

## 2022-08-04 DIAGNOSIS — C83.31 DIFFUSE LARGE B-CELL LYMPHOMA, LYMPH NODES OF HEAD, FACE, AND NECK (HCC): ICD-10-CM

## 2022-08-04 PROCEDURE — G1004 CDSM NDSC: HCPCS

## 2022-08-04 PROCEDURE — A9585 GADOBUTROL INJECTION: HCPCS | Performed by: RADIOLOGY

## 2022-08-04 PROCEDURE — 70553 MRI BRAIN STEM W/O & W/DYE: CPT

## 2022-08-04 RX ADMIN — GADOBUTROL 5 ML: 604.72 INJECTION INTRAVENOUS at 12:36

## 2022-08-25 ENCOUNTER — HOSPITAL ENCOUNTER (OUTPATIENT)
Dept: CT IMAGING | Facility: HOSPITAL | Age: 67
End: 2022-08-25
Payer: COMMERCIAL

## 2022-08-25 DIAGNOSIS — F17.210 SMOKING GREATER THAN 25 PACK YEARS: ICD-10-CM

## 2022-08-25 DIAGNOSIS — F17.200 TOBACCO DEPENDENCY: ICD-10-CM

## 2022-08-25 PROCEDURE — 71271 CT THORAX LUNG CANCER SCR C-: CPT

## 2022-09-16 ENCOUNTER — ANESTHESIA (OUTPATIENT)
Dept: GASTROENTEROLOGY | Facility: HOSPITAL | Age: 67
End: 2022-09-16

## 2022-09-16 ENCOUNTER — HOSPITAL ENCOUNTER (OUTPATIENT)
Dept: GASTROENTEROLOGY | Facility: HOSPITAL | Age: 67
Setting detail: OUTPATIENT SURGERY
End: 2022-09-16
Attending: COLON & RECTAL SURGERY
Payer: COMMERCIAL

## 2022-09-16 ENCOUNTER — ANESTHESIA EVENT (OUTPATIENT)
Dept: GASTROENTEROLOGY | Facility: HOSPITAL | Age: 67
End: 2022-09-16

## 2022-09-16 VITALS
HEART RATE: 77 BPM | TEMPERATURE: 96.8 F | RESPIRATION RATE: 16 BRPM | DIASTOLIC BLOOD PRESSURE: 86 MMHG | SYSTOLIC BLOOD PRESSURE: 167 MMHG | WEIGHT: 112 LBS | HEIGHT: 63 IN | OXYGEN SATURATION: 94 % | BODY MASS INDEX: 19.84 KG/M2

## 2022-09-16 DIAGNOSIS — Z85.038 PERSONAL HISTORY OF COLON CANCER: ICD-10-CM

## 2022-09-16 PROCEDURE — 99213 OFFICE O/P EST LOW 20 MIN: CPT | Performed by: COLON & RECTAL SURGERY

## 2022-09-16 PROCEDURE — 45385 COLONOSCOPY W/LESION REMOVAL: CPT | Performed by: COLON & RECTAL SURGERY

## 2022-09-16 PROCEDURE — 88305 TISSUE EXAM BY PATHOLOGIST: CPT | Performed by: STUDENT IN AN ORGANIZED HEALTH CARE EDUCATION/TRAINING PROGRAM

## 2022-09-16 RX ORDER — PROPOFOL 10 MG/ML
INJECTION, EMULSION INTRAVENOUS AS NEEDED
Status: DISCONTINUED | OUTPATIENT
Start: 2022-09-16 | End: 2022-09-16

## 2022-09-16 RX ORDER — SODIUM CHLORIDE 9 MG/ML
INJECTION, SOLUTION INTRAVENOUS CONTINUOUS PRN
Status: DISCONTINUED | OUTPATIENT
Start: 2022-09-16 | End: 2022-09-16

## 2022-09-16 RX ORDER — LIDOCAINE HYDROCHLORIDE 10 MG/ML
INJECTION, SOLUTION EPIDURAL; INFILTRATION; INTRACAUDAL; PERINEURAL AS NEEDED
Status: DISCONTINUED | OUTPATIENT
Start: 2022-09-16 | End: 2022-09-16

## 2022-09-16 RX ADMIN — PROPOFOL 20 MG: 10 INJECTION, EMULSION INTRAVENOUS at 11:05

## 2022-09-16 RX ADMIN — PROPOFOL 20 MG: 10 INJECTION, EMULSION INTRAVENOUS at 11:04

## 2022-09-16 RX ADMIN — PROPOFOL 20 MG: 10 INJECTION, EMULSION INTRAVENOUS at 11:03

## 2022-09-16 RX ADMIN — PROPOFOL 20 MG: 10 INJECTION, EMULSION INTRAVENOUS at 11:16

## 2022-09-16 RX ADMIN — PROPOFOL 20 MG: 10 INJECTION, EMULSION INTRAVENOUS at 11:06

## 2022-09-16 RX ADMIN — LIDOCAINE HYDROCHLORIDE 50 MG: 10 INJECTION, SOLUTION EPIDURAL; INFILTRATION; INTRACAUDAL; PERINEURAL at 10:55

## 2022-09-16 RX ADMIN — PROPOFOL 20 MG: 10 INJECTION, EMULSION INTRAVENOUS at 11:00

## 2022-09-16 RX ADMIN — PROPOFOL 20 MG: 10 INJECTION, EMULSION INTRAVENOUS at 11:07

## 2022-09-16 RX ADMIN — PROPOFOL 20 MG: 10 INJECTION, EMULSION INTRAVENOUS at 10:56

## 2022-09-16 RX ADMIN — PROPOFOL 20 MG: 10 INJECTION, EMULSION INTRAVENOUS at 11:02

## 2022-09-16 RX ADMIN — PROPOFOL 40 MG: 10 INJECTION, EMULSION INTRAVENOUS at 10:55

## 2022-09-16 RX ADMIN — PROPOFOL 20 MG: 10 INJECTION, EMULSION INTRAVENOUS at 11:13

## 2022-09-16 RX ADMIN — PROPOFOL 20 MG: 10 INJECTION, EMULSION INTRAVENOUS at 11:10

## 2022-09-16 RX ADMIN — SODIUM CHLORIDE: 0.9 INJECTION, SOLUTION INTRAVENOUS at 10:40

## 2022-09-16 RX ADMIN — PROPOFOL 20 MG: 10 INJECTION, EMULSION INTRAVENOUS at 11:08

## 2022-09-16 RX ADMIN — PROPOFOL 20 MG: 10 INJECTION, EMULSION INTRAVENOUS at 10:26

## 2022-09-16 RX ADMIN — PROPOFOL 20 MG: 10 INJECTION, EMULSION INTRAVENOUS at 10:58

## 2022-09-16 NOTE — ANESTHESIA PREPROCEDURE EVALUATION
Procedure:  COLONOSCOPY    Relevant Problems   ANESTHESIA (within normal limits)      CARDIO   (+) Aortoiliac occlusive disease (HCC)   (+) Atrial fibrillation with RVR (HCC)   (+) Benign essential hypertension   (+) Carotid stenosis, asymptomatic, bilateral   (+) Essential hypertension   (+) PAD (peripheral artery disease) (HCC)   (+) Paroxysmal atrial fibrillation (HCC)      ENDO   (+) Secondary hyperparathyroidism (HCC)      GI/HEPATIC (within normal limits)      /RENAL   (+) BRYANT (acute kidney injury) ([facility] [address] )   (+) Stage 3a chronic kidney disease ([facility] [address] )      GYN (within normal limits)      HEMATOLOGY   (+) Anemia   (+) Lymphoma (HCC)      MUSCULOSKELETAL (within normal limits)      NEURO/PSYCH   (+) Depression with anxiety   (+) Lower extremity weakness   (+) Weakness of lower extremity      PULMONARY   (+) Acute respiratory failure with hypoxia      Other   (+) Tobacco dependency        Physical Exam    Airway    Mallampati score: II  TM Distance: >3 FB  Neck ROM: full     Dental   lower dentures and upper dentures,     Cardiovascular  Rhythm: irregular, Rate: normal,     Pulmonary  Breath sounds clear to auscultation, Decreased breath sounds,     Other Findings        Anesthesia Plan  ASA Score- 3     Anesthesia Type- IV sedation with anesthesia with ASA Monitors  Additional Monitors:   Airway Plan:     Comment: NC with CO2 monitoring  Plan Factors-Exercise tolerance (METS): <4 METS  Chart reviewed  EKG reviewed  Imaging results reviewed  Existing labs reviewed  Patient summary reviewed  Patient is a current smoker  Patient did not smoke on day of surgery  Obstructive sleep apnea risk education given perioperatively  Induction- intravenous  Postoperative Plan-     Informed Consent- Anesthetic plan and risks discussed with patient  I personally reviewed this patient with the CRNA  Discussed and agreed on the Anesthesia Plan with the CRNA  Juan Umana

## 2022-09-16 NOTE — ANESTHESIA POSTPROCEDURE EVALUATION
Post-Op Assessment Note    CV Status:  Stable  Pain Score: 0    Pain management: adequate     Mental Status:  Alert and awake   Hydration Status:  Euvolemic   PONV Controlled:  Controlled   Airway Patency:  Patent      Post Op Vitals Reviewed: Yes      Staff: CRNA, Anesthesiologist         No complications documented      /56 (09/16/22 1125)    Temp (!) 96 8 °F (36 °C) (09/16/22 1125)    Pulse 69 (09/16/22 1125)   Resp 18 (09/16/22 1125)    SpO2 97 % (09/16/22 1125)

## 2022-09-16 NOTE — H&P
History and Physical   Colon and Rectal Surgery   Ricky Jarrell 69 y o  female MRN: [id number]  Unit/Bed#:  Encounter: [id number]  09/16/22   10:46 AM      No chief complaint on file  ASSESSMENT:  Ricky Jarrell is a 69 y o  female who presents for screening colonoscopy, last 2015 with Advanced size polyps, she did not follow-up for recall colonoscopy  Screening colonoscopy,discussed in a face-to-face, personal, informed consent process, the benefits, alternatives, risks including not limited to bleeding, missed lesion, perforation requiring emergent surgery discussed/understood  PLAN:  Colonoscopy    History of Present Illness   HPI:  Ricky Jarrell is a 69 y o  female who presents for colonoscopy  Historical Information   Past Medical History:   Diagnosis Date    Bell's palsy     [facility]     lymphoma    Diffuse large B cell lymphoma ([facility] )     Hyperlipidemia     Hypertension     PAD (peripheral artery disease) (HCC)     PAF (paroxysmal atrial fibrillation) (HCC)     PVD [facility]peripheral vascular disease) ([facility] )      Past Surgical History:   Procedure Laterality Date    ARTERIOGRAM Left 12/6/2018    Procedure: LEFT lower extremity angiography, with Left lower extremity run-off, stent and angioplasty of Left Common Femoral Artery (Left Brachial Access); Surgeon: Fanny Norton MD;  Location:  MAIN OR;  Service: Vascular    CARDIAC SURGERY      CARPAL TUNNEL RELEASE      CT NEEDLE BIOPSY LUNG  10/8/2019    HYSTERECTOMY      IR AORTAGRAM WITH RUN-OFF  12/6/2018    OOPHORECTOMY      [place] OPEN RX FEMUR FX+INTRAMED LISA Left 9/29/2021    Procedure: INSERTION NAIL IM FEMUR ANTEGRADE (TROCHANTERIC);   Surgeon: Crista Penn DO;  Location: [address] [place] [address] MAIN OR;  Service: Orthopedics    TONSILLECTOMY         Meds/Allergies     (Not in a hospital admission)        Current Outpatient Medications:     ALPRAZolam (XANAX) 0 5 mg tablet, Take 0 25 mg by mouth, Disp: , Rfl:     atorvastatin (LIPITOR) 40 mg tablet, Take 40 mg by mouth daily at bedtime  , Disp: , Rfl:     bumetanide (BUMEX) 0 5 MG tablet, TAKE 1 TABLET (0 5 MG TOTAL) BY MOUTH IF NEEDED (FOR SWELLING, BLOATING, WEIGHT GAIN), Disp: 90 tablet, Rfl: 2    Cholecalciferol (VITAMIN D3) 2000 units TABS, Take 1 tablet by mouth daily, Disp: , Rfl:     Cholecalciferol (Vitamin D3) 50 MCG (2000 UT) capsule, Take 2,000 Units by mouth daily, Disp: , Rfl:     Eliquis 5 MG, TAKE 1 TABLET BY MOUTH TWICE A DAY, Disp: 60 tablet, Rfl: 5    glycerin-hypromellose- (ARTIFICIAL TEARS) 0 2-0 2-1 % SOLN, 1 drop, Disp: , Rfl:     metoprolol succinate (TOPROL-XL) 50 mg 24 hr tablet, Take 1 tablet (50 mg total) by mouth daily, Disp: 90 tablet, Rfl: 3    mirtazapine (REMERON) 15 mg tablet, Take 7 5 mg by mouth daily at bedtime  , Disp: , Rfl:     senna-docusate sodium (SENOKOT S) 8 6-50 mg per tablet, Take 1 tablet by mouth daily As needed for constipation, Disp: 20 tablet, Rfl: 0    diltiazem (CARDIZEM CD) 240 mg 24 hr capsule, TAKE 1 CAPSULE BY MOUTH EVERY DAY, Disp: 90 capsule, Rfl: 3    Allergies   Allergen Reactions    Oxycodone-Acetaminophen Itching and Rash     Itching, rash         Social History   Social History     Substance and Sexual Activity   Alcohol Use Yes     Social History     Substance and Sexual Activity   Drug Use No    Comment: medical marijuana 10/7/19     Social History     Tobacco Use   Smoking Status Current Every Day Smoker    Packs/day: 1 00    Years: 40 00    Pack years: 40 00    Types: Cigarettes   Smokeless Tobacco Never Used         Family History:   Family History   Problem Relation Age of Onset    Cancer Mother     Breast cancer Mother     Heart attack Father     No Known Problems Sister     No Known Problems Daughter     No Known Problems Maternal Grandmother     No Known Problems Maternal Grandfather     No Known Problems Paternal Grandmother     No Known Problems Paternal Grandfather     Prostate cancer Brother     No Known Problems Maternal Aunt     No Known Problems Maternal Aunt     No Known Problems Maternal Aunt     No Known Problems Maternal Aunt     No Known Problems Paternal Aunt     No Known Problems Paternal Uncle          Objective     Current Vitals:   Blood Pressure: 130/70 (09/16/22 1020)  Pulse: 71 (09/16/22 1020)  Temperature: 98 1 °F (36 7 °C) (09/16/22 1020)  Temp Source: Tympanic (09/16/22 1020)  Respirations: 16 (09/16/22 1020)  Height: 5' 2 5" (158 8 cm) (09/16/22 1020)  Weight - Scale: 50 8 kg (112 lb) (09/16/22 1020)  SpO2: 94 % (09/16/22 1020)  No intake or output data in the 24 hours ending 09/16/22 1046    Physical Exam:  General:no distress  Eyes:perrla/eomi  ENT:moist mucus membranes  Neck:supple  Pulm:no increased work of breathing  CV:sinus  Abdomen:soft,nontender

## 2022-09-20 ENCOUNTER — CONSULT (OUTPATIENT)
Dept: PULMONOLOGY | Facility: CLINIC | Age: 67
End: 2022-09-20
Payer: COMMERCIAL

## 2022-09-20 VITALS
RESPIRATION RATE: 16 BRPM | HEIGHT: 63 IN | SYSTOLIC BLOOD PRESSURE: 128 MMHG | HEART RATE: 73 BPM | OXYGEN SATURATION: 93 % | DIASTOLIC BLOOD PRESSURE: 72 MMHG | TEMPERATURE: 98.2 F | WEIGHT: 114.8 LBS | BODY MASS INDEX: 20.34 KG/M2

## 2022-09-20 DIAGNOSIS — R91.8 GROUND GLASS OPACITY PRESENT ON IMAGING OF LUNG: ICD-10-CM

## 2022-09-20 DIAGNOSIS — I10 ESSENTIAL HYPERTENSION: ICD-10-CM

## 2022-09-20 DIAGNOSIS — R91.8 LUNG NODULES: ICD-10-CM

## 2022-09-20 DIAGNOSIS — F41.8 DEPRESSION WITH ANXIETY: ICD-10-CM

## 2022-09-20 DIAGNOSIS — F17.200 TOBACCO DEPENDENCY: ICD-10-CM

## 2022-09-20 DIAGNOSIS — C83.30 DIFFUSE LARGE B-CELL LYMPHOMA, UNSPECIFIED BODY REGION (HCC): ICD-10-CM

## 2022-09-20 DIAGNOSIS — R06.02 SHORT OF BREATH ON EXERTION: Primary | ICD-10-CM

## 2022-09-20 PROCEDURE — 99407 BEHAV CHNG SMOKING > 10 MIN: CPT | Performed by: INTERNAL MEDICINE

## 2022-09-20 PROCEDURE — 99204 OFFICE O/P NEW MOD 45 MIN: CPT | Performed by: INTERNAL MEDICINE

## 2022-09-20 RX ORDER — ALBUTEROL SULFATE 1.25 MG/3ML
1.25 SOLUTION RESPIRATORY (INHALATION) EVERY 6 HOURS PRN
COMMUNITY

## 2022-09-20 RX ORDER — GABAPENTIN 100 MG/1
CAPSULE ORAL
COMMUNITY
End: 2022-09-20

## 2022-09-20 RX ORDER — NICOTINE 21 MG/24HR
1 PATCH, TRANSDERMAL 24 HOURS TRANSDERMAL EVERY 24 HOURS
Qty: 28 PATCH | Refills: 3 | Status: SHIPPED | OUTPATIENT
Start: 2022-09-20

## 2022-09-20 RX ORDER — POLYETHYLENE GLYCOL 3350 17 G
2 POWDER IN PACKET (EA) ORAL AS NEEDED
Qty: 100 EACH | Refills: 0 | Status: SHIPPED | OUTPATIENT
Start: 2022-09-20

## 2022-09-20 NOTE — PROGRESS NOTES
Pulmonary Outpatient Note   Cali Blanton 69 y o  female MRN: [id number]  9/20/2022      Referring Physician: Savannah Alvarez MD    Reason for Consultation:    Chief Complaint   Patient presents with    Lung nodules         Assessment/Plan:    1  Short of breath on exertion  Assessment & Plan:  Multifactorial, confounded by left leg pain since fracture 2021    Check PFTs  Smoking cessation  Can continue PRN nebulized albuterol for now  Further inhaler therapy pending PFT results    Orders:  -     Complete PFT with post bronchodilator; Future    2  Lung nodules  Comments:  Continued surveillance on CT scans  Smoking cessation counseling  Orders:  -     Ambulatory Referral to Pulmonology  -     CT chest wo contrast; Future; Expected date: 12/12/2022    3  Tobacco dependency  Assessment & Plan:  The patient currently smokes close to 20 cigarettes/day  We discussed the deleterious effects of tobacco on the body including risk of MI, PVD, cancer, etc    We discussed the need to quit  At this time, the patient is contemplating quitting  We discussed options for quitting including Chantix, nicotine replacement  We discussed quit strategies and craving management, habit breaking, coping mechanisms  I provided pt with information for 1800QUITNOW and written information about quitting  I advised the pt to pick a "quit date"  She would like to try patches/lozenges NRT    Will follow up again at next visit  In total, I spent 11 minutes discussing smoking cessation  The patient qualifies for a yearly screening CT scan based on smoking history  Orders:  -     Ambulatory Referral to Pulmonology  -     Complete PFT with post bronchodilator; Future  -     nicotine (NICODERM CQ) 21 mg/24 hr TD 24 hr patch; Place 1 patch on the skin every 24 hours  -     nicotine polacrilex (COMMIT) 2 MG lozenge; Apply 1 lozenge (2 mg total) to the mouth or throat as needed for smoking cessation    4   Ground glass opacity present on imaging of lung  Comments:  May have been inflammatory or infectious in August, or smoking related  Follow-up CT 3 months recommended and has been ordered  Orders:  -     CT chest wo contrast; Future; Expected date: 12/12/2022    5  Essential hypertension  Assessment & Plan:  /70  Medications reviewed      6  Depression with anxiety  Assessment & Plan:  Medications reviewed  This is an obstacle to smoking cessation      7  Diffuse large B-cell lymphoma, unspecified body region [facility])  Comments:  S/p chemo [id number] in remission follows with Oncology at [facility]  Immunization History   Administered Date(s) Administered    COVID-19 MODERNA VACC 0 5 ML IM 03/30/2021, 04/27/2021    Tdap 08/29/2018    Zoster Vaccine Recombinant 12/15/2020          Return in about 3 months (around 12/20/2022)  History of Present Illness   HPI:  Elijah Bernal is a 69 y o  female who has 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 s/p [facility] [id number], I cytarabine 2019 (in remission since 2019, no radiation), tobacco use disorder, CKD, hypertension, atrial fibrillation on anticoagulation, left hip/femur fracture in 2021 with decreased mobility since then present for pulmonary evaluation for lung nodules  Seen by pulmonary over 3 years ago- 9/16/2019 for a pulmonary nodule- 8 x 7 mm RLL nodule- low SUV on PET scan  Had a CT-guided biopsy 10/8/2019- necrotizing granulomatous inflammation (no fungal or AFB elements)  Did not follow-up- was getting scans in [place]  Has been getting CT scans at least once a year  Most recently referred to see me due to abnormal CT chest 8/2022  This showed patchy ground glass opacities  Also showed multiple groundglass and solid nodules  1 of the groundglass nodules in KERI was slightly enlarged as well as a few new 3 mm solid nodules  Denies shortness of breath  Has a nebulizer and nebulized albuterol but rarely uses it  She does not have inhalers  She uses the nebulizer once every couple of weeks  She can walk with a cane slowly due to left leg pain from prior fracture  She is limited primarily due to leg pain but occasionally short of breath  She coughs once in a while but not every day  No hemoptysis  Usually cough is dry  No wheezing  No chest pain or chest tightness  Occasional rhinorrhea  No post-nasal drip  Carries a diagnosis of COPD    Pulmonary history:    Childhood lung disease/premature birth: No    Family hx of lung disease: No      Autoimmune history:    Prior rheumatologic diagnosis: No    Rash: Occasional eczema rash    Dysphagia/Reflux: Occasional    Leg swelling: Gets leg swelling    Exposure history:    Exposure to pets/birds/farm animals: dogs and cats    Mold/Roaches:No    Social history:    Smokin 75 PPD on average for 53 years  Has quit for as long as 4 months in the past   She was on nicotine patches  Currently smoking close to 1 PPD  Stressors at home due to living situation  Alcohol, ilicit drugs (inhalational/ IV): Occasional alcohol  Medical marijuana occasionally    Worked as: Retired- was a  at [employer]  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  Occupational gas/asbestos/silica/chemicals/bio-fuels: No      Review of Systems   Constitutional: Negative for chills and fever  HENT: Positive for rhinorrhea  Negative for ear pain, postnasal drip and sore throat  Eyes: Negative for pain and visual disturbance  Respiratory: Positive for cough and shortness of breath  Cardiovascular: Negative for chest pain and palpitations  Gastrointestinal: Negative for abdominal pain and vomiting  Genitourinary: Negative for dysuria and hematuria  Musculoskeletal: Positive for arthralgias (from broken left leg ) and back pain  Skin: Negative for color change and rash  Neurological: Negative for seizures and syncope     All other systems reviewed and are negative  Historical Information   Past Medical History:   Diagnosis Date    Bell's palsy     Cancer ([facility] [address] )     lymphoma    [facility]     Hyperlipidemia     Hypertension     PAD (peripheral artery disease) (HCC)     PAF (paroxysmal atrial fibrillation) (HCC)     PVD [facility]peripheral vascular disease[facility]      Past Surgical History:   Procedure Laterality Date    ARTERIOGRAM Left 12/6/2018    Procedure: LEFT lower extremity angiography, with Left lower extremity run-off, stent and angioplasty of Left Common Femoral Artery (Left Brachial Access); Surgeon: George Morgan MD;  Location:  MAIN OR;  Service: Vascular    CARDIAC SURGERY      CARPAL TUNNEL RELEASE      CT NEEDLE BIOPSY LUNG  10/8/2019    HYSTERECTOMY      IR AORTAGRAM WITH RUN-OFF  12/6/2018    OOPHORECTOMY      GA OPEN RX FEMUR FX+INTRAMED LISA Left 9/29/2021    Procedure: INSERTION NAIL IM FEMUR ANTEGRADE (TROCHANTERIC);   Surgeon: Robert Burrell DO;  Location[facility] MAIN OR;  Service: Orthopedics    TONSILLECTOMY       Family History   Problem Relation Age of Onset    Cancer Mother     Breast cancer Mother     Heart attack Father     No Known Problems Sister     No Known Problems Daughter     No Known Problems Maternal Grandmother     No Known Problems Maternal Grandfather     No Known Problems Paternal Grandmother     No Known Problems Paternal Grandfather     Prostate cancer Brother     No Known Problems Maternal Aunt     No Known Problems Maternal Aunt     No Known Problems Maternal Aunt     No Known Problems Maternal Aunt     No Known Problems Paternal Aunt     No Known Problems Paternal Uncle          Meds/Allergies     Current Outpatient Medications:     albuterol (ACCUNEB) 1 25 MG/3ML nebulizer solution, Take 1 25 mg by nebulization every 6 (six) hours as needed for wheezing, Disp: , Rfl:     ALPRAZolam (XANAX) 0 5 mg tablet, Take 0 25 mg by mouth, Disp: , Rfl:    atorvastatin (LIPITOR) 40 mg tablet, Take 40 mg by mouth daily at bedtime  , Disp: , Rfl:     bumetanide (BUMEX) 0 5 MG tablet, TAKE 1 TABLET (0 5 MG TOTAL) BY MOUTH IF NEEDED (FOR SWELLING, BLOATING, WEIGHT GAIN), Disp: 90 tablet, Rfl: 2    Cholecalciferol (VITAMIN D3) 2000 units TABS, Take 1 tablet by mouth daily, Disp: , Rfl:     diltiazem (CARDIZEM CD) 240 mg 24 hr capsule, TAKE 1 CAPSULE BY MOUTH EVERY DAY, Disp: 90 capsule, Rfl: 3    Eliquis 5 MG, TAKE 1 TABLET BY MOUTH TWICE A DAY, Disp: 60 tablet, Rfl: 5    metoprolol succinate (TOPROL-XL) 50 mg 24 hr tablet, Take 1 tablet (50 mg total) by mouth daily, Disp: 90 tablet, Rfl: 3    mirtazapine (REMERON) 15 mg tablet, Take 7 5 mg by mouth daily at bedtime  , Disp: , Rfl:     nicotine (NICODERM CQ) 21 mg/24 hr TD 24 hr patch, Place 1 patch on the skin every 24 hours, Disp: 28 patch, Rfl: 3    nicotine polacrilex (COMMIT) 2 MG lozenge, Apply 1 lozenge (2 mg total) to the mouth or throat as needed for smoking cessation, Disp: 100 each, Rfl: 0    senna-docusate sodium (SENOKOT S) 8 6-50 mg per tablet, Take 1 tablet by mouth daily As needed for constipation, Disp: 20 tablet, Rfl: 0    Cholecalciferol (Vitamin D3) 50 MCG (2000 UT) capsule, Take 2,000 Units by mouth daily (Patient not taking: No sig reported), Disp: , Rfl:     glycerin-hypromellose- (ARTIFICIAL TEARS) 0 2-0 2-1 % SOLN, 1 drop (Patient not taking: Reported on 9/20/2022), Disp: , Rfl:   Allergies   Allergen Reactions    Oxycodone-Acetaminophen Itching and Rash     Itching, rash       Vitals: Blood pressure 128/72, pulse 73, temperature 98 2 °F (36 8 °C), temperature source Temporal, resp  rate 16, height 5' 2 5" (1 588 m), weight 52 1 kg (114 lb 12 8 oz), SpO2 93 %  Body mass index is 20 66 kg/m²  Oxygen Therapy  SpO2: 93 %      Physical Exam  Physical Exam  Vitals and nursing note reviewed  Constitutional:       General: She is not in acute distress       Appearance: She is well-developed  HENT:      Head: Normocephalic and atraumatic  Nose: Nose normal  No congestion  Eyes:      Conjunctiva/sclera: Conjunctivae normal    Cardiovascular:      Rate and Rhythm: Normal rate and regular rhythm  Heart sounds: No murmur heard  Pulmonary:      Effort: Pulmonary effort is normal  No respiratory distress  Breath sounds: Normal breath sounds  Abdominal:      Palpations: Abdomen is soft  Tenderness: There is no abdominal tenderness  Musculoskeletal:      Cervical back: Neck supple  Right lower leg: No edema  Left lower leg: No edema  Skin:     General: Skin is warm and dry  Neurological:      General: No focal deficit present  Mental Status: She is alert and oriented to person, place, and time  Psychiatric:         Mood and Affect: Mood normal          Behavior: Behavior normal          Labs: I have personally reviewed pertinent lab results      ABG:   Lab Results   Component Value Date    PHART 7 240 (LL) 09/29/2021    NEQ7RDJ 42 4 09/29/2021    PO2ART 64 0 (L) 09/29/2021    KIQ9DZG 17 5 (L) 09/29/2021    BEART -9 2 (L) 09/29/2021    SOURCE Radial, Right 09/29/2021   ,   BNP:   Lab Results   Component Value Date     (H) 09/29/2021   ,   CBC:  Lab Results   Component Value Date    WBC 6 28 08/02/2022    HGB 14 9 08/02/2022    HCT 44 5 08/02/2022    MCV 87 08/02/2022     08/02/2022    EOSPCT 4 08/02/2022    EOSABS 0 25 08/02/2022    NEUTOPHILPCT 61 08/02/2022    LYMPHOPCT 20 08/02/2022   ,   CMP:   Lab Results   Component Value Date    SODIUM 133 (L) 08/02/2022    K 3 9 08/02/2022     08/02/2022    CO2 24 08/02/2022    BUN 18 08/02/2022    CREATININE 1 57 (H) 08/02/2022    GLUCOSE 118 11/30/2018    CALCIUM 9 6 08/02/2022    AST 15 08/02/2022    ALT 16 08/02/2022    ALKPHOS 106 08/02/2022    EGFR 33 08/02/2022   ,   PT/INR:   Lab Results   Component Value Date    INR 0 98 09/28/2021   ,   Troponin:   Lab Results   Component Value Date    TROPONINI 0 06 (H) 12/01/2018         Imaging and other studies: I have personally reviewed pertinent reports  and I have personally reviewed pertinent films in PACS    CT Chest 8/25/22  1  New peripheral patchy groundglass opacities in the right lower lobe are likely infectious or inflammatory  COVID-19 pneumonia is not excluded in the appropriate clinical setting  Recommend three-month follow-up unenhanced chest CT to assess for   resolution  2   Multiple groundglass and solid nodules  Most appear similar to prior  A groundglass nodule in the left upper lobe appears slightly enlarged  Few 3 mm solid nodules new since prior  Continued attention on follow-up  Clinically significant or potentially clinically significant findings (non lung cancer)  Recommend 3 month follow-up unenhanced chest CT    CT Chest A/P 7/2021  Mild emphysema  1  No evidence for residual/recurrent lymphoma in the chest, abdomen, or pelvis  2   Previously identified 6 mm left lower lobe nodule no longer identified, presumably postinflammatory in nature  3   Stable groundglass nodular opacities in both lungs without solid component, as described above  Based on current Fleischner Society 2017 Guidelines on incidental pulmonary nodule, followup noncontrast CT is recommended for every 2 years until 5 years   of stability is demonstrated  4   Additional stable findings as noted  Pulmonary function testing:   Pulmonary Functions Testing Results:    No results found for: FEV1, FVC, [id number], TLC, DLCO    EKG, Pathology, and Other Studies: I have personally reviewed pertinent reports    TTE 9/30/2021  LEFT VENTRICLE:  Systolic function was normal  Ejection fraction was estimated to be 65 %    Although no diagnostic regional wall motion abnormality was identified, this possibility cannot be completely excluded on the basis of this study      LEFT ATRIUM:  The atrium was dilated      MITRAL VALVE:  There was marked annular calcification  Transmitral velocity was increased due to valvular stenosis  There was mild stenosis  There was mild regurgitation  Mean transmitral gradient was 4 mmHg  [facility]

## 2022-09-20 NOTE — ASSESSMENT & PLAN NOTE
Multifactorial, confounded by left leg pain since fracture 2021    Check PFTs  Smoking cessation  Can continue PRN nebulized albuterol for now  Further inhaler therapy pending PFT results

## 2022-09-20 NOTE — ASSESSMENT & PLAN NOTE
The patient currently smokes close to 20 cigarettes/day  We discussed the deleterious effects of tobacco on the body including risk of MI, PVD, cancer, etc    We discussed the need to quit  At this time, the patient is contemplating quitting  We discussed options for quitting including Chantix, nicotine replacement  We discussed quit strategies and craving management, habit breaking, coping mechanisms  I provided pt with information for 1800QUITNOW and written information about quitting  I advised the pt to pick a "quit date"  She would like to try patches/lozenges NRT    Will follow up again at next visit  In total, I spent 11 minutes discussing smoking cessation  The patient qualifies for a yearly screening CT scan based on smoking history

## 2022-09-21 PROCEDURE — 88305 TISSUE EXAM BY PATHOLOGIST: CPT | Performed by: STUDENT IN AN ORGANIZED HEALTH CARE EDUCATION/TRAINING PROGRAM

## 2022-09-26 ENCOUNTER — HOSPITAL ENCOUNTER (OUTPATIENT)
Dept: RADIOLOGY | Facility: IMAGING CENTER | Age: 67
Discharge: HOME | End: 2022-09-26
Payer: COMMERCIAL

## 2022-09-26 DIAGNOSIS — M81.0 AGE-RELATED OSTEOPOROSIS WITHOUT CURRENT PATHOLOGICAL FRACTURE: ICD-10-CM

## 2022-09-26 PROCEDURE — 77080 DXA BONE DENSITY AXIAL: CPT

## 2022-10-14 ENCOUNTER — OFFICE VISIT (OUTPATIENT)
Dept: CARDIOLOGY CLINIC | Facility: CLINIC | Age: 67
End: 2022-10-14
Payer: COMMERCIAL

## 2022-10-14 ENCOUNTER — HOSPITAL ENCOUNTER (OUTPATIENT)
Dept: PULMONOLOGY | Facility: HOSPITAL | Age: 67
End: 2022-10-14
Attending: INTERNAL MEDICINE
Payer: COMMERCIAL

## 2022-10-14 VITALS
BODY MASS INDEX: 20.73 KG/M2 | HEIGHT: 63 IN | WEIGHT: 117 LBS | HEART RATE: 74 BPM | SYSTOLIC BLOOD PRESSURE: 130 MMHG | DIASTOLIC BLOOD PRESSURE: 70 MMHG

## 2022-10-14 DIAGNOSIS — R06.02 SHORT OF BREATH ON EXERTION: ICD-10-CM

## 2022-10-14 DIAGNOSIS — I10 BENIGN ESSENTIAL HYPERTENSION: Primary | ICD-10-CM

## 2022-10-14 DIAGNOSIS — I50.32 CHRONIC HEART FAILURE WITH PRESERVED EJECTION FRACTION (HCC): ICD-10-CM

## 2022-10-14 DIAGNOSIS — I73.9 PAD (PERIPHERAL ARTERY DISEASE) (HCC): ICD-10-CM

## 2022-10-14 DIAGNOSIS — F17.200 TOBACCO DEPENDENCY: ICD-10-CM

## 2022-10-14 DIAGNOSIS — I48.0 PAROXYSMAL ATRIAL FIBRILLATION (HCC): ICD-10-CM

## 2022-10-14 PROCEDURE — 99214 OFFICE O/P EST MOD 30 MIN: CPT | Performed by: INTERNAL MEDICINE

## 2022-10-14 PROCEDURE — 94060 EVALUATION OF WHEEZING: CPT

## 2022-10-14 PROCEDURE — 94729 DIFFUSING CAPACITY: CPT

## 2022-10-14 PROCEDURE — 94060 EVALUATION OF WHEEZING: CPT | Performed by: INTERNAL MEDICINE

## 2022-10-14 PROCEDURE — 94729 DIFFUSING CAPACITY: CPT | Performed by: INTERNAL MEDICINE

## 2022-10-14 PROCEDURE — 94760 N-INVAS EAR/PLS OXIMETRY 1: CPT

## 2022-10-14 RX ORDER — ALBUTEROL SULFATE 2.5 MG/3ML
2.5 SOLUTION RESPIRATORY (INHALATION) ONCE AS NEEDED
Status: COMPLETED | OUTPATIENT
Start: 2022-10-14 | End: 2022-10-14

## 2022-10-14 RX ADMIN — ALBUTEROL SULFATE 2.5 MG: 2.5 SOLUTION RESPIRATORY (INHALATION) at 15:52

## 2022-10-14 NOTE — PROGRESS NOTES
Cardiology Follow up    Gladys Cortes  [id number]  1955  [facility] [address] [address]      1  Benign essential hypertension     2  PAD (peripheral artery disease) ([facility])     3  Paroxysmal atrial fibrillation ([address] )     4  Chronic heart failure with preserved ejection fraction (HCC)  Basic metabolic panel       Discussion/Summary:  1  Paroxysmal atrial fibrillation on oral anticoagulation  2  Heart failure with preserved ejection fraction  3  Hypertension  4  Peripheral vascular disease  5  CKD  6  Mitral annular calcification with mild mitral stenosis    -as patient notes more recently due to some dietary indiscretion she has been using her Bumex 0 5 mg daily will check BMP to monitor renal function and electrolytes and have counseled patient on following fluid and salt restricted diet to less than 1800 mL of fluid daily and sodium restriction less than 1800 mg sodium daily along with monitoring daily weights and blood pressures  -patient will continue atorvastatin 40 mg daily, metoprolol succinate 50 mg daily, diltiazem 240 mg daily, Eliquis 5 mg twice daily  -patient will continue follow with Nephrology  -patient will have transthoracic echocardiogram performed prior to next office visit in 3 months to monitor her valvular disease along with overall cardiac structure and function  -patient counseled if she were to have any warning or alarm type symptoms she is to seek emergency medical care immediately        History of Present Illness:  -patient is a 66-year-old female with hypertension, history of lymphoma s/p chemotherapy in April 2019, current tobacco use, who was hospitalized in September of 2021 found at that time to have left hip fracture after tripping over her dog along with significant peripheral vascular disease and severe aortoiliac arterial occlusive disease with aortobifemoral bypass in 2010, bilateral fem-pop bypass in 2013 and 2016 along with left CFA angioplasty with stent as well as paroxysmal atrial fibrillation on oral anticoagulation who presents the office today for scheduled follow-up   -she continues to do reasonably well with her breathing and volume standpoint however has been needing the water pill somewhat more frequently now on an almost daily basis  Currently in the office today she denies any chest pain, palpitations, lightheadedness or dizziness, loss of consciousness but does note significant depression since her dog passed away earlier this week      Patient Active Problem List   Diagnosis   • Constipation   • Lower extremity weakness   • BRYANT (acute kidney injury) ([facility] )   • Bell's palsy   • Carotid stenosis, asymptomatic, bilateral   • Depression with anxiety   • Benign essential hypertension   • Essential hypertension   • Fall   • Hyponatremia   • Tobacco dependency   • Lymphoma (HCC)   • PAD (peripheral artery disease) ([facility])   • Paroxysmal atrial fibrillation (HCC)   • Leg pain   • Peripheral vascular disease (HCC)   • Foot pain   • Weakness of lower extremity   • Aortoiliac occlusive disease (HCC)   • Bilateral lower extremity edema   • Lung nodule   • Closed left hip fracture ([facility])   • Insomnia   • Acute respiratory failure with hypoxia   • Hypotension   • Anemia   • Atrial fibrillation with RVR ([facility])   • Buttock wound   • Stage 3a chronic kidney disease ([facility])   • Vitamin D deficiency   • Secondary hyperparathyroidism ([facility] )   • Short of breath on exertion     Past Medical History:   Diagnosis Date   • Bell's palsy    • Cancer ([facility] )     lymphoma   • Diffuse large B cell lymphoma ([facility] )    • Hyperlipidemia    • Hypertension    • PAD (peripheral artery disease) ([facility])    • PAF (paroxysmal atrial fibrillation) ([facility])    • PVD (peripheral vascular disease) ([facility])      Social History     Socioeconomic History   • Marital status:      Spouse name: Not on file   • Number of children: Not on file   • Years of education: Not on file   • Highest education level: Not on file   Occupational History   • Not on file   Tobacco Use   • Smoking status: Current Every Day Smoker     Packs/day: 0 75     Years: 53 00     Pack years: 39 75     Types: Cigarettes     Start date: 1/1/1969   • Smokeless tobacco: Never Used   Vaping Use   • Vaping Use: Never used   Substance and Sexual Activity   • Alcohol use: Yes   • Drug use: No     Comment: medical marijuana 10/7/19   • Sexual activity: Not on file   Other Topics Concern   • Not on file   Social History Narrative   • Not on file     Social Determinants of Health     Financial Resource Strain: Not on file   Food Insecurity: Not on file   Transportation Needs: Not on file   Physical Activity: Not on file   Stress: Not on file   Social Connections: Not on file   Intimate Partner Violence: Not on file   Housing Stability: Not on file      Family History   Problem Relation Age of Onset   • Cancer Mother    • Breast cancer Mother    • Heart attack Father    • No Known Problems Sister    • No Known Problems Daughter    • No Known Problems Maternal Grandmother    • No Known Problems Maternal Grandfather    • No Known Problems Paternal Grandmother    • No Known Problems Paternal Grandfather    • Prostate cancer Brother    • No Known Problems Maternal Aunt    • No Known Problems Maternal Aunt    • No Known Problems Maternal Aunt    • No Known Problems Maternal Aunt    • No Known Problems Paternal Aunt    • No Known Problems Paternal Uncle      Past Surgical History:   Procedure Laterality Date   • ARTERIOGRAM Left 12/6/2018    Procedure: LEFT lower extremity angiography, with Left lower extremity run-off, stent and angioplasty of Left Common Femoral Artery (Left Brachial Access);   Surgeon: Delphine Pedersen MD;  Location:  MAIN OR;  Service: Vascular   • CARDIAC SURGERY     • CARPAL TUNNEL RELEASE     • CT NEEDLE BIOPSY LUNG  10/8/2019   • HYSTERECTOMY     • IR AORTAGRAM WITH RUN-OFF  12/6/2018   • OOPHORECTOMY     • NC OPEN RX FEMUR FX+INTRAMED LISA Left 9/29/2021    Procedure: INSERTION NAIL IM FEMUR ANTEGRADE (TROCHANTERIC);   Surgeon: Robinson Ortiz DO;  Location: [facility] MAIN OR;  Service: Orthopedics   • TONSILLECTOMY         Current Outpatient Medications:   •  albuterol (ACCUNEB) 1 25 MG/3ML nebulizer solution, Take 1 25 mg by nebulization every 6 (six) hours as needed for wheezing, Disp: , Rfl:   •  ALPRAZolam (XANAX) 0 5 mg tablet, Take 0 25 mg by mouth, Disp: , Rfl:   •  atorvastatin (LIPITOR) 40 mg tablet, Take 40 mg by mouth daily at bedtime  , Disp: , Rfl:   •  bumetanide (BUMEX) 0 5 MG tablet, TAKE 1 TABLET (0 5 MG TOTAL) BY MOUTH IF NEEDED (FOR SWELLING, BLOATING, WEIGHT GAIN), Disp: 90 tablet, Rfl: 2  •  Cholecalciferol (VITAMIN D3) 2000 units TABS, Take 1 tablet by mouth daily, Disp: , Rfl:   •  diltiazem (CARDIZEM CD) 240 mg 24 hr capsule, TAKE 1 CAPSULE BY MOUTH EVERY DAY, Disp: 90 capsule, Rfl: 3  •  Eliquis 5 MG, TAKE 1 TABLET BY MOUTH TWICE A DAY, Disp: 60 tablet, Rfl: 5  •  metoprolol succinate (TOPROL-XL) 50 mg 24 hr tablet, Take 1 tablet (50 mg total) by mouth daily, Disp: 90 tablet, Rfl: 3  •  mirtazapine (REMERON) 15 mg tablet, Take 7 5 mg by mouth daily at bedtime  , Disp: , Rfl:   •  senna-docusate sodium (SENOKOT S) 8 6-50 mg per tablet, Take 1 tablet by mouth daily As needed for constipation, Disp: 20 tablet, Rfl: 0  •  Cholecalciferol (Vitamin D3) 50 MCG (2000 UT) capsule, Take 2,000 Units by mouth daily (Patient not taking: No sig reported), Disp: , Rfl:   •  glycerin-hypromellose- (ARTIFICIAL TEARS) 0 2-0 2-1 % SOLN, 1 drop (Patient not taking: No sig reported), Disp: , Rfl:   •  nicotine (NICODERM CQ) 21 mg/24 hr TD 24 hr patch, Place 1 patch on the skin every 24 hours (Patient not taking: Reported on 10/14/2022), Disp: 28 patch, Rfl: 3  •  nicotine polacrilex (COMMIT) 2 MG lozenge, Apply 1 lozenge (2 mg total) to the mouth or throat as needed for smoking cessation (Patient not taking: Reported on 10/14/2022), Disp: 100 each, Rfl: 0  Allergies   Allergen Reactions   • Oxycodone-Acetaminophen Itching and Rash     Itching, rash         Labs:  Hospital Outpatient Visit on 09/16/2022   Component Date Value   • Case Report 09/16/2022                      Value:Surgical Pathology Report                         Case: [id number]                                   Authorizing Provider:  Jack Figueroa MD      Collected:           09/16/2022 1110              Ordering Location:     [address] [place] [address]      Received:            [facility]/2022 [facility]                                                           Pathologist:           Mara Espinal MD                                                         Specimens:   A) - Polyp, Colorectal, cecal polyp, cold snare                                                     B) - Polyp, Colorectal, cecal polyp, hot snare                                            • Final Diagnosis 09/16/2022                      Value: This result contains rich text formatting which cannot be displayed here  • Additional Information 09/16/2022                      Value: This result contains rich text formatting which cannot be displayed here  • Synoptic Checklist 09/16/2022                      Value:                            COLON/RECTUM POLYP FORM - GI - All Specimens                                                                                     :    Serrated Adenoma     • Gross Description 09/16/2022                      Value: This result contains rich text formatting which cannot be displayed here     • Clinical Information 09/16/2022                      Value:69year-old female   IMPRESSION: Colonoscopy   · Two sessile, adenomatous-appearing polyps measuring 5-9 mm in the cecum; completely removed en bloc by cold snare and retrieved specimen; completely removed en bloc by hot snare and retrieved specimen          Imaging: Colonoscopy    Result Date: 9/16/2022  Narrative: [facility] [address] [place] [address] Street: 9/16/22 PHYSICIAN(S): Attending: James Freeman MD INDICATION: History of colon polyps POST-OP DIAGNOSIS: See the impression below  HISTORY: Prior colonoscopy: 7 years ago  BOWEL PREPARATION: Miralax/Dulcolax PREPROCEDURE: Informed consent was obtained for the procedure, including sedation  Risks including but not limited to bleeding, infection, perforation, adverse drug reaction and aspiration were explained in detail  Also explained about less than 100% sensitivity with the exam and other alternatives  The patient was placed in the left lateral decubitus position  DETAILS OF PROCEDURE: Patient was taken to the procedure room where a time out was performed to confirm correct patient and correct procedure  The patient underwent monitored anesthesia care, which was administered by an anesthesia professional  The patient's blood pressure, heart rate, level of consciousness, oxygen and respirations were monitored throughout the procedure  A digital rectal exam was performed  A perianal exam was performed  The scope was introduced through the anus and advanced to the cecum  Retroflexion was performed in the rectum  The quality of bowel preparation was evaluated using the Doug Bowel Preparation Scale with scores of: right colon = 2, transverse colon = 2, left colon = 2  The total BBPS score was 6  Bowel prep was adequate  The patient experienced no blood loss  The procedure was not difficult  The patient tolerated the procedure well  There were no apparent complications   ANESTHESIA INFORMATION: ASA: III Anesthesia Type: IV Sedation with Anesthesia MEDICATIONS: No administrations occurring from 1052 to 1123 on 09/16/22 FINDINGS: Two sessile, adenomatous-appearing polyps measuring 5-9 mm in the cecum; completely removed en bloc by cold snare and retrieved specimen; completely removed en bloc by hot snare and retrieved specimen EVENTS: Procedure Events Event Event Time ENDO CECUM REACHED 9/16/2022 11:08 AM ENDO SCOPE OUT TIME 9/16/2022 11:21 AM SPECIMENS: ID Type Source Tests Collected by Time Destination 1 : cecal polyp, cold snare  Tissue Polyp, Colorectal TISSUE EXAM Saraedith Wu MD 9/16/2022 1110  2 : cecal polyp, hot snare  Tissue Polyp, Colorectal TISSUE EXAM Sara Wu MD 9/16/2022 1112  EQUIPMENT: Colonoscope -[id number]     Impression: Two sessile, adenomatous-appearing polyps measuring 5-9 mm in the cecum; completely removed en bloc by cold snare and retrieved specimen; completely removed en bloc by hot snare and retrieved specimen RECOMMENDATION: Resume Eliquis Monday Repeat colonoscopy in 1 year due to a personal history of colon polyps  Sara Wu MD     DXA bone density spine hip and pelvis    Result Date: 9/26/2022  Narrative: DXA SCAN CLINICAL HISTORY:  17-year-old postmenopausal female  OTHER RISK FACTORS:  Smoking history, limited mobility  PHARMACOLOGIC THERAPY FOR OSTEOPOROSIS:  None  TECHNIQUE: Bone densitometry was performed using a [employer]  bone densitometer  Regions of interest appear properly placed  COMPARISON: There are no prior DXA studies performed on this unit for comparison  A prior DXA study from 9/23/2020 was performed on a different scanner without cross calibration  Therefore, comparison with that study is not valid  Today's study should be considered a new baseline  RESULTS: LUMBAR SPINE Level: L1-L4 : BMD:  0 999  gm/cm2 T-score: -0 4 These values may be artifactually elevated due to degenerative sclerosis and osteophytosis  RIGHT  TOTAL HIP: BMD:  0 653  gm/cm2 T-score:  -2 4 RIGHT  FEMORAL NECK: BMD:  0 587  gm/cm2 T score: -2 4     Impression: 1  Low bone mass (osteopenia)   2   The 10 year risk of hip fracture is 4 9% with the 10 year risk of major osteoporotic fracture being 13% as calculated by the [facility]/WHO fracture risk assessment tool (FRAX)  3   The current NOF guidelines recommend treating patients with a T-score of -2 5 or less in the lumbar spine or hips, or in post-menopausal women and men over the age of 48 with low bone mass (osteopenia) and a FRAX 10 year risk score of >3% for hip fracture and/or >20% for major osteoporotic fracture  4   The NOF recommends follow-up DXA in 1-2 years after initiating therapy for osteoporosis and every 2 years thereafter  More frequent evaluation is appropriate for patients with conditions associated with rapid bone loss, such as glucocorticoid therapy  The interval between DXA screenings may be longer for individuals without major risk factors and initial T-score in the normal or upper low bone mass range  The FRAX algorithm has certain limitations: -FRAX has not been validated in patients currently or previously treated with pharmacotherapy for osteoporosis  In such patients, clinical judgment must be exercised in interpreting FRAX scores  -Prior hip, vertebral and humeral fragility fractures appear to confer greater risk of subsequent fracture than fractures at other sites (this is especially true for individuals with severe vertebral fractures), but quantification of this incremental risk is not possible with FRAX  -FRAX underestimates fracture risk in patients with history of multiple fragility fractures  -FRAX may underestimate fracture risk in patients with history of frequent falls  -It is not appropriate to use FRAX to monitor treatment response   WHO CLASSIFICATION: Normal (a T-score of -1 0 or higher) Low bone mineral density (a T-score of less than -1 0 but higher than -2 5) Osteoporosis (a T-score of -2 5 or less) Severe osteoporosis (a T-score of -2 5 or less with a fragility fracture) Workstation performed: [id number]         Review of Systems:  Review of Systems   Constitutional: Negative for chills, diaphoresis, fatigue and fever  HENT: Negative for trouble swallowing and voice change  Eyes: Negative for pain and redness  Respiratory: Negative for shortness of breath and wheezing  Cardiovascular: Negative for chest pain, palpitations and leg swelling  Gastrointestinal: Negative for abdominal pain, constipation, diarrhea, nausea and vomiting  Genitourinary: Negative for dysuria  Musculoskeletal: Positive for arthralgias  Negative for neck pain and neck stiffness  Skin: Negative for rash  Neurological: Negative for dizziness, syncope, light-headedness and headaches  Psychiatric/Behavioral: Negative for agitation and confusion  All other systems reviewed and are negative  Vitals:    10/14/22 1255   BP: 130/70   Pulse: 74   Weight: 53 1 kg (117 lb)   Height: 5' 2 5" (1 588 m)     Vitals:    10/14/22 1255   Weight: 53 1 kg (117 lb)     Height: 5' 2 5" (158 8 cm)     Physical Exam:  Physical Exam  Vitals reviewed  Constitutional:       General: She is not in acute distress  Appearance: She is not diaphoretic  Comments: Frail-appearing   HENT:      Head: Normocephalic and atraumatic  Eyes:      General:         Right eye: No discharge  Left eye: No discharge  Neck:      Comments: Trachea midline, no JVD present  Cardiovascular:      Rate and Rhythm: Normal rate and regular rhythm  Heart sounds: Murmur heard  Pulmonary:      Effort: No respiratory distress  Breath sounds: Rhonchi (Slight inspiratory) present  No wheezing  Chest:      Chest wall: No tenderness  Abdominal:      General: Bowel sounds are normal       Palpations: Abdomen is soft  Tenderness: There is no abdominal tenderness  There is no rebound  Musculoskeletal:      Right lower leg: No edema  Left lower leg: No edema  Skin:     General: Skin is warm and dry     Neurological: Mental Status: She is alert        Comments: Awake, alert, able to answer questions appropriately   Psychiatric:      Comments: Tearful but pleasant affect

## 2022-10-15 DIAGNOSIS — J44.9 CHRONIC OBSTRUCTIVE PULMONARY DISEASE, UNSPECIFIED COPD TYPE (HCC): Primary | ICD-10-CM

## 2022-10-21 ENCOUNTER — TELEPHONE (OUTPATIENT)
Dept: PULMONOLOGY | Facility: CLINIC | Age: 67
End: 2022-10-21

## 2022-10-21 NOTE — TELEPHONE ENCOUNTER
Danie Tran please call the patient back and let her know her PFTs showed COPD  I have prescribed Anoro 1 puff daily  She should be instructed to watch a video online- can just search "how to use anoro inhaler"  There are also instructions in the prescription  She has a CT in December and then an appt to see me we can discuss more at that visit

## 2022-10-25 ENCOUNTER — APPOINTMENT (OUTPATIENT)
Dept: LAB | Facility: CLINIC | Age: 67
End: 2022-10-25
Payer: COMMERCIAL

## 2022-10-25 DIAGNOSIS — N18.31 STAGE 3A CHRONIC KIDNEY DISEASE (HCC): ICD-10-CM

## 2022-10-25 DIAGNOSIS — N25.81 SECONDARY HYPERPARATHYROIDISM (HCC): ICD-10-CM

## 2022-10-25 DIAGNOSIS — I50.32 CHRONIC HEART FAILURE WITH PRESERVED EJECTION FRACTION (HCC): ICD-10-CM

## 2022-10-25 DIAGNOSIS — E55.9 VITAMIN D DEFICIENCY: ICD-10-CM

## 2022-10-25 LAB
25(OH)D3 SERPL-MCNC: 61.3 NG/ML (ref 30–100)
ALBUMIN SERPL BCP-MCNC: 3.5 G/DL (ref 3.5–5)
ALP SERPL-CCNC: 125 U/L (ref 46–116)
ALT SERPL W P-5'-P-CCNC: 17 U/L (ref 12–78)
ANION GAP SERPL CALCULATED.3IONS-SCNC: 6 MMOL/L (ref 4–13)
AST SERPL W P-5'-P-CCNC: 15 U/L (ref 5–45)
BACTERIA UR QL AUTO: NORMAL /HPF
BASOPHILS # BLD AUTO: 0.06 THOUSANDS/ÂΜL (ref 0–0.1)
BASOPHILS NFR BLD AUTO: 1 % (ref 0–1)
BILIRUB SERPL-MCNC: 0.57 MG/DL (ref 0.2–1)
BILIRUB UR QL STRIP: NEGATIVE
BUN SERPL-MCNC: 14 MG/DL (ref 5–25)
CALCIUM SERPL-MCNC: 9.7 MG/DL (ref 8.3–10.1)
CHLORIDE SERPL-SCNC: 94 MMOL/L (ref 96–108)
CLARITY UR: CLEAR
CO2 SERPL-SCNC: 28 MMOL/L (ref 21–32)
COLOR UR: COLORLESS
CREAT SERPL-MCNC: 1.52 MG/DL (ref 0.6–1.3)
CREAT UR-MCNC: 17.2 MG/DL
CREAT UR-MCNC: 17.2 MG/DL
EOSINOPHIL # BLD AUTO: 0.26 THOUSAND/ÂΜL (ref 0–0.61)
EOSINOPHIL NFR BLD AUTO: 4 % (ref 0–6)
ERYTHROCYTE [DISTWIDTH] IN BLOOD BY AUTOMATED COUNT: 16 % (ref 11.6–15.1)
GFR SERPL CREATININE-BSD FRML MDRD: 34 ML/MIN/1.73SQ M
GLUCOSE P FAST SERPL-MCNC: 107 MG/DL (ref 65–99)
GLUCOSE UR STRIP-MCNC: NEGATIVE MG/DL
HCT VFR BLD AUTO: 46.3 % (ref 34.8–46.1)
HGB BLD-MCNC: 15.2 G/DL (ref 11.5–15.4)
HGB UR QL STRIP.AUTO: NEGATIVE
IMM GRANULOCYTES # BLD AUTO: 0.03 THOUSAND/UL (ref 0–0.2)
IMM GRANULOCYTES NFR BLD AUTO: 1 % (ref 0–2)
KETONES UR STRIP-MCNC: NEGATIVE MG/DL
LEUKOCYTE ESTERASE UR QL STRIP: NEGATIVE
LYMPHOCYTES # BLD AUTO: 0.98 THOUSANDS/ÂΜL (ref 0.6–4.47)
LYMPHOCYTES NFR BLD AUTO: 16 % (ref 14–44)
MCH RBC QN AUTO: 29 PG (ref 26.8–34.3)
MCHC RBC AUTO-ENTMCNC: 32.8 G/DL (ref 31.4–37.4)
MCV RBC AUTO: 88 FL (ref 82–98)
MICROALBUMIN UR-MCNC: 58.6 MG/L (ref 0–20)
MICROALBUMIN/CREAT 24H UR: 341 MG/G CREATININE (ref 0–30)
MONOCYTES # BLD AUTO: 0.65 THOUSAND/ÂΜL (ref 0.17–1.22)
MONOCYTES NFR BLD AUTO: 11 % (ref 4–12)
NEUTROPHILS # BLD AUTO: 4.22 THOUSANDS/ÂΜL (ref 1.85–7.62)
NEUTS SEG NFR BLD AUTO: 67 % (ref 43–75)
NITRITE UR QL STRIP: NEGATIVE
NON-SQ EPI CELLS URNS QL MICRO: NORMAL /HPF
NRBC BLD AUTO-RTO: 0 /100 WBCS
PH UR STRIP.AUTO: 6.5 [PH]
PLATELET # BLD AUTO: 249 THOUSANDS/UL (ref 149–390)
PMV BLD AUTO: 11.2 FL (ref 8.9–12.7)
POTASSIUM SERPL-SCNC: 3.4 MMOL/L (ref 3.5–5.3)
PROT SERPL-MCNC: 7.6 G/DL (ref 6.4–8.4)
PROT UR STRIP-MCNC: NEGATIVE MG/DL
PROT UR-MCNC: 13 MG/DL
PROT/CREAT UR: 0.76 MG/G{CREAT} (ref 0–0.1)
PTH-INTACT SERPL-MCNC: 75.1 PG/ML (ref 18.4–80.1)
RBC # BLD AUTO: 5.25 MILLION/UL (ref 3.81–5.12)
RBC #/AREA URNS AUTO: NORMAL /HPF
SODIUM SERPL-SCNC: 128 MMOL/L (ref 135–147)
SP GR UR STRIP.AUTO: 1 (ref 1–1.03)
UROBILINOGEN UR STRIP-ACNC: <2 MG/DL
WBC # BLD AUTO: 6.2 THOUSAND/UL (ref 4.31–10.16)
WBC #/AREA URNS AUTO: NORMAL /HPF

## 2022-10-25 PROCEDURE — 85025 COMPLETE CBC W/AUTO DIFF WBC: CPT

## 2022-10-25 PROCEDURE — 82570 ASSAY OF URINE CREATININE: CPT

## 2022-10-25 PROCEDURE — 80053 COMPREHEN METABOLIC PANEL: CPT

## 2022-10-25 PROCEDURE — 84156 ASSAY OF PROTEIN URINE: CPT

## 2022-10-25 PROCEDURE — 82306 VITAMIN D 25 HYDROXY: CPT

## 2022-10-25 PROCEDURE — 83970 ASSAY OF PARATHORMONE: CPT

## 2022-10-25 PROCEDURE — 82043 UR ALBUMIN QUANTITATIVE: CPT

## 2022-10-25 PROCEDURE — 81001 URINALYSIS AUTO W/SCOPE: CPT

## 2022-10-25 PROCEDURE — 36415 COLL VENOUS BLD VENIPUNCTURE: CPT

## 2022-10-28 ENCOUNTER — OFFICE VISIT (OUTPATIENT)
Dept: NEPHROLOGY | Facility: CLINIC | Age: 67
End: 2022-10-28

## 2022-10-28 VITALS
DIASTOLIC BLOOD PRESSURE: 68 MMHG | SYSTOLIC BLOOD PRESSURE: 112 MMHG | HEIGHT: 63 IN | BODY MASS INDEX: 20.59 KG/M2 | OXYGEN SATURATION: 99 % | HEART RATE: 69 BPM | WEIGHT: 116.2 LBS

## 2022-10-28 DIAGNOSIS — N18.32 STAGE 3B CHRONIC KIDNEY DISEASE (HCC): Primary | ICD-10-CM

## 2022-10-28 DIAGNOSIS — E87.6 HYPOKALEMIA: ICD-10-CM

## 2022-10-28 DIAGNOSIS — R80.9 PROTEINURIA, UNSPECIFIED TYPE: ICD-10-CM

## 2022-10-28 DIAGNOSIS — E87.1 HYPONATREMIA: ICD-10-CM

## 2022-10-28 NOTE — PROGRESS NOTES
Assessment & Plan:    1  Stage 3b chronic kidney disease ([facility] [address] )  -     Basic metabolic panel; Future; Expected date: 11/04/2022  -     Comprehensive metabolic panel; Future; Expected date: 01/11/2023  -     Magnesium; Future; Expected date: 01/11/2023  -     Microalbumin / creatinine urine ratio; Future; Expected date: 01/11/2023  -     Uric acid; Future; Expected date: 01/11/2023  -     Protein electrophoresis, serum; Future; Expected date: 01/11/2023  -     Protein electrophoresis, urine; Future; Expected date: 01/11/2023  -     Urinalysis with microscopic; Future; Expected date: 01/11/2023    2  Hyponatremia    3  Hypokalemia    4  Proteinuria, unspecified type       1  CKD3B   Appears mildly volume down with dry MM/skin  No LE edema  UOP nonoliguric by hx  Most recent chem 10/25/22  Cr 1 52 with eGFR 34 ml/min  She is stable 1 4-1 5 since April  Slightly higher baseline than previous 1 1-1 2  Etiology of CKD 2/2 age related, HTN, potentially tobacco nodular sclerosis  She has a smaller right kidney than left  Right 7 8 cm and left 9 2 cm  Potentially KIAN at risk with tobacco use, but BP under very good control  Recommend:  1  Reviewed Cr trend and eGFR trends  2  Appears slightly volume down, recommend taking lasix as needed based on weight and not daily  She does have hyponatremia that is worsened, likely hypovolemic  3  Check chem in next 1-2 weeks to fu Na  4  Fluid intake of 50 ounces recommended  Do not want Na to decline further  5  Fu in 3 months  2  Hyponatremia, moderate, asympomatic, chronic 133 in August->128in October  Suspect hypovolemic due to daily bumex  Cut bumex down to as needed based on weight  Check chem in 1-2 weeks  Continue low sodium intake and cut Fluid intake to 50 ounces  Low specific gravity on UA 1 005 from 10/25-- may be having excess free water intake  3  Hypokalemia, mild, suspect will improve being off daily bumex  No replacement for now   Follow trend and assess need for supplementation  goal 3 5-4     4  Proteinuria--worsening  Urine protein to cr ratio 0 76 gram 10/25 from 0 4 gram 4/11/22  Microalbuminuria 341 mg/g cr from 92 mg/g cr in April   no blood, rbc noted, 1-2 wbc  Negative protein on UA  Cr trend are stable  proteinuria is nonnephrotic range and at goal for < 1gram  Upward trned unclear  No hematuria noted  Will check spep/upep for initial eval  If Cr trend up further may expand workup  I am ordering free light chain ratio as well to exclude paraproteinemia  Her BP was low on my evaluation 112/68 likely due to hypovolemia and would be risks> benefits to introduce ACE/ARB  The benefits, risks and alternatives to the treatment plan were discussed at this visit  Patient was advised of common adverse effects of any medical therapies prescribed  All questions were answered and discussed with the patient and any accompanying family members or caretakers  Subjective:      Patient ID: Danna Richey is a 69 y o  female seen in follow up today for CKD  Last seen by AP from nephrology service 5/23/22  Baseline Cr 1 1-1 2  Cr had trended up in April last year 1 6->1 4  Seen by cardiology service 10/14  She had been prescribed bumex 0 5mg PRN based on weight but ended up taking it daily  She was recommended to drink < 60 ounces per day  She was ordered chemistry to check her metabolics given daily bumex use  Previously she had LE edema but has resolved  Reports dry MM  HPI    In interim since last visit, denies any new medical conditions, surgeries  She was started on bumex 0 5mg 1 5 month ago by her cardiologist  She has been taking it every day which her cardiologist is aware  She had chemistry done on 10/25 and showed Na 128  Previously running 133 8/2/22  Potassium 3 4 previously 3 9 8/2/22  Cr 1 52 which was stable  Denies any LUTS, hematuria  Denies lightheadedness/dizziness  Reports weight is stable at 115 lb   Reports increase UOP with bumex  Cannot quantify how much fluid she drinks  Does not check BP at home  /68 HR 69  She reports dry MM and dry skin  Previously on lisinopril but this was stopped after her previous hip surgery  The following portions of the patient's history were reviewed and updated as appropriate: allergies, current medications, past family history, past medical history, past social history, past surgical history, and problem list     Review of Systems   Constitutional: Negative  Respiratory: Negative  Cardiovascular: Negative  Gastrointestinal: Negative  Genitourinary: Negative  Musculoskeletal: Negative  Neurological: Negative  All other systems reviewed and are negative  Objective:      /68   Pulse 69   Ht 5' 2 5" (1 588 m)   Wt 52 7 kg (116 lb 3 2 oz)   SpO2 99%   BMI 20 91 kg/m²          Physical Exam  Vitals and nursing note reviewed  Constitutional:       General: She is not in acute distress  Appearance: She is ill-appearing  Comments: Appears older than stated age   HENT:      Head: Atraumatic  Nose: Nose normal       Mouth/Throat:      Mouth: Mucous membranes are moist       Pharynx: Oropharynx is clear  Eyes:      Extraocular Movements: Extraocular movements intact  Conjunctiva/sclera: Conjunctivae normal    Cardiovascular:      Rate and Rhythm: Normal rate and regular rhythm  Heart sounds: Murmur heard  No friction rub  Pulmonary:      Breath sounds: No wheezing, rhonchi or rales  Comments: Decrease BS bilaterally   Abdominal:      General: Bowel sounds are normal  There is no distension  Palpations: Abdomen is soft  Tenderness: There is no abdominal tenderness  There is no guarding  Musculoskeletal:      Right lower leg: No edema  Left lower leg: No edema  Skin:     General: Skin is warm and dry  Capillary Refill: Capillary refill takes less than 2 seconds     Neurological: General: No focal deficit present  Mental Status: She is alert and oriented to person, place, and time  Mental status is at baseline  Cranial Nerves: No cranial nerve deficit     Psychiatric:         Mood and Affect: Mood normal          Behavior: Behavior normal              Lab Results   Component Value Date    SODIUM 128 (L) 10/25/2022    K 3 4 (L) 10/25/2022    CL 94 (L) 10/25/2022    CO2 28 10/25/2022    AGAP 6 10/25/2022    BUN 14 10/25/2022    CREATININE 1 52 (H) 10/25/2022    GLUC 150 (H) 08/02/2022    GLUF 107 (H) 10/25/2022    CALCIUM 9 7 10/25/2022    AST 15 10/25/2022    ALT 17 10/25/2022    ALKPHOS 125 (H) 10/25/2022    TP 7 6 10/25/2022    TBILI 0 57 10/25/2022    EGFR 34 10/25/2022      Lab Results   Component Value Date    CREATININE 1 52 (H) 10/25/2022    CREATININE 1 57 (H) 08/02/2022    CREATININE 1 47 (H) 06/22/2022    CREATININE 1 39 (H) 06/09/2022    CREATININE 1 40 (H) 04/29/2022    CREATININE 1 61 (H) 04/08/2022    CREATININE 1 22 02/10/2022    CREATININE 1 12 12/30/2021    CREATININE 0 97 11/08/2021    CREATININE 0 79 10/07/2021    CREATININE 0 78 10/06/2021    CREATININE 0 72 10/05/2021    CREATININE 0 65 10/04/2021    CREATININE 0 67 10/03/2021    CREATININE 0 82 10/02/2021      Lab Results   Component Value Date    COLORU Colorless 10/25/2022    CLARITYU Clear 10/25/2022    SPECGRAV 1 005 10/25/2022    PHUR 6 5 10/25/2022    LEUKOCYTESUR Negative 10/25/2022    NITRITE Negative 10/25/2022    PROTEIN UA Negative 10/25/2022    GLUCOSEU Negative 10/25/2022    KETONESU Negative 10/25/2022    UROBILINOGEN <2 0 10/25/2022    BILIRUBINUR Negative 10/25/2022    BLOODU Negative 10/25/2022    RBCUA None Seen 10/25/2022    WBCUA 1-2 10/25/2022    EPIS Occasional 10/25/2022    BACTERIA None Seen 10/25/2022      No results found for: LABPROT  No results found for: Kwesi Gabriel  Lab Results   Component Value Date    WBC 6 20 10/25/2022    HGB 15 2 10/25/2022    HCT 46 3 (H) 10/25/2022 MCV 88 10/25/2022     10/25/2022      Lab Results   Component Value Date    HGB 15 2 10/25/2022    HGB 14 9 08/02/2022    HGB 14 8 04/29/2022    HGB 12 9 11/08/2021    HGB 8 2 (L) 10/07/2021      Lab Results   Component Value Date    IRON 80 11/08/2021    TIBC 237 (L) 10/07/2021    FERRITIN 421 (H) 10/07/2021      No results found for: PTHCALCIUM, [id number], PHOSPHORUS   Lab Results   Component Value Date    CHOLESTEROL 86 12/02/2018    HDL 35 (L) 12/02/2018    LDLCALC 37 12/02/2018    TRIG 70 12/02/2018      No results found for: URICACID   No results found for: HGBA1C   Lab Results   Component Value Date    FREET4 1 34 04/08/2022      No results found for: TARA, DSDNAAB, RFIGM   No results found for: PROT, UPEP, IMMUNOFIX, KAPPALAMBDA, KAPPALIGHT     Portions of the record may have been created with voice recognition software  Occasional wrong word or "sound a like" substitutions may have occurred due to the inherent limitations of voice recognition software  Read the chart carefully and recognize, using context, where substitutions have occurred  If you have any questions, please contact the dictating provider

## 2022-10-28 NOTE — PATIENT INSTRUCTIONS
Recommend taking bumex as needed if weight goes up by 5 lb in one week or by 2-3 lb overnight  Recommend checking blood work in 1-2 weeks just to evaluate potassium level and sodium level  Your kidney function is stable  But your potassium and sodium level are mildly low due to dehydration likely  Recommend fluid intake of 50 ounces per day  Follow up in 3 months with blood just prior to next visit

## 2022-10-31 ENCOUNTER — TELEPHONE (OUTPATIENT)
Dept: OTHER | Facility: HOSPITAL | Age: 67
End: 2022-10-31

## 2022-10-31 NOTE — TELEPHONE ENCOUNTER
Please call patient I added a blood test I would like to get with her next set of labs in 1-2 weeks to evaluate her protein in her urine as well

## 2022-11-04 ENCOUNTER — APPOINTMENT (OUTPATIENT)
Dept: LAB | Facility: CLINIC | Age: 67
End: 2022-11-04

## 2022-11-04 DIAGNOSIS — N18.32 STAGE 3B CHRONIC KIDNEY DISEASE (HCC): ICD-10-CM

## 2022-11-04 LAB
ANION GAP SERPL CALCULATED.3IONS-SCNC: 8 MMOL/L (ref 4–13)
BUN SERPL-MCNC: 13 MG/DL (ref 5–25)
CALCIUM SERPL-MCNC: 9.8 MG/DL (ref 8.3–10.1)
CHLORIDE SERPL-SCNC: 99 MMOL/L (ref 96–108)
CO2 SERPL-SCNC: 25 MMOL/L (ref 21–32)
CREAT SERPL-MCNC: 1.62 MG/DL (ref 0.6–1.3)
GFR SERPL CREATININE-BSD FRML MDRD: 32 ML/MIN/1.73SQ M
GLUCOSE P FAST SERPL-MCNC: 98 MG/DL (ref 65–99)
POTASSIUM SERPL-SCNC: 4.1 MMOL/L (ref 3.5–5.3)
SODIUM SERPL-SCNC: 132 MMOL/L (ref 135–147)

## 2022-11-15 DIAGNOSIS — I48.91 ATRIAL FIBRILLATION WITH RVR (HCC): ICD-10-CM

## 2022-11-15 RX ORDER — APIXABAN 5 MG/1
TABLET, FILM COATED ORAL
Qty: 60 TABLET | Refills: 5 | Status: SHIPPED | OUTPATIENT
Start: 2022-11-15

## 2022-12-12 ENCOUNTER — HOSPITAL ENCOUNTER (OUTPATIENT)
Dept: CT IMAGING | Facility: HOSPITAL | Age: 67
Discharge: HOME | End: 2022-12-12
Attending: INTERNAL MEDICINE

## 2022-12-12 DIAGNOSIS — R91.8 GROUND GLASS OPACITY PRESENT ON IMAGING OF LUNG: ICD-10-CM

## 2022-12-12 DIAGNOSIS — R91.8 LUNG NODULES: ICD-10-CM

## 2022-12-19 ENCOUNTER — TELEPHONE (OUTPATIENT)
Dept: PULMONOLOGY | Facility: CLINIC | Age: 67
End: 2022-12-19

## 2022-12-19 DIAGNOSIS — C83.30 DIFFUSE LARGE B-CELL LYMPHOMA, UNSPECIFIED BODY REGION (HCC): ICD-10-CM

## 2022-12-19 DIAGNOSIS — R91.8 RIGHT LOWER LOBE LUNG MASS: Primary | ICD-10-CM

## 2022-12-19 NOTE — TELEPHONE ENCOUNTER
I tried multiple times to call patient and her daughter to give result  I recommend a PET/CT given abnormal findings, history of nondiagnostic lung nodule biopsy in past, history of lymphoma in past, history of cigarette smoking  I asked patient to call me back  I messaged office to schedule a PET/CT

## 2022-12-20 ENCOUNTER — TELEPHONE (OUTPATIENT)
Dept: PULMONOLOGY | Facility: HOSPITAL | Age: 67
End: 2022-12-20

## 2022-12-20 ENCOUNTER — PREP FOR PROCEDURE (OUTPATIENT)
Dept: PULMONOLOGY | Facility: CLINIC | Age: 67
End: 2022-12-20

## 2022-12-20 DIAGNOSIS — R91.8 GROUND GLASS OPACITY PRESENT ON IMAGING OF LUNG: Primary | ICD-10-CM

## 2022-12-20 NOTE — TELEPHONE ENCOUNTER
Dr Miller Gomez will be performing a Bronch on Ana Diallo on 12/28/2022     LOCATION: [facility]     ANTICOAGULATION INSTRUCTIONS: Hold Eliquis 3 days prior     OTHER MEDICATION INSTRUCTIONS: none    LABS: (CBC/PT/PTT in last 90 days): no recent cbc or pt/ptt  (If no, labs ordered / to be done at least one day prior to procedure)    LAST OFFICE NOTE/H&P within 30 days of procedure: 09/20/2022 Update H&P      INSTRUCTIONS GIVEN: called and spoke to patient, advised her of the date and location of the procedure  Patient will need to be NPO after mid-night the night prior and will need someone to drive her home  She will get a call the night before going over the arrival time for the procedure  Advised if she needs to change procedure she is to call the office      Gabby Batres

## 2022-12-21 ENCOUNTER — TELEPHONE (OUTPATIENT)
Dept: PULMONOLOGY | Facility: CLINIC | Age: 67
End: 2022-12-21

## 2022-12-28 ENCOUNTER — ANESTHESIA EVENT (OUTPATIENT)
Dept: GASTROENTEROLOGY | Facility: HOSPITAL | Age: 67
End: 2022-12-28

## 2022-12-28 ENCOUNTER — ANESTHESIA (OUTPATIENT)
Dept: GASTROENTEROLOGY | Facility: HOSPITAL | Age: 67
End: 2022-12-28

## 2022-12-28 ENCOUNTER — HOSPITAL ENCOUNTER (OUTPATIENT)
Dept: GASTROENTEROLOGY | Facility: HOSPITAL | Age: 67
Setting detail: OUTPATIENT SURGERY
Discharge: HOME | End: 2022-12-28
Attending: INTERNAL MEDICINE | Admitting: INTERNAL MEDICINE

## 2022-12-28 VITALS
RESPIRATION RATE: 16 BRPM | TEMPERATURE: 97.2 F | SYSTOLIC BLOOD PRESSURE: 134 MMHG | BODY MASS INDEX: 21.16 KG/M2 | DIASTOLIC BLOOD PRESSURE: 64 MMHG | WEIGHT: 115 LBS | OXYGEN SATURATION: 93 % | HEART RATE: 73 BPM | HEIGHT: 62 IN

## 2022-12-28 DIAGNOSIS — R91.8 GROUND GLASS OPACITY PRESENT ON IMAGING OF LUNG: ICD-10-CM

## 2022-12-28 RX ORDER — PROPOFOL 10 MG/ML
INJECTION, EMULSION INTRAVENOUS CONTINUOUS PRN
Status: DISCONTINUED | OUTPATIENT
Start: 2022-12-28 | End: 2022-12-28

## 2022-12-28 RX ORDER — SODIUM CHLORIDE, SODIUM LACTATE, POTASSIUM CHLORIDE, CALCIUM CHLORIDE 600; 310; 30; 20 MG/100ML; MG/100ML; MG/100ML; MG/100ML
100 INJECTION, SOLUTION INTRAVENOUS CONTINUOUS
Status: DISCONTINUED | OUTPATIENT
Start: 2022-12-28 | End: 2023-01-01 | Stop reason: HOSPADM

## 2022-12-28 RX ORDER — MIDAZOLAM HYDROCHLORIDE 2 MG/2ML
INJECTION, SOLUTION INTRAMUSCULAR; INTRAVENOUS AS NEEDED
Status: DISCONTINUED | OUTPATIENT
Start: 2022-12-28 | End: 2022-12-28

## 2022-12-28 RX ORDER — SODIUM CHLORIDE, SODIUM LACTATE, POTASSIUM CHLORIDE, CALCIUM CHLORIDE 600; 310; 30; 20 MG/100ML; MG/100ML; MG/100ML; MG/100ML
INJECTION, SOLUTION INTRAVENOUS CONTINUOUS PRN
Status: DISCONTINUED | OUTPATIENT
Start: 2022-12-28 | End: 2022-12-28

## 2022-12-28 RX ORDER — DEXAMETHASONE SODIUM PHOSPHATE 10 MG/ML
INJECTION, SOLUTION INTRAMUSCULAR; INTRAVENOUS AS NEEDED
Status: DISCONTINUED | OUTPATIENT
Start: 2022-12-28 | End: 2022-12-28

## 2022-12-28 RX ORDER — ONDANSETRON 2 MG/ML
INJECTION INTRAMUSCULAR; INTRAVENOUS AS NEEDED
Status: DISCONTINUED | OUTPATIENT
Start: 2022-12-28 | End: 2022-12-28

## 2022-12-28 RX ORDER — EPHEDRINE SULFATE 50 MG/ML
INJECTION INTRAVENOUS AS NEEDED
Status: DISCONTINUED | OUTPATIENT
Start: 2022-12-28 | End: 2022-12-28

## 2022-12-28 RX ORDER — LIDOCAINE HYDROCHLORIDE 10 MG/ML
INJECTION, SOLUTION EPIDURAL; INFILTRATION; INTRACAUDAL; PERINEURAL AS NEEDED
Status: DISCONTINUED | OUTPATIENT
Start: 2022-12-28 | End: 2022-12-28

## 2022-12-28 RX ORDER — PROPOFOL 10 MG/ML
INJECTION, EMULSION INTRAVENOUS AS NEEDED
Status: DISCONTINUED | OUTPATIENT
Start: 2022-12-28 | End: 2022-12-28

## 2022-12-28 RX ADMIN — LIDOCAINE HYDROCHLORIDE 50 MG: 10 INJECTION, SOLUTION EPIDURAL; INFILTRATION; INTRACAUDAL at 08:14

## 2022-12-28 RX ADMIN — MIDAZOLAM HYDROCHLORIDE 1 MG: 1 INJECTION, SOLUTION INTRAMUSCULAR; INTRAVENOUS at 08:21

## 2022-12-28 RX ADMIN — SODIUM CHLORIDE, POTASSIUM CHLORIDE, SODIUM LACTATE AND CALCIUM CHLORIDE: 600; 310; 30; 20 INJECTION, SOLUTION INTRAVENOUS at 08:12

## 2022-12-28 RX ADMIN — PROPOFOL 100 MCG/KG/MIN: 10 INJECTION, EMULSION INTRAVENOUS at 08:18

## 2022-12-28 RX ADMIN — SODIUM CHLORIDE, POTASSIUM CHLORIDE, SODIUM LACTATE AND CALCIUM CHLORIDE: 600; 310; 30; 20 INJECTION, SOLUTION INTRAVENOUS at 08:06

## 2022-12-28 RX ADMIN — SODIUM CHLORIDE, POTASSIUM CHLORIDE, SODIUM LACTATE AND CALCIUM CHLORIDE 100 ML/HR: 600; 310; 30; 20 INJECTION, SOLUTION INTRAVENOUS at 07:34

## 2022-12-28 RX ADMIN — EPHEDRINE SULFATE 10 MG: 50 INJECTION INTRAVENOUS at 08:20

## 2022-12-28 RX ADMIN — MIDAZOLAM HYDROCHLORIDE 1 MG: 1 INJECTION, SOLUTION INTRAMUSCULAR; INTRAVENOUS at 08:12

## 2022-12-28 RX ADMIN — DEXAMETHASONE SODIUM PHOSPHATE 10 MG: 10 INJECTION, SOLUTION INTRAMUSCULAR; INTRAVENOUS at 08:18

## 2022-12-28 RX ADMIN — PROPOFOL 120 MG: 10 INJECTION, EMULSION INTRAVENOUS at 08:14

## 2022-12-28 RX ADMIN — ONDANSETRON 4 MG: 2 INJECTION INTRAMUSCULAR; INTRAVENOUS at 08:29

## 2022-12-28 NOTE — ANESTHESIA PREPROCEDURE EVALUATION
Procedure:  BRONCHOSCOPY    Relevant Problems   CARDIO   (+) Aortoiliac occlusive disease (HCC)   (+) Atrial fibrillation with RVR (HCC)   (+) Benign essential hypertension   (+) Essential hypertension   (+) Paroxysmal atrial fibrillation (HCC)   (+) Short of breath on exertion      ENDO   (+) Secondary hyperparathyroidism (HCC)      /RENAL   (+) BRYANT (acute kidney injury) (HCC)   (+) Stage 3a chronic kidney disease (HCC)   (+) Stage 3b chronic kidney disease (HCC)      HEMATOLOGY   (+) Anemia   (+) Lymphoma (HCC)      NEURO/PSYCH   (+) Depression with anxiety   (+) Lower extremity weakness   (+) Weakness of lower extremity      PULMONARY   (+) Acute respiratory failure with hypoxia   (+) Short of breath on exertion      Smoking Status: Every Day - 39 75 pack years   Smokeless Tobacco Status: Never   Alcohol use: Yes; 4 0 standard drinks per week   Comments: 4 CANS OF BEER DAILY   Drug use: No   Comments: medical marijuana 10/7/19     LEFT VENTRICLE: Size was normal  Systolic function was normal  Ejection fraction was estimated to be 65 %  Although no diagnostic regional wall motion abnormality was identified, this possibility cannot be completely excluded on the basis  of this study      RIGHT VENTRICLE: Systolic function appeared grossly normal  Not well visualized      LEFT ATRIUM: The atrium was dilated      RIGHT ATRIUM: Not well visualized      MITRAL VALVE: There was marked annular calcification  There was mildly reduced leaflet separation particularly due to reduced mobility of posterior leaflet  DOPPLER: Transmitral velocity was increased due to valvular stenosis  There was  mild stenosis  There was mild regurgitation      AORTIC VALVE: The valve was not well visualized  DOPPLER: Transaortic velocity was within the normal range      TRICUSPID VALVE: Not well visualized      PULMONIC VALVE: Not well visualized      PERICARDIUM: Not well visualized      AORTA: Not well visualized            Physical Exam    Airway    Mallampati score: II  TM Distance: >3 FB  Neck ROM: full     Dental   upper dentures and lower dentures,     Cardiovascular  Rhythm: regular, Rate: normal,     Pulmonary  Breath sounds clear to auscultation,     Other Findings  Intercisor Distance > 3cm          Anesthesia Plan  ASA Score- 3     Anesthesia Type- general with ASA Monitors  Additional Monitors:   Airway Plan: LMA  Comment: Discussed benefits/risks of general anesthesia including possibility of mouth/throat pain, injury to lips/teeth, nausea/vomiting, and surgical pain along with more rare complications such as stroke, MI, pneumonia, aspiration, and injury to blood vessels  Patient understands and wishes to proceed  All questions answered          Plan Factors-Exercise tolerance (METS): >4 METS  Chart reviewed  EKG reviewed  Existing labs reviewed  Induction- intravenous  Postoperative Plan- Plan for postoperative opioid use  Planned trial extubation    Informed Consent- Anesthetic plan and risks discussed with patient  I personally reviewed this patient with the CRNA  Discussed and agreed on the Anesthesia Plan with the CRNA  Jesika Munoz

## 2022-12-28 NOTE — RESPIRATORY THERAPY NOTE
Assisted Dr Vipul Pak and Anesthesia with Bronchoscopy procedure  10mls of 2% lidocaine instilled on the sharif and mainstem  240mls of 0 9% sodium chloride instilled into the RLL with 30mls returned  40 mls also collected into the trap post BALS  All specimens labeled and sent  Patient tolerated procedure well  No adverse reactions

## 2022-12-28 NOTE — ANESTHESIA POSTPROCEDURE EVALUATION
Post-Op Assessment Note    CV Status:  Stable  Pain Score: 0    Pain management: adequate     Mental Status:  Awake and sleepy   Hydration Status:  Euvolemic   PONV Controlled:  Controlled   Airway Patency:  Patent      Post Op Vitals Reviewed: Yes      Staff: CRNA         No notable events documented      BP   121/57   Temp   97 2   Pulse  71   Resp   12   SpO2   98

## 2022-12-28 NOTE — H&P
H&P Exam - Dane Martinez 69 y o  female MRN: [id number]    Unit/Bed#:  Encounter: [id number]      Assessment/Plan     Assessment:  Abnormal Chest CT  GGO RLL- infectious vs malignant  Tobacco use disorder  COPD not in exacerbation    Plan:  Bronchoscopy with BAL and cytology brushing of RLL  History of Present Illness     HPI:  Dane Martinez is a 69 y o  female who presents with abnormal CT for bronchoscopy  She has no shortness of breath, cough, wheeze  She is at her baseline  She is an active smoker- 1 PPD>  Imaging reveals worsening RLL juxtaplerual GGO with consolidation  PCP: Faustino Trent MD    Review of Systems   Constitutional: Negative for chills and fever  HENT: Negative for ear pain and sore throat  Eyes: Negative for pain and visual disturbance  Respiratory: Negative for cough and shortness of breath  Cardiovascular: Negative for chest pain and palpitations  Gastrointestinal: Negative for abdominal pain and vomiting  Genitourinary: Negative for dysuria and hematuria  Musculoskeletal: Negative for arthralgias and back pain  Skin: Negative for color change and rash  Neurological: Negative for seizures and syncope  All other systems reviewed and are negative  Historical Information   Past Medical History:   Diagnosis Date   • Bell's palsy    • Cancer ([facility] )     lymphoma   • Diffuse large B cell lymphoma ([facility] )    • Hyperlipidemia    • Hypertension    • PAD (peripheral artery disease) ([facility])    • PAF (paroxysmal atrial fibrillation) ([facility])    • PVD (peripheral vascular disease) ([facility] )      Past Surgical History:   Procedure Laterality Date   • ARTERIOGRAM Left 12/6/2018    Procedure: LEFT lower extremity angiography, with Left lower extremity run-off, stent and angioplasty of Left Common Femoral Artery (Left Brachial Access);   Surgeon: Isra Barcenas MD;  Location: [facility];  Service: Vascular   • CARDIAC SURGERY     • CARPAL TUNNEL RELEASE     • CT NEEDLE BIOPSY LUNG 10/8/2019   • HYSTERECTOMY     • IR AORTAGRAM WITH RUN-OFF  12/6/2018   • OOPHORECTOMY     • AR OPTX FEM SHFT FX W/INSJ IMED IMPLT W/WO SCREW Left 9/29/2021    Procedure: INSERTION NAIL IM FEMUR ANTEGRADE (TROCHANTERIC); Surgeon: Evan Norris DO;  Location: [address] [place], [place] [address];  Service: Orthopedics   • TONSILLECTOMY       Social History   Social History     Substance and Sexual Activity   Alcohol Use Yes   • Alcohol/week: 4 0 standard drinks   • Types: 4 Cans of beer per week    Comment: 4 CANS OF BEER DAILY     Social History     Substance and Sexual Activity   Drug Use No    Comment: medical marijuana 10/7/19     Social History     Tobacco Use   Smoking Status Every Day   • Packs/day: 0 75   • Years: 53 00   • Pack years: 39 75   • Types: Cigarettes   • Start date: 1/1/1969   Smokeless Tobacco Never     E-Cigarette/Vaping   • E-Cigarette Use Never User       E-Cigarette/Vaping Substances   • Nicotine No    • THC No    • CBD No    • Flavoring No    • Other No    • Unknown No      Family History: non-contributory    Meds/Allergies   all medications and allergies reviewed  Allergies   Allergen Reactions   • Oxycodone-Acetaminophen Itching and Rash     Itching, rash       Objective   Vitals: Blood pressure 133/64, pulse 67, temperature 97 5 °F (36 4 °C), temperature source Temporal, resp  rate 16, height 5' 2" (1 575 m), weight 52 2 kg (115 lb), SpO2 93 %  No intake or output data in the 24 hours ending 12/28/22 0812    Invasive Devices     Peripheral Intravenous Line  Duration           Peripheral IV 12/28/22 Right;Ventral (anterior) Forearm <1 day                Physical Exam  Vitals and nursing note reviewed  Constitutional:       General: She is not in acute distress  Appearance: She is well-developed  HENT:      Head: Normocephalic and atraumatic  Eyes:      Conjunctiva/sclera: Conjunctivae normal    Cardiovascular:      Rate and Rhythm: Normal rate and regular rhythm        Heart sounds: No murmur heard   Pulmonary:      Effort: Pulmonary effort is normal  No respiratory distress  Breath sounds: Normal breath sounds  Abdominal:      Palpations: Abdomen is soft  Tenderness: There is no abdominal tenderness  Musculoskeletal:         General: No swelling  Cervical back: Neck supple  Skin:     General: Skin is warm and dry  Capillary Refill: Capillary refill takes less than 2 seconds  Neurological:      Mental Status: She is alert  Psychiatric:         Mood and Affect: Mood normal          Lab Results: I have personally reviewed pertinent reports  Imaging: I have personally reviewed pertinent reports  and I have personally reviewed pertinent films in PACS  EKG, Pathology, and Other Studies: I have personally reviewed pertinent reports  CT Chest 12/12/22  1  Worsening of the right lower lobe juxtapleural groundglass opacity with development of superimposed juxtapleural consolidation  Unfortunately, this has a relatively broad differential, including the aforementioned infectious or inflammatory   etiologies, but also including pulmonary adenocarcinoma, pulmonary lymphoma, or organizing pneumonia  Consider correlation with PET/CT versus short interval follow-up  Of note, none of these findings were present on the prior PET/CT in September 2019   2   Other biapical areas of groundglass nodularity are stable since August, but remain increased since a prior study in July 2021, also raising suspicion for slow growing adenocarcinomas  3   Age advanced mitral valvular, aortic valvular, and coronary arterial atherosclerotic calcifications      Code Status: Prior    Aaron Castañeda MD  Attending Physician  Pulmonary and Critical Care Medicine

## 2022-12-29 LAB
RHODAMINE-AURAMINE STN SPEC: NORMAL
RHODAMINE-AURAMINE STN SPEC: NORMAL
SCAN RESULT: NORMAL
SCAN RESULT: NORMAL

## 2022-12-30 LAB
BACTERIA BRONCH AEROBE CULT: NORMAL
GRAM STN SPEC: NORMAL

## 2023-01-01 ENCOUNTER — APPOINTMENT (INPATIENT)
Dept: RADIOLOGY | Facility: HOSPITAL | Age: 68
DRG: 208 | End: 2023-01-01
Payer: COMMERCIAL

## 2023-01-01 ENCOUNTER — OFFICE VISIT (OUTPATIENT)
Dept: VASCULAR SURGERY | Facility: CLINIC | Age: 68
End: 2023-01-01
Payer: COMMERCIAL

## 2023-01-01 ENCOUNTER — HOSPITAL ENCOUNTER (INPATIENT)
Facility: HOSPITAL | Age: 68
LOS: 3 days | DRG: 208 | End: 2023-08-10
Attending: EMERGENCY MEDICINE | Admitting: HOSPITALIST
Payer: COMMERCIAL

## 2023-01-01 ENCOUNTER — APPOINTMENT (EMERGENCY)
Dept: RADIOLOGY | Facility: HOSPITAL | Age: 68
DRG: 208 | End: 2023-01-01
Payer: COMMERCIAL

## 2023-01-01 ENCOUNTER — APPOINTMENT (INPATIENT)
Dept: NON INVASIVE DIAGNOSTICS | Facility: HOSPITAL | Age: 68
DRG: 208 | End: 2023-01-01
Payer: COMMERCIAL

## 2023-01-01 VITALS
HEART RATE: 40 BPM | BODY MASS INDEX: 22.23 KG/M2 | WEIGHT: 120.81 LBS | HEIGHT: 62 IN | SYSTOLIC BLOOD PRESSURE: 62 MMHG | RESPIRATION RATE: 48 BRPM | OXYGEN SATURATION: 28 % | DIASTOLIC BLOOD PRESSURE: 29 MMHG | TEMPERATURE: 99.3 F

## 2023-01-01 VITALS
BODY MASS INDEX: 21.03 KG/M2 | DIASTOLIC BLOOD PRESSURE: 60 MMHG | OXYGEN SATURATION: 80 % | SYSTOLIC BLOOD PRESSURE: 112 MMHG | HEART RATE: 90 BPM | HEIGHT: 62 IN

## 2023-01-01 DIAGNOSIS — R00.0 TACHYCARDIA: ICD-10-CM

## 2023-01-01 DIAGNOSIS — I50.9 CONGESTIVE HEART FAILURE (CHF) (HCC): ICD-10-CM

## 2023-01-01 DIAGNOSIS — I48.91 ATRIAL FIBRILLATION (HCC): ICD-10-CM

## 2023-01-01 DIAGNOSIS — J96.90 RESPIRATORY FAILURE (HCC): ICD-10-CM

## 2023-01-01 DIAGNOSIS — J96.01 ACUTE RESPIRATORY FAILURE WITH HYPOXIA (HCC): Primary | ICD-10-CM

## 2023-01-01 DIAGNOSIS — I89.0 LYMPHEDEMA: Primary | ICD-10-CM

## 2023-01-01 DIAGNOSIS — I50.33 ACUTE ON CHRONIC HEART FAILURE WITH PRESERVED EJECTION FRACTION (HCC): ICD-10-CM

## 2023-01-01 DIAGNOSIS — I73.9 PAD (PERIPHERAL ARTERY DISEASE) (HCC): ICD-10-CM

## 2023-01-01 LAB
2HR DELTA HS TROPONIN: 0 NG/L
4HR DELTA HS TROPONIN: 3 NG/L
ABO GROUP BLD: NORMAL
ALBUMIN SERPL BCP-MCNC: 3.7 G/DL (ref 3.5–5)
ALBUMIN SERPL BCP-MCNC: 4 G/DL (ref 3.5–5)
ALP SERPL-CCNC: 115 U/L (ref 34–104)
ALP SERPL-CCNC: 146 U/L (ref 34–104)
ALT SERPL W P-5'-P-CCNC: 13 U/L (ref 7–52)
ALT SERPL W P-5'-P-CCNC: 14 U/L (ref 7–52)
ANION GAP SERPL CALCULATED.3IONS-SCNC: 11 MMOL/L
ANION GAP SERPL CALCULATED.3IONS-SCNC: 11 MMOL/L
ANION GAP SERPL CALCULATED.3IONS-SCNC: 14 MMOL/L
ANION GAP SERPL CALCULATED.3IONS-SCNC: 14 MMOL/L
ANION GAP SERPL CALCULATED.3IONS-SCNC: 15 MMOL/L
ANION GAP SERPL CALCULATED.3IONS-SCNC: 16 MMOL/L
ANION GAP SERPL CALCULATED.3IONS-SCNC: 17 MMOL/L
APTT PPP: 33 SECONDS (ref 23–37)
ARTERIAL PATENCY WRIST A: YES
AST SERPL W P-5'-P-CCNC: 17 U/L (ref 13–39)
AST SERPL W P-5'-P-CCNC: 20 U/L (ref 13–39)
ATRIAL RATE: 166 BPM
ATRIAL RATE: 66 BPM
BACTERIA BRONCH AEROBE CULT: ABNORMAL
BACTERIA BRONCH AEROBE CULT: ABNORMAL
BASE EXCESS BLDA CALC-SCNC: -1.8 MMOL/L
BASE EXCESS BLDA CALC-SCNC: -11 MMOL/L
BASE EXCESS BLDA CALC-SCNC: -3.2 MMOL/L
BASE EXCESS BLDA CALC-SCNC: -6 MMOL/L
BASE EXCESS BLDA CALC-SCNC: -7.6 MMOL/L
BASOPHILS # BLD AUTO: 0.02 THOUSANDS/ÂΜL (ref 0–0.1)
BASOPHILS # BLD AUTO: 0.04 THOUSANDS/ÂΜL (ref 0–0.1)
BASOPHILS # BLD AUTO: 0.04 THOUSANDS/ÂΜL (ref 0–0.1)
BASOPHILS # BLD MANUAL: 0 THOUSAND/UL (ref 0–0.1)
BASOPHILS NFR BLD AUTO: 0 % (ref 0–1)
BASOPHILS NFR BLD AUTO: 0 % (ref 0–1)
BASOPHILS NFR BLD AUTO: 1 % (ref 0–1)
BASOPHILS NFR MAR MANUAL: 0 % (ref 0–1)
BILIRUB DIRECT SERPL-MCNC: 0.44 MG/DL (ref 0–0.2)
BILIRUB SERPL-MCNC: 1.04 MG/DL (ref 0.2–1)
BILIRUB SERPL-MCNC: 1.23 MG/DL (ref 0.2–1)
BLD GP AB SCN SERPL QL: NEGATIVE
BNP SERPL-MCNC: 2205 PG/ML (ref 0–100)
BODY TEMPERATURE: 99.5 DEGREES FEHRENHEIT
BUN SERPL-MCNC: 17 MG/DL (ref 5–25)
BUN SERPL-MCNC: 21 MG/DL (ref 5–25)
BUN SERPL-MCNC: 22 MG/DL (ref 5–25)
BUN SERPL-MCNC: 23 MG/DL (ref 5–25)
BUN SERPL-MCNC: 24 MG/DL (ref 5–25)
BUN SERPL-MCNC: 25 MG/DL (ref 5–25)
BUN SERPL-MCNC: 27 MG/DL (ref 5–25)
BURR CELLS BLD QL SMEAR: PRESENT
CA-I BLD-SCNC: 1.09 MMOL/L (ref 1.12–1.32)
CALCIUM SERPL-MCNC: 10.3 MG/DL (ref 8.4–10.2)
CALCIUM SERPL-MCNC: 12.8 MG/DL (ref 8.4–10.2)
CALCIUM SERPL-MCNC: 8.7 MG/DL (ref 8.4–10.2)
CALCIUM SERPL-MCNC: 8.9 MG/DL (ref 8.4–10.2)
CALCIUM SERPL-MCNC: 9.3 MG/DL (ref 8.4–10.2)
CALCIUM SERPL-MCNC: 9.3 MG/DL (ref 8.4–10.2)
CALCIUM SERPL-MCNC: 9.5 MG/DL (ref 8.4–10.2)
CARDIAC TROPONIN I PNL SERPL HS: 30 NG/L
CARDIAC TROPONIN I PNL SERPL HS: 61 NG/L
CARDIAC TROPONIN I PNL SERPL HS: 61 NG/L
CARDIAC TROPONIN I PNL SERPL HS: 64 NG/L
CHLORIDE SERPL-SCNC: 92 MMOL/L (ref 96–108)
CHLORIDE SERPL-SCNC: 93 MMOL/L (ref 96–108)
CHLORIDE SERPL-SCNC: 97 MMOL/L (ref 96–108)
CHLORIDE SERPL-SCNC: 97 MMOL/L (ref 96–108)
CHLORIDE SERPL-SCNC: 98 MMOL/L (ref 96–108)
CHLORIDE SERPL-SCNC: 98 MMOL/L (ref 96–108)
CHLORIDE SERPL-SCNC: 99 MMOL/L (ref 96–108)
CO2 SERPL-SCNC: 17 MMOL/L (ref 21–32)
CO2 SERPL-SCNC: 18 MMOL/L (ref 21–32)
CO2 SERPL-SCNC: 20 MMOL/L (ref 21–32)
CO2 SERPL-SCNC: 22 MMOL/L (ref 21–32)
CO2 SERPL-SCNC: 23 MMOL/L (ref 21–32)
CO2 SERPL-SCNC: 25 MMOL/L (ref 21–32)
CO2 SERPL-SCNC: 25 MMOL/L (ref 21–32)
CREAT SERPL-MCNC: 1.49 MG/DL (ref 0.6–1.3)
CREAT SERPL-MCNC: 1.49 MG/DL (ref 0.6–1.3)
CREAT SERPL-MCNC: 1.73 MG/DL (ref 0.6–1.3)
CREAT SERPL-MCNC: 1.76 MG/DL (ref 0.6–1.3)
CREAT SERPL-MCNC: 1.88 MG/DL (ref 0.6–1.3)
CREAT SERPL-MCNC: 2 MG/DL (ref 0.6–1.3)
CREAT SERPL-MCNC: 2.07 MG/DL (ref 0.6–1.3)
EOSINOPHIL # BLD AUTO: 0 THOUSAND/ÂΜL (ref 0–0.61)
EOSINOPHIL # BLD AUTO: 0.01 THOUSAND/ÂΜL (ref 0–0.61)
EOSINOPHIL # BLD AUTO: 0.02 THOUSAND/ÂΜL (ref 0–0.61)
EOSINOPHIL # BLD MANUAL: 0 THOUSAND/UL (ref 0–0.4)
EOSINOPHIL NFR BLD AUTO: 0 % (ref 0–6)
EOSINOPHIL NFR BLD MANUAL: 0 % (ref 0–6)
ERYTHROCYTE [DISTWIDTH] IN BLOOD BY AUTOMATED COUNT: 17.2 % (ref 11.6–15.1)
ERYTHROCYTE [DISTWIDTH] IN BLOOD BY AUTOMATED COUNT: 17.2 % (ref 11.6–15.1)
ERYTHROCYTE [DISTWIDTH] IN BLOOD BY AUTOMATED COUNT: 17.3 % (ref 11.6–15.1)
ERYTHROCYTE [DISTWIDTH] IN BLOOD BY AUTOMATED COUNT: 17.4 % (ref 11.6–15.1)
ERYTHROCYTE [DISTWIDTH] IN BLOOD BY AUTOMATED COUNT: 17.4 % (ref 11.6–15.1)
ERYTHROCYTE [DISTWIDTH] IN BLOOD BY AUTOMATED COUNT: 17.7 % (ref 11.6–15.1)
GFR SERPL CREATININE-BSD FRML MDRD: 23 ML/MIN/1.73SQ M
GFR SERPL CREATININE-BSD FRML MDRD: 24 ML/MIN/1.73SQ M
GFR SERPL CREATININE-BSD FRML MDRD: 26 ML/MIN/1.73SQ M
GFR SERPL CREATININE-BSD FRML MDRD: 28 ML/MIN/1.73SQ M
GFR SERPL CREATININE-BSD FRML MDRD: 29 ML/MIN/1.73SQ M
GFR SERPL CREATININE-BSD FRML MDRD: 35 ML/MIN/1.73SQ M
GFR SERPL CREATININE-BSD FRML MDRD: 35 ML/MIN/1.73SQ M
GIANT PLATELETS BLD QL SMEAR: PRESENT
GLUCOSE SERPL-MCNC: 142 MG/DL (ref 65–140)
GLUCOSE SERPL-MCNC: 145 MG/DL (ref 65–140)
GLUCOSE SERPL-MCNC: 164 MG/DL (ref 65–140)
GLUCOSE SERPL-MCNC: 165 MG/DL (ref 65–140)
GLUCOSE SERPL-MCNC: 197 MG/DL (ref 65–140)
GLUCOSE SERPL-MCNC: 33 MG/DL (ref 65–140)
GLUCOSE SERPL-MCNC: 48 MG/DL (ref 65–140)
GLUCOSE SERPL-MCNC: 71 MG/DL (ref 65–140)
GLUCOSE SERPL-MCNC: 73 MG/DL (ref 65–140)
GLUCOSE SERPL-MCNC: 84 MG/DL (ref 65–140)
GLUCOSE SERPL-MCNC: 93 MG/DL (ref 65–140)
GLUCOSE SERPL-MCNC: 98 MG/DL (ref 65–140)
GLUCOSE SERPL-MCNC: 99 MG/DL (ref 65–140)
GRAM STN SPEC: ABNORMAL
HCO3 BLDA-SCNC: 15 MMOL/L (ref 22–28)
HCO3 BLDA-SCNC: 18.7 MMOL/L (ref 22–28)
HCO3 BLDA-SCNC: 19.3 MMOL/L (ref 22–28)
HCO3 BLDA-SCNC: 21.2 MMOL/L (ref 22–28)
HCO3 BLDA-SCNC: 21.6 MMOL/L (ref 22–28)
HCT VFR BLD AUTO: 43.5 % (ref 34.8–46.1)
HCT VFR BLD AUTO: 45.2 % (ref 34.8–46.1)
HCT VFR BLD AUTO: 47.1 % (ref 34.8–46.1)
HCT VFR BLD AUTO: 47.7 % (ref 34.8–46.1)
HCT VFR BLD AUTO: 47.8 % (ref 34.8–46.1)
HCT VFR BLD AUTO: 49.6 % (ref 34.8–46.1)
HGB BLD-MCNC: 13.2 G/DL (ref 11.5–15.4)
HGB BLD-MCNC: 14.1 G/DL (ref 11.5–15.4)
HGB BLD-MCNC: 14.5 G/DL (ref 11.5–15.4)
HGB BLD-MCNC: 14.6 G/DL (ref 11.5–15.4)
HGB BLD-MCNC: 15 G/DL (ref 11.5–15.4)
HGB BLD-MCNC: 16.1 G/DL (ref 11.5–15.4)
HOROWITZ INDEX BLDA+IHG-RTO: 100 MM[HG]
HOROWITZ INDEX BLDA+IHG-RTO: 100 MM[HG]
I-TIME: 1
I-TIME: 1
IMM GRANULOCYTES # BLD AUTO: 0.03 THOUSAND/UL (ref 0–0.2)
IMM GRANULOCYTES # BLD AUTO: 0.17 THOUSAND/UL (ref 0–0.2)
IMM GRANULOCYTES # BLD AUTO: 0.18 THOUSAND/UL (ref 0–0.2)
IMM GRANULOCYTES NFR BLD AUTO: 0 % (ref 0–2)
IMM GRANULOCYTES NFR BLD AUTO: 1 % (ref 0–2)
IMM GRANULOCYTES NFR BLD AUTO: 2 % (ref 0–2)
INR PPP: 2.24 (ref 0.84–1.19)
LACTATE SERPL-SCNC: 5.9 MMOL/L (ref 0.5–2)
LACTATE SERPL-SCNC: 7.4 MMOL/L (ref 0.5–2)
LACTATE SERPL-SCNC: 8.1 MMOL/L (ref 0.5–2)
LACTATE SERPL-SCNC: 9.6 MMOL/L (ref 0.5–2)
LG PLATELETS BLD QL SMEAR: PRESENT
LYMPHOCYTES # BLD AUTO: 0.67 THOUSANDS/ÂΜL (ref 0.6–4.47)
LYMPHOCYTES # BLD AUTO: 0.91 THOUSANDS/ÂΜL (ref 0.6–4.47)
LYMPHOCYTES # BLD AUTO: 0.96 THOUSANDS/ÂΜL (ref 0.6–4.47)
LYMPHOCYTES # BLD AUTO: 1.28 THOUSAND/UL (ref 0.6–4.47)
LYMPHOCYTES # BLD AUTO: 14 % (ref 14–44)
LYMPHOCYTES NFR BLD AUTO: 12 % (ref 14–44)
LYMPHOCYTES NFR BLD AUTO: 6 % (ref 14–44)
LYMPHOCYTES NFR BLD AUTO: 7 % (ref 14–44)
MAGNESIUM SERPL-MCNC: 1.7 MG/DL (ref 1.9–2.7)
MAGNESIUM SERPL-MCNC: 1.8 MG/DL (ref 1.9–2.7)
MAGNESIUM SERPL-MCNC: 1.9 MG/DL (ref 1.9–2.7)
MAGNESIUM SERPL-MCNC: 2.1 MG/DL (ref 1.9–2.7)
MCH RBC QN AUTO: 27.7 PG (ref 26.8–34.3)
MCH RBC QN AUTO: 27.9 PG (ref 26.8–34.3)
MCH RBC QN AUTO: 28.2 PG (ref 26.8–34.3)
MCH RBC QN AUTO: 28.2 PG (ref 26.8–34.3)
MCH RBC QN AUTO: 28.7 PG (ref 26.8–34.3)
MCH RBC QN AUTO: 28.8 PG (ref 26.8–34.3)
MCHC RBC AUTO-ENTMCNC: 30.3 G/DL (ref 31.4–37.4)
MCHC RBC AUTO-ENTMCNC: 30.3 G/DL (ref 31.4–37.4)
MCHC RBC AUTO-ENTMCNC: 30.6 G/DL (ref 31.4–37.4)
MCHC RBC AUTO-ENTMCNC: 31.2 G/DL (ref 31.4–37.4)
MCHC RBC AUTO-ENTMCNC: 31.8 G/DL (ref 31.4–37.4)
MCHC RBC AUTO-ENTMCNC: 32.5 G/DL (ref 31.4–37.4)
MCV RBC AUTO: 88 FL (ref 82–98)
MCV RBC AUTO: 89 FL (ref 82–98)
MCV RBC AUTO: 91 FL (ref 82–98)
MCV RBC AUTO: 92 FL (ref 82–98)
MCV RBC AUTO: 92 FL (ref 82–98)
MCV RBC AUTO: 93 FL (ref 82–98)
MONOCYTES # BLD AUTO: 0.36 THOUSAND/ÂΜL (ref 0.17–1.22)
MONOCYTES # BLD AUTO: 0.46 THOUSAND/UL (ref 0–1.22)
MONOCYTES # BLD AUTO: 0.8 THOUSAND/ÂΜL (ref 0.17–1.22)
MONOCYTES # BLD AUTO: 1.35 THOUSAND/ÂΜL (ref 0.17–1.22)
MONOCYTES NFR BLD AUTO: 10 % (ref 4–12)
MONOCYTES NFR BLD AUTO: 11 % (ref 4–12)
MONOCYTES NFR BLD AUTO: 3 % (ref 4–12)
MONOCYTES NFR BLD: 5 % (ref 4–12)
NEUTROPHILS # BLD AUTO: 10.58 THOUSANDS/ÂΜL (ref 1.85–7.62)
NEUTROPHILS # BLD AUTO: 5.78 THOUSANDS/ÂΜL (ref 1.85–7.62)
NEUTROPHILS # BLD AUTO: 9.72 THOUSANDS/ÂΜL (ref 1.85–7.62)
NEUTROPHILS # BLD MANUAL: 7.38 THOUSAND/UL (ref 1.85–7.62)
NEUTS BAND NFR BLD MANUAL: 1 % (ref 0–8)
NEUTS SEG NFR BLD AUTO: 76 % (ref 43–75)
NEUTS SEG NFR BLD AUTO: 80 % (ref 43–75)
NEUTS SEG NFR BLD AUTO: 82 % (ref 43–75)
NEUTS SEG NFR BLD AUTO: 89 % (ref 43–75)
NON VENT TYPE- NRB: ABNORMAL
NRBC BLD AUTO-RTO: 0 /100 WBCS
NRBC BLD AUTO-RTO: 1 /100 WBC (ref 0–2)
O2 CT BLDA-SCNC: 15.4 ML/DL (ref 16–23)
O2 CT BLDA-SCNC: 15.4 ML/DL (ref 16–23)
O2 CT BLDA-SCNC: 16.8 ML/DL (ref 16–23)
O2 CT BLDA-SCNC: 17.4 ML/DL (ref 16–23)
O2 CT BLDA-SCNC: 18.2 ML/DL (ref 16–23)
OXYHGB MFR BLDA: 71.3 % (ref 94–97)
OXYHGB MFR BLDA: 72.5 % (ref 94–97)
OXYHGB MFR BLDA: 79.2 % (ref 94–97)
OXYHGB MFR BLDA: 86.1 % (ref 94–97)
OXYHGB MFR BLDA: 86.6 % (ref 94–97)
PCO2 BLDA: 33 MM HG (ref 36–44)
PCO2 BLDA: 34.3 MM HG (ref 36–44)
PCO2 BLDA: 36.4 MM HG (ref 36–44)
PCO2 BLDA: 37.5 MM HG (ref 36–44)
PCO2 BLDA: 41.1 MM HG (ref 36–44)
PCO2 TEMP ADJ BLDA: 42 MM HG (ref 36–44)
PEEP RESPIRATORY: 10 CM[H2O]
PEEP RESPIRATORY: 10 CM[H2O]
PH BLD: 7.27 [PH] (ref 7.35–7.45)
PH BLDA: 7.26 [PH] (ref 7.35–7.45)
PH BLDA: 7.28 [PH] (ref 7.35–7.45)
PH BLDA: 7.33 [PH] (ref 7.35–7.45)
PH BLDA: 7.38 [PH] (ref 7.35–7.45)
PH BLDA: 7.43 [PH] (ref 7.35–7.45)
PHOSPHATE SERPL-MCNC: 3.7 MG/DL (ref 2.3–4.1)
PLATELET # BLD AUTO: 189 THOUSANDS/UL (ref 149–390)
PLATELET # BLD AUTO: 192 THOUSANDS/UL (ref 149–390)
PLATELET # BLD AUTO: 193 THOUSANDS/UL (ref 149–390)
PLATELET # BLD AUTO: 199 THOUSANDS/UL (ref 149–390)
PLATELET # BLD AUTO: 219 THOUSANDS/UL (ref 149–390)
PLATELET # BLD AUTO: 251 THOUSANDS/UL (ref 149–390)
PLATELET BLD QL SMEAR: ADEQUATE
PMV BLD AUTO: 10 FL (ref 8.9–12.7)
PMV BLD AUTO: 10.4 FL (ref 8.9–12.7)
PMV BLD AUTO: 10.7 FL (ref 8.9–12.7)
PMV BLD AUTO: 10.8 FL (ref 8.9–12.7)
PO2 BLD: 66.3 MM HG (ref 75–129)
PO2 BLDA: 44.9 MM HG (ref 75–129)
PO2 BLDA: 46.1 MM HG (ref 75–129)
PO2 BLDA: 48.3 MM HG (ref 75–129)
PO2 BLDA: 56.6 MM HG (ref 75–129)
PO2 BLDA: 64.1 MM HG (ref 75–129)
POTASSIUM SERPL-SCNC: 2.6 MMOL/L (ref 3.5–5.3)
POTASSIUM SERPL-SCNC: 3.7 MMOL/L (ref 3.5–5.3)
POTASSIUM SERPL-SCNC: 4.8 MMOL/L (ref 3.5–5.3)
POTASSIUM SERPL-SCNC: 5.5 MMOL/L (ref 3.5–5.3)
POTASSIUM SERPL-SCNC: 5.9 MMOL/L (ref 3.5–5.3)
POTASSIUM SERPL-SCNC: 6.5 MMOL/L (ref 3.5–5.3)
POTASSIUM SERPL-SCNC: 6.5 MMOL/L (ref 3.5–5.3)
POTASSIUM SERPL-SCNC: 6.7 MMOL/L (ref 3.5–5.3)
PROCALCITONIN SERPL-MCNC: 0.22 NG/ML
PROT SERPL-MCNC: 6.2 G/DL (ref 6.4–8.4)
PROT SERPL-MCNC: 7.1 G/DL (ref 6.4–8.4)
PROTHROMBIN TIME: 24.6 SECONDS (ref 11.6–14.5)
QRS AXIS: 147 DEGREES
QRS AXIS: 149 DEGREES
QRSD INTERVAL: 84 MS
QRSD INTERVAL: 88 MS
QT INTERVAL: 266 MS
QT INTERVAL: 284 MS
QTC INTERVAL: 394 MS
QTC INTERVAL: 395 MS
RBC # BLD AUTO: 4.77 MILLION/UL (ref 3.81–5.12)
RBC # BLD AUTO: 4.92 MILLION/UL (ref 3.81–5.12)
RBC # BLD AUTO: 5.15 MILLION/UL (ref 3.81–5.12)
RBC # BLD AUTO: 5.18 MILLION/UL (ref 3.81–5.12)
RBC # BLD AUTO: 5.37 MILLION/UL (ref 3.81–5.12)
RBC # BLD AUTO: 5.59 MILLION/UL (ref 3.81–5.12)
RH BLD: NEGATIVE
SIMV VENT INSPIRED AIR FIO2: 100
SIMV VENT PEEP: 20
SIMV VENT TIDAL VOLUME: 360
SIMV VENT: ABNORMAL
SODIUM SERPL-SCNC: 129 MMOL/L (ref 135–147)
SODIUM SERPL-SCNC: 130 MMOL/L (ref 135–147)
SODIUM SERPL-SCNC: 130 MMOL/L (ref 135–147)
SODIUM SERPL-SCNC: 131 MMOL/L (ref 135–147)
SODIUM SERPL-SCNC: 133 MMOL/L (ref 135–147)
SODIUM SERPL-SCNC: 134 MMOL/L (ref 135–147)
SODIUM SERPL-SCNC: 135 MMOL/L (ref 135–147)
SPECIMEN EXPIRATION DATE: NORMAL
SPECIMEN SOURCE: ABNORMAL
T WAVE AXIS: -17 DEGREES
T WAVE AXIS: 2 DEGREES
VENT AC: 20
VENT AC: 20
VENT SIMV: 14
VENT- AC: AC
VENT- AC: AC
VENTRICULAR RATE: 116 BPM
VENTRICULAR RATE: 133 BPM
VT SETTING VENT: 350 ML
VT SETTING VENT: 350 ML
WBC # BLD AUTO: 10.96 THOUSAND/UL (ref 4.31–10.16)
WBC # BLD AUTO: 13.1 THOUSAND/UL (ref 4.31–10.16)
WBC # BLD AUTO: 13.19 THOUSAND/UL (ref 4.31–10.16)
WBC # BLD AUTO: 7.58 THOUSAND/UL (ref 4.31–10.16)
WBC # BLD AUTO: 8.84 THOUSAND/UL (ref 4.31–10.16)
WBC # BLD AUTO: 9.11 THOUSAND/UL (ref 4.31–10.16)

## 2023-01-01 PROCEDURE — 82805 BLOOD GASES W/O2 SATURATION: CPT | Performed by: PHYSICIAN ASSISTANT

## 2023-01-01 PROCEDURE — 71045 X-RAY EXAM CHEST 1 VIEW: CPT

## 2023-01-01 PROCEDURE — 84100 ASSAY OF PHOSPHORUS: CPT | Performed by: NURSE PRACTITIONER

## 2023-01-01 PROCEDURE — 93010 ELECTROCARDIOGRAM REPORT: CPT

## 2023-01-01 PROCEDURE — 84132 ASSAY OF SERUM POTASSIUM: CPT | Performed by: PHYSICIAN ASSISTANT

## 2023-01-01 PROCEDURE — NC001 PR NO CHARGE: Performed by: EMERGENCY MEDICINE

## 2023-01-01 PROCEDURE — 86850 RBC ANTIBODY SCREEN: CPT | Performed by: EMERGENCY MEDICINE

## 2023-01-01 PROCEDURE — 5A1935Z RESPIRATORY VENTILATION, LESS THAN 24 CONSECUTIVE HOURS: ICD-10-PCS | Performed by: INTERNAL MEDICINE

## 2023-01-01 PROCEDURE — 83735 ASSAY OF MAGNESIUM: CPT | Performed by: NURSE PRACTITIONER

## 2023-01-01 PROCEDURE — 99232 SBSQ HOSP IP/OBS MODERATE 35: CPT | Performed by: PHYSICIAN ASSISTANT

## 2023-01-01 PROCEDURE — 94640 AIRWAY INHALATION TREATMENT: CPT

## 2023-01-01 PROCEDURE — 80048 BASIC METABOLIC PNL TOTAL CA: CPT | Performed by: NURSE PRACTITIONER

## 2023-01-01 PROCEDURE — 86900 BLOOD TYPING SEROLOGIC ABO: CPT | Performed by: EMERGENCY MEDICINE

## 2023-01-01 PROCEDURE — 97163 PT EVAL HIGH COMPLEX 45 MIN: CPT

## 2023-01-01 PROCEDURE — 94002 VENT MGMT INPAT INIT DAY: CPT

## 2023-01-01 PROCEDURE — 31500 INSERT EMERGENCY AIRWAY: CPT

## 2023-01-01 PROCEDURE — 80048 BASIC METABOLIC PNL TOTAL CA: CPT | Performed by: EMERGENCY MEDICINE

## 2023-01-01 PROCEDURE — NC001 PR NO CHARGE: Performed by: INTERNAL MEDICINE

## 2023-01-01 PROCEDURE — 99291 CRITICAL CARE FIRST HOUR: CPT | Performed by: PHYSICIAN ASSISTANT

## 2023-01-01 PROCEDURE — 85025 COMPLETE CBC W/AUTO DIFF WBC: CPT | Performed by: NURSE PRACTITIONER

## 2023-01-01 PROCEDURE — 83880 ASSAY OF NATRIURETIC PEPTIDE: CPT | Performed by: EMERGENCY MEDICINE

## 2023-01-01 PROCEDURE — 85610 PROTHROMBIN TIME: CPT | Performed by: NURSE PRACTITIONER

## 2023-01-01 PROCEDURE — 80048 BASIC METABOLIC PNL TOTAL CA: CPT | Performed by: PHYSICIAN ASSISTANT

## 2023-01-01 PROCEDURE — 31500 INSERT EMERGENCY AIRWAY: CPT | Performed by: NURSE PRACTITIONER

## 2023-01-01 PROCEDURE — 99205 OFFICE O/P NEW HI 60 MIN: CPT | Performed by: NURSE PRACTITIONER

## 2023-01-01 PROCEDURE — 80076 HEPATIC FUNCTION PANEL: CPT | Performed by: EMERGENCY MEDICINE

## 2023-01-01 PROCEDURE — 82948 REAGENT STRIP/BLOOD GLUCOSE: CPT

## 2023-01-01 PROCEDURE — 99223 1ST HOSP IP/OBS HIGH 75: CPT | Performed by: INTERNAL MEDICINE

## 2023-01-01 PROCEDURE — 99232 SBSQ HOSP IP/OBS MODERATE 35: CPT | Performed by: INTERNAL MEDICINE

## 2023-01-01 PROCEDURE — 86901 BLOOD TYPING SEROLOGIC RH(D): CPT | Performed by: EMERGENCY MEDICINE

## 2023-01-01 PROCEDURE — 85025 COMPLETE CBC W/AUTO DIFF WBC: CPT | Performed by: PHYSICIAN ASSISTANT

## 2023-01-01 PROCEDURE — 83735 ASSAY OF MAGNESIUM: CPT | Performed by: EMERGENCY MEDICINE

## 2023-01-01 PROCEDURE — 84484 ASSAY OF TROPONIN QUANT: CPT | Performed by: EMERGENCY MEDICINE

## 2023-01-01 PROCEDURE — 85730 THROMBOPLASTIN TIME PARTIAL: CPT | Performed by: PHYSICIAN ASSISTANT

## 2023-01-01 PROCEDURE — 94664 DEMO&/EVAL PT USE INHALER: CPT

## 2023-01-01 PROCEDURE — 96375 TX/PRO/DX INJ NEW DRUG ADDON: CPT

## 2023-01-01 PROCEDURE — 83605 ASSAY OF LACTIC ACID: CPT | Performed by: PHYSICIAN ASSISTANT

## 2023-01-01 PROCEDURE — 36415 COLL VENOUS BLD VENIPUNCTURE: CPT | Performed by: EMERGENCY MEDICINE

## 2023-01-01 PROCEDURE — 99291 CRITICAL CARE FIRST HOUR: CPT | Performed by: INTERNAL MEDICINE

## 2023-01-01 PROCEDURE — 0BH17EZ INSERTION OF ENDOTRACHEAL AIRWAY INTO TRACHEA, VIA NATURAL OR ARTIFICIAL OPENING: ICD-10-PCS | Performed by: INTERNAL MEDICINE

## 2023-01-01 PROCEDURE — 94003 VENT MGMT INPAT SUBQ DAY: CPT

## 2023-01-01 PROCEDURE — 99285 EMERGENCY DEPT VISIT HI MDM: CPT | Performed by: EMERGENCY MEDICINE

## 2023-01-01 PROCEDURE — 99285 EMERGENCY DEPT VISIT HI MDM: CPT

## 2023-01-01 PROCEDURE — 36600 WITHDRAWAL OF ARTERIAL BLOOD: CPT

## 2023-01-01 PROCEDURE — 97167 OT EVAL HIGH COMPLEX 60 MIN: CPT

## 2023-01-01 PROCEDURE — NC001 PR NO CHARGE: Performed by: NURSE PRACTITIONER

## 2023-01-01 PROCEDURE — 83735 ASSAY OF MAGNESIUM: CPT | Performed by: PHYSICIAN ASSISTANT

## 2023-01-01 PROCEDURE — 93010 ELECTROCARDIOGRAM REPORT: CPT | Performed by: INTERNAL MEDICINE

## 2023-01-01 PROCEDURE — 94760 N-INVAS EAR/PLS OXIMETRY 1: CPT

## 2023-01-01 PROCEDURE — 85027 COMPLETE CBC AUTOMATED: CPT | Performed by: PHYSICIAN ASSISTANT

## 2023-01-01 PROCEDURE — 84145 PROCALCITONIN (PCT): CPT | Performed by: NURSE PRACTITIONER

## 2023-01-01 PROCEDURE — 99292 CRITICAL CARE ADDL 30 MIN: CPT | Performed by: PHYSICIAN ASSISTANT

## 2023-01-01 PROCEDURE — 93005 ELECTROCARDIOGRAM TRACING: CPT

## 2023-01-01 PROCEDURE — 83605 ASSAY OF LACTIC ACID: CPT | Performed by: NURSE PRACTITIONER

## 2023-01-01 PROCEDURE — 85007 BL SMEAR W/DIFF WBC COUNT: CPT | Performed by: PHYSICIAN ASSISTANT

## 2023-01-01 PROCEDURE — 82805 BLOOD GASES W/O2 SATURATION: CPT | Performed by: NURSE PRACTITIONER

## 2023-01-01 PROCEDURE — 99223 1ST HOSP IP/OBS HIGH 75: CPT | Performed by: HOSPITALIST

## 2023-01-01 PROCEDURE — 85027 COMPLETE CBC AUTOMATED: CPT | Performed by: NURSE PRACTITIONER

## 2023-01-01 PROCEDURE — 84484 ASSAY OF TROPONIN QUANT: CPT | Performed by: PHYSICIAN ASSISTANT

## 2023-01-01 PROCEDURE — 85025 COMPLETE CBC W/AUTO DIFF WBC: CPT | Performed by: EMERGENCY MEDICINE

## 2023-01-01 PROCEDURE — 82330 ASSAY OF CALCIUM: CPT | Performed by: NURSE PRACTITIONER

## 2023-01-01 PROCEDURE — 94660 CPAP INITIATION&MGMT: CPT

## 2023-01-01 PROCEDURE — 96365 THER/PROPH/DIAG IV INF INIT: CPT

## 2023-01-01 PROCEDURE — 80053 COMPREHEN METABOLIC PANEL: CPT | Performed by: NURSE PRACTITIONER

## 2023-01-01 RX ORDER — ATORVASTATIN CALCIUM 40 MG/1
40 TABLET, FILM COATED ORAL DAILY
Status: DISCONTINUED | OUTPATIENT
Start: 2023-01-01 | End: 2023-01-01 | Stop reason: HOSPADM

## 2023-01-01 RX ORDER — MIRTAZAPINE 15 MG/1
7.5 TABLET, FILM COATED ORAL
Status: DISCONTINUED | OUTPATIENT
Start: 2023-01-01 | End: 2023-01-01

## 2023-01-01 RX ORDER — POTASSIUM CHLORIDE 14.9 MG/ML
20 INJECTION INTRAVENOUS ONCE
Status: COMPLETED | OUTPATIENT
Start: 2023-01-01 | End: 2023-01-01

## 2023-01-01 RX ORDER — POTASSIUM CHLORIDE 20 MEQ/1
40 TABLET, EXTENDED RELEASE ORAL ONCE
Status: COMPLETED | OUTPATIENT
Start: 2023-01-01 | End: 2023-01-01

## 2023-01-01 RX ORDER — CALCIUM GLUCONATE 20 MG/ML
1 INJECTION, SOLUTION INTRAVENOUS ONCE
Status: COMPLETED | OUTPATIENT
Start: 2023-01-01 | End: 2023-01-01

## 2023-01-01 RX ORDER — POTASSIUM CHLORIDE 20MEQ/15ML
20 LIQUID (ML) ORAL 2 TIMES DAILY
Status: DISCONTINUED | OUTPATIENT
Start: 2023-01-01 | End: 2023-01-01

## 2023-01-01 RX ORDER — FENTANYL CITRATE/PF 50 MCG/ML
50 SYRINGE (ML) INJECTION
Status: DISCONTINUED | OUTPATIENT
Start: 2023-01-01 | End: 2023-01-01 | Stop reason: SDUPTHER

## 2023-01-01 RX ORDER — LEVALBUTEROL 1.25 MG/.5ML
1.25 SOLUTION, CONCENTRATE RESPIRATORY (INHALATION)
Status: DISCONTINUED | OUTPATIENT
Start: 2023-01-01 | End: 2023-01-01 | Stop reason: HOSPADM

## 2023-01-01 RX ORDER — POTASSIUM CHLORIDE 20MEQ/15ML
20 LIQUID (ML) ORAL DAILY
Status: DISCONTINUED | OUTPATIENT
Start: 2023-01-01 | End: 2023-01-01

## 2023-01-01 RX ORDER — BUMETANIDE 1 MG/1
1 TABLET ORAL DAILY
Status: DISCONTINUED | OUTPATIENT
Start: 2023-01-01 | End: 2023-01-01

## 2023-01-01 RX ORDER — FUROSEMIDE 10 MG/ML
20 INJECTION INTRAMUSCULAR; INTRAVENOUS ONCE
Status: DISCONTINUED | OUTPATIENT
Start: 2023-01-01 | End: 2023-01-01 | Stop reason: HOSPADM

## 2023-01-01 RX ORDER — ALPRAZOLAM 0.25 MG/1
0.25 TABLET ORAL 2 TIMES DAILY
Status: DISCONTINUED | OUTPATIENT
Start: 2023-01-01 | End: 2023-01-01

## 2023-01-01 RX ORDER — NYSTATIN 100000 [USP'U]/G
POWDER TOPICAL 2 TIMES DAILY
Status: DISCONTINUED | OUTPATIENT
Start: 2023-01-01 | End: 2023-01-01 | Stop reason: HOSPADM

## 2023-01-01 RX ORDER — DEXTROSE MONOHYDRATE 25 G/50ML
50 INJECTION, SOLUTION INTRAVENOUS ONCE
Status: COMPLETED | OUTPATIENT
Start: 2023-01-01 | End: 2023-01-01

## 2023-01-01 RX ORDER — FUROSEMIDE 10 MG/ML
40 INJECTION INTRAMUSCULAR; INTRAVENOUS
Status: DISCONTINUED | OUTPATIENT
Start: 2023-01-01 | End: 2023-01-01

## 2023-01-01 RX ORDER — DEXTROSE MONOHYDRATE 25 G/50ML
25 INJECTION, SOLUTION INTRAVENOUS ONCE
Status: COMPLETED | OUTPATIENT
Start: 2023-01-01 | End: 2023-01-01

## 2023-01-01 RX ORDER — ALBUTEROL SULFATE 2.5 MG/3ML
10 SOLUTION RESPIRATORY (INHALATION) ONCE
Status: COMPLETED | OUTPATIENT
Start: 2023-01-01 | End: 2023-01-01

## 2023-01-01 RX ORDER — LEVALBUTEROL INHALATION SOLUTION 0.63 MG/3ML
0.63 SOLUTION RESPIRATORY (INHALATION)
Status: DISCONTINUED | OUTPATIENT
Start: 2023-01-01 | End: 2023-01-01

## 2023-01-01 RX ORDER — FUROSEMIDE 10 MG/ML
40 INJECTION INTRAMUSCULAR; INTRAVENOUS ONCE
Status: COMPLETED | OUTPATIENT
Start: 2023-01-01 | End: 2023-01-01

## 2023-01-01 RX ORDER — METOPROLOL TARTRATE 5 MG/5ML
5 INJECTION INTRAVENOUS ONCE
Status: COMPLETED | OUTPATIENT
Start: 2023-01-01 | End: 2023-01-01

## 2023-01-01 RX ORDER — SODIUM POLYSTYRENE SULFONATE 4.1 MEQ/G
15 POWDER, FOR SUSPENSION ORAL; RECTAL ONCE
Status: COMPLETED | OUTPATIENT
Start: 2023-01-01 | End: 2023-01-01

## 2023-01-01 RX ORDER — FUROSEMIDE 20 MG/1
20 TABLET ORAL ONCE
Status: DISCONTINUED | OUTPATIENT
Start: 2023-01-01 | End: 2023-01-01

## 2023-01-01 RX ORDER — MAGNESIUM SULFATE HEPTAHYDRATE 40 MG/ML
2 INJECTION, SOLUTION INTRAVENOUS ONCE
Status: COMPLETED | OUTPATIENT
Start: 2023-01-01 | End: 2023-01-01

## 2023-01-01 RX ORDER — METHYLPREDNISOLONE SODIUM SUCCINATE 40 MG/ML
40 INJECTION, POWDER, LYOPHILIZED, FOR SOLUTION INTRAMUSCULAR; INTRAVENOUS EVERY 12 HOURS SCHEDULED
Status: DISCONTINUED | OUTPATIENT
Start: 2023-01-01 | End: 2023-01-01 | Stop reason: HOSPADM

## 2023-01-01 RX ORDER — CALCIUM GLUCONATE 20 MG/ML
2 INJECTION, SOLUTION INTRAVENOUS ONCE
Status: COMPLETED | OUTPATIENT
Start: 2023-01-01 | End: 2023-01-01

## 2023-01-01 RX ORDER — LEVALBUTEROL INHALATION SOLUTION 0.63 MG/3ML
0.63 SOLUTION RESPIRATORY (INHALATION) 4 TIMES DAILY
Status: CANCELLED | OUTPATIENT
Start: 2023-01-01

## 2023-01-01 RX ORDER — AMOXICILLIN 250 MG
1 CAPSULE ORAL DAILY
Status: DISCONTINUED | OUTPATIENT
Start: 2023-01-01 | End: 2023-01-01 | Stop reason: HOSPADM

## 2023-01-01 RX ORDER — MELATONIN
2000 DAILY
Status: DISCONTINUED | OUTPATIENT
Start: 2023-01-01 | End: 2023-01-01 | Stop reason: HOSPADM

## 2023-01-01 RX ORDER — FUROSEMIDE 10 MG/ML
60 INJECTION INTRAMUSCULAR; INTRAVENOUS ONCE
Status: COMPLETED | OUTPATIENT
Start: 2023-01-01 | End: 2023-01-01

## 2023-01-01 RX ORDER — NICOTINE 21 MG/24HR
1 PATCH, TRANSDERMAL 24 HOURS TRANSDERMAL DAILY
Status: DISCONTINUED | OUTPATIENT
Start: 2023-01-01 | End: 2023-01-01 | Stop reason: HOSPADM

## 2023-01-01 RX ORDER — FENTANYL CITRATE 50 UG/ML
25 INJECTION, SOLUTION INTRAMUSCULAR; INTRAVENOUS
Status: DISCONTINUED | OUTPATIENT
Start: 2023-01-01 | End: 2023-01-01

## 2023-01-01 RX ORDER — PROPOFOL 10 MG/ML
5-50 INJECTION, EMULSION INTRAVENOUS
Status: DISCONTINUED | OUTPATIENT
Start: 2023-01-01 | End: 2023-01-01 | Stop reason: HOSPADM

## 2023-01-01 RX ORDER — FUROSEMIDE 10 MG/ML
20 SYRINGE (ML) INJECTION CONTINUOUS
Status: DISCONTINUED | OUTPATIENT
Start: 2023-01-01 | End: 2023-01-01

## 2023-01-01 RX ORDER — POTASSIUM CHLORIDE 14.9 MG/ML
20 INJECTION INTRAVENOUS ONCE
Status: DISCONTINUED | OUTPATIENT
Start: 2023-01-01 | End: 2023-01-01

## 2023-01-01 RX ORDER — POTASSIUM CHLORIDE 20MEQ/15ML
40 LIQUID (ML) ORAL
Status: DISCONTINUED | OUTPATIENT
Start: 2023-01-01 | End: 2023-01-01

## 2023-01-01 RX ORDER — DILTIAZEM HYDROCHLORIDE 5 MG/ML
10 INJECTION INTRAVENOUS ONCE
Status: COMPLETED | OUTPATIENT
Start: 2023-01-01 | End: 2023-01-01

## 2023-01-01 RX ORDER — LEVALBUTEROL INHALATION SOLUTION 1.25 MG/3ML
SOLUTION RESPIRATORY (INHALATION)
Status: DISPENSED
Start: 2023-01-01 | End: 2023-01-01

## 2023-01-01 RX ORDER — FENTANYL CITRATE 50 UG/ML
50 INJECTION, SOLUTION INTRAMUSCULAR; INTRAVENOUS
Status: DISCONTINUED | OUTPATIENT
Start: 2023-01-01 | End: 2023-01-01 | Stop reason: HOSPADM

## 2023-01-01 RX ORDER — CHLORHEXIDINE GLUCONATE 0.12 MG/ML
15 RINSE ORAL EVERY 12 HOURS SCHEDULED
Status: DISCONTINUED | OUTPATIENT
Start: 2023-01-01 | End: 2023-01-01 | Stop reason: HOSPADM

## 2023-01-01 RX ORDER — FENTANYL CITRATE 50 UG/ML
INJECTION, SOLUTION INTRAMUSCULAR; INTRAVENOUS
Status: DISPENSED
Start: 2023-01-01 | End: 2023-01-01

## 2023-01-01 RX ORDER — NYSTATIN 100000 [USP'U]/G
POWDER TOPICAL 2 TIMES DAILY
Status: DISCONTINUED | OUTPATIENT
Start: 2023-01-01 | End: 2023-01-01

## 2023-01-01 RX ORDER — EPINEPHRINE IN SOD CHLOR,ISO 1 MG/10 ML
SYRINGE (ML) INTRAVENOUS CODE/TRAUMA/SEDATION MEDICATION
Status: COMPLETED | OUTPATIENT
Start: 2023-01-01 | End: 2023-01-01

## 2023-01-01 RX ORDER — SODIUM CHLORIDE, SODIUM LACTATE, POTASSIUM CHLORIDE, CALCIUM CHLORIDE 600; 310; 30; 20 MG/100ML; MG/100ML; MG/100ML; MG/100ML
75 INJECTION, SOLUTION INTRAVENOUS CONTINUOUS
Status: DISCONTINUED | OUTPATIENT
Start: 2023-01-01 | End: 2023-01-01

## 2023-01-01 RX ORDER — DEXTROSE MONOHYDRATE 25 G/50ML
25 INJECTION, SOLUTION INTRAVENOUS ONCE
Status: DISCONTINUED | OUTPATIENT
Start: 2023-01-01 | End: 2023-01-01

## 2023-01-01 RX ORDER — FUROSEMIDE 10 MG/ML
80 INJECTION INTRAMUSCULAR; INTRAVENOUS
Status: DISCONTINUED | OUTPATIENT
Start: 2023-01-01 | End: 2023-01-01

## 2023-01-01 RX ORDER — ALBUMIN, HUMAN INJ 5% 5 %
12.5 SOLUTION INTRAVENOUS ONCE
Status: COMPLETED | OUTPATIENT
Start: 2023-01-01 | End: 2023-01-01

## 2023-01-01 RX ORDER — DILTIAZEM HYDROCHLORIDE 300 MG/1
300 CAPSULE, COATED, EXTENDED RELEASE ORAL DAILY
Status: DISCONTINUED | OUTPATIENT
Start: 2023-01-01 | End: 2023-01-01

## 2023-01-01 RX ORDER — CALCIUM CHLORIDE 100 MG/ML
SYRINGE (ML) INTRAVENOUS CODE/TRAUMA/SEDATION MEDICATION
Status: COMPLETED | OUTPATIENT
Start: 2023-01-01 | End: 2023-01-01

## 2023-01-01 RX ORDER — LEVALBUTEROL 1.25 MG/.5ML
1.25 SOLUTION, CONCENTRATE RESPIRATORY (INHALATION) 4 TIMES DAILY
Status: DISCONTINUED | OUTPATIENT
Start: 2023-01-01 | End: 2023-01-01

## 2023-01-01 RX ORDER — ALBUMIN (HUMAN) 12.5 G/50ML
12.5 SOLUTION INTRAVENOUS ONCE
Status: COMPLETED | OUTPATIENT
Start: 2023-01-01 | End: 2023-01-01

## 2023-01-01 RX ADMIN — EPINEPHRINE 1 MG: 0.1 INJECTION INTRACARDIAC; INTRAVENOUS at 02:43

## 2023-01-01 RX ADMIN — EPINEPHRINE 1 MG: 0.1 INJECTION INTRACARDIAC; INTRAVENOUS at 02:40

## 2023-01-01 RX ADMIN — APIXABAN 5 MG: 5 TABLET, FILM COATED ORAL at 17:11

## 2023-01-01 RX ADMIN — LEVALBUTEROL 1.25 MG: 1.25 SOLUTION, CONCENTRATE RESPIRATORY (INHALATION) at 23:23

## 2023-01-01 RX ADMIN — ATORVASTATIN CALCIUM 40 MG: 40 TABLET, FILM COATED ORAL at 08:10

## 2023-01-01 RX ADMIN — APIXABAN 5 MG: 5 TABLET, FILM COATED ORAL at 21:36

## 2023-01-01 RX ADMIN — DILTIAZEM HYDROCHLORIDE 300 MG: 180 CAPSULE, COATED, EXTENDED RELEASE ORAL at 09:27

## 2023-01-01 RX ADMIN — METOROPROLOL TARTRATE 5 MG: 5 INJECTION, SOLUTION INTRAVENOUS at 17:01

## 2023-01-01 RX ADMIN — DEXTROSE MONOHYDRATE 25 ML: 25 INJECTION, SOLUTION INTRAVENOUS at 22:21

## 2023-01-01 RX ADMIN — POTASSIUM CHLORIDE 40 MEQ: 1.5 SOLUTION ORAL at 11:55

## 2023-01-01 RX ADMIN — MAGNESIUM SULFATE HEPTAHYDRATE 2 G: 40 INJECTION, SOLUTION INTRAVENOUS at 18:10

## 2023-01-01 RX ADMIN — ALPRAZOLAM 0.25 MG: 0.25 TABLET ORAL at 21:36

## 2023-01-01 RX ADMIN — ALBUMIN (HUMAN) 12.5 G: 0.25 INJECTION, SOLUTION INTRAVENOUS at 04:00

## 2023-01-01 RX ADMIN — ATORVASTATIN CALCIUM 40 MG: 40 TABLET, FILM COATED ORAL at 09:27

## 2023-01-01 RX ADMIN — CALCIUM GLUCONATE 1 G: 20 INJECTION, SOLUTION INTRAVENOUS at 07:52

## 2023-01-01 RX ADMIN — SODIUM CHLORIDE: 9 INJECTION, SOLUTION INTRAMUSCULAR; INTRAVENOUS; SUBCUTANEOUS at 22:21

## 2023-01-01 RX ADMIN — SODIUM POLYSTYRENE SULFONATE 15 G: 1 POWDER ORAL; RECTAL at 07:53

## 2023-01-01 RX ADMIN — IPRATROPIUM BROMIDE 0.5 MG: 0.5 SOLUTION RESPIRATORY (INHALATION) at 19:31

## 2023-01-01 RX ADMIN — MIRTAZAPINE 7.5 MG: 15 TABLET, FILM COATED ORAL at 21:21

## 2023-01-01 RX ADMIN — Medication 2000 UNITS: at 09:27

## 2023-01-01 RX ADMIN — IPRATROPIUM BROMIDE 0.5 MG: 0.5 SOLUTION RESPIRATORY (INHALATION) at 23:23

## 2023-01-01 RX ADMIN — CALCIUM GLUCONATE 1 G: 20 INJECTION, SOLUTION INTRAVENOUS at 18:33

## 2023-01-01 RX ADMIN — FENTANYL CITRATE 25 MCG: 50 INJECTION INTRAMUSCULAR; INTRAVENOUS at 08:09

## 2023-01-01 RX ADMIN — METHYLPREDNISOLONE SODIUM SUCCINATE 40 MG: 40 INJECTION, POWDER, FOR SOLUTION INTRAMUSCULAR; INTRAVENOUS at 22:21

## 2023-01-01 RX ADMIN — POTASSIUM CHLORIDE 20 MEQ: 14.9 INJECTION, SOLUTION INTRAVENOUS at 08:17

## 2023-01-01 RX ADMIN — ALBUTEROL SULFATE 10 MG: 2.5 SOLUTION RESPIRATORY (INHALATION) at 03:34

## 2023-01-01 RX ADMIN — ALBUMIN (HUMAN) 12.5 G: 12.5 INJECTION, SOLUTION INTRAVENOUS at 00:47

## 2023-01-01 RX ADMIN — PROPOFOL 5 MCG/KG/MIN: 10 INJECTION, EMULSION INTRAVENOUS at 18:19

## 2023-01-01 RX ADMIN — METHYLPREDNISOLONE SODIUM SUCCINATE 40 MG: 40 INJECTION, POWDER, FOR SOLUTION INTRAMUSCULAR; INTRAVENOUS at 11:56

## 2023-01-01 RX ADMIN — DEXTROSE MONOHYDRATE 50 ML: 25 INJECTION, SOLUTION INTRAVENOUS at 07:04

## 2023-01-01 RX ADMIN — IPRATROPIUM BROMIDE 0.5 MG: 0.5 SOLUTION RESPIRATORY (INHALATION) at 07:07

## 2023-01-01 RX ADMIN — Medication 1 PATCH: at 08:11

## 2023-01-01 RX ADMIN — Medication 1 PATCH: at 08:27

## 2023-01-01 RX ADMIN — FENTANYL CITRATE 25 MCG: 50 INJECTION INTRAMUSCULAR; INTRAVENOUS at 07:52

## 2023-01-01 RX ADMIN — BUMETANIDE 1 MG: 1 TABLET ORAL at 09:26

## 2023-01-01 RX ADMIN — SENNOSIDES AND DOCUSATE SODIUM 1 TABLET: 50; 8.6 TABLET ORAL at 09:27

## 2023-01-01 RX ADMIN — SODIUM CHLORIDE, SODIUM LACTATE, POTASSIUM CHLORIDE, AND CALCIUM CHLORIDE 75 ML/HR: .6; .31; .03; .02 INJECTION, SOLUTION INTRAVENOUS at 00:47

## 2023-01-01 RX ADMIN — APIXABAN 5 MG: 5 TABLET, FILM COATED ORAL at 09:31

## 2023-01-01 RX ADMIN — POTASSIUM CHLORIDE 20 MEQ: 1.5 SOLUTION ORAL at 09:36

## 2023-01-01 RX ADMIN — LEVALBUTEROL HYDROCHLORIDE 0.63 MG: 0.63 SOLUTION RESPIRATORY (INHALATION) at 19:31

## 2023-01-01 RX ADMIN — IPRATROPIUM BROMIDE 0.5 MG: 0.5 SOLUTION RESPIRATORY (INHALATION) at 19:23

## 2023-01-01 RX ADMIN — Medication 20 MG: at 08:11

## 2023-01-01 RX ADMIN — LEVALBUTEROL HYDROCHLORIDE 0.63 MG: 0.63 SOLUTION RESPIRATORY (INHALATION) at 13:36

## 2023-01-01 RX ADMIN — NYSTATIN: 100000 POWDER TOPICAL at 17:11

## 2023-01-01 RX ADMIN — POTASSIUM CHLORIDE 20 MEQ: 1.5 SOLUTION ORAL at 15:49

## 2023-01-01 RX ADMIN — ALPRAZOLAM 0.25 MG: 0.25 TABLET ORAL at 08:10

## 2023-01-01 RX ADMIN — DILTIAZEM HYDROCHLORIDE 5 MG/HR: 5 INJECTION INTRAVENOUS at 09:24

## 2023-01-01 RX ADMIN — IPRATROPIUM BROMIDE 0.5 MG: 0.5 SOLUTION RESPIRATORY (INHALATION) at 07:20

## 2023-01-01 RX ADMIN — FENTANYL CITRATE 25 MCG: 50 INJECTION INTRAMUSCULAR; INTRAVENOUS at 18:22

## 2023-01-01 RX ADMIN — NOREPINEPHRINE BITARTRATE 30 MCG/MIN: 1 INJECTION, SOLUTION, CONCENTRATE INTRAVENOUS at 08:33

## 2023-01-01 RX ADMIN — DILTIAZEM HYDROCHLORIDE 10 MG: 5 INJECTION INTRAVENOUS at 16:33

## 2023-01-01 RX ADMIN — CHLORHEXIDINE GLUCONATE 15 ML: 1.2 RINSE ORAL at 22:22

## 2023-01-01 RX ADMIN — POTASSIUM CHLORIDE 40 MEQ: 1.5 SOLUTION ORAL at 08:11

## 2023-01-01 RX ADMIN — LEVALBUTEROL HYDROCHLORIDE 0.63 MG: 0.63 SOLUTION RESPIRATORY (INHALATION) at 13:45

## 2023-01-01 RX ADMIN — SODIUM CHLORIDE: 9 INJECTION INTRAMUSCULAR; INTRAVENOUS; SUBCUTANEOUS at 03:37

## 2023-01-01 RX ADMIN — Medication 2000 UNITS: at 08:09

## 2023-01-01 RX ADMIN — Medication 50 MEQ: at 20:48

## 2023-01-01 RX ADMIN — APIXABAN 5 MG: 5 TABLET, FILM COATED ORAL at 08:10

## 2023-01-01 RX ADMIN — ALBUTEROL SULFATE 10 MG: 2.5 SOLUTION RESPIRATORY (INHALATION) at 07:04

## 2023-01-01 RX ADMIN — FENTANYL CITRATE 25 MCG: 50 INJECTION INTRAMUSCULAR; INTRAVENOUS at 19:45

## 2023-01-01 RX ADMIN — LEVALBUTEROL 1.25 MG: 1.25 SOLUTION, CONCENTRATE RESPIRATORY (INHALATION) at 07:14

## 2023-01-01 RX ADMIN — LEVALBUTEROL HYDROCHLORIDE 0.63 MG: 0.63 SOLUTION RESPIRATORY (INHALATION) at 07:20

## 2023-01-01 RX ADMIN — SODIUM BICARBONATE 50 MEQ: 84 INJECTION INTRAVENOUS at 20:48

## 2023-01-01 RX ADMIN — DILTIAZEM HYDROCHLORIDE 300 MG: 180 CAPSULE, COATED, EXTENDED RELEASE ORAL at 08:10

## 2023-01-01 RX ADMIN — MIRTAZAPINE 7.5 MG: 15 TABLET, FILM COATED ORAL at 21:36

## 2023-01-01 RX ADMIN — METOPROLOL SUCCINATE 75 MG: 25 TABLET, EXTENDED RELEASE ORAL at 01:08

## 2023-01-01 RX ADMIN — SENNOSIDES AND DOCUSATE SODIUM 1 TABLET: 50; 8.6 TABLET ORAL at 08:11

## 2023-01-01 RX ADMIN — ALBUMIN (HUMAN) 12.5 G: 12.5 INJECTION, SOLUTION INTRAVENOUS at 18:09

## 2023-01-01 RX ADMIN — SODIUM CHLORIDE, SODIUM LACTATE, POTASSIUM CHLORIDE, AND CALCIUM CHLORIDE 75 ML/HR: .6; .31; .03; .02 INJECTION, SOLUTION INTRAVENOUS at 18:30

## 2023-01-01 RX ADMIN — FUROSEMIDE 80 MG: 10 INJECTION, SOLUTION INTRAVENOUS at 14:56

## 2023-01-01 RX ADMIN — CALCIUM CHLORIDE 1 G: 100 INJECTION INTRAVENOUS; INTRAVENTRICULAR at 02:44

## 2023-01-01 RX ADMIN — METOPROLOL SUCCINATE 75 MG: 50 TABLET, EXTENDED RELEASE ORAL at 08:11

## 2023-01-01 RX ADMIN — SODIUM BICARBONATE 75 ML/HR: 84 INJECTION, SOLUTION INTRAVENOUS at 07:53

## 2023-01-01 RX ADMIN — ALPRAZOLAM 0.25 MG: 0.25 TABLET ORAL at 17:11

## 2023-01-01 RX ADMIN — UMECLIDINIUM BROMIDE AND VILANTEROL TRIFENATATE 1 PUFF: 62.5; 25 POWDER RESPIRATORY (INHALATION) at 09:36

## 2023-01-01 RX ADMIN — CHLORHEXIDINE GLUCONATE 15 ML: 1.2 RINSE ORAL at 08:11

## 2023-01-01 RX ADMIN — NYSTATIN 1 APPLICATION: 100000 POWDER TOPICAL at 08:28

## 2023-01-01 RX ADMIN — DEXTROSE MONOHYDRATE 25 ML: 25 INJECTION, SOLUTION INTRAVENOUS at 08:34

## 2023-01-01 RX ADMIN — MAGNESIUM SULFATE 2 G: 2 INJECTION INTRAVENOUS at 07:52

## 2023-01-01 RX ADMIN — IPRATROPIUM BROMIDE 0.5 MG: 0.5 SOLUTION RESPIRATORY (INHALATION) at 13:36

## 2023-01-01 RX ADMIN — ALPRAZOLAM 0.25 MG: 0.25 TABLET ORAL at 09:26

## 2023-01-01 RX ADMIN — LEVALBUTEROL 1.25 MG: 1.25 SOLUTION, CONCENTRATE RESPIRATORY (INHALATION) at 19:23

## 2023-01-01 RX ADMIN — POTASSIUM CHLORIDE 40 MEQ: 1500 TABLET, EXTENDED RELEASE ORAL at 11:54

## 2023-01-01 RX ADMIN — FUROSEMIDE 40 MG: 10 INJECTION, SOLUTION INTRAVENOUS at 13:35

## 2023-01-01 RX ADMIN — CALCIUM GLUCONATE 2 G: 20 INJECTION, SOLUTION INTRAVENOUS at 16:39

## 2023-01-01 RX ADMIN — IPRATROPIUM BROMIDE 0.5 MG: 0.5 SOLUTION RESPIRATORY (INHALATION) at 07:17

## 2023-01-01 RX ADMIN — DEXTROSE MONOHYDRATE 50 ML: 25 INJECTION, SOLUTION INTRAVENOUS at 03:34

## 2023-01-01 RX ADMIN — IPRATROPIUM BROMIDE 0.5 MG: 0.5 SOLUTION RESPIRATORY (INHALATION) at 13:45

## 2023-01-01 RX ADMIN — Medication 11 UNITS: at 08:13

## 2023-01-01 RX ADMIN — SODIUM CHLORIDE, SODIUM LACTATE, POTASSIUM CHLORIDE, AND CALCIUM CHLORIDE 500 ML: .6; .31; .03; .02 INJECTION, SOLUTION INTRAVENOUS at 17:59

## 2023-01-01 RX ADMIN — FUROSEMIDE 60 MG: 10 INJECTION, SOLUTION INTRAVENOUS at 12:00

## 2023-01-01 RX ADMIN — FUROSEMIDE 80 MG: 10 INJECTION, SOLUTION INTRAVENOUS at 15:49

## 2023-01-01 RX ADMIN — LEVALBUTEROL HYDROCHLORIDE 0.63 MG: 0.63 SOLUTION RESPIRATORY (INHALATION) at 07:17

## 2023-01-01 RX ADMIN — FUROSEMIDE 40 MG: 10 INJECTION, SOLUTION INTRAVENOUS at 11:27

## 2023-01-01 RX ADMIN — DILTIAZEM HYDROCHLORIDE 5 MG/HR: 5 INJECTION INTRAVENOUS at 20:42

## 2023-01-01 RX ADMIN — METHYLPREDNISOLONE SODIUM SUCCINATE 40 MG: 40 INJECTION, POWDER, FOR SOLUTION INTRAMUSCULAR; INTRAVENOUS at 08:10

## 2023-01-01 RX ADMIN — FUROSEMIDE 40 MG: 10 INJECTION, SOLUTION INTRAVENOUS at 18:04

## 2023-01-01 RX ADMIN — Medication 1 PATCH: at 09:27

## 2023-01-01 RX ADMIN — SODIUM BICARBONATE 50 MEQ: 84 INJECTION INTRAVENOUS at 08:33

## 2023-01-01 RX ADMIN — MORPHINE SULFATE 2 MG: 2 INJECTION, SOLUTION INTRAMUSCULAR; INTRAVENOUS at 14:05

## 2023-01-02 ENCOUNTER — TELEPHONE (OUTPATIENT)
Dept: PULMONOLOGY | Facility: CLINIC | Age: 68
End: 2023-01-02

## 2023-01-02 DIAGNOSIS — J18.9 PNEUMONIA OF RIGHT LOWER LOBE DUE TO INFECTIOUS ORGANISM: Primary | ICD-10-CM

## 2023-01-02 DIAGNOSIS — R91.1 LUNG NODULE: ICD-10-CM

## 2023-01-02 RX ORDER — AMOXICILLIN AND CLAVULANATE POTASSIUM 875; 125 MG/1; MG/1
1 TABLET, FILM COATED ORAL EVERY 12 HOURS SCHEDULED
Qty: 14 TABLET | Refills: 0 | Status: SHIPPED | OUTPATIENT
Start: 2023-01-02 | End: 2023-01-09

## 2023-01-02 NOTE — TELEPHONE ENCOUNTER
Left  Message with results from bronch growing bacteria- sensitive Strep pneumo    Recommend 7 days Augmentin- sent to pharmacy  Cancel PET/CT    Get CT chest in March        I will message my office to call her again this week to confirm she received message, is taking antibiotics, and cancelled PET and arranged CT chest in March

## 2023-01-03 LAB
FUNGUS SPEC CULT: NORMAL
FUNGUS SPEC CULT: NORMAL
MYCOBACTERIUM SPEC CULT: NORMAL
MYCOBACTERIUM SPEC CULT: NORMAL
RHODAMINE-AURAMINE STN SPEC: NORMAL
RHODAMINE-AURAMINE STN SPEC: NORMAL

## 2023-01-03 NOTE — TELEPHONE ENCOUNTER
CT scheduled 3/1/23- I called and informed pt of date, time, and location   She has no questions or concerns

## 2023-01-03 NOTE — TELEPHONE ENCOUNTER
Spoke with patient  Told her about 7 day augmentin course  Told her we are cancelling PET scan and I want her to get a CT chest in early March and to see me after that      Ramon Garcia or Beata Noland please schedule CT scan and let patient know of date and time- she does not have MyChart

## 2023-01-09 LAB
FUNGUS SPEC CULT: NORMAL
FUNGUS SPEC CULT: NORMAL

## 2023-01-10 LAB
MYCOBACTERIUM SPEC CULT: NORMAL
MYCOBACTERIUM SPEC CULT: NORMAL
RHODAMINE-AURAMINE STN SPEC: NORMAL
RHODAMINE-AURAMINE STN SPEC: NORMAL

## 2023-01-11 ENCOUNTER — APPOINTMENT (OUTPATIENT)
Dept: LAB | Facility: CLINIC | Age: 68
End: 2023-01-11

## 2023-01-11 DIAGNOSIS — N18.32 STAGE 3B CHRONIC KIDNEY DISEASE (HCC): ICD-10-CM

## 2023-01-11 DIAGNOSIS — R80.9 PROTEINURIA, UNSPECIFIED TYPE: ICD-10-CM

## 2023-01-11 LAB
ALBUMIN SERPL BCP-MCNC: 3.4 G/DL (ref 3.5–5)
ALP SERPL-CCNC: 114 U/L (ref 46–116)
ALT SERPL W P-5'-P-CCNC: 22 U/L (ref 12–78)
ANION GAP SERPL CALCULATED.3IONS-SCNC: 9 MMOL/L (ref 4–13)
AST SERPL W P-5'-P-CCNC: 19 U/L (ref 5–45)
BILIRUB SERPL-MCNC: 0.85 MG/DL (ref 0.2–1)
BUN SERPL-MCNC: 17 MG/DL (ref 5–25)
CALCIUM ALBUM COR SERPL-MCNC: 9.9 MG/DL (ref 8.3–10.1)
CALCIUM SERPL-MCNC: 9.4 MG/DL (ref 8.3–10.1)
CHLORIDE SERPL-SCNC: 101 MMOL/L (ref 96–108)
CO2 SERPL-SCNC: 26 MMOL/L (ref 21–32)
CREAT SERPL-MCNC: 1.48 MG/DL (ref 0.6–1.3)
GFR SERPL CREATININE-BSD FRML MDRD: 35 ML/MIN/1.73SQ M
GLUCOSE P FAST SERPL-MCNC: 102 MG/DL (ref 65–99)
MAGNESIUM SERPL-MCNC: 2 MG/DL (ref 1.6–2.6)
POTASSIUM SERPL-SCNC: 3.3 MMOL/L (ref 3.5–5.3)
PROT SERPL-MCNC: 7.1 G/DL (ref 6.4–8.4)
SODIUM SERPL-SCNC: 136 MMOL/L (ref 135–147)
URATE SERPL-MCNC: 6.4 MG/DL (ref 2–7.5)

## 2023-01-12 LAB
KAPPA LC FREE SER-MCNC: 41.4 MG/L (ref 3.3–19.4)
KAPPA LC FREE/LAMBDA FREE SER: 1.32 {RATIO} (ref 0.26–1.65)
LAMBDA LC FREE SERPL-MCNC: 31.4 MG/L (ref 5.7–26.3)

## 2023-01-13 ENCOUNTER — APPOINTMENT (OUTPATIENT)
Dept: LAB | Facility: CLINIC | Age: 68
End: 2023-01-13

## 2023-01-13 LAB
ALBUMIN SERPL ELPH-MCNC: 4.4 G/DL (ref 3.5–5)
ALBUMIN SERPL ELPH-MCNC: 63.8 % (ref 52–65)
ALPHA1 GLOB SERPL ELPH-MCNC: 0.39 G/DL (ref 0.1–0.4)
ALPHA1 GLOB SERPL ELPH-MCNC: 5.7 % (ref 2.5–5)
ALPHA2 GLOB SERPL ELPH-MCNC: 1.08 G/DL (ref 0.4–1.2)
ALPHA2 GLOB SERPL ELPH-MCNC: 15.7 % (ref 7–13)
BACTERIA UR QL AUTO: ABNORMAL /HPF
BETA GLOB ABNORMAL SERPL ELPH-MCNC: 0.32 G/DL (ref 0.4–0.8)
BETA1 GLOB SERPL ELPH-MCNC: 4.6 % (ref 5–13)
BETA2 GLOB SERPL ELPH-MCNC: 3.4 % (ref 2–8)
BETA2+GAMMA GLOB SERPL ELPH-MCNC: 0.23 G/DL (ref 0.2–0.5)
BILIRUB UR QL STRIP: NEGATIVE
BUDDING YEAST: PRESENT
CLARITY UR: CLEAR
COLOR UR: ABNORMAL
CREAT UR-MCNC: 78.2 MG/DL
GAMMA GLOB ABNORMAL SERPL ELPH-MCNC: 0.47 G/DL (ref 0.5–1.6)
GAMMA GLOB SERPL ELPH-MCNC: 6.8 % (ref 12–22)
GLUCOSE UR STRIP-MCNC: NEGATIVE MG/DL
HGB UR QL STRIP.AUTO: NEGATIVE
IGG/ALB SER: 1.76 {RATIO} (ref 1.1–1.8)
KETONES UR STRIP-MCNC: NEGATIVE MG/DL
LEUKOCYTE ESTERASE UR QL STRIP: NEGATIVE
MICROALBUMIN UR-MCNC: 254 MG/L (ref 0–20)
MICROALBUMIN/CREAT 24H UR: 325 MG/G CREATININE (ref 0–30)
NITRITE UR QL STRIP: NEGATIVE
NON-SQ EPI CELLS URNS QL MICRO: ABNORMAL /HPF
PH UR STRIP.AUTO: 6.5 [PH]
PROT PATTERN SERPL ELPH-IMP: ABNORMAL
PROT SERPL-MCNC: 6.9 G/DL (ref 6.4–8.2)
PROT UR STRIP-MCNC: ABNORMAL MG/DL
RBC #/AREA URNS AUTO: ABNORMAL /HPF
SP GR UR STRIP.AUTO: 1.01 (ref 1–1.03)
UROBILINOGEN UR STRIP-ACNC: <2 MG/DL
WBC #/AREA URNS AUTO: ABNORMAL /HPF

## 2023-01-16 ENCOUNTER — OFFICE VISIT (OUTPATIENT)
Dept: CARDIOLOGY CLINIC | Facility: CLINIC | Age: 68
End: 2023-01-16

## 2023-01-16 ENCOUNTER — HOSPITAL ENCOUNTER (OUTPATIENT)
Dept: NON INVASIVE DIAGNOSTICS | Facility: CLINIC | Age: 68
Discharge: HOME | End: 2023-01-16

## 2023-01-16 ENCOUNTER — TELEPHONE (OUTPATIENT)
Dept: NEPHROLOGY | Facility: CLINIC | Age: 68
End: 2023-01-16

## 2023-01-16 VITALS
SYSTOLIC BLOOD PRESSURE: 140 MMHG | BODY MASS INDEX: 20.98 KG/M2 | WEIGHT: 114 LBS | HEIGHT: 62 IN | DIASTOLIC BLOOD PRESSURE: 70 MMHG | HEART RATE: 64 BPM

## 2023-01-16 VITALS
SYSTOLIC BLOOD PRESSURE: 134 MMHG | HEIGHT: 62 IN | WEIGHT: 115 LBS | HEART RATE: 70 BPM | DIASTOLIC BLOOD PRESSURE: 64 MMHG | BODY MASS INDEX: 21.16 KG/M2

## 2023-01-16 DIAGNOSIS — I27.20 PULMONARY HTN (HCC): ICD-10-CM

## 2023-01-16 DIAGNOSIS — Z72.0 CURRENT TOBACCO USE: ICD-10-CM

## 2023-01-16 DIAGNOSIS — I10 PRIMARY HYPERTENSION: Primary | ICD-10-CM

## 2023-01-16 DIAGNOSIS — I50.32 CHRONIC HEART FAILURE WITH PRESERVED EJECTION FRACTION (HCC): ICD-10-CM

## 2023-01-16 DIAGNOSIS — I48.0 PAF (PAROXYSMAL ATRIAL FIBRILLATION) (HCC): ICD-10-CM

## 2023-01-16 LAB
AORTIC ROOT: 2.9 CM
AORTIC VALVE MEAN VELOCITY: 6.6 M/S
APICAL FOUR CHAMBER EJECTION FRACTION: 67 %
ASCENDING AORTA: 3.2 CM
AV AREA BY CONTINUOUS VTI: 3.3 CM2
AV AREA PEAK VELOCITY: 2.8 CM2
AV LVOT MEAN GRADIENT: 2 MMHG
AV LVOT PEAK GRADIENT: 3 MMHG
AV MEAN GRADIENT: 2 MMHG
AV PEAK GRADIENT: 4 MMHG
AV VALVE AREA: 3.28 CM2
AV VELOCITY RATIO: 0.88
DOP CALC AO PEAK VEL: 1.02 M/S
DOP CALC AO VTI: 23.64 CM
DOP CALC LVOT AREA: 3.14 CM2
DOP CALC LVOT DIAMETER: 2 CM
DOP CALC LVOT PEAK VEL VTI: 24.69 CM
DOP CALC LVOT PEAK VEL: 0.9 M/S
DOP CALC LVOT STROKE INDEX: 49.7 ML/M2
DOP CALC LVOT STROKE VOLUME: 77.53
DOP CALC MV VTI: 38 CM
E WAVE DECELERATION TIME: 310 MS
FRACTIONAL SHORTENING: 33 (ref 28–44)
FUNGUS SPEC CULT: NORMAL
FUNGUS SPEC CULT: NORMAL
INTERVENTRICULAR SEPTUM IN DIASTOLE (PARASTERNAL SHORT AXIS VIEW): 1.5 CM
INTERVENTRICULAR SEPTUM: 1.5 CM (ref 0.6–1.1)
LAAS-AP2: 23.7 CM2
LAAS-AP4: 22.5 CM2
LEFT ATRIUM SIZE: 4 CM
LEFT INTERNAL DIMENSION IN SYSTOLE: 2.6 CM (ref 2.1–4)
LEFT VENTRICULAR INTERNAL DIMENSION IN DIASTOLE: 3.9 CM (ref 3.5–6)
LEFT VENTRICULAR POSTERIOR WALL IN END DIASTOLE: 1.3 CM
LEFT VENTRICULAR STROKE VOLUME: 42 ML
LVSV (TEICH): 42 ML
MV E'TISSUE VEL-SEP: 6 CM/S
MV MEAN GRADIENT: 2 MMHG
MV PEAK A VEL: 1.1 M/S
MV PEAK E VEL: 100 CM/S
MV PEAK GRADIENT: 5 MMHG
MV STENOSIS PRESSURE HALF TIME: 90 MS
MV VALVE AREA BY CONTINUITY EQUATION: 2.04 CM2
MV VALVE AREA P 1/2 METHOD: 2.44
MYCOBACTERIUM SPEC CULT: NORMAL
MYCOBACTERIUM SPEC CULT: NORMAL
RA PRESSURE ESTIMATED: 10 MMHG
RHODAMINE-AURAMINE STN SPEC: NORMAL
RHODAMINE-AURAMINE STN SPEC: NORMAL
RIGHT ATRIUM AREA SYSTOLE A4C: 16.3 CM2
RIGHT VENTRICLE ID DIMENSION: 3.7 CM
RV PSP: 77 MMHG
SL CV LEFT ATRIUM LENGTH A2C: 6.3 CM
SL CV LV EF: 60
SL CV PED ECHO LEFT VENTRICLE DIASTOLIC VOLUME (MOD BIPLANE) 2D: 66 ML
SL CV PED ECHO LEFT VENTRICLE SYSTOLIC VOLUME (MOD BIPLANE) 2D: 24 ML
TR MAX PG: 67 MMHG
TR PEAK VELOCITY: 4.1 M/S
TRICUSPID ANNULAR PLANE SYSTOLIC EXCURSION: 1.8 CM
TRICUSPID VALVE PEAK REGURGITATION VELOCITY: 4.1 M/S

## 2023-01-16 NOTE — TELEPHONE ENCOUNTER
Called patient and left a voicemail stating the following:    Patients sodium level in her blood is normal  Her potassium is very mildly low at 3 3, normal at 3 5  Her kidney function is stable from 2 months ago  Will discuss remainder of recommendations at her next visit on 1/27  I asked the patient please call back if she has any further questions for me

## 2023-01-16 NOTE — PROGRESS NOTES
Cardiology Follow up    Mitesh Ibanez  [id number]  1955  [facility] [address] [address]      1  Primary hypertension        2  Pulmonary HTN ([facility] [address] )        3  PAF (paroxysmal atrial fibrillation) ([facility] [address] )        4  Current tobacco use            Discussion/Summary:  1  Paroxysmal atrial fibrillation on oral anticoagulation  2  Heart failure preserved ejection fraction  3  Pulmonary hypertension  4  Hypertension  5  Current tobacco use  6  CKD  7  Mitral annular calcification with mild mitral stenosis    -Transthoracic echocardiogram 1/16/2023 showing left ventricular systolic function normal estimated LVEF 60% with grade 1 diastolic function, biventricular systolic function lower limits of normal with elevated RVSP at 77 mmHg with aortic sclerosis, mitral regurgitation with mitral anticonstipation and mild mitral stenosis  -As patient likely has mixed picture from pulmonary hypertension standpoint I did recommend her being seen by our pulmonary hypertension clinic however at this time patient wishes to avoid but will let us know if she changes her mind    She would likely need further evaluation if she were to undergo more aggressive medical therapy apart from diuresis  -Patient will continue Bumex 0 5 mg daily, diltiazem 240 mg daily, Eliquis 5 mg twice daily, metoprolol succinate 50 mg daily and atorvastatin 40 mg daily   -We will need to monitor renal function closely as patient is on borderline with Eliquis dosing and patient will continue to follow with pulmonology and nephrology   -Patient counseled on dietary and lifestyle modifications including following sodium and fluid restriction to less than 1800 mg of sodium daily and less than 1800 mL of fluid daily  -Patient counseled if she were to have any warning or alarm type symptoms she should seek emergency medical care immediately   -Patient counseled on the importance of tobacco cessation  History of Present Illness:  -Patient is a 40-year-old female with hypertension, history of lymphoma s/p chemotherapy in April 2019, current tobacco use, chronic kidney disease, obstructive lung disease who was hospitalized in September 2021 found at that time to have a left hip fracture after tripping over her dog with significant peripheral vascular disease and severe aortoiliac occlusive disease with aortobifemoral bypass in 2010, bilateral femoropopliteal bypass in 2013 and 2016 along with left CFA angioplasty with stent as well as paroxysmal atrial fibrillation on oral anticoagulation who presents to the office today for scheduled follow-up   -Patient has been taking her diuretics as needed at the request of her nephrologist as at last visit was thought to be slightly volume down  She denies any active chest pain, palpitations, lightheadedness or dizziness, loss of consciousness or shortness of breath at rest   She denies any orthopnea or bendopnea      Patient Active Problem List   Diagnosis   • Constipation   • Lower extremity weakness   • BRYANT (acute kidney injury) ([facility] [address] )   • Bell's palsy   • Carotid stenosis, asymptomatic, bilateral   • Depression with anxiety   • Benign essential hypertension   • Essential hypertension   • Fall   • Hypokalemia   • Hyponatremia   • Tobacco dependency   • Lymphoma (HCC)   • PAD (peripheral artery disease) ([facility])   • Paroxysmal atrial fibrillation ([facility])   • Leg pain   • Peripheral vascular disease ([facility])   • Foot pain   • Weakness of lower extremity   • Aortoiliac occlusive disease (HCC)   • Bilateral lower extremity edema   • Lung nodule   • Closed left hip fracture ([facility])   • Insomnia   • Acute respiratory failure with hypoxia   • Hypotension   • Anemia   • Atrial fibrillation with RVR ([facility])   • Buttock wound   • Stage 3a chronic kidney disease ([facility])   • Vitamin D deficiency • Secondary hyperparathyroidism ([place] [address] )   • Short of breath on exertion   • Stage 3b chronic kidney disease (HCC)   • Proteinuria     Past Medical History:   Diagnosis Date   • Bell's palsy    • Cancer ([place] [address] )     lymphoma   • Diffuse large B cell lymphoma ([place] [address] )    • Hyperlipidemia    • Hypertension    • PAD (peripheral artery disease) ([facility])    • PAF (paroxysmal atrial fibrillation) ([facility])    • PVD (peripheral vascular disease) ([place] [address] )      Social History     Socioeconomic History   • Marital status:      Spouse name: Not on file   • Number of children: Not on file   • Years of education: Not on file   • Highest education level: Not on file   Occupational History   • Not on file   Tobacco Use   • Smoking status: Every Day     Packs/day: 0 75     Years: 53 00     Pack years: 39 75     Types: Cigarettes     Start date: 1/1/1969   • Smokeless tobacco: Never   Vaping Use   • Vaping Use: Never used   Substance and Sexual Activity   • Alcohol use:  Yes     Alcohol/week: 4 0 standard drinks     Types: 4 Cans of beer per week     Comment: 4 CANS OF BEER DAILY   • Drug use: No     Comment: medical marijuana 10/7/19   • Sexual activity: Not on file   Other Topics Concern   • Not on file   Social History Narrative   • Not on file     Social Determinants of Health     Financial Resource Strain: Not on file   Food Insecurity: Not on file   Transportation Needs: Not on file   Physical Activity: Not on file   Stress: Not on file   Social Connections: Not on file   Intimate Partner Violence: Not on file   Housing Stability: Not on file      Family History   Problem Relation Age of Onset   • Cancer Mother    • Breast cancer Mother    • Heart attack Father    • No Known Problems Sister    • No Known Problems Daughter    • No Known Problems Maternal Grandmother    • No Known Problems Maternal Grandfather    • No Known Problems Paternal Grandmother    • No Known Problems Paternal Grandfather    • Prostate cancer Brother    • No Known Problems Maternal Aunt    • No Known Problems Maternal Aunt    • No Known Problems Maternal Aunt    • No Known Problems Maternal Aunt    • No Known Problems Paternal Aunt    • No Known Problems Paternal Uncle      Past Surgical History:   Procedure Laterality Date   • ARTERIOGRAM Left 12/6/2018    Procedure: LEFT lower extremity angiography, with Left lower extremity run-off, stent and angioplasty of Left Common Femoral Artery (Left Brachial Access); Surgeon: Annabelle Pedersen MD;  Location: [facility];  Service: Vascular   • CARDIAC SURGERY     • CARPAL TUNNEL RELEASE     • CT NEEDLE BIOPSY LUNG  10/8/2019   • HYSTERECTOMY     • IR AORTAGRAM WITH RUN-OFF  12/6/2018   • OOPHORECTOMY     • TN OPTX FEM SHFT FX W/INSJ IMED IMPLT W/WO SCREW Left 9/29/2021    Procedure: INSERTION NAIL IM FEMUR ANTEGRADE (TROCHANTERIC);   Surgeon: Serenity Mtz DO;  Location: [facility] MAIN OR;  Service: Orthopedics   • TONSILLECTOMY         Current Outpatient Medications:   •  albuterol (ACCUNEB) 1 25 MG/3ML nebulizer solution, Take 1 25 mg by nebulization every 6 (six) hours as needed for wheezing, Disp: , Rfl:   •  ALPRAZolam (XANAX) 0 5 mg tablet, Take 0 25 mg by mouth, Disp: , Rfl:   •  bumetanide (BUMEX) 0 5 MG tablet, TAKE 1 TABLET (0 5 MG TOTAL) BY MOUTH IF NEEDED (FOR SWELLING, BLOATING, WEIGHT GAIN) (Patient taking differently: Take 0 5 mg by mouth if needed (for swelling, bloating, weight gain) PRN), Disp: 90 tablet, Rfl: 2  •  Cholecalciferol (VITAMIN D3) 2000 units TABS, Take 1 tablet by mouth daily, Disp: , Rfl:   •  diltiazem (CARDIZEM CD) 240 mg 24 hr capsule, TAKE 1 CAPSULE BY MOUTH EVERY DAY, Disp: 90 capsule, Rfl: 3  •  Eliquis 5 MG, TAKE 1 TABLET BY MOUTH TWICE A DAY, Disp: 60 tablet, Rfl: 5  •  glycerin-hypromellose- (ARTIFICIAL TEARS) 0 2-0 2-1 % SOLN, 1 drop, Disp: , Rfl:   •  metoprolol succinate (TOPROL-XL) 50 mg 24 hr tablet, Take 1 tablet (50 mg total) by mouth daily, Disp: 90 tablet, Rfl: 3  •  mirtazapine (REMERON) 15 mg tablet, Take 7 5 mg by mouth daily at bedtime  , Disp: , Rfl:   •  senna-docusate sodium (SENOKOT S) 8 6-50 mg per tablet, Take 1 tablet by mouth daily As needed for constipation, Disp: 20 tablet, Rfl: 0  •  umeclidinium-vilanterol (Anoro Ellipta) 62 5-25 MCG/INH inhaler, Inhale 1 puff daily, Disp: 60 blister, Rfl: 3  Allergies   Allergen Reactions   • Oxycodone-Acetaminophen Itching and Rash     Itching, rash         Labs:  Hospital Outpatient Visit on 01/16/2023   Component Date Value   • A4C EF 01/16/2023 67    • LVOT stroke volume 01/16/2023 77 53    • LVOT stroke volume index 01/16/2023 49 70    • LVIDd 01/16/2023 3 90    • LVIDS 01/16/2023 2 60    • IVSd 01/16/2023 1 50    • LVPWd 01/16/2023 1 30    • FS 01/16/2023 33    • MV E' Tissue Velocity Se* 01/16/2023 6    • E wave deceleration time 01/16/2023 310    • MV Peak E Joe 01/16/2023 100    • MV Peak A Joe 01/16/2023 1 1    • AV LVOT peak gradient 01/16/2023 3    • LVOT peak VTI 01/16/2023 24 69    • LVOT peak joe 01/16/2023 0 9    • RVID d 01/16/2023 3 7    • Tricuspid annular plane * 01/16/2023 1 80    • LA size 01/16/2023 4    • LA length (A2C) 01/16/2023 6 30    • RAA A4C 01/16/2023 16 3    • LVOT diameter 01/16/2023 2 0    • Aortic valve peak veloci* 01/16/2023 1 02    • Ao VTI 01/16/2023 23 64    • AV mean gradient 01/16/2023 2    • LVOT mn grad 01/16/2023 2 0    • AV peak gradient 01/16/2023 4    • AV area by cont VTI 01/16/2023 3 3    • AV area peak joe 01/16/2023 2 8    • MV mean gradient antegra* 01/16/2023 2    • MV peak gradient antegra* 01/16/2023 5    • MV VTI 01/16/2023 38    • MV stenosis pressure 1/2* 01/16/2023 90    • MV valve area p 1/2 meth* 01/16/2023 2 44    • TR Peak Joe 01/16/2023 4 1    • Triscuspid Valve Regurgi* 01/16/2023 67 0    • Ao root 01/16/2023 2 90    • Asc Ao 01/16/2023 3 2    • Aortic valve mean veloci* 01/16/2023 6 60    • Tricuspid valve peak reg* 01/16/2023 4 10    • Left ventricular stroke * 01/16/2023 42 00    • IVS 01/16/2023 1 5    • LEFT VENTRICLE SYSTOLIC * 23/95/8545 24    • LV DIASTOLIC VOLUME (MOD* 26/90/2546 66    • Left Atrium Area-systoli* 01/16/2023 22 5    • Left Atrium Area-systoli* 01/16/2023 23 7    • LVSV, 2D 01/16/2023 42    • LV EF 01/16/2023 60    • LVOT area 01/16/2023 3 14    • Dimensionless velociy in* 01/16/2023 0 88    • AV valve area 01/16/2023 3 28    • MV valve area by continu* 01/16/2023 2 04    • Est  RA pres 01/16/2023 10 0    • Right Ventricular Peak S* 01/16/2023 77 00    Appointment on 01/11/2023   Component Date Value   • Sodium 01/11/2023 136    • Potassium 01/11/2023 3 3 (L)    • Chloride 01/11/2023 101    • CO2 01/11/2023 26    • ANION GAP 01/11/2023 9    • BUN 01/11/2023 17    • Creatinine 01/11/2023 1 48 (H)    • Glucose, Fasting 01/11/2023 102 (H)    • Calcium 01/11/2023 9 4    • Corrected Calcium 01/11/2023 9 9    • AST 01/11/2023 19    • ALT 01/11/2023 22    • Alkaline Phosphatase 01/11/2023 114    • Total Protein 01/11/2023 7 1    • Albumin 01/11/2023 3 4 (L)    • Total Bilirubin 01/11/2023 0 85    • eGFR 01/11/2023 35    • Magnesium 01/11/2023 2 0    • Creatinine, Ur 01/13/2023 78 2    • Microalbum  ,U,Random 01/13/2023 254 0 (H)    • Microalb Creat Ratio 01/13/2023 325 (H)    • Uric Acid 01/11/2023 6 4    • Color, UA 01/13/2023 Light Yellow    • Clarity, UA 01/13/2023 Clear    • Specific Gravity, UA 01/13/2023 1 013    • pH, UA 01/13/2023 6 5    • Leukocytes, UA 01/13/2023 Negative    • Nitrite, UA 01/13/2023 Negative    • Protein, UA 01/13/2023 50 (1+) (A)    • Glucose, UA 01/13/2023 Negative    • Ketones, UA 01/13/2023 Negative    • Urobilinogen, UA 01/13/2023 <2 0    • Bilirubin, UA 01/13/2023 Negative    • Occult Blood, UA 01/13/2023 Negative    • RBC, UA 01/13/2023 None Seen    • WBC, UA 01/13/2023 None Seen    • Epithelial Cells 01/13/2023 Occasional    • Bacteria, UA 01/13/2023 None Seen    • Budding Yeast 01/13/2023 Present    • A/G Ratio 01/11/2023 1 76    • Albumin Electrophoresis 01/11/2023 63 8    • Albumin CONC 01/11/2023 4 40    • Alpha 1 01/11/2023 5 7 (H)    • ALPHA 1 CONC 01/11/2023 0 39    • Alpha 2 01/11/2023 15 7 (H)    • ALPHA 2 CONC 01/11/2023 1 08    • Beta-1 01/11/2023 4 6 (L)    • BETA 1 CONC 01/11/2023 0 32 (L)    • Beta-2 01/11/2023 3 4    • BETA 2 CONC 01/11/2023 0 23    • Gamma Globulin 01/11/2023 6 8 (L)    • GAMMA CONC 01/11/2023 0 47 (L)    • Total Protein 01/11/2023 6 9    • SPEP Interpretation 01/11/2023 See Comment    • Ig Kappa Free Light Chain 01/11/2023 41 4 (H)    • Ig Lambda Free Light Vicki* 01/11/2023 31 4 (H)    • Kappa/Lambda FluidC Ratio 01/11/2023 1 32    Hospital Outpatient Visit on 12/28/2022   Component Date Value   • Case Report 12/28/2022                      Value:Non-gynecologic Cytology                          Case: [id number]                                  Authorizing Provider:  Lesa Adair MD     Collected:           12/28/2022 0840              Ordering Location:     [facility] [place] Received:            12/28/2022 0901                                     Endoscopy                                                                    Pathologist:           Rasheeda Mayfield MD                                                          Specimens:   A) - Lung, Right Lower Lobe Bronchoalveolar Lavage, cell count                                      B) - Lung, Right Lower Lobe Bronchial Washing, cell count                                           C) - Lung, Right Lower Lobe Bronchial Washing, cell block                                 • Final Diagnosis 12/28/2022                      Value: This result contains rich text formatting which cannot be displayed here  • Note 12/28/2022                      Value: This result contains rich text formatting which cannot be displayed here  • Gross Description 12/28/2022                      Value: This result contains rich text formatting which cannot be displayed here  • Additional Information 12/28/2022                      Value: This result contains rich text formatting which cannot be displayed here  • Fungus (Mycology) Culture 12/28/2022 No Fungus Isolated at 2 Weeks    • Bronchial Culture 12/28/2022 Few Colonies of    • Gram Stain Result 12/28/2022 No Polys or Bacteria seen    • AFB Culture 12/28/2022 No AFB Isolated at 1 Week    • AFB Stain 12/28/2022 No acid fast bacilli seen    • Scan Result 12/28/2022 SEE WRITTEN REPORT    • Fungus (Mycology) Culture 12/28/2022 No Fungus Isolated at 2 Weeks    • Bronchial Culture 12/28/2022 2+ Growth of Streptococcus pneumoniae (AA)    • Bronchial Culture 12/28/2022 2+ Growth of    • Gram Stain Result 12/28/2022 No Polys or Bacteria seen    • AFB Culture 12/28/2022 No AFB Isolated at 1 Week    • AFB Stain 12/28/2022 No acid fast bacilli seen    • Scan Result 12/28/2022 SEE WRITTEN REPORT         Imaging: Bronchoscopy    Result Date: 12/28/2022  Narrative: Table formatting from the original result was not included  [facility] [address] [place] [address] Dr [place] [address] [phone] DATE OF SERVICE: 12/28/22 PHYSICIAN(S): Attending: Alba Dale MD Fellow: No Staff Documented INDICATION: Ground glass opacity present on imaging of lung POST-OP DIAGNOSIS: See the impression below  PREPROCEDURE: Standard airway preparation completed per respiratory therapy protocol  Informed consent was obtained  Images reviewed prior to the procedure  A Time Out was performed  No suspicion or identified risk for TB or other airborne infectious disease; bronchoscopy procedure being performed for diagnostic purposes  PROCEDURE: Bronchoscopy DETAILS OF PROCEDURE: Patient was taken to the procedure room where a time out was performed to confirm correct patient and correct procedure   The patient underwent general anesthesia, which was administered by an anesthesia professional  The patient's blood pressure, heart rate, level of consciousness, oxygen and respirations were monitored throughout the procedure  The patient experienced no blood loss  The scope was introduced through the laryngeal mask airway  The procedure was not difficult  The patient tolerated the procedure well  There were no apparent complications  ANESTHESIA INFORMATION: ASA: III Anesthesia Type: General FINDINGS: The vocal cords, trachea, main sharif, left lung and right lung appeared normal  Bronchoalveolar lavage was performed x4 in the right lateral basal segment (RB9) with 240 mL of saline instilled and a total return of 30 mL; the fluid appeared clear SPECIMENS: ID Type Source Tests Collected by Time Destination 1 : cell count Lavage Lung, Right Lower Lobe Bronchoalveolar Lavage PULMONARY CYTOLOGY Mayela Hernandez MD 12/28/2022  8:40 AM  2 : cell count Washing Lung, Right Lower Lobe Bronchial Washing PULMONARY CYTOLOGY Mayela Hernandez MD 12/28/2022  8:46 AM  A :  Bronchial Lung, Right Lower Lobe Bronchoalveolar Lavage FUNGAL CULTURE, BRONCHIAL CULTURE AND GRAM STAIN, AFB CULTURE WITH STAIN, LEUKEMIA/LYMPHOMA FLOW CYTOMETRY Mayela Hernandez MD 12/28/2022  8:41 AM  B :  Bronchial Lung, Right Lower Lobe Bronchial Washing FUNGAL CULTURE, BRONCHIAL CULTURE AND GRAM STAIN, AFB CULTURE WITH STAIN, LEUKEMIA/LYMPHOMA FLOW CYTOMETRY Mayela Hernandez MD 12/28/2022  8:46 AM       Impression: Normal bronchoscopy BAL sample collected RECOMMENDATION:  Follow up with me in clinic     Echo complete w/ contrast if indicated    Result Date: 1/16/2023  Narrative: •  Left Ventricle: Left ventricular cavity size is normal  Wall thickness is moderately increased  The left ventricular ejection fraction is 60%  Systolic function is normal  Wall motion is normal  Diastolic function is mildly abnormal, consistent with grade I (abnormal) relaxation  •  Right Ventricle: Systolic function is low normal  Normal tricuspid annular plane systolic excursion (TAPSE) > 1 7 cm   • Left Atrium: The atrium is dilated  •  Right Atrium: The atrium is dilated  •  Aortic Valve: There is aortic valve sclerosis  •  Mitral Valve: There is annular calcification  There is mild regurgitation  There is mild stenosis  •  Tricuspid Valve: There is tricuspid valve prolapse  There is mild to moderate regurgitation  The right ventricular systolic pressure is severely elevated  The estimated right ventricular systolic pressure is 72 06 mmHg  •  Pulmonic Valve: There is mild regurgitation  •  Aorta: The aortic root is normal in size  Review of Systems:  Review of Systems   Constitutional: Positive for fatigue  Negative for chills, diaphoresis and fever  HENT: Negative for trouble swallowing and voice change  Eyes: Negative for pain and redness  Respiratory: Negative for cough and shortness of breath  Cardiovascular: Negative for chest pain, palpitations and leg swelling  Gastrointestinal: Negative for abdominal pain, constipation, diarrhea, nausea and vomiting  Genitourinary: Negative for dysuria  Musculoskeletal: Positive for arthralgias  Negative for neck pain and neck stiffness  Skin: Negative for rash  Neurological: Negative for dizziness, syncope, light-headedness and headaches  Psychiatric/Behavioral: Negative for agitation and confusion  Vitals:    01/16/23 1421   BP: 140/70   BP Location: Left arm   Patient Position: Sitting   Pulse: 64   Weight: 51 7 kg (114 lb)   Height: 5' 2" (1 575 m)     Vitals:    01/16/23 1421   Weight: 51 7 kg (114 lb)     Height: 5' 2" (157 5 cm)     Physical Exam:  Physical Exam  Vitals reviewed  Constitutional:       General: She is not in acute distress  Appearance: She is not diaphoretic  HENT:      Head: Normocephalic and atraumatic  Eyes:      General:         Right eye: No discharge  Left eye: No discharge  Neck:      Comments: Trachea midline, mildly elevated JVD    Cardiovascular:      Rate and Rhythm: Normal rate and regular rhythm  Heart sounds: No friction rub  Pulmonary:      Effort: Pulmonary effort is normal  No respiratory distress  Comments: Decreased breath sounds bilaterally  Chest:      Chest wall: No tenderness  Abdominal:      General: Bowel sounds are normal       Palpations: Abdomen is soft  Tenderness: There is no abdominal tenderness  There is no rebound  Musculoskeletal:      Right lower leg: Edema (trace) present  Left lower leg: Edema (trace) present  Skin:     General: Skin is warm and dry  Neurological:      Mental Status: She is alert  Comments: Awake, alert, able to answer questions appropriately, able to move extremities bilaterally     Psychiatric:         Mood and Affect: Mood normal          Behavior: Behavior normal

## 2023-01-16 NOTE — TELEPHONE ENCOUNTER
----- Message from Marty Ovalles sent at 1/12/2023  8:19 AM EST -----  Lvm for patient to call office back to go over lab results

## 2023-01-18 ENCOUNTER — TELEPHONE (OUTPATIENT)
Dept: CARDIOLOGY CLINIC | Facility: CLINIC | Age: 68
End: 2023-01-18

## 2023-01-18 DIAGNOSIS — I27.20 PULMONARY HTN (HCC): Primary | ICD-10-CM

## 2023-01-18 NOTE — TELEPHONE ENCOUNTER
I was able to return patient's phone call  Patient notes that after discussion with her daughter she is now agreeable to seeing pulmonary hypertension clinic for further potential evaluation of her recently discovered pulmonary hypertension on transthoracic echocardiogram   She notes overall she feels reasonably well at this time but will discuss with them to see what recommendations they would make  I will have office staff assist in setting this up 
Name band;

## 2023-01-27 ENCOUNTER — TELEPHONE (OUTPATIENT)
Dept: NEPHROLOGY | Facility: CLINIC | Age: 68
End: 2023-01-27

## 2023-01-27 ENCOUNTER — OFFICE VISIT (OUTPATIENT)
Dept: NEPHROLOGY | Facility: CLINIC | Age: 68
End: 2023-01-27

## 2023-01-27 VITALS
BODY MASS INDEX: 21.9 KG/M2 | SYSTOLIC BLOOD PRESSURE: 126 MMHG | HEIGHT: 62 IN | WEIGHT: 119 LBS | OXYGEN SATURATION: 96 % | DIASTOLIC BLOOD PRESSURE: 78 MMHG | HEART RATE: 68 BPM

## 2023-01-27 DIAGNOSIS — E87.6 HYPOKALEMIA: ICD-10-CM

## 2023-01-27 DIAGNOSIS — N18.32 STAGE 3B CHRONIC KIDNEY DISEASE (HCC): Primary | ICD-10-CM

## 2023-01-27 DIAGNOSIS — R80.9 MICROALBUMINURIA: ICD-10-CM

## 2023-01-27 DIAGNOSIS — E87.1 HYPONATREMIA: ICD-10-CM

## 2023-01-27 DIAGNOSIS — I10 PRIMARY HYPERTENSION: ICD-10-CM

## 2023-01-27 DIAGNOSIS — N28.1 RENAL CYSTS, ACQUIRED, BILATERAL: ICD-10-CM

## 2023-01-27 NOTE — PATIENT INSTRUCTIONS
Recent kidney function is stable at 35%  This means you have stage IIIb kidney disease stage IIIb remains a function between 30 and 45%  Will defer to kidney smart education  They will contact you to discuss kidney disease  Your potassium is mildly low, this is due to diuretics  Eating foods higher in potassium including tomato  potatoes, orange juice, citrus fruits  You do not need to eat all of these, however, you may find them useful and helpful if you are taking a Bumex  This could include eating a banana when you take your Bumex  You spill protein in the urine which is due to a damaged kidney barrier  This could be due to longstanding high blood pressure over time  The protein in the urine is higher than it would be recommended  We will consider adding a medication to help with this after your evaluation with the pulmonary hypertension specialist   Continue to optimize protein intake like chicken and fish this will help maintain a normal salt level in your blood  Try to reduce alcohol intake this also contributes to your low salt level  Follow up in 3 months

## 2023-01-27 NOTE — PROGRESS NOTES
Assessment & Plan:    1  Stage 3b chronic kidney disease ([facility] [address] )  -     Ambulatory referral to ckd education program; Future; Expected date: 04/05/2023  -     Comprehensive metabolic panel; Future; Expected date: 04/05/2023  -     Uric acid; Future; Expected date: 04/05/2023  -     Urinalysis with microscopic; Future; Expected date: 04/05/2023  -     Microalbumin / creatinine urine ratio; Future; Expected date: 04/05/2023    2  Primary hypertension    3  Hyponatremia    4  Hypokalemia    5  Renal cysts, acquired, bilateral    6  Microalbuminuria       1  [id number]  Baseline 1 4-1 5  most recent Cr stable at 1 48 with eGFR 35 ml/min  Appears euvolemic  Etiology of CKD potentially 2/2 HTN nephrosclerosis, age related eGFR decline, nodular sclerosis from tobacco, atherosclerosis  Not biopsy proven  Right kidney mildly smaller at 7 8 cm and left 9 2 cm  This could raise the concern for renal artery stenosis with smaller kidney  Recommend  1  Cr trends stable and reviewed with pt  CKD stages discussed  2  Continue to take bumex as needed  Follow daily weights  3  Continue to follow every 3 months for CKD3B  4  Tobacco cessation discussed  Reviewed association with CKD  5  Can consider Vascular US in the future for renal artery stenosis  6  Refer to kidney smart education  2  Hypokalemia, mild ,asymptomatic  Likely due to diuretic use  Reviewed higher potassium foods with patient  Will hold on adding supplement for now  3  Hyponatremia, resolved  Etiology 2/2 insufficient solute, beer potomania, excess free water intake, hypovolemic with diuretic use in the past    Encourage higher protein diet  Reduce alcohol intake  4  Renal cysts on US from 4/11/22  Can follow up [address] in 1 year  5  Microalbuminuria, severely increased  spep no monoclonal protein  Kappa/lambda ratio normal  Ideally an ACE/ARB would be added to reduce protein  Her BP is under reasonable control at present   She is going to see pul HTN specialist within the month, will hold on adding medication at this time until evaluated  No hx DM to warrant kerendia  Hold on SGLT-2i at this time  No emergency to renal biopsy at this point given stability of protein/cr trends  6  Primary HTN  Under ideal control at present, goal< 130/80  No antihypertensive changes made at this juncture, follow up with pul HTN specialist on 2/2/23  The benefits, risks and alternatives to the treatment plan were discussed at this visit  Patient was advised of common adverse effects of any medical therapies prescribed  All questions were answered and discussed with the patient and any accompanying family members or caretakers  Subjective:      Patient ID: Charly Dailey is a 77 y o  female presenting in follow for CKD3 in the [place] office  Last seen by me on 10/28/22 for CKD3B, hyponatremia and hypokalemia  Hyponatremia felt to be due to hypovolemic state with bumex but also potentially due to insufficient solute and alcohol  She was recommended to cut fluid intake down to 50 ounces  HPI      In interim since last visit, denies any new medical conditions, surgeries, allergies or medications  Takes bumex 0 5mg several times a week for left leg swelling  Denies NSAID use  Weight 119 LB  Wylilliam Caller Watching low sodium diet  Not following a fluid restriction necessarily  Seen by Dr Mahendra Panchal on 1/16  Going to be evalauted by [facility] HTN clinic Feb 2nd  Does not monitor BP at home  /78 in the office  Reports HTN under reasonable control  She takes toprol 50mg /day and cardizem 240mg/day  Has 3 beers every night  Her protein is likely suboptimal      Has not undergone CKD education in the past     Denies LUTS/hematuria/changes to urine volume  UA 1/13  Showed no blood/RBC, but 1+ protein  Urine microalbumin to cr ratio 325mg/g cr, trending up since UA 10/2522 at 341 mg/g cr   Spep showed no monoclonal protien   Immunoglobulin showed kappa/lambda ratio normal at 1 3  Both free light chains mildly elevated due to CKD likely  Cr 1 6 on 11/4 and 1 48 on 1/11/23 with eGFR 35 ml/min  Na nromal at 136  Potassium 3 3 on 1/11 as well  The following portions of the patient's history were reviewed and updated as appropriate: allergies, current medications, past family history, past medical history, past social history, past surgical history, and problem list     Review of Systems   Constitutional: Negative  Respiratory: Positive for cough  Cardiovascular: Negative  Gastrointestinal: Negative  Genitourinary: Negative  Musculoskeletal: Positive for gait problem  All other systems reviewed and are negative  Objective:      /78 (BP Location: Left arm, Patient Position: Sitting, Cuff Size: Standard)   Pulse 68   Ht 5' 2" (1 575 m)   Wt 54 kg (119 lb)   SpO2 96%   BMI 21 77 kg/m²          Physical Exam  Vitals and nursing note reviewed  Constitutional:       Appearance: She is ill-appearing  Comments: Appears older than stated age    Eyes:      Extraocular Movements: Extraocular movements intact  Conjunctiva/sclera: Conjunctivae normal    Cardiovascular:      Rate and Rhythm: Normal rate and regular rhythm  Heart sounds: No friction rub  Pulmonary:      Breath sounds: No wheezing, rhonchi or rales  Comments: Decrease BS bilaterally  Abdominal:      General: Abdomen is flat  Bowel sounds are normal  There is no distension  Palpations: Abdomen is soft  Tenderness: There is no abdominal tenderness  There is no guarding  Musculoskeletal:      Right lower leg: No edema  Left lower leg: Edema present  Comments: trace   Skin:     General: Skin is warm  Neurological:      General: No focal deficit present  Mental Status: She is alert and oriented to person, place, and time  Mental status is at baseline  Cranial Nerves: No cranial nerve deficit  Sensory: Sensory deficit present        Comments: Ambulate with walker  Moves ext x 4    Psychiatric:         Mood and Affect: Mood normal          Behavior: Behavior normal              Lab Results   Component Value Date    SODIUM 136 01/11/2023    K 3 3 (L) 01/11/2023     01/11/2023    CO2 26 01/11/2023    AGAP 9 01/11/2023    BUN 17 01/11/2023    CREATININE 1 48 (H) 01/11/2023    GLUC 150 (H) 08/02/2022    GLUF 102 (H) 01/11/2023    CALCIUM 9 4 01/11/2023    AST 19 01/11/2023    ALT 22 01/11/2023    ALKPHOS 114 01/11/2023    TP 7 1 01/11/2023    TP 6 9 01/11/2023    TBILI 0 85 01/11/2023    EGFR 35 01/11/2023      Lab Results   Component Value Date    CREATININE 1 48 (H) 01/11/2023    CREATININE 1 62 (H) 11/04/2022    CREATININE 1 52 (H) 10/25/2022    CREATININE 1 57 (H) 08/02/2022    CREATININE 1 47 (H) 06/22/2022    CREATININE 1 39 (H) 06/09/2022    CREATININE 1 40 (H) 04/29/2022    CREATININE 1 61 (H) 04/08/2022    CREATININE 1 22 02/10/2022    CREATININE 1 12 12/30/2021    CREATININE 0 97 11/08/2021    CREATININE 0 79 10/07/2021    CREATININE 0 78 10/06/2021    CREATININE 0 72 10/05/2021    CREATININE 0 65 10/04/2021      Lab Results   Component Value Date    COLORU Light Yellow 01/13/2023    CLARITYU Clear 01/13/2023    SPECGRAV 1 013 01/13/2023    PHUR 6 5 01/13/2023    LEUKOCYTESUR Negative 01/13/2023    NITRITE Negative 01/13/2023    PROTEIN UA 50 (1+) (A) 01/13/2023    GLUCOSEU Negative 01/13/2023    KETONESU Negative 01/13/2023    UROBILINOGEN <2 0 01/13/2023    BILIRUBINUR Negative 01/13/2023    BLOODU Negative 01/13/2023    RBCUA None Seen 01/13/2023    WBCUA None Seen 01/13/2023    EPIS Occasional 01/13/2023    BACTERIA None Seen 01/13/2023      No results found for: LABPROT  No results found for: Alexandra Araujo  Lab Results   Component Value Date    WBC 6 20 10/25/2022    HGB 15 2 10/25/2022    HCT 46 3 (H) 10/25/2022    MCV 88 10/25/2022     10/25/2022      Lab Results Component Value Date    HGB 15 2 10/25/2022    HGB 14 9 08/02/2022    HGB 14 8 04/29/2022    HGB 12 9 11/08/2021    HGB 8 2 (L) 10/07/2021      Lab Results   Component Value Date    IRON 80 11/08/2021    TIBC 237 (L) 10/07/2021    FERRITIN 421 (H) 10/07/2021      No results found for: PTHCALCIUM, [id number], PHOSPHORUS   Lab Results   Component Value Date    CHOLESTEROL 86 12/02/2018    HDL 35 (L) 12/02/2018    LDLCALC 37 12/02/2018    TRIG 70 12/02/2018      Lab Results   Component Value Date    URICACID 6 4 01/11/2023      No results found for: HGBA1C   Lab Results   Component Value Date    FREET4 1 34 04/08/2022      No results found for: TARA, DSDNAAB, RFIGM   No results found for: PROT, UPEP, IMMUNOFIX, KAPPALAMBDA, KAPPALIGHT     Portions of the record may have been created with voice recognition software  Occasional wrong word or "sound a like" substitutions may have occurred due to the inherent limitations of voice recognition software  Read the chart carefully and recognize, using context, where substitutions have occurred  If you have any questions, please contact the dictating provider

## 2023-01-30 LAB
FUNGUS SPEC CULT: NORMAL
FUNGUS SPEC CULT: NORMAL

## 2023-02-01 NOTE — PROGRESS NOTES
Advanced Heart Failure Outpatient Consult Note - Ana Diallo 77 y o  female MRN: [id number]    Encounter: [id number]      Assessment/Plan:    Patient Active Problem List    Diagnosis Date Noted   • Stage 3b chronic kidney disease ([place] [address] ) 10/28/2022   • Proteinuria 10/28/2022   • Short of breath on exertion 09/20/2022   • Stage 3a chronic kidney disease ([place] [address] ) 05/23/2022   • Vitamin D deficiency 05/23/2022   • Secondary hyperparathyroidism ([place] [address] ) 05/23/2022   • Buttock wound 10/07/2021   • Atrial fibrillation with RVR ([place] [address] ) 10/01/2021   • Hypotension 09/30/2021   • Anemia 09/30/2021   • Acute respiratory failure with hypoxia 09/29/2021   • Closed left hip fracture (HCC) 09/28/2021   • Insomnia 09/28/2021   • Lung nodule 09/16/2019   • Aortoiliac occlusive disease ([place] [address] ) 01/09/2019   • Bilateral lower extremity edema 01/09/2019   • Leg pain 12/03/2018   • Lower extremity weakness 11/30/2018   • Peripheral vascular disease ([place] [address] ) 11/30/2018   • Foot pain 11/30/2018   • Weakness of lower extremity 11/30/2018   • Constipation 10/09/2018   • Bell's palsy 10/05/2018   • Depression with anxiety 09/03/2018   • Essential hypertension 09/03/2018   • Lymphoma ([place] [address] ) 09/03/2018   • Paroxysmal atrial fibrillation ([place] [address] ) 09/03/2018   • Hypokalemia 08/28/2018   • BRYANT (acute kidney injury) ([place] [address] ) 08/26/2018   • Fall 08/26/2018   • Hyponatremia 08/26/2018   • Carotid stenosis, asymptomatic, bilateral 04/26/2016   • Tobacco dependency 11/27/2015   • PAD (peripheral artery disease) ([place] [address] ) 11/27/2015   • Benign essential hypertension 11/07/2013       # Chronic heart failure with preserved ejection fraction  # Pulmonary hypertension  Etiology: more likely group III with severe obstructive disease on PFTs, abnormal chest CT with consolidation/fibrosis; with  with contribution from group II disease with diastolic dysfunction, afib  No h/o PE/DVT or drug use  No known CTD or liver disease    Diuretic: bumex 0 5mg as needed    Weight: 117 lbs 2/2/23  NT proBNP:     Studies- personally reviewed by me    Echocardiogram 1/16/23  LVEF: 60%  LVIDd: 3 9cm  RV: normal size, mildly reduced systolic function on review  MR: mild, mild stenosis  PASP: 77mmHg, mild to moderate TR  RVOT: no notching  Other: grade 1 diastology, dilated left atrium    Echo 9/30/21: technically difficult study  LVEF 65%  Grossly normal RV size and systolic function  Mild MR, mild MS, mean gradient 4  Estimated PASP 45mmHg    Echo 12/2018:  LVEF 60%, grade 1 diastology  Normal RV size and systolic function  Mild MR, mild MS, mean gradient 4  Estimated PASP 40mmHg    CT chest 12/12/22: IMPRESSION: 1  Worsening of the right lower lobe juxtapleural groundglass opacity with development of superimposed juxtapleural consolidation  Unfortunately, this has a relatively broad differential, including the aforementioned infectious or inflammatory   etiologies, but also including pulmonary adenocarcinoma, pulmonary lymphoma, or organizing pneumonia  Consider correlation with PET/CT versus short interval follow-up  Of note, none of these findings were present on the prior PET/CT in September 2019   2   Other biapical areas of groundglass nodularity are stable since August, but remain increased since a prior study in July 2021, also raising suspicion for slow growing adenocarcinomas  3   Age advanced mitral valvular, aortic valvular, and coronary arterial atherosclerotic calcifications  PFT 10/14/22: FEV1/FVC Ratio:  65%, FEV1 0 95 L/44%, FVC 1 46 L/53%  Lung volumes:  Not performed    DLCO 38%  Interpretation:  • Spirometer shows a severe obstructive ventilatory defect   • No significant response after administration of bronchodilator according to the ats standards   • Severely reduced diffusion capacity     # Paroxysmal atrial fibrillation  Rate: metoprolol and diltiazem  AC: apixaban 5mg BID  # Atherosclerotic heart disease, coronary artery calcifications seen on chest CT  # Obstructive lung disease, lung nodules  Chest CT and PFTs as above  # Peripheral arterial disease, with severe aortoiliac occlusive disease   S/p aortobifemoral bypass in 2010, bilateral femoropopliteal bypass in 2013 and 2016 along with left CFA angioplasty with stent  # CKD Stage 3b  # Hypertension  # Tobacco use  # Diffuse large B cell lymphoma, follows with [facility]      Today's Plan:  Patient with pulmonary hypertension which I suspect is mainly driven by group III disease  Overall preserved RV systolic function and euvolemic on exam  Severe obstructive disease on PFTs  Recent CT with  worsening of the right lower lobe juxtapleural groundglass opacity with development of superimposed juxtapleural consolidation  Underwent bronchoscopy and culture positive for Strep pneumo s/p 7 day course of Augmentin  Planned for repeat chest CT  Would defer possible RHC until treatment and/or further evaluation of pulmonary processes  Will coordinate with Dr Yane Asher regarding further cardiac testing and follow up  HPI:   77year old female with hypertension, history of diffuse large B cell lymphoma s/p chemotherapy in April 2019, current tobacco use, chronic kidney disease, obstructive lung disease, peripheral vascular disease, chronic heart failure with preserved EF and pulmonary hypertension  She has been following up with Dr Yane Asher  He is sent for evaluation of pulmonary hypertension  She had a repeat echocardiogram this month as a follow up for valvular disease and reassessment of cardiac function  She was also noted to be using her diuretic more due to dietary indiscretion  Echocardiogram showed worsening pulmonary hypertension with estimated PASP of 77mmHg with normal RV size and mildly reduced systolic function, and mild MR and MS  Seen by pulmonary for follow up of pulmonary nodules  Underwent testing as above including PFTs, CT chest and bronchoscopy   She denies shortness of breath on current level of exertion  Mainly limited by left leg pain from prior hip and femur fraction  She has chronic edema on left  Lower extremity  Past Medical History:   Diagnosis Date   • Bell's palsy    • Cancer ([facility] )     lymphoma   • Diffuse large B cell lymphoma ([place] [address] )    • Hyperlipidemia    • Hypertension    • PAD (peripheral artery disease) ([facility])    • PAF (paroxysmal atrial fibrillation) ([facility])    • PVD (peripheral vascular disease) ([place] [address] )        Review of Systems   Constitutional: Negative for chills, fatigue and fever  HENT: Negative for congestion, nosebleeds and sore throat  Eyes: Negative for discharge and visual disturbance  Respiratory: Negative for cough, chest tightness and shortness of breath  Cardiovascular: Negative for chest pain, palpitations and leg swelling  Gastrointestinal: Negative for abdominal distention, abdominal pain, diarrhea, nausea and vomiting  Endocrine: Negative for cold intolerance and heat intolerance  Genitourinary: Negative for dysuria, flank pain and hematuria  Musculoskeletal: Positive for arthralgias  Negative for back pain and myalgias  Skin: Negative for color change, pallor and rash  Neurological: Negative for dizziness, syncope, weakness and light-headedness  Hematological: Negative for adenopathy  Does not bruise/bleed easily  Psychiatric/Behavioral: Negative for agitation and confusion  Allergies   Allergen Reactions   • Oxycodone-Acetaminophen Itching and Rash     Itching, rash           Current Outpatient Medications:   •  albuterol (ACCUNEB) 1 25 MG/3ML nebulizer solution, Take 1 25 mg by nebulization every 6 (six) hours as needed for wheezing, Disp: , Rfl:   •  ALPRAZolam (XANAX) 0 5 mg tablet, Take 0 25 mg by mouth, Disp: , Rfl:   •  bumetanide (BUMEX) 0 5 MG tablet, TAKE 1 TABLET (0 5 MG TOTAL) BY MOUTH IF NEEDED (FOR SWELLING, BLOATING, WEIGHT GAIN) (Patient taking differently: Take 0 5 mg by mouth if needed (for swelling, bloating, weight gain) PRN), Disp: 90 tablet, Rfl: 2  •  Cholecalciferol (VITAMIN D3) 2000 units TABS, Take 1 tablet by mouth daily, Disp: , Rfl:   •  diltiazem (CARDIZEM CD) 240 mg 24 hr capsule, TAKE 1 CAPSULE BY MOUTH EVERY DAY, Disp: 90 capsule, Rfl: 3  •  Eliquis 5 MG, TAKE 1 TABLET BY MOUTH TWICE A DAY, Disp: 60 tablet, Rfl: 5  •  metoprolol succinate (TOPROL-XL) 50 mg 24 hr tablet, Take 1 tablet (50 mg total) by mouth daily, Disp: 90 tablet, Rfl: 3  •  mirtazapine (REMERON) 15 mg tablet, Take 7 5 mg by mouth daily at bedtime  , Disp: , Rfl:   •  senna-docusate sodium (SENOKOT S) 8 6-50 mg per tablet, Take 1 tablet by mouth daily As needed for constipation, Disp: 20 tablet, Rfl: 0  •  umeclidinium-vilanterol (Anoro Ellipta) 62 5-25 MCG/INH inhaler, Inhale 1 puff daily, Disp: 60 blister, Rfl: 3  •  glycerin-hypromellose- (ARTIFICIAL TEARS) 0 2-0 2-1 % SOLN, 1 drop (Patient not taking: Reported on 2/2/2023), Disp: , Rfl:     Social History     Socioeconomic History   • Marital status:      Spouse name: Not on file   • Number of children: Not on file   • Years of education: Not on file   • Highest education level: Not on file   Occupational History   • Not on file   Tobacco Use   • Smoking status: Every Day     Packs/day: 0 75     Years: 53 00     Pack years: 39 75     Types: Cigarettes     Start date: 1/1/1969   • Smokeless tobacco: Never   Vaping Use   • Vaping Use: Never used   Substance and Sexual Activity   • Alcohol use:  Yes     Alcohol/week: 4 0 standard drinks     Types: 4 Cans of beer per week     Comment: 4 CANS OF BEER DAILY   • Drug use: No     Comment: medical marijuana 10/7/19   • Sexual activity: Not on file   Other Topics Concern   • Not on file   Social History Narrative   • Not on file     Social Determinants of Health     Financial Resource Strain: Not on file   Food Insecurity: Not on file   Transportation Needs: Not on file   Physical Activity: Not on file   Stress: Not on file   Social Connections: Not on file   Intimate Partner Violence: Not on file   Housing Stability: Not on file       Family History   Problem Relation Age of Onset   • Cancer Mother    • Breast cancer Mother    • Heart attack Father    • No Known Problems Sister    • No Known Problems Daughter    • No Known Problems Maternal Grandmother    • No Known Problems Maternal Grandfather    • No Known Problems Paternal Grandmother    • No Known Problems Paternal Grandfather    • Prostate cancer Brother    • No Known Problems Maternal Aunt    • No Known Problems Maternal Aunt    • No Known Problems Maternal Aunt    • No Known Problems Maternal Aunt    • No Known Problems Paternal Aunt    • No Known Problems Paternal Uncle        Physical Exam:    Vitals: Blood pressure 112/70, pulse 69, weight 53 1 kg (117 lb), SpO2 94 %  , Body mass index is 21 4 kg/m² ,   Wt Readings from Last 3 Encounters:   23 53 1 kg (117 lb)   23 54 kg (119 lb)   23 52 2 kg (115 lb)       Physical Exam  Constitutional:       General: She is not in acute distress  Appearance: Normal appearance  HENT:      Head: Normocephalic and atraumatic  Mouth/Throat:      Mouth: Mucous membranes are moist    Eyes:      Extraocular Movements: Extraocular movements intact  Conjunctiva/sclera: Conjunctivae normal    Neck:      Vascular: No JVD  Cardiovascular:      Rate and Rhythm: Normal rate and regular rhythm  Pulses: Normal pulses  Heart sounds: No murmur heard  No friction rub  No gallop  Pulmonary:      Effort: Pulmonary effort is normal  No respiratory distress  Breath sounds: Decreased breath sounds present  No wheezing, rhonchi or rales  Abdominal:      General: There is no distension  Palpations: Abdomen is soft  Tenderness: There is no abdominal tenderness  There is no guarding  Musculoskeletal:         General: No deformity or signs of injury  Cervical back: Neck supple        Left lower le+ Pitting Edema present  Skin:     General: Skin is warm and dry  Capillary Refill: Capillary refill takes less than 2 seconds  Neurological:      General: No focal deficit present  Mental Status: She is alert and oriented to person, place, and time  Psychiatric:         Mood and Affect: Mood normal          Labs & Results:    Lab Results   Component Value Date    WBC 6 20 10/25/2022    HGB 15 2 10/25/2022    HCT 46 3 (H) 10/25/2022    MCV 88 10/25/2022     10/25/2022     Lab Results   Component Value Date    SODIUM 136 01/11/2023    K 3 3 (L) 01/11/2023     01/11/2023    CO2 26 01/11/2023    BUN 17 01/11/2023    CREATININE 1 48 (H) 01/11/2023    GLUC 150 (H) 08/02/2022    CALCIUM 9 4 01/11/2023     No results found for: NTBNP   Lab Results   Component Value Date    CHOLESTEROL 86 12/02/2018     Lab Results   Component Value Date    HDL 35 (L) 12/02/2018     Lab Results   Component Value Date    TRIG 70 12/02/2018     Lab Results   Component Value Date    [place] 51 12/02/2018       EKG personally reviewed by Oswaldo Gonzales MD      Counseling / Coordination of Care  Time spent today 40 minutes  Greater than 50% of total time was spent with the patient and / or family counseling and / or coordination of care  We discussed diagnoses, most recent studies and any changes in treatment    Thank you for the opportunity to participate in the care of this patient      Daniella Vega MD  ADVANCED HEART FAILURE AND MECHANICAL CIRCULATORY SUPPORT  [facility]

## 2023-02-02 ENCOUNTER — OFFICE VISIT (OUTPATIENT)
Dept: CARDIOLOGY CLINIC | Facility: CLINIC | Age: 68
End: 2023-02-02

## 2023-02-02 VITALS
BODY MASS INDEX: 21.4 KG/M2 | OXYGEN SATURATION: 94 % | WEIGHT: 117 LBS | HEART RATE: 69 BPM | SYSTOLIC BLOOD PRESSURE: 112 MMHG | DIASTOLIC BLOOD PRESSURE: 70 MMHG

## 2023-02-02 DIAGNOSIS — J44.9 OBSTRUCTIVE LUNG DISEASE (HCC): ICD-10-CM

## 2023-02-02 DIAGNOSIS — I27.20 PULMONARY HTN (HCC): Primary | ICD-10-CM

## 2023-02-02 DIAGNOSIS — I48.0 PAF (PAROXYSMAL ATRIAL FIBRILLATION) (HCC): ICD-10-CM

## 2023-02-02 DIAGNOSIS — I50.32 CHRONIC HEART FAILURE WITH PRESERVED EJECTION FRACTION (HCC): ICD-10-CM

## 2023-02-18 DIAGNOSIS — J44.9 CHRONIC OBSTRUCTIVE PULMONARY DISEASE, UNSPECIFIED COPD TYPE (HCC): ICD-10-CM

## 2023-02-20 RX ORDER — UMECLIDINIUM BROMIDE AND VILANTEROL TRIFENATATE 62.5; 25 UG/1; UG/1
POWDER RESPIRATORY (INHALATION)
Qty: 60 EACH | Refills: 3 | Status: SHIPPED | OUTPATIENT
Start: 2023-02-20

## 2023-03-01 ENCOUNTER — HOSPITAL ENCOUNTER (OUTPATIENT)
Dept: CT IMAGING | Facility: HOSPITAL | Age: 68
Discharge: HOME | End: 2023-03-01
Attending: INTERNAL MEDICINE

## 2023-03-01 DIAGNOSIS — R91.1 LUNG NODULE: ICD-10-CM

## 2023-03-13 DIAGNOSIS — R91.1 LUNG NODULE: Primary | ICD-10-CM

## 2023-03-15 DIAGNOSIS — R91.8 LUNG NODULES: Primary | ICD-10-CM

## 2023-04-06 ENCOUNTER — APPOINTMENT (OUTPATIENT)
Dept: LAB | Facility: CLINIC | Age: 68
End: 2023-04-06

## 2023-05-01 ENCOUNTER — OFFICE VISIT (OUTPATIENT)
Dept: NEPHROLOGY | Facility: CLINIC | Age: 68
End: 2023-05-01

## 2023-05-01 VITALS
BODY MASS INDEX: 21.16 KG/M2 | SYSTOLIC BLOOD PRESSURE: 116 MMHG | OXYGEN SATURATION: 93 % | WEIGHT: 115 LBS | DIASTOLIC BLOOD PRESSURE: 70 MMHG | HEIGHT: 62 IN | HEART RATE: 83 BPM

## 2023-05-01 DIAGNOSIS — N18.32 STAGE 3B CHRONIC KIDNEY DISEASE (HCC): Primary | ICD-10-CM

## 2023-05-01 DIAGNOSIS — F17.200 TOBACCO DEPENDENCY: ICD-10-CM

## 2023-05-01 DIAGNOSIS — R80.9 MICROALBUMINURIA: ICD-10-CM

## 2023-05-01 DIAGNOSIS — I10 BENIGN ESSENTIAL HYPERTENSION: ICD-10-CM

## 2023-05-01 DIAGNOSIS — N25.81 SECONDARY HYPERPARATHYROIDISM (HCC): ICD-10-CM

## 2023-05-01 NOTE — PATIENT INSTRUCTIONS
1   Your kidney function is stable at 37% you have stage IIIb kidney disease  You have protein in the urine as well which is actually improved  No medication changes made at this visit  Follow-up in 4 months  2  Keep fluid intake 50-60 ounces  3  Avoid motrin/advil/aleve/ ibuprofen

## 2023-05-01 NOTE — PROGRESS NOTES
Assessment & Plan:    1  Stage 3b chronic kidney disease ([facility] [address] )  -     Comprehensive metabolic panel; Future; Expected date: 07/04/2023  -     PTH, intact; Future; Expected date: 07/04/2023  -     Uric acid; Future; Expected date: 07/04/2023  -     Phosphorus; Future; Expected date: 07/04/2023  -     Magnesium; Future; Expected date: 07/04/2023  -     CBC and Platelet; Future; Expected date: 07/04/2023  -     Urinalysis with microscopic; Future; Expected date: 07/04/2023  -     Microalbumin / creatinine urine ratio; Future; Expected date: 07/04/2023    2  Secondary hyperparathyroidism ([facility] [address] )    3  Benign essential hypertension    4  Tobacco dependency    5  Microalbuminuria       1  CKD 3b A2-3  Baseline 1 4-1 5  Most recent chemistry showed a creatinine 1 4 with an EGFR 37 mL/min on 4/5/2023  Electrolytes: Sodium 134 mildly low, potassium 3 5 within normal limits  Acid-base: Chloride 102, T CO2 26 within normal limits  Calcium: 9 4 within normal limits  Normal ratio of kappa to lambda  Kappa and lambda light chains mildly elevated due to CKD  SPEP no monoclonal bands  She has a history of diffuse large B-cell lymphoma maintained by hematology  Initially diagnosed in 2018  Etiology of CKD secondary to tobacco related nodular sclerosis, hypertensive nephrosclerosis, vascular disease, age-related EGFR decline  Not biopsy proven  Appears euvolemic at present  She was referred to CKD education last visit  She has declined vascular duplex to look at renal arteries  Reviewed CKD stages, creatinine and EGFR trends  Follow-up in 4 months  No medication changes made at this visit  Encourage hydration to 50-60 ounces per day  Avoid NSAIDs  2 secondary hyperparathyroidism  Follow-up PTH trends at next visit  Check phosphorus and calcium  Last PTH 75 1 on 10/25/2022  PTH goal is 35-70 with CKD 3  Last vitamin D level was sufficient at 61 3 goal 30-50    Continue vitamin D3 2000 unit/day to reduce risk of osteoporosis  Last DEXA scan in September showed osteopenia     She should have a repeat DEXA in 1 to 2 years  3   Benign essential hypertension, blood pressure trends under reasonable control at present  Blood pressure 116/70 with heart rate 83  Goal blood pressure less than 130/80  Current trends do not allow for ACE or ARB  Continue as needed Bumex  She does not follow her weights at home but does evaluate her legs for swelling  She is maintained on Cardizem and Toprol-XL for atrial fibrillation  4 tobacco dependency, encourage reducing cessation  She is not ready to quit at this time  5   Microalbuminuria, A2  Improving microalbuminuria trends  Microalbumin to creatinine ratio 208 mg/g creatinine on 4/6/23  This is down from 325 mg/g creatinine in January  Attributed to potential tobacco related nodular sclerosis or hypertensive nephrosclerosis  She does not have a diagnosis of diabetes  Her blood pressure trends do not allow for ACE/ARB at this time  6   Health maintenance  Continue age-appropriate vaccines and cancer screenings per primary care provider  7   Diffuse large B-cell lymphoma diagnosed in 2018  She received chemotherapy with R-CHOP x 6 cycles from 12 [id number]  She received IT chemo x 4 cycles  In 2019 she was found to be in remission  MRI and CT body were negative for systemic disease  She is 3 years out from completion of chemotherapy  Followed by hematology every 4 to 6 months  Reviewed hematology's notes  The benefits, risks and alternatives to the treatment plan were discussed at this visit  Patient was advised of common adverse effects of any medical therapies prescribed  All questions were answered and discussed with the patient and any accompanying family members or caretakers  Subjective:      Patient ID: Dianne Jeffries is a 77 y o  female presents for follow up in the [place] office    She was last seen on 1/27/2023 for "CKD 3B A3  Baseline creatinine 1 4-1 5  Etiology attributed to potential nodular sclerosis from tobacco use, atherosclerosis, age-related EGFR decline, hypertension  Her right kidney was slightly smaller at 7 8 cm and left was 9 2 cm  This did raise a suspicion for renal artery stenosis but we have been holding off a vascular ultrasound  He Developed ratio was normal for her degree of renal disease  An ACE/ARB would be ideal however her blood pressure trends do not allow  HPI  IN interim since last visit, denies any new medical conditions, surgeries, allergies or medications  Follows with PCP, cardiology and pulmonology  She had a CT chest and has multiple irregular opacities and new subcentimeter nodules  Needs a CT chest in 6 months  She also has a consolidation in lateral and right lower lobe, potentially inflammatory or infectious  Does not monitor BP at home  No weight changes  She has been taking bumex for the past 3 days due to left leg swelling which improved  Follows a low sodium diet  Does not drink 60 ounces per day  Cut back cigarettes to 12 per day  Denies NSAID use  Does not take medication for pain  The following portions of the patient's history were reviewed and updated as appropriate: allergies, current medications, past family history, past medical history, past social history, past surgical history, and problem list     Review of Systems   Constitutional: Positive for fatigue  Respiratory: Negative  Cardiovascular: Positive for leg swelling  Gastrointestinal: Negative  Genitourinary: Negative  Neurological: Negative  All other systems reviewed and are negative  Objective:      /70 (BP Location: Left arm, Patient Position: Sitting, Cuff Size: Standard)   Pulse 83   Ht 5' 2\" (1 575 m)   Wt 52 2 kg (115 lb)   SpO2 93%   BMI 21 03 kg/m²          Physical Exam  Vitals reviewed     Constitutional:       General: She is not in acute " distress  HENT:      Head: Atraumatic  Eyes:      Extraocular Movements: Extraocular movements intact  Conjunctiva/sclera: Conjunctivae normal    Cardiovascular:      Rate and Rhythm: Normal rate and regular rhythm  Heart sounds: No friction rub  Pulmonary:      Breath sounds: No wheezing, rhonchi or rales  Comments: Decrease BS right side   Abdominal:      General: There is no distension  Tenderness: There is no abdominal tenderness  There is no guarding  Musculoskeletal:      Right lower leg: Edema present  Left lower leg: Edema present  Comments: Trace edema   Skin:     General: Skin is warm  Coloration: Skin is not jaundiced  Findings: No bruising  Neurological:      General: No focal deficit present  Mental Status: She is alert and oriented to person, place, and time  Mental status is at baseline  Cranial Nerves: No cranial nerve deficit     Psychiatric:         Mood and Affect: Mood normal          Behavior: Behavior normal              Lab Results   Component Value Date    SODIUM 134 (L) 04/05/2023    K 3 5 04/05/2023     04/05/2023    CO2 26 04/05/2023    AGAP 6 04/05/2023    BUN 10 04/05/2023    CREATININE 1 43 (H) 04/05/2023    GLUC 150 (H) 08/02/2022    GLUF 108 (H) 04/05/2023    CALCIUM 9 4 04/05/2023    AST 19 04/05/2023    ALT 14 04/05/2023    ALKPHOS 109 04/05/2023    TP 7 4 04/05/2023    TBILI 1 04 (H) 04/05/2023    EGFR 37 04/05/2023      Lab Results   Component Value Date    CREATININE 1 43 (H) 04/05/2023    CREATININE 1 48 (H) 01/11/2023    CREATININE 1 62 (H) 11/04/2022    CREATININE 1 52 (H) 10/25/2022    CREATININE 1 57 (H) 08/02/2022    CREATININE 1 47 (H) 06/22/2022    CREATININE 1 39 (H) 06/09/2022    CREATININE 1 40 (H) 04/29/2022    CREATININE 1 61 (H) 04/08/2022    CREATININE 1 22 02/10/2022    CREATININE 1 12 12/30/2021    CREATININE 0 97 11/08/2021    CREATININE 0 79 10/07/2021    CREATININE 0 78 10/06/2021 "CREATININE 0 72 10/05/2021      Lab Results   Component Value Date    COLORU Light Yellow 04/06/2023    CLARITYU Clear 04/06/2023    SPECGRAV 1 009 04/06/2023    PHUR 6 5 04/06/2023    LEUKOCYTESUR Trace (A) 04/06/2023    NITRITE Negative 04/06/2023    PROTEIN UA 30 (1+) (A) 04/06/2023    GLUCOSEU Negative 04/06/2023    KETONESU Negative 04/06/2023    UROBILINOGEN <2 0 04/06/2023    BILIRUBINUR Negative 04/06/2023    BLOODU Negative 04/06/2023    RBCUA None Seen 04/06/2023    WBCUA 1-2 04/06/2023    EPIS Occasional 04/06/2023    BACTERIA None Seen 04/06/2023      No results found for: LABPROT  No results found for: Aurea Ko  Lab Results   Component Value Date    WBC 6 20 10/25/2022    HGB 15 2 10/25/2022    HCT 46 3 (H) 10/25/2022    MCV 88 10/25/2022     10/25/2022      Lab Results   Component Value Date    HGB 15 2 10/25/2022    HGB 14 9 08/02/2022    HGB 14 8 04/29/2022    HGB 12 9 11/08/2021    HGB 8 2 (L) 10/07/2021      Lab Results   Component Value Date    IRON 80 11/08/2021    TIBC 237 (L) 10/07/2021    FERRITIN 421 (H) 10/07/2021      No results found for: PTHCALCIUM, [id number], PHOSPHORUS   Lab Results   Component Value Date    CHOLESTEROL 86 12/02/2018    HDL 35 (L) 12/02/2018    LDLCALC 37 12/02/2018    TRIG 70 12/02/2018      Lab Results   Component Value Date    URICACID 6 4 04/05/2023      No results found for: HGBA1C   Lab Results   Component Value Date    FREET4 1 34 04/08/2022      No results found for: TARA, DSDNAAB, RFIGM   No results found for: PROT, UPEP, IMMUNOFIX, KAPPALAMBDA, KAPPALIGHT     Portions of the record may have been created with voice recognition software  Occasional wrong word or \"sound a like\" substitutions may have occurred due to the inherent limitations of voice recognition software  Read the chart carefully and recognize, using context, where substitutions have occurred  If you have any questions, please contact the dictating provider        "

## 2023-05-08 DIAGNOSIS — I48.91 ATRIAL FIBRILLATION WITH RVR (HCC): ICD-10-CM

## 2023-05-10 DIAGNOSIS — I48.0 PAF (PAROXYSMAL ATRIAL FIBRILLATION) (HCC): ICD-10-CM

## 2023-05-10 RX ORDER — METOPROLOL SUCCINATE 50 MG/1
TABLET, EXTENDED RELEASE ORAL
Qty: 90 TABLET | Refills: 3 | Status: SHIPPED | OUTPATIENT
Start: 2023-05-10

## 2023-05-22 ENCOUNTER — TELEPHONE (OUTPATIENT)
Dept: NON INVASIVE DIAGNOSTICS | Facility: HOSPITAL | Age: 68
End: 2023-05-22

## 2023-05-22 ENCOUNTER — TELEPHONE (OUTPATIENT)
Dept: CARDIOLOGY CLINIC | Facility: CLINIC | Age: 68
End: 2023-05-22

## 2023-05-22 NOTE — TELEPHONE ENCOUNTER
I was able to call and speak with patient and she believes she was told by her pulmonologist that the right heart cath procedure may be related to her atrial fibrillation but now understands that she was referred to advanced heart failure/pulmonary hypertension clinic for high blood pressure and her lungs for evaluation and that this is what her right heart cath would evaluate her for  She is agreeable potentially for this but wishes to see a different advanced heart failure/pulmonary hypertension specialist then Dr Loulou Venegas who she previously saw  Will have office staff assist in setting this up for patient

## 2023-05-22 NOTE — TELEPHONE ENCOUNTER
Received a call from patient  She had been scheduled to have a procedure done a few months back and walked out as she was very frustrated with the doctor who was going to do the procedure  She said it is a procedure where they go in her neck for atrial fibrillation  She now wants to have this rescheduled, however, I do not know what I am to reschedule? Please advise or call the patient to discuss further

## 2023-05-23 NOTE — TELEPHONE ENCOUNTER
Will call patient and offer patient an appointment to see Dr Gina Gaytan in [place] office on 6/7/23

## 2023-05-25 NOTE — TELEPHONE ENCOUNTER
Called both patient and her daughter and left message regarding an appointment with Dr Mary Anne Grigsby

## 2023-06-25 ENCOUNTER — APPOINTMENT (EMERGENCY)
Dept: RADIOLOGY | Facility: HOSPITAL | Age: 68
End: 2023-06-25
Payer: COMMERCIAL

## 2023-06-25 ENCOUNTER — APPOINTMENT (EMERGENCY)
Dept: CT IMAGING | Facility: HOSPITAL | Age: 68
End: 2023-06-25
Payer: COMMERCIAL

## 2023-06-25 ENCOUNTER — HOSPITAL ENCOUNTER (INPATIENT)
Facility: HOSPITAL | Age: 68
LOS: 1 days | Discharge: HOME | End: 2023-06-27
Attending: FAMILY MEDICINE | Admitting: INTERNAL MEDICINE
Payer: COMMERCIAL

## 2023-06-25 DIAGNOSIS — R09.02 HYPOXIA: ICD-10-CM

## 2023-06-25 DIAGNOSIS — E87.6 HYPOKALEMIA: ICD-10-CM
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
## 2023-06-25 DIAGNOSIS — I10 ESSENTIAL HYPERTENSION: ICD-10-CM

## 2023-06-25 DIAGNOSIS — J90 PLEURAL EFFUSION: ICD-10-CM

## 2023-06-25 DIAGNOSIS — J44.9 COPD (CHRONIC OBSTRUCTIVE PULMONARY DISEASE) (HCC): ICD-10-CM

## 2023-06-25 DIAGNOSIS — S32.10XA SACRAL FRACTURE (HCC): Primary | ICD-10-CM

## 2023-06-25 LAB
ANION GAP SERPL CALCULATED.3IONS-SCNC: 17 MMOL/L
ATRIAL RATE: 89 BPM
BASOPHILS # BLD AUTO: 0.06 THOUSANDS/ÂΜL (ref 0–0.1)
BASOPHILS NFR BLD AUTO: 1 % (ref 0–1)
BUN SERPL-MCNC: 13 MG/DL (ref 5–25)
CALCIUM SERPL-MCNC: 9.4 MG/DL (ref 8.4–10.2)
CHLORIDE SERPL-SCNC: 93 MMOL/L (ref 96–108)
CO2 SERPL-SCNC: 23 MMOL/L (ref 21–32)
CREAT SERPL-MCNC: 1.18 MG/DL (ref 0.6–1.3)
EOSINOPHIL # BLD AUTO: 0.1 THOUSAND/ÂΜL (ref 0–0.61)
EOSINOPHIL NFR BLD AUTO: 1 % (ref 0–6)
ERYTHROCYTE [DISTWIDTH] IN BLOOD BY AUTOMATED COUNT: 17.2 % (ref 11.6–15.1)
ETHANOL SERPL-MCNC: 21 MG/DL
FLUAV RNA RESP QL NAA+PROBE: NEGATIVE
FLUBV RNA RESP QL NAA+PROBE: NEGATIVE
GFR SERPL CREATININE-BSD FRML MDRD: 46 ML/MIN/1.73SQ M
GLUCOSE SERPL-MCNC: 112 MG/DL (ref 65–140)
HCT VFR BLD AUTO: 44.7 % (ref 34.8–46.1)
HGB BLD-MCNC: 14.7 G/DL (ref 11.5–15.4)
IMM GRANULOCYTES # BLD AUTO: 0.03 THOUSAND/UL (ref 0–0.2)
IMM GRANULOCYTES NFR BLD AUTO: 0 % (ref 0–2)
LYMPHOCYTES # BLD AUTO: 0.98 THOUSANDS/ÂΜL (ref 0.6–4.47)
LYMPHOCYTES NFR BLD AUTO: 12 % (ref 14–44)
MCH RBC QN AUTO: 28.5 PG (ref 26.8–34.3)
MCHC RBC AUTO-ENTMCNC: 32.9 G/DL (ref 31.4–37.4)
MCV RBC AUTO: 87 FL (ref 82–98)
MONOCYTES # BLD AUTO: 0.78 THOUSAND/ÂΜL (ref 0.17–1.22)
MONOCYTES NFR BLD AUTO: 9 % (ref 4–12)
NEUTROPHILS # BLD AUTO: 6.48 THOUSANDS/ÂΜL (ref 1.85–7.62)
NEUTS SEG NFR BLD AUTO: 77 % (ref 43–75)
NRBC BLD AUTO-RTO: 0 /100 WBCS
P AXIS: -6 DEGREES
PLATELET # BLD AUTO: 326 THOUSANDS/UL (ref 149–390)
PMV BLD AUTO: 9.7 FL (ref 8.9–12.7)
POTASSIUM SERPL-SCNC: 2.4 MMOL/L (ref 3.5–5.3)
PR INTERVAL: 148 MS
QRS AXIS: 159 DEGREES
QRSD INTERVAL: 100 MS
QT INTERVAL: 398 MS
QTC INTERVAL: 484 MS
RBC # BLD AUTO: 5.15 MILLION/UL (ref 3.81–5.12)
RSV RNA RESP QL NAA+PROBE: NEGATIVE
SARS-COV-2 RNA RESP QL NAA+PROBE: NEGATIVE
SODIUM SERPL-SCNC: 133 MMOL/L (ref 135–147)
T WAVE AXIS: -26 DEGREES
VENTRICULAR RATE: 89 BPM
WBC # BLD AUTO: 8.43 THOUSAND/UL (ref 4.31–10.16)

## 2023-06-25 PROCEDURE — 36415 COLL VENOUS BLD VENIPUNCTURE: CPT | Performed by: FAMILY MEDICINE

## 2023-06-25 PROCEDURE — 0241U HB NFCT DS VIR RESP RNA 4 TRGT: CPT | Performed by: FAMILY MEDICINE

## 2023-06-25 PROCEDURE — 80048 BASIC METABOLIC PNL TOTAL CA: CPT | Performed by: FAMILY MEDICINE

## 2023-06-25 PROCEDURE — 85025 COMPLETE CBC W/AUTO DIFF WBC: CPT | Performed by: FAMILY MEDICINE

## 2023-06-25 PROCEDURE — 82077 ASSAY SPEC XCP UR&BREATH IA: CPT | Performed by: FAMILY MEDICINE

## 2023-06-25 PROCEDURE — 94760 N-INVAS EAR/PLS OXIMETRY 1: CPT

## 2023-06-25 PROCEDURE — 93005 ELECTROCARDIOGRAM TRACING: CPT

## 2023-06-25 PROCEDURE — 1124F ACP DISCUSS-NO DSCNMKR DOCD: CPT | Performed by: INTERNAL MEDICINE

## 2023-06-25 PROCEDURE — 71045 X-RAY EXAM CHEST 1 VIEW: CPT

## 2023-06-25 PROCEDURE — 72131 CT LUMBAR SPINE W/O DYE: CPT

## 2023-06-25 PROCEDURE — 96365 THER/PROPH/DIAG IV INF INIT: CPT

## 2023-06-25 PROCEDURE — 93010 ELECTROCARDIOGRAM REPORT: CPT | Performed by: INTERNAL MEDICINE

## 2023-06-25 PROCEDURE — 99223 1ST HOSP IP/OBS HIGH 75: CPT | Performed by: INTERNAL MEDICINE

## 2023-06-25 PROCEDURE — G1004 CDSM NDSC: HCPCS

## 2023-06-25 PROCEDURE — 99285 EMERGENCY DEPT VISIT HI MDM: CPT

## 2023-06-25 PROCEDURE — 96361 HYDRATE IV INFUSION ADD-ON: CPT

## 2023-06-25 PROCEDURE — 99285 EMERGENCY DEPT VISIT HI MDM: CPT | Performed by: FAMILY MEDICINE

## 2023-06-25 RX ORDER — NICOTINE 21 MG/24HR
1 PATCH, TRANSDERMAL 24 HOURS TRANSDERMAL DAILY
Status: DISCONTINUED | OUTPATIENT
Start: 2023-06-26 | End: 2023-06-27 | Stop reason: HOSPADM

## 2023-06-25 RX ORDER — MIRTAZAPINE 15 MG/1
7.5 TABLET, FILM COATED ORAL
Status: DISCONTINUED | OUTPATIENT
Start: 2023-06-25 | End: 2023-06-27 | Stop reason: HOSPADM

## 2023-06-25 RX ORDER — LEVALBUTEROL INHALATION SOLUTION 1.25 MG/3ML
1.25 SOLUTION RESPIRATORY (INHALATION) EVERY 8 HOURS PRN
Status: DISCONTINUED | OUTPATIENT
Start: 2023-06-25 | End: 2023-06-25

## 2023-06-25 RX ORDER — ACETAMINOPHEN 325 MG/1
650 TABLET ORAL EVERY 4 HOURS PRN
Status: DISCONTINUED | OUTPATIENT
Start: 2023-06-25 | End: 2023-06-27 | Stop reason: HOSPADM

## 2023-06-25 RX ORDER — METOPROLOL SUCCINATE 50 MG/1
50 TABLET, EXTENDED RELEASE ORAL DAILY
Status: DISCONTINUED | OUTPATIENT
Start: 2023-06-26 | End: 2023-06-27 | Stop reason: HOSPADM

## 2023-06-25 RX ORDER — POTASSIUM CHLORIDE 20 MEQ/1
40 TABLET, EXTENDED RELEASE ORAL ONCE
Status: COMPLETED | OUTPATIENT
Start: 2023-06-25 | End: 2023-06-25

## 2023-06-25 RX ORDER — ALPRAZOLAM 0.25 MG/1
0.25 TABLET ORAL DAILY
Status: DISCONTINUED | OUTPATIENT
Start: 2023-06-25 | End: 2023-06-27 | Stop reason: HOSPADM

## 2023-06-25 RX ORDER — METHYLPREDNISOLONE SODIUM SUCCINATE 40 MG/ML
40 INJECTION, POWDER, LYOPHILIZED, FOR SOLUTION INTRAMUSCULAR; INTRAVENOUS EVERY 8 HOURS SCHEDULED
Status: COMPLETED | OUTPATIENT
Start: 2023-06-25 | End: 2023-06-26

## 2023-06-25 RX ORDER — ONDANSETRON 2 MG/ML
4 INJECTION INTRAMUSCULAR; INTRAVENOUS EVERY 6 HOURS PRN
Status: DISCONTINUED | OUTPATIENT
Start: 2023-06-25 | End: 2023-06-27 | Stop reason: HOSPADM

## 2023-06-25 RX ORDER — ALBUTEROL SULFATE 2.5 MG/3ML
2.5 SOLUTION RESPIRATORY (INHALATION) EVERY 4 HOURS PRN
Status: DISCONTINUED | OUTPATIENT
Start: 2023-06-25 | End: 2023-06-27 | Stop reason: HOSPADM

## 2023-06-25 RX ORDER — AMOXICILLIN 250 MG
1 CAPSULE ORAL DAILY
Status: DISCONTINUED | OUTPATIENT
Start: 2023-06-26 | End: 2023-06-27 | Stop reason: HOSPADM

## 2023-06-25 RX ORDER — DILTIAZEM HYDROCHLORIDE 240 MG/1
240 CAPSULE, COATED, EXTENDED RELEASE ORAL DAILY
Status: DISCONTINUED | OUTPATIENT
Start: 2023-06-26 | End: 2023-06-27 | Stop reason: HOSPADM

## 2023-06-25 RX ORDER — POTASSIUM CHLORIDE 14.9 MG/ML
20 INJECTION INTRAVENOUS
Status: COMPLETED | OUTPATIENT
Start: 2023-06-25 | End: 2023-06-25

## 2023-06-25 RX ADMIN — APIXABAN 5 MG: 5 TABLET, FILM COATED ORAL at 18:21

## 2023-06-25 RX ADMIN — ALPRAZOLAM 0.25 MG: 0.25 TABLET ORAL at 16:53

## 2023-06-25 RX ADMIN — MIRTAZAPINE 7.5 MG: 15 TABLET, FILM COATED ORAL at 21:08

## 2023-06-25 RX ADMIN — POTASSIUM CHLORIDE 40 MEQ: 1500 TABLET, EXTENDED RELEASE ORAL at 15:55

## 2023-06-25 RX ADMIN — POTASSIUM CHLORIDE 20 MEQ: 14.9 INJECTION, SOLUTION INTRAVENOUS at 15:57

## 2023-06-25 RX ADMIN — METHYLPREDNISOLONE SODIUM SUCCINATE 40 MG: 40 INJECTION, POWDER, FOR SOLUTION INTRAMUSCULAR; INTRAVENOUS at 16:54

## 2023-06-25 RX ADMIN — SODIUM CHLORIDE 1000 ML: 0.9 INJECTION, SOLUTION INTRAVENOUS at 15:06

## 2023-06-25 RX ADMIN — POTASSIUM CHLORIDE 20 MEQ: 14.9 INJECTION, SOLUTION INTRAVENOUS at 18:20

## 2023-06-25 RX ADMIN — METHYLPREDNISOLONE SODIUM SUCCINATE 40 MG: 40 INJECTION, POWDER, FOR SOLUTION INTRAMUSCULAR; INTRAVENOUS at 22:57

## 2023-06-25 NOTE — ASSESSMENT & PLAN NOTE
· K 2 4 on presentation  · Status post 60 mEq in the ED  · Trend and replete electrolytes as needed  · Monitor on telemetry

## 2023-06-25 NOTE — ED PROVIDER NOTES
History  Chief Complaint   Patient presents with   • Fall     Last Monday  Lower back and buttocks pain  No head strike, No LOC  Takes Eliquis     HPI  68-year-old female with history of severe COPD presented with complaint of feeling shakiness and weak  Patient states she fell Monday denies any head strike  Complaining of lower back pain  Denies any chest pain or shortness of breath  She is awake alert oriented x3 states 15  Complaining of lower back pain rating a 7 out of 10  States having difficulty ambulating at home  Found to be hypoxic in the ED with the low 80% on room air  She is not in any respiratory distress denies shortness of breath  Sitting comfortably in bed answer questions appropriately  She did state that she just smoked prior to arrival   Prior to Admission Medications   Prescriptions Last Dose Informant Patient Reported? Taking?    ALPRAZolam (XANAX) 0 5 mg tablet  Self Yes No   Sig: Take 0 25 mg by mouth   Anoro Ellipta 62 5-25 MCG/ACT inhaler  Self No No   Sig: INHALE 1 PUFF DAILY   Cholecalciferol (VITAMIN D3) 2000 units TABS  Self Yes No   Sig: Take 1 tablet by mouth daily   albuterol (ACCUNEB) 1 25 MG/3ML nebulizer solution  Self Yes No   Sig: Take 1 25 mg by nebulization every 6 (six) hours as needed for wheezing   apixaban (Eliquis) 5 mg   No No   Sig: Take 1 tablet (5 mg total) by mouth 2 (two) times a day   bumetanide (BUMEX) 0 5 MG tablet  Self No No   Sig: TAKE 1 TABLET (0 5 MG TOTAL) BY MOUTH IF NEEDED (FOR SWELLING, BLOATING, WEIGHT GAIN)   Patient taking differently: Take 0 5 mg by mouth if needed (for swelling, bloating, weight gain) PRN   diltiazem (CARDIZEM CD) 240 mg 24 hr capsule  Self No No   Sig: TAKE 1 CAPSULE BY MOUTH EVERY DAY   glycerin-hypromellose- (ARTIFICIAL TEARS) 0 2-0 2-1 % SOLN  Self Yes No   Si drop   Patient not taking: Reported on 2023   metoprolol succinate (TOPROL-XL) 50 mg 24 hr tablet   No No   Sig: TAKE 1 TABLET BY MOUTH EVERY DAY mirtazapine (REMERON) 15 mg tablet  Self Yes No   Sig: Take 7 5 mg by mouth daily at bedtime     senna-docusate sodium (SENOKOT S) 8 6-50 mg per tablet  Self No No   Sig: Take 1 tablet by mouth daily As needed for constipation      Facility-Administered Medications: None       Past Medical History:   Diagnosis Date   • Bell's palsy    • Cancer ([place] [address] )     lymphoma   • Diffuse large B cell lymphoma ([place] [address] )    • Hyperlipidemia    • Hypertension    • PAD (peripheral artery disease) ([facility])    • PAF (paroxysmal atrial fibrillation) ([place] [address] )    • PVD (peripheral vascular disease) ([place] [address] )        Past Surgical History:   Procedure Laterality Date   • ARTERIOGRAM Left 12/6/2018    Procedure: LEFT lower extremity angiography, with Left lower extremity run-off, stent and angioplasty of Left Common Femoral Artery (Left Brachial Access); Surgeon: Nuvia Pedersen MD;  Location:  MAIN OR;  Service: Vascular   • CARDIAC SURGERY     • CARPAL TUNNEL RELEASE     • CT NEEDLE BIOPSY LUNG  10/8/2019   • HYSTERECTOMY     • IR AORTAGRAM WITH RUN-OFF  12/6/2018   • OOPHORECTOMY     • MA OPTX FEM SHFT FX W/INSJ IMED IMPLT W/WO SCREW Left 9/29/2021    Procedure: INSERTION NAIL IM FEMUR ANTEGRADE (TROCHANTERIC);   Surgeon: Gigi Gonzalez DO;  Location: [facility] MAIN OR;  Service: Orthopedics   • TONSILLECTOMY         Family History   Problem Relation Age of Onset   • Cancer Mother    • Breast cancer Mother    • Heart attack Father    • No Known Problems Sister    • No Known Problems Daughter    • No Known Problems Maternal Grandmother    • No Known Problems Maternal Grandfather    • No Known Problems Paternal Grandmother    • No Known Problems Paternal Grandfather    • Prostate cancer Brother    • No Known Problems Maternal Aunt    • No Known Problems Maternal Aunt    • No Known Problems Maternal Aunt    • No Known Problems Maternal Aunt    • No Known Problems Paternal Aunt    • No Known Problems Paternal Uncle      I have reviewed and agree with the history as documented  E-Cigarette/Vaping   • E-Cigarette Use Never User      E-Cigarette/Vaping Substances   • Nicotine No    • THC No    • CBD No    • Flavoring No    • Other No    • Unknown No      Social History     Tobacco Use   • Smoking status: Every Day     Packs/day: 0 75     Years: 53 00     Total pack years: 39 75     Types: Cigarettes     Start date: 1/1/1969   • Smokeless tobacco: Never   Vaping Use   • Vaping Use: Never used   Substance Use Topics   • Alcohol use: Yes     Alcohol/week: 4 0 standard drinks of alcohol     Types: 4 Cans of beer per week     Comment: 4 CANS OF BEER DAILY   • Drug use: No     Comment: medical marijuana 10/7/19       Review of Systems   Constitutional: Negative  HENT: Negative  Eyes: Negative  Respiratory: Negative  Cardiovascular: Negative  Gastrointestinal: Negative  Endocrine: Negative  Genitourinary: Negative  Musculoskeletal: Positive for back pain  Skin: Negative  Allergic/Immunologic: Negative  Neurological: Positive for weakness  Hematological: Negative  Psychiatric/Behavioral: The patient is nervous/anxious  Physical Exam  Physical Exam  Vitals and nursing note reviewed  Constitutional:       Comments: Appear anxious   HENT:      Head: Normocephalic and atraumatic  Nose: Nose normal    Eyes:      Extraocular Movements: Extraocular movements intact  Conjunctiva/sclera: Conjunctivae normal       Pupils: Pupils are equal, round, and reactive to light  Cardiovascular:      Rate and Rhythm: Normal rate and regular rhythm  Pulmonary:      Effort: Pulmonary effort is normal       Breath sounds: Normal breath sounds  Abdominal:      General: Bowel sounds are normal       Palpations: Abdomen is soft  Tenderness: There is no abdominal tenderness  Musculoskeletal:         General: Tenderness (lower back : mild tenderness at the lower back   no swelling or bruising noted  No crepitus noted ) present  Cervical back: Normal range of motion and neck supple  Neurological:      General: No focal deficit present  Mental Status: She is alert and oriented to person, place, and time     Psychiatric:         Mood and Affect: Mood normal          Vital Signs  ED Triage Vitals [06/25/23 1448]   Temperature Pulse Respirations Blood Pressure SpO2   98 °F (36 7 °C) 92 18 163/86 90 %      Temp Source Heart Rate Source Patient Position - Orthostatic VS BP Location FiO2 (%)   Tympanic Monitor Sitting Left arm --      Pain Score       8           Vitals:    06/26/23 0242 06/26/23 0729 06/26/23 1100 06/26/23 1458   BP: 141/90 154/99 (!) 162/101 133/84   Pulse: 79 81 85 81   Patient Position - Orthostatic VS:             Visual Acuity      ED Medications  Medications   umeclidinium-vilanterol 62 5-25 mcg/actuation inhaler 1 puff (1 puff Inhalation Given 6/26/23 0852)   apixaban (ELIQUIS) tablet 5 mg (5 mg Oral Given 6/26/23 0851)   diltiazem (CARDIZEM CD) 24 hr capsule 240 mg (240 mg Oral Given 6/26/23 0850)   metoprolol succinate (TOPROL-XL) 24 hr tablet 50 mg (50 mg Oral Given 6/26/23 0850)   mirtazapine (REMERON) tablet 7 5 mg (7 5 mg Oral Given 6/25/23 2108)   senna-docusate sodium (SENOKOT S) 8 6-50 mg per tablet 1 tablet (1 tablet Oral Given 6/26/23 0851)   acetaminophen (TYLENOL) tablet 650 mg (has no administration in time range)   ondansetron (ZOFRAN) injection 4 mg (has no administration in time range)   nicotine (NICODERM CQ) 21 mg/24 hr TD 24 hr patch 1 patch (1 patch Transdermal Medication Applied 6/26/23 0850)   methylPREDNISolone sodium succinate (Solu-MEDROL) injection 40 mg (40 mg Intravenous Given 6/26/23 1506)   ALPRAZolam (XANAX) tablet 0 25 mg (0 25 mg Oral Given 6/26/23 0850)   albuterol inhalation solution 2 5 mg (has no administration in time range)   predniSONE tablet 40 mg (has no administration in time range)   magnesium sulfate 2 g/50 mL IVPB (premix) 2 g (2 g Intravenous New Bag 6/26/23 9867) magnesium Oxide (MAG-OX) tablet 400 mg (has no administration in time range)   lisinopril (ZESTRIL) tablet 10 mg (10 mg Oral Given 6/26/23 1506)   sodium chloride 0 9 % bolus 1,000 mL (0 mL Intravenous Stopped 6/25/23 1555)   potassium chloride (K-DUR,KLOR-CON) CR tablet 40 mEq (40 mEq Oral Given 6/25/23 1555)   potassium chloride 20 mEq IVPB (premix) (20 mEq Intravenous New Bag 6/25/23 1820)   potassium chloride (K-DUR,KLOR-CON) CR tablet 40 mEq (40 mEq Oral Given 6/26/23 1105)       Diagnostic Studies  Results Reviewed     Procedure Component Value Units Date/Time    Basic metabolic panel [[id number]]  (Abnormal) Collected: 06/26/23 0518    Lab Status: Final result Specimen: Blood from Hand, Right Updated: 06/26/23 0547     Sodium 134 mmol/L      Potassium 3 2 mmol/L      Chloride 100 mmol/L      CO2 25 mmol/L      ANION GAP 9 mmol/L      BUN 12 mg/dL      Creatinine 1 02 mg/dL      Glucose 169 mg/dL      Calcium 8 3 mg/dL      eGFR 55 ml/min/1 73sq m     Narrative:      [place] guidelines for Chronic Kidney Disease (CKD):   •  Stage 1 with normal or high GFR (GFR > 90 mL/min/1 73 square meters)  •  Stage 2 Mild CKD (GFR = 60-89 mL/min/1 73 square meters)  •  Stage 3A Moderate CKD (GFR = 45-59 mL/min/1 73 square meters)  •  Stage 3B Moderate CKD (GFR = 30-44 mL/min/1 73 square meters)  •  Stage 4 Severe CKD (GFR = 15-29 mL/min/1 73 square meters)  •  Stage 5 End Stage CKD (GFR <15 mL/min/1 73 square meters)  Note: GFR calculation is accurate only with a steady state creatinine    Magnesium [[id number]]  (Abnormal) Collected: 06/26/23 0518    Lab Status: Final result Specimen: Blood from Hand, Right Updated: 06/26/23 0547     Magnesium 1 5 mg/dL     Phosphorus [[id number]]  (Normal) Collected: 06/26/23 0518    Lab Status: Final result Specimen: Blood from Hand, Right Updated: 06/26/23 0547     Phosphorus 2 4 mg/dL     CBC and differential [[id number]]  (Abnormal) Collected: 06/26/23 0518 Lab Status: Final result Specimen: Blood from Hand, Right Updated: 06/26/23 0527     WBC 4 37 Thousand/uL      RBC 4 94 Million/uL      Hemoglobin 14 2 g/dL      Hematocrit 43 7 %      MCV 89 fL      MCH 28 7 pg      MCHC 32 5 g/dL      RDW 17 5 %      MPV 9 6 fL      Platelets 966 Thousands/uL      nRBC 0 /100 WBCs      Neutrophils Relative 86 %      Immat GRANS % 1 %      Lymphocytes Relative 11 %      Monocytes Relative 2 %      Eosinophils Relative 0 %      Basophils Relative 0 %      Neutrophils Absolute 3 79 Thousands/µL      Immature Grans Absolute 0 03 Thousand/uL      Lymphocytes Absolute 0 46 Thousands/µL      Monocytes Absolute 0 08 Thousand/µL      Eosinophils Absolute 0 00 Thousand/µL      Basophils Absolute 0 01 Thousands/µL     Basic metabolic panel [[id number]]  (Abnormal) Collected: 06/25/23 1459    Lab Status: Final result Specimen: Blood from Arm, Left Updated: 06/25/23 1555     Sodium 133 mmol/L      Potassium 2 4 mmol/L      Chloride 93 mmol/L      CO2 23 mmol/L      ANION GAP 17 mmol/L      BUN 13 mg/dL      Creatinine 1 18 mg/dL      Glucose 112 mg/dL      Calcium 9 4 mg/dL      eGFR 46 ml/min/1 73sq m     Narrative:      [place] guidelines for Chronic Kidney Disease (CKD):   •  Stage 1 with normal or high GFR (GFR > 90 mL/min/1 73 square meters)  •  Stage 2 Mild CKD (GFR = 60-89 mL/min/1 73 square meters)  •  Stage 3A Moderate CKD (GFR = 45-59 mL/min/1 73 square meters)  •  Stage 3B Moderate CKD (GFR = 30-44 mL/min/1 73 square meters)  •  Stage 4 Severe CKD (GFR = 15-29 mL/min/1 73 square meters)  •  Stage 5 End Stage CKD (GFR <15 mL/min/1 73 square meters)  Note: GFR calculation is accurate only with a steady state creatinine    FLU/RSV/COVID - if FLU/RSV clinically relevant [[id number]]  (Normal) Collected: 06/25/23 1459    Lab Status: Final result Specimen: Nares from Nose Updated: 06/25/23 1551     SARS-CoV-2 Negative     INFLUENZA A PCR Negative     INFLUENZA B PCR Negative     RSV PCR Negative    Narrative:      FOR PEDIATRIC PATIENTS - copy/paste COVID Guidelines URL to browser: [email or web address]    SARS-CoV-2 assay is a Nucleic Acid Amplification assay intended for the  qualitative detection of nucleic acid from SARS-CoV-2 in nasopharyngeal  swabs  Results are for the presumptive identification of SARS-CoV-2 RNA  Positive results are indicative of infection with SARS-CoV-2, the virus  causing COVID-19, but do not rule out bacterial infection or co-infection  with other viruses  Laboratories within the [place] and its  territories are required to report all positive results to the appropriate  public health authorities  Negative results do not preclude SARS-CoV-2  infection and should not be used as the sole basis for treatment or other  patient management decisions  Negative results must be combined with  clinical observations, patient history, and epidemiological information  This test has not been FDA cleared or approved  This test has been authorized by FDA under an Emergency Use Authorization  (EUA)  This test is only authorized for the duration of time the  declaration that circumstances exist justifying the authorization of the  emergency use of an in vitro diagnostic tests for detection of SARS-CoV-2  virus and/or diagnosis of COVID-19 infection under section 564(b)(1) of  the Act, 21 U  S C  002FUZ-4(N)(4), unless the authorization is terminated  or revoked sooner  The test has been validated but independent review by FDA  and CLIA is pending  Test performed using "[employer] GeneXpert: This RT-PCR assay targets N2,  a region unique to SARS-CoV-2  A conserved region in the E-gene was chosen  for pan-Sarbecovirus detection which includes SARS-CoV-2  According to CMS-2020-01-R, this platform meets the definition of high-[employer] technology      Ethanol [[id number]]  (Abnormal) Collected: 06/25/23 1459    Lab Status: Final result Specimen: Blood from Arm, Left Updated: 06/25/23 1545     Ethanol Lvl 21 mg/dL     CBC and differential [[id number]]  (Abnormal) Collected: 06/25/23 1459    Lab Status: Final result Specimen: Blood from Arm, Left Updated: 06/25/23 1512     WBC 8 43 Thousand/uL      RBC 5 15 Million/uL      Hemoglobin 14 7 g/dL      Hematocrit 44 7 %      MCV 87 fL      MCH 28 5 pg      MCHC 32 9 g/dL      RDW 17 2 %      MPV 9 7 fL      Platelets 948 Thousands/uL      nRBC 0 /100 WBCs      Neutrophils Relative 77 %      Immat GRANS % 0 %      Lymphocytes Relative 12 %      Monocytes Relative 9 %      Eosinophils Relative 1 %      Basophils Relative 1 %      Neutrophils Absolute 6 48 Thousands/µL      Immature Grans Absolute 0 03 Thousand/uL      Lymphocytes Absolute 0 98 Thousands/µL      Monocytes Absolute 0 78 Thousand/µL      Eosinophils Absolute 0 10 Thousand/µL      Basophils Absolute 0 06 Thousands/µL                  CT spine lumbar without contrast   Final Result by Selwyn Lopez MD (06/25 7118)      1  Nondisplaced sacral fractures involving the body and left ala  There is a transverse fracture through the S2 vertebral body  2  Multilevel degenerative disc disease, progressed since 12/4/2018  Moderate to severe narrowing of the thecal sac is again noted at the L4-5 level   3  Atherosclerotic aortic calcifications with ectasia  Moderate right pleural effusion with underlying compressive atelectasis, incompletely imaged  The study was marked in [facility] for immediate notification  Workstation performed: [id number]         XR chest 1 view portable   Final Result by Jaskaran Marrero MD (06/26 8846)      Small right effusion and moderate right base opacity, question pneumonia/aspiration                 Workstation performed: [id number]                    Procedures  Procedures         ED Course  ED Course as of 06/26/23 1602   Sun Jun 25, 2023   1556 Potassium(!!): 2  4  Potassium replaced no EKG changes  Will admit  SBIRT 20yo+    Flowsheet Row Most Recent Value   Initial Alcohol Screen: US AUDIT-C     1  How often do you have a drink containing alcohol? 6 Filed at: 06/25/2023 1452   2  How many drinks containing alcohol do you have on a typical day you are drinking? 3 Filed at: 06/25/2023 1452   3a  Male UNDER 65: How often do you have five or more drinks on one occasion? 0 Filed at: 06/25/2023 1452   3b  FEMALE Any Age, or MALE 65+: How often do you have 4 or more drinks on one occassion? 6 Filed at: 06/25/2023 1452   Audit-C Score 15 Filed at: 06/25/2023 1452   Full Alcohol Screen: US AUDIT    4  How often during the last year have you found that you were not able to stop drinking once you had started? 0 Filed at: 06/25/2023 1452   5  How often during past year have you failed to do what was normally expected of you because of drinking? 0 Filed at: 06/25/2023 1452   6  How often in past year have you needed a first drink in the morning to get yourself going after a heavy drinking session? 0 Filed at: 06/25/2023 1452   7  How often in past year have you had feeling of guilt or remorse after drinking? 0 Filed at: 06/25/2023 1452   8  How often in past year have you been unable to remember what happened night before because you had been drinking? 0 Filed at: 06/25/2023 1452   10  Has a relative, friend, doctor or other health worker been concerned about your drinking and suggested you cut down?  0 Filed at: 06/25/2023 1452   MILVIA: How many times in the past year have you    Used an illegal drug or used a prescription medication for non-medical reasons? Never Filed at: 06/25/2023 1452                    Medical Decision Making  77-year-old female with history of severe COPD presented with complaint of fall and lower back pain  History is taken from patient and EMS  Patient was found to be hypoxic was placed on 2 L of cannula    Will obtain labs and a CT lumbar chest x-ray  Concern for sacral fracture, patient is resting with pain medication  Labs reviewed showed hypokalemia and hypomagnesia and  x-ray shows pleural effusion questionable pneumonia patient current condition hypoxia will admit for further evaluation  CT shows a sacral fracture  Amount and/or Complexity of Data Reviewed  Labs: ordered  Decision-making details documented in ED Course  Risk  Prescription drug management  Decision regarding hospitalization  Disposition  Final diagnoses:   Fall, initial encounter   Hypokalemia   COPD (chronic obstructive pulmonary disease) ([facility] )   Hypoxia   Sacral fracture (HCC)   Pleural effusion     Time reflects when diagnosis was documented in both MDM as applicable and the Disposition within this note     Time User Action Codes Description Comment    6/25/2023  4:18 PM [place] [D08  XXXA] Fall, initial encounter     6/25/2023  4:18 PM Superior Lamer Add [E87 6] Hypokalemia     6/25/2023  4:18 PM Superior Lamer Add [J44 9] COPD (chronic obstructive pulmonary disease) ([place] [address] )     6/25/2023  4:18 PM Superior Lamer Add [R09 02] Hypoxia     6/25/2023  6:52 PM [facility] Modify [C63  LHTC] Fall, initial encounter     6/25/2023  6:52 PM [facility] Add [S32 10XA] Sacral fracture ([facility] )     6/26/2023  3:38 PM Superior Lamer Modify [S32 10XA] Sacral fracture ([facility] )     6/26/2023  3:39 PM Superior Lamer Add [J90] Pleural effusion       ED Disposition     ED Disposition   Admit    Condition   Stable    Date/Time   Sun Jun 25, 2023  4:18 PM    Comment   Case was discussed with Dr Joshi  and the patient's admission status was agreed to be Admission Status: observation status to the service of Dr joshi              Follow-up Information    None         Current Discharge Medication List      CONTINUE these medications which have NOT CHANGED    Details   albuterol (ACCUNEB) 1 25 MG/3ML nebulizer solution Take 1 25 mg by nebulization every 6 (six) hours as needed for wheezing      ALPRAZolam (XANAX) 0 5 mg tablet Take 0 25 mg by mouth      Anoro Ellipta 62 5-25 MCG/ACT inhaler INHALE 1 PUFF DAILY  Qty: 60 each, Refills: 3    Associated Diagnoses: Chronic obstructive pulmonary disease, unspecified COPD type ([facility])      apixaban (Eliquis) 5 mg Take 1 tablet (5 mg total) by mouth 2 (two) times a day  Qty: 60 tablet, Refills: 5    Associated Diagnoses: Atrial fibrillation with RVR ([facility])      bumetanide (BUMEX) 0 5 MG tablet TAKE 1 TABLET (0 5 MG TOTAL) BY MOUTH IF NEEDED (FOR SWELLING, BLOATING, WEIGHT GAIN)  Qty: 90 tablet, Refills: 2    Associated Diagnoses: Acute respiratory failure with hypoxemia ([facility])      Cholecalciferol (VITAMIN D3) 2000 units TABS Take 1 tablet by mouth daily      diltiazem (CARDIZEM CD) 240 mg 24 hr capsule TAKE 1 CAPSULE BY MOUTH EVERY DAY  Qty: 90 capsule, Refills: 3    Associated Diagnoses: Atrial fibrillation with RVR ([facility])      glycerin-hypromellose- (ARTIFICIAL TEARS) 0 2-0 2-1 % SOLN 1 drop      metoprolol succinate (TOPROL-XL) 50 mg 24 hr tablet TAKE 1 TABLET BY MOUTH EVERY DAY  Qty: 90 tablet, Refills: 3    Associated Diagnoses: PAF (paroxysmal atrial fibrillation) ([facility])      mirtazapine (REMERON) 15 mg tablet Take 7 5 mg by mouth daily at bedtime        senna-docusate sodium (SENOKOT S) 8 6-50 mg per tablet Take 1 tablet by mouth daily As needed for constipation  Qty: 20 tablet, Refills: 0    Associated Diagnoses: Constipation             No discharge procedures on file      PDMP Review     None          ED Provider  Electronically Signed by           Adan Betts MD  06/26/23 9977

## 2023-06-25 NOTE — H&P
[address]  H&P  Name: Teresa Blake 77 y o  female I MRN: [id number]  Unit/Bed#: ED 05 I Date of Admission: 6/25/2023   Date of Service: 6/25/2023 I Hospital Day: 0      Assessment/Plan   * COPD (chronic obstructive pulmonary disease) ([facility] [address] )  Assessment & Plan  · Patient presents with complaints of shortness of breath  · Complicated by acute hypoxic respiratory failure  · Atrovent  · Xopenex  · Solu-Medrol 40 mg IV every 8 hours  · Respiratory protocol    Acute respiratory failure with hypoxia (HCC)  Assessment & Plan  · Likely secondary to COPD exacerbation  · Requiring 2 L of nasal cannula supplemental O2  · Wean O2 as tolerated  · Goal SPO2 greater than 88%  · Respiratory protocol    Paroxysmal atrial fibrillation (HCC)  Assessment & Plan  · Continue home beta-blocker and calcium channel blocker  · Eliquis for CVA prophylaxis    Insomnia  Assessment & Plan  · Continue home Remeron    Hypokalemia  Assessment & Plan  · K 2 4 on presentation  · Status post 60 mEq in the ED  · Trend and replete electrolytes as needed  · Monitor on telemetry    Essential hypertension  Assessment & Plan  · Blood pressure stable  · Continue beta-blocker and calcium channel blocker  · Monitor blood pressures    Tobacco dependency  Assessment & Plan  · Nicotine patch while inpatient  · Counseled on tobacco cessation         VTE Prophylaxis: Eliquis  Code Status: Level 1 Full Code    Anticipated Length of Stay:  Patient will be admitted on an Observation basis with an anticipated length of stay of less than 2 midnights  Justification for Hospital Stay: COPD exacerbation    Total Time for Visit, including Counseling / Coordination of Care: 60 minutes  Greater than 50% of this total time spent on direct patient counseling and coordination of care      Chief Complaint:   Shortness of breath    History of Present Illness:    Teresa Blake is a 77 y o  female with a past medical history significant for atrial fibrillation on Eliquis, COPD, hypertension who presents with complaints of shortness of breath  The patient does not utilize oxygen at home and was found to be requiring 2 L of nasal cannula in the ED  Likely secondary to COPD exacerbation  Laboratory analysis remarkable for hypokalemia  The patient was assigned observation in the setting of COPD exacerbation complicated by acute hypoxic respiratory failure  Review of Systems:    Review of Systems   Constitutional: Negative for chills and fever  HENT: Negative for ear pain and sore throat  Eyes: Negative for pain and visual disturbance  Respiratory: Positive for shortness of breath  Negative for cough  Cardiovascular: Negative for chest pain and palpitations  Gastrointestinal: Negative for abdominal pain and vomiting  Genitourinary: Negative for dysuria and hematuria  Musculoskeletal: Negative for arthralgias and back pain  Skin: Negative for color change and rash  Neurological: Negative for seizures and syncope  All other systems reviewed and are negative  Past Medical and Surgical History:     Past Medical History:   Diagnosis Date   • Bell's palsy    • Cancer ([facility] )     lymphoma   • Diffuse large B cell lymphoma ([facility] )    • Hyperlipidemia    • Hypertension    • PAD (peripheral artery disease) ([facility])    • PAF (paroxysmal atrial fibrillation) ([facility])    • PVD (peripheral vascular disease) ([facility] )        Past Surgical History:   Procedure Laterality Date   • ARTERIOGRAM Left 12/6/2018    Procedure: LEFT lower extremity angiography, with Left lower extremity run-off, stent and angioplasty of Left Common Femoral Artery (Left Brachial Access);   Surgeon: Mandy Pedersen MD;  Location: [facility] MAIN OR;  Service: Vascular   • CARDIAC SURGERY     • CARPAL TUNNEL RELEASE     • CT NEEDLE BIOPSY LUNG  10/8/2019   • HYSTERECTOMY     • IR AORTAGRAM WITH RUN-OFF  12/6/2018   • OOPHORECTOMY     • IN OPTX FEM SHFT FX W/INSJ IMED IMPLT W/WO SCREW Left 9/29/2021 Procedure: INSERTION NAIL IM FEMUR ANTEGRADE (TROCHANTERIC); Surgeon: Mary Navarro DO;  Location: [facility] MAIN OR;  Service: Orthopedics   • TONSILLECTOMY         Meds/Allergies:    Prior to Admission medications    Medication Sig Start Date End Date Taking? Authorizing Provider   albuterol (ACCUNEB) 1 25 MG/3ML nebulizer solution Take 1 25 mg by nebulization every 6 (six) hours as needed for wheezing    Historical Provider, MD   ALPRAZolam Yoli Galea) 0 5 mg tablet Take 0 25 mg by mouth 1/30/19   Historical Provider, MD Marylen Slight 62 5-25 MCG/ACT inhaler INHALE 1 PUFF DAILY 2/20/23   Sumanth Rodriguez MD   apixaban (Eliquis) 5 mg Take 1 tablet (5 mg total) by mouth 2 (two) times a day 5/8/23   Prateek Funes DO   bumetanide (BUMEX) 0 5 MG tablet TAKE 1 TABLET (0 5 MG TOTAL) BY MOUTH IF NEEDED (FOR SWELLING, BLOATING, WEIGHT GAIN)  Patient taking differently: Take 0 5 mg by mouth if needed (for swelling, bloating, weight gain) PRN 7/7/22   LAURA Berger   Cholecalciferol (VITAMIN D3) 2000 units TABS Take 1 tablet by mouth daily    Historical Provider, MD   diltiazem (CARDIZEM CD) 240 mg 24 hr capsule TAKE 1 CAPSULE BY MOUTH EVERY DAY 7/28/22   Prateek Funes DO   glycerin-hypromellose- (ARTIFICIAL TEARS) 0 2-0 2-1 % SOLN 1 drop  Patient not taking: Reported on 2/2/2023 9/20/18   Historical Provider, MD   metoprolol succinate (TOPROL-XL) 50 mg 24 hr tablet TAKE 1 TABLET BY MOUTH EVERY DAY 5/10/23   Prateek Funes DO   mirtazapine (REMERON) 15 mg tablet Take 7 5 mg by mouth daily at bedtime      Historical Provider, MD   senna-docusate sodium (SENOKOT S) 8 6-50 mg per tablet Take 1 tablet by mouth daily As needed for constipation 10/9/18   Petra Blount DO       Allergies: Allergies   Allergen Reactions   • Oxycodone-Acetaminophen Itching and Rash     Itching, rash       Social History:     Marital Status:     Substance Use History:   Social History     Substance and Sexual "Activity   Alcohol Use Yes   • Alcohol/week: 4 0 standard drinks of alcohol   • Types: 4 Cans of beer per week    Comment: 4 CANS OF BEER DAILY     Social History     Tobacco Use   Smoking Status Every Day   • Packs/day: 0 75   • Years: 53 00   • Total pack years: 39 75   • Types: Cigarettes   • Start date: 1/1/1969   Smokeless Tobacco Never     Social History     Substance and Sexual Activity   Drug Use No    Comment: medical marijuana 10/7/19       Family History:    Pertinent family history reviewed    Physical Exam:     Vitals:   Blood Pressure: 163/86 (06/25/23 1448)  Pulse: 92 (06/25/23 1448)  Temperature: 98 °F (36 7 °C) (06/25/23 1448)  Temp Source: Tympanic (06/25/23 1448)  Respirations: 18 (06/25/23 1448)  Height: 5' 2\" (157 5 cm) (06/25/23 1448)  Weight - Scale: 53 3 kg (117 lb 8 1 oz) (06/25/23 1448)  SpO2: 92 % (06/25/23 1652)    Physical Exam  Vitals and nursing note reviewed  Constitutional:       General: She is not in acute distress  Appearance: She is well-developed  HENT:      Head: Normocephalic and atraumatic  Eyes:      Conjunctiva/sclera: Conjunctivae normal    Cardiovascular:      Rate and Rhythm: Normal rate and regular rhythm  Heart sounds: No murmur heard  Pulmonary:      Effort: Pulmonary effort is normal  No respiratory distress  Breath sounds: Normal breath sounds  Abdominal:      Palpations: Abdomen is soft  Tenderness: There is no abdominal tenderness  Musculoskeletal:         General: No swelling  Cervical back: Neck supple  Skin:     General: Skin is warm and dry  Capillary Refill: Capillary refill takes less than 2 seconds  Neurological:      Mental Status: She is alert     Psychiatric:         Mood and Affect: Mood normal           Additional Data:     Lab Results: I have reviewed pertinent results     Results from last 7 days   Lab Units 06/25/23  1459   WBC Thousand/uL 8 43   HEMOGLOBIN g/dL 14 7   HEMATOCRIT % 44 7   PLATELETS " Thousands/uL 326   NEUTROS PCT % 77*   LYMPHS PCT % 12*   MONOS PCT % 9   EOS PCT % 1     Results from last 7 days   Lab Units 06/25/23  1459   SODIUM mmol/L 133*   POTASSIUM mmol/L 2 4*   CHLORIDE mmol/L 93*   CO2 mmol/L 23   BUN mg/dL 13   CREATININE mg/dL 1 18   ANION GAP mmol/L 17   CALCIUM mg/dL 9 4   GLUCOSE RANDOM mg/dL 112                       Imaging: I have reviewed pertinent imaging     CT spine lumbar without contrast    (Results Pending)   XR chest 1 view portable    (Results Pending)       EKG, Pathology, and Other Studies Reviewed on Admission:   · EKG: Reviewed     Allscripts / Epic Records Reviewed    ** Please Note: This note has been constructed using a voice recognition system   **

## 2023-06-25 NOTE — RESPIRATORY THERAPY NOTE
RT Protocol Note  Azeem Rodrigues 77 y o  female MRN: [id number]  Unit/Bed#: ED 05 Encounter: [id number]    Assessment    Principal Problem:    COPD (chronic obstructive pulmonary disease) ([place] [address] )  Active Problems:    Essential hypertension    Hypokalemia    Tobacco dependency    Paroxysmal atrial fibrillation (HCC)    Insomnia    Acute respiratory failure with hypoxia (HCC)      Home Pulmonary Medications:  2 5mg albuterol Q4prn  Past Medical History:   Diagnosis Date    Bell's palsy     Cancer (HCC)     lymphoma    Diffuse large B cell lymphoma (HCC)     Hyperlipidemia     Hypertension     PAD (peripheral artery disease) (HCC)     PAF (paroxysmal atrial fibrillation) (HCC)     PVD (peripheral vascular disease) ([place] [address] )      Social History     Socioeconomic History    Marital status:      Spouse name: None    Number of children: None    Years of education: None    Highest education level: None   Occupational History    None   Tobacco Use    Smoking status: Every Day     Packs/day: 0 75     Years: 53 00     Total pack years: 39 75     Types: Cigarettes     Start date: 1/1/1969    Smokeless tobacco: Never   Vaping Use    Vaping Use: Never used   Substance and Sexual Activity    Alcohol use:  Yes     Alcohol/week: 4 0 standard drinks of alcohol     Types: 4 Cans of beer per week     Comment: 4 CANS OF BEER DAILY    Drug use: No     Comment: medical marijuana 10/7/19    Sexual activity: None   Other Topics Concern    None   Social History Narrative    None     Social Determinants of Health     Financial Resource Strain: Not on file   Food Insecurity: Not on file   Transportation Needs: Not on file   Physical Activity: Not on file   Stress: Not on file   Social Connections: Not on file   Intimate Partner Violence: Not on file   Housing Stability: Not on file       Subjective         Objective    Physical Exam:   Assessment Type: (P) Assess only  General Appearance: (P) Alert, Awake  Respiratory Pattern: (P) "Normal  Chest Assessment: (P) Chest expansion symmetrical  Bilateral Breath Sounds: (P) Diminished, Clear    Vitals:  Blood pressure 163/86, pulse 92, temperature 98 °F (36 7 °C), temperature source Tympanic, resp  rate 18, height 5' 2\" (1 575 m), weight 53 3 kg (117 lb 8 1 oz), SpO2 92 %  Imaging and other studies: I have personally reviewed pertinent reports  Plan    Respiratory Plan: (P) Home Bronchodilator Patient pathway        Resp Comments: (P) Pt evaluated per respiratory protocal  Pt admitted with back pain  Found to be hypoxic in ED  Pt seen today on 2lnc with no sob and clear bs  Pt has a hx of COPD and states she has not used her albuerol recently  Will cont  bronchodilator theapy prn as at home     " I will STOP taking the medications listed below when I get home from the hospital:  None

## 2023-06-25 NOTE — ASSESSMENT & PLAN NOTE
· Patient presents with complaints of shortness of breath  · Complicated by acute hypoxic respiratory failure  · Atrovent  · Xopenex  · Solu-Medrol 40 mg IV every 8 hours  · Respiratory protocol

## 2023-06-25 NOTE — ASSESSMENT & PLAN NOTE
· Likely secondary to COPD exacerbation  · Requiring 2 L of nasal cannula supplemental O2  · Wean O2 as tolerated  · Goal SPO2 greater than 88%  · Respiratory protocol

## 2023-06-25 NOTE — ASSESSMENT & PLAN NOTE
· Blood pressure stable  · Continue beta-blocker and calcium channel blocker  · Monitor blood pressures

## 2023-06-26 LAB
ANION GAP SERPL CALCULATED.3IONS-SCNC: 9 MMOL/L
BASOPHILS # BLD AUTO: 0.01 THOUSANDS/ÂΜL (ref 0–0.1)
BASOPHILS NFR BLD AUTO: 0 % (ref 0–1)
BUN SERPL-MCNC: 12 MG/DL (ref 5–25)
CALCIUM SERPL-MCNC: 8.3 MG/DL (ref 8.4–10.2)
CHLORIDE SERPL-SCNC: 100 MMOL/L (ref 96–108)
CO2 SERPL-SCNC: 25 MMOL/L (ref 21–32)
CREAT SERPL-MCNC: 1.02 MG/DL (ref 0.6–1.3)
EOSINOPHIL # BLD AUTO: 0 THOUSAND/ÂΜL (ref 0–0.61)
EOSINOPHIL NFR BLD AUTO: 0 % (ref 0–6)
ERYTHROCYTE [DISTWIDTH] IN BLOOD BY AUTOMATED COUNT: 17.5 % (ref 11.6–15.1)
GFR SERPL CREATININE-BSD FRML MDRD: 55 ML/MIN/1.73SQ M
GLUCOSE SERPL-MCNC: 151 MG/DL (ref 65–140)
GLUCOSE SERPL-MCNC: 169 MG/DL (ref 65–140)
HCT VFR BLD AUTO: 43.7 % (ref 34.8–46.1)
HGB BLD-MCNC: 14.2 G/DL (ref 11.5–15.4)
IMM GRANULOCYTES # BLD AUTO: 0.03 THOUSAND/UL (ref 0–0.2)
IMM GRANULOCYTES NFR BLD AUTO: 1 % (ref 0–2)
LYMPHOCYTES # BLD AUTO: 0.46 THOUSANDS/ÂΜL (ref 0.6–4.47)
LYMPHOCYTES NFR BLD AUTO: 11 % (ref 14–44)
MAGNESIUM SERPL-MCNC: 1.5 MG/DL (ref 1.9–2.7)
MCH RBC QN AUTO: 28.7 PG (ref 26.8–34.3)
MCHC RBC AUTO-ENTMCNC: 32.5 G/DL (ref 31.4–37.4)
MCV RBC AUTO: 89 FL (ref 82–98)
MONOCYTES # BLD AUTO: 0.08 THOUSAND/ÂΜL (ref 0.17–1.22)
MONOCYTES NFR BLD AUTO: 2 % (ref 4–12)
NEUTROPHILS # BLD AUTO: 3.79 THOUSANDS/ÂΜL (ref 1.85–7.62)
NEUTS SEG NFR BLD AUTO: 86 % (ref 43–75)
NRBC BLD AUTO-RTO: 0 /100 WBCS
PHOSPHATE SERPL-MCNC: 2.4 MG/DL (ref 2.3–4.1)
PLATELET # BLD AUTO: 280 THOUSANDS/UL (ref 149–390)
PMV BLD AUTO: 9.6 FL (ref 8.9–12.7)
POTASSIUM SERPL-SCNC: 3.2 MMOL/L (ref 3.5–5.3)
RBC # BLD AUTO: 4.94 MILLION/UL (ref 3.81–5.12)
SODIUM SERPL-SCNC: 134 MMOL/L (ref 135–147)
WBC # BLD AUTO: 4.37 THOUSAND/UL (ref 4.31–10.16)

## 2023-06-26 PROCEDURE — 94664 DEMO&/EVAL PT USE INHALER: CPT

## 2023-06-26 PROCEDURE — 99222 1ST HOSP IP/OBS MODERATE 55: CPT | Performed by: PHYSICIAN ASSISTANT

## 2023-06-26 PROCEDURE — 80048 BASIC METABOLIC PNL TOTAL CA: CPT | Performed by: INTERNAL MEDICINE

## 2023-06-26 PROCEDURE — 99232 SBSQ HOSP IP/OBS MODERATE 35: CPT | Performed by: INTERNAL MEDICINE

## 2023-06-26 PROCEDURE — 83735 ASSAY OF MAGNESIUM: CPT | Performed by: INTERNAL MEDICINE

## 2023-06-26 PROCEDURE — 82948 REAGENT STRIP/BLOOD GLUCOSE: CPT

## 2023-06-26 PROCEDURE — 84100 ASSAY OF PHOSPHORUS: CPT | Performed by: INTERNAL MEDICINE

## 2023-06-26 PROCEDURE — 85025 COMPLETE CBC W/AUTO DIFF WBC: CPT | Performed by: INTERNAL MEDICINE

## 2023-06-26 RX ORDER — PREDNISONE 20 MG/1
40 TABLET ORAL DAILY
Status: DISCONTINUED | OUTPATIENT
Start: 2023-06-27 | End: 2023-06-27 | Stop reason: HOSPADM

## 2023-06-26 RX ORDER — LANOLIN ALCOHOL/MO/W.PET/CERES
400 CREAM (GRAM) TOPICAL 2 TIMES DAILY
Status: DISCONTINUED | OUTPATIENT
Start: 2023-06-26 | End: 2023-06-27 | Stop reason: HOSPADM

## 2023-06-26 RX ORDER — LISINOPRIL 10 MG/1
10 TABLET ORAL DAILY
Status: DISCONTINUED | OUTPATIENT
Start: 2023-06-26 | End: 2023-06-27 | Stop reason: HOSPADM

## 2023-06-26 RX ORDER — MAGNESIUM SULFATE HEPTAHYDRATE 40 MG/ML
2 INJECTION, SOLUTION INTRAVENOUS ONCE
Status: COMPLETED | OUTPATIENT
Start: 2023-06-26 | End: 2023-06-26

## 2023-06-26 RX ORDER — POTASSIUM CHLORIDE 20 MEQ/1
40 TABLET, EXTENDED RELEASE ORAL ONCE
Status: COMPLETED | OUTPATIENT
Start: 2023-06-26 | End: 2023-06-26

## 2023-06-26 RX ADMIN — MAGNESIUM SULFATE HEPTAHYDRATE 2 G: 40 INJECTION, SOLUTION INTRAVENOUS at 15:06

## 2023-06-26 RX ADMIN — DILTIAZEM HYDROCHLORIDE 240 MG: 240 CAPSULE, COATED, EXTENDED RELEASE ORAL at 08:50

## 2023-06-26 RX ADMIN — APIXABAN 5 MG: 5 TABLET, FILM COATED ORAL at 08:51

## 2023-06-26 RX ADMIN — METHYLPREDNISOLONE SODIUM SUCCINATE 40 MG: 40 INJECTION, POWDER, FOR SOLUTION INTRAMUSCULAR; INTRAVENOUS at 05:15

## 2023-06-26 RX ADMIN — UMECLIDINIUM BROMIDE AND VILANTEROL TRIFENATATE 1 PUFF: 62.5; 25 POWDER RESPIRATORY (INHALATION) at 08:52

## 2023-06-26 RX ADMIN — APIXABAN 5 MG: 5 TABLET, FILM COATED ORAL at 17:23

## 2023-06-26 RX ADMIN — NICOTINE 1 PATCH: 21 PATCH, EXTENDED RELEASE TRANSDERMAL at 08:50

## 2023-06-26 RX ADMIN — METOPROLOL SUCCINATE 50 MG: 50 TABLET, EXTENDED RELEASE ORAL at 08:50

## 2023-06-26 RX ADMIN — METHYLPREDNISOLONE SODIUM SUCCINATE 40 MG: 40 INJECTION, POWDER, FOR SOLUTION INTRAMUSCULAR; INTRAVENOUS at 21:02

## 2023-06-26 RX ADMIN — Medication 400 MG: at 17:22

## 2023-06-26 RX ADMIN — MIRTAZAPINE 7.5 MG: 15 TABLET, FILM COATED ORAL at 21:02

## 2023-06-26 RX ADMIN — POTASSIUM CHLORIDE 40 MEQ: 1500 TABLET, EXTENDED RELEASE ORAL at 11:05

## 2023-06-26 RX ADMIN — SENNOSIDES AND DOCUSATE SODIUM 1 TABLET: 50; 8.6 TABLET ORAL at 08:51

## 2023-06-26 RX ADMIN — LISINOPRIL 10 MG: 10 TABLET ORAL at 15:06

## 2023-06-26 RX ADMIN — METHYLPREDNISOLONE SODIUM SUCCINATE 40 MG: 40 INJECTION, POWDER, FOR SOLUTION INTRAMUSCULAR; INTRAVENOUS at 15:06

## 2023-06-26 RX ADMIN — ALPRAZOLAM 0.25 MG: 0.25 TABLET ORAL at 08:50

## 2023-06-26 NOTE — ASSESSMENT & PLAN NOTE
· Patient presents with complaints of shortness of breath  · Complicated by acute hypoxic respiratory failure  · Atrovent  · Xopenex  · Solu-Medrol 40 mg IV every 8 hours-wean off IV steroids today and anticipate starting oral steroids tomorrow morning  · Respiratory protocol

## 2023-06-26 NOTE — UTILIZATION REVIEW
Initial Clinical Review    Observation 6/25 1619 changed to inpatient 6/26 1436  Pt requiring continued stay for IV steroids  Admission: Date/Time/Statement:   Admission Orders (From admission, onward)     Ordered        06/26/23 1436  Inpatient Admission  Once            06/25/23 1619  Place in Observation  Once                      Orders Placed This Encounter   Procedures   • Inpatient Admission     Standing Status:   Standing     Number of Occurrences:   1     Order Specific Question:   Level of Care     Answer:   Med Surg [16]     Order Specific Question:   Estimated length of stay     Answer:   More than 2 Midnights     Order Specific Question:   Certification     Answer:   I certify that inpatient services are medically necessary for this patient for a duration of greater than two midnights  See H&P and MD Progress Notes for additional information about the patient's course of treatment  ED Arrival Information     Expected   -    Arrival   6/25/2023 14:43    Acuity   Urgent            Means of arrival   Ambulance    Escorted by   [facility]    Admission type   Emergency            Arrival complaint   fall           Chief Complaint   Patient presents with   • Fall     Last Monday  Lower back and buttocks pain  No head strike, No LOC  Takes Eliquis       Initial Presentation: 77 y o  female with PMH of Afib on Eliquis, COPD, hypertension who presents with complaints of shortness of breath  The patient does not utilize oxygen at home and was found to be requiring 2 L of nasal cannula in the ED  Likely secondary to COPD exacerbation  Laboratory analysis remarkable for hypokalemia  Plan: Observation for COPD, acute resp failure with hypoxia, Afib, hypokalemia, HTN, tobacco dependency: Atrovent, Xopenex, IV solu medrol 40 mg q 8 hrs, wean O2 as tolerated, continue Eliquis, replete potassium and monitor, telemetry, continue BB and calcium channel blocker      6/26 Observation changed to inpatient  Orthopedics consult: A CT scan of the patient's lumbar spine was consistent with degenerative disc disease along multiple levels of the lumbar spine, nondisplaced sacral fractures of the body and left ala and transverse fracture of S2  The patient does not require surgical intervention at this time  She may continue physical therapy and Occupational Therapy weightbearing as tolerated  Internal medicine: continue Atrovent and Xopenex, IV solu medrol 40 mg q 8 hrs, requiring 2 L of nasal cannula supplemental O2, continue home Remeron, Eliquis, continue home beta-blocker and calcium channel blocker  Pt to receive another 40 meQ potassium today  Date: 6/27    Day 3: Has surpassed a 2nd midnight with active treatments and services, which include IV steroids        ED Triage Vitals [06/25/23 1448]   Temperature Pulse Respirations Blood Pressure SpO2   98 °F (36 7 °C) 92 18 163/86 90 %      Temp Source Heart Rate Source Patient Position - Orthostatic VS BP Location FiO2 (%)   Tympanic Monitor Sitting Left arm --      Pain Score       8          Wt Readings from Last 1 Encounters:   06/25/23 54 kg (119 lb 0 8 oz)     Additional Vital Signs:     Date/Time Temp Pulse Resp BP MAP (mmHg) SpO2 Calculated FIO2 (%) - Nasal Cannula Nasal Cannula O2 Flow Rate (L/min) O2 Device   06/26/23 07:29:17 97 8 °F (36 6 °C) 81 20 154/99 117 96 % -- -- --   06/26/23 02:42:24 97 4 °F (36 3 °C) Abnormal  79 -- 141/90 107 94 % -- -- --   06/25/23 22:59:35 97 6 °F (36 4 °C) 83 18 161/100 120 92 % -- -- --   06/25/23 19:09:02 97 8 °F (36 6 °C) 90 20 154/93 113 91 % 28 2 L/min Nasal cannula   06/25/23 1800 -- -- -- -- -- 91 % 28 2 L/min Nasal cannula   06/25/23 17:41:42 98 3 °F (36 8 °C) 91 18 174/108 Abnormal  130 91 % -- -- --   06/25/23 17:38:07 -- 90 -- 169/107 Abnormal  128 90 % -- -- --   06/25/23 1652 -- -- -- -- -- 92 % 28 2 L/min Nasal cannula   06/25/23 5354 -- -- -- -- -- -- -- -- Nasal cannula Pertinent Labs/Diagnostic Test Results:   CT spine lumbar without contrast   Final Result by Ramses Donnelly MD (06/25 8711)      1  Nondisplaced sacral fractures involving the body and left ala  There is a transverse fracture through the S2 vertebral body  2  Multilevel degenerative disc disease, progressed since 12/4/2018  Moderate to severe narrowing of the thecal sac is again noted at the L4-5 level   3  Atherosclerotic aortic calcifications with ectasia  Moderate right pleural effusion with underlying compressive atelectasis, incompletely imaged  The study was marked in [facility] for immediate notification  Workstation performed: [id number]         XR chest 1 view portable   Final Result by Amanda Mckeon MD (06/26 3491)      Small right effusion and moderate right base opacity, question pneumonia/aspiration  Workstation performed: [id number]           6/25 EKG:  Normal sinus rhythm  Right axis deviation  Incomplete right bundle branch block  Right ventricular hypertrophy  Nonspecific ST-t wave changes  When compared with ECG of 01-OCT-2021 04:03,  Sinus rhythm has replaced Atrial fibrillation  Vent   rate has decreased BY  65 BPM    Results from last 7 days   Lab Units 06/25/23  1459   SARS-COV-2  Negative     Results from last 7 days   Lab Units 06/26/23  0518 06/25/23  1459   WBC Thousand/uL 4 37 8 43   HEMOGLOBIN g/dL 14 2 14 7   HEMATOCRIT % 43 7 44 7   PLATELETS Thousands/uL 280 326   NEUTROS ABS Thousands/µL 3 79 6 48         Results from last 7 days   Lab Units 06/26/23  0518 06/25/23  1459   SODIUM mmol/L 134* 133*   POTASSIUM mmol/L 3 2* 2 4*   CHLORIDE mmol/L 100 93*   CO2 mmol/L 25 23   ANION GAP mmol/L 9 17   BUN mg/dL 12 13   CREATININE mg/dL 1 02 1 18   EGFR ml/min/1 73sq m 55 46   CALCIUM mg/dL 8 3* 9 4   MAGNESIUM mg/dL 1 5*  --    PHOSPHORUS mg/dL 2 4  --          Results from last 7 days   Lab Units 06/26/23  0729   POC GLUCOSE mg/dl 151*     Results from last 7 days   Lab Units 06/26/23  0518 06/25/23  1459   GLUCOSE RANDOM mg/dL 169* 112           Results from last 7 days   Lab Units 06/25/23  1459   INFLUENZA A PCR  Negative   INFLUENZA B PCR  Negative   RSV PCR  Negative             Results from last 7 days   Lab Units 06/25/23  1459   ETHANOL LVL mg/dL 21*         ED Treatment:   Medication Administration from 06/25/2023 1443 to 06/25/2023 1717       Date/Time Order Dose Route Action     06/25/2023 1506 EDT sodium chloride 0 9 % bolus 1,000 mL 1,000 mL Intravenous New Bag     06/25/2023 1555 EDT potassium chloride (K-DUR,KLOR-CON) CR tablet 40 mEq 40 mEq Oral Given     06/25/2023 1557 EDT potassium chloride 20 mEq IVPB (premix) 20 mEq Intravenous New Bag     06/25/2023 1654 EDT methylPREDNISolone sodium succinate (Solu-MEDROL) injection 40 mg 40 mg Intravenous Given     06/25/2023 1653 EDT ALPRAZolam (XANAX) tablet 0 25 mg 0 25 mg Oral Given        Past Medical History:   Diagnosis Date   • Bell's palsy    • Cancer ([place] [address] )     lymphoma   • Diffuse large B cell lymphoma ([place] [address] )    • Hyperlipidemia    • Hypertension    • PAD (peripheral artery disease) ([facility])    • PAF (paroxysmal atrial fibrillation) ([facility])    • PVD (peripheral vascular disease) ([place] [address] )      Present on Admission:  • COPD (chronic obstructive pulmonary disease) ([place] [address] )  • Essential hypertension  • Insomnia  • Tobacco dependency  • Hypokalemia  • Paroxysmal atrial fibrillation ([facility])  • Acute respiratory failure with hypoxia ([facility])      Admitting Diagnosis: COPD (chronic obstructive pulmonary disease) ([place] [address] ) [J44 9]  Hypokalemia [E87 6]  Back pain [M54 9]  Hypoxia [R09 02]  Fall, initial encounter [W19  XXXA]  Age/Sex: 77 y o  female  Admission Orders:  Scheduled Medications:  ALPRAZolam, 0 25 mg, Oral, Daily  apixaban, 5 mg, Oral, BID  diltiazem, 240 mg, Oral, Daily  lisinopril, 10 mg, Oral, Daily  magnesium Oxide, 400 mg, Oral, BID  magnesium sulfate, 2 g, Intravenous, Once  methylPREDNISolone sodium succinate, 40 mg, Intravenous, Q8H [facility] longterm  metoprolol succinate, 50 mg, Oral, Daily  mirtazapine, 7 5 mg, Oral, HS  nicotine, 1 patch, Transdermal, Daily  [START ON 6/27/2023] predniSONE, 40 mg, Oral, Daily  senna-docusate sodium, 1 tablet, Oral, Daily  umeclidinium-vilanterol, 1 puff, Inhalation, Daily      Continuous IV Infusions:     PRN Meds:  acetaminophen, 650 mg, Oral, Q4H PRN  albuterol, 2 5 mg, Nebulization, Q4H PRN  ondansetron, 4 mg, Intravenous, Q6H PRN        IP CONSULT TO CASE MANAGEMENT  IP CONSULT TO ORTHOPEDIC SURGERY    Network Utilization Review Department  ATTENTION: Please call with any questions or concerns to [phone] and carefully listen to the prompts so that you are directed to the right person  All voicemails are confidential   Diallo Almanzar all requests for admission clinical reviews, approved or denied determinations and any other requests to dedicated fax number below belonging to the campus where the patient is receiving treatment   List of dedicated fax numbers for the Facilities:  [address] DENIALS (Administrative/Medical Necessity) [phone]   [address] (Maternity/NICU/Pediatrics) [phone]   [address] [phone]   [facility]  [phone]   [address] [place] [address] [place] [facility] [phone]   [address] [facility] 134 815 [facility] [phone] Detail Level: Simple

## 2023-06-26 NOTE — ASSESSMENT & PLAN NOTE
· K 2 4 on presentation-proved this morning 3 2  Also has low magnesium we will supplement magnesium to help with potassium levels as well  · Status post 60 mEq in the ED   receive another 40 mill equivalents today  · Trend and replete electrolytes as needed  ·

## 2023-06-26 NOTE — ASSESSMENT & PLAN NOTE
· Likely secondary to COPD exacerbation  · Requiring 2 L of nasal cannula supplemental O2  · Wean O2 as tolerated  · Goal SPO2 greater than 88%  · Respiratory protocol  · We will transition from IV to oral steroids over next 24 hours

## 2023-06-26 NOTE — ASSESSMENT & PLAN NOTE
· Pressure elevated-we will add on 10 mg of lisinopril daily as patient also seems to have some hypokalemia, this will help in both regards, repeat BMP tomorrow    · Continue beta-blocker and calcium channel blocker-patient continues to have persistent hypertension can switch metoprolol to carvedilol for better blood pressure effect rather than just rate control  · Monitor blood pressures

## 2023-06-26 NOTE — PROGRESS NOTES
[address]  Progress Note  Name: Seda Urbina  MRN: [id number]  Unit/Bed#: -01 I Date of Admission: 6/25/2023   Date of Service: 6/26/2023 I Hospital Day: 0    Assessment/Plan   Sacral fracture [facility])  Assessment & Plan  Orthopedics evaluation appreciated  Nonsurgical management anticipated    Acute respiratory failure with hypoxia [facility])  Assessment & Plan  · Likely secondary to COPD exacerbation  · Requiring 2 L of nasal cannula supplemental O2  · Wean O2 as tolerated  · Goal SPO2 greater than 88%  · Respiratory protocol  · We will transition from IV to oral steroids over next 24 hours    Insomnia  Assessment & Plan  · Continue home Remeron    Paroxysmal atrial fibrillation (HCC)  Assessment & Plan  · Continue home beta-blocker and calcium channel blocker  · Eliquis for CVA prophylaxis    Tobacco dependency  Assessment & Plan  · Nicotine patch while inpatient  · Counseled on tobacco cessation    Hypokalemia  Assessment & Plan  · K 2 4 on presentation-proved this morning 3 2  Also has low magnesium we will supplement magnesium to help with potassium levels as well  · Status post 60 mEq in the ED  receive another 40 mill equivalents today  · Trend and replete electrolytes as needed  ·     Essential hypertension  Assessment & Plan  · Pressure elevated-we will add on 10 mg of lisinopril daily as patient also seems to have some hypokalemia, this will help in both regards, repeat BMP tomorrow    · Continue beta-blocker and calcium channel blocker-patient continues to have persistent hypertension can switch metoprolol to carvedilol for better blood pressure effect rather than just rate control  · Monitor blood pressures    * COPD [facility]chronic obstructive pulmonary disease[facility] )  Assessment & Plan  · Patient presents with complaints of shortness of breath  · Complicated by acute hypoxic respiratory failure  · Atrovent  · Xopenex  · Solu-Medrol 40 mg IV every 8 hours-wean off IV steroids today and anticipate starting oral steroids tomorrow morning  · Respiratory protocol             VTE Pharmacologic Prophylaxis: VTE Score: 2 High Risk (Score >/= 5) - Pharmacological DVT Prophylaxis Ordered: apixaban (Eliquis)  Sequential Compression Devices Ordered  Patient Centered Rounds: I performed bedside rounds with nursing staff today  Discussions with Specialists or Other Care Team Provider: n/a    Education and Discussions with Family / Patient: Patient declined call to   Total Time Spent on Date of Encounter in care of patient: 35 minutes This time was spent on one or more of the following: performing physical exam; counseling and coordination of care; obtaining or reviewing history; documenting in the medical record; reviewing/ordering tests, medications or procedures; communicating with other healthcare professionals and discussing with patient's family/caregivers  Current Length of Stay: 0 day(s)  Current Patient Status: Observation   Certification Statement: The patient will continue to require additional inpatient hospital stay due to Pending transition from IV to oral steroids, pending PT OT consult  Discharge Plan: Anticipate discharge in 24-48 hrs to discharge location to be determined pending rehab evaluations  Code Status: Level 1 - Full Code    Subjective:   Patient reports shortness of breath is improving today, reports pain is well controlled, denies any acute complaints    Objective:     Vitals:   Temp (24hrs), Av 8 °F (36 6 °C), Min:97 4 °F (36 3 °C), Max:98 3 °F (36 8 °C)    Temp:  [97 4 °F (36 3 °C)-98 3 °F (36 8 °C)] 97 9 °F (36 6 °C)  HR:  [79-92] 85  Resp:  [18-20] 20  BP: (141-174)/() 162/101  SpO2:  [90 %-96 %] 94 %  Body mass index is 21 77 kg/m²  Input and Output Summary (last 24 hours):      Intake/Output Summary (Last 24 hours) at 2023 1433  Last data filed at 2023 1247  Gross per 24 hour   Intake 600 ml   Output 900 ml   Net -300 ml Physical Exam:   Physical Exam  Vitals and nursing note reviewed  Constitutional:       General: She is not in acute distress  Appearance: She is well-developed  She is not ill-appearing, toxic-appearing or diaphoretic  HENT:      Head: Normocephalic and atraumatic  Eyes:      General: No scleral icterus  Conjunctiva/sclera: Conjunctivae normal    Cardiovascular:      Rate and Rhythm: Normal rate and regular rhythm  Heart sounds: No murmur heard  No friction rub  No gallop  Pulmonary:      [place]: Pulmonary effort is normal  No respiratory distress  Breath sounds: Normal breath sounds  No stridor  No wheezing, rhonchi or rales  Comments: 2l nc with wheezing expiratory  Chest:      Chest wall: No tenderness  Abdominal:      General: There is no distension  Palpations: Abdomen is soft  There is no mass  Tenderness: There is no abdominal tenderness  There is no guarding or rebound  Hernia: No hernia is present  Musculoskeletal:         General: No swelling or tenderness  Cervical back: Neck supple  Right lower leg: No edema  Left lower leg: No edema  Skin:     General: Skin is warm and dry  Capillary Refill: Capillary refill takes less than 2 seconds  Neurological:      Mental Status: She is alert and oriented to person, place, and time     Psychiatric:         Mood and Affect: Mood normal           Additional Data:     Labs:  Results from last 7 days   Lab Units 06/26/23  0518   WBC Thousand/uL 4 37   HEMOGLOBIN g/dL 14 2   HEMATOCRIT % 43 7   PLATELETS Thousands/uL 280   NEUTROS PCT % 86*   LYMPHS PCT % 11*   MONOS PCT % 2*   EOS PCT % 0     Results from last 7 days   Lab Units 06/26/23  0518   SODIUM mmol/L 134*   POTASSIUM mmol/L 3 2*   CHLORIDE mmol/L 100   CO2 mmol/L 25   BUN mg/dL 12   CREATININE mg/dL 1 02   ANION GAP mmol/L 9   CALCIUM mg/dL 8 3*   GLUCOSE RANDOM mg/dL 169*         Results from last 7 days   Lab Units 06/26/23  0729   POC GLUCOSE mg/dl 151*               Lines/Drains:  Invasive Devices     Peripheral Intravenous Line  Duration           Peripheral IV 06/25/23 Left;Ventral (anterior) Forearm <1 day                      Imaging: No pertinent imaging reviewed  Recent Cultures (last 7 days):         Last 24 Hours Medication List:   Current Facility-Administered Medications   Medication Dose Route Frequency Provider Last Rate   • acetaminophen  650 mg Oral Q4H PRN Joyice Stable, DO     • albuterol  2 5 mg Nebulization Q4H [facility]     • ALPRAZolam  0 25 mg Oral Daily Joyice Stable, DO     • apixaban  5 mg Oral BID Joyice Stable, DO     • diltiazem  240 mg Oral Daily Joyice Stable, DO     • lisinopril  10 mg Oral Daily Trell Banai, DO     • magnesium Oxide  400 mg Oral BID Trell Banai, DO     • magnesium sulfate  2 g Intravenous Once Trell Banai, DO     • methylPREDNISolone sodium succinate  40 mg Intravenous Q8H [facility] Trell Banai, DO     • metoprolol succinate  50 mg Oral Daily Joyice Stable, DO     • mirtazapine  7 5 mg Oral HS Joyice Stable, DO     • nicotine  1 patch Transdermal Daily Joyice Stable, DO     • ondansetron  4 mg Intravenous Q6H PRN Joyice Stable, DO     • [START ON 6/27/2023] predniSONE  40 mg Oral Daily Trell Banai, DO     • senna-docusate sodium  1 tablet Oral Daily Joyice Stable, DO     • umeclidinium-vilanterol  1 puff Inhalation Daily Joyice Stable, DO          Today, Patient Was Seen By: Nandini Espana DO    **Please Note: This note may have been constructed using a voice recognition system  **

## 2023-06-26 NOTE — CASE MANAGEMENT
Case Management Progress Note    Patient name Theresa Means  Location [address] [id number] MRN [id number]  : 1955 Date 2023       LOS (days): 0  Geometric Mean LOS (GMLOS) (days):   Days to GMLOS:        OBJECTIVE:        Current admission status: Inpatient  Preferred Pharmacy:   [id number]- [place], [place] - [address]  Phone: [phone] Fax: [phone]    Primary Care Provider: Ruthann Reynoso MD    Primary Insurance: [facility]  Secondary Insurance:     PROGRESS NOTE:  Cm attempted to assess the pt at 14:25 pm and the  pt was sleeping, cm placed a call to Kee Ruiz at 14:36 pm and left a message, pt/ot eval ordered, cm will continue to follow and work on a safe d/c plan

## 2023-06-26 NOTE — PLAN OF CARE
Problem: Potential for Falls  Goal: Patient will remain free of falls  Description: INTERVENTIONS:  - Educate patient/family on patient safety including physical limitations  - Instruct patient to call for assistance with activity   - Consult OT/PT to assist with strengthening/mobility   - Keep Call bell within reach  - Keep bed low and locked with side rails adjusted as appropriate  - Keep care items and personal belongings within reach  - Initiate and maintain comfort rounds  - Make Fall Risk Sign visible to staff  - Offer Toileting every  Hours, in advance of need  - Initiate/Maintain alarm  - Obtain necessary fall risk management equipment:  - Apply yellow socks and bracelet for high fall risk patients  - Consider moving patient to room near nurses station  Outcome: Progressing     Problem: MOBILITY - ADULT  Goal: Maintain or return to baseline ADL function  Description: INTERVENTIONS:  -  Assess patient's ability to carry out ADLs; assess patient's baseline for ADL function and identify physical deficits which impact ability to perform ADLs (bathing, care of mouth/teeth, toileting, grooming, dressing, etc )  - Assess/evaluate cause of self-care deficits   - Assess range of motion  - Assess patient's mobility; develop plan if impaired  - Assess patient's need for assistive devices and provide as appropriate  - Encourage maximum independence but intervene and supervise when necessary  - Involve family in performance of ADLs  - Assess for home care needs following discharge   - Consider OT consult to assist with ADL evaluation and planning for discharge  - Provide patient education as appropriate  Outcome: Progressing  Goal: Maintains/Returns to pre admission functional level  Description: INTERVENTIONS:  - Perform BMAT or MOVE assessment daily    - Set and communicate daily mobility goal to care team and patient/family/caregiver     - Collaborate with rehabilitation services on mobility goals if consulted  - Perform Range of Motion  times a day  - Reposition patient every  hours    - Dangle patient  times a day  - Stand patient  times a day  - Ambulate patient  times a day  - Out of bed to chair  times a day   - Out of bed for meals  times a day  - Out of bed for toileting  - Record patient progress and toleration of activity level   Outcome: Progressing     Problem: PAIN - ADULT  Goal: Verbalizes/displays adequate comfort level or baseline comfort level  Description: Interventions:  - Encourage patient to monitor pain and request assistance  - Assess pain using appropriate pain scale  - Administer analgesics based on type and severity of pain and evaluate response  - Implement non-pharmacological measures as appropriate and evaluate response  - Consider cultural and social influences on pain and pain management  - Notify physician/advanced practitioner if interventions unsuccessful or patient reports new pain  Outcome: Progressing     Problem: INFECTION - ADULT  Goal: Absence or prevention of progression during hospitalization  Description: INTERVENTIONS:  - Assess and monitor for signs and symptoms of infection  - Monitor lab/diagnostic results  - Monitor all insertion sites, i e  indwelling lines, tubes, and drains  - Monitor endotracheal if appropriate and nasal secretions for changes in amount and color  - [place] appropriate cooling/warming therapies per order  - Administer medications as ordered  - Instruct and encourage patient and family to use good hand hygiene technique  - Identify and instruct in appropriate isolation precautions for identified infection/condition  Outcome: Progressing     Problem: SAFETY ADULT  Goal: Patient will remain free of falls  Description: INTERVENTIONS:  - Educate patient/family on patient safety including physical limitations  - Instruct patient to call for assistance with activity   - Consult OT/PT to assist with strengthening/mobility   - Keep Call bell within reach  - Keep bed low and locked with side rails adjusted as appropriate  - Keep care items and personal belongings within reach  - Initiate and maintain comfort rounds  - Make Fall Risk Sign visible to staff  - Offer Toileting every  Hours, in advance of need  - Initiate/Maintain alarm  - Obtain necessary fall risk management equipment:   - Apply yellow socks and bracelet for high fall risk patients  - Consider moving patient to room near nurses station  Outcome: Progressing  Goal: Maintain or return to baseline ADL function  Description: INTERVENTIONS:  -  Assess patient's ability to carry out ADLs; assess patient's baseline for ADL function and identify physical deficits which impact ability to perform ADLs (bathing, care of mouth/teeth, toileting, grooming, dressing, etc )  - Assess/evaluate cause of self-care deficits   - Assess range of motion  - Assess patient's mobility; develop plan if impaired  - Assess patient's need for assistive devices and provide as appropriate  - Encourage maximum independence but intervene and supervise when necessary  - Involve family in performance of ADLs  - Assess for home care needs following discharge   - Consider OT consult to assist with ADL evaluation and planning for discharge  - Provide patient education as appropriate  Outcome: Progressing  Goal: Maintains/Returns to pre admission functional level  Description: INTERVENTIONS:  - Perform BMAT or MOVE assessment daily    - Set and communicate daily mobility goal to care team and patient/family/caregiver  - Collaborate with rehabilitation services on mobility goals if consulted  - Perform Range of Motion  times a day  - Reposition patient every  hours    - Dangle patient  times a day  - Stand patient  times a day  - Ambulate patient  times a day  - Out of bed to chair times a day   - Out of bed for meals  times a day  - Out of bed for toileting  - Record patient progress and toleration of activity level   Outcome: Progressing Problem: DISCHARGE PLANNING  Goal: Discharge to home or other facility with appropriate resources  Description: INTERVENTIONS:  - Identify barriers to discharge w/patient and caregiver  - Arrange for needed discharge resources and transportation as appropriate  - Identify discharge learning needs (meds, wound care, etc )  - Arrange for interpretive services to assist at discharge as needed  - Refer to Case Management Department for coordinating discharge planning if the patient needs post-hospital services based on physician/advanced practitioner order or complex needs related to functional status, cognitive ability, or social support system  Outcome: Progressing     Problem: Knowledge Deficit  Goal: Patient/family/caregiver demonstrates understanding of disease process, treatment plan, medications, and discharge instructions  Description: Complete learning assessment and assess knowledge base    Interventions:  - Provide teaching at level of understanding  - Provide teaching via preferred learning methods  Outcome: Progressing

## 2023-06-26 NOTE — CONSULTS
Consultation - Winnie Le 77 y o  female MRN: [id number]  Unit/Bed#: -01 Encounter: [id number]      Assessment/Plan     Assessment:  Degenerative disc disease of lumbar spine  Nondisplaced sacral fractures of the body and left ala  Transverse fracture of S2  Plan:  A CT scan of the patient's lumbar spine was consistent with degenerative disc disease along multiple levels of the lumbar spine, nondisplaced sacral fractures of the body and left ala and transverse fracture of S2  The patient does not require surgical intervention at this time  She may continue physical therapy and Occupational Therapy weightbearing as tolerated  She can follow-up with our office in 1 month with new x-rays  She is acceptable to this plan  History of Present Illness   Physician Requesting Consult: Anant Gonzalez DO  Reason for Consult / Principal Problem: Sacral fractures  HPI: Megan Javed is a 77y o  year old female who was admitted with lower back pain, as well as COPD exacerbation  The patient states she fell approximately 1 week ago  She has had pain along her lower back ever since  She denies any numbness or tingling  She denies any fever or chills  She denies any loss of bowel or bladder function  The pain does not radiate down her legs  Inpatient consult to Orthopedic Surgery  Consult performed by: Roel Pedersen PA-C  Consult ordered by: Anant Gonzalez DO          Review of Systems   Constitutional: Negative for chills, fever and unexpected weight change  HENT: Negative for nosebleeds and sore throat  Eyes: Negative for pain, redness and visual disturbance  Respiratory: Negative for cough, shortness of breath and wheezing  Cardiovascular: Negative for chest pain, palpitations and leg swelling  Gastrointestinal: Negative for abdominal pain, nausea and vomiting  Endocrine: Negative for polydipsia and polyuria     Genitourinary: Negative for dysuria and hematuria  Musculoskeletal: Positive for arthralgias, back pain and myalgias  As noted in HPI   Skin: Negative for rash and wound  Neurological: Negative for dizziness, numbness and headaches  Psychiatric/Behavioral: Negative for decreased concentration and suicidal ideas  The patient is not nervous/anxious  Historical Information   Past Medical History:   Diagnosis Date   • Bell's palsy    • Cancer ([facility] )     lymphoma   • Diffuse large B cell lymphoma ([place] [address] )    • Hyperlipidemia    • Hypertension    • PAD (peripheral artery disease) ([facility])    • PAF (paroxysmal atrial fibrillation) ([facility])    • PVD (peripheral vascular disease) ([place] [address] )      Past Surgical History:   Procedure Laterality Date   • ARTERIOGRAM Left 12/6/2018    Procedure: LEFT lower extremity angiography, with Left lower extremity run-off, stent and angioplasty of Left Common Femoral Artery (Left Brachial Access); Surgeon: Norma Pedersen MD;  Location: [facility] OR;  Service: Vascular   • CARDIAC SURGERY     • CARPAL TUNNEL RELEASE     • CT NEEDLE BIOPSY LUNG  10/8/2019   • HYSTERECTOMY     • IR AORTAGRAM WITH RUN-OFF  12/6/2018   • OOPHORECTOMY     • CA OPTX FEM SHFT FX W/INSJ IMED IMPLT W/WO SCREW Left 9/29/2021    Procedure: INSERTION NAIL IM FEMUR ANTEGRADE (TROCHANTERIC);   Surgeon: Sarika Lam DO;  Location: [address] [place] [address] MAIN OR;  Service: Orthopedics   • TONSILLECTOMY       Social History   Social History     Substance and Sexual Activity   Alcohol Use Yes   • Alcohol/week: 4 0 standard drinks of alcohol   • Types: 4 Cans of beer per week    Comment: 4 CANS OF BEER DAILY     Social History     Substance and Sexual Activity   Drug Use No    Comment: medical marijuana 10/7/19     E-Cigarette/Vaping   • E-Cigarette Use Never User      E-Cigarette/Vaping Substances   • Nicotine No    • THC No    • CBD No    • Flavoring No    • Other No    • Unknown No      Social History     Tobacco Use   Smoking Status Every Day   • Packs/day: 0 75   • Years: "53 00   • Total pack years: 39 75   • Types: Cigarettes   • Start date: 1/1/1969   Smokeless Tobacco Never     Family History:   Family History   Problem Relation Age of Onset   • Cancer Mother    • Breast cancer Mother    • Heart attack Father    • No Known Problems Sister    • No Known Problems Daughter    • No Known Problems Maternal Grandmother    • No Known Problems Maternal Grandfather    • No Known Problems Paternal Grandmother    • No Known Problems Paternal Grandfather    • Prostate cancer Brother    • No Known Problems Maternal Aunt    • No Known Problems Maternal Aunt    • No Known Problems Maternal Aunt    • No Known Problems Maternal Aunt    • No Known Problems Paternal Aunt    • No Known Problems Paternal Uncle        Meds/Allergies   all current active meds have been reviewed  Allergies   Allergen Reactions   • Oxycodone-Acetaminophen Itching and Rash     Itching, rash       Objective   Vitals: Blood pressure 154/99, pulse 81, temperature 97 8 °F (36 6 °C), resp  rate 20, height 5' 2\" (1 575 m), weight 54 kg (119 lb 0 8 oz), SpO2 96 %  ,Body mass index is 21 77 kg/m²  Intake/Output Summary (Last 24 hours) at 6/26/2023 0858  Last data filed at 6/26/2023 0701  Gross per 24 hour   Intake --   Output 900 ml   Net -900 ml     I/O last 24 hours: In: -   Out: 900 [Urine:900]    Invasive Devices     Peripheral Intravenous Line  Duration           Peripheral IV 06/25/23 Left;Ventral (anterior) Forearm <1 day                Physical Exam  Ortho Exam  Bilateral lower extremities are neurovascularly intact  Toes are pink and mobile  Compartments are soft  Tenderness to palpation along lumbar spine  Brisk cap refill  Sensation intact  Strength 5 out of 5 bilateral lower extremity  Physical Exam   Constitutional: Appears well-developed and well-nourished  No distress  HENT:   Head: Normocephalic  Eyes: Conjunctivae are normal  Right eye exhibits no discharge  Left eye exhibits no discharge   No scleral " icterus  Cardiovascular: Normal rate  Pulmonary/Chest: [place] normal    Neurological: Alert and oriented to person, place, and time  Skin: Skin is warm and dry  No rash noted  The patient is not diaphoretic  No erythema  No pallor  Psychiatric: Normal mood and affect  Behavior is normal  Judgment and thought content normal    Lab Results: I have personally reviewed pertinent lab results  Imaging Studies: I have personally reviewed pertinent films in PACS  EKG, Pathology, and Other Studies: I have personally reviewed pertinent films in PACS  VTE Prophylaxis: Sequential compression device (Venodyne)     Code Status: Level 1 - Full Code  Advance Directive and Living Will:      Power of :    POLST:      Counseling / Coordination of Care  Total floor / unit time spent today 30 minutes  Greater than 50% of total time was spent with the patient and / or family counseling and / or coordination of care

## 2023-06-26 NOTE — ASSESSMENT & PLAN NOTE
Diffuse large B-cell lymphoma  -on chemotherapy q 3 weeks   -[facility]    Was scheduled for chemotherapy 12/3/2018  -continue management per Oncology [Negative] : Heme/Lymph

## 2023-06-26 NOTE — PLAN OF CARE
Problem: Potential for Falls  Goal: Patient will remain free of falls  Description: INTERVENTIONS:  - Educate patient/family on patient safety including physical limitations  - Instruct patient to call for assistance with activity   - Consult OT/PT to assist with strengthening/mobility   - Keep Call bell within reach  - Keep bed low and locked with side rails adjusted as appropriate  - Keep care items and personal belongings within reach  - Initiate and maintain comfort rounds  - Make Fall Risk Sign visible to staff  - Offer Toileting every  Hours, in advance of need  - Initiate/Maintain alarm  - Obtain necessary fall risk management equipment:   - Apply yellow socks and bracelet for high fall risk patients  - Consider moving patient to room near nurses station  Outcome: Progressing     Problem: MOBILITY - ADULT  Goal: Maintain or return to baseline ADL function  Description: INTERVENTIONS:  -  Assess patient's ability to carry out ADLs; assess patient's baseline for ADL function and identify physical deficits which impact ability to perform ADLs (bathing, care of mouth/teeth, toileting, grooming, dressing, etc )  - Assess/evaluate cause of self-care deficits   - Assess range of motion  - Assess patient's mobility; develop plan if impaired  - Assess patient's need for assistive devices and provide as appropriate  - Encourage maximum independence but intervene and supervise when necessary  - Involve family in performance of ADLs  - Assess for home care needs following discharge   - Consider OT consult to assist with ADL evaluation and planning for discharge  - Provide patient education as appropriate  Outcome: Progressing  Goal: Maintains/Returns to pre admission functional level  Description: INTERVENTIONS:  - Perform BMAT or MOVE assessment daily    - Set and communicate daily mobility goal to care team and patient/family/caregiver     - Collaborate with rehabilitation services on mobility goals if consulted  - Perform Range of Motion  times a day  - Reposition patient every  hours    - Dangle patient  times a day  - Stand patient  times a day  - Ambulate patient  times a day  - Out of bed to chair  times a day   - Out of bed for meals times a day  - Out of bed for toileting  - Record patient progress and toleration of activity level   Outcome: Progressing     Problem: PAIN - ADULT  Goal: Verbalizes/displays adequate comfort level or baseline comfort level  Description: Interventions:  - Encourage patient to monitor pain and request assistance  - Assess pain using appropriate pain scale  - Administer analgesics based on type and severity of pain and evaluate response  - Implement non-pharmacological measures as appropriate and evaluate response  - Consider cultural and social influences on pain and pain management  - Notify physician/advanced practitioner if interventions unsuccessful or patient reports new pain  Outcome: Progressing     Problem: INFECTION - ADULT  Goal: Absence or prevention of progression during hospitalization  Description: INTERVENTIONS:  - Assess and monitor for signs and symptoms of infection  - Monitor lab/diagnostic results  - Monitor all insertion sites, i e  indwelling lines, tubes, and drains  - Monitor endotracheal if appropriate and nasal secretions for changes in amount and color  - [place] appropriate cooling/warming therapies per order  - Administer medications as ordered  - Instruct and encourage patient and family to use good hand hygiene technique  - Identify and instruct in appropriate isolation precautions for identified infection/condition  Outcome: Progressing     Problem: SAFETY ADULT  Goal: Patient will remain free of falls  Description: INTERVENTIONS:  - Educate patient/family on patient safety including physical limitations  - Instruct patient to call for assistance with activity   - Consult OT/PT to assist with strengthening/mobility   - Keep Call bell within reach  - Keep bed low and locked with side rails adjusted as appropriate  - Keep care items and personal belongings within reach  - Initiate and maintain comfort rounds  - Make Fall Risk Sign visible to staff  - Offer Toileting every  Hours, in advance of need  - Initiate/Maintain alarm  - Obtain necessary fall risk management equipment:   - Apply yellow socks and bracelet for high fall risk patients  - Consider moving patient to room near nurses station  Outcome: Progressing  Goal: Maintain or return to baseline ADL function  Description: INTERVENTIONS:  -  Assess patient's ability to carry out ADLs; assess patient's baseline for ADL function and identify physical deficits which impact ability to perform ADLs (bathing, care of mouth/teeth, toileting, grooming, dressing, etc )  - Assess/evaluate cause of self-care deficits   - Assess range of motion  - Assess patient's mobility; develop plan if impaired  - Assess patient's need for assistive devices and provide as appropriate  - Encourage maximum independence but intervene and supervise when necessary  - Involve family in performance of ADLs  - Assess for home care needs following discharge   - Consider OT consult to assist with ADL evaluation and planning for discharge  - Provide patient education as appropriate  Outcome: Progressing  Goal: Maintains/Returns to pre admission functional level  Description: INTERVENTIONS:  - Perform BMAT or MOVE assessment daily    - Set and communicate daily mobility goal to care team and patient/family/caregiver  - Collaborate with rehabilitation services on mobility goals if consulted  - Perform Range of Motion  times a day  - Reposition patient every  hours    - Dangle patient  times a day  - Stand patient  times a day  - Ambulate patient  times a day  - Out of bed to chair  times a day   - Out of bed for meals  times a day  - Out of bed for toileting  - Record patient progress and toleration of activity level   Outcome: Progressing     Problem: DISCHARGE PLANNING  Goal: Discharge to home or other facility with appropriate resources  Description: INTERVENTIONS:  - Identify barriers to discharge w/patient and caregiver  - Arrange for needed discharge resources and transportation as appropriate  - Identify discharge learning needs (meds, wound care, etc )  - Arrange for interpretive services to assist at discharge as needed  - Refer to Case Management Department for coordinating discharge planning if the patient needs post-hospital services based on physician/advanced practitioner order or complex needs related to functional status, cognitive ability, or social support system  Outcome: Progressing     Problem: Knowledge Deficit  Goal: Patient/family/caregiver demonstrates understanding of disease process, treatment plan, medications, and discharge instructions  Description: Complete learning assessment and assess knowledge base    Interventions:  - Provide teaching at level of understanding  - Provide teaching via preferred learning methods  Outcome: Progressing     Problem: Prexisting or High Potential for Compromised Skin Integrity  Goal: Skin integrity is maintained or improved  Description: INTERVENTIONS:  - Identify patients at risk for skin breakdown  - Assess and monitor skin integrity  - Assess and monitor nutrition and hydration status  - Monitor labs   - Assess for incontinence   - Turn and reposition patient  - Assist with mobility/ambulation  - Relieve pressure over bony prominences  - Avoid friction and shearing  - Provide appropriate hygiene as needed including keeping skin clean and dry  - Evaluate need for skin moisturizer/barrier cream  - Collaborate with interdisciplinary team   - Patient/family teaching  - Consider wound care consult   Outcome: Progressing

## 2023-06-27 VITALS
TEMPERATURE: 96.9 F | BODY MASS INDEX: 21.91 KG/M2 | DIASTOLIC BLOOD PRESSURE: 86 MMHG | SYSTOLIC BLOOD PRESSURE: 150 MMHG | WEIGHT: 119.05 LBS | HEART RATE: 74 BPM | OXYGEN SATURATION: 93 % | HEIGHT: 62 IN | RESPIRATION RATE: 20 BRPM

## 2023-06-27 LAB
ANION GAP SERPL CALCULATED.3IONS-SCNC: 8 MMOL/L
BASOPHILS # BLD MANUAL: 0 THOUSAND/UL (ref 0–0.1)
BASOPHILS NFR MAR MANUAL: 0 % (ref 0–1)
BUN SERPL-MCNC: 17 MG/DL (ref 5–25)
CALCIUM SERPL-MCNC: 8.4 MG/DL (ref 8.4–10.2)
CHLORIDE SERPL-SCNC: 101 MMOL/L (ref 96–108)
CO2 SERPL-SCNC: 24 MMOL/L (ref 21–32)
CREAT SERPL-MCNC: 1.05 MG/DL (ref 0.6–1.3)
EOSINOPHIL # BLD MANUAL: 0 THOUSAND/UL (ref 0–0.4)
EOSINOPHIL NFR BLD MANUAL: 0 % (ref 0–6)
ERYTHROCYTE [DISTWIDTH] IN BLOOD BY AUTOMATED COUNT: 17.7 % (ref 11.6–15.1)
GFR SERPL CREATININE-BSD FRML MDRD: 53 ML/MIN/1.73SQ M
GIANT PLATELETS BLD QL SMEAR: PRESENT
GLUCOSE SERPL-MCNC: 177 MG/DL (ref 65–140)
HCT VFR BLD AUTO: 42 % (ref 34.8–46.1)
HGB BLD-MCNC: 13.5 G/DL (ref 11.5–15.4)
LYMPHOCYTES # BLD AUTO: 0.82 THOUSAND/UL (ref 0.6–4.47)
LYMPHOCYTES # BLD AUTO: 7 % (ref 14–44)
MAGNESIUM SERPL-MCNC: 2.1 MG/DL (ref 1.9–2.7)
MCH RBC QN AUTO: 28.7 PG (ref 26.8–34.3)
MCHC RBC AUTO-ENTMCNC: 32.1 G/DL (ref 31.4–37.4)
MCV RBC AUTO: 89 FL (ref 82–98)
MONOCYTES # BLD AUTO: 0 THOUSAND/UL (ref 0–1.22)
MONOCYTES NFR BLD: 0 % (ref 4–12)
NEUTROPHILS # BLD MANUAL: 10.87 THOUSAND/UL (ref 1.85–7.62)
NEUTS BAND NFR BLD MANUAL: 1 % (ref 0–8)
NEUTS SEG NFR BLD AUTO: 92 % (ref 43–75)
PLATELET # BLD AUTO: 295 THOUSANDS/UL (ref 149–390)
PLATELET BLD QL SMEAR: ADEQUATE
PMV BLD AUTO: 9.7 FL (ref 8.9–12.7)
POTASSIUM SERPL-SCNC: 3.7 MMOL/L (ref 3.5–5.3)
PROCALCITONIN SERPL-MCNC: 0.07 NG/ML
RBC # BLD AUTO: 4.7 MILLION/UL (ref 3.81–5.12)
RBC MORPH BLD: NORMAL
SODIUM SERPL-SCNC: 133 MMOL/L (ref 135–147)
WBC # BLD AUTO: 11.69 THOUSAND/UL (ref 4.31–10.16)

## 2023-06-27 PROCEDURE — 97535 SELF CARE MNGMENT TRAINING: CPT

## 2023-06-27 PROCEDURE — 97163 PT EVAL HIGH COMPLEX 45 MIN: CPT

## 2023-06-27 PROCEDURE — 99239 HOSP IP/OBS DSCHRG MGMT >30: CPT | Performed by: INTERNAL MEDICINE

## 2023-06-27 PROCEDURE — 84145 PROCALCITONIN (PCT): CPT | Performed by: INTERNAL MEDICINE

## 2023-06-27 PROCEDURE — 94664 DEMO&/EVAL PT USE INHALER: CPT

## 2023-06-27 PROCEDURE — 80048 BASIC METABOLIC PNL TOTAL CA: CPT | Performed by: INTERNAL MEDICINE

## 2023-06-27 PROCEDURE — 94760 N-INVAS EAR/PLS OXIMETRY 1: CPT

## 2023-06-27 PROCEDURE — 85027 COMPLETE CBC AUTOMATED: CPT | Performed by: INTERNAL MEDICINE

## 2023-06-27 PROCEDURE — 83735 ASSAY OF MAGNESIUM: CPT | Performed by: INTERNAL MEDICINE

## 2023-06-27 PROCEDURE — 97166 OT EVAL MOD COMPLEX 45 MIN: CPT

## 2023-06-27 PROCEDURE — 85007 BL SMEAR W/DIFF WBC COUNT: CPT | Performed by: INTERNAL MEDICINE

## 2023-06-27 RX ORDER — HYDRALAZINE HYDROCHLORIDE 20 MG/ML
5 INJECTION INTRAMUSCULAR; INTRAVENOUS EVERY 6 HOURS PRN
Status: DISCONTINUED | OUTPATIENT
Start: 2023-06-27 | End: 2023-06-27 | Stop reason: HOSPADM

## 2023-06-27 RX ORDER — PREDNISONE 20 MG/1
TABLET ORAL
Qty: 5 TABLET | Refills: 0 | Status: SHIPPED | OUTPATIENT
Start: 2023-06-28 | End: 2023-07-02

## 2023-06-27 RX ORDER — LISINOPRIL 10 MG/1
10 TABLET ORAL DAILY
Qty: 30 TABLET | Refills: 0 | Status: SHIPPED | OUTPATIENT
Start: 2023-06-28 | End: 2023-07-17

## 2023-06-27 RX ADMIN — METOPROLOL SUCCINATE 50 MG: 50 TABLET, EXTENDED RELEASE ORAL at 08:23

## 2023-06-27 RX ADMIN — SENNOSIDES AND DOCUSATE SODIUM 1 TABLET: 50; 8.6 TABLET ORAL at 08:23

## 2023-06-27 RX ADMIN — UMECLIDINIUM BROMIDE AND VILANTEROL TRIFENATATE 1 PUFF: 62.5; 25 POWDER RESPIRATORY (INHALATION) at 08:52

## 2023-06-27 RX ADMIN — ALPRAZOLAM 0.25 MG: 0.25 TABLET ORAL at 08:23

## 2023-06-27 RX ADMIN — LISINOPRIL 10 MG: 10 TABLET ORAL at 08:22

## 2023-06-27 RX ADMIN — Medication 400 MG: at 08:22

## 2023-06-27 RX ADMIN — DILTIAZEM HYDROCHLORIDE 240 MG: 240 CAPSULE, COATED, EXTENDED RELEASE ORAL at 08:23

## 2023-06-27 RX ADMIN — PREDNISONE 40 MG: 20 TABLET ORAL at 08:23

## 2023-06-27 RX ADMIN — APIXABAN 5 MG: 5 TABLET, FILM COATED ORAL at 08:22

## 2023-06-27 NOTE — CASE MANAGEMENT
Case Management Discharge Planning Note    Patient name Theresa Means  Location /-19 MRN [id number]  : 1955 Date 2023       Current Admission Date: 2023  Current Admission Diagnosis:COPD (chronic obstructive pulmonary disease) [facility])   Patient Active Problem List    Diagnosis Date Noted   • COPD (chronic obstructive pulmonary disease) ([facility] ) 2023   • Sacral fracture ([facility] ) 2023   • Stage 3b chronic kidney disease ([facility] ) 10/28/2022   • Proteinuria 10/28/2022   • Short of breath on exertion 2022   • Stage 3a chronic kidney disease ([facility] ) 2022   • Vitamin D deficiency 2022   • Secondary hyperparathyroidism ([facility] ) 2022   • Buttock wound 10/07/2021   • Atrial fibrillation with RVR ([facility] ) 10/01/2021   • Hypotension 2021   • Anemia 2021   • Acute respiratory failure with hypoxia ([facility] ) 2021   • Closed left hip fracture ([facility] ) 2021   • Insomnia 2021   • Lung nodule 2019   • Aortoiliac occlusive disease ([facility] ) 2019   • Bilateral lower extremity edema 2019   • Leg pain 2018   • Lower extremity weakness 2018   • Peripheral vascular disease ([facility] ) 2018   • Foot pain 2018   • Weakness of lower extremity 2018   • Constipation 10/09/2018   • Bell's palsy 10/05/2018   • Depression with anxiety 2018   • Essential hypertension 2018   • Lymphoma ([facility] ) 2018   • Paroxysmal atrial fibrillation ([facility] ) 2018   • Hypokalemia 2018   • BRYANT (acute kidney injury) ([facility] ) 2018   • Fall 2018   • Hyponatremia 2018   • Carotid stenosis, asymptomatic, bilateral 2016   • Tobacco dependency 2015   • PAD (peripheral artery disease) ([facility] ) 2015   • Benign essential hypertension 2013      LOS (days): 1  Geometric Mean LOS (GMLOS) (days): 4 40  Days to GMLOS:3 5     OBJECTIVE:  Risk of Unplanned Readmission Score: 17         Current admission status: Inpatient   Preferred Pharmacy:   [facility]/pharmacy #[id number]- [place] - [address]  Phone: [phone] Fax: [phone]    Primary Care Provider: Luis Veronica MD    Primary Insurance: [facility]  Secondary Insurance:     DISCHARGE DETAILS:    Discharge planning discussed with[de-identified] patient and spoke with daughter at 10:32 am  Freedom of Choice: Yes  Comments - Freedom of Choice: recommendation was for hhc- pt declined hhc  CM contacted family/caregiver?: Yes  Were Treatment Team discharge recommendations reviewed with patient/caregiver?: Yes  Did patient/caregiver verbalize understanding of patient care needs?: Yes  Were patient/caregiver advised of the risks associated with not following Treatment Team discharge recommendations?: Yes    Contacts  Patient Contacts: Javad Salgado  Relationship to Patient[de-identified] Family (daughter)  Contact Method: Phone  Phone Number: [phone]  Reason/Outcome: Discharge [address]         Is the patient interested in [facility] at discharge?: No (pt declined hhc)    DME Referral Provided  Referral made for DME?: No    Other Referral/Resources/Interventions Provided:  Referral Comments: pt declined hhc-  pt is to be d/c to her daugher;s home   [address] María Elena PA    Would you like to participate in our [address] service program?  : No - Declined    Treatment Team Recommendation: Home (home with daughter & outpt follow up- family)  Discharge Destination Plan[de-identified] Home (home wiht daughter & outpt follow up)         pt and family were in agreement with the d/c and d/c plan                             Family notified[de-identified] daughter was called

## 2023-06-27 NOTE — PLAN OF CARE
Problem: PHYSICAL THERAPY ADULT  Goal: Performs mobility at highest level of function for planned discharge setting  See evaluation for individualized goals  Description: Treatment/Interventions: Functional transfer training, LE strengthening/ROM, Therapeutic exercise, Endurance training, Patient/family training, Equipment eval/education, Bed mobility, Gait training, Spoke to nursing, Spoke to case management  Equipment Recommended:  (continue RW use)       See flowsheet documentation for full assessment, interventions and recommendations  Note: Prognosis: Good  Problem List: Decreased strength, Decreased endurance, Impaired balance, Decreased mobility, Decreased safety awareness, Orthopedic restrictions  Assessment: Pt is 77 y o  female seen for high-complexity PT evaluation on 6/27/2023 s/p admit to [facility] on 6/25/2023 w/ COPD (chronic obstructive pulmonary disease) ([facility] )  PT was consulted to assess pt's functional mobility and d/c needs  Order placed for PT eval and tx, w/ up and OOB as tolerated and ambulate patient order  WBAT BLEs recommended by orthopedics  PTA, pt lives c family in [place] c [address], amb c RW, (I) ADLs  At time of eval, pt performs bed mob and transfers at SUP level, amb x 15 ft c SBA  Upon evaluation, pt presenting with impaired functional mobility d/t decreased strength, decreased endurance, impaired balance, decreased mobility, decreased safety awareness and orthopedic restrictions of sacral fracture with WBAT LEs  Pertinent PMHx and current co-morbidities affecting pt's physical performance at time of assessment include: sacral fracture, acute respiratory failure c hypoxia, insomnia, AFib, tobacco dependency, hypokalemia, HTN, COPD  Personal factors affecting pt at time of eval include: positive fall history and increased age  The following objective measures performed on IE also reveal limitations: AM-PAC 6-Clicks: 91/38   Pt's clinical presentation is currently unstable/unpredictable seen in pt's presentation of need for input for task focus and mobility technique, ongoing medical assessment and supplemental O2 required  Overall, pt's rehab potential and prognosis to return to PLOF is good as impacted by objective findings, warranting pt to receive further skilled PT interventions to address identified impairments, activity limitation(s), and participation restriction(s)  Goal for patient is to go home  Pt to benefit from continued PT tx to address deficits as defined above and maximize level of functional independent mobility and consistency in order for pt to improve activity tolerance  From PT/mobility standpoint, recommendation at time of d/c would be home with home health rehabilitation pending progress in order to facilitate return to PLOF  Barriers to Discharge: Inaccessible home environment     PT Discharge Recommendation: Home with home health rehabilitation    See flowsheet documentation for full assessment

## 2023-06-27 NOTE — Clinical Note
The patient's raw score on the AM-PAC Daily Activity Inpatient Short Form is 19  A raw score of greater than or equal to 19 suggests the patient may benefit from discharge to home  Please refer to the recommendation of the Occupational Therapist for safe discharge planning  Pt is a 77 y o  female seen for OT evaluation s/p admit to [place] [id number] on 6/25/2023 w/ COPD (chronic obstructive pulmonary disease) ([facility] )  Prior to admission pt was I with ADLs, functional mobility, med management, and driving  Comorbidities impacting pt's functional performance at time of assessment include HTN, hypokalemia, tobacco dependency, A-fib, and sacral fx  Current personal factors limiting pt's occupational performance include difficulty performing ADLS and difficulty performing IADLS   Current deficits impacting occupational performance include decreased balance, decreased tolerance and impaired memory  Pt to benefit from continued skilled OT tx during this hospital stay to address deficits listed above  Areas of occupational performance to address include bathing/shower, toilet hygiene, dressing, functional mobility and clothing management  From an OT standpoint, recommendation at time of d/c would be Home Health OT

## 2023-06-27 NOTE — PHYSICAL THERAPY NOTE
Physical Therapy Evaluation   Time in: 0925  Time out: 0948  Total evaluation time: 23 minutes    Patient's Name: Karlo Barnes    Admitting Diagnosis  COPD (chronic obstructive pulmonary disease) ([facility] ) [J44 9]  Hypokalemia [E87 6]  Back pain [M54 9]  Hypoxia [R09 02]  Fall, initial encounter [W19  XXXA]    Problem List  Patient Active Problem List   Diagnosis    Constipation    Lower extremity weakness    BRYANT (acute kidney injury) ([facility] )    Bell's palsy    Carotid stenosis, asymptomatic, bilateral    Depression with anxiety    Benign essential hypertension    Essential hypertension    Fall    Hypokalemia    Hyponatremia    Tobacco dependency    Lymphoma (HCC)    PAD (peripheral artery disease) (HCC)    Paroxysmal atrial fibrillation (HCC)    Leg pain    Peripheral vascular disease (HCC)    Foot pain    Weakness of lower extremity    Aortoiliac occlusive disease (HCC)    Bilateral lower extremity edema    Lung nodule    Closed left hip fracture (HCC)    Insomnia    Acute respiratory failure with hypoxia (HCC)    Hypotension    Anemia    Atrial fibrillation with RVR (HCC)    Buttock wound    Stage 3a chronic kidney disease (HCC)    Vitamin D deficiency    Secondary hyperparathyroidism ([facility] )    Short of breath on exertion    Stage 3b chronic kidney disease (HCC)    Proteinuria    COPD (chronic obstructive pulmonary disease) ([facility] )    Sacral fracture (HCC)       Past Medical History  Past Medical History:   Diagnosis Date    Bell's palsy     Cancer ([facility] )     lymphoma    Diffuse large B cell lymphoma ([facility] )     Hyperlipidemia     Hypertension     PAD (peripheral artery disease) (HCC)     PAF (paroxysmal atrial fibrillation) ([facility] )     PVD (peripheral vascular disease) ([facility] )        Past Surgical History  Past Surgical History:   Procedure Laterality Date    ARTERIOGRAM Left 12/6/2018    Procedure: LEFT lower extremity angiography, with Left lower extremity run-off, stent and angioplasty of Left Common Femoral Artery (Left Brachial Access); Surgeon: Shruthi Rossi MD;  Location:  MAIN OR;  Service: Vascular    CARDIAC SURGERY      CARPAL TUNNEL RELEASE      CT NEEDLE BIOPSY LUNG  10/8/2019    HYSTERECTOMY      IR AORTAGRAM WITH RUN-OFF  12/6/2018    OOPHORECTOMY      NV OPTX FEM SHFT FX W/INSJ IMED IMPLT W/WO SCREW Left 9/29/2021    Procedure: INSERTION NAIL IM FEMUR ANTEGRADE (TROCHANTERIC); Surgeon: Myesha Gilbert DO;  Location: [address] [place] [address] MAIN OR;  Service: Orthopedics    TONSILLECTOMY         PT performed at least 2 patient identifiers during session: Name and wristband  06/27/23 0948   PT Last Visit   PT Visit Date 06/27/23   Note Type   Note type Evaluation   Pain Assessment   Pain Assessment Tool 0-10   Pain Score No Pain  (at rest)   Restrictions/Precautions   Weight Bearing Precautions Per Order Yes  (per orthopedics note)   RLE Weight Bearing Per Order WBAT   LLE Weight Bearing Per Order WBAT   Other Precautions O2;Fall Risk; Chair Alarm; Bed Alarm;Multiple lines  (2 L O2 via NC)   Home Living   Type of [address] [place] [address] One level;Performs ADLs on one level; Able to live on main level with bedroom/bathroom; Access   Bathroom Shower/Tub Tub/shower unit   [facility]; Shower chair   [address] Walker  (RW prn at baseline)   Prior Function   Level of [place] Independent with ADLs; Independent with functional mobility; Needs assistance with IADLS   Lives With Family  (sister, sister's grandaughter and kids)   Receives Help From Family   IADLs Independent with driving;Family/Friend/Other provides meals; Independent with medication management  (drive short distances)   Falls in the last 6 months 1 to 4  (3 falls)   Vocational Retired   General   Family/Caregiver Present No   Cognition   Arousal/Participation Alert   Orientation Level Oriented X4  (increased time for recall)   Memory Decreased recall of precautions;Decreased "recall of recent events;Decreased recall of biographical information   Following Commands Follows one step commands without difficulty   Comments pt agreeable to PT session   Subjective   Subjective \"I can try to get up\"   RLE Assessment   RLE Assessment WFL   LLE Assessment   LLE Assessment WFL   Vision-Basic Assessment   Current Vision Wears glasses only for reading   Visual History   (atrophy of R eye)   Coordination   Movements are Fluid and Coordinated 1   Sensation WFL   Bed Mobility   Supine to Sit 5  Supervision   Additional items Assist x 1;HOB elevated; Bedrails; Increased time required   Additional Comments pt denies lightheadedness/dizziness with positional changes   Transfers   Sit to Stand 5  Supervision   Additional items Assist x 1; Increased time required   Stand to Sit 5  Supervision   Additional items Assist x 1; Armrests; Increased time required   Ambulation/Elevation   Gait pattern Improper Weight shift;Narrow SAL; Forward Flexion;Decreased foot clearance;Shuffling   Gait Assistance   (SBA for safety and line management)   Additional items Assist x 1;Verbal cues; Tactile cues   Assistive Device Rolling walker   Distance 15 ft   Stair Management Assistance Not tested   Balance   Static Sitting Good   Dynamic Sitting Fair +   Static Standing Fair   Dynamic Standing Fair -   Ambulatory Fair -   Endurance Deficit   Endurance Deficit Yes   Activity Tolerance   Activity Tolerance Patient limited by fatigue   Medical Staff Made Aware coordination of care provided with OTS Lida   Nurse Made Aware Yes, RN made aware of outcomes/recs   Assessment   Prognosis Good   Problem List Decreased strength;Decreased endurance; Impaired balance;Decreased mobility; Decreased safety awareness;Orthopedic restrictions   Assessment Pt is 77 y o  female seen for high-complexity PT evaluation on 6/27/2023 s/p admit to [facility] on 6/25/2023 w/ COPD (chronic obstructive pulmonary disease) ([address] )   PT was consulted to " "assess pt's functional mobility and d/c needs  Order placed for PT eval and tx, w/ up and OOB as tolerated and ambulate patient order  WBAT BLEs recommended by orthopedics  PTA, pt lives c family in 1 SH c 0 [address], amb c RW, (I) ADLs  At time of eval, pt performs bed mob and transfers at SUP level, amb x 15 ft c SBA  Upon evaluation, pt presenting with impaired functional mobility d/t decreased strength, decreased endurance, impaired balance, decreased mobility, decreased safety awareness and orthopedic restrictions of sacral fracture with WBAT LEs  Pertinent PMHx and current co-morbidities affecting pt's physical performance at time of assessment include: sacral fracture, acute respiratory failure c hypoxia, insomnia, AFib, tobacco dependency, hypokalemia, HTN, COPD  Personal factors affecting pt at time of eval include: positive fall history and increased age  The following objective measures performed on IE also reveal limitations: AM-PAC 6-Clicks: 41/75  Pt's clinical presentation is currently unstable/unpredictable seen in pt's presentation of need for input for task focus and mobility technique, ongoing medical assessment and supplemental O2 required  Overall, pt's rehab potential and prognosis to return to PLOF is good as impacted by objective findings, warranting pt to receive further skilled PT interventions to address identified impairments, activity limitation(s), and participation restriction(s)  Goal for patient is to go home  Pt to benefit from continued PT tx to address deficits as defined above and maximize level of functional independent mobility and consistency in order for pt to improve activity tolerance  From PT/mobility standpoint, recommendation at time of d/c would be home with home health rehabilitation pending progress in order to facilitate return to PLOF     Barriers to Discharge Inaccessible home environment   Goals   Patient Goals \"to go home\"   STG Expiration Date 07/07/23   Short Term " Goal #1 In 7-10 days: Increase bilateral LE strength 1/2 grade to facilitate independent mobility, Perform all bed mobility tasks modified independent to decrease caregiver burden, Perform all transfers modified independent to improve independence, Ambulate > 75 ft  with least restrictive assistive device modified independent w/o LOB and w/ normalized gait pattern 100% of the time, Increase all balance 1/2 grade to decrease risk for falls, Complete exercise program independently and Tolerate 4 hr OOB to faciliate upright tolerance   PT Treatment Day 0   Plan   Treatment/Interventions Functional transfer training;LE strengthening/ROM; Therapeutic exercise; Endurance training;Patient/family training;Equipment eval/education; Bed mobility;Gait training;Spoke to nursing;Spoke to case management   PT Frequency 3-5x/wk   Recommendation   PT Discharge Recommendation Home with home health rehabilitation   Equipment Recommended   (continue RW use)   Additional Comments Pt's raw score on the [facility] Basic Mobility inpatient short form is 18, standardized score is 41 05  Patients at this level are likely to benefit from DC to home with home care, however, please refer to therapist recommendation for safe DC planning     [facility] Basic Mobility Inpatient   Turning in Flat Bed Without Bedrails 4   Lying on Back to Sitting on Edge of Flat Bed Without Bedrails 3   Moving Bed to Chair 3   Standing Up From Chair Using Arms 3   Walk in Room 3   Climb 3-5 Stairs With Railing 2   Basic Mobility Inpatient Raw Score 18   Basic Mobility Standardized Score 41 05   Highest Level Of Mobility   JH-HLM Goal 6: Walk 10 steps or more   JH-HLM Achieved 6: Walk 10 steps or more       Cortez Carreon, PT, DPT

## 2023-06-27 NOTE — PLAN OF CARE
Problem: OCCUPATIONAL THERAPY ADULT  Goal: Performs self-care activities at highest level of function for planned discharge setting  See evaluation for individualized goals  Description: Treatment Interventions: ADL retraining, Functional transfer training, Endurance training, Equipment evaluation/education, Compensatory technique education, Energy conservation, Activityengagement          See flowsheet documentation for full assessment, interventions and recommendations  Note: Limitation: Decreased ADL status, Decreased endurance, Decreased high-level ADLs, Decreased self-care trans, Decreased cognition  Prognosis: Good  Assessment: Pt is a 77 y o  female seen for OT evaluation s/p admit to [facility] on 6/25/2023 w/ COPD (chronic obstructive pulmonary disease) ([facility] )  Prior to admission pt was I with ADLs, functional mobility, med management, and driving  Comorbidities impacting pt's functional performance at time of assessment include HTN, hypokalemia, tobacco dependency, A-fib, and sacral fx  Current personal factors limiting pt's occupational performance include difficulty performing ADLS and difficulty performing IADLS   Current deficits impacting occupational performance include decreased balance, decreased tolerance and impaired memory  Pt to benefit from continued skilled OT tx during this hospital stay to address deficits listed above  Areas of occupational performance to address include bathing/shower, toilet hygiene, dressing, functional mobility and clothing management  From an OT standpoint, recommendation at time of d/c would be Home Health OT       OT Discharge Recommendation: Home with home health rehabilitation     JEANIE Mann

## 2023-06-27 NOTE — OCCUPATIONAL THERAPY NOTE
Occupational Therapy Evaluation       06/27/23 0928   OT Last Visit   OT Visit Date 06/27/23   Note Type   Note type Evaluation   Pain Assessment   Pain Assessment Tool 0-10   Pain Score No Pain   Restrictions/Precautions   Weight Bearing Precautions Per Order Yes  (per orthopedics note 6/26/23)   RLE Weight Bearing Per Order WBAT   LLE Weight Bearing Per Order WBAT   Other Precautions O2;Pain; Fall Risk; Chair Alarm  (2L O2)   Home Living   Type of [address] One level;Performs ADLs on one level; Able to live on main level with bedroom/bathroom; Access   Bathroom Shower/Tub Tub/shower unit   [address] Grab bars in shower; Shower chair   [address] Walker  (RW prn at baseline)   Prior Function   Level of [place] Independent with ADLs; Independent with functional mobility; Needs assistance with IADLS   Lives With Family  (sister, sister's grandaughter and kids)   Receives Help From Family   IADLs Independent with driving;Family/Friend/Other provides meals; Independent with medication management  (drive short distances)   Falls in the last 6 months 1 to 4  (3 falls)   Vocational Retired   ADL   Grooming Assistance 6  [address]  [address] [place] [address] [address] 4  [address]  [address]  Moderate [address]  4  Minimal Assistance   Bed Mobility   Supine to Sit 5  Supervision   Additional items Assist x 1;HOB elevated; Bedrails; Increased time required   Additional Comments pt remained OOB in recliner upon conclusion   Transfers   Sit to Stand 5  Supervision   Additional items Assist x 1; Increased time required   Stand to Sit 5  Supervision   Additional items Assist x 1; Increased time required   Functional Mobility   Functional Mobility 5  Supervision   Additional Comments A x1   Additional items "  (no AD usage)   Balance   Static Sitting Good   Dynamic Sitting Fair +   Static Standing Fair   Dynamic Standing Fair -   Ambulatory Fair -   Activity Tolerance   Activity Tolerance Patient limited by fatigue   Nurse Made Aware Yes, RN made aware of outcomes/recs   RUE Assessment   RUE Assessment WFL   LUE Assessment   LUE Assessment WFL   Vision-Basic Assessment   Current Vision Wears glasses only for reading   Visual History   (atrophy of R eye)   Cognition   Overall Cognitive Status Impaired   Arousal/Participation Alert   Attention Within functional limits   Orientation Level Oriented X4  (\"[place]\" then corrected with VC  \"July\")   Memory Decreased short term memory;Decreased recall of recent events;Decreased recall of precautions   Following Commands Follows one step commands without difficulty   Assessment   Limitation Decreased ADL status; Decreased endurance;Decreased high-level ADLs; Decreased self-care trans;Decreased cognition   Prognosis Good   Assessment Pt is a 77 y o  female seen for OT evaluation s/p admit to [place] [address] on 6/25/2023 w/ COPD (chronic obstructive pulmonary disease) ([facility] )  Prior to admission pt was I with ADLs, functional mobility, med management, and driving  Comorbidities impacting pt's functional performance at time of assessment include HTN, hypokalemia, tobacco dependency, A-fib, and sacral fx  Current personal factors limiting pt's occupational performance include difficulty performing ADLS and difficulty performing IADLS   Current deficits impacting occupational performance include decreased balance, decreased tolerance and impaired memory  Pt to benefit from continued skilled OT tx during this hospital stay to address deficits listed above  Areas of occupational performance to address include bathing/shower, toilet hygiene, dressing, functional mobility and clothing management  From an OT standpoint, recommendation at time of d/c would be Home Health OT     Goals   Patient " Goals to go home soon   Plan   Treatment Interventions ADL retraining;Functional transfer training; Endurance training;Equipment evaluation/education; Compensatory technique education; Energy conservation; Activityengagement   Goal Expiration Date 07/07/23   OT Treatment Day 0   OT Frequency 3-5x/wk   Recommendation   OT Discharge Recommendation Home with home health rehabilitation   Additional Comments  co evaluation with PT due to pt comorbidities   Additional Comments 2 The patient's raw score on the AM-PAC Daily Activity Inpatient Short Form is 19  A raw score of greater than or equal to 19 suggests the patient may benefit from discharge to home  Please refer to the recommendation of the Occupational Therapist for safe discharge planning  AM-PAC Daily Activity Inpatient   Lower Body Dressing 2   Bathing 3   Toileting 3   Upper Body Dressing 3   Grooming 4   Eating 4   Daily Activity Raw Score 19   Daily Activity Standardized Score (Calc for Raw Score >=11) 40 22   [facility] Applied Cognition Inpatient   Following a Speech/Presentation 4   Understanding Ordinary Conversation 4   Taking Medications 4   Remembering Where Things Are Placed or Put Away 3   Remembering List of 4-5 Errands 3   Taking Care of Complicated Tasks 3   Applied Cognition Raw Score 21   Applied Cognition Standardized Score 44 3     GOALS    STG  Pt will achieve the following goals within 5 days    Pt will complete ADL transfers with Mod I for increased independence with ADLs/ meaningful tasks  Pt will complete UB ADLs including bathing and dressing with Mod I to promote increased independence with self care tasks  Pt will complete LB ADLs including bathing and dressing with Min A using AE prn to promote increased independence with self care tasks  Pt will complete toileting with CGA for clothing management and hygiene to promote independence with self care tasks       Pt will increase static stand balance to F+ and dynamic stand balance to F for increased safety with standing functional tasks  Pt will increase stand tolerance to 3 min for sustained participation in standing functional tasks  Pt will participate in 10m UE therex to increase overall stamina/activity tolerance for purposeful tasks  Pt will complete bed mobility with Mod I for increased independence to manage own comfort and participate in EOB & OOB purposeful tasks  LTG  Pt will achieve the following goals within 10 days    Pt will complete LB ADLs including bathing and dressing with CGA using AE prn to promote increased independence with self care tasks  Pt will complete toileting with S for clothing management and hygiene to promote independence with self care tasks  Pt will increase static stand balance to G- and dynamic stand balance to F+ for increased safety with standing functional tasks  Pt will increase stand tolerance to 5 min for sustained participation in standing functional tasks  Pt will participate in 15m UE therex to increase overall stamina/activity tolerance for purposeful tasks  Occupational Therapy Treatment      Additional Treatment Session   Start Time 8827   End Time 9256   ADL   LB Dressing Assistance 3 Moderate Assistance    LB Dressing Deficit Brief and pure wick management only   LB Dressing Comment Pt seated OOB in recliner, pt stood for portions of ADL tasks with rest breaks  Assessment   Treatment Assessment Patient participated in Skilled OT session this date with interventions consisting of ADL re training with the use of correct body mechanics and Energy Conservation techniques  Patient agreeable to OT treatment session, in succession of evaluation  Patient requiring verbal cues for safety and frequent rest periods during clothing management  Patient continues to be functioning below baseline level, occupational performance remains limited secondary to factors listed above and increased risk for falls and injury  From OT standpoint, recommendation at time of d/c would be Home OT  Patient to benefit from continued Occupational Therapy treatment while in the hospital to address deficits as defined above and maximize level of functional independence with ADLs and functional mobility     Additional Treatment Day Abdullahi Diaz, OTS

## 2023-06-27 NOTE — DISCHARGE SUMMARY
Discharge Summary - [facility] Internal Medicine  Patient: Tatiana Yang 77 y o  female   MRN: [id number]  PCP: Luis Veronica MD  Unit/Bed#: -01 Encounter: [id number]            Discharging Physician / Practitioner: Greer Golden MD  PCP: Luis Veronica MD  Admission Date:   Admission Orders (From admission, onward)     Ordered        06/26/23 1436  Inpatient Admission  Once            06/25/23 1619  Place in Observation  Once                      Discharge Date: 06/27/23      Reason for Admission:  Shortness of breath      Discharge Diagnoses:     Principal Problem:    COPD exacerbation    Active Problems:    Essential hypertension    Leukocytosis    Tobacco abuse    Paroxysmal atrial fibrillation    Hyponatremia    History of B-cell lymphoma    Acute respiratory failure with hypoxia     Sacral fracture    Depression with anxiety    Resolved Problems:    Hypokalemia    Hypomagnesemia         Consultations During Hospital Stay:  · Orthopedic surgery      Hospital Course:     Tatiana Yang is a 77 y o  female patient who originally presented to the hospital on 6/25/2023 due to complaints of progressive shortness of breath found to be in a COPD exacerbation  She was initiated on intravenous steroids and with subsequent improvement in symptoms, to send over to oral Prednisone, for which she will complete a taper postdischarge  She was also on supplemental maintenance inhaler therapy  She also complained of some lower back pain, for which CT imaging of the lumbar spine was done with incidental finding of nondisplaced sacral fractures of the sacral body and left ala  He was evaluated by orthopedic surgery with plan for outpatient follow-up with repeat imaging as the pathology was deemed inoperable  She was evaluated by PT/OT with recommendation of home health services, however, she refused  She will be discharged back home today  Her chronic medications for ongoing medical issues will be continued  "She also had electrolyte imbalances including hypokalemia and hypomagnesemia, that normalized status post repletion  Her leukocytosis is reactive secondary to overall medical condition along with corticosteroids  Her hyponatremia is stable  Seizure imaging did reveal evidence of a right basilar opacity, possibly pneumonia versus atelectasis, however, procalcitonin was normal, making bacterial infection less likely  She will follow-up with her PCP as indicated on discharge reconciliation  Condition at Discharge: fair       Discharge Day Visit / Exam:     Vitals:  Blood Pressure: 150/86 (06/27/23 0700)  Pulse: 74 (06/27/23 0700)  Temperature: (!) 96 9 °F (36 1 °C) (06/27/23 0700)  Temp Source: Oral (06/27/23 0700)  Respirations: 20 (06/27/23 0700)  Height: 5' 2\" (157 5 cm) (06/25/23 1448)  Weight - Scale: 54 kg (119 lb 0 8 oz) (06/25/23 1741)  SpO2: 92 % (06/27/23 7941)      Physical exam:  I had a face-to-face encounter with the patient on day of discharge  Discussion with Patient and/or Family:  The patient has been advised to return to the ER immediately if any symptoms recur or worsen  Discharge instructions/Information to Patient and/or Family:   See after visit summary for information provided to patient and/or family  Provisions for Follow-Up Care:  See after visit summary for information related to follow-up care and any pertinent home health orders  Disposition:   Home -> refused home health services      Discharge Medications:  See after visit summary for reconciled discharge medications provided to patient and/or family  Discharge Statement:  I spent 38 minutes discharging the patient  This time was spent on the day of discharge  I had direct contact with the patient on the day of discharge   Greater than 50% of the total time was spent examining patient, answering all patient questions, arranging and discussing plan of care with patient as well as directly providing " post-discharge instructions  Additional time then spent on discharge activities  ** Please Note: This note is constructed using a voice recognition dictation system  An occasional wrong word/phrase or “sound-a-like” substitution may have been picked up by dictation device due to the inherent limitations of voice recognition software  Read the chart carefully and recognize, using reasonable context, where substitutions may have occurred  **

## 2023-06-28 NOTE — UTILIZATION REVIEW
NOTIFICATION OF ADMISSION DISCHARGE   This is a Notification of Discharge from [address]  Please be advised that this patient has been discharge from our facility  Below you will find the admission and discharge date and time including the patient’s disposition  UTILIZATION REVIEW CONTACT:  Gisella Rodriguez  Utilization   Network Utilization Review Department  Phone: [phone] carefully listen to the prompts  All voicemails are confidential   Email: [email or web address]     ADMISSION INFORMATION  PRESENTATION DATE: 6/25/2023  2:43 PM  OBERVATION ADMISSION DATE:   INPATIENT ADMISSION DATE: 6/26/23  2:36 PM   DISCHARGE DATE: 6/27/2023  1:16 PM   DISPOSITION:Home/Self Care    IMPORTANT INFORMATION:  Send all requests for admission clinical reviews, approved or denied determinations and any other requests to dedicated fax number below belonging to the campus where the patient is receiving treatment   List of dedicated fax numbers:  [address] DENIALS (Administrative/Medical Necessity) [phone]   [address] (Maternity/NICU/Pediatrics) [phone]   [facility] [phone]   [facility] 87 [phone]   [facility] [phone]   [address] [facility] [phone]   [address] [place] [address] [phone]   [address] [place] [address] [phone]   [facility]  [phone]   [facility] [phone]   [address] [place] [address] [address] [facility] [phone]

## 2023-07-04 DIAGNOSIS — J44.9 CHRONIC OBSTRUCTIVE PULMONARY DISEASE, UNSPECIFIED COPD TYPE (HCC): ICD-10-CM

## 2023-07-04 RX ORDER — UMECLIDINIUM BROMIDE AND VILANTEROL TRIFENATATE 62.5; 25 UG/1; UG/1
POWDER RESPIRATORY (INHALATION)
Qty: 60 EACH | Refills: 3 | Status: ON HOLD | OUTPATIENT
Start: 2023-07-04

## 2023-07-13 ENCOUNTER — HOSPITAL ENCOUNTER (INPATIENT)
Facility: HOSPITAL | Age: 68
LOS: 4 days | Discharge: HOME | DRG: 291 | End: 2023-07-17
Attending: EMERGENCY MEDICINE | Admitting: INTERNAL MEDICINE
Payer: COMMERCIAL

## 2023-07-13 ENCOUNTER — APPOINTMENT (INPATIENT)
Dept: NON INVASIVE DIAGNOSTICS | Facility: HOSPITAL | Age: 68
DRG: 291 | End: 2023-07-13
Payer: COMMERCIAL

## 2023-07-13 ENCOUNTER — APPOINTMENT (EMERGENCY)
Dept: RADIOLOGY | Facility: HOSPITAL | Age: 68
DRG: 291 | End: 2023-07-13
Payer: COMMERCIAL

## 2023-07-13 DIAGNOSIS — I50.9 CHF EXACERBATION (HCC): ICD-10-CM

## 2023-07-13 DIAGNOSIS — E87.1 HYPONATREMIA: ICD-10-CM

## 2023-07-13 DIAGNOSIS — N18.31 STAGE 3A CHRONIC KIDNEY DISEASE (HCC): Chronic | ICD-10-CM

## 2023-07-13 DIAGNOSIS — I48.91 ATRIAL FIBRILLATION (HCC): ICD-10-CM

## 2023-07-13 DIAGNOSIS — J96.01 ACUTE RESPIRATORY FAILURE WITH HYPOXEMIA (HCC): ICD-10-CM

## 2023-07-13 DIAGNOSIS — I50.33 ACUTE ON CHRONIC HEART FAILURE WITH PRESERVED EJECTION FRACTION (HCC): Primary | ICD-10-CM

## 2023-07-13 DIAGNOSIS — J44.1 COPD EXACERBATION (HCC): ICD-10-CM

## 2023-07-13 DIAGNOSIS — I48.91 ATRIAL FIBRILLATION WITH RVR (HCC): ICD-10-CM

## 2023-07-13 PROBLEM — E83.42 HYPOMAGNESEMIA: Chronic | Status: ACTIVE | Noted: 2023-07-13

## 2023-07-13 PROBLEM — G47.00 INSOMNIA: Chronic | Status: ACTIVE | Noted: 2021-09-28

## 2023-07-13 PROBLEM — R06.02 SHORT OF BREATH ON EXERTION: Status: RESOLVED | Noted: 2022-09-20 | Resolved: 2023-07-13

## 2023-07-13 PROBLEM — N18.32 STAGE 3B CHRONIC KIDNEY DISEASE (HCC): Status: RESOLVED | Noted: 2022-10-28 | Resolved: 2023-07-13

## 2023-07-13 PROBLEM — J44.9 COPD (CHRONIC OBSTRUCTIVE PULMONARY DISEASE) (HCC): Chronic | Status: ACTIVE | Noted: 2023-06-25

## 2023-07-13 PROBLEM — M79.606 LEG PAIN: Status: RESOLVED | Noted: 2018-12-03 | Resolved: 2023-07-13

## 2023-07-13 PROBLEM — E83.42 HYPOMAGNESEMIA: Status: ACTIVE | Noted: 2023-07-13

## 2023-07-13 PROBLEM — S32.10XA SACRAL FRACTURE (HCC): Status: RESOLVED | Noted: 2023-06-25 | Resolved: 2023-07-13

## 2023-07-13 PROBLEM — R29.898 WEAKNESS OF LOWER EXTREMITY: Status: RESOLVED | Noted: 2018-11-30 | Resolved: 2023-07-13

## 2023-07-13 PROBLEM — G51.0 BELL'S PALSY: Status: RESOLVED | Noted: 2018-10-05 | Resolved: 2023-07-13

## 2023-07-13 PROBLEM — D64.9 ANEMIA: Status: RESOLVED | Noted: 2021-09-30 | Resolved: 2023-07-13

## 2023-07-13 PROBLEM — E87.6 HYPOKALEMIA: Status: RESOLVED | Noted: 2018-08-28 | Resolved: 2023-07-13

## 2023-07-13 PROBLEM — F41.8 DEPRESSION WITH ANXIETY: Chronic | Status: ACTIVE | Noted: 2018-09-03

## 2023-07-13 PROBLEM — S72.002A CLOSED LEFT HIP FRACTURE (HCC): Status: RESOLVED | Noted: 2021-09-28 | Resolved: 2023-07-13

## 2023-07-13 PROBLEM — N17.9 AKI (ACUTE KIDNEY INJURY) (HCC): Status: RESOLVED | Noted: 2018-08-26 | Resolved: 2023-07-13

## 2023-07-13 PROBLEM — R29.898 LOWER EXTREMITY WEAKNESS: Status: RESOLVED | Noted: 2018-11-30 | Resolved: 2023-07-13

## 2023-07-13 LAB
ALBUMIN SERPL BCP-MCNC: 3.4 G/DL (ref 3.5–5)
ALP SERPL-CCNC: 227 U/L (ref 34–104)
ALT SERPL W P-5'-P-CCNC: 22 U/L (ref 7–52)
ANION GAP SERPL CALCULATED.3IONS-SCNC: 12 MMOL/L
ANION GAP SERPL CALCULATED.3IONS-SCNC: 13 MMOL/L
AORTIC ROOT: 2.7 CM
APICAL FOUR CHAMBER EJECTION FRACTION: 76 %
AST SERPL W P-5'-P-CCNC: 20 U/L (ref 13–39)
ATRIAL RATE: 166 BPM
BACTERIA UR QL AUTO: NORMAL /HPF
BASOPHILS # BLD AUTO: 0.04 THOUSANDS/ÂΜL (ref 0–0.1)
BASOPHILS NFR BLD AUTO: 1 % (ref 0–1)
BILIRUB SERPL-MCNC: 1.01 MG/DL (ref 0.2–1)
BILIRUB UR QL STRIP: NEGATIVE
BNP SERPL-MCNC: 2322 PG/ML (ref 0–100)
BUN SERPL-MCNC: 21 MG/DL (ref 5–25)
BUN SERPL-MCNC: 24 MG/DL (ref 5–25)
CALCIUM ALBUM COR SERPL-MCNC: 9.1 MG/DL (ref 8.3–10.1)
CALCIUM SERPL-MCNC: 8.6 MG/DL (ref 8.4–10.2)
CALCIUM SERPL-MCNC: 8.6 MG/DL (ref 8.4–10.2)
CHLORIDE SERPL-SCNC: 94 MMOL/L (ref 96–108)
CHLORIDE SERPL-SCNC: 96 MMOL/L (ref 96–108)
CLARITY UR: CLEAR
CO2 SERPL-SCNC: 22 MMOL/L (ref 21–32)
CO2 SERPL-SCNC: 22 MMOL/L (ref 21–32)
COLOR UR: YELLOW
CREAT SERPL-MCNC: 1.4 MG/DL (ref 0.6–1.3)
CREAT SERPL-MCNC: 1.58 MG/DL (ref 0.6–1.3)
DOP CALC LVOT AREA: 2.83 CM2
DOP CALC LVOT DIAMETER: 1.9 CM
EOSINOPHIL # BLD AUTO: 0.1 THOUSAND/ÂΜL (ref 0–0.61)
EOSINOPHIL NFR BLD AUTO: 1 % (ref 0–6)
ERYTHROCYTE [DISTWIDTH] IN BLOOD BY AUTOMATED COUNT: 19.2 % (ref 11.6–15.1)
FLUAV RNA RESP QL NAA+PROBE: NEGATIVE
FLUBV RNA RESP QL NAA+PROBE: NEGATIVE
FRACTIONAL SHORTENING: 35 % (ref 28–44)
GFR SERPL CREATININE-BSD FRML MDRD: 32 ML/MIN/1.73SQ M
GFR SERPL CREATININE-BSD FRML MDRD: 38 ML/MIN/1.73SQ M
GLUCOSE SERPL-MCNC: 232 MG/DL (ref 65–140)
GLUCOSE SERPL-MCNC: 99 MG/DL (ref 65–140)
GLUCOSE UR STRIP-MCNC: NEGATIVE MG/DL
HCT VFR BLD AUTO: 44.4 % (ref 34.8–46.1)
HGB BLD-MCNC: 14.6 G/DL (ref 11.5–15.4)
HGB UR QL STRIP.AUTO: NEGATIVE
IMM GRANULOCYTES # BLD AUTO: 0.05 THOUSAND/UL (ref 0–0.2)
IMM GRANULOCYTES NFR BLD AUTO: 1 % (ref 0–2)
INTERVENTRICULAR SEPTUM IN DIASTOLE (PARASTERNAL SHORT AXIS VIEW): 1.2 CM
INTERVENTRICULAR SEPTUM: 1.2 CM (ref 0.6–1.1)
KETONES UR STRIP-MCNC: NEGATIVE MG/DL
LAAS-AP2: 23.4 CM2
LAAS-AP4: 21.6 CM2
LEFT ATRIUM SIZE: 4.1 CM
LEFT ATRIUM VOLUME (MOD BIPLANE): 68 ML
LEFT INTERNAL DIMENSION IN SYSTOLE: 2.4 CM (ref 2.1–4)
LEFT VENTRICULAR INTERNAL DIMENSION IN DIASTOLE: 3.7 CM (ref 3.5–6)
LEFT VENTRICULAR POSTERIOR WALL IN END DIASTOLE: 1.2 CM
LEFT VENTRICULAR STROKE VOLUME: 37 ML
LEUKOCYTE ESTERASE UR QL STRIP: NEGATIVE
LVSV (TEICH): 37 ML
LYMPHOCYTES # BLD AUTO: 1.37 THOUSANDS/ÂΜL (ref 0.6–4.47)
LYMPHOCYTES NFR BLD AUTO: 18 % (ref 14–44)
MAGNESIUM SERPL-MCNC: 1.6 MG/DL (ref 1.9–2.7)
MAGNESIUM SERPL-MCNC: 2 MG/DL (ref 1.9–2.7)
MCH RBC QN AUTO: 29.7 PG (ref 26.8–34.3)
MCHC RBC AUTO-ENTMCNC: 32.9 G/DL (ref 31.4–37.4)
MCV RBC AUTO: 90 FL (ref 82–98)
MONOCYTES # BLD AUTO: 0.9 THOUSAND/ÂΜL (ref 0.17–1.22)
MONOCYTES NFR BLD AUTO: 12 % (ref 4–12)
NEUTROPHILS # BLD AUTO: 5.21 THOUSANDS/ÂΜL (ref 1.85–7.62)
NEUTS SEG NFR BLD AUTO: 67 % (ref 43–75)
NITRITE UR QL STRIP: NEGATIVE
NON-SQ EPI CELLS URNS QL MICRO: NORMAL /HPF
NRBC BLD AUTO-RTO: 0 /100 WBCS
PH UR STRIP.AUTO: 6 [PH]
PLATELET # BLD AUTO: 243 THOUSANDS/UL (ref 149–390)
PMV BLD AUTO: 9.2 FL (ref 8.9–12.7)
POTASSIUM SERPL-SCNC: 3.6 MMOL/L (ref 3.5–5.3)
POTASSIUM SERPL-SCNC: 4.1 MMOL/L (ref 3.5–5.3)
PROT SERPL-MCNC: 6.2 G/DL (ref 6.4–8.4)
PROT UR STRIP-MCNC: ABNORMAL MG/DL
QRS AXIS: 141 DEGREES
QRSD INTERVAL: 86 MS
QT INTERVAL: 288 MS
QTC INTERVAL: 456 MS
RBC # BLD AUTO: 4.91 MILLION/UL (ref 3.81–5.12)
RBC #/AREA URNS AUTO: NORMAL /HPF
RIGHT ATRIUM AREA SYSTOLE A4C: 20.6 CM2
RIGHT VENTRICLE ID DIMENSION: 2.6 CM
RSV RNA RESP QL NAA+PROBE: NEGATIVE
SARS-COV-2 RNA RESP QL NAA+PROBE: NEGATIVE
SL CV LEFT ATRIUM LENGTH A2C: 6.4 CM
SL CV LV EF: 65
SL CV PED ECHO LEFT VENTRICLE DIASTOLIC VOLUME (MOD BIPLANE) 2D: 57 ML
SL CV PED ECHO LEFT VENTRICLE SYSTOLIC VOLUME (MOD BIPLANE) 2D: 20 ML
SODIUM SERPL-SCNC: 129 MMOL/L (ref 135–147)
SODIUM SERPL-SCNC: 130 MMOL/L (ref 135–147)
SP GR UR STRIP.AUTO: 1.01
T WAVE AXIS: 84 DEGREES
TR MAX PG: 45 MMHG
TR PEAK VELOCITY: 3.3 M/S
TRICUSPID ANNULAR PLANE SYSTOLIC EXCURSION: 1 CM
TRICUSPID VALVE PEAK REGURGITATION VELOCITY: 3.34 M/S
UROBILINOGEN UR QL STRIP.AUTO: 0.2 E.U./DL
VENTRICULAR RATE: 151 BPM
WBC # BLD AUTO: 7.67 THOUSAND/UL (ref 4.31–10.16)
WBC #/AREA URNS AUTO: NORMAL /HPF

## 2023-07-13 PROCEDURE — 93005 ELECTROCARDIOGRAM TRACING: CPT

## 2023-07-13 PROCEDURE — 83735 ASSAY OF MAGNESIUM: CPT | Performed by: INTERNAL MEDICINE

## 2023-07-13 PROCEDURE — 99291 CRITICAL CARE FIRST HOUR: CPT | Performed by: EMERGENCY MEDICINE

## 2023-07-13 PROCEDURE — 96375 TX/PRO/DX INJ NEW DRUG ADDON: CPT

## 2023-07-13 PROCEDURE — 93321 DOPPLER ECHO F-UP/LMTD STD: CPT | Performed by: INTERNAL MEDICINE

## 2023-07-13 PROCEDURE — 85025 COMPLETE CBC W/AUTO DIFF WBC: CPT | Performed by: EMERGENCY MEDICINE

## 2023-07-13 PROCEDURE — 83880 ASSAY OF NATRIURETIC PEPTIDE: CPT | Performed by: EMERGENCY MEDICINE

## 2023-07-13 PROCEDURE — 96374 THER/PROPH/DIAG INJ IV PUSH: CPT

## 2023-07-13 PROCEDURE — 36415 COLL VENOUS BLD VENIPUNCTURE: CPT | Performed by: EMERGENCY MEDICINE

## 2023-07-13 PROCEDURE — 93010 ELECTROCARDIOGRAM REPORT: CPT | Performed by: INTERNAL MEDICINE

## 2023-07-13 PROCEDURE — 99291 CRITICAL CARE FIRST HOUR: CPT

## 2023-07-13 PROCEDURE — 94640 AIRWAY INHALATION TREATMENT: CPT

## 2023-07-13 PROCEDURE — 93325 DOPPLER ECHO COLOR FLOW MAPG: CPT | Performed by: INTERNAL MEDICINE

## 2023-07-13 PROCEDURE — 0241U HB NFCT DS VIR RESP RNA 4 TRGT: CPT | Performed by: EMERGENCY MEDICINE

## 2023-07-13 PROCEDURE — 71045 X-RAY EXAM CHEST 1 VIEW: CPT

## 2023-07-13 PROCEDURE — 94760 N-INVAS EAR/PLS OXIMETRY 1: CPT

## 2023-07-13 PROCEDURE — 93308 TTE F-UP OR LMTD: CPT | Performed by: INTERNAL MEDICINE

## 2023-07-13 PROCEDURE — 80053 COMPREHEN METABOLIC PANEL: CPT | Performed by: EMERGENCY MEDICINE

## 2023-07-13 PROCEDURE — 93321 DOPPLER ECHO F-UP/LMTD STD: CPT

## 2023-07-13 PROCEDURE — 94664 DEMO&/EVAL PT USE INHALER: CPT

## 2023-07-13 PROCEDURE — 93308 TTE F-UP OR LMTD: CPT

## 2023-07-13 PROCEDURE — 99223 1ST HOSP IP/OBS HIGH 75: CPT | Performed by: HOSPITALIST

## 2023-07-13 PROCEDURE — 80048 BASIC METABOLIC PNL TOTAL CA: CPT | Performed by: INTERNAL MEDICINE

## 2023-07-13 PROCEDURE — 81003 URINALYSIS AUTO W/O SCOPE: CPT | Performed by: EMERGENCY MEDICINE

## 2023-07-13 PROCEDURE — 81001 URINALYSIS AUTO W/SCOPE: CPT | Performed by: EMERGENCY MEDICINE

## 2023-07-13 PROCEDURE — 93325 DOPPLER ECHO COLOR FLOW MAPG: CPT

## 2023-07-13 PROCEDURE — 83735 ASSAY OF MAGNESIUM: CPT | Performed by: EMERGENCY MEDICINE

## 2023-07-13 PROCEDURE — 99223 1ST HOSP IP/OBS HIGH 75: CPT | Performed by: INTERNAL MEDICINE

## 2023-07-13 PROCEDURE — 96376 TX/PRO/DX INJ SAME DRUG ADON: CPT

## 2023-07-13 RX ORDER — ACETAMINOPHEN 325 MG/1
650 TABLET ORAL EVERY 4 HOURS PRN
Status: DISCONTINUED | OUTPATIENT
Start: 2023-07-13 | End: 2023-07-17 | Stop reason: HOSPADM

## 2023-07-13 RX ORDER — ALBUMIN, HUMAN INJ 5% 5 %
SOLUTION INTRAVENOUS
Status: DISPENSED
Start: 2023-07-13 | End: 2023-07-13

## 2023-07-13 RX ORDER — LEVALBUTEROL INHALATION SOLUTION 1.25 MG/3ML
1.25 SOLUTION RESPIRATORY (INHALATION)
Status: DISCONTINUED | OUTPATIENT
Start: 2023-07-13 | End: 2023-07-17 | Stop reason: HOSPADM

## 2023-07-13 RX ORDER — ALBUMIN, HUMAN INJ 5% 5 %
25 SOLUTION INTRAVENOUS ONCE
Status: DISCONTINUED | OUTPATIENT
Start: 2023-07-13 | End: 2023-07-13

## 2023-07-13 RX ORDER — DILTIAZEM HYDROCHLORIDE 5 MG/ML
15 INJECTION INTRAVENOUS ONCE
Status: COMPLETED | OUTPATIENT
Start: 2023-07-13 | End: 2023-07-13

## 2023-07-13 RX ORDER — FUROSEMIDE 10 MG/ML
40 INJECTION INTRAMUSCULAR; INTRAVENOUS
Status: DISCONTINUED | OUTPATIENT
Start: 2023-07-13 | End: 2023-07-17 | Stop reason: HOSPADM

## 2023-07-13 RX ORDER — DIPHENHYDRAMINE HCL 25 MG
25 TABLET ORAL ONCE
Status: COMPLETED | OUTPATIENT
Start: 2023-07-13 | End: 2023-07-13

## 2023-07-13 RX ORDER — MELATONIN
2000 DAILY
Status: DISCONTINUED | OUTPATIENT
Start: 2023-07-13 | End: 2023-07-17 | Stop reason: HOSPADM

## 2023-07-13 RX ORDER — DILTIAZEM HYDROCHLORIDE 240 MG/1
240 CAPSULE, COATED, EXTENDED RELEASE ORAL DAILY
Status: DISCONTINUED | OUTPATIENT
Start: 2023-07-13 | End: 2023-07-16

## 2023-07-13 RX ORDER — DILTIAZEM HYDROCHLORIDE 5 MG/ML
15 INJECTION INTRAVENOUS ONCE
Status: DISCONTINUED | OUTPATIENT
Start: 2023-07-13 | End: 2023-07-13

## 2023-07-13 RX ORDER — FUROSEMIDE 10 MG/ML
20 INJECTION INTRAMUSCULAR; INTRAVENOUS ONCE
Status: COMPLETED | OUTPATIENT
Start: 2023-07-13 | End: 2023-07-13

## 2023-07-13 RX ORDER — ALPRAZOLAM 0.25 MG/1
0.25 TABLET ORAL 2 TIMES DAILY
Status: DISCONTINUED | OUTPATIENT
Start: 2023-07-13 | End: 2023-07-17 | Stop reason: HOSPADM

## 2023-07-13 RX ORDER — POTASSIUM CHLORIDE 20 MEQ/1
20 TABLET, EXTENDED RELEASE ORAL DAILY
Status: DISCONTINUED | OUTPATIENT
Start: 2023-07-13 | End: 2023-07-13

## 2023-07-13 RX ORDER — METHYLPREDNISOLONE SODIUM SUCCINATE 125 MG/2ML
125 INJECTION, POWDER, LYOPHILIZED, FOR SOLUTION INTRAMUSCULAR; INTRAVENOUS ONCE
Status: COMPLETED | OUTPATIENT
Start: 2023-07-13 | End: 2023-07-13

## 2023-07-13 RX ORDER — IPRATROPIUM BROMIDE AND ALBUTEROL SULFATE 2.5; .5 MG/3ML; MG/3ML
SOLUTION RESPIRATORY (INHALATION)
Status: COMPLETED
Start: 2023-07-13 | End: 2023-07-13

## 2023-07-13 RX ORDER — MAGNESIUM SULFATE HEPTAHYDRATE 40 MG/ML
2 INJECTION, SOLUTION INTRAVENOUS ONCE
Status: COMPLETED | OUTPATIENT
Start: 2023-07-13 | End: 2023-07-13

## 2023-07-13 RX ORDER — DILTIAZEM HYDROCHLORIDE 120 MG/1
240 CAPSULE, COATED, EXTENDED RELEASE ORAL DAILY
Status: DISCONTINUED | OUTPATIENT
Start: 2023-07-13 | End: 2023-07-13

## 2023-07-13 RX ORDER — POTASSIUM CHLORIDE 20 MEQ/1
40 TABLET, EXTENDED RELEASE ORAL DAILY
Status: DISCONTINUED | OUTPATIENT
Start: 2023-07-14 | End: 2023-07-17 | Stop reason: HOSPADM

## 2023-07-13 RX ORDER — ALBUTEROL SULFATE 2.5 MG/3ML
SOLUTION RESPIRATORY (INHALATION)
Status: COMPLETED
Start: 2023-07-13 | End: 2023-07-13

## 2023-07-13 RX ORDER — FUROSEMIDE 10 MG/ML
25 INJECTION INTRAMUSCULAR; INTRAVENOUS
Status: DISCONTINUED | OUTPATIENT
Start: 2023-07-13 | End: 2023-07-13

## 2023-07-13 RX ORDER — ALBUMIN, HUMAN INJ 5% 5 %
25 SOLUTION INTRAVENOUS ONCE
Status: COMPLETED | OUTPATIENT
Start: 2023-07-13 | End: 2023-07-13

## 2023-07-13 RX ORDER — DIGOXIN 0.25 MG/ML
250 INJECTION INTRAMUSCULAR; INTRAVENOUS ONCE
Status: COMPLETED | OUTPATIENT
Start: 2023-07-13 | End: 2023-07-13

## 2023-07-13 RX ORDER — POTASSIUM CHLORIDE 20 MEQ/1
20 TABLET, EXTENDED RELEASE ORAL ONCE
Status: COMPLETED | OUTPATIENT
Start: 2023-07-13 | End: 2023-07-13

## 2023-07-13 RX ORDER — DILTIAZEM HYDROCHLORIDE 5 MG/ML
10 INJECTION INTRAVENOUS ONCE
Status: DISCONTINUED | OUTPATIENT
Start: 2023-07-13 | End: 2023-07-13

## 2023-07-13 RX ORDER — METOPROLOL SUCCINATE 50 MG/1
50 TABLET, EXTENDED RELEASE ORAL DAILY
Status: DISCONTINUED | OUTPATIENT
Start: 2023-07-13 | End: 2023-07-16

## 2023-07-13 RX ORDER — MIRTAZAPINE 15 MG/1
7.5 TABLET, FILM COATED ORAL
Status: DISCONTINUED | OUTPATIENT
Start: 2023-07-13 | End: 2023-07-17 | Stop reason: HOSPADM

## 2023-07-13 RX ADMIN — APIXABAN 5 MG: 5 TABLET, FILM COATED ORAL at 17:55

## 2023-07-13 RX ADMIN — LEVALBUTEROL HYDROCHLORIDE 1.25 MG: 1.25 SOLUTION RESPIRATORY (INHALATION) at 23:33

## 2023-07-13 RX ADMIN — METOPROLOL SUCCINATE 50 MG: 50 TABLET, EXTENDED RELEASE ORAL at 08:29

## 2023-07-13 RX ADMIN — POTASSIUM CHLORIDE 20 MEQ: 1500 TABLET, EXTENDED RELEASE ORAL at 10:47

## 2023-07-13 RX ADMIN — APIXABAN 5 MG: 5 TABLET, FILM COATED ORAL at 08:29

## 2023-07-13 RX ADMIN — ALPRAZOLAM 0.25 MG: 0.25 TABLET ORAL at 08:29

## 2023-07-13 RX ADMIN — LEVALBUTEROL HYDROCHLORIDE 1.25 MG: 1.25 SOLUTION RESPIRATORY (INHALATION) at 07:53

## 2023-07-13 RX ADMIN — DILTIAZEM HYDROCHLORIDE 15 MG: 5 INJECTION INTRAVENOUS at 04:56

## 2023-07-13 RX ADMIN — ALBUMIN (HUMAN) 25 G: 12.5 INJECTION, SOLUTION INTRAVENOUS at 04:58

## 2023-07-13 RX ADMIN — Medication 2000 UNITS: at 08:29

## 2023-07-13 RX ADMIN — METHYLPREDNISOLONE SODIUM SUCCINATE 125 MG: 125 INJECTION, POWDER, FOR SOLUTION INTRAMUSCULAR; INTRAVENOUS at 02:55

## 2023-07-13 RX ADMIN — ALPRAZOLAM 0.25 MG: 0.25 TABLET ORAL at 21:51

## 2023-07-13 RX ADMIN — DIPHENHYDRAMINE HYDROCHLORIDE 25 MG: 25 TABLET ORAL at 04:55

## 2023-07-13 RX ADMIN — FUROSEMIDE 20 MG: 10 INJECTION, SOLUTION INTRAMUSCULAR; INTRAVENOUS at 03:30

## 2023-07-13 RX ADMIN — FUROSEMIDE 25 MG: 10 INJECTION, SOLUTION INTRAMUSCULAR; INTRAVENOUS at 08:28

## 2023-07-13 RX ADMIN — MAGNESIUM SULFATE HEPTAHYDRATE 2 G: 40 INJECTION, SOLUTION INTRAVENOUS at 05:30

## 2023-07-13 RX ADMIN — DILTIAZEM HYDROCHLORIDE 15 MG: 5 INJECTION INTRAVENOUS at 02:48

## 2023-07-13 RX ADMIN — DILTIAZEM HYDROCHLORIDE 240 MG: 240 CAPSULE, COATED, EXTENDED RELEASE ORAL at 08:29

## 2023-07-13 RX ADMIN — POTASSIUM CHLORIDE 20 MEQ: 1500 TABLET, EXTENDED RELEASE ORAL at 08:29

## 2023-07-13 RX ADMIN — FUROSEMIDE 40 MG: 10 INJECTION, SOLUTION INTRAMUSCULAR; INTRAVENOUS at 17:55

## 2023-07-13 RX ADMIN — MIRTAZAPINE 7.5 MG: 15 TABLET, FILM COATED ORAL at 21:51

## 2023-07-13 RX ADMIN — UMECLIDINIUM BROMIDE AND VILANTEROL TRIFENATATE 1 PUFF: 62.5; 25 POWDER RESPIRATORY (INHALATION) at 10:47

## 2023-07-13 RX ADMIN — NICOTINE 1 PATCH: 7 PATCH, EXTENDED RELEASE TRANSDERMAL at 08:30

## 2023-07-13 RX ADMIN — DIGOXIN 250 MCG: 0.25 INJECTION INTRAMUSCULAR; INTRAVENOUS at 12:15

## 2023-07-13 NOTE — ASSESSMENT & PLAN NOTE
Wt Readings from Last 3 Encounters:   07/13/23 59.1 kg (130 lb 4.7 oz)   06/25/23 54 kg (119 lb 0.8 oz)   05/01/23 52.2 kg (115 lb)       -Patient significantly volume overloaded on physical exam  -We will check limited transthoracic echocardiogram to monitor cardiac function and RVSP  -Agree with continuing IV diuresis and would increase to 40 mg IV twice daily  - would also recommend nephrology evaluation as patient does follow with them in the outpatient setting  -Counseled patient on dietary and lifestyle modifications including sodium and fluid restricted diet.  -Would monitor daily weights and strict I's and O's. Please do standing weights if patient can tolerate.   If not then please do accurate bed weights.  -Patient with hypokalemia and hyponatremia along with hypomagnesemia we will give additional 20 mEq potassium on top of 20 mEq standing today and recheck BMP and magnesium level this afternoon to assist with repletion while on IV diuretic therapy

## 2023-07-13 NOTE — PLAN OF CARE
Problem: MOBILITY - ADULT  Goal: Maintain or return to baseline ADL function  Description: INTERVENTIONS:  -  Assess patient's ability to carry out ADLs; assess patient's baseline for ADL function and identify physical deficits which impact ability to perform ADLs (bathing, care of mouth/teeth, toileting, grooming, dressing, etc.)  - Assess/evaluate cause of self-care deficits   - Assess range of motion  - Assess patient's mobility; develop plan if impaired  - Assess patient's need for assistive devices and provide as appropriate  - Encourage maximum independence but intervene and supervise when necessary  - Involve family in performance of ADLs  - Assess for home care needs following discharge   - Consider OT consult to assist with ADL evaluation and planning for discharge  - Provide patient education as appropriate  Outcome: Progressing  Goal: Maintains/Returns to pre admission functional level  Description: INTERVENTIONS:  - Perform BMAT or MOVE assessment daily.   - Set and communicate daily mobility goal to care team and patient/family/caregiver. - Collaborate with rehabilitation services on mobility goals if consulted  - Perform Range of Motion 3 times a day. - Reposition patient every 2 hours.   - Dangle patient 3 times a day  - Stand patient 3 times a day  - Ambulate patient 3 times a day  - Out of bed to chair 3 times a day   - Out of bed for meals 3 times a day  - Out of bed for toileting  - Record patient progress and toleration of activity level   Outcome: Progressing     Problem: Prexisting or High Potential for Compromised Skin Integrity  Goal: Skin integrity is maintained or improved  Description: INTERVENTIONS:  - Identify patients at risk for skin breakdown  - Assess and monitor skin integrity  - Assess and monitor nutrition and hydration status  - Monitor labs   - Assess for incontinence   - Turn and reposition patient  - Assist with mobility/ambulation  - Relieve pressure over bony prominences  - Avoid friction and shearing  - Provide appropriate hygiene as needed including keeping skin clean and dry  - Evaluate need for skin moisturizer/barrier cream  - Collaborate with interdisciplinary team   - Patient/family teaching  - Consider wound care consult   Outcome: Progressing     Problem: PAIN - ADULT  Goal: Verbalizes/displays adequate comfort level or baseline comfort level  Description: Interventions:  - Encourage patient to monitor pain and request assistance  - Assess pain using appropriate pain scale  - Administer analgesics based on type and severity of pain and evaluate response  - Implement non-pharmacological measures as appropriate and evaluate response  - Consider cultural and social influences on pain and pain management  - Notify physician/advanced practitioner if interventions unsuccessful or patient reports new pain  Outcome: Progressing     Problem: INFECTION - ADULT  Goal: Absence or prevention of progression during hospitalization  Description: INTERVENTIONS:  - Assess and monitor for signs and symptoms of infection  - Monitor lab/diagnostic results  - Monitor all insertion sites, i.e. indwelling lines, tubes, and drains  - Monitor endotracheal if appropriate and nasal secretions for changes in amount and color  - [place] appropriate cooling/warming therapies per order  - Administer medications as ordered  - Instruct and encourage patient and family to use good hand hygiene technique  - Identify and instruct in appropriate isolation precautions for identified infection/condition  Outcome: Progressing  Goal: Absence of fever/infection during neutropenic period  Description: INTERVENTIONS:  - Monitor WBC    Outcome: Progressing     Problem: SAFETY ADULT  Goal: Maintain or return to baseline ADL function  Description: INTERVENTIONS:  -  Assess patient's ability to carry out ADLs; assess patient's baseline for ADL function and identify physical deficits which impact ability to perform ADLs (bathing, care of mouth/teeth, toileting, grooming, dressing, etc.)  - Assess/evaluate cause of self-care deficits   - Assess range of motion  - Assess patient's mobility; develop plan if impaired  - Assess patient's need for assistive devices and provide as appropriate  - Encourage maximum independence but intervene and supervise when necessary  - Involve family in performance of ADLs  - Assess for home care needs following discharge   - Consider OT consult to assist with ADL evaluation and planning for discharge  - Provide patient education as appropriate  Outcome: Progressing  Goal: Maintains/Returns to pre admission functional level  Description: INTERVENTIONS:  - Perform BMAT or MOVE assessment daily.   - Set and communicate daily mobility goal to care team and patient/family/caregiver. - Collaborate with rehabilitation services on mobility goals if consulted  - Perform Range of Motion 3 times a day. - Reposition patient every 2 hours.   - Dangle patient 3 times a day  - Stand patient 3 times a day  - Ambulate patient 3 times a day  - Out of bed to chair 3 times a day   - Out of bed for meals 3 times a day  - Out of bed for toileting  - Record patient progress and toleration of activity level   Outcome: Progressing  Goal: Patient will remain free of falls  Description: INTERVENTIONS:  - Educate patient/family on patient safety including physical limitations  - Instruct patient to call for assistance with activity   - Consult OT/PT to assist with strengthening/mobility   - Keep Call bell within reach  - Keep bed low and locked with side rails adjusted as appropriate  - Keep care items and personal belongings within reach  - Initiate and maintain comfort rounds  - Make Fall Risk Sign visible to staff  - Offer Toileting every 2 Hours, in advance of need  - Initiate/Maintain bed alarm  - Apply yellow socks and bracelet for high fall risk patients  - Consider moving patient to room near nurses station  Outcome: Progressing     Problem: DISCHARGE PLANNING  Goal: Discharge to home or other facility with appropriate resources  Description: INTERVENTIONS:  - Identify barriers to discharge w/patient and caregiver  - Arrange for needed discharge resources and transportation as appropriate  - Identify discharge learning needs (meds, wound care, etc.)  - Arrange for interpretive services to assist at discharge as needed  - Refer to Case Management Department for coordinating discharge planning if the patient needs post-hospital services based on physician/advanced practitioner order or complex needs related to functional status, cognitive ability, or social support system  Outcome: Progressing     Problem: Knowledge Deficit  Goal: Patient/family/caregiver demonstrates understanding of disease process, treatment plan, medications, and discharge instructions  Description: Complete learning assessment and assess knowledge base.   Interventions:  - Provide teaching at level of understanding  - Provide teaching via preferred learning methods  Outcome: Progressing

## 2023-07-13 NOTE — PLAN OF CARE
Problem: MOBILITY - ADULT  Goal: Maintain or return to baseline ADL function  Description: INTERVENTIONS:  -  Assess patient's ability to carry out ADLs; assess patient's baseline for ADL function and identify physical deficits which impact ability to perform ADLs (bathing, care of mouth/teeth, toileting, grooming, dressing, etc.)  - Assess/evaluate cause of self-care deficits   - Assess range of motion  - Assess patient's mobility; develop plan if impaired  - Assess patient's need for assistive devices and provide as appropriate  - Encourage maximum independence but intervene and supervise when necessary  - Involve family in performance of ADLs  - Assess for home care needs following discharge   - Consider OT consult to assist with ADL evaluation and planning for discharge  - Provide patient education as appropriate  Outcome: Progressing  Goal: Maintains/Returns to pre admission functional level  Description: INTERVENTIONS:  - Perform BMAT or MOVE assessment daily.   - Set and communicate daily mobility goal to care team and patient/family/caregiver. - Collaborate with rehabilitation services on mobility goals if consulted  - Perform Range of Motion  times a day. - Reposition patient every  hours.   - Dangle patient  times a day  - Stand patient  times a day  - Ambulate patient  times a day  - Out of bed to chair  times a day   - Out of bed for meals  times a day  - Out of bed for toileting  - Record patient progress and toleration of activity level   Outcome: Progressing     Problem: Prexisting or High Potential for Compromised Skin Integrity  Goal: Skin integrity is maintained or improved  Description: INTERVENTIONS:  - Identify patients at risk for skin breakdown  - Assess and monitor skin integrity  - Assess and monitor nutrition and hydration status  - Monitor labs   - Assess for incontinence   - Turn and reposition patient  - Assist with mobility/ambulation  - Relieve pressure over bony prominences  - Avoid friction and shearing  - Provide appropriate hygiene as needed including keeping skin clean and dry  - Evaluate need for skin moisturizer/barrier cream  - Collaborate with interdisciplinary team   - Patient/family teaching  - Consider wound care consult   Outcome: Progressing     Problem: PAIN - ADULT  Goal: Verbalizes/displays adequate comfort level or baseline comfort level  Description: Interventions:  - Encourage patient to monitor pain and request assistance  - Assess pain using appropriate pain scale  - Administer analgesics based on type and severity of pain and evaluate response  - Implement non-pharmacological measures as appropriate and evaluate response  - Consider cultural and social influences on pain and pain management  - Notify physician/advanced practitioner if interventions unsuccessful or patient reports new pain  Outcome: Progressing     Problem: INFECTION - ADULT  Goal: Absence or prevention of progression during hospitalization  Description: INTERVENTIONS:  - Assess and monitor for signs and symptoms of infection  - Monitor lab/diagnostic results  - Monitor all insertion sites, i.e. indwelling lines, tubes, and drains  - Monitor endotracheal if appropriate and nasal secretions for changes in amount and color  - [place] appropriate cooling/warming therapies per order  - Administer medications as ordered  - Instruct and encourage patient and family to use good hand hygiene technique  - Identify and instruct in appropriate isolation precautions for identified infection/condition  Outcome: Progressing  Goal: Absence of fever/infection during neutropenic period  Description: INTERVENTIONS:  - Monitor WBC    Outcome: Progressing     Problem: SAFETY ADULT  Goal: Maintain or return to baseline ADL function  Description: INTERVENTIONS:  -  Assess patient's ability to carry out ADLs; assess patient's baseline for ADL function and identify physical deficits which impact ability to perform ADLs (bathing, care of mouth/teeth, toileting, grooming, dressing, etc.)  - Assess/evaluate cause of self-care deficits   - Assess range of motion  - Assess patient's mobility; develop plan if impaired  - Assess patient's need for assistive devices and provide as appropriate  - Encourage maximum independence but intervene and supervise when necessary  - Involve family in performance of ADLs  - Assess for home care needs following discharge   - Consider OT consult to assist with ADL evaluation and planning for discharge  - Provide patient education as appropriate  Outcome: Progressing  Goal: Maintains/Returns to pre admission functional level  Description: INTERVENTIONS:  - Perform BMAT or MOVE assessment daily.   - Set and communicate daily mobility goal to care team and patient/family/caregiver. - Collaborate with rehabilitation services on mobility goals if consulted  - Perform Range of Motion  times a day. - Reposition patient every  hours.   - Dangle patient  times a day  - Stand patient  times a day  - Ambulate patient  times a day  - Out of bed to chair  times a day   - Out of bed for meals  times a day  - Out of bed for toileting  - Record patient progress and toleration of activity level   Outcome: Progressing  Goal: Patient will remain free of falls  Description: INTERVENTIONS:  - Educate patient/family on patient safety including physical limitations  - Instruct patient to call for assistance with activity   - Consult OT/PT to assist with strengthening/mobility   - Keep Call bell within reach  - Keep bed low and locked with side rails adjusted as appropriate  - Keep care items and personal belongings within reach  - Initiate and maintain comfort rounds  - Make Fall Risk Sign visible to staff  - Offer Toileting every  Hours, in advance of need  - Initiate/Maintain alarm  - Obtain necessary fall risk management equipment:   - Apply yellow socks and bracelet for high fall risk patients  - Consider moving patient to room near nurses station  Outcome: Progressing     Problem: DISCHARGE PLANNING  Goal: Discharge to home or other facility with appropriate resources  Description: INTERVENTIONS:  - Identify barriers to discharge w/patient and caregiver  - Arrange for needed discharge resources and transportation as appropriate  - Identify discharge learning needs (meds, wound care, etc.)  - Arrange for interpretive services to assist at discharge as needed  - Refer to Case Management Department for coordinating discharge planning if the patient needs post-hospital services based on physician/advanced practitioner order or complex needs related to functional status, cognitive ability, or social support system  Outcome: Progressing     Problem: Knowledge Deficit  Goal: Patient/family/caregiver demonstrates understanding of disease process, treatment plan, medications, and discharge instructions  Description: Complete learning assessment and assess knowledge base.   Interventions:  - Provide teaching at level of understanding  - Provide teaching via preferred learning methods  Outcome: Progressing

## 2023-07-13 NOTE — PLAN OF CARE
Problem: PAIN - ADULT  Goal: Verbalizes/displays adequate comfort level or baseline comfort level  Description: Interventions:  - Encourage patient to monitor pain and request assistance  - Assess pain using appropriate pain scale  - Administer analgesics based on type and severity of pain and evaluate response  - Implement non-pharmacological measures as appropriate and evaluate response  - Consider cultural and social influences on pain and pain management  - Notify physician/advanced practitioner if interventions unsuccessful or patient reports new pain  Outcome: Progressing     Problem: INFECTION - ADULT  Goal: Absence or prevention of progression during hospitalization  Description: INTERVENTIONS:  - Assess and monitor for signs and symptoms of infection  - Monitor lab/diagnostic results  - Monitor all insertion sites, i.e. indwelling lines, tubes, and drains  - Monitor endotracheal if appropriate and nasal secretions for changes in amount and color  - [place] appropriate cooling/warming therapies per order  - Administer medications as ordered  - Instruct and encourage patient and family to use good hand hygiene technique  - Identify and instruct in appropriate isolation precautions for identified infection/condition  Outcome: Progressing     Problem: Knowledge Deficit  Goal: Patient/family/caregiver demonstrates understanding of disease process, treatment plan, medications, and discharge instructions  Description: Complete learning assessment and assess knowledge base.   Interventions:  - Provide teaching at level of understanding  - Provide teaching via preferred learning methods  Outcome: Progressing

## 2023-07-13 NOTE — ASSESSMENT & PLAN NOTE
Patient was found to be 85% on RA w/ EMS and was on 6 L nasal cannula in ED, currently on 5L NC  Respiratory protocol  Patient does not wear oxygen at home

## 2023-07-13 NOTE — H&P
[id number] [employer]  H&P  Name: Bandar Angel 77 y.o. female I MRN: [id number]  Unit/Bed#: ED 02 I Date of Admission: 7/13/2023   Date of Service: 7/13/2023 I Hospital Day: 0      Assessment/Plan   * Acute respiratory failure with hypoxia [facility])  Assessment & Plan  Patient was found to be 85% on RA w/ EMS and was on 6 L nasal cannula in ED, currently on 5L NC  Respiratory protocol  Patient does not wear oxygen at home    Acute on chronic heart failure with preserved ejection fraction [facility])  Assessment & Plan  Wt Readings from Last 3 Encounters:   07/13/23 52.2 kg (115 lb)   06/25/23 54 kg (119 lb 0.8 oz)   05/01/23 52.2 kg (115 lb)     · BNP 2322  · IV Lasix  · Monitor I&O's Daily weight  · Cardiology consult  · Echo January 2023 EF 58%, grade 1 diastolic dysfunction, mitral valve mild regurg mild stenosis, tricuspid valve mild to moderate regurg, pulmonic valve mild regurg        Atrial fibrillation with RVR ([address])  Assessment & Plan  Patient found atrial fibrillation with rate of 151 on EKG, given Cardizem 15 mg x 1  · Continue Cardizem 240 and Toprol XL 50mg  · Eliquis 5mg BID      Hypomagnesemia  Assessment & Plan  · Replete and monitor    COPD (chronic obstructive pulmonary disease) ([address])  Assessment & Plan  · Respiratory protocol  · Patient recently hospitalized with COPD exacerbation 6/25/23  · Continue inhalers    Stage 3a chronic kidney disease [facility])  Assessment & Plan  Lab Results   Component Value Date    EGFR 38 07/13/2023    EGFR 53 06/27/2023    EGFR 55 06/26/2023    CREATININE 1.40 (H) 07/13/2023    CREATININE 1.05 06/27/2023    CREATININE 1.02 06/26/2023   · creat baseline 1.0, currently 1.4  · Monitor w/ diuresis    Insomnia  Assessment & Plan  · Continue Remeron    Tobacco dependency  Assessment & Plan  · Nicotine patch  · Smoking cessation recommended    Hyponatremia  Assessment & Plan  · Likely hypervolemia related  · Monitor with diuresis    Depression with anxiety  Assessment & Plan  · Continue xanax and Remeron    VTE Pharmacologic Prophylaxis: VTE Score: 6 High Risk (Score >/= 5) - Pharmacological DVT Prophylaxis Ordered: apixaban (Eliquis). Sequential Compression Devices Ordered. Code Status: Full code  Discussion with patient    Anticipated Length of Stay: Patient will be admitted on an inpatient basis with an anticipated length of stay of greater than 2 midnights secondary to IV diuresis, heart rate monitoring, specialist.    Total Time Spent on Date of Encounter in care of patient: 75 minutes This time was spent on one or more of the following: performing physical exam; counseling and coordination of care; obtaining or reviewing history; documenting in the medical record; reviewing/ordering tests, medications or procedures; communicating with other healthcare professionals and discussing with patient's family/caregivers. Chief Complaint: Shortness of breath    History of Present Illness:  Jazz Betts is a 77 y.o. female with a PMH of hypertension, hyperlipidemia, PAD, B-cell lymphoma, paroxysmal atrial fibrillation on anticoagulation, COPD who presents with shortness of breath. Patient was brought in by EMS, found to be 85% on RA, she was placed on oxygen and given a DuoNeb. In ED she was placed on 6l NC and found to have afib w/ RVR, given cardizem 15 mg x1, Lasix 20 mg and solumedrol 125 mg. She had reported generalized itching and was given IV Benadryl in ED. Patient HR increased again and was given Albumin and additional dose of Cardizem 15mg IV. Patient reports she takes Bumex as needed but for past few days she has been taking regularly in morning for swelling in her legs. She noted her breathing to be worse around 3 pm.  Had no palpitations. Little wheezing, no coughing. She does not wear oxygen normally. Notes her breathing is better    Review of Systems:  Review of Systems   Constitutional: Negative for chills and fever.    HENT: Negative for rhinorrhea, sore throat and trouble swallowing. Eyes: Negative for discharge and redness. Respiratory: Positive for shortness of breath and wheezing. Negative for cough. Cardiovascular: Positive for palpitations and leg swelling. Negative for chest pain. Gastrointestinal: Negative for abdominal pain, diarrhea, nausea and vomiting. Genitourinary: Negative for dysuria and hematuria. Musculoskeletal: Negative for back pain and neck pain. Skin: Negative for rash and wound. Neurological: Negative for dizziness, weakness and headaches. Psychiatric/Behavioral: Negative for agitation and confusion. Past Medical and Surgical History:   Past Medical History:   Diagnosis Date   • Bell's palsy    • Cancer [facility])     lymphoma   • Closed left hip fracture ([address]) 09/28/2021   • COPD (chronic obstructive pulmonary disease) ([facility])    • Diffuse large B cell lymphoma ([facility])    • Hyperlipidemia    • Hypertension    • PAD (peripheral artery disease) ([facility])    • PAF (paroxysmal atrial fibrillation) ([address])    • PVD (peripheral vascular disease) ([address])    • Sacral fracture ([address]) 06/25/2023       Past Surgical History:   Procedure Laterality Date   • ARTERIOGRAM Left 12/6/2018    Procedure: LEFT lower extremity angiography, with Left lower extremity run-off, stent and angioplasty of Left Common Femoral Artery (Left Brachial Access); Surgeon: Cristina Pedersen MD;  Location: [facility];  Service: Vascular   • CARDIAC SURGERY     • CARPAL TUNNEL RELEASE     • CT NEEDLE BIOPSY LUNG  10/8/2019   • HYSTERECTOMY     • IR AORTAGRAM WITH RUN-OFF  12/6/2018   • OOPHORECTOMY     • MI OPTX FEM SHFT FX W/INSJ IMED IMPLT W/WO SCREW Left 9/29/2021    Procedure: INSERTION NAIL IM FEMUR ANTEGRADE (TROCHANTERIC); Surgeon: Ben Lei DO;  Location: [address] [place] [address] MAIN OR;  Service: Orthopedics   • TONSILLECTOMY         Meds/Allergies:  Prior to Admission medications    Medication Sig Start Date End Date Taking?  Authorizing Provider   albuterol (ACCUNEB) 1.25 MG/3ML nebulizer solution Take 1.25 mg by nebulization every 6 (six) hours as needed for wheezing    Historical Provider, MD   ALPRAZolam Richi Lopez) 0.5 mg tablet Take 0.25 mg by mouth 1/30/19   Historical Provider, MD Caroleen Peabody 62.5-25 MCG/ACT inhaler INHALE 1 PUFF DAILY 7/4/23   Aubrie Sylvester MD   apixaban (Eliquis) 5 mg Take 1 tablet (5 mg total) by mouth 2 (two) times a day 5/8/23   Robert Rajan DO   bumetanide (BUMEX) 0.5 MG tablet TAKE 1 TABLET (0.5 MG TOTAL) BY MOUTH IF NEEDED (FOR SWELLING, BLOATING, WEIGHT GAIN)  Patient taking differently: Take 0.5 mg by mouth if needed (for swelling, bloating, weight gain) PRN 7/7/22   LAURA Tavera   Cholecalciferol (VITAMIN D3) 2000 units TABS Take 1 tablet by mouth daily    Historical Provider, MD   diltiazem (CARDIZEM CD) 240 mg 24 hr capsule TAKE 1 CAPSULE BY MOUTH EVERY DAY 7/28/22   Robert Rajan DO   lisinopril (ZESTRIL) 10 mg tablet Take 1 tablet (10 mg total) by mouth daily Do not start before June 28, 2023. 6/28/23   Orly Oliveira MD   metoprolol succinate (TOPROL-XL) 50 mg 24 hr tablet TAKE 1 TABLET BY MOUTH EVERY DAY 5/10/23   Robert Rajan DO   mirtazapine (REMERON) 15 mg tablet Take 7.5 mg by mouth daily at bedtime      Historical Provider, MD   senna-docusate sodium (SENOKOT S) 8.6-50 mg per tablet Take 1 tablet by mouth daily As needed for constipation 10/9/18   Víctor Blount DO     I have reviewed home medications with patient personally. Allergies: Allergies   Allergen Reactions   • Oxycodone-Acetaminophen Itching and Rash     Itching, rash       Social History:  Marital Status:     Occupation: Retired  Patient Pre-hospital Living Situation: Home  Patient Pre-hospital Level of Mobility: walks  Patient Pre-hospital Diet Restrictions: none  Substance Use History:   Social History     Substance and Sexual Activity   Alcohol Use Yes   • Alcohol/week: 4.0 standard drinks of alcohol   • Types: 4 Cans of beer per week Comment: 4 CANS OF BEER DAILY     Social History     Tobacco Use   Smoking Status Every Day   • Packs/day: 0.75   • Years: 53.00   • Total pack years: 39.75   • Types: Cigarettes   • Start date: 1/1/1969   Smokeless Tobacco Never     Social History     Substance and Sexual Activity   Drug Use Yes   • Frequency: 1.0 times per week   • Types: Marijuana    Comment: medical marijuana 10/7/19       Family History:  Family History   Problem Relation Age of Onset   • Cancer Mother    • Breast cancer Mother    • Heart attack Father    • No Known Problems Sister    • No Known Problems Daughter    • No Known Problems Maternal Grandmother    • No Known Problems Maternal Grandfather    • No Known Problems Paternal Grandmother    • No Known Problems Paternal Grandfather    • Prostate cancer Brother    • No Known Problems Maternal Aunt    • No Known Problems Maternal Aunt    • No Known Problems Maternal Aunt    • No Known Problems Maternal Aunt    • No Known Problems Paternal Aunt    • No Known Problems Paternal Uncle        Physical Exam:     Vitals:   Blood Pressure: 93/64 (07/13/23 0545)  Pulse: 102 (07/13/23 0545)  Temperature: 99 °F (37.2 °C) (07/13/23 0223)  Temp Source: Tympanic (07/13/23 0223)  Respirations: 22 (07/13/23 0545)  Height: 5' 2.5" (158.8 cm) (07/13/23 0223)  Weight - Scale: 52.2 kg (115 lb) (07/13/23 0223)  SpO2: 93 % (07/13/23 0545)    Physical Exam  Vitals reviewed. Constitutional:       Interventions: Nasal cannula in place. Comments: Frail, chronically ill-appearing elderly  female sleeping, easily arousable   HENT:      Head: Normocephalic and atraumatic. Nose: Nose normal.   Eyes:      General:         Right eye: No discharge. Left eye: No discharge. Extraocular Movements: Extraocular movements intact. Conjunctiva/sclera: Conjunctivae normal.   Cardiovascular:      Rate and Rhythm: Normal rate. Rhythm irregular.       Comments: Rate varies from the 80s to 130s  Pulmonary:      Effort: Pulmonary effort is normal. No respiratory distress. Breath sounds: Examination of the right-lower field reveals decreased breath sounds. Examination of the left-lower field reveals decreased breath sounds. Decreased breath sounds present. No wheezing. Abdominal:      General: Bowel sounds are normal. There is no distension. Palpations: Abdomen is soft. Tenderness: There is no abdominal tenderness. There is no guarding. Musculoskeletal:         General: No swelling or tenderness. Normal range of motion. Cervical back: Normal range of motion. Right lower leg: Edema present. Left lower leg: Edema present. Feet:      Right foot:      Toenail Condition: Right toenails are abnormally thick. Left foot:      Toenail Condition: Left toenails are abnormally thick. Comments: +pedal edema  Skin:     General: Skin is warm and dry. Capillary Refill: Capillary refill takes less than 2 seconds. Neurological:      General: No focal deficit present. Mental Status: Mental status is at baseline. Motor: Weakness present. Psychiatric:         Mood and Affect: Mood normal.         Behavior: Behavior normal.         Thought Content:  Thought content normal.         Judgment: Judgment normal.        Additional Data:     Lab Results:  Results from last 7 days   Lab Units 07/13/23  0232   WBC Thousand/uL 7.67   HEMOGLOBIN g/dL 14.6   HEMATOCRIT % 44.4   PLATELETS Thousands/uL 243   NEUTROS PCT % 67   LYMPHS PCT % 18   MONOS PCT % 12   EOS PCT % 1     Results from last 7 days   Lab Units 07/13/23  0232   SODIUM mmol/L 129*   POTASSIUM mmol/L 3.6   CHLORIDE mmol/L 94*   CO2 mmol/L 22   BUN mg/dL 21   CREATININE mg/dL 1.40*   ANION GAP mmol/L 13   CALCIUM mg/dL 8.6   ALBUMIN g/dL 3.4*   TOTAL BILIRUBIN mg/dL 1.01*   ALK PHOS U/L 227*   ALT U/L 22   AST U/L 20   GLUCOSE RANDOM mg/dL 99       Lines/Drains:  Invasive Devices     Peripheral Intravenous Line  Duration           Peripheral IV 07/13/23 Left Antecubital <1 day              Imaging: CHF, cardiomegaly my read, formal read pending  XR chest 1 view portable    (Results Pending)       EKG and Other Studies Reviewed on Admission:   · EKG: Atrial fibrillation. , reviewed and muse personally. ** Please Note: This note has been constructed using a voice recognition system.  **

## 2023-07-13 NOTE — ASSESSMENT & PLAN NOTE
Wt Readings from Last 3 Encounters:   07/13/23 52.2 kg (115 lb)   06/25/23 54 kg (119 lb 0.8 oz)   05/01/23 52.2 kg (115 lb)     · BNP 2322  · IV Lasix  · Monitor I&O's Daily weight  · Cardiology consult  · Echo January 2023 EF 63%, grade 1 diastolic dysfunction, mitral valve mild regurg mild stenosis, tricuspid valve mild to moderate regurg, pulmonic valve mild regurg

## 2023-07-13 NOTE — ED PROVIDER NOTES
History  Chief Complaint   Patient presents with   • Shortness of Breath     Shortness of breath since 1500, known hx of rapid afib     52-year-old female with history of A-fib, COPD, recent sacral fracture presents by EMS for shortness of breath. Patient says she ran out of her inhaler recently and has been using it. EMS reports that she was 85% on room air on their arrival, gave her a DuoNeb. She says her symptoms improved dramatically. She denies any chest pain or chest tightness          Prior to Admission Medications   Prescriptions Last Dose Informant Patient Reported? Taking? ALPRAZolam (XANAX) 0.5 mg tablet  Self Yes No   Sig: Take 0.25 mg by mouth   Anoro Ellipta 62.5-25 MCG/ACT inhaler   No No   Sig: INHALE 1 PUFF DAILY   Cholecalciferol (VITAMIN D3) 2000 units TABS  Self Yes No   Sig: Take 1 tablet by mouth daily   albuterol (ACCUNEB) 1.25 MG/3ML nebulizer solution  Self Yes No   Sig: Take 1.25 mg by nebulization every 6 (six) hours as needed for wheezing   apixaban (Eliquis) 5 mg   No No   Sig: Take 1 tablet (5 mg total) by mouth 2 (two) times a day   bumetanide (BUMEX) 0.5 MG tablet  Self No No   Sig: TAKE 1 TABLET (0.5 MG TOTAL) BY MOUTH IF NEEDED (FOR SWELLING, BLOATING, WEIGHT GAIN)   Patient taking differently: Take 0.5 mg by mouth if needed (for swelling, bloating, weight gain) PRN   diltiazem (CARDIZEM CD) 240 mg 24 hr capsule  Self No No   Sig: TAKE 1 CAPSULE BY MOUTH EVERY DAY   lisinopril (ZESTRIL) 10 mg tablet   No No   Sig: Take 1 tablet (10 mg total) by mouth daily Do not start before June 28, 2023.    metoprolol succinate (TOPROL-XL) 50 mg 24 hr tablet   No No   Sig: TAKE 1 TABLET BY MOUTH EVERY DAY   mirtazapine (REMERON) 15 mg tablet  Self Yes No   Sig: Take 7.5 mg by mouth daily at bedtime     senna-docusate sodium (SENOKOT S) 8.6-50 mg per tablet  Self No No   Sig: Take 1 tablet by mouth daily As needed for constipation      Facility-Administered Medications: None       Past Medical History:   Diagnosis Date   • Bell's palsy    • Cancer [facility])     lymphoma   • Closed left hip fracture ([address]) 09/28/2021   • COPD (chronic obstructive pulmonary disease) (HCC)    • Diffuse large B cell lymphoma (HCC)    • Hyperlipidemia    • Hypertension    • PAD (peripheral artery disease) (HCC)    • PAF (paroxysmal atrial fibrillation) ([address])    • PVD (peripheral vascular disease) ([address])    • Sacral fracture ([address]) 06/25/2023       Past Surgical History:   Procedure Laterality Date   • ARTERIOGRAM Left 12/6/2018    Procedure: LEFT lower extremity angiography, with Left lower extremity run-off, stent and angioplasty of Left Common Femoral Artery (Left Brachial Access); Surgeon: Fanny Pedersen MD;  Location:  MAIN OR;  Service: Vascular   • CARDIAC SURGERY     • CARPAL TUNNEL RELEASE     • CT NEEDLE BIOPSY LUNG  10/8/2019   • HYSTERECTOMY     • IR AORTAGRAM WITH RUN-OFF  12/6/2018   • OOPHORECTOMY     • WA OPTX FEM SHFT FX W/INSJ IMED IMPLT W/WO SCREW Left 9/29/2021    Procedure: INSERTION NAIL IM FEMUR ANTEGRADE (TROCHANTERIC); Surgeon: Johnathon Hanson DO;  Location: [facility] MAIN OR;  Service: Orthopedics   • TONSILLECTOMY         Family History   Problem Relation Age of Onset   • Cancer Mother    • Breast cancer Mother    • Heart attack Father    • No Known Problems Sister    • No Known Problems Daughter    • No Known Problems Maternal Grandmother    • No Known Problems Maternal Grandfather    • No Known Problems Paternal Grandmother    • No Known Problems Paternal Grandfather    • Prostate cancer Brother    • No Known Problems Maternal Aunt    • No Known Problems Maternal Aunt    • No Known Problems Maternal Aunt    • No Known Problems Maternal Aunt    • No Known Problems Paternal Aunt    • No Known Problems Paternal Uncle      I have reviewed and agree with the history as documented.     E-Cigarette/Vaping   • E-Cigarette Use Never User      E-Cigarette/Vaping Substances   • Nicotine No    • THC No    • CBD No    • Flavoring No    • Other No    • Unknown No      Social History     Tobacco Use   • Smoking status: Every Day     Packs/day: 0.75     Years: 53.00     Total pack years: 39.75     Types: Cigarettes     Start date: 1/1/1969   • Smokeless tobacco: Never   Vaping Use   • Vaping Use: Never used   Substance Use Topics   • Alcohol use: Yes     Alcohol/week: 4.0 standard drinks of alcohol     Types: 4 Cans of beer per week     Comment: 4 CANS OF BEER DAILY   • Drug use: Yes     Frequency: 1.0 times per week     Types: Marijuana     Comment: medical marijuana 10/7/19       Review of Systems    Physical Exam  Physical Exam  Vitals and nursing note reviewed. Constitutional:       Appearance: She is well-developed. HENT:      Head: Normocephalic and atraumatic. Eyes:      Conjunctiva/sclera: Conjunctivae normal.      Pupils: Pupils are equal, round, and reactive to light. Neck:      Trachea: No tracheal deviation. Cardiovascular:      Rate and Rhythm: Normal rate and regular rhythm. Heart sounds: Normal heart sounds. No murmur heard. Pulmonary:      Effort: Pulmonary effort is normal. No respiratory distress. Breath sounds: Normal breath sounds. No wheezing or rales. Abdominal:      General: Bowel sounds are normal. There is no distension. Palpations: Abdomen is soft. Tenderness: There is no abdominal tenderness. Musculoskeletal:         General: No deformity. Cervical back: Normal range of motion and neck supple. Skin:     General: Skin is warm and dry. Capillary Refill: Capillary refill takes less than 2 seconds. Neurological:      Mental Status: She is alert and oriented to person, place, and time. Sensory: No sensory deficit.    Psychiatric:         Judgment: Judgment normal.         Vital Signs  ED Triage Vitals   Temperature Pulse Respirations Blood Pressure SpO2   07/13/23 0223 07/13/23 0223 07/13/23 0223 07/13/23 0231 07/13/23 0216   99 °F (37.2 °C) (!) 172 17 112/73 97 %      Temp Source Heart Rate Source Patient Position - Orthostatic VS BP Location FiO2 (%)   07/13/23 0223 07/13/23 0223 07/13/23 0223 07/13/23 0223 --   Tympanic Monitor Lying Left arm       Pain Score       07/13/23 0223       No Pain           Vitals:    07/13/23 0245 07/13/23 0300 07/13/23 0312 07/13/23 0330   BP: (!) 87/66 94/56 106/68 102/67   Pulse: (!) 164 (!) 144 (!) 118 (!) 133   Patient Position - Orthostatic VS: Lying            Visual Acuity      ED Medications  Medications   magnesium sulfate 2 g/50 mL IVPB (premix) 2 g (has no administration in time range)   nicotine (NICODERM CQ) 7 mg/24hr TD 24 hr patch 1 patch (has no administration in time range)   potassium chloride (K-DUR,KLOR-CON) CR tablet 20 mEq (has no administration in time range)   acetaminophen (TYLENOL) tablet 650 mg (has no administration in time range)   albuterol (2.5 mg/3 mL) 0.083 % inhalation solution **ADS Override Pull** (  Given to EMS 7/13/23 0234)   ipratropium-albuterol (DUO-NEB) 0.5-2.5 mg/3 mL inhalation solution **ADS Override Pull** (  Given to EMS 7/13/23 0234)   methylPREDNISolone sodium succinate (Solu-MEDROL) injection 125 mg (125 mg Intravenous Given 7/13/23 0255)   diltiazem (CARDIZEM) injection 15 mg (15 mg Intravenous Given 7/13/23 0248)   furosemide (LASIX) injection 20 mg (20 mg Intravenous Given 7/13/23 0330)   albumin human (FLEXBUMIN) 5 % injection 25 g (25 g Intravenous New Bag 7/13/23 0458)   diphenhydrAMINE (BENADRYL) tablet 25 mg (25 mg Oral Given 7/13/23 0455)   diltiazem (CARDIZEM) injection 15 mg (15 mg Intravenous Given 7/13/23 0456)       Diagnostic Studies  Results Reviewed     Procedure Component Value Units Date/Time    FLU/RSV/COVID - if FLU/RSV clinically relevant [[id number]]  (Normal) Collected: 07/13/23 0232    Lab Status: Final result Specimen: Nares from Nasopharyngeal Swab Updated: 07/13/23 0316     SARS-CoV-2 Negative     INFLUENZA A PCR Negative     INFLUENZA B PCR Negative RSV PCR Negative    Narrative:      FOR PEDIATRIC PATIENTS - copy/paste COVID Guidelines URL to browser: [email or web address]    SARS-CoV-2 assay is a Nucleic Acid Amplification assay intended for the  qualitative detection of nucleic acid from SARS-CoV-2 in nasopharyngeal  swabs. Results are for the presumptive identification of SARS-CoV-2 RNA. Positive results are indicative of infection with SARS-CoV-2, the virus  causing COVID-19, but do not rule out bacterial infection or co-infection  with other viruses. Laboratories within the [facility] and its  territories are required to report all positive results to the appropriate  public health authorities. Negative results do not preclude SARS-CoV-2  infection and should not be used as the sole basis for treatment or other  patient management decisions. Negative results must be combined with  clinical observations, patient history, and epidemiological information. This test has not been FDA cleared or approved. This test has been authorized by FDA under an Emergency Use Authorization  (EUA). This test is only authorized for the duration of time the  declaration that circumstances exist justifying the authorization of the  emergency use of an in vitro diagnostic tests for detection of SARS-CoV-2  virus and/or diagnosis of COVID-19 infection under section 564(b)(1) of  the Act, 21 U. S.C. 703SIA-0(F)(9), unless the authorization is terminated  or revoked sooner. The test has been validated but independent review by FDA  and CLIA is pending. Test performed using [employer] GeneXpert: This RT-PCR assay targets N2,  a region unique to SARS-CoV-2. A conserved region in the E-gene was chosen  for pan-Sarbecovirus detection which includes SARS-CoV-2. According to CMS-2020-01-R, this platform meets the definition of high-throughput technology.     B-Type Natriuretic Peptide(BNP) [[id number]]  (Abnormal) Collected: 07/13/23 0232    Lab Status: Final result Specimen: Blood from Arm, Right Updated: 07/13/23 0306     BNP 2,322 pg/mL     Comprehensive metabolic panel [[id number]]  (Abnormal) Collected: 07/13/23 0232    Lab Status: Final result Specimen: Blood from Arm, Right Updated: 07/13/23 0259     Sodium 129 mmol/L      Potassium 3.6 mmol/L      Chloride 94 mmol/L      CO2 22 mmol/L      ANION GAP 13 mmol/L      BUN 21 mg/dL      Creatinine 1.40 mg/dL      Glucose 99 mg/dL      Calcium 8.6 mg/dL      Corrected Calcium 9.1 mg/dL      AST 20 U/L      ALT 22 U/L      Alkaline Phosphatase 227 U/L      Total Protein 6.2 g/dL      Albumin 3.4 g/dL      Total Bilirubin 1.01 mg/dL      eGFR 38 ml/min/1.73sq m     Narrative:      [place] guidelines for Chronic Kidney Disease (CKD):   •  Stage 1 with normal or high GFR (GFR > 90 mL/min/1.73 square meters)  •  Stage 2 Mild CKD (GFR = 60-89 mL/min/1.73 square meters)  •  Stage 3A Moderate CKD (GFR = 45-59 mL/min/1.73 square meters)  •  Stage 3B Moderate CKD (GFR = 30-44 mL/min/1.73 square meters)  •  Stage 4 Severe CKD (GFR = 15-29 mL/min/1.73 square meters)  •  Stage 5 End Stage CKD (GFR <15 mL/min/1.73 square meters)  Note: GFR calculation is accurate only with a steady state creatinine    Magnesium [[id number]]  (Abnormal) Collected: 07/13/23 0232    Lab Status: Final result Specimen: Blood from Arm, Right Updated: 07/13/23 0259     Magnesium 1.6 mg/dL     CBC and differential [[id number]]  (Abnormal) Collected: 07/13/23 0232    Lab Status: Final result Specimen: Blood from Arm, Right Updated: 07/13/23 0241     WBC 7.67 Thousand/uL      RBC 4.91 Million/uL      Hemoglobin 14.6 g/dL      Hematocrit 44.4 %      MCV 90 fL      MCH 29.7 pg      MCHC 32.9 g/dL      RDW 19.2 %      MPV 9.2 fL      Platelets 830 Thousands/uL      nRBC 0 /100 WBCs      Neutrophils Relative 67 %      Immat GRANS % 1 %      Lymphocytes Relative 18 %      Monocytes Relative 12 % Eosinophils Relative 1 %      Basophils Relative 1 %      Neutrophils Absolute 5.21 Thousands/µL      Immature Grans Absolute 0.05 Thousand/uL      Lymphocytes Absolute 1.37 Thousands/µL      Monocytes Absolute 0.90 Thousand/µL      Eosinophils Absolute 0.10 Thousand/µL      Basophils Absolute 0.04 Thousands/µL     UA w Reflex to Microscopic w Reflex to Culture [[id number]]     Lab Status: No result Specimen: Urine                  XR chest 1 view portable    (Results Pending)              Procedures  CriticalCare Time    Date/Time: 7/13/2023 5:13 AM    Performed by: Trish Carbajal DO  Authorized by: Trish Carbajal DO    Critical care provider statement:     Critical care time (minutes):  85    Critical care time was exclusive of:  Separately billable procedures and treating other patients and teaching time    Critical care was necessary to treat or prevent imminent or life-threatening deterioration of the following conditions:  Cardiac failure and respiratory failure    Critical care was time spent personally by me on the following activities:  Examination of patient, evaluation of patient's response to treatment, obtaining history from patient or surrogate, development of treatment plan with patient or surrogate, review of old charts, re-evaluation of patient's condition, ordering and review of radiographic studies, ordering and review of laboratory studies and ordering and performing treatments and interventions  ECG 12 Lead Documentation Only    Date/Time: 7/13/2023 5:14 AM    Performed by: Trish Carbajal DO  Authorized by: Trish Carbajal DO    Indications / Diagnosis:  SOB  Patient location:  ED  Interpretation:     Interpretation: abnormal    Rate:     ECG rate:  151    ECG rate assessment: tachycardic    Rhythm:     Rhythm: atrial fibrillation    Ectopy:     Ectopy: none    ST segments:     ST segments:  Non-specific  T waves:     T waves: non-specific               ED Course  ED Course as of 07/13/23 0518   Thu Jul 13, 2023   0314 Patient had low blood pressure reading, was reassessed and repeat blood pressure was 106/68 when she was awake. Initially ordered albumin infusion for it however will stop at this time. Heart rate also improved, rates between 101 15, mostly low 100s. She is still in A-fib so her heart rate does increase beat to beat   0315 BNP(!): 2,322  We will give low-dose Lasix as patient is fluid overloaded as well. Chest x-ray pending. 2819 Chest x-ray viewed and interpreted and below by me. No acute infiltrate, mild pulmonary vascular congestion. 0512 HR 80s-90s BP stable                               SBIRT 22yo+    Flowsheet Row Most Recent Value   Initial Alcohol Screen: US AUDIT-C     1. How often do you have a drink containing alcohol? 6 Filed at: 07/13/2023 0230   2. How many drinks containing alcohol do you have on a typical day you are drinking? 4 Filed at: 07/13/2023 0230   3a. Male UNDER 65: How often do you have five or more drinks on one occasion? 0 Filed at: 07/13/2023 0230   3b. FEMALE Any Age, or MALE 65+: How often do you have 4 or more drinks on one occassion? 6 Filed at: 07/13/2023 0230   Audit-C Score 16 Filed at: 07/13/2023 0230   Full Alcohol Screen: US AUDIT    4. How often during the last year have you found that you were not able to stop drinking once you had started? 0 Filed at: 07/13/2023 0230   5. How often during past year have you failed to do what was normally expected of you because of drinking? 0 Filed at: 07/13/2023 0230   6. How often in past year have you needed a first drink in the morning to get yourself going after a heavy drinking session? 0 Filed at: 07/13/2023 0230   7. How often in past year have you had feeling of guilt or remorse after drinking? 0 Filed at: 07/13/2023 0230   8. How often in past year have you been unable to remember what happened night before because you had been drinking? 0 Filed at: 07/13/2023 0230   9. Have you or someone else been injured as a result of your drinking? 0 Filed at: 07/13/2023 0230   10. Has a relative, friend, doctor or other health worker been concerned about your drinking and suggested you cut down?  0 Filed at: 07/13/2023 0230   AUDIT Total Score 16 Filed at: 07/13/2023 0230   MILVIA: How many times in the past year have you. .. Used an illegal drug or used a prescription medication for non-medical reasons? Never Filed at: 07/13/2023 0230                    Medical Decision Making  42-year-old female presents in respiratory distress by EMS who gave a DuoNeb which improved her symptoms however she is still hypoxic and tachycardic. She is A-fib RVR on her EKG, as well as bilateral lower extremity edema. Edema may be due to right-sided heart failure. Clinically not consistent with an infection. We will get chest x-ray to look for pleural effusion, pulmonary edema, possible infiltrate. We will get blood work, CBC, BMP, BNP. Patient has no chest pain and symptoms improved after DuoNeb not consistent with ACS. Blood pressure mildly depressed however would likely benefit from rate control to increase her preload; 15 mg IV diltiazem was administered which did improve her heart rate and her blood pressure remained stable however did have some mild low readings. Heart rate would occasionally increase into the 120s and 130s after the bolus so additional bolus was ordered. She is low total protein/albumin on her chemistry and would likely benefit from albumin as I want to avoid crystalloid infusion due to her interstitial edema on x-ray. After repeat bolus her blood pressure remained stable with maps above 65, and her heart rate improved into the 90s still in A-fib. BNP is significantly elevated, IV Lasix was given.     Patient requires admission, while her acute shortness of breath resolved when EMS gave her DuoNeb she remained short of breath and hypoxic on room air however saturating well on nasal cannula. Atrial fibrillation [facility]): acute illness or injury  CHF exacerbation ([address]): acute illness or injury  COPD exacerbation ([address]): acute illness or injury  Hyponatremia: acute illness or injury  Amount and/or Complexity of Data Reviewed  Labs: ordered. Decision-making details documented in ED Course. Radiology: ordered and independent interpretation performed. Decision-making details documented in ED Course. Risk  OTC drugs. Prescription drug management. Decision regarding hospitalization.           Disposition  Final diagnoses:   COPD exacerbation ([address])   Atrial fibrillation ([address])   Hyponatremia   CHF exacerbation ([address])     Time reflects when diagnosis was documented in both MDM as applicable and the Disposition within this note     Time User Action Codes Description Comment    7/13/2023  3:20 AM Marea Gurpreet Add [J44.1] COPD exacerbation ([address])     7/13/2023  3:20 AM Marea Gurpreet Add [I48.91] Atrial fibrillation ([address])     7/13/2023  3:20 AM Marea Gurpreet Add [E87.1] Hyponatremia     7/13/2023  5:09 AM Delford Omer D Add [I50.33] Acute on chronic heart failure with preserved ejection fraction ([address])     7/13/2023  5:09 AM Delford Omer D Add [I48.91] Atrial fibrillation with RVR ([address])     7/13/2023  5:11 AM Marea Gurpreet Add [I50.9] CHF exacerbation ([address])     7/13/2023  5:11 AM Marea Gurpreet Modify [J44.1] COPD exacerbation ([address])     7/13/2023  5:11 AM Marea Gurpreet Modify [I50.9] CHF exacerbation ([address])     7/13/2023  5:12 AM Delford Omer D Modify [J44.1] COPD exacerbation ([address])     7/13/2023  5:12 AM Delford Omer D Modify [I48.91] Atrial fibrillation ([address])     7/13/2023  5:12 AM Delford Omer D Modify [E87.1] Hyponatremia     7/13/2023  5:12 AM Delford Omer D Modify [I50.33] Acute on chronic heart failure with preserved ejection fraction ([address])     7/13/2023  5:12 AM Neha Guevara [I48.91] Atrial fibrillation with RVR ([address] [facility])     7/13/2023  5:12 AM Altamese Day D Modify [I50.9] CHF exacerbation [facility])       ED Disposition     ED Disposition   Admit    Condition   Stable    Date/Time   Thu Jul 13, 2023  3:19 AM    Comment   Case was discussed with tbd and the patient's admission status was agreed to be Admission Status: inpatient status to the service of Dr. Subha Aguilar . Follow-up Information    None         Patient's Medications   Discharge Prescriptions    No medications on file       No discharge procedures on file.     PDMP Review     None          ED Provider  Electronically Signed by           Noel Álvarez DO  07/13/23 2042

## 2023-07-13 NOTE — CONSULTS
[address]  Consult  Name: Luis Senior 77 y.o. female I MRN: [id number]  Unit/Bed#: -01 I Date of Admission: 7/13/2023   Date of Service: 7/13/2023 I Hospital Day: 0    Consults    Assessment/Plan   Stage 3a chronic kidney disease [facility])  Assessment & Plan  Lab Results   Component Value Date    EGFR 38 07/13/2023    EGFR 53 06/27/2023    EGFR 55 06/26/2023    CREATININE 1.40 (H) 07/13/2023    CREATININE 1.05 06/27/2023    CREATININE 1.02 06/26/2023       Would recommend nephrology evaluation    Atrial fibrillation with RVR ([address])  Assessment & Plan  -Patient with known paroxysmal atrial fibrillation on oral anticoagulation with Xarelto at home  -Patient notes she has been 100% compliant with medical therapy including oral anticoagulation and has not missed any doses  -Continue with diltiazem and metoprolol  -If rates not better controlled would recommend digoxin load with 250 mcg IV every 6 hours x4 doses with hold parameters for heart rate less than 50  -Continue to monitor on telemetry  -In the setting of her volume overload difficult to determine if atrial fibrillation is driving force or secondary to volume overload as patient does not necessarily feel when she is in atrial fibrillation  -Continue oral anticoagulation with Xarelto    Tobacco dependency  Assessment & Plan  - Counseled patient on the importance of tobacco cessation    * Acute respiratory failure with hypoxia ([address])  Assessment & Plan  - Patient on chronic nasal cannula oxygen at home as well  -Likely seen in the setting of volume overload and atrial fibrillation with rapid ventricular response  -We will uptitrate medical therapy as patient tolerates    Acute on chronic heart failure with preserved ejection fraction [facility])  Assessment & Plan  Wt Readings from Last 3 Encounters:   07/13/23 59.1 kg (130 lb 4.7 oz)   06/25/23 54 kg (119 lb 0.8 oz)   05/01/23 52.2 kg (115 lb)       -Patient significantly volume overloaded on physical exam  -We will check limited transthoracic echocardiogram to monitor cardiac function and RVSP  -Agree with continuing IV diuresis and would increase to 40 mg IV twice daily  - would also recommend nephrology evaluation as patient does follow with them in the outpatient setting  -Counseled patient on dietary and lifestyle modifications including sodium and fluid restricted diet.  -Would monitor daily weights and strict I's and O's. Please do standing weights if patient can tolerate. If not then please do accurate bed weights.  -Patient with hypokalemia and hyponatremia along with hypomagnesemia we will give additional 20 mEq potassium on top of 20 mEq standing today and recheck BMP and magnesium level this afternoon to assist with repletion while on IV diuretic therapy          COPD (chronic obstructive pulmonary disease) ([facility])  Assessment & Plan  - On chronic nasal cannula oxygen at home  -Plan of care per primary team          Other summary comments:   -Will give additional potassium 20 mEq and increase patient's regimen to 40 mEq daily.  -Will increase furosemide to 40 mg IV twice daily  -We will recheck BMP and magnesium level this afternoon and would recommend repletion to goal potassium 4.0, magnesium 2.0 based on these laboratory studies for patient  -Continue Eliquis, diltiazem and metoprolol at this time  -If patient is not adequately rate controlled could use digoxin 250 mcg IV every 6 hours x4 for loading dose with hold parameters for heart rate less than 50 however would monitor closely on telemetry  -We will check transthoracic echocardiogram to evaluate overall cardiac function and RVSP at this time  -Continue to monitor patient closely with strict I's and O's and daily weights as patient can tolerate.       HPI: Vamshi Roa is a 77y.o. year old female with hypertension, history of lymphoma s/p chemotherapy in April 2019, current tobacco use, known CKD, obstructive lung disease and paroxysmal atrial fibrillation on oral anticoagulation with Xarelto along with pulmonary hypertension thought possibly combination group 3 and group 2 who presented to [address] [place] [address] “” [place] 7/13/2023 with worsening lower extremity edema and shortness of breath. She had noted that over approximately past 2 weeks she had worsening lower extremity edema that did not seem to resolve with her 0.5 mg Bumex dose daily at home and as she was laying down became more short of breath than her chronic baseline and therefore presented to emergency department for further evaluation.  -Patient was given DuoNeb in emergency department along with IV Cardizem bolus and Lasix along with Solu-Medrol was also given IV Benadryl. She was initiated on IV diuretic therapy and even noted some significant urine output improvement which was not present at home on her oral regimen.  -Currently at bedside she denies any chest pain, palpitations, lightheadedness or dizziness, loss of consciousness. She notes that her shortness of breath has improved since being able to urinate more significantly and notes still significant lower extremity edema above her baseline. EKG:   Currently atrial fibrillation on telemetry heart rates 90s-110 bpm range    MOST  RECENT CARDIAC IMAGING:   -Transthoracic echocardiogram 1/16/2023 showing ventricular systolic function normal submitted LVEF 60% with grade 1 diastolic dysfunction, low normal right ventricular systolic function with dilated left and right atrium, aortic sclerosis with mild mitral regurgitation and mild mitral stenosis along with mild to moderate tricuspid regurgitation with severely elevated RVSP estimated at 77 mmHg. Review of Systems:   Review of Systems   Constitutional: Positive for fatigue. Negative for chills, diaphoresis and fever. HENT: Negative for trouble swallowing and voice change. Eyes: Negative for pain and redness.    Respiratory: Positive for cough and shortness of breath. Negative for wheezing. Cardiovascular: Positive for leg swelling. Negative for chest pain and palpitations. Gastrointestinal: Negative for abdominal pain, constipation, diarrhea, nausea and vomiting. Genitourinary: Negative for dysuria. Musculoskeletal: Positive for arthralgias. Negative for neck pain and neck stiffness. Skin: Negative for rash. Neurological: Negative for dizziness, syncope, light-headedness and headaches. Psychiatric/Behavioral: Negative for agitation and confusion. All other systems reviewed and are negative. Historical Information   Past Medical History:   Diagnosis Date   • Bell's palsy    • Cancer [facility])     lymphoma   • Closed left hip fracture ([address]) 09/28/2021   • COPD (chronic obstructive pulmonary disease) ([facility])    • Diffuse large B cell lymphoma ([facility])    • Hyperlipidemia    • Hypertension    • PAD (peripheral artery disease) ([facility])    • PAF (paroxysmal atrial fibrillation) ([address])    • PVD (peripheral vascular disease) ([address])    • Sacral fracture ([address]) 06/25/2023     Past Surgical History:   Procedure Laterality Date   • ARTERIOGRAM Left 12/6/2018    Procedure: LEFT lower extremity angiography, with Left lower extremity run-off, stent and angioplasty of Left Common Femoral Artery (Left Brachial Access); Surgeon: Geraldine Pedersen MD;  Location: [facility] MA[facility];  Service: Vascular   • CARDIAC SURGERY     • CARPAL TUNNEL RELEASE     • CT NEEDLE BIOPSY LUNG  10/8/2019   • HYSTERECTOMY     • IR AORTAGRAM WITH RUN-OFF  12/6/2018   • OOPHORECTOMY     • MI OPTX FEM SHFT FX W/INSJ IMED IMPLT W/WO SCREW Left 9/29/2021    Procedure: INSERTION NAIL IM FEMUR ANTEGRADE (TROCHANTERIC);   Surgeon: Claudia Pack DO;  Location: [address] [place] [address] MAIN OR;  Service: Orthopedics   • TONSILLECTOMY       Social History     Substance and Sexual Activity   Alcohol Use Yes   • Alcohol/week: 4.0 standard drinks of alcohol   • Types: 4 Cans of beer per week    Comment: 4 CANS OF BEER DAILY     Social History     Substance and Sexual Activity   Drug Use Yes   • Frequency: 1.0 times per week   • Types: Marijuana    Comment: medical marijuana 10/7/19     Social History     Tobacco Use   Smoking Status Every Day   • Packs/day: 0.25   • Years: 53.00   • Total pack years: 13.25   • Types: Cigarettes   • Start date: 1/1/1969   Smokeless Tobacco Never       Family History:   Family History   Problem Relation Age of Onset   • Cancer Mother    • Breast cancer Mother    • Heart attack Father    • No Known Problems Sister    • No Known Problems Daughter    • No Known Problems Maternal Grandmother    • No Known Problems Maternal Grandfather    • No Known Problems Paternal Grandmother    • No Known Problems Paternal Grandfather    • Prostate cancer Brother    • No Known Problems Maternal Aunt    • No Known Problems Maternal Aunt    • No Known Problems Maternal Aunt    • No Known Problems Maternal Aunt    • No Known Problems Paternal Aunt    • No Known Problems Paternal Uncle        Meds/Allergies   all current active meds have been reviewed  Medications Prior to Admission   Medication   • albuterol (ACCUNEB) 1.25 MG/3ML nebulizer solution   • ALPRAZolam (XANAX) 0.5 mg tablet   • Anoro Ellipta 62.5-25 MCG/ACT inhaler   • apixaban (Eliquis) 5 mg   • bumetanide (BUMEX) 0.5 MG tablet   • Cholecalciferol (VITAMIN D3) 2000 units TABS   • diltiazem (CARDIZEM CD) 240 mg 24 hr capsule   • lisinopril (ZESTRIL) 10 mg tablet   • metoprolol succinate (TOPROL-XL) 50 mg 24 hr tablet   • mirtazapine (REMERON) 15 mg tablet   • senna-docusate sodium (SENOKOT S) 8.6-50 mg per tablet       Allergies   Allergen Reactions   • Oxycodone-Acetaminophen Itching and Rash     Itching, rash       Objective   Vitals: Blood pressure 125/71, pulse 56, temperature (!) 97.4 °F (36.3 °C), temperature source Oral, resp.  rate 17, height 5' 2.5" (1.588 m), weight 59.1 kg (130 lb 4.7 oz), SpO2 91 %., Body mass index is 23.45 kg/m².,   Orthostatic Blood Pressures Flowsheet Row Most Recent Value   Blood Pressure 125/71 filed at 07/13/2023 0707   Patient Position - Orthostatic VS Lying filed at 07/13/2023 1580          Systolic (03AMV), BNW:875 , Min:87 , KBT:667     Diastolic (27RVL), AIU:67, Min:52, Max:77      Physical Exam:  Physical Exam  Vitals reviewed. Constitutional:       General: She is not in acute distress. Appearance: She is not diaphoretic. HENT:      Head: Normocephalic and atraumatic. Comments: Nasal cannula oxygen in place  Eyes:      General:         Right eye: No discharge. Left eye: No discharge. Neck:      Comments: Trachea midline, JVD present  Cardiovascular:      Heart sounds: Murmur (BLANCA) heard. Comments: Irregularly irregular rate and rhythm  Pulmonary:      [place]: No respiratory distress. Breath sounds: No wheezing. Comments: Decreased breath sounds bilaterally  Chest:      Chest wall: No tenderness. Abdominal:      General: Bowel sounds are normal.      Palpations: Abdomen is soft. Tenderness: There is no abdominal tenderness. There is no rebound. Musculoskeletal:      Right lower leg: Edema (3+) present. Left lower leg: Edema (3+) present. Skin:     General: Skin is warm and dry. Neurological:      Mental Status: She is alert. Comments: Awake, alert, able to answer questions appropriately.    Psychiatric:         Mood and Affect: Mood normal.         Behavior: Behavior normal.         Lab Results:     Troponins:      BNP:   Results from last 6 Months   Lab Units 07/13/23  0232   BNP pg/mL 2,322*       CBC :   Results from last 7 days   Lab Units 07/13/23  0232   WBC Thousand/uL 7.67   HEMOGLOBIN g/dL 14.6   HEMATOCRIT % 44.4   MCV fL 90   PLATELETS Thousands/uL 243     TSH:     CMP:   Results from last 7 days   Lab Units 07/13/23  0232   POTASSIUM mmol/L 3.6   CHLORIDE mmol/L 94*   CO2 mmol/L 22   BUN mg/dL 21   CREATININE mg/dL 1.40*   AST U/L 20   ALT U/L 22   EGFR ml/min/1.73sq  38 Lipid Profile:     Coags:

## 2023-07-13 NOTE — NUTRITION
07/13/23 1549   Biochemical Data,Medical Tests, and Procedures   Biochemical Data/Medical Tests/Procedures Lab values reviewed; Meds reviewed   Labs (Comment) 7/13/23 Na 130, creat 1.58, glucose 232   Meds (Comment) eliquis, vitamin D3, cardizem, lasix, remeron, potassium chloride   Nutrition-Focused Physical Exam   Nutrition-Focused Physical Exam Findings RN skin assessment reviewed   Medical-Related Concerns acute respiratory failure, stage 3 CKD, COPD, heart failure   Current PO Intake   Current Diet Order Cardiac diet, 1800 mL fluid restriction, thin liquids   Current Meal Intake 50-75%   Estimated calorie intake compared to estimated need Nutrient needs are likely met. PES Statement   Knowledge and Beliefs (1) Food- and nutrition-related knowledge deficit NB-1.1   Related to Other (Comment)  (low sodium diet)   As evidenced by: Per patient/family interview   Recommendations/Interventions   Malnutrition/BMI Present No   Summary Consult - CHF. Presents with SOB. Past medical history significant for acute respiratory failure, stage 3 CKD, COPD, heart failure. Weight history reviewed. Weight trending up however no significant changes. Prescribed a Cardiac diet, 1800 mL fluid restriction, thin liquids. Meal intake documented 50%. Patient reports having a good appetite. She has 2 meals daily. No dietary restrictions. She does not use salt at the table, occasionally adds when cooking. However reports not cooking often. She states “I buy the boxed meals,” specifies Doug Serrano. Reports no difficulty chewing or swallowing. Her sister grocery shops. Patient reports her sister tells her she looks at the nutrition facts label with attention to sodium while shopping. RD provided and discussed “low sodium nutrition therapy” handout. She verbalizes understanding. RD To follow and assess need for future education at follow up.    Interventions/Recommendations Continue current diet order   Education Assessment Education Education initiated/ completed   Patient Nutrition Goals   Goal Comprehend education

## 2023-07-13 NOTE — ASSESSMENT & PLAN NOTE
Lab Results   Component Value Date    EGFR 38 07/13/2023    EGFR 53 06/27/2023    EGFR 55 06/26/2023    CREATININE 1.40 (H) 07/13/2023    CREATININE 1.05 06/27/2023    CREATININE 1.02 06/26/2023       Would recommend nephrology evaluation

## 2023-07-13 NOTE — ASSESSMENT & PLAN NOTE
Patient found atrial fibrillation with rate of 151 on EKG, given Cardizem 15 mg x 1  · Continue Cardizem 240 and Toprol XL 50mg  · Eliquis 5mg BID

## 2023-07-13 NOTE — ASSESSMENT & PLAN NOTE
· Respiratory protocol  · Patient recently hospitalized with COPD exacerbation 6/25/23  · Continue inhalers

## 2023-07-13 NOTE — CASE MANAGEMENT
Case Management Assessment & Discharge Planning Note    Patient name Crystal Piedra  Location /-01 MRN [id number]  : 1955 Date 2023       Current Admission Date: 2023  Current Admission Diagnosis:Acute respiratory failure with hypoxia [facility])   Patient Active Problem List    Diagnosis Date Noted   • Hypomagnesemia 2023   • Acute on chronic heart failure with preserved ejection fraction ([address]) 2023   • COPD (chronic obstructive pulmonary disease) ([address]) 2023   • Proteinuria 10/28/2022   • Stage 3a chronic kidney disease ([address]) 2022   • Vitamin D deficiency 2022   • Secondary hyperparathyroidism ([address]) 2022   • Buttock wound 10/07/2021   • Atrial fibrillation with RVR ([address]) 10/01/2021   • Hypotension 2021   • Acute respiratory failure with hypoxia ([address]) 2021   • Insomnia 2021   • Lung nodule 2019   • Aortoiliac occlusive disease ([address]) 2019   • Bilateral lower extremity edema 2019   • Peripheral vascular disease ([address]) 2018   • Foot pain 2018   • Constipation 10/09/2018   • Depression with anxiety 2018   • Essential hypertension 2018   • Lymphoma ([address]) 2018   • Paroxysmal atrial fibrillation ([address]) 2018   • Hyponatremia 2018   • Carotid stenosis, asymptomatic, bilateral 2016   • Tobacco dependency 2015   • PAD (peripheral artery disease) ([address]) 2015      LOS (days): 0  Geometric Mean LOS (GMLOS) (days): 3.90  Days to GMLOS:3.6     OBJECTIVE:  PATIENT READMITTED TO HOSPITAL  Risk of Unplanned Readmission Score: 19.53         Current admission status: Inpatient  Referral Reason: Other (Discharge planning)    Preferred Pharmacy:   [id number]- [place], [place] - [address] [place] [address]  Phone: [phone] Fax: [phone]    Primary Care Provider: Jasson Ordaz MD    Primary Insurance: [facility] REP  Secondary Insurance: ASSESSMENT:  Active Health Care Proxies     [facility] - Daughter   Primary Phone: [phone] (Mobile)               Advance Directives  Does patient have a [address]?: No  Was patient offered paperwork?: Yes  Does patient currently have a Health Care decision maker?: No  Does patient have Advance Directives?: Yes  Primary Contact: Erwin Weaver - Spouse         Readmission Root Cause  30 Day Readmission: No    Patient Information  Admitted from[de-identified] Home  Mental Status: Alert  During Assessment patient was accompanied by: Not accompanied during assessment  Assessment information provided by[de-identified] Patient  Primary Caregiver: Self  Support Systems: Daughter, Family members  [place] of Residence: [address] [place] [address] do you live in?: [address] entry access options.  Select all that apply.: Stairs  Number of steps to enter home.: 2  Do the steps have railings?: Yes  In the last 12 months, was there a time when you were not able to pay the mortgage or rent on time?: No  In the last 12 months, how many places have you lived?: 1  In the last 12 months, was there a time when you did not have a steady place to sleep or slept in a shelter (including now)?: No  Homeless/housing insecurity resource given?: N/A  Living Arrangements: Other (Comment) (sister, sister's grandaughter and kids)  Is patient a ?: No    Activities of Daily Living Prior to Admission  Functional Status: Independent  Completes ADLs independently?: Yes  Ambulates independently?: No  Level of ambulatory dependence: Assistance (RW PRN)  Does patient use assisted devices?: Yes  Assisted Devices (DME) used: Walker (RW PRN)  Does patient currently own DME?: Yes  What DME does the patient currently own?: Jonelle Buerger  Does patient have a history of Outpatient Therapy (PT/OT)?: Yes  Does the patient have a history of Short-Term Rehab?: Yes ([place])  Does patient have a history of HHC?: Yes  Does patient currently have [address]?: No         Patient Information Continued  Income Source: Pension/correction  Does patient have prescription coverage?: Yes  Within the past 12 months, you worried that your food would run out before you got the money to buy more.: Never true  Within the past 12 months, the food you bought just didn't last and you didn't have money to get more.: Never true  Food insecurity resource given?: N/A  Does patient receive dialysis treatments?: No  Does patient have a history of substance abuse?: No  Does patient have a history of Mental Health Diagnosis?: No         Means of Transportation  Means of Transport to Appts[de-identified] Family transport  In the past 12 months, has lack of transportation kept you from medical appointments or from getting medications?: No  In the past 12 months, has lack of transportation kept you from meetings, work, or from getting things needed for daily living?: No  Was application for public transport provided?: N/A        DISCHARGE DETAILS:    Discharge planning discussed with[de-identified] patient  Freedom of Choice: Yes       Contacts  Patient Contacts: Amrik Spann  Relationship to Patient[de-identified] Family  Contact Method: Phone  Phone Number: [phone]  Reason/Outcome: Discharge [facility]         Is the patient interested in [address] at discharge?: No    DME Referral Provided  Referral made for DME?: No         Would you like to participate in our [facility] service program?  : No - Declined    Treatment Team Recommendation: Home  Discharge Destination Plan[de-identified] Home  Transport at Discharge : Family       Additional Comments: Pt from home with family. Independent with ADLs at baseline. Pt discharge needs TBD pending hospital course.

## 2023-07-13 NOTE — NURSING NOTE
HR still running 120s to 130s with occasional recovery to 90s, Dr Low Romero made aware, orders noted.   Digoxin given as ordered

## 2023-07-13 NOTE — ASSESSMENT & PLAN NOTE
Lab Results   Component Value Date    EGFR 38 07/13/2023    EGFR 53 06/27/2023    EGFR 55 06/26/2023    CREATININE 1.40 (H) 07/13/2023    CREATININE 1.05 06/27/2023    CREATININE 1.02 06/26/2023   · creat baseline 1.0, currently 1.4  · Monitor w/ diuresis

## 2023-07-13 NOTE — RESPIRATORY THERAPY NOTE
RT Protocol Note  Ford Abel 77 y.o. female MRN: [id number]  Unit/Bed#: -01 Encounter: [id number]    Assessment    Principal Problem:    Acute respiratory failure with hypoxia ([address])  Active Problems:    Depression with anxiety    Hyponatremia    Tobacco dependency    Insomnia    Atrial fibrillation with RVR (HCC)    Stage 3a chronic kidney disease (HCC)    COPD (chronic obstructive pulmonary disease) (HCC)    Hypomagnesemia    Acute on chronic heart failure with preserved ejection fraction (HCC)      Home Pulmonary Medications:1.25mg albuterol q6prn, 1 puff anoro ellipta daily  Home Devices/Therapy: Other (Comment) (neb/inhaler)    Past Medical History:   Diagnosis Date    Bell's palsy     Cancer ([address])     lymphoma    Closed left hip fracture ([address]) 09/28/2021    COPD (chronic obstructive pulmonary disease) (HCC)     Diffuse large B cell lymphoma (HCC)     Hyperlipidemia     Hypertension     PAD (peripheral artery disease) (HCC)     PAF (paroxysmal atrial fibrillation) ([facility])     PVD (peripheral vascular disease) ([address])     Sacral fracture ([address]) 06/25/2023     Social History     Socioeconomic History    Marital status:      Spouse name: None    Number of children: None    Years of education: None    Highest education level: None   Occupational History    None   Tobacco Use    Smoking status: Every Day     Packs/day: 0.25     Years: 53.00     Total pack years: 13.25     Types: Cigarettes     Start date: 1/1/1969    Smokeless tobacco: Never   Vaping Use    Vaping Use: Never used   Substance and Sexual Activity    Alcohol use:  Yes     Alcohol/week: 4.0 standard drinks of alcohol     Types: 4 Cans of beer per week     Comment: 4 CANS OF BEER DAILY    Drug use: Yes     Frequency: 1.0 times per week     Types: Marijuana     Comment: medical marijuana 10/7/19    Sexual activity: Not Currently   Other Topics Concern    None   Social History Narrative    None     Social Determinants of Health     Financial Resource Strain: Not on file   Food Insecurity: Not on file   Transportation Needs: Not on file   Physical Activity: Not on file   Stress: Not on file   Social Connections: Not on file   Intimate Partner Violence: Not on file   Housing Stability: Not on file       Subjective         Objective    Physical Exam:   Assessment Type: Assess only  General Appearance: Alert, Awake  Respiratory Pattern: Dyspnea at rest  Chest Assessment: Chest expansion symmetrical  Bilateral Breath Sounds: Crackles (fine bibasilar)  Cough: None    Vitals:  Blood pressure 102/66, pulse (P) 70, temperature 98.7 °F (37.1 °C), temperature source Oral, resp. rate (P) 22, height 5' 2.5" (1.588 m), weight 59.1 kg (130 lb 4.7 oz), SpO2 90 %. Imaging and other studies: I have personally reviewed pertinent reports. 07/13/23 0636   Respiratory Protocol   Protocol Initiated? Yes   Protocol Selection Respiratory   Language Barrier? No   Medical & Social History Reviewed? Yes   Diagnostic Studies Reviewed? Yes   Physical Assessment Performed? Yes   Home Devices/Therapy Other (Comment)  (neb/inhaler)   Respiratory Plan Mild Distress pathway   Respiratory Assessment   Assessment Type Assess only   General Appearance Alert; Awake   Respiratory Pattern Dyspnea at rest   Chest Assessment Chest expansion symmetrical   Bilateral Breath Sounds Crackles  (fine bibasilar)   Cough None   Resp Comments   (pt assessed as per protocol. pt to be ordered xopenex tid.  will continue to monitor pt)   Additional Assessments   Pulse 70   Respirations 22               Plan    Respiratory Plan: Mild Distress pathway        Resp Comments: (P)  (pt assessed as per protocol.  pt to be ordered xopenex tid.  will continue to monitor pt)

## 2023-07-13 NOTE — ASSESSMENT & PLAN NOTE
-Patient with known paroxysmal atrial fibrillation on oral anticoagulation with Xarelto at home  -Patient notes she has been 100% compliant with medical therapy including oral anticoagulation and has not missed any doses  -Continue with diltiazem and metoprolol  -If rates not better controlled would recommend digoxin load with 250 mcg IV every 6 hours x4 doses with hold parameters for heart rate less than 50  -Continue to monitor on telemetry  -In the setting of her volume overload difficult to determine if atrial fibrillation is driving force or secondary to volume overload as patient does not necessarily feel when she is in atrial fibrillation  -Continue oral anticoagulation with Xarelto

## 2023-07-14 LAB
ANION GAP SERPL CALCULATED.3IONS-SCNC: 12 MMOL/L
BASOPHILS # BLD MANUAL: 0 THOUSAND/UL (ref 0–0.1)
BASOPHILS NFR MAR MANUAL: 0 % (ref 0–1)
BUN SERPL-MCNC: 26 MG/DL (ref 5–25)
CALCIUM SERPL-MCNC: 8.5 MG/DL (ref 8.4–10.2)
CHLORIDE SERPL-SCNC: 98 MMOL/L (ref 96–108)
CO2 SERPL-SCNC: 21 MMOL/L (ref 21–32)
CREAT SERPL-MCNC: 1.49 MG/DL (ref 0.6–1.3)
EOSINOPHIL # BLD MANUAL: 0 THOUSAND/UL (ref 0–0.4)
EOSINOPHIL NFR BLD MANUAL: 0 % (ref 0–6)
ERYTHROCYTE [DISTWIDTH] IN BLOOD BY AUTOMATED COUNT: 18.8 % (ref 11.6–15.1)
GFR SERPL CREATININE-BSD FRML MDRD: 35 ML/MIN/1.73SQ M
GLUCOSE SERPL-MCNC: 200 MG/DL (ref 65–140)
HCT VFR BLD AUTO: 42.6 % (ref 34.8–46.1)
HGB BLD-MCNC: 13.9 G/DL (ref 11.5–15.4)
LYMPHOCYTES # BLD AUTO: 0.5 THOUSAND/UL (ref 0.6–4.47)
LYMPHOCYTES # BLD AUTO: 5 % (ref 14–44)
MAGNESIUM SERPL-MCNC: 1.9 MG/DL (ref 1.9–2.7)
MCH RBC QN AUTO: 29 PG (ref 26.8–34.3)
MCHC RBC AUTO-ENTMCNC: 32.6 G/DL (ref 31.4–37.4)
MCV RBC AUTO: 89 FL (ref 82–98)
MONOCYTES # BLD AUTO: 0.3 THOUSAND/UL (ref 0–1.22)
MONOCYTES NFR BLD: 3 % (ref 4–12)
NEUTROPHILS # BLD MANUAL: 9.17 THOUSAND/UL (ref 1.85–7.62)
NEUTS BAND NFR BLD MANUAL: 2 % (ref 0–8)
NEUTS SEG NFR BLD AUTO: 90 % (ref 43–75)
PLATELET # BLD AUTO: 247 THOUSANDS/UL (ref 149–390)
PLATELET BLD QL SMEAR: ADEQUATE
PMV BLD AUTO: 9.9 FL (ref 8.9–12.7)
POTASSIUM SERPL-SCNC: 3.8 MMOL/L (ref 3.5–5.3)
RBC # BLD AUTO: 4.8 MILLION/UL (ref 3.81–5.12)
RBC MORPH BLD: NORMAL
SODIUM SERPL-SCNC: 131 MMOL/L (ref 135–147)
WBC # BLD AUTO: 9.97 THOUSAND/UL (ref 4.31–10.16)

## 2023-07-14 PROCEDURE — 99232 SBSQ HOSP IP/OBS MODERATE 35: CPT | Performed by: INTERNAL MEDICINE

## 2023-07-14 PROCEDURE — NC001 PR NO CHARGE: Performed by: INTERNAL MEDICINE

## 2023-07-14 PROCEDURE — 99232 SBSQ HOSP IP/OBS MODERATE 35: CPT | Performed by: HOSPITALIST

## 2023-07-14 PROCEDURE — 83735 ASSAY OF MAGNESIUM: CPT | Performed by: PHYSICIAN ASSISTANT

## 2023-07-14 PROCEDURE — 94664 DEMO&/EVAL PT USE INHALER: CPT

## 2023-07-14 PROCEDURE — 80048 BASIC METABOLIC PNL TOTAL CA: CPT | Performed by: PHYSICIAN ASSISTANT

## 2023-07-14 PROCEDURE — 85027 COMPLETE CBC AUTOMATED: CPT | Performed by: HOSPITALIST

## 2023-07-14 PROCEDURE — 85007 BL SMEAR W/DIFF WBC COUNT: CPT | Performed by: HOSPITALIST

## 2023-07-14 PROCEDURE — 94640 AIRWAY INHALATION TREATMENT: CPT

## 2023-07-14 PROCEDURE — 94760 N-INVAS EAR/PLS OXIMETRY 1: CPT

## 2023-07-14 RX ORDER — METOPROLOL TARTRATE 5 MG/5ML
5 INJECTION INTRAVENOUS EVERY 6 HOURS PRN
Status: DISCONTINUED | OUTPATIENT
Start: 2023-07-14 | End: 2023-07-16

## 2023-07-14 RX ADMIN — Medication 2000 UNITS: at 09:45

## 2023-07-14 RX ADMIN — FUROSEMIDE 40 MG: 10 INJECTION, SOLUTION INTRAMUSCULAR; INTRAVENOUS at 18:00

## 2023-07-14 RX ADMIN — LEVALBUTEROL HYDROCHLORIDE 1.25 MG: 1.25 SOLUTION RESPIRATORY (INHALATION) at 20:25

## 2023-07-14 RX ADMIN — APIXABAN 5 MG: 5 TABLET, FILM COATED ORAL at 09:46

## 2023-07-14 RX ADMIN — METOPROLOL SUCCINATE 50 MG: 50 TABLET, EXTENDED RELEASE ORAL at 09:45

## 2023-07-14 RX ADMIN — LEVALBUTEROL HYDROCHLORIDE 1.25 MG: 1.25 SOLUTION RESPIRATORY (INHALATION) at 13:25

## 2023-07-14 RX ADMIN — ALPRAZOLAM 0.25 MG: 0.25 TABLET ORAL at 09:45

## 2023-07-14 RX ADMIN — ALPRAZOLAM 0.25 MG: 0.25 TABLET ORAL at 21:24

## 2023-07-14 RX ADMIN — DILTIAZEM HYDROCHLORIDE 240 MG: 240 CAPSULE, COATED, EXTENDED RELEASE ORAL at 09:46

## 2023-07-14 RX ADMIN — NICOTINE 1 PATCH: 7 PATCH, EXTENDED RELEASE TRANSDERMAL at 09:50

## 2023-07-14 RX ADMIN — MIRTAZAPINE 7.5 MG: 15 TABLET, FILM COATED ORAL at 21:24

## 2023-07-14 RX ADMIN — METOPROLOL TARTRATE 5 MG: 5 INJECTION INTRAVENOUS at 00:43

## 2023-07-14 RX ADMIN — FUROSEMIDE 40 MG: 10 INJECTION, SOLUTION INTRAMUSCULAR; INTRAVENOUS at 09:47

## 2023-07-14 RX ADMIN — APIXABAN 5 MG: 5 TABLET, FILM COATED ORAL at 18:00

## 2023-07-14 RX ADMIN — UMECLIDINIUM BROMIDE AND VILANTEROL TRIFENATATE 1 PUFF: 62.5; 25 POWDER RESPIRATORY (INHALATION) at 09:43

## 2023-07-14 RX ADMIN — POTASSIUM CHLORIDE 40 MEQ: 1500 TABLET, EXTENDED RELEASE ORAL at 09:49

## 2023-07-14 NOTE — ASSESSMENT & PLAN NOTE
- Patient notes no longer on oxygen at home therefore as her oxygen requirement has been reducing we will attempt to continue IV diuresis to assist with this  -This is likely seen in the setting of volume overload and atrial fibrillation rapid ventricular response  -Continue to monitor with up titration of medical therapy.

## 2023-07-14 NOTE — ASSESSMENT & PLAN NOTE
-Patient with known paroxysmal atrial fibrillation on oral anticoagulation with Eliquis at home.  -Patient has been 100% compliant with medical therapy including oral anticoagulation and has not missed any doses. -Patient did require dose of IV digoxin however. Better rate controlled at this time on current medical therapy now as her volume status and respiratory status is improving.  -Continue metoprolol and diltiazem therapy  -Continue to monitor on telemetry  -Can use IV digoxin for loading dose for total of 4 doses if necessary (patient has already received 1 dose)  -Can uptitrate medical therapy if rates are not continued to be better controlled. -We will need to continue to monitor renal function as patient is borderline however if creatinine continues to rise above 1.5 may need to reduce Eliquis dosing at that time as patient is less than 60 kg.

## 2023-07-14 NOTE — PLAN OF CARE
Problem: INFECTION - ADULT  Goal: Absence or prevention of progression during hospitalization  Description: INTERVENTIONS:  - Assess and monitor for signs and symptoms of infection  - Monitor lab/diagnostic results  - Monitor all insertion sites, i.e. indwelling lines, tubes, and drains  - Monitor endotracheal if appropriate and nasal secretions for changes in amount and color  - [place] appropriate cooling/warming therapies per order  - Administer medications as ordered  - Instruct and encourage patient and family to use good hand hygiene technique  - Identify and instruct in appropriate isolation precautions for identified infection/condition  Outcome: Progressing     Problem: PAIN - ADULT  Goal: Verbalizes/displays adequate comfort level or baseline comfort level  Description: Interventions:  - Encourage patient to monitor pain and request assistance  - Assess pain using appropriate pain scale  - Administer analgesics based on type and severity of pain and evaluate response  - Implement non-pharmacological measures as appropriate and evaluate response  - Consider cultural and social influences on pain and pain management  - Notify physician/advanced practitioner if interventions unsuccessful or patient reports new pain  Outcome: Progressing

## 2023-07-14 NOTE — ASSESSMENT & PLAN NOTE
Wt Readings from Last 3 Encounters:   07/14/23 59.1 kg (130 lb 4.7 oz)   06/25/23 54 kg (119 lb 0.8 oz)   05/01/23 52.2 kg (115 lb)     -Patient still appears somewhat volume overloaded on physical exam with significant lower extremity edema and JVD present  -Transthoracic echocardiogram does show preserved left ventricular systolic function however reduced right ventricular systolic function with significant tricuspid regurgitation and dilated IVC along with pulmonary hypertension  -Would continue aggressive IV diuresis at this time as patient has improved since initiation and will continue to monitor renal function and electrolytes with goal potassium 4.0, magnesium 2.0.  -Continue to monitor urine output and daily weights however as patient is in bed please try to make these as accurate as possible

## 2023-07-14 NOTE — ASSESSMENT & PLAN NOTE
Lab Results   Component Value Date    EGFR 35 07/14/2023    EGFR 32 07/13/2023    EGFR 38 07/13/2023    CREATININE 1.49 (H) 07/14/2023    CREATININE 1.58 (H) 07/13/2023    CREATININE 1.40 (H) 07/13/2023       -Renal function appears stable at this time  -Would monitor creatinine along with electrolytes and would consider nephrology evaluation as patient does well with them in the outpatient setting.

## 2023-07-14 NOTE — ASSESSMENT & PLAN NOTE
Wt Readings from Last 3 Encounters:   07/14/23 59.1 kg (130 lb 4.7 oz)   06/25/23 54 kg (119 lb 0.8 oz)   05/01/23 52.2 kg (115 lb)     · Arrival BNP 2322  · Appreciate cardiology input  · Continue diuresis-40 mg of Lasix IV twice a day  · Continue other current cardiac based medications which include  diltiazem, Toprol-XL, and apixaban

## 2023-07-14 NOTE — ASSESSMENT & PLAN NOTE
Lab Results   Component Value Date    EGFR 35 07/14/2023    EGFR 32 07/13/2023    EGFR 38 07/13/2023    CREATININE 1.49 (H) 07/14/2023    CREATININE 1.58 (H) 07/13/2023    CREATININE 1.40 (H) 07/13/2023   · Creatinine is stable over the past 24 hours, slightly better than arrival at 1.49  · We will continue to monitor closely with administration of IV diuretics  · Recheck a BMP in the a.m.

## 2023-07-14 NOTE — ASSESSMENT & PLAN NOTE
· Significantly improved  · Patient is now down to 2 L of supplemental oxygen via nasal cannula  · Secondary to an exacerbation of acute on chronic heart failure with preserved ejection fraction  · See the management as outlined below  · Continue to wean supplemental oxygen as able

## 2023-07-14 NOTE — ASSESSMENT & PLAN NOTE
· Patient with some periods of rapid ventricular response  · Status post a one-time dose of digoxin  · Continue Toprol-XL, diltiazem for continued rate control  · Continue apixaban 5 mg p.o. twice daily for anticoagulation purposes

## 2023-07-14 NOTE — UTILIZATION REVIEW
Initial Clinical Review    Admission: Date/Time/Statement:   Admission Orders (From admission, onward)     Ordered        07/13/23 0513  Inpatient Admission  Once                      Orders Placed This Encounter   Procedures   • Inpatient Admission     Standing Status:   Standing     Number of Occurrences:   1     Order Specific Question:   Level of Care     Answer:   Med Surg [16]     Order Specific Question:   Estimated length of stay     Answer:   More than 2 Midnights     Order Specific Question:   Certification     Answer:   I certify that inpatient services are medically necessary for this patient for a duration of greater than two midnights. See H&P and MD Progress Notes for additional information about the patient's course of treatment. ED Arrival Information     Expected   -    Arrival   7/13/2023 02:17    Acuity   Emergent            Means of arrival   Ambulance    Escorted by   [facility]    Service   Hospitalist    Admission type   Emergency            Arrival complaint   shortness of breath           Chief Complaint   Patient presents with   • Shortness of Breath     Shortness of breath since 1500, known hx of rapid afib       Initial Presentation: 77 y.o. female to the ED from home via EMS with complaints of shortness of breath for 8 hours prior to arrival. Admitted to inpatient for acute respiratory failure with hypoxia, acute on chronic chf, afib with RVR. H/O hypertension, hyperlipidemia, PAD, B-cell lymphoma, paroxysmal atrial fibrillation on anticoagulation, COPD . EMS found pulse ox to be 85% on room air. They gave her duoneb, placed on oxygen. ON arrival to the ED, she is on [facility], given Cardizem IV, IV lasix, IV solumedrol and reported itching, given IV benadryl. Heart rate increased again, given another dose of IV cardizem and albumin. LUngs decreased, bilateral lower extremity edema present. + pedal edema. Cardiology consult. Cardiology:  Afib with rvr.   Has been 100% compliant with xarelto. Continue diltiazem, metoprolol. May need digoxin load. Monitor tele. In the setting of her volume overload difficult to determine if atrial fibrillation is driving force or secondary to volume overload as patient does not necessarily feel when she is in atrial fibrillation. Continue with IV diuresis. Nephrology consult. Hypokalemia, hyponatremia, hypomagnesemia. Give supplements and recheck. Date: 7/14   Day 2: Now requiring 2LNC. Continue with IV Lasix. Swelling improved. Refusing to be seen by PT/OT. Decreased breath sounds. Cardiology consult: Continue with IV lasix. Shortness of breath improved.      ED Triage Vitals   Temperature Pulse Respirations Blood Pressure SpO2   07/13/23 0223 07/13/23 0223 07/13/23 0223 07/13/23 0231 07/13/23 0216   99 °F (37.2 °C) (!) 172 17 112/73 97 %      Temp Source Heart Rate Source Patient Position - Orthostatic VS BP Location FiO2 (%)   07/13/23 0223 07/13/23 0223 07/13/23 0223 07/13/23 0223 --   Tympanic Monitor Lying Left arm       Pain Score       07/13/23 0223       No Pain          Wt Readings from Last 1 Encounters:   07/14/23 59.1 kg (130 lb 4.7 oz)     Additional Vital Signs:   Time Temp Pulse Resp BP MAP (mmHg) SpO2 Calculated FIO2 (%) - Nasal Cannula O2 Flow Rate (L/min) Nasal Cannula O2 Flow Rate (L/min) O2 Device Patient Position - Orthostatic VS   07/14/23 0721 -- -- -- -- -- 91 % 28 2 L/min 2 L/min Nasal cannula --   07/13/23 1900 98.4 °F (36.9 °C) 81 20 144/93 110 94 % 34 -- 3.5 L/min Nasal cannula Lying   07/13/23 17:54:19 -- 60 20 138/81 100 93 % -- -- -- -- --   07/13/23 14:49:37 98.1 °F (36.7 °C) 87 17 126/85 99 94 % -- -- -- Nasal cannula Lying   07/13/23 11:16:01 97.7 °F (36.5 °C) 104 16 116/83 94 96 % -- -- -- -- --   07/13/23 1035 -- 106 Abnormal  -- 125/71 -- 96 % -- -- -- -- --   07/13/23 0753 -- -- -- -- -- 91 % 40 -- 5 L/min Nasal cannula --   07/13/23 0735 -- -- -- -- -- 95 % 40 -- 5 L/min Nasal cannula --   07/13/23 07:07:03 97.4 °F (36.3 °C) Abnormal  56 17 125/71 89 93 % -- -- -- Nasal cannula Lying   07/13/23 0636 -- 70 22 -- -- -- 40 -- 5 L/min Nasal cannula --   07/13/23 0624 98.7 °F (37.1 °C) 87 20 102/66 78 90 % 40 -- 5 L/min Nasal cannula Lying   07/13/23 0330 -- 133 Abnormal  20 102/67 76 92 % -- -- -- Nasal cannula --   07/13/23 0312 -- 118 Abnormal  18 106/68 -- 94 % -- -- -- Nasal cannula --   07/13/23 0300 -- 144 Abnormal  18 94/56 70 94 % -- -- -- Nasal cannula --   07/13/23 0245 -- 164 Abnormal  16 87/66 Abnormal  73 96 % -- -- -- Nasal cannula Lying   07/13/23 0231 -- -- -- 112/73 -- -- -- -- -- -- --   07/13/23 0223 99 °F (37.2 °C) 172 Abnormal  17 -- -- 96 % -- 6 L/min -- High flow nasal cannula Lying   07/13/23 0216 -- -- -- -- -- 97 % -- 6 L/min -- Nasal cannula --       Pertinent Labs/Diagnostic Test Results:   7/13 ECHO: •  Left Ventricle: Left ventricular cavity size is normal. Wall thickness is moderately increased. The left ventricular ejection fraction is 65%. Systolic function is normal. Wall motion is normal.  •  Right Ventricle: Right ventricular cavity size is dilated. Systolic function is moderately reduced. Abnormal tricuspid annular plane systolic excursion (TAPSE) < 1.7 cm. •  Left Atrium: The atrium is dilated. •  Right Atrium: The atrium is dilated. •  Aortic Valve: There is aortic valve sclerosis. •  Mitral Valve: There is mild annular calcification. •  Tricuspid Valve: There is moderate to severe regurgitation. The right ventricular systolic pressure is moderately to severely elevated. •  Pulmonic Valve: There is mild to moderate regurgitation.   7/13 EKG: Atrial fibrillation with rapid ventricular response  Right axis deviation  Right ventricular hypertrophy  Nonspecific ST and T wave abnormality  Abnormal ECG  When compared with ECG of 25-JUN-2023 15:05,  Atrial fibrillation has replaced Sinus rhythm  XR chest 1 view portable   Final Result by Farrah Jorgensen MD (07/13 1210) Cardiomegaly with interstitial thickening and resolution of right effusion.                   Workstation performed: [id number]           Results from last 7 days   Lab Units 07/13/23  0232   SARS-COV-2  Negative     Results from last 7 days   Lab Units 07/14/23  0451 07/13/23  0232   WBC Thousand/uL 9.97 7.67   HEMOGLOBIN g/dL 13.9 14.6   HEMATOCRIT % 42.6 44.4   PLATELETS Thousands/uL 247 243   NEUTROS ABS Thousands/µL  --  5.21   BANDS PCT % 2  --          Results from last 7 days   Lab Units 07/14/23  0451 07/13/23  1430 07/13/23  0232   SODIUM mmol/L 131* 130* 129*   POTASSIUM mmol/L 3.8 4.1 3.6   CHLORIDE mmol/L 98 96 94*   CO2 mmol/L 21 22 22   ANION GAP mmol/L 12 12 13   BUN mg/dL 26* 24 21   CREATININE mg/dL 1.49* 1.58* 1.40*   EGFR ml/min/1.73sq m 35 32 38   CALCIUM mg/dL 8.5 8.6 8.6   MAGNESIUM mg/dL 1.9 2.0 1.6*     Results from last 7 days   Lab Units 07/13/23  0232   AST U/L 20   ALT U/L 22   ALK PHOS U/L 227*   TOTAL PROTEIN g/dL 6.2*   ALBUMIN g/dL 3.4*   TOTAL BILIRUBIN mg/dL 1.01*         Results from last 7 days   Lab Units 07/14/23  0451 07/13/23  1430 07/13/23  0232   GLUCOSE RANDOM mg/dL 200* 232* 99       Results from last 7 days   Lab Units 07/13/23  0232   BNP pg/mL 2,322*         Results from last 7 days   Lab Units 07/13/23  1107   CLARITY UA  Clear   COLOR UA  Yellow   SPEC GRAV UA  1.015   PH UA  6.0   GLUCOSE UA mg/dl Negative   KETONES UA mg/dl Negative   BLOOD UA  Negative   PROTEIN UA mg/dl 1+*   NITRITE UA  Negative   BILIRUBIN UA  Negative   UROBILINOGEN UA E.U./dl 0.2   LEUKOCYTES UA  Negative   WBC UA /hpf 0-1   RBC UA /hpf None Seen   BACTERIA UA /hpf Occasional   EPITHELIAL CELLS WET PREP /hpf Occasional     Results from last 7 days   Lab Units 07/13/23  0232   INFLUENZA A PCR  Negative   INFLUENZA B PCR  Negative   RSV PCR  Negative       ED Treatment:   Medication Administration from 07/13/2023 0217 to 07/13/2023 0615       Date/Time Order Dose Route Action     07/13/2023 1050 EDT methylPREDNISolone sodium succinate (Solu-MEDROL) injection 125 mg 125 mg Intravenous Given     07/13/2023 0248 EDT diltiazem (CARDIZEM) injection 15 mg 15 mg Intravenous Given     07/13/2023 0330 EDT furosemide (LASIX) injection 20 mg 20 mg Intravenous Given     07/13/2023 0458 EDT albumin human (FLEXBUMIN) 5 % injection 25 g 25 g Intravenous New Bag     07/13/2023 0530 EDT magnesium sulfate 2 g/50 mL IVPB (premix) 2 g 2 g Intravenous New Bag     07/13/2023 0455 EDT diphenhydrAMINE (BENADRYL) tablet 25 mg 25 mg Oral Given     07/13/2023 0456 EDT diltiazem (CARDIZEM) injection 15 mg 15 mg Intravenous Given        Past Medical History:   Diagnosis Date   • Bell's palsy    • Cancer (HCC)     lymphoma   • Closed left hip fracture ([address]) 09/28/2021   • COPD (chronic obstructive pulmonary disease) (HCC)    • Diffuse large B cell lymphoma (HCC)    • Hyperlipidemia    • Hypertension    • PAD (peripheral artery disease) (HCC)    • PAF (paroxysmal atrial fibrillation) (HCC)    • PVD (peripheral vascular disease) (HCC)    • Sacral fracture (HCC) 06/25/2023       Admitting Diagnosis: Atrial fibrillation (HCC) [I48.91]  Shortness of breath [R06.02]  Hyponatremia [E87.1]  COPD exacerbation (HCC) [J44.1]  CHF exacerbation (HCC) [I50.9]  Atrial fibrillation with RVR (HCC) [I48.91]  Acute on chronic heart failure with preserved ejection fraction (HCC) [I50.33]  Age/Sex: 77 y.o. female  Admission Orders:  SCDs  I/O  Tele  Scheduled Medications:  ALPRAZolam, 0.25 mg, Oral, BID  apixaban, 5 mg, Oral, BID  cholecalciferol, 2,000 Units, Oral, Daily  diltiazem, 240 mg, Oral, Daily  furosemide, 40 mg, Intravenous, BID (diuretic)  levalbuterol, 1.25 mg, Nebulization, TID  metoprolol succinate, 50 mg, Oral, Daily  mirtazapine, 7.5 mg, Oral, HS  nicotine, 1 patch, Transdermal, Daily  potassium chloride, 40 mEq, Oral, Daily  umeclidinium-vilanterol, 1 puff, Inhalation, Daily      Continuous IV Infusions:     PRN Meds:  acetaminophen, 650 mg, Oral, Q4H PRN  metoprolol, 5 mg, Intravenous, Q6H PRN        IP CONSULT TO NUTRITION SERVICES  IP CONSULT TO CARDIOLOGY    Network Utilization Review Department  ATTENTION: Please call with any questions or concerns to [phone] and carefully listen to the prompts so that you are directed to the right person. All voicemails are confidential.  Tori April all requests for admission clinical reviews, approved or denied determinations and any other requests to dedicated fax number below belonging to the campus where the patient is receiving treatment.  List of dedicated fax numbers for the Facilities:  [place] DENIALS (Administrative/Medical Necessity) [phone] [address] [facility] (Maternity/NICU/Pediatrics) [phone]   [address] [place] [address] Drive [phone]   [facility] [address] [facility] [phone] [address] [place] [address] [id number] [place] [address] [phone]   [id number] [facility] [address] [facility] [id number] [place] [address] [phone]

## 2023-07-14 NOTE — ASSESSMENT & PLAN NOTE
- Patient currently on 2 L nasal cannula oxygen from previous 5 L nasal cannula oxygen with IV diuretics  -Continue to monitor with IV diuretic therapy  -Medical treatment per primary team

## 2023-07-14 NOTE — PROGRESS NOTES
[id number] [facility]  Progress Note  Name: Destiny Stanton  MRN: [id number]  Unit/Bed#: -01 I Date of Admission: 7/13/2023   Date of Service: 7/14/2023 I Hospital Day: 1    Assessment/Plan   Stage 3a chronic kidney disease [facility])  Assessment & Plan  Lab Results   Component Value Date    EGFR 35 07/14/2023    EGFR 32 07/13/2023    EGFR 38 07/13/2023    CREATININE 1.49 (H) 07/14/2023    CREATININE 1.58 (H) 07/13/2023    CREATININE 1.40 (H) 07/13/2023       -Renal function appears stable at this time  -Would monitor creatinine along with electrolytes and would consider nephrology evaluation as patient does well with them in the outpatient setting. Atrial fibrillation with RVR ([address])  Assessment & Plan  -Patient with known paroxysmal atrial fibrillation on oral anticoagulation with Xarelto at home.  -Patient has been 100% compliant with medical therapy including oral anticoagulation and has not missed any doses. -Patient did require dose of IV digoxin however. Better rate controlled at this time on current medical therapy now as her volume status and respiratory status is improving.  -Continue metoprolol and diltiazem therapy  -Continue to monitor on telemetry  -Can use IV digoxin for loading dose for total of 4 doses if necessary (patient has already received 1 dose)  -Can uptitrate medical therapy if rates are not continued to be better controlled. Tobacco dependency  Assessment & Plan  - Counseled on the importance of tobacco cessation    * Acute respiratory failure with hypoxia (HCC)  Assessment & Plan  - Patient notes no longer on oxygen at home therefore as her oxygen requirement has been reducing we will attempt to continue IV diuresis to assist with this  -This is likely seen in the setting of volume overload and atrial fibrillation rapid ventricular response  -Continue to monitor with up titration of medical therapy.       Acute on chronic heart failure with preserved ejection fraction [facility])  Assessment & Plan  Wt Readings from Last 3 Encounters:   07/14/23 59.1 kg (130 lb 4.7 oz)   06/25/23 54 kg (119 lb 0.8 oz)   05/01/23 52.2 kg (115 lb)     -Patient still appears somewhat volume overloaded on physical exam with significant lower extremity edema and JVD present  -Transthoracic echocardiogram does show preserved left ventricular systolic function however reduced right ventricular systolic function with significant tricuspid regurgitation and dilated IVC along with pulmonary hypertension  -Would continue aggressive IV diuresis at this time as patient has improved since initiation and will continue to monitor renal function and electrolytes with goal potassium 4.0, magnesium 2.0.  -Continue to monitor urine output and daily weights however as patient is in bed please try to make these as accurate as possible        COPD (chronic obstructive pulmonary disease) ([address])  Assessment & Plan  - Patient currently on 2 L nasal cannula oxygen from previous 5 L nasal cannula oxygen with IV diuretics  -Continue to monitor with IV diuretic therapy  -Medical treatment per primary team        Subjective:   Patient seen and examined. Per patient she denies any chest pain, palpitations, lightheadedness, loss of consciousness. She notes her shortness of breath has significantly improved and her lower extremity edema continues to improve. She notes overall feeling better but not back to baseline. She continues to note significant urine output with IV diuretic.       Summary comments:  -In the setting of adequate diuresis with stable renal function continue IV furosemide 40 mg twice daily at this time as patient still appears volume overloaded with potassium 40 mEq daily.  -Patient appears reasonably rate controlled at this time now with improvement in volume status continue diltiazem 240 mg daily and metoprolol succinate 50 mg daily  -We will uptitrate as patient requires  -Continues to digoxin therapy as well if blood pressure becomes an issue  -Continue to monitor on telemetry along with monitoring renal function and electrolytes with repletion to goal potassium 4.0, magnesium 2.0  -Patient counseled on dietary lifestyle modifications including sodium and fluid restriction along with tobacco cessation and once more euvolemic will need follow-up with pulmonary hypertension clinic. Telemetry/ECG/Cardiac testing:   -Currently rate controlled atrial fibrillation on telemetry heart rates 60s-80s beat per minute range    Vitals: Blood pressure 132/89, pulse 82, temperature 98.4 °F (36.9 °C), resp. rate 19, height 5' 2" (1.575 m), weight 59.1 kg (130 lb 4.7 oz), SpO2 91 %.,   Orthostatic Blood Pressures    Flowsheet Row Most Recent Value   Blood Pressure 132/89 filed at 07/14/2023 0707   Patient Position - Orthostatic VS Lying filed at 07/13/2023 1900      ,   Weight (last 2 days)     Date/Time Weight    07/14/23 0600 59.1 (130.29)    07/13/23 1035 59 (130)    07/13/23 0624 59.1 (130.29)    07/13/23 0223 52.2 (115)          Physical Exam:  Physical Exam  Vitals reviewed. Constitutional:       General: She is not in acute distress. Appearance: She is not diaphoretic. HENT:      Head: Normocephalic and atraumatic. Comments: Nasal cannula oxygen in place  Eyes:      General:         Right eye: No discharge. Left eye: No discharge. Neck:      Comments: Trachea midline, JVD present  Cardiovascular:      Heart sounds: Murmur (BLANCA) heard. Comments: Irregularly irregular rate and rhythm  Pulmonary:      Effort: No respiratory distress. Breath sounds: No wheezing. Comments: Decreased breath sounds bilaterally  Chest:      Chest wall: No tenderness. Abdominal:      General: Bowel sounds are normal.      Palpations: Abdomen is soft. Tenderness: There is no abdominal tenderness. There is no rebound. Musculoskeletal:         General: Injury: 2+      Right lower leg: Edema (2+) present. Left lower leg: Edema present. Skin:     General: Skin is warm and dry. Neurological:      Mental Status: She is alert. Comments: Awake, alert, able to answer questions appropriately.    Psychiatric:         Mood and Affect: Mood normal.         Behavior: Behavior normal.       Medications:      Current Facility-Administered Medications:   •  acetaminophen (TYLENOL) tablet 650 mg, 650 mg, Oral, Q4H PRN, VERNON Butt  •  ALPRAZolam Kathyleen Pander) tablet 0.25 mg, 0.25 mg, Oral, BID, Lorena Best PA-C, 0.25 mg at 07/13/23 2151  •  apixaban (ELIQUIS) tablet 5 mg, 5 mg, Oral, BID, Lorena Best PA-C, 5 mg at 07/13/23 1755  •  cholecalciferol (VITAMIN D3) tablet 2,000 Units, 2,000 Units, Oral, Daily, VERNON Butt, 2,000 Units at 07/13/23 6125  •  diltiazem (CARDIZEM CD) 24 hr capsule 240 mg, 240 mg, Oral, Daily, VERNON Butt, 240 mg at 07/13/23 7648  •  furosemide (LASIX) injection 40 mg, 40 mg, Intravenous, BID (diuretic), Luisito Clifton DO, 40 mg at 07/13/23 1755  •  levalbuterol (XOPENEX) inhalation solution 1.25 mg, 1.25 mg, Nebulization, TID, Ronda Galindo DO, 1.25 mg at 07/13/23 2333  •  metoprolol (LOPRESSOR) injection 5 mg, 5 mg, Intravenous, Q6H PRN, VERNON Butt, 5 mg at 07/14/23 0043  •  metoprolol succinate (TOPROL-XL) 24 hr tablet 50 mg, 50 mg, Oral, Daily, VERNON Butt, 50 mg at 07/13/23 8309  •  mirtazapine (REMERON) tablet 7.5 mg, 7.5 mg, Oral, HS, Lorena Best PA-C, 7.5 mg at 07/13/23 2151  •  nicotine (NICODERM CQ) 7 mg/24hr TD 24 hr patch 1 patch, 1 patch, Transdermal, Daily, VERNON Butt, 1 patch at 07/13/23 0830  •  potassium chloride (K-DUR,KLOR-CON) CR tablet 40 mEq, 40 mEq, Oral, Daily, Luisito Clifton,   •  umeclidinium-vilanterol 62.5-25 mcg/actuation inhaler 1 puff, 1 puff, Inhalation, Daily, VERNON Butt, 1 puff at 07/13/23 1047 Labs & Results:    Troponins:         BNP:   Results from last 6 Months   Lab Units 07/13/23  0232   BNP pg/mL 2,322*     CBC with diff:   Results from last 7 days   Lab Units 07/14/23  0451 07/13/23  0232   WBC Thousand/uL 9.97 7.67   HEMOGLOBIN g/dL 13.9 14.6   HEMATOCRIT % 42.6 44.4   MCV fL 89 90   PLATELETS Thousands/uL 247 243     TSH:     CMP:   Results from last 7 days   Lab Units 07/14/23  0451 07/13/23  1430 07/13/23  0232   POTASSIUM mmol/L 3.8 4.1 3.6   CHLORIDE mmol/L 98 96 94*   CO2 mmol/L 21 22 22   BUN mg/dL 26* 24 21   CREATININE mg/dL 1.49* 1.58* 1.40*   AST U/L  --   --  20   ALT U/L  --   --  22   EGFR ml/min/1.73sq m 35 32 38     Lipid Profile:     Coags:

## 2023-07-14 NOTE — PROGRESS NOTES
[id number] [facility]  Progress Note  Name: Chaz Fuller  MRN: [id number]  Unit/Bed#: -01 I Date of Admission: 7/13/2023   Date of Service: 7/14/2023 I Hospital Day: 1    Assessment/Plan   * Acute respiratory failure with hypoxia ([address])  Assessment & Plan  · Significantly improved  · Patient is now down to 2 L of supplemental oxygen via nasal cannula  · Secondary to an exacerbation of acute on chronic heart failure with preserved ejection fraction  · See the management as outlined below  · Continue to wean supplemental oxygen as able    Acute on chronic heart failure with preserved ejection fraction (HCC)  Assessment & Plan  Wt Readings from Last 3 Encounters:   07/14/23 59.1 kg (130 lb 4.7 oz)   06/25/23 54 kg (119 lb 0.8 oz)   05/01/23 52.2 kg (115 lb)     · Arrival BNP 2322  · Appreciate cardiology input  · Continue diuresis-40 mg of Lasix IV twice a day  · Continue other current cardiac based medications which include  diltiazem, Toprol-XL, and apixaban        Atrial fibrillation with RVR ([address])  Assessment & Plan  · Patient with some periods of rapid ventricular response  · Status post a one-time dose of digoxin  · Continue Toprol-XL, diltiazem for continued rate control  · Continue apixaban 5 mg p.o. twice daily for anticoagulation purposes      Hyponatremia  Assessment & Plan  · Hypervolemic hyponatremia  · Has improved with Lasix therapy  · Continue to monitor    Stage 3a chronic kidney disease [facility])  Assessment & Plan  Lab Results   Component Value Date    EGFR 35 07/14/2023    EGFR 32 07/13/2023    EGFR 38 07/13/2023    CREATININE 1.49 (H) 07/14/2023    CREATININE 1.58 (H) 07/13/2023    CREATININE 1.40 (H) 07/13/2023   · Creatinine is stable over the past 24 hours, slightly better than arrival at 1.49  · We will continue to monitor closely with administration of IV diuretics  · Recheck a BMP in the a.m.     COPD (chronic obstructive pulmonary disease) ([address])  Assessment & Plan  · Without exacerbation  · Continue respiratory protocol    Depression with anxiety  Assessment & Plan  · Continue xanax and Remeron    Tobacco dependency  Assessment & Plan  · Nicotine patch  · Smoking cessation recommended    Insomnia  Assessment & Plan  · Continue Remeron    Hypomagnesemia  Assessment & Plan  · Resolved with repletion             VTE Prophylaxis:  Apixaban (Eliquis)    Patient Centered Rounds: I have performed bedside rounds with nursing staff today. Discussions with Specialists or Other Care Team Provider: Cardiology, case management, nursing, pharmacy  Education and Discussions with Family / Patient: No family members were present, I offered to call one of her family members patient reported that she would do it on her own    Current Length of Stay: 1 day(s)    Current Patient Status: Inpatient   Certification Statement: The patient will continue to require additional inpatient hospital stay due to Need for continued diuretics    Discharge Plan: Discharge planning once cleared by cardiology    Code Status: Level 1 - Full Code    Subjective:   Patient seen, resting in bed, is comfortable, is happy that her leg swelling is going down. Patient was given an offer to be seen by physical and Occupational Therapy, the patient absolutely refused, at this time she prefers to lay in bed because she feels tired    Objective:     Vitals:   Temp (24hrs), Av.2 °F (36.8 °C), Min:97.8 °F (36.6 °C), Max:98.4 °F (36.9 °C)    Temp:  [97.8 °F (36.6 °C)-98.4 °F (36.9 °C)] 97.8 °F (36.6 °C)  HR:  [60-87] 69  Resp:  [17-20] 19  BP: (114-154)/() 114/68  SpO2:  [87 %-94 %] 87 %  Body mass index is 23.83 kg/m². Input and Output Summary (last 24 hours): Intake/Output Summary (Last 24 hours) at 2023 1311  Last data filed at 2023 1259  Gross per 24 hour   Intake 460 ml   Output 1500 ml   Net -1040 ml       Physical Exam:   Physical Exam  Constitutional:       General: She is not in acute distress. Appearance: Normal appearance. She is normal weight. She is not ill-appearing. HENT:      Head: Normocephalic and atraumatic. Nose: Nose normal.      Mouth/Throat:      Mouth: Mucous membranes are moist.   Eyes:      Extraocular Movements: Extraocular movements intact. Pupils: Pupils are equal, round, and reactive to light. Cardiovascular:      Rate and Rhythm: Normal rate and regular rhythm. Pulses: Normal pulses. Heart sounds: Normal heart sounds. No murmur heard. No friction rub. No gallop. Pulmonary:      Effort: Pulmonary effort is normal. No respiratory distress. Breath sounds: Normal breath sounds. No wheezing, rhonchi or rales. Comments: Decreased breath sounds bilaterally at the bases  Abdominal:      General: There is no distension. Palpations: Abdomen is soft. There is no mass. Tenderness: There is no abdominal tenderness. Hernia: No hernia is present. Musculoskeletal:         General: No swelling or tenderness. Normal range of motion. Cervical back: Normal range of motion and neck supple. No rigidity. Right lower leg: No edema. Left lower leg: No edema. Comments: Improved bilateral lower extremity pitting edema   Skin:     General: Skin is warm. Capillary Refill: Capillary refill takes less than 2 seconds. Findings: No erythema or rash. Neurological:      General: No focal deficit present. Mental Status: She is alert and oriented to person, place, and time. Mental status is at baseline. Cranial Nerves: No cranial nerve deficit. Motor: No weakness.    Psychiatric:         Mood and Affect: Mood normal.         Behavior: Behavior normal.         Additional Data:     Labs:    Results from last 7 days   Lab Units 07/14/23  0451 07/13/23  0232   WBC Thousand/uL 9.97 7.67   HEMOGLOBIN g/dL 13.9 14.6   HEMATOCRIT % 42.6 44.4   PLATELETS Thousands/uL 247 243   NEUTROS PCT %  --  67   LYMPHS PCT %  --  18 LYMPHO PCT % 5*  --    MONOS PCT %  --  12   MONO PCT % 3*  --    EOS PCT % 0 1     Results from last 7 days   Lab Units 07/14/23  0451 07/13/23  1430 07/13/23  0232   SODIUM mmol/L 131*   < > 129*   POTASSIUM mmol/L 3.8   < > 3.6   CHLORIDE mmol/L 98   < > 94*   CO2 mmol/L 21   < > 22   BUN mg/dL 26*   < > 21   CREATININE mg/dL 1.49*   < > 1.40*   CALCIUM mg/dL 8.5   < > 8.6   ALK PHOS U/L  --   --  227*   ALT U/L  --   --  22   AST U/L  --   --  20    < > = values in this interval not displayed. * I Have Reviewed All Lab Data Listed Above. * Additional Pertinent Lab Tests Reviewed:  [address] Admission  Reviewed    Imaging:  Imaging Reports Reviewed Today Include: None    Recent Cultures (last 7 days):           Last 24 Hours Medication List:   Current Facility-Administered Medications   Medication Dose Route Frequency Provider Last Rate   • acetaminophen  650 mg Oral Q4H PRN Bekah Masker, PA-C     • ALPRAZolam  0.25 mg Oral BID Bekah Masker, PA-C     • apixaban  5 mg Oral BID Bekah Masker, PA-C     • cholecalciferol  2,000 Units Oral Daily Bekah Masker, PA-C     • diltiazem  240 mg Oral Daily Bekah Masker, [place]     • furosemide  40 mg Intravenous BID (diuretic) Yesenia Pinedo, DO     • levalbuterol  1.25 mg Nebulization TID Nusrat Vila DO     • metoprolol  5 mg Intravenous Q6H PRN Bekah Masker, PA-C     • metoprolol succinate  50 mg Oral Daily Bekah Masker, PA-C     • mirtazapine  7.5 mg Oral HS Bekah Masker, PA-C     • nicotine  1 patch Transdermal Daily Bekah Masker, PA-C     • potassium chloride  40 mEq Oral Daily Yesenia Duos, DO     • umeclidinium-vilanterol  1 puff Inhalation Daily Bekah Masker, PA-C          Today, Patient Was Seen By: Archer Cushing, MD    ** Please Note: Dictation voice to text software may have been used in the creation of this document.  **

## 2023-07-15 LAB
ANION GAP SERPL CALCULATED.3IONS-SCNC: 11 MMOL/L
BUN SERPL-MCNC: 27 MG/DL (ref 5–25)
CALCIUM SERPL-MCNC: 8.8 MG/DL (ref 8.4–10.2)
CHLORIDE SERPL-SCNC: 98 MMOL/L (ref 96–108)
CO2 SERPL-SCNC: 26 MMOL/L (ref 21–32)
CREAT SERPL-MCNC: 1.44 MG/DL (ref 0.6–1.3)
GFR SERPL CREATININE-BSD FRML MDRD: 36 ML/MIN/1.73SQ M
GLUCOSE SERPL-MCNC: 176 MG/DL (ref 65–140)
POTASSIUM SERPL-SCNC: 3.4 MMOL/L (ref 3.5–5.3)
SODIUM SERPL-SCNC: 135 MMOL/L (ref 135–147)

## 2023-07-15 PROCEDURE — 94760 N-INVAS EAR/PLS OXIMETRY 1: CPT

## 2023-07-15 PROCEDURE — 99232 SBSQ HOSP IP/OBS MODERATE 35: CPT | Performed by: INTERNAL MEDICINE

## 2023-07-15 PROCEDURE — 80048 BASIC METABOLIC PNL TOTAL CA: CPT | Performed by: INTERNAL MEDICINE

## 2023-07-15 PROCEDURE — 94640 AIRWAY INHALATION TREATMENT: CPT

## 2023-07-15 PROCEDURE — 99232 SBSQ HOSP IP/OBS MODERATE 35: CPT | Performed by: HOSPITALIST

## 2023-07-15 RX ORDER — POTASSIUM CHLORIDE 20 MEQ/1
20 TABLET, EXTENDED RELEASE ORAL ONCE
Status: COMPLETED | OUTPATIENT
Start: 2023-07-15 | End: 2023-07-15

## 2023-07-15 RX ADMIN — METOPROLOL SUCCINATE 50 MG: 50 TABLET, EXTENDED RELEASE ORAL at 09:32

## 2023-07-15 RX ADMIN — DILTIAZEM HYDROCHLORIDE 240 MG: 240 CAPSULE, COATED, EXTENDED RELEASE ORAL at 09:32

## 2023-07-15 RX ADMIN — MIRTAZAPINE 7.5 MG: 15 TABLET, FILM COATED ORAL at 21:12

## 2023-07-15 RX ADMIN — POTASSIUM CHLORIDE 20 MEQ: 1500 TABLET, EXTENDED RELEASE ORAL at 14:23

## 2023-07-15 RX ADMIN — LEVALBUTEROL HYDROCHLORIDE 1.25 MG: 1.25 SOLUTION RESPIRATORY (INHALATION) at 13:20

## 2023-07-15 RX ADMIN — ALPRAZOLAM 0.25 MG: 0.25 TABLET ORAL at 21:12

## 2023-07-15 RX ADMIN — UMECLIDINIUM BROMIDE AND VILANTEROL TRIFENATATE 1 PUFF: 62.5; 25 POWDER RESPIRATORY (INHALATION) at 09:33

## 2023-07-15 RX ADMIN — FUROSEMIDE 40 MG: 10 INJECTION, SOLUTION INTRAMUSCULAR; INTRAVENOUS at 17:08

## 2023-07-15 RX ADMIN — Medication 2000 UNITS: at 09:32

## 2023-07-15 RX ADMIN — POTASSIUM CHLORIDE 40 MEQ: 1500 TABLET, EXTENDED RELEASE ORAL at 09:32

## 2023-07-15 RX ADMIN — APIXABAN 5 MG: 5 TABLET, FILM COATED ORAL at 09:32

## 2023-07-15 RX ADMIN — FUROSEMIDE 40 MG: 10 INJECTION, SOLUTION INTRAMUSCULAR; INTRAVENOUS at 09:35

## 2023-07-15 RX ADMIN — NICOTINE 1 PATCH: 7 PATCH, EXTENDED RELEASE TRANSDERMAL at 09:33

## 2023-07-15 RX ADMIN — ALPRAZOLAM 0.25 MG: 0.25 TABLET ORAL at 09:32

## 2023-07-15 RX ADMIN — APIXABAN 5 MG: 5 TABLET, FILM COATED ORAL at 17:06

## 2023-07-15 RX ADMIN — LEVALBUTEROL HYDROCHLORIDE 1.25 MG: 1.25 SOLUTION RESPIRATORY (INHALATION) at 20:20

## 2023-07-15 NOTE — PROGRESS NOTES
[id number] [facility] ([place], [place])  Progress Note  Name: Destiny Stanton  MRN: [id number]  Unit/Bed#: -01 I Date of Admission: 7/13/2023   Date of Service: 7/15/2023 I Hospital Day: 2    Assessment/Plan   * Acute respiratory failure with hypoxia ([address])  Assessment & Plan  · Significantly improved  · Patient is now down to 2 L of supplemental oxygen via nasal cannula  · Secondary to an exacerbation of acute on chronic heart failure with preserved ejection fraction  · See the management as outlined below  · Continue to wean supplemental oxygen as able    Acute on chronic heart failure with preserved ejection fraction (HCC)  Assessment & Plan  Wt Readings from Last 3 Encounters:   07/15/23 54.2 kg (119 lb 7.8 oz)   06/25/23 54 kg (119 lb 0.8 oz)   05/01/23 52.2 kg (115 lb)     · Arrival BNP 2322  · Appreciate cardiology input  · Continue diuresis-40 mg of Lasix IV twice a day  · Continue other current cardiac based medications which include  diltiazem, Toprol-XL, and apixaban  · Continue to monitor daily weight, I's and O's  · Discharge planning once cleared by cardiology        Atrial fibrillation with RVR ([address])  Assessment & Plan  · Patient with some periods of rapid ventricular response  · Status post a one-time dose of digoxin  · Continue Toprol-XL, diltiazem for continued rate control  · Continue apixaban 5 mg p.o. twice daily for anticoagulation purposes      Hyponatremia  Assessment & Plan  · Hypervolemic hyponatremia  · Has improved with Lasix therapy  · Continue to monitor    Stage 3a chronic kidney disease [facility])  Assessment & Plan  Lab Results   Component Value Date    EGFR 36 07/15/2023    EGFR 35 07/14/2023    EGFR 32 07/13/2023    CREATININE 1.44 (H) 07/15/2023    CREATININE 1.49 (H) 07/14/2023    CREATININE 1.58 (H) 07/13/2023   · Creatinine is stable over the past 24 hours, 1.44 today  · We will continue to monitor closely with administration of IV diuretics  · Recheck a BMP in the a.m.     COPD (chronic obstructive pulmonary disease) (HCC)  Assessment & Plan  · Without exacerbation  · Continue respiratory protocol    Depression with anxiety  Assessment & Plan  · Continue xanax and Remeron    Tobacco dependency  Assessment & Plan  · Nicotine patch  · Smoking cessation recommended    Insomnia  Assessment & Plan  · Continue Remeron    Hypomagnesemia  Assessment & Plan  · Resolved with repletion             VTE Prophylaxis:  Apixaban (Eliquis)    Patient Centered Rounds: I have performed bedside rounds with nursing staff today. Discussions with Specialists or Other Care Team Provider: Cardiology, case management, nursing  Education and Discussions with Family / Patient: Patient was brought up to par    Current Length of Stay: 2 day(s)    Current Patient Status: Inpatient   Certification Statement: The patient will continue to require additional inpatient hospital stay due to The need for continued IV diuretics    Discharge Plan: Discharge planning once cleared by cardiology    Code Status: Level 1 - Full Code    Subjective:   Patient seen, resting in bed, no new complaints, is curious about discharge planning    Objective:     Vitals:   Temp (24hrs), Av.5 °F (36.4 °C), Min:96.7 °F (35.9 °C), Max:98.3 °F (36.8 °C)    Temp:  [96.7 °F (35.9 °C)-98.3 °F (36.8 °C)] 96.7 °F (35.9 °C)  HR:  [51-79] 66  Resp:  [16-19] 18  BP: (108-160)/(75-97) 108/79  SpO2:  [87 %-94 %] 92 %  Body mass index is 21.85 kg/m². Input and Output Summary (last 24 hours): Intake/Output Summary (Last 24 hours) at 7/15/2023 1413  Last data filed at 7/15/2023 1300  Gross per 24 hour   Intake 360 ml   Output 1200 ml   Net -840 ml       Physical Exam:   Physical Exam  Constitutional:       General: She is not in acute distress. Appearance: Normal appearance. She is normal weight. She is not ill-appearing. HENT:      Head: Normocephalic and atraumatic.       Nose: Nose normal.      Mouth/Throat:      Mouth: Mucous membranes are moist.   Eyes:      Extraocular Movements: Extraocular movements intact. Pupils: Pupils are equal, round, and reactive to light. Cardiovascular:      Rate and Rhythm: Normal rate and regular rhythm. Pulses: Normal pulses. Heart sounds: Normal heart sounds. No murmur heard. No friction rub. No gallop. Pulmonary:      Effort: Pulmonary effort is normal. No respiratory distress. Breath sounds: Normal breath sounds. No wheezing, rhonchi or rales. Abdominal:      General: There is no distension. Palpations: Abdomen is soft. There is no mass. Tenderness: There is no abdominal tenderness. Hernia: No hernia is present. Musculoskeletal:         General: No swelling or tenderness. Normal range of motion. Cervical back: Normal range of motion and neck supple. No rigidity. Right lower leg: No edema. Left lower leg: No edema. Comments: Improved bilateral lower extremity swelling   Skin:     General: Skin is warm. Capillary Refill: Capillary refill takes less than 2 seconds. Findings: No erythema or rash. Neurological:      General: No focal deficit present. Mental Status: She is alert and oriented to person, place, and time. Mental status is at baseline. Cranial Nerves: No cranial nerve deficit. Motor: No weakness.    Psychiatric:         Mood and Affect: Mood normal.         Behavior: Behavior normal.         Additional Data:     Labs:    Results from last 7 days   Lab Units 07/14/23  0451 07/13/23  0232   WBC Thousand/uL 9.97 7.67   HEMOGLOBIN g/dL 13.9 14.6   HEMATOCRIT % 42.6 44.4   PLATELETS Thousands/uL 247 243   NEUTROS PCT %  --  67   LYMPHS PCT %  --  18   LYMPHO PCT % 5*  --    MONOS PCT %  --  12   MONO PCT % 3*  --    EOS PCT % 0 1     Results from last 7 days   Lab Units 07/15/23  0924 07/13/23  1430 07/13/23  0232   SODIUM mmol/L 135   < > 129*   POTASSIUM mmol/L 3.4*   < > 3.6   CHLORIDE mmol/L 98   < > 94*   CO2 mmol/L 26   < > 22   BUN mg/dL 27*   < > 21   CREATININE mg/dL 1.44*   < > 1.40*   CALCIUM mg/dL 8.8   < > 8.6   ALK PHOS U/L  --   --  227*   ALT U/L  --   --  22   AST U/L  --   --  20    < > = values in this interval not displayed. * I Have Reviewed All Lab Data Listed Above. * Additional Pertinent Lab Tests Reviewed: [address] Admission  Reviewed    Imaging:  Imaging Reports Reviewed Today Include: None    Recent Cultures (last 7 days):           Last 24 Hours Medication List:   Current Facility-Administered Medications   Medication Dose Route Frequency Provider Last Rate   • acetaminophen  650 mg Oral Q4H PRN Dee Reed PA-C     • ALPRAZolam  0.25 mg Oral BID Dee Reed PA-C     • apixaban  5 mg Oral BID Dee Reed PA-C     • cholecalciferol  2,000 Units Oral Daily Dee Reed PA-C     • diltiazem  240 mg [facility] Daily [facility]     • furosemide  40 mg Intravenous BID (diuretic) Simmie Heal, DO     • levalbuterol  1.25 mg Nebulization TID Phil Elizabeth, DO     • metoprolol  5 mg Intravenous Q6H PRN Dee Reed PA-C     • metoprolol succinate  50 mg Oral Daily Dee Reed PA-C     • mirtazapine  7.5 mg Oral HS Dee Reed PA-C     • nicotine  1 patch Transdermal Daily Dee Reed PA-C     • potassium chloride  20 mEq Oral Once Simmie Heal, DO     • potassium chloride  40 mEq Oral Daily Simmie Heal, DO     • umeclidinium-vilanterol  1 puff Inhalation Daily Dee Reed PA-C          Today, Patient Was Seen By: Tony Hurd MD    ** Please Note: Dictation voice to text software may have been used in the creation of this document.  **

## 2023-07-15 NOTE — ASSESSMENT & PLAN NOTE
-Patient with known paroxysmal atrial fibrillation currently rate controlled  -Continue oral anticoagulation with Eliquis  -Continue metoprolol and diltiazem therapy at this time with up titration as necessary to allow for continued rate control strategy  -Continue to monitor patient clinically at this time on telemetry

## 2023-07-15 NOTE — ASSESSMENT & PLAN NOTE
Lab Results   Component Value Date    EGFR 36 07/15/2023    EGFR 35 07/14/2023    EGFR 32 07/13/2023    CREATININE 1.44 (H) 07/15/2023    CREATININE 1.49 (H) 07/14/2023    CREATININE 1.58 (H) 07/13/2023       -Renal function stable continue to monitor  -Would monitor creatinine along with electrolytes and would consider nephrology evaluation as patient does well with them in the outpatient setting.

## 2023-07-15 NOTE — ASSESSMENT & PLAN NOTE
Wt Readings from Last 3 Encounters:   07/15/23 54.2 kg (119 lb 7.8 oz)   06/25/23 54 kg (119 lb 0.8 oz)   05/01/23 52.2 kg (115 lb)       -Patient appears to be improving but still somewhat volume overloaded on physical exam with significant lower extremity edema and JVD present  -Continue with aggressive IV diuretic therapy with monitoring of renal function and electrolytes  -Continue potassium supplementation and will give additional 20 mEq today.  -Continue to monitor urine output along with daily weights if patient can tolerate standing

## 2023-07-15 NOTE — PROGRESS NOTES
[id number] [facility]  Progress Note  Name: Tay Carpio  MRN: [id number]  Unit/Bed#: -01 I Date of Admission: 7/13/2023   Date of Service: 7/15/2023 I Hospital Day: 2    Assessment/Plan   Stage 3a chronic kidney disease [facility])  Assessment & Plan  Lab Results   Component Value Date    EGFR 36 07/15/2023    EGFR 35 07/14/2023    EGFR 32 07/13/2023    CREATININE 1.44 (H) 07/15/2023    CREATININE 1.49 (H) 07/14/2023    CREATININE 1.58 (H) 07/13/2023       -Renal function stable continue to monitor  -Would monitor creatinine along with electrolytes and would consider nephrology evaluation as patient does well with them in the outpatient setting. Atrial fibrillation with RVR ([address])  Assessment & Plan  -Patient with known paroxysmal atrial fibrillation currently rate controlled  -Continue oral anticoagulation with Eliquis  -Continue metoprolol and diltiazem therapy at this time with up titration as necessary to allow for continued rate control strategy  -Continue to monitor patient clinically at this time on telemetry    Tobacco dependency  Assessment & Plan  - Counseled patient on the importance of tobacco cessation.     * Acute respiratory failure with hypoxia (HCC)  Assessment & Plan  -Patient currently on 2 L nasal cannula oxygen  -Continue to monitor with continued diuresis      Acute on chronic heart failure with preserved ejection fraction (HCC)  Assessment & Plan  Wt Readings from Last 3 Encounters:   07/15/23 54.2 kg (119 lb 7.8 oz)   06/25/23 54 kg (119 lb 0.8 oz)   05/01/23 52.2 kg (115 lb)       -Patient appears to be improving but still somewhat volume overloaded on physical exam with significant lower extremity edema and JVD present  -Continue with aggressive IV diuretic therapy with monitoring of renal function and electrolytes  -Continue potassium supplementation and will give additional 20 mEq today.  -Continue to monitor urine output along with daily weights if patient can tolerate standing      COPD (chronic obstructive pulmonary disease) ([address])  Assessment & Plan  - Patient currently on 2 L nasal cannula oxygen  -Continue to monitor with medical therapy per primary team        Subjective:   Patient seen and examined. Per patient she denies any chest pain, palpitations, lightness or dizziness, loss of consciousness and notes her shortness of breath is improving as well as lower extremity edema. She still notes significant urine output with IV diuretic therapy. She does note fatigue but states this is from not sleeping well while in the hospital.      Summary comments:  -Continue IV diuretic therapy with repletion of electrolytes and monitoring. Telemetry/ECG/Cardiac testing:   Rate controlled atrial fibrillation on telemetry    Vitals: Blood pressure 108/79, pulse 66, temperature (!) 96.7 °F (35.9 °C), resp. rate 18, height 5' 2" (1.575 m), weight 54.2 kg (119 lb 7.8 oz), SpO2 92 %.,   Orthostatic Blood Pressures    Flowsheet Row Most Recent Value   Blood Pressure 108/79 filed at 07/15/2023 1108   Patient Position - Orthostatic VS Lying filed at 07/13/2023 1900      ,   Weight (last 2 days)     Date/Time Weight    07/15/23 0600 54.2 (119.49)    07/14/23 0600 59.1 (130.29)    07/13/23 1035 59 (130)    07/13/23 0624 59.1 (130.29)    07/13/23 0223 52.2 (115)          Physical Exam:  Physical Exam  Vitals reviewed. Constitutional:       General: She is not in acute distress. Appearance: She is not diaphoretic. HENT:      Head: Normocephalic and atraumatic. Comments: Nasal cannula oxygen in place  Eyes:      General:         Right eye: No discharge. Left eye: No discharge. Neck:      Comments: Trachea midline, mild JVD present  Cardiovascular:      Heart sounds: Murmur (BLANCA) heard. No friction rub. Comments: Irregularly irregular rate and rhythm  Pulmonary:      Effort: No respiratory distress. Breath sounds: No wheezing.       Comments: Decreased breath sounds bilaterally  Chest:      Chest wall: No tenderness. Abdominal:      General: Bowel sounds are normal.      Palpations: Abdomen is soft. Tenderness: There is no abdominal tenderness. There is no rebound. Musculoskeletal:      Right lower leg: Edema (2+) present. Left lower leg: Edema (2+) present. Skin:     General: Skin is warm and dry. Neurological:      Mental Status: She is alert. Comments: Awake, alert, able to answer questions appropriately.    Psychiatric:         Mood and Affect: Mood normal.         Behavior: Behavior normal.       Medications:      Current Facility-Administered Medications:   •  acetaminophen (TYLENOL) tablet 650 mg, 650 mg, Oral, Q4H PRN, Concepcion Harmon PA-C  •  ALPRAZolam Carina Davis) tablet 0.25 mg, 0.25 mg, Oral, BID, Lorena Best PA-C, 0.25 mg at 07/15/23 0932  •  apixaban (ELIQUIS) tablet 5 mg, 5 mg, Oral, BID, Lorena Best PA-C, 5 mg at 07/15/23 3219  •  cholecalciferol (VITAMIN D3) tablet 2,000 Units, 2,000 Units, Oral, Daily, Concepcion Harmon PA-C, 2,000 Units at 07/15/23 0932  •  diltiazem (CARDIZEM CD) 24 hr capsule 240 mg, 240 mg, Oral, Daily, Concepcion Harmon PA-C, 240 mg at 07/15/23 0932  •  furosemide (LASIX) injection 40 mg, 40 mg, Intravenous, BID (diuretic), Anna Donald DO, 40 mg at 07/15/23 0935  •  levalbuterol (XOPENEX) inhalation solution 1.25 mg, 1.25 mg, Nebulization, TID, Tyrone Norman DO, 1.25 mg at 07/14/23 2025  •  metoprolol (LOPRESSOR) injection 5 mg, 5 mg, Intravenous, Q6H PRN, Concepcion Harmon PA-C, 5 mg at 07/14/23 0043  •  metoprolol succinate (TOPROL-XL) 24 hr tablet 50 mg, 50 mg, Oral, Daily, Concepcion Harmon PA-C, 50 mg at 07/15/23 0932  •  mirtazapine (REMERON) tablet 7.5 mg, 7.5 mg, Oral, HS, Lorena Best, VERNON, 7.5 mg at 07/14/23 2124  •  nicotine (NICODERM CQ) 7 mg/24hr TD 24 hr patch 1 patch, 1 patch, Transdermal, Daily, Marjorie Loya VERNON Best, 1 patch at 07/15/23 8459  •  potassium chloride (K-DUR,KLOR-CON) CR tablet 20 mEq, 20 mEq, Oral, Once, Nellene Satinder, DO  •  potassium chloride (K-DUR,KLOR-CON) CR tablet 40 mEq, 40 mEq, Oral, Daily, Nellene Satinder, DO, 40 mEq at 07/15/23 0932  •  umeclidinium-vilanterol 62.5-25 mcg/actuation inhaler 1 puff, 1 puff, Inhalation, Daily, [employer], PA-C, 1 puff at 07/15/23 0933     Labs & Results:    Troponins:         BNP:   Results from last 6 Months   Lab Units 07/13/23  0232   BNP pg/mL 2,322*     CBC with diff:   Results from last 7 days   Lab Units 07/14/23  0451 07/13/23  0232   WBC Thousand/uL 9.97 7.67   HEMOGLOBIN g/dL 13.9 14.6   HEMATOCRIT % 42.6 44.4   MCV fL 89 90   PLATELETS Thousands/uL 247 243     TSH:     CMP:   Results from last 7 days   Lab Units 07/15/23  0924 07/14/23  0451 07/13/23  1430 07/13/23  0232   POTASSIUM mmol/L 3.4* 3.8   < > 3.6   CHLORIDE mmol/L 98 98   < > 94*   CO2 mmol/L 26 21   < > 22   BUN mg/dL 27* 26*   < > 21   CREATININE mg/dL 1.44* 1.49*   < > 1.40*   AST U/L  --   --   --  20   ALT U/L  --   --   --  22   EGFR ml/min/1.73sq m 36 35   < > 38    < > = values in this interval not displayed.      Lipid Profile:     Coags:

## 2023-07-15 NOTE — ASSESSMENT & PLAN NOTE
- Patient currently on 2 L nasal cannula oxygen  -Continue to monitor with medical therapy per primary team

## 2023-07-15 NOTE — ASSESSMENT & PLAN NOTE
Wt Readings from Last 3 Encounters:   07/15/23 54.2 kg (119 lb 7.8 oz)   06/25/23 54 kg (119 lb 0.8 oz)   05/01/23 52.2 kg (115 lb)     · Arrival BNP 2322  · Appreciate cardiology input  · Continue diuresis-40 mg of Lasix IV twice a day  · Continue other current cardiac based medications which include  diltiazem, Toprol-XL, and apixaban  · Continue to monitor daily weight, I's and O's  · Discharge planning once cleared by cardiology

## 2023-07-15 NOTE — PLAN OF CARE
Problem: MOBILITY - ADULT  Goal: Maintain or return to baseline ADL function  Description: INTERVENTIONS:  -  Assess patient's ability to carry out ADLs; assess patient's baseline for ADL function and identify physical deficits which impact ability to perform ADLs (bathing, care of mouth/teeth, toileting, grooming, dressing, etc.)  - Assess/evaluate cause of self-care deficits   - Assess range of motion  - Assess patient's mobility; develop plan if impaired  - Assess patient's need for assistive devices and provide as appropriate  - Encourage maximum independence but intervene and supervise when necessary  - Involve family in performance of ADLs  - Assess for home care needs following discharge   - Consider OT consult to assist with ADL evaluation and planning for discharge  - Provide patient education as appropriate  Outcome: Progressing  Goal: Maintains/Returns to pre admission functional level  Description: INTERVENTIONS:  - Perform BMAT or MOVE assessment daily.   - Set and communicate daily mobility goal to care team and patient/family/caregiver.    - Collaborate with rehabilitation services on mobility goals if consulted  - Out of bed for toileting  - Record patient progress and toleration of activity level   Outcome: Progressing     Problem: Prexisting or High Potential for Compromised Skin Integrity  Goal: Skin integrity is maintained or improved  Description: INTERVENTIONS:  - Identify patients at risk for skin breakdown  - Assess and monitor skin integrity  - Assess and monitor nutrition and hydration status  - Monitor labs   - Assess for incontinence   - Turn and reposition patient  - Assist with mobility/ambulation  - Relieve pressure over bony prominences  - Avoid friction and shearing  - Provide appropriate hygiene as needed including keeping skin clean and dry  - Evaluate need for skin moisturizer/barrier cream  - Collaborate with interdisciplinary team   - Patient/family teaching  - Consider wound care consult   Outcome: Progressing     Problem: PAIN - ADULT  Goal: Verbalizes/displays adequate comfort level or baseline comfort level  Description: Interventions:  - Encourage patient to monitor pain and request assistance  - Assess pain using appropriate pain scale  - Administer analgesics based on type and severity of pain and evaluate response  - Implement non-pharmacological measures as appropriate and evaluate response  - Consider cultural and social influences on pain and pain management  - Notify physician/advanced practitioner if interventions unsuccessful or patient reports new pain  Outcome: Progressing     Problem: INFECTION - ADULT  Goal: Absence or prevention of progression during hospitalization  Description: INTERVENTIONS:  - Assess and monitor for signs and symptoms of infection  - Monitor lab/diagnostic results  - Monitor all insertion sites, i.e. indwelling lines, tubes, and drains  - Monitor endotracheal if appropriate and nasal secretions for changes in amount and color  - [place] appropriate cooling/warming therapies per order  - Administer medications as ordered  - Instruct and encourage patient and family to use good hand hygiene technique  - Identify and instruct in appropriate isolation precautions for identified infection/condition  Outcome: Progressing  Goal: Absence of fever/infection during neutropenic period  Description: INTERVENTIONS:  - Monitor WBC    Outcome: Progressing     Problem: SAFETY ADULT  Goal: Maintain or return to baseline ADL function  Description: INTERVENTIONS:  -  Assess patient's ability to carry out ADLs; assess patient's baseline for ADL function and identify physical deficits which impact ability to perform ADLs (bathing, care of mouth/teeth, toileting, grooming, dressing, etc.)  - Assess/evaluate cause of self-care deficits   - Assess range of motion  - Assess patient's mobility; develop plan if impaired  - Assess patient's need for assistive devices and provide as appropriate  - Encourage maximum independence but intervene and supervise when necessary  - Involve family in performance of ADLs  - Assess for home care needs following discharge   - Consider OT consult to assist with ADL evaluation and planning for discharge  - Provide patient education as appropriate  Outcome: Progressing  Goal: Maintains/Returns to pre admission functional level  Description: INTERVENTIONS:  - Perform BMAT or MOVE assessment daily.   - Set and communicate daily mobility goal to care team and patient/family/caregiver.    - Collaborate with rehabilitation services on mobility goals if consulted  - Out of bed for toileting  - Record patient progress and toleration of activity level   Outcome: Progressing  Goal: Patient will remain free of falls  Description: INTERVENTIONS:  - Educate patient/family on patient safety including physical limitations  - Instruct patient to call for assistance with activity   - Consult OT/PT to assist with strengthening/mobility   - Keep Call bell within reach  - Keep bed low and locked with side rails adjusted as appropriate  - Keep care items and personal belongings within reach  - Initiate and maintain comfort rounds  - Make Fall Risk Sign visible to staff  - Apply yellow socks and bracelet for high fall risk patients  - Consider moving patient to room near nurses station  Outcome: Progressing     Problem: DISCHARGE PLANNING  Goal: Discharge to home or other facility with appropriate resources  Description: INTERVENTIONS:  - Identify barriers to discharge w/patient and caregiver  - Arrange for needed discharge resources and transportation as appropriate  - Identify discharge learning needs (meds, wound care, etc.)  - Arrange for interpretive services to assist at discharge as needed  - Refer to Case Management Department for coordinating discharge planning if the patient needs post-hospital services based on physician/advanced practitioner order or complex needs related to functional status, cognitive ability, or social support system  Outcome: Progressing     Problem: Knowledge Deficit  Goal: Patient/family/caregiver demonstrates understanding of disease process, treatment plan, medications, and discharge instructions  Description: Complete learning assessment and assess knowledge base.   Interventions:  - Provide teaching at level of understanding  - Provide teaching via preferred learning methods  Outcome: Progressing

## 2023-07-15 NOTE — ASSESSMENT & PLAN NOTE
Lab Results   Component Value Date    EGFR 36 07/15/2023    EGFR 35 07/14/2023    EGFR 32 07/13/2023    CREATININE 1.44 (H) 07/15/2023    CREATININE 1.49 (H) 07/14/2023    CREATININE 1.58 (H) 07/13/2023   · Creatinine is stable over the past 24 hours, 1.44 today  · We will continue to monitor closely with administration of IV diuretics  · Recheck a BMP in the a.m.

## 2023-07-16 LAB
ANION GAP SERPL CALCULATED.3IONS-SCNC: 9 MMOL/L
BASOPHILS # BLD AUTO: 0.03 THOUSANDS/ÂΜL (ref 0–0.1)
BASOPHILS NFR BLD AUTO: 0 % (ref 0–1)
BUN SERPL-MCNC: 25 MG/DL (ref 5–25)
CALCIUM SERPL-MCNC: 9.2 MG/DL (ref 8.4–10.2)
CHLORIDE SERPL-SCNC: 97 MMOL/L (ref 96–108)
CO2 SERPL-SCNC: 30 MMOL/L (ref 21–32)
CREAT SERPL-MCNC: 1.31 MG/DL (ref 0.6–1.3)
EOSINOPHIL # BLD AUTO: 0.06 THOUSAND/ÂΜL (ref 0–0.61)
EOSINOPHIL NFR BLD AUTO: 1 % (ref 0–6)
ERYTHROCYTE [DISTWIDTH] IN BLOOD BY AUTOMATED COUNT: 19.7 % (ref 11.6–15.1)
GFR SERPL CREATININE-BSD FRML MDRD: 41 ML/MIN/1.73SQ M
GLUCOSE SERPL-MCNC: 101 MG/DL (ref 65–140)
HCT VFR BLD AUTO: 45.9 % (ref 34.8–46.1)
HGB BLD-MCNC: 14.4 G/DL (ref 11.5–15.4)
IMM GRANULOCYTES # BLD AUTO: 0.04 THOUSAND/UL (ref 0–0.2)
IMM GRANULOCYTES NFR BLD AUTO: 0 % (ref 0–2)
LYMPHOCYTES # BLD AUTO: 0.99 THOUSANDS/ÂΜL (ref 0.6–4.47)
LYMPHOCYTES NFR BLD AUTO: 10 % (ref 14–44)
MCH RBC QN AUTO: 28.9 PG (ref 26.8–34.3)
MCHC RBC AUTO-ENTMCNC: 31.4 G/DL (ref 31.4–37.4)
MCV RBC AUTO: 92 FL (ref 82–98)
MONOCYTES # BLD AUTO: 0.69 THOUSAND/ÂΜL (ref 0.17–1.22)
MONOCYTES NFR BLD AUTO: 7 % (ref 4–12)
NEUTROPHILS # BLD AUTO: 7.71 THOUSANDS/ÂΜL (ref 1.85–7.62)
NEUTS SEG NFR BLD AUTO: 82 % (ref 43–75)
NRBC BLD AUTO-RTO: 0 /100 WBCS
PLATELET # BLD AUTO: 248 THOUSANDS/UL (ref 149–390)
PMV BLD AUTO: 9.4 FL (ref 8.9–12.7)
POTASSIUM SERPL-SCNC: 3.9 MMOL/L (ref 3.5–5.3)
RBC # BLD AUTO: 4.98 MILLION/UL (ref 3.81–5.12)
SODIUM SERPL-SCNC: 136 MMOL/L (ref 135–147)
WBC # BLD AUTO: 9.52 THOUSAND/UL (ref 4.31–10.16)

## 2023-07-16 PROCEDURE — 94640 AIRWAY INHALATION TREATMENT: CPT

## 2023-07-16 PROCEDURE — 99232 SBSQ HOSP IP/OBS MODERATE 35: CPT | Performed by: INTERNAL MEDICINE

## 2023-07-16 PROCEDURE — 80048 BASIC METABOLIC PNL TOTAL CA: CPT | Performed by: HOSPITALIST

## 2023-07-16 PROCEDURE — 94664 DEMO&/EVAL PT USE INHALER: CPT

## 2023-07-16 PROCEDURE — 99232 SBSQ HOSP IP/OBS MODERATE 35: CPT | Performed by: HOSPITALIST

## 2023-07-16 PROCEDURE — 85025 COMPLETE CBC W/AUTO DIFF WBC: CPT | Performed by: HOSPITALIST

## 2023-07-16 PROCEDURE — 94760 N-INVAS EAR/PLS OXIMETRY 1: CPT

## 2023-07-16 RX ORDER — POLYETHYLENE GLYCOL 3350 17 G/17G
17 POWDER, FOR SOLUTION ORAL DAILY PRN
Status: DISCONTINUED | OUTPATIENT
Start: 2023-07-16 | End: 2023-07-17 | Stop reason: HOSPADM

## 2023-07-16 RX ORDER — METOPROLOL TARTRATE 5 MG/5ML
5 INJECTION INTRAVENOUS EVERY 4 HOURS PRN
Status: DISCONTINUED | OUTPATIENT
Start: 2023-07-16 | End: 2023-07-17 | Stop reason: HOSPADM

## 2023-07-16 RX ORDER — DIGOXIN 0.25 MG/ML
250 INJECTION INTRAMUSCULAR; INTRAVENOUS ONCE
Status: COMPLETED | OUTPATIENT
Start: 2023-07-16 | End: 2023-07-16

## 2023-07-16 RX ADMIN — FUROSEMIDE 40 MG: 10 INJECTION, SOLUTION INTRAMUSCULAR; INTRAVENOUS at 17:09

## 2023-07-16 RX ADMIN — UMECLIDINIUM BROMIDE AND VILANTEROL TRIFENATATE 1 PUFF: 62.5; 25 POWDER RESPIRATORY (INHALATION) at 09:56

## 2023-07-16 RX ADMIN — DILTIAZEM HYDROCHLORIDE 240 MG: 240 CAPSULE, COATED, EXTENDED RELEASE ORAL at 09:54

## 2023-07-16 RX ADMIN — ALPRAZOLAM 0.25 MG: 0.25 TABLET ORAL at 09:54

## 2023-07-16 RX ADMIN — LEVALBUTEROL HYDROCHLORIDE 1.25 MG: 1.25 SOLUTION RESPIRATORY (INHALATION) at 13:26

## 2023-07-16 RX ADMIN — ALPRAZOLAM 0.25 MG: 0.25 TABLET ORAL at 22:12

## 2023-07-16 RX ADMIN — APIXABAN 5 MG: 5 TABLET, FILM COATED ORAL at 09:54

## 2023-07-16 RX ADMIN — METOPROLOL TARTRATE 5 MG: 5 INJECTION INTRAVENOUS at 08:23

## 2023-07-16 RX ADMIN — MIRTAZAPINE 7.5 MG: 15 TABLET, FILM COATED ORAL at 22:12

## 2023-07-16 RX ADMIN — FUROSEMIDE 40 MG: 10 INJECTION, SOLUTION INTRAMUSCULAR; INTRAVENOUS at 09:55

## 2023-07-16 RX ADMIN — POLYETHYLENE GLYCOL 3350 17 G: 17 POWDER, FOR SOLUTION ORAL at 17:09

## 2023-07-16 RX ADMIN — POTASSIUM CHLORIDE 40 MEQ: 1500 TABLET, EXTENDED RELEASE ORAL at 09:54

## 2023-07-16 RX ADMIN — DIGOXIN 250 MCG: 0.25 INJECTION INTRAMUSCULAR; INTRAVENOUS at 13:25

## 2023-07-16 RX ADMIN — LEVALBUTEROL HYDROCHLORIDE 1.25 MG: 1.25 SOLUTION RESPIRATORY (INHALATION) at 21:40

## 2023-07-16 RX ADMIN — APIXABAN 5 MG: 5 TABLET, FILM COATED ORAL at 17:09

## 2023-07-16 RX ADMIN — METOPROLOL SUCCINATE 75 MG: 50 TABLET, EXTENDED RELEASE ORAL at 12:17

## 2023-07-16 RX ADMIN — NICOTINE 1 PATCH: 7 PATCH, EXTENDED RELEASE TRANSDERMAL at 09:55

## 2023-07-16 RX ADMIN — METOROPROLOL TARTRATE 5 MG: 5 INJECTION, SOLUTION INTRAVENOUS at 12:18

## 2023-07-16 RX ADMIN — Medication 2000 UNITS: at 09:54

## 2023-07-16 NOTE — NURSING NOTE
Patient -160's on telemetry, afib, physician aware. Patient sitting at bedside, no s/s of distress. BP WNL. Patient given second dose of Metoprolol IV 5mg this morning. PO metoprolol 75mg ordered and administered per physician.

## 2023-07-16 NOTE — ASSESSMENT & PLAN NOTE
· Patient with some periods of rapid ventricular response  · Antiarrhythmic medications optimized today-continue Cardizem 240 mg p.o. daily, Toprol-XL has been increased to 75 mg p.o. daily by cardiology  · Continue apixaban 5 mg p.o. twice daily for anticoagulation purposes

## 2023-07-16 NOTE — PROGRESS NOTES
[address] [facility]  Progress Note  Name: Guillermina Dennis  MRN: [id number]  Unit/Bed#: -01 I Date of Admission: 7/13/2023   Date of Service: 7/16/2023 I Hospital Day: 3    Assessment/Plan   Stage 3a chronic kidney disease [facility])  Assessment & Plan  Lab Results   Component Value Date    EGFR 41 07/16/2023    EGFR 36 07/15/2023    EGFR 35 07/14/2023    CREATININE 1.31 (H) 07/16/2023    CREATININE 1.44 (H) 07/15/2023    CREATININE 1.49 (H) 07/14/2023     -Continue to monitor renal function and electrolytes. Atrial fibrillation with RVR ([address])  Assessment & Plan  -Patient with known paroxysmal atrial fibrillation  - rates somewhat elevated today we will increase metoprolol to 75 mg daily and continue diltiazem therapy with up titration as patient requires  -Continue oral anticoagulation with Eliquis  -Continue to monitor on telemetry. Tobacco dependency  Assessment & Plan  - Counseled on the importance of tobacco cessation    * Acute respiratory failure with hypoxia (HCC)  Assessment & Plan  -Continue nasal cannula oxygen and wean as patient tolerates   -continue to monitor on diuretic therapy        Acute on chronic heart failure with preserved ejection fraction (HCC)  Assessment & Plan  Wt Readings from Last 3 Encounters:   07/16/23 54.7 kg (120 lb 9.5 oz)   06/25/23 54 kg (119 lb 0.8 oz)   05/01/23 52.2 kg (115 lb)       -Patient still appearing somewhat volume overloaded on physical exam with lower extremity edema and JVD  -Continue IV diuretic therapy at this time and monitor renal function and electrolytes with repletion to goal potassium 4.0, magnesium 2.0  -Counseled patient on dietary and lifestyle modifications including sodium and fluid restricted diet.  -Continue to monitor standing daily weights and strict I's and O's    COPD (chronic obstructive pulmonary disease) ([facility])  Assessment & Plan  - Plan of care per primary team        Subjective:   Patient seen and examined.   Per patient she notes improvement in shortness of breath and lower extremity edema but still not back to baseline. She denies any chest pain, palpitations, lightheadedness or dizziness, loss of consciousness. Summary comments:  -As patient still appears volume overloaded on exam we will   -continue with IV diuretic therapy continue to monitor renal function and electrolytes with repletion to goal potassium 4.0, magnesium 2.0.  -As patient's heart rates elevated this morning will increase metoprolol to 75 mg daily continue diltiazem therapy and continue oral anticoagulation with Eliquis  -Continue to monitor patient clinically at this time. Telemetry/ECG/Cardiac testing:   Atrial fibrillation on telemetry heart rates 80s-120s bpm    Vitals: Blood pressure 118/64, pulse 76, temperature 97.9 °F (36.6 °C), resp. rate 18, height 5' 2" (1.575 m), weight 54.7 kg (120 lb 9.5 oz), SpO2 92 %.,   Orthostatic Blood Pressures    Flowsheet Row Most Recent Value   Blood Pressure 118/64 filed at 07/16/2023 1115   Patient Position - Orthostatic VS Lying filed at 07/16/2023 0715      ,   Weight (last 2 days)     Date/Time Weight    07/16/23 0600 54.7 (120.59)    07/15/23 0600 54.2 (119.49)    07/14/23 0600 59.1 (130.29)          Physical Exam:  Physical Exam  Vitals reviewed. Constitutional:       General: She is not in acute distress. Appearance: She is not diaphoretic. HENT:      Head: Normocephalic and atraumatic. Comments: Nasal cannula oxygen in place  Eyes:      General:         Right eye: No discharge. Left eye: No discharge. Neck:      Comments: Trachea midline, mild JVD present  Cardiovascular:      Heart sounds: Murmur (BLANCA) heard. No friction rub. Comments: Irregularly irregular rate and rhythm  Pulmonary:      Effort: Pulmonary effort is normal. No respiratory distress. Breath sounds: No wheezing.       Comments: Decreased breath sounds bilaterally  Chest:      Chest wall: No tenderness. Abdominal:      General: Bowel sounds are normal.      Palpations: Abdomen is soft. Tenderness: There is no abdominal tenderness. There is no rebound. Musculoskeletal:         General: Tenderness: 1-2+      Right lower leg: Edema (1-2+) present. Left lower leg: Edema present. Skin:     General: Skin is warm and dry. Neurological:      Mental Status: She is alert. Comments: Awake, alert, able to answer questions appropriately, able to move extremities bilaterally.    Psychiatric:         Mood and Affect: Mood normal.         Behavior: Behavior normal.       Medications:      Current Facility-Administered Medications:   •  acetaminophen (TYLENOL) tablet 650 mg, 650 mg, Oral, Q4H PRN, Jevon Curtis PA-C  •  ALPRAZolam Willian Romina) tablet 0.25 mg, 0.25 mg, Oral, BID, Lorena Best PA-C, 0.25 mg at 07/16/23 8661  •  apixaban (ELIQUIS) tablet 5 mg, 5 mg, Oral, BID, Jevon Curtis PA-C, 5 mg at 07/16/23 8375  •  cholecalciferol (VITAMIN D3) tablet 2,000 Units, 2,000 Units, Oral, Daily, Jevon Curtis PA-C, 2,000 Units at 07/16/23 0007  •  diltiazem (CARDIZEM CD) 24 hr capsule 240 mg, 240 mg, Oral, Daily, Jevon Curtis PA-C, 240 mg at 07/16/23 2139  •  furosemide (LASIX) injection 40 mg, 40 mg, Intravenous, BID (diuretic), Luna Urias DO, 40 mg at 07/16/23 0768  •  levalbuterol (XOPENEX) inhalation solution 1.25 mg, 1.25 mg, Nebulization, TID, Charly Netter, DO, 1.25 mg at 07/15/23 2020  •  metoprolol (LOPRESSOR) injection 5 mg, 5 mg, Intravenous, Q6H PRN, Jevon Curtis PA-C, 5 mg at 07/16/23 2803  •  [START ON 7/17/2023] metoprolol succinate (TOPROL-XL) 24 hr tablet 75 mg, 75 mg, Oral, Daily, Lunaanmol Urias DO  •  mirtazapine (REMERON) tablet 7.5 mg, 7.5 mg, Oral, HS, Lorena Best PA-C, 7.5 mg at 07/15/23 2112  •  nicotine (NICODERM CQ) 7 mg/24hr TD 24 hr patch 1 patch, 1 patch, Transdermal, Daily, Viktoriya Hoffman VERNON Best, 1 patch at 07/16/23 0955  •  polyethylene glycol (MIRALAX) packet 17 g, 17 g, Oral, Daily PRN, Caio Almanzar MD  •  potassium chloride (K-DUR,KLOR-CON) CR tablet 40 mEq, 40 mEq, Oral, Daily, Faith Rings, DO, 40 mEq at 07/16/23 0954  •  umeclidinium-vilanterol 62.5-25 mcg/actuation inhaler 1 puff, 1 puff, Inhalation, Daily, Rebecca Waite PA-C, 1 puff at 07/16/23 0956     Labs & Results:    Troponins:         BNP:   Results from last 6 Months   Lab Units 07/13/23  0232   BNP pg/mL 2,322*     CBC with diff:   Results from last 7 days   Lab Units 07/16/23  0433 07/14/23  0451   WBC Thousand/uL 9.52 9.97   HEMOGLOBIN g/dL 14.4 13.9   HEMATOCRIT % 45.9 42.6   MCV fL 92 89   PLATELETS Thousands/uL 248 247     TSH:     CMP:   Results from last 7 days   Lab Units 07/16/23  0433 07/15/23  0924 07/13/23  1430 07/13/23  0232   POTASSIUM mmol/L 3.9 3.4*   < > 3.6   CHLORIDE mmol/L 97 98   < > 94*   CO2 mmol/L 30 26   < > 22   BUN mg/dL 25 27*   < > 21   CREATININE mg/dL 1.31* 1.44*   < > 1.40*   AST U/L  --   --   --  20   ALT U/L  --   --   --  22   EGFR ml/min/1.73sq m 41 36   < > 38    < > = values in this interval not displayed.      Lipid Profile:     Coags:

## 2023-07-16 NOTE — ASSESSMENT & PLAN NOTE
Wt Readings from Last 3 Encounters:   07/16/23 54.7 kg (120 lb 9.5 oz)   06/25/23 54 kg (119 lb 0.8 oz)   05/01/23 52.2 kg (115 lb)     · Arrival BNP 2322  · Appreciate cardiology input  · Continue diuresis-40 mg of Lasix IV twice a day  · Continue other current cardiac based medications which include  diltiazem, Toprol-XL, and apixaban  · Continue to monitor daily weight, I's and O's  · Discharge planning once cleared by cardiology

## 2023-07-16 NOTE — PLAN OF CARE
Problem: MOBILITY - ADULT  Goal: Maintain or return to baseline ADL function  Description: INTERVENTIONS:  -  Assess patient's ability to carry out ADLs; assess patient's baseline for ADL function and identify physical deficits which impact ability to perform ADLs (bathing, care of mouth/teeth, toileting, grooming, dressing, etc.)  - Assess/evaluate cause of self-care deficits   - Assess range of motion  - Assess patient's mobility; develop plan if impaired  - Assess patient's need for assistive devices and provide as appropriate  - Encourage maximum independence but intervene and supervise when necessary  - Involve family in performance of ADLs  - Assess for home care needs following discharge   - Consider OT consult to assist with ADL evaluation and planning for discharge  - Provide patient education as appropriate  Outcome: Progressing  Goal: Maintains/Returns to pre admission functional level  Description: INTERVENTIONS:  - Perform BMAT or MOVE assessment daily.   - Set and communicate daily mobility goal to care team and patient/family/caregiver. - Collaborate with rehabilitation services on mobility goals if consulted  - Perform Range of Motion  times a day. - Reposition patient every  hours.   - Dangle patient  times a day  - Stand patient  times a day  - Ambulate patient  times a day  - Out of bed to chair  times a day   - Out of bed for meals  times a day  - Out of bed for toileting  - Record patient progress and toleration of activity level   Outcome: Progressing     Problem: Prexisting or High Potential for Compromised Skin Integrity  Goal: Skin integrity is maintained or improved  Description: INTERVENTIONS:  - Identify patients at risk for skin breakdown  - Assess and monitor skin integrity  - Assess and monitor nutrition and hydration status  - Monitor labs   - Assess for incontinence   - Turn and reposition patient  - Assist with mobility/ambulation  - Relieve pressure over bony prominences  - Avoid friction and shearing  - Provide appropriate hygiene as needed including keeping skin clean and dry  - Evaluate need for skin moisturizer/barrier cream  - Collaborate with interdisciplinary team   - Patient/family teaching  - Consider wound care consult   Outcome: Progressing     Problem: PAIN - ADULT  Goal: Verbalizes/displays adequate comfort level or baseline comfort level  Description: Interventions:  - Encourage patient to monitor pain and request assistance  - Assess pain using appropriate pain scale  - Administer analgesics based on type and severity of pain and evaluate response  - Implement non-pharmacological measures as appropriate and evaluate response  - Consider cultural and social influences on pain and pain management  - Notify physician/advanced practitioner if interventions unsuccessful or patient reports new pain  Outcome: Progressing     Problem: INFECTION - ADULT  Goal: Absence or prevention of progression during hospitalization  Description: INTERVENTIONS:  - Assess and monitor for signs and symptoms of infection  - Monitor lab/diagnostic results  - Monitor all insertion sites, i.e. indwelling lines, tubes, and drains  - Monitor endotracheal if appropriate and nasal secretions for changes in amount and color  - [place] appropriate cooling/warming therapies per order  - Administer medications as ordered  - Instruct and encourage patient and family to use good hand hygiene technique  - Identify and instruct in appropriate isolation precautions for identified infection/condition  Outcome: Progressing  Goal: Absence of fever/infection during neutropenic period  Description: INTERVENTIONS:  - Monitor WBC    Outcome: Progressing     Problem: SAFETY ADULT  Goal: Maintain or return to baseline ADL function  Description: INTERVENTIONS:  -  Assess patient's ability to carry out ADLs; assess patient's baseline for ADL function and identify physical deficits which impact ability to perform ADLs (bathing, care of mouth/teeth, toileting, grooming, dressing, etc.)  - Assess/evaluate cause of self-care deficits   - Assess range of motion  - Assess patient's mobility; develop plan if impaired  - Assess patient's need for assistive devices and provide as appropriate  - Encourage maximum independence but intervene and supervise when necessary  - Involve family in performance of ADLs  - Assess for home care needs following discharge   - Consider OT consult to assist with ADL evaluation and planning for discharge  - Provide patient education as appropriate  Outcome: Progressing  Goal: Maintains/Returns to pre admission functional level  Description: INTERVENTIONS:  - Perform BMAT or MOVE assessment daily.   - Set and communicate daily mobility goal to care team and patient/family/caregiver. - Collaborate with rehabilitation services on mobility goals if consulted  - Perform Range of Motion  times a day. - Reposition patient every  hours.   - Dangle patient  times a day  - Stand patient  times a day  - Ambulate patient  times a day  - Out of bed to chair  times a day   - Out of bed for meals  times a day  - Out of bed for toileting  - Record patient progress and toleration of activity level   Outcome: Progressing  Goal: Patient will remain free of falls  Description: INTERVENTIONS:  - Educate patient/family on patient safety including physical limitations  - Instruct patient to call for assistance with activity   - Consult OT/PT to assist with strengthening/mobility   - Keep Call bell within reach  - Keep bed low and locked with side rails adjusted as appropriate  - Keep care items and personal belongings within reach  - Initiate and maintain comfort rounds  - Make Fall Risk Sign visible to staff  - Offer Toileting every  Hours, in advance of need  - Initiate/Maintain alarm  - Obtain necessary fall risk management equipment:   - Apply yellow socks and bracelet for high fall risk patients  - Consider moving patient to room near nurses station  Outcome: Progressing     Problem: DISCHARGE PLANNING  Goal: Discharge to home or other facility with appropriate resources  Description: INTERVENTIONS:  - Identify barriers to discharge w/patient and caregiver  - Arrange for needed discharge resources and transportation as appropriate  - Identify discharge learning needs (meds, wound care, etc.)  - Arrange for interpretive services to assist at discharge as needed  - Refer to Case Management Department for coordinating discharge planning if the patient needs post-hospital services based on physician/advanced practitioner order or complex needs related to functional status, cognitive ability, or social support system  Outcome: Progressing     Problem: Knowledge Deficit  Goal: Patient/family/caregiver demonstrates understanding of disease process, treatment plan, medications, and discharge instructions  Description: Complete learning assessment and assess knowledge base.   Interventions:  - Provide teaching at level of understanding  - Provide teaching via preferred learning methods  Outcome: Progressing

## 2023-07-16 NOTE — ASSESSMENT & PLAN NOTE
-Patient with known paroxysmal atrial fibrillation  - rates somewhat elevated today we will increase metoprolol to 75 mg daily and continue diltiazem therapy with up titration as patient requires  -Continue oral anticoagulation with Eliquis  -Continue to monitor on telemetry.

## 2023-07-16 NOTE — ASSESSMENT & PLAN NOTE
Lab Results   Component Value Date    EGFR 41 07/16/2023    EGFR 36 07/15/2023    EGFR 35 07/14/2023    CREATININE 1.31 (H) 07/16/2023    CREATININE 1.44 (H) 07/15/2023    CREATININE 1.49 (H) 07/14/2023     -Continue to monitor renal function and electrolytes.

## 2023-07-16 NOTE — ASSESSMENT & PLAN NOTE
· Significantly improved  · Patient is now down to 1- 2 L of supplemental oxygen via nasal cannula  · Secondary to an exacerbation of acute on chronic heart failure with preserved ejection fraction  · See the management as outlined below  · Continue to wean supplemental oxygen as able

## 2023-07-16 NOTE — PROGRESS NOTES
[id number] [facility]  Progress Note  Name: Osman Jose  MRN: [id number]  Unit/Bed#: -01 I Date of Admission: 7/13/2023   Date of Service: 7/16/2023 I Hospital Day: 3    Assessment/Plan   * Acute respiratory failure with hypoxia ([address])  Assessment & Plan  · Significantly improved  · Patient is now down to 1- 2 L of supplemental oxygen via nasal cannula  · Secondary to an exacerbation of acute on chronic heart failure with preserved ejection fraction  · See the management as outlined below  · Continue to wean supplemental oxygen as able    Acute on chronic heart failure with preserved ejection fraction (HCC)  Assessment & Plan  Wt Readings from Last 3 Encounters:   07/16/23 54.7 kg (120 lb 9.5 oz)   06/25/23 54 kg (119 lb 0.8 oz)   05/01/23 52.2 kg (115 lb)     · Arrival BNP 2322  · Appreciate cardiology input  · Continue diuresis-40 mg of Lasix IV twice a day  · Continue other current cardiac based medications which include  diltiazem, Toprol-XL, and apixaban  · Continue to monitor daily weight, I's and O's  · Discharge planning once cleared by cardiology        Atrial fibrillation with RVR ([address])  Assessment & Plan  · Patient with some periods of rapid ventricular response  · Antiarrhythmic medications optimized today-continue Cardizem 240 mg p.o. daily, Toprol-XL has been increased to 75 mg p.o. daily by cardiology  · Continue apixaban 5 mg p.o. twice daily for anticoagulation purposes      Hyponatremia  Assessment & Plan  · Hypervolemic hyponatremia  · Has improved with Lasix therapy  · Continue to monitor    Stage 3a chronic kidney disease [facility])  Assessment & Plan  Lab Results   Component Value Date    EGFR 41 07/16/2023    EGFR 36 07/15/2023    EGFR 35 07/14/2023    CREATININE 1.31 (H) 07/16/2023    CREATININE 1.44 (H) 07/15/2023    CREATININE 1.49 (H) 07/14/2023   · Creatinine is stable over the past 24 hours, 1.31 today  · We will continue to monitor closely with administration of IV diuretics  · Recheck a BMP in the a.m. COPD (chronic obstructive pulmonary disease) ([facility])  Assessment & Plan  · Without exacerbation  · Continue respiratory protocol    Depression with anxiety  Assessment & Plan  · Continue xanax and Remeron    Tobacco dependency  Assessment & Plan  · Nicotine patch  · Smoking cessation recommended    Insomnia  Assessment & Plan  · Continue Remeron    Hypomagnesemia  Assessment & Plan  · Resolved with repletion             VTE Prophylaxis:  Apixaban (Eliquis)    Patient Centered Rounds: I have performed bedside rounds with nursing staff today. Discussions with Specialists or Other Care Team Provider: Cardiology  Education and Discussions with Family / Patient: Patient was brought up to par    Current Length of Stay: 3 day(s)    Current Patient Status: Inpatient   Certification Statement: The patient will continue to require additional inpatient hospital stay due to Need for continued IV diuretics    Discharge Plan: Discharge planning once cleared by cardiology hopefully for within the next 24 to 48 hours    Code Status: Level 1 - Full Code    Subjective:   Patient seen, resting in bed, complains of constipation but has no other complaints    Objective:     Vitals:   Temp (24hrs), Av.8 °F (36.6 °C), Min:97.4 °F (36.3 °C), Max:98.3 °F (36.8 °C)    Temp:  [97.4 °F (36.3 °C)-98.3 °F (36.8 °C)] 98.3 °F (36.8 °C)  HR:  [54-89] 54  Resp:  [16-19] 18  BP: (106-133)/(74-82) 133/82  SpO2:  [88 %-93 %] 93 %  Body mass index is 22.06 kg/m². Input and Output Summary (last 24 hours): Intake/Output Summary (Last 24 hours) at 2023 1058  Last data filed at 2023 0900  Gross per 24 hour   Intake 360 ml   Output 1750 ml   Net -1390 ml       Physical Exam:   Physical Exam  Constitutional:       General: She is not in acute distress. Appearance: Normal appearance. She is normal weight. She is not ill-appearing. HENT:      Head: Normocephalic and atraumatic.       Nose: Nose normal.      Mouth/Throat:      Mouth: Mucous membranes are moist.   Eyes:      Extraocular Movements: Extraocular movements intact. Pupils: Pupils are equal, round, and reactive to light. Cardiovascular:      Rate and Rhythm: Normal rate and regular rhythm. Pulses: Normal pulses. Heart sounds: Normal heart sounds. No murmur heard. No friction rub. No gallop. Pulmonary:      [place]: Pulmonary effort is normal. No respiratory distress. Breath sounds: Normal breath sounds. No wheezing, rhonchi or rales. Comments: Diminished entry bilaterally at the bases  Abdominal:      General: There is no distension. Palpations: Abdomen is soft. There is no mass. Tenderness: There is no abdominal tenderness. Hernia: No hernia is present. Musculoskeletal:         General: No swelling or tenderness. Normal range of motion. Cervical back: Normal range of motion and neck supple. No rigidity. Right lower leg: No edema. Left lower leg: No edema. Comments: 1+ bilateral lower extremity pitting edema-overall improved since arrival   Skin:     General: Skin is warm. Capillary Refill: Capillary refill takes less than 2 seconds. Findings: No erythema or rash. Neurological:      General: No focal deficit present. Mental Status: She is alert and oriented to person, place, and time. Mental status is at baseline. Cranial Nerves: No cranial nerve deficit. Motor: No weakness.    Psychiatric:         Mood and Affect: Mood normal.         Behavior: Behavior normal.         Additional Data:     Labs:    Results from last 7 days   Lab Units 07/16/23  0433   WBC Thousand/uL 9.52   HEMOGLOBIN g/dL 14.4   HEMATOCRIT % 45.9   PLATELETS Thousands/uL 248   NEUTROS PCT % 82*   LYMPHS PCT % 10*   MONOS PCT % 7   EOS PCT % 1     Results from last 7 days   Lab Units 07/16/23  0433 07/13/23  1430 07/13/23  0232   SODIUM mmol/L 136   < > 129*   POTASSIUM mmol/L 3.9 < > 3.6   CHLORIDE mmol/L 97   < > 94*   CO2 mmol/L 30   < > 22   BUN mg/dL 25   < > 21   CREATININE mg/dL 1.31*   < > 1.40*   CALCIUM mg/dL 9.2   < > 8.6   ALK PHOS U/L  --   --  227*   ALT U/L  --   --  22   AST U/L  --   --  20    < > = values in this interval not displayed. * I Have Reviewed All Lab Data Listed Above. * Additional Pertinent Lab Tests Reviewed: [address] Admission  Reviewed    Imaging:  Imaging Reports Reviewed Today Include: None    Recent Cultures (last 7 days):           Last 24 Hours Medication List:   Current Facility-Administered Medications   Medication Dose Route Frequency Provider Last Rate   • acetaminophen  650 mg Oral Q4H PRN [facility] VERNON Wright     • ALPRAZolam  0.25 mg Oral BID [facility] VERNON Wright     • apixaban  5 mg Oral BID [facility]     • cholecalciferol  2,000 Units Oral Daily [facility], PA-C     • diltiazem  240 mg Oral Daily [facility]     • furosemide  40 mg Intravenous BID (diuretic) Robert Rajan DO     • levalbuterol  1.25 mg Nebulization TID Carie Garcia DO     • metoprolol  5 mg Intravenous Q6H PRN [facility], PA-C     • [START ON 7/17/2023] metoprolol succinate  75 mg Oral Daily Robert Rajan DO     • mirtazapine  7.5 mg Oral HS [facility], PA-C     • nicotine  1 patch Transdermal Daily [facility], PA-C     • potassium chloride  40 mEq Oral Daily Robert Rajan DO     • umeclidinium-vilanterol  1 puff Inhalation Daily [facility], PA-C          Today, Patient Was Seen By: Kashif Quezada MD    ** Please Note: Dictation voice to text software may have been used in the creation of this document.  **

## 2023-07-16 NOTE — ASSESSMENT & PLAN NOTE
Wt Readings from Last 3 Encounters:   07/16/23 54.7 kg (120 lb 9.5 oz)   06/25/23 54 kg (119 lb 0.8 oz)   05/01/23 52.2 kg (115 lb)       -Patient still appearing somewhat volume overloaded on physical exam with lower extremity edema and JVD  -Continue IV diuretic therapy at this time and monitor renal function and electrolytes with repletion to goal potassium 4.0, magnesium 2.0  -Counseled patient on dietary and lifestyle modifications including sodium and fluid restricted diet.  -Continue to monitor standing daily weights and strict I's and O's 27-Sep-2018 20:56

## 2023-07-16 NOTE — UTILIZATION REVIEW
Date:   7/15      Day 3: Has surpassed a 2nd midnight with active treatments and services, which include IV Lasix diuresis, telemetry. Acute on chronic HF significantly improved: cont to require supplemental oxygen . Hypervolemic hyponatremia has improved w/lasix therapy. cont daily wgt, I/O's,  Creatinine is stable over the past 24 hours, 1.44 today. HR somewhat elevated today we will increase metoprolol to 75 mg daily and continue diltiazem therapy    7/16       Per patient she notes improvement in shortness of breath and lower extremity edema :  Decreased breath sounds b/l . Continues to require supplemental oxygen. HR minimally Improved - GIVEN  1x dose   IV Digoxin 250 mcg today  And   IV lopressor 5 mg @ 0830 and 1220.   Cont iv lasix bid and BB  75 mg daily       07/16/23 1217 -- 136 -- 137/90 -- -- -- -- -- --   07/16/23 11:15:20 97.9 °F (36.6 °C) 76 18 118/64 82 92 % 26 -- 1.5 L/min Nasal cannula   07/16/23 07:15:04 98.3 °F (36.8 °C) 54   18 133/82 99 93 % -- -- -- Nasal cannula   07/16/23 0300 97.6 °F (36.4 °C) 78 18 115/77 91 91 % -- -- -- Nasal cannula   07/15/23 2331 97.5 °F (36.4 °C) 77 18 118/77 90 93 % -- -- -- Nasal cannula   07/15/23 2020 -- 76 16 -- -- 90 % 26 -- 1.5 L/min Nasal cannula   07/15/23 19:07:33 97.4 °F  73 19 115/78 90 88 %  -- 2 L/min -- Nasal cannula

## 2023-07-16 NOTE — ASSESSMENT & PLAN NOTE
Lab Results   Component Value Date    EGFR 41 07/16/2023    EGFR 36 07/15/2023    EGFR 35 07/14/2023    CREATININE 1.31 (H) 07/16/2023    CREATININE 1.44 (H) 07/15/2023    CREATININE 1.49 (H) 07/14/2023   · Creatinine is stable over the past 24 hours, 1.31 today  · We will continue to monitor closely with administration of IV diuretics  · Recheck a BMP in the a.m.

## 2023-07-16 NOTE — ASSESSMENT & PLAN NOTE
-Continue nasal cannula oxygen and wean as patient tolerates   -continue to monitor on diuretic therapy

## 2023-07-17 ENCOUNTER — TELEPHONE (OUTPATIENT)
Dept: NEPHROLOGY | Facility: CLINIC | Age: 68
End: 2023-07-17

## 2023-07-17 VITALS
DIASTOLIC BLOOD PRESSURE: 68 MMHG | HEIGHT: 62 IN | OXYGEN SATURATION: 93 % | SYSTOLIC BLOOD PRESSURE: 110 MMHG | HEART RATE: 55 BPM | BODY MASS INDEX: 20.53 KG/M2 | TEMPERATURE: 97.7 F | WEIGHT: 111.55 LBS | RESPIRATION RATE: 15 BRPM

## 2023-07-17 DIAGNOSIS — I48.0 PAROXYSMAL ATRIAL FIBRILLATION (HCC): Primary | ICD-10-CM

## 2023-07-17 LAB
ANION GAP SERPL CALCULATED.3IONS-SCNC: 11 MMOL/L
BUN SERPL-MCNC: 26 MG/DL (ref 5–25)
CALCIUM SERPL-MCNC: 9.3 MG/DL (ref 8.4–10.2)
CHLORIDE SERPL-SCNC: 93 MMOL/L (ref 96–108)
CO2 SERPL-SCNC: 33 MMOL/L (ref 21–32)
CREAT SERPL-MCNC: 1.22 MG/DL (ref 0.6–1.3)
GFR SERPL CREATININE-BSD FRML MDRD: 44 ML/MIN/1.73SQ M
GLUCOSE SERPL-MCNC: 107 MG/DL (ref 65–140)
MAGNESIUM SERPL-MCNC: 2.7 MG/DL (ref 1.9–2.7)
POTASSIUM SERPL-SCNC: 4.3 MMOL/L (ref 3.5–5.3)
SODIUM SERPL-SCNC: 137 MMOL/L (ref 135–147)

## 2023-07-17 PROCEDURE — 80048 BASIC METABOLIC PNL TOTAL CA: CPT | Performed by: INTERNAL MEDICINE

## 2023-07-17 PROCEDURE — 83735 ASSAY OF MAGNESIUM: CPT | Performed by: INTERNAL MEDICINE

## 2023-07-17 PROCEDURE — 94760 N-INVAS EAR/PLS OXIMETRY 1: CPT

## 2023-07-17 PROCEDURE — 99239 HOSP IP/OBS DSCHRG MGMT >30: CPT | Performed by: INTERNAL MEDICINE

## 2023-07-17 PROCEDURE — 94640 AIRWAY INHALATION TREATMENT: CPT

## 2023-07-17 PROCEDURE — 94664 DEMO&/EVAL PT USE INHALER: CPT

## 2023-07-17 PROCEDURE — 99232 SBSQ HOSP IP/OBS MODERATE 35: CPT | Performed by: INTERNAL MEDICINE

## 2023-07-17 RX ORDER — POTASSIUM CHLORIDE 750 MG/1
20 CAPSULE, EXTENDED RELEASE ORAL DAILY
Qty: 30 CAPSULE | Refills: 0 | Status: SHIPPED | OUTPATIENT
Start: 2023-07-17

## 2023-07-17 RX ORDER — BUMETANIDE 1 MG/1
1 TABLET ORAL DAILY
Qty: 30 TABLET | Refills: 0 | Status: SHIPPED | OUTPATIENT
Start: 2023-07-17

## 2023-07-17 RX ORDER — POTASSIUM CHLORIDE 20 MEQ/1
20 TABLET, EXTENDED RELEASE ORAL ONCE
Status: COMPLETED | OUTPATIENT
Start: 2023-07-17 | End: 2023-07-17

## 2023-07-17 RX ORDER — DILTIAZEM HYDROCHLORIDE 300 MG/1
300 CAPSULE, COATED, EXTENDED RELEASE ORAL DAILY
Qty: 30 CAPSULE | Refills: 0 | Status: SHIPPED | OUTPATIENT
Start: 2023-07-18

## 2023-07-17 RX ORDER — MAGNESIUM SULFATE HEPTAHYDRATE 40 MG/ML
2 INJECTION, SOLUTION INTRAVENOUS ONCE
Status: COMPLETED | OUTPATIENT
Start: 2023-07-17 | End: 2023-07-17

## 2023-07-17 RX ORDER — METOPROLOL SUCCINATE 25 MG/1
75 TABLET, EXTENDED RELEASE ORAL DAILY
Qty: 30 TABLET | Refills: 0 | Status: SHIPPED | OUTPATIENT
Start: 2023-07-18

## 2023-07-17 RX ORDER — POTASSIUM CHLORIDE 20 MEQ/1
40 TABLET, EXTENDED RELEASE ORAL ONCE
Status: COMPLETED | OUTPATIENT
Start: 2023-07-17 | End: 2023-07-17

## 2023-07-17 RX ADMIN — ALPRAZOLAM 0.25 MG: 0.25 TABLET ORAL at 08:01

## 2023-07-17 RX ADMIN — UMECLIDINIUM BROMIDE AND VILANTEROL TRIFENATATE 1 PUFF: 62.5; 25 POWDER RESPIRATORY (INHALATION) at 08:49

## 2023-07-17 RX ADMIN — DILTIAZEM HYDROCHLORIDE 300 MG: 180 CAPSULE, COATED, EXTENDED RELEASE ORAL at 08:01

## 2023-07-17 RX ADMIN — POTASSIUM CHLORIDE 40 MEQ: 1500 TABLET, EXTENDED RELEASE ORAL at 02:16

## 2023-07-17 RX ADMIN — POTASSIUM CHLORIDE 20 MEQ: 1500 TABLET, EXTENDED RELEASE ORAL at 08:48

## 2023-07-17 RX ADMIN — MAGNESIUM SULFATE HEPTAHYDRATE 2 G: 40 INJECTION, SOLUTION INTRAVENOUS at 02:16

## 2023-07-17 RX ADMIN — LEVALBUTEROL HYDROCHLORIDE 1.25 MG: 1.25 SOLUTION RESPIRATORY (INHALATION) at 07:13

## 2023-07-17 RX ADMIN — METOPROLOL SUCCINATE 75 MG: 50 TABLET, EXTENDED RELEASE ORAL at 08:01

## 2023-07-17 RX ADMIN — NICOTINE 1 PATCH: 7 PATCH, EXTENDED RELEASE TRANSDERMAL at 08:02

## 2023-07-17 RX ADMIN — FUROSEMIDE 40 MG: 10 INJECTION, SOLUTION INTRAMUSCULAR; INTRAVENOUS at 08:00

## 2023-07-17 RX ADMIN — Medication 2000 UNITS: at 08:02

## 2023-07-17 RX ADMIN — LEVALBUTEROL HYDROCHLORIDE 1.25 MG: 1.25 SOLUTION RESPIRATORY (INHALATION) at 13:08

## 2023-07-17 RX ADMIN — APIXABAN 5 MG: 5 TABLET, FILM COATED ORAL at 08:01

## 2023-07-17 NOTE — ASSESSMENT & PLAN NOTE
· Significantly improved  · Patient has been titrated off oxygen  · Secondary to an exacerbation of acute on chronic heart failure with preserved ejection fraction  · See the management as outlined below  · Follow-up with cardiology and pulmonology as outpatient

## 2023-07-17 NOTE — ASSESSMENT & PLAN NOTE
Lab Results   Component Value Date    EGFR 44 07/17/2023    EGFR 41 07/16/2023    EGFR 36 07/15/2023    CREATININE 1.22 07/17/2023    CREATININE 1.31 (H) 07/16/2023    CREATININE 1.44 (H) 07/15/2023   · Creatinine has been stable/improving  · Cardiology recommends increasing Bumex to 1 mg daily with 20 mEq of potassium  · Patient advised to have repeat blood work in 1 week to monitor kidney function  · Lisinopril discontinued until instructed otherwise by outpatient providers

## 2023-07-17 NOTE — CASE MANAGEMENT
Case Management Discharge Planning Note    Patient name Starla Gray  Location /-01 MRN [id number]  : 1955 Date 2023       Current Admission Date: 2023  Current Admission Diagnosis:Acute respiratory failure with hypoxia [facility])   Patient Active Problem List    Diagnosis Date Noted   • Hypomagnesemia 2023   • Acute on chronic heart failure with preserved ejection fraction ([address]) 2023   • COPD (chronic obstructive pulmonary disease) ([address]) 2023   • Proteinuria 10/28/2022   • Stage 3a chronic kidney disease ([address]) 2022   • Vitamin D deficiency 2022   • Secondary hyperparathyroidism ([address]) 2022   • Buttock wound 10/07/2021   • Atrial fibrillation with RVR ([address]) 10/01/2021   • Hypotension 2021   • Acute respiratory failure with hypoxia ([address]) 2021   • Insomnia 2021   • Lung nodule 2019   • Aortoiliac occlusive disease ([address]) 2019   • Bilateral lower extremity edema 2019   • Peripheral vascular disease ([address]) 2018   • Foot pain 2018   • Constipation 10/09/2018   • Depression with anxiety 2018   • Essential hypertension 2018   • Lymphoma ([address]) 2018   • Paroxysmal atrial fibrillation ([address]) 2018   • Hyponatremia 2018   • Carotid stenosis, asymptomatic, bilateral 2016   • Tobacco dependency 2015   • PAD (peripheral artery disease) ([address]) 2015      LOS (days): 4  Geometric Mean LOS (GMLOS) (days): 3.90  Days to GMLOS:-0.4     OBJECTIVE:  Risk of Unplanned Readmission Score: 19.43         Current admission status: Inpatient   Preferred Pharmacy:   [id number] [place], [place] - [address] [place] [address]  Phone: [phone] Fax: [phone]    Primary Care Provider: Ady Chaudhary MD    Primary Insurance: [facility]  Secondary Insurance:     DISCHARGE DETAILS:      Contacts  Patient Contacts: Sohail Miller  Relationship to Patient[de-identified] Family  Contact Method: Phone  Phone Number: [phone]  Reason/Outcome: Discharge [address]         Is the patient interested in [facility] at discharge?: Yes  [address] [facility] requested[de-identified] Nursing, Occupational Therapy, Physical [address] [facility] Name[de-identified] [facility] External Referral Reason (only applicable if external HHA name selected): Patient has established relationship with provider  [address] [facility] Provider[de-identified] PCP  Home Health Services Needed[de-identified] Strengthening/Theraputic Exercises to Improve Function, Evaluate Functional Status and Safety, Gait/ADL Training  Homebound Criteria Met[de-identified] Requires the Assistance of Another Person for Safe Ambulation or to Leave the Home  Supporting Clincal Findings[de-identified] Limited Endurance, Fatigues Easliy in [employer]      Treatment Team Recommendation: Home with [address]  Discharge Destination Plan[de-identified] Home with [address] [facility] at Discharge : Family           Additional Comments: Pt discussed home health care with pt daughter at Dr. Radha Sotelo request. Daughter requested referral to [facility] [place] as pt has had same in the past. Referral sent via [employer]. Awaiting determinatin. Damián updare daughter and Dr Nicole Gurrola.

## 2023-07-17 NOTE — NURSING NOTE
Patient discharged to home with home health. Daughter at bedside and given medication changes and follow up appointments. Patient educated on smoking cessation and given heart failure education including a scale and printed material. Patient has all belongings.

## 2023-07-17 NOTE — ASSESSMENT & PLAN NOTE
Wt Readings from Last 3 Encounters:   07/17/23 50.6 kg (111 lb 8.8 oz)   06/25/23 54 kg (119 lb 0.8 oz)   05/01/23 52.2 kg (115 lb)     -Patient continues to improve from volume status   -patient wishes to be discharged at this time understanding recommendations for monitoring however will increase Bumex home dosing from 0.5 mg daily to 1 mg daily and would recommend potassium 20 mEq daily with this along with monitoring standing daily weights and to let our office know if weight increases to uptitrate medical therapy  -Counseled patient on sodium and fluid restricted diet  -Patient should have BMP checked in 1 week to monitor renal function and electrolytes and follow-up with nephrology as well as myself in the office  -Message has been sent to nephrology team as patient missed appointment due to being hospitalized to assist in getting her seen in the office.

## 2023-07-17 NOTE — ASSESSMENT & PLAN NOTE
Wt Readings from Last 3 Encounters:   07/17/23 50.6 kg (111 lb 8.8 oz)   06/25/23 54 kg (119 lb 0.8 oz)   05/01/23 52.2 kg (115 lb)     · Arrival BNP 2322  · Appreciate cardiology input  · Patient will be transition to Bumex 1 mg daily on discharge with potassium supplementation  · Continue other current cardiac based medications which include  diltiazem, Toprol-XL, and apixaban  · Follow-up with cardiology as outpatient

## 2023-07-17 NOTE — ASSESSMENT & PLAN NOTE
-Weaned off nasal cannula oxygen  -Continue to monitor him patient should be evaluated by pulmonology in the outpatient setting

## 2023-07-17 NOTE — DISCHARGE INSTR - AVS FIRST PAGE
Please have repeat blood work done with your primary care doctor within 1 week.   Blood work, specifically BMP should be done on 7/21/2023

## 2023-07-17 NOTE — ASSESSMENT & PLAN NOTE
· Patient with some periods of rapid ventricular response  · Antiarrhythmic medications optimized - Cardizem increased to 300 mg p.o. daily, Toprol-XL has been increased to 75 mg p.o. daily by cardiology  · Continue apixaban 5 mg p.o. twice daily for anticoagulation purposes  · Heart rate has been better controlled on above regimen  · Follow-up with cardiology as outpatient

## 2023-07-17 NOTE — PROGRESS NOTES
[address]  Progress Note  Name: Chaz Fuller  MRN: [id number]  Unit/Bed#: -01 I Date of Admission: 7/13/2023   Date of Service: 7/17/2023 I Hospital Day: 4    Assessment/Plan   Stage 3a chronic kidney disease [facility])  Assessment & Plan  Lab Results   Component Value Date    EGFR 44 07/17/2023    EGFR 41 07/16/2023    EGFR 36 07/15/2023    CREATININE 1.22 07/17/2023    CREATININE 1.31 (H) 07/16/2023    CREATININE 1.44 (H) 07/15/2023       -Renal function does appear to be improving with diuretic therapy   -would increase home Bumex to 1 mg daily and have sent message to Nephrology team to have patient seen and evaluated in office that she did miss her appointment unfortunately due to hospitalization.  -Would have patient check BMP in 1 week to monitor renal function and electrolytes on new medical therapy. Atrial fibrillation with RVR (HCC)  Assessment & Plan  -Rates were elevated yesterday however appear better controlled with up titration of medical therapy  -Patient understands my recommendations for monitoring on telemetry at this time with uptitrated dose however patient does not wish to stay in the hospital any longer.  -In that setting we will continue with metoprolol 75 mg daily and diltiazem 300 mg daily along with oral anticoagulation with Eliquis  -I have sent a message to my office staff to assist in getting patient set up for 48-hour Holter monitor to evaluate for overall rate control strategy  -Patient counseled on return precautions if symptoms return or worsen. Tobacco dependency  Assessment & Plan  - Counseled patient on the importance of tobacco cessation.     * Acute respiratory failure with hypoxia (HCC)  Assessment & Plan  -Weaned off nasal cannula oxygen  -Continue to monitor him patient should be evaluated by pulmonology in the outpatient setting      Acute on chronic heart failure with preserved ejection fraction [facility])  Assessment & Plan  Wt Readings from Last 3 Encounters:   07/17/23 50.6 kg (111 lb 8.8 oz)   06/25/23 54 kg (119 lb 0.8 oz)   05/01/23 52.2 kg (115 lb)     -Patient continues to improve from volume status   -patient wishes to be discharged at this time understanding recommendations for monitoring however will increase Bumex home dosing from 0.5 mg daily to 1 mg daily and would recommend potassium 20 mEq daily with this along with monitoring standing daily weights and to let our office know if weight increases to uptitrate medical therapy  -Counseled patient on sodium and fluid restricted diet  -Patient should have BMP checked in 1 week to monitor renal function and electrolytes and follow-up with nephrology as well as myself in the office  -Message has been sent to nephrology team as patient missed appointment due to being hospitalized to assist in getting her seen in the office. COPD (chronic obstructive pulmonary disease) ([address])  Assessment & Plan  - Plan of care per primary team      Subjective:   Patient seen and examined. Per patient she denies any chest pain, palpitations, lightheadedness, loss consciousness and notes her shortness of breath is significantly better.   She notes her lower extremity edema is better and she wishes to be discharged from the hospital.      Summary comments:  -Would continue metoprolol 75 mg daily, diltiazem 300 mg daily -as patient wishes to be discharged from hospital we will have her set up for outpatient follow-up with cardiology and nephrology and will have her undergo Holter monitor for evaluation of rate control strategy for atrial fibrillation.    -Patient counseled on dietary and lifestyle modifications including sodium and fluid restricted diet along with monitoring standing daily weights and for return precautions to hospital.  -Patient was on 0.5 mg of Bumex taking it daily at home we will increase this to 1 mg daily and add potassium 20 mEq daily as patient has been requiring potassium supplementation with increased diuresis during hospitalization  -Patient should have BMP in 1 week to monitor renal function and electrolytes    Telemetry/ECG/Cardiac testing:   -Currently rate controlled atrial fibrillation on telemetry. Vitals: Blood pressure 128/83, pulse 64, temperature 97.7 °F (36.5 °C), resp. rate 15, height 5' 2" (1.575 m), weight 50.6 kg (111 lb 8.8 oz), SpO2 94 %.,   Orthostatic Blood Pressures    Flowsheet Row Most Recent Value   Blood Pressure 128/83 filed at 07/17/2023 0704   Patient Position - Orthostatic VS Lying filed at 07/16/2023 1507      ,   Weight (last 2 days)     Date/Time Weight    07/17/23 0600 50.6 (111.55)     Weight: pt weighed twice on grey standing scale at 07/17/23 0600    07/16/23 0600 54.7 (120.59)    07/15/23 0600 54.2 (119.49)          Physical Exam:  Physical Exam  Vitals reviewed. Constitutional:       General: She is not in acute distress. Appearance: She is not diaphoretic. HENT:      Head: Normocephalic and atraumatic. Eyes:      General:         Right eye: No discharge. Left eye: No discharge. Neck:      Comments: Trachea midline, minimal JVD present  Cardiovascular:      Heart sounds: Murmur (BLANCA) heard. No friction rub. Comments: Irregularly irregular rate and rhythm  Pulmonary:      Effort: Pulmonary effort is normal. No respiratory distress. Breath sounds: No wheezing. Comments: Decreased breath sounds bilaterally  Chest:      Chest wall: No tenderness. Abdominal:      General: Bowel sounds are normal.      Palpations: Abdomen is soft. Tenderness: There is no abdominal tenderness. There is no rebound. Musculoskeletal:      Right lower leg: Edema (1+) present. Left lower leg: Edema (1+) present. Skin:     General: Skin is warm and dry. Neurological:      Mental Status: She is alert. Comments: Awake, alert, able to answer questions appropriately, able to move extremities bilaterally. Psychiatric:         Mood and Affect: Mood normal.         Behavior: Behavior normal.       Medications:      Current Facility-Administered Medications:   •  acetaminophen (TYLENOL) tablet 650 mg, 650 mg, Oral, Q4H PRN, Jcarlos Merino PA-C  •  ALPRAZolam Francyne Rile) tablet 0.25 mg, 0.25 mg, Oral, BID, Lorena Best PA-C, 0.25 mg at 07/17/23 0801  •  apixaban (ELIQUIS) tablet 5 mg, 5 mg, Oral, BID, Lorena Best PA-C, 5 mg at 07/17/23 3849  •  cholecalciferol (VITAMIN D3) tablet 2,000 Units, 2,000 Units, Oral, Daily, Jcarlos Merino PA-C, 2,000 Units at 07/17/23 0802  •  diltiazem (CARDIZEM CD) 24 hr capsule 300 mg, 300 mg, Oral, Daily, Alex Singer MD, 300 mg at 07/17/23 0801  •  furosemide (LASIX) injection 40 mg, 40 mg, Intravenous, BID (diuretic), Daryle Grate, DO, 40 mg at 07/17/23 0800  •  levalbuterol (XOPENEX) inhalation solution 1.25 mg, 1.25 mg, Nebulization, TID, Rubens Pantoja DO, 1.25 mg at 07/17/23 0279  •  metoprolol (LOPRESSOR) injection 5 mg, 5 mg, Intravenous, Q4H PRN, Alex Singer MD, 5 mg at 07/16/23 1218  •  metoprolol succinate (TOPROL-XL) 24 hr tablet 75 mg, 75 mg, Oral, Daily, Alex Singer MD, 75 mg at 07/17/23 0801  •  mirtazapine (REMERON) tablet 7.5 mg, 7.5 mg, Oral, HS, Lorena Best PA-C, 7.5 mg at 07/16/23 2212  •  nicotine (NICODERM CQ) 7 mg/24hr TD 24 hr patch 1 patch, 1 patch, Transdermal, Daily, Jcarlos Merino PA-C, 1 patch at 07/17/23 0802  •  polyethylene glycol (MIRALAX) packet 17 g, 17 g, Oral, Daily PRN, Alex Singer MD, 17 g at 07/16/23 1709  •  potassium chloride (K-DUR,KLOR-CON) CR tablet 40 mEq, 40 mEq, Oral, Daily, Debra Alcala MD, 40 mEq at 07/16/23 0954  •  umeclidinium-vilanterol 62.5-25 mcg/actuation inhaler 1 puff, 1 puff, Inhalation, Daily, Jcarlos Merino PA-C, 1 puff at 07/17/23 0849     Labs & Results:    Troponins:         BNP:   Results from last 6 Months   Lab Units 07/13/23  0232   BNP pg/mL 2,322*     CBC with diff:   Results from last 7 days   Lab Units 07/16/23  0433 07/14/23  0451   WBC Thousand/uL 9.52 9.97   HEMOGLOBIN g/dL 14.4 13.9   HEMATOCRIT % 45.9 42.6   MCV fL 92 89   PLATELETS Thousands/uL 248 247     TSH:     CMP:   Results from last 7 days   Lab Units 07/17/23  0437 07/16/23  0433 07/13/23  1430 07/13/23  0232   POTASSIUM mmol/L 4.3 3.9   < > 3.6   CHLORIDE mmol/L 93* 97   < > 94*   CO2 mmol/L 33* 30   < > 22   BUN mg/dL 26* 25   < > 21   CREATININE mg/dL 1.22 1.31*   < > 1.40*   AST U/L  --   --   --  20   ALT U/L  --   --   --  22   EGFR ml/min/1.73sq m 44 41   < > 38    < > = values in this interval not displayed.      Lipid Profile:     Coags:

## 2023-07-17 NOTE — ASSESSMENT & PLAN NOTE
-Rates were elevated yesterday however appear better controlled with up titration of medical therapy  -Patient understands my recommendations for monitoring on telemetry at this time with uptitrated dose however patient does not wish to stay in the hospital any longer.  -In that setting we will continue with metoprolol 75 mg daily and diltiazem 300 mg daily along with oral anticoagulation with Eliquis  -I have sent a message to my office staff to assist in getting patient set up for 48-hour Holter monitor to evaluate for overall rate control strategy  -Patient counseled on return precautions if symptoms return or worsen.

## 2023-07-17 NOTE — PLAN OF CARE
Problem: CARDIOVASCULAR - ADULT  Goal: Maintains optimal cardiac output and hemodynamic stability  Description: INTERVENTIONS:  - Monitor I/O, vital signs and rhythm  - Monitor for S/S and trends of decreased cardiac output  - Administer and titrate ordered vasoactive medications to optimize hemodynamic stability  - Assess quality of pulses, skin color and temperature  - Assess for signs of decreased coronary artery perfusion  - Instruct patient to report change in severity of symptoms  Outcome: Progressing  Goal: Absence of cardiac dysrhythmias or at baseline rhythm  Description: INTERVENTIONS:  - Continuous cardiac monitoring, vital signs, obtain 12 lead EKG if ordered  - Administer antiarrhythmic and heart rate control medications as ordered  - Monitor electrolytes and administer replacement therapy as ordered  Monitor telemetry   Outcome: Progressing

## 2023-07-17 NOTE — DISCHARGE SUMMARY
[address]  Discharge- Radha Carter 1955, 77 y.o. female MRN: [id number]  Unit/Bed#: -01 Encounter: [id number]  Primary Care Provider: Bret Cruz MD   Date and time admitted to hospital: 7/13/2023  2:24 AM    * Acute respiratory failure with hypoxia ([address])  Assessment & Plan  · Significantly improved  · Patient has been titrated off oxygen  · Secondary to an exacerbation of acute on chronic heart failure with preserved ejection fraction  · See the management as outlined below  · Follow-up with cardiology and pulmonology as outpatient    Atrial fibrillation with RVR ([address])  Assessment & Plan  · Patient with some periods of rapid ventricular response  · Antiarrhythmic medications optimized - Cardizem increased to 300 mg p.o. daily, Toprol-XL has been increased to 75 mg p.o. daily by cardiology  · Continue apixaban 5 mg p.o. twice daily for anticoagulation purposes  · Heart rate has been better controlled on above regimen  · Follow-up with cardiology as outpatient    Acute on chronic heart failure with preserved ejection fraction ([address])  Assessment & Plan  Wt Readings from Last 3 Encounters:   07/17/23 50.6 kg (111 lb 8.8 oz)   06/25/23 54 kg (119 lb 0.8 oz)   05/01/23 52.2 kg (115 lb)     · Arrival BNP 2322  · Appreciate cardiology input  · Patient will be transition to Bumex 1 mg daily on discharge with potassium supplementation  · Continue other current cardiac based medications which include  diltiazem, Toprol-XL, and apixaban  · Follow-up with cardiology as outpatient        Hypomagnesemia  Assessment & Plan  · Resolved with repletion    COPD (chronic obstructive pulmonary disease) ([address])  Assessment & Plan  · Without exacerbation  · Continue home medications    Stage 3a chronic kidney disease [facility])  Assessment & Plan  Lab Results   Component Value Date    EGFR 44 07/17/2023    EGFR 41 07/16/2023    EGFR 36 07/15/2023    CREATININE 1.22 07/17/2023    CREATININE 1.31 (H) 07/16/2023    CREATININE 1.44 (H) 07/15/2023   · Creatinine has been stable/improving  · Cardiology recommends increasing Bumex to 1 mg daily with 20 mEq of potassium  · Patient advised to have repeat blood work in 1 week to monitor kidney function  · Lisinopril discontinued until instructed otherwise by outpatient providers    Insomnia  Assessment & Plan  · Continue Remeron    Tobacco dependency  Assessment & Plan  · Nicotine patch  · Smoking cessation recommended    Hyponatremia  Assessment & Plan  · Hypervolemic hyponatremia  · Has improved with Lasix therapy  · Continue to monitor    Depression with anxiety  Assessment & Plan  · Continue xanax and Remeron      Discharging Physician / Practitioner: Kaylyn Yung MD  PCP: Chilo Kelly MD  Admission Date:   Admission Orders (From admission, onward)     Ordered        07/13/23 0513  Inpatient Admission  Once                      Discharge Date: 07/17/23    Medical Problems     Resolved Problems  Date Reviewed: 7/13/2023   None         Consultations During Hospital Stay:  · Cardiology    Procedures Performed:   · None    Significant Findings / Test Results:   · CHF exacerbation, rapid A-fib    Incidental Findings:   · None    Test Results Pending at Discharge (will require follow up): · None     Outpatient Tests Requested:  · BMP to be done in 1 week to monitor kidney function    Complications:    • None    Reason for Admission: CHF exacerbation    Hospital Course:     Koki Moran is a 77 y.o. female patient who originally presented to the hospital on 7/13/2023 due to shortness of breath. She was admitted and started on IV diuresis. Cardiology was consulted and medications were optimized. During hospitalization patient also noted to have rapid A-fib for which Toprol and Cardizem doses were increased. Heart rates have been better controlled. Patient also with CKD with creatinine being at upper limit of baseline.   Creatinine has gradually improved with diuresis. Patient doing well today. Patient was requiring significant level of oxygen via nasal cannula. Respiratory failure requiring oxygen has resolved as patient is now saturating well on room air. Patient transition to oral diuretics on discharge. Will be discharged on the above mentioned rate control adjustments. Patient will follow-up with cardiology as outpatient. Advised to have repeat BMP in 1 week to monitor kidney function while on Bumex. Lisinopril has been discontinued on discharge. Please see above list of diagnoses and related plan for additional information. Condition at Discharge: stable     Discharge Day Visit / Exam:     Subjective: No complaints at this time. Patient would like to be discharged home. Denies any shortness of breath. States that she is feeling better today. Tolerating diet    Vitals: Blood Pressure: 128/83 (07/17/23 0704)  Pulse: 64 (07/17/23 0704)  Temperature: 97.7 °F (36.5 °C) (07/17/23 0704)  Temp Source: Oral (07/16/23 1507)  Respirations: 15 (07/17/23 0704)  Height: 5' 2" (157.5 cm) (07/13/23 1035)  Weight - Scale: 50.6 kg (111 lb 8.8 oz) (pt weighed twice on grey standing scale) (07/17/23 0600)  SpO2: 94 % (07/17/23 0713)  Exam:   Physical Exam  Constitutional:       General: She is not in acute distress. Comments: Frail elderly  female   HENT:      Head: Normocephalic and atraumatic. Nose: Nose normal.      Mouth/Throat:      Mouth: Mucous membranes are moist.   Eyes:      Extraocular Movements: Extraocular movements intact. Conjunctiva/sclera: Conjunctivae normal.   Cardiovascular:      Rate and Rhythm: Normal rate and regular rhythm. Pulmonary:      Effort: Pulmonary effort is normal. No respiratory distress. Abdominal:      Palpations: Abdomen is soft. Tenderness: There is no abdominal tenderness. Musculoskeletal:         General: Normal range of motion. Cervical back: Normal range of motion and neck supple. Skin:     General: Skin is warm and dry. Neurological:      General: No focal deficit present. Mental Status: She is alert. Mental status is at baseline. Cranial Nerves: No cranial nerve deficit. Psychiatric:         Mood and Affect: Mood normal.         Behavior: Behavior normal.         Discussion with Family: Attempted to call patient's daughter Elpidio Elliott at number provided in chart with no answer    Discharge instructions/Information to patient and family:   See after visit summary for information provided to patient and family. Provisions for Follow-Up Care:  See after visit summary for information related to follow-up care and any pertinent home health orders. Disposition:     Home      Planned Readmission:   • no     Discharge Statement:  I spent 35 minutes discharging the patient. This time was spent on the day of discharge. I had direct contact with the patient on the day of discharge. Greater than 50% of the total time was spent examining patient, answering all patient questions, arranging and discussing plan of care with patient as well as directly providing post-discharge instructions. Additional time then spent on discharge activities. Discharge Medications:  See after visit summary for reconciled discharge medications provided to patient and family.       ** Please Note: This note has been constructed using a voice recognition system **

## 2023-07-17 NOTE — UTILIZATION REVIEW
Initial Clinical Review    Admission: Date/Time/Statement:   Admission Orders (From admission, onward)     Ordered        07/13/23 0513  Inpatient Admission  Once                      Orders Placed This Encounter   Procedures   • Inpatient Admission     Standing Status:   Standing     Number of Occurrences:   1     Order Specific Question:   Level of Care     Answer:   Med Surg [16]     Order Specific Question:   Estimated length of stay     Answer:   More than 2 Midnights     Order Specific Question:   Certification     Answer:   I certify that inpatient services are medically necessary for this patient for a duration of greater than two midnights. See H&P and MD Progress Notes for additional information about the patient's course of treatment. ED Arrival Information     Expected   -    Arrival   7/13/2023 02:17    Acuity   Emergent            Means of arrival   Ambulance    Escorted by   [facility]    Service   Hospitalist    Admission type   Emergency            Arrival complaint   shortness of breath           Chief Complaint   Patient presents with   • Shortness of Breath     Shortness of breath since 1500, known hx of rapid afib       Initial Presentation: 77 y.o. female to the ED from home via EMS with complaints of shortness of breath for 8 hours prior to arrival. Admitted to inpatient for acute respiratory failure with hypoxia, acute on chronic chf, afib with RVR. H/O hypertension, hyperlipidemia, PAD, B-cell lymphoma, paroxysmal atrial fibrillation on anticoagulation, COPD . EMS found pulse ox to be 85% on room air. They gave her duoneb, placed on oxygen. ON arrival to the ED, she is on [facility], given Cardizem IV, IV lasix, IV solumedrol and reported itching, given IV benadryl. Heart rate increased again, given another dose of IV cardizem and albumin. LUngs decreased, bilateral lower extremity edema present. + pedal edema. Cardiology consult. Cardiology:  Afib with rvr.   Has been 100% compliant with xarelto. Continue diltiazem, metoprolol. May need digoxin load. Monitor tele. In the setting of her volume overload difficult to determine if atrial fibrillation is driving force or secondary to volume overload as patient does not necessarily feel when she is in atrial fibrillation. Continue with IV diuresis. Nephrology consult. Hypokalemia, hyponatremia, hypomagnesemia. Give supplements and recheck. Date: 7/14   Day 2: Now requiring 2LNC. Continue with IV Lasix. Swelling improved. Refusing to be seen by PT/OT. Decreased breath sounds. Cardiology consult: Continue with IV lasix. Shortness of breath improved.      ED Triage Vitals   Temperature Pulse Respirations Blood Pressure SpO2   07/13/23 0223 07/13/23 0223 07/13/23 0223 07/13/23 0231 07/13/23 0216   99 °F (37.2 °C) (!) 172 17 112/73 97 %      Temp Source Heart Rate Source Patient Position - Orthostatic VS BP Location FiO2 (%)   07/13/23 0223 07/13/23 0223 07/13/23 0223 07/13/23 0223 --   Tympanic Monitor Lying Left arm       Pain Score       07/13/23 0223       No Pain          Wt Readings from Last 1 Encounters:   07/17/23 50.6 kg (111 lb 8.8 oz)     Additional Vital Signs:   Time Temp Pulse Resp BP MAP (mmHg) SpO2 Calculated FIO2 (%) - Nasal Cannula O2 Flow Rate (L/min) Nasal Cannula O2 Flow Rate (L/min) O2 Device Patient Position - Orthostatic VS   07/14/23 0721 -- -- -- -- -- 91 % 28 2 L/min 2 L/min Nasal cannula --   07/13/23 1900 98.4 °F (36.9 °C) 81 20 144/93 110 94 % 34 -- 3.5 L/min Nasal cannula Lying   07/13/23 17:54:19 -- 60 20 138/81 100 93 % -- -- -- -- --   07/13/23 14:49:37 98.1 °F (36.7 °C) 87 17 126/85 99 94 % -- -- -- Nasal cannula Lying   07/13/23 11:16:01 97.7 °F (36.5 °C) 104 16 116/83 94 96 % -- -- -- -- --   07/13/23 1035 -- 106 Abnormal  -- 125/71 -- 96 % -- -- -- -- --   07/13/23 0753 -- -- -- -- -- 91 % 40 -- 5 L/min Nasal cannula --   07/13/23 0735 -- -- -- -- -- 95 % 40 -- 5 L/min Nasal cannula --   07/13/23 07:07:03 97.4 °F (36.3 °C) Abnormal  56 17 125/71 89 93 % -- -- -- Nasal cannula Lying   07/13/23 0636 -- 70 22 -- -- -- 40 -- 5 L/min Nasal cannula --   07/13/23 0624 98.7 °F (37.1 °C) 87 20 102/66 78 90 % 40 -- 5 L/min Nasal cannula Lying   07/13/23 0330 -- 133 Abnormal  20 102/67 76 92 % -- -- -- Nasal cannula --   07/13/23 0312 -- 118 Abnormal  18 106/68 -- 94 % -- -- -- Nasal cannula --   07/13/23 0300 -- 144 Abnormal  18 94/56 70 94 % -- -- -- Nasal cannula --   07/13/23 0245 -- 164 Abnormal  16 87/66 Abnormal  73 96 % -- -- -- Nasal cannula Lying   07/13/23 0231 -- -- -- 112/73 -- -- -- -- -- -- --   07/13/23 0223 99 °F (37.2 °C) 172 Abnormal  17 -- -- 96 % -- 6 L/min -- High flow nasal cannula Lying   07/13/23 0216 -- -- -- -- -- 97 % -- 6 L/min -- Nasal cannula --       Pertinent Labs/Diagnostic Test Results:   7/13 ECHO: •  Left Ventricle: Left ventricular cavity size is normal. Wall thickness is moderately increased. The left ventricular ejection fraction is 65%. Systolic function is normal. Wall motion is normal.  •  Right Ventricle: Right ventricular cavity size is dilated. Systolic function is moderately reduced. Abnormal tricuspid annular plane systolic excursion (TAPSE) < 1.7 cm. •  Left Atrium: The atrium is dilated. •  Right Atrium: The atrium is dilated. •  Aortic Valve: There is aortic valve sclerosis. •  Mitral Valve: There is mild annular calcification. •  Tricuspid Valve: There is moderate to severe regurgitation. The right ventricular systolic pressure is moderately to severely elevated. •  Pulmonic Valve: There is mild to moderate regurgitation.   7/13 EKG: Atrial fibrillation with rapid ventricular response  Right axis deviation  Right ventricular hypertrophy  Nonspecific ST and T wave abnormality  Abnormal ECG  When compared with ECG of 25-JUN-2023 15:05,  Atrial fibrillation has replaced Sinus rhythm  XR chest 1 view portable   Final Result by Charly Daniels MD (07/13 1210) Cardiomegaly with interstitial thickening and resolution of right effusion.                   Workstation performed: [id number]           Results from last 7 days   Lab Units 07/13/23  0232   SARS-COV-2  Negative     Results from last 7 days   Lab Units 07/16/23 0433 07/14/23 0451 07/13/23 0232   WBC Thousand/uL 9.52 9.97 7.67   HEMOGLOBIN g/dL 14.4 13.9 14.6   HEMATOCRIT % 45.9 42.6 44.4   PLATELETS Thousands/uL 248 247 243   NEUTROS ABS Thousands/µL 7.71*  --  5.21   BANDS PCT %  --  2  --          Results from last 7 days   Lab Units 07/17/23  0437 07/16/23  0433 07/15/23  0924 07/14/23  0451 07/13/23  1430 07/13/23  0232   SODIUM mmol/L 137 136 135 131* 130* 129*   POTASSIUM mmol/L 4.3 3.9 3.4* 3.8 4.1 3.6   CHLORIDE mmol/L 93* 97 98 98 96 94*   CO2 mmol/L 33* 30 26 21 22 22   ANION GAP mmol/L 11 9 11 12 12 13   BUN mg/dL 26* 25 27* 26* 24 21   CREATININE mg/dL 1.22 1.31* 1.44* 1.49* 1.58* 1.40*   EGFR ml/min/1.73sq m 44 41 36 35 32 38   CALCIUM mg/dL 9.3 9.2 8.8 8.5 8.6 8.6   MAGNESIUM mg/dL 2.7  --   --  1.9 2.0 1.6*     Results from last 7 days   Lab Units 07/13/23  0232   AST U/L 20   ALT U/L 22   ALK PHOS U/L 227*   TOTAL PROTEIN g/dL 6.2*   ALBUMIN g/dL 3.4*   TOTAL BILIRUBIN mg/dL 1.01*         Results from last 7 days   Lab Units 07/17/23  0437 07/16/23  0433 07/15/23  0924 07/14/23  0451 07/13/23  1430 07/13/23  0232   GLUCOSE RANDOM mg/dL 107 101 176* 200* 232* 99       Results from last 7 days   Lab Units 07/13/23  0232   BNP pg/mL 2,322*         Results from last 7 days   Lab Units 07/13/23  1107   CLARITY UA  Clear   COLOR UA  Yellow   SPEC GRAV UA  1.015   PH UA  6.0   GLUCOSE UA mg/dl Negative   KETONES UA mg/dl Negative   BLOOD UA  Negative   PROTEIN UA mg/dl 1+*   NITRITE UA  Negative   BILIRUBIN UA  Negative   UROBILINOGEN UA E.U./dl 0.2   LEUKOCYTES UA  Negative   WBC UA /hpf 0-1   RBC UA /hpf None Seen   BACTERIA UA /hpf Occasional   EPITHELIAL CELLS WET PREP /hpf Occasional     Results from last 7 days   Lab Units 07/13/23  0232   INFLUENZA A PCR  Negative   INFLUENZA B PCR  Negative   RSV PCR  Negative       ED Treatment:   Medication Administration from 07/13/2023 0217 to 07/13/2023 0615       Date/Time Order Dose Route Action     07/13/2023 0255 EDT methylPREDNISolone sodium succinate (Solu-MEDROL) injection 125 mg 125 mg Intravenous Given     07/13/2023 0248 EDT diltiazem (CARDIZEM) injection 15 mg 15 mg Intravenous Given     07/13/2023 0330 EDT furosemide (LASIX) injection 20 mg 20 mg Intravenous Given     07/13/2023 0458 EDT albumin human (FLEXBUMIN) 5 % injection 25 g 25 g Intravenous New Bag     07/13/2023 0530 EDT magnesium sulfate 2 g/50 mL IVPB (premix) 2 g 2 g Intravenous New Bag     07/13/2023 0455 EDT diphenhydrAMINE (BENADRYL) tablet 25 mg 25 mg Oral Given     07/13/2023 0456 EDT diltiazem (CARDIZEM) injection 15 mg 15 mg Intravenous Given        Past Medical History:   Diagnosis Date   • Bell's palsy    • Cancer ([address])     lymphoma   • Closed left hip fracture ([address]) 09/28/2021   • COPD (chronic obstructive pulmonary disease) (HCC)    • Diffuse large B cell lymphoma (HCC)    • Hyperlipidemia    • Hypertension    • PAD (peripheral artery disease) (HCC)    • PAF (paroxysmal atrial fibrillation) ([facility])    • PVD (peripheral vascular disease) ([facility])    • Sacral fracture ([address]) 06/25/2023       Admitting Diagnosis: Atrial fibrillation (HCC) [I48.91]  Shortness of breath [R06.02]  Hyponatremia [E87.1]  COPD exacerbation (HCC) [J44.1]  CHF exacerbation ([facility]) [I50.9]  Atrial fibrillation with RVR ([address]) [I48.91]  Acute on chronic heart failure with preserved ejection fraction ([address]) [I50.33]  Age/Sex: 77 y.o. female  Admission Orders:  SCDs  I/O  Tele  Scheduled Medications:  ALPRAZolam, 0.25 mg, Oral, BID  apixaban, 5 mg, Oral, BID  cholecalciferol, 2,000 Units, Oral, Daily  diltiazem, 240 mg, Oral, Daily  furosemide, 40 mg, Intravenous, BID (diuretic)  levalbuterol, 1.25 mg, Nebulization, TID  metoprolol succinate, 50 mg, Oral, Daily  mirtazapine, 7.5 mg, Oral, HS  nicotine, 1 patch, Transdermal, Daily  potassium chloride, 40 mEq, Oral, Daily  umeclidinium-vilanterol, 1 puff, Inhalation, Daily      Continuous IV Infusions:     PRN Meds:  acetaminophen, 650 mg, Oral, Q4H PRN  metoprolol, 5 mg, Intravenous, Q4H PRN  polyethylene glycol, 17 g, Oral, Daily PRN        IP CONSULT TO NUTRITION SERVICES  IP CONSULT TO CARDIOLOGY    Network Utilization Review Department  ATTENTION: Please call with any questions or concerns to [phone] and carefully listen to the prompts so that you are directed to the right person. All voicemails are confidential.  Bryanna Mendoza all requests for admission clinical reviews, approved or denied determinations and any other requests to dedicated fax number below belonging to the campus where the patient is receiving treatment.  List of dedicated fax numbers for the Facilities:  [place] DENIALS (Administrative/Medical Necessity) [phone]   [facility] [phone]   [address] [place] [address] [phone]   [facility] [address] [facility] [phone]   [employer] [facility] [address] [facility] [address] [facility] [address] [place] [address] [id number] [place] [address] [phone]   [address] [address] [phone]

## 2023-07-18 NOTE — UTILIZATION REVIEW
NOTIFICATION OF ADMISSION DISCHARGE   This is a Notification of Discharge from [address] [facility] [address] Please be advised that this patient has been discharge from our facility. Below you will find the admission and discharge date and time including the patient’s disposition. UTILIZATION REVIEW CONTACT:  Jo Ann Marc  Utilization   Network Utilization Review Department  Phone: [phone] carefully listen to the prompts. All voicemails are confidential.  Email: [email or web address]     ADMISSION INFORMATION  PRESENTATION DATE: 7/13/2023  2:24 AM  OBERVATION ADMISSION DATE:   INPATIENT ADMISSION DATE: 7/13/23  5:13 AM   DISCHARGE DATE: 7/17/2023  3:06 PM   DISPOSITION:Home/Self Care    IMPORTANT INFORMATION:  Send all requests for admission clinical reviews, approved or denied determinations and any other requests to dedicated fax number below belonging to the campus where the patient is receiving treatment.  List of dedicated fax numbers:  [place] DENIALS (Administrative/Medical Necessity) [phone] [address] [facility] (Maternity/NICU/Pediatrics) [phone]   [facility] [phone]   [facility] [phone]   [address] [facility] [phone]   [address] [place] [address] [phone]   [facility] [phone]   [address] [facility] [phone] [address] [facility] [phone] [address] [facility] [phone] 27[address] [facility] [address] [facility] [phone]

## 2023-07-20 ENCOUNTER — OFFICE VISIT (OUTPATIENT)
Dept: CARDIOLOGY CLINIC | Facility: CLINIC | Age: 68
End: 2023-07-20
Payer: COMMERCIAL

## 2023-07-20 ENCOUNTER — HOSPITAL ENCOUNTER (OUTPATIENT)
Dept: NON INVASIVE DIAGNOSTICS | Facility: CLINIC | Age: 68
Discharge: HOME | End: 2023-07-20
Payer: COMMERCIAL

## 2023-07-20 VITALS
DIASTOLIC BLOOD PRESSURE: 66 MMHG | OXYGEN SATURATION: 94 % | HEART RATE: 51 BPM | BODY MASS INDEX: 21.16 KG/M2 | SYSTOLIC BLOOD PRESSURE: 102 MMHG | WEIGHT: 115 LBS | HEIGHT: 62 IN

## 2023-07-20 DIAGNOSIS — I48.0 PAROXYSMAL ATRIAL FIBRILLATION (HCC): ICD-10-CM

## 2023-07-20 DIAGNOSIS — I10 ESSENTIAL HYPERTENSION: Primary | ICD-10-CM

## 2023-07-20 DIAGNOSIS — F17.200 TOBACCO DEPENDENCY: Chronic | ICD-10-CM

## 2023-07-20 DIAGNOSIS — I27.20 PULMONARY HTN (HCC): ICD-10-CM

## 2023-07-20 DIAGNOSIS — I73.9 PAD (PERIPHERAL ARTERY DISEASE) (HCC): ICD-10-CM

## 2023-07-20 PROCEDURE — 93225 XTRNL ECG REC<48 HRS REC: CPT

## 2023-07-20 PROCEDURE — 99214 OFFICE O/P EST MOD 30 MIN: CPT | Performed by: INTERNAL MEDICINE

## 2023-07-20 PROCEDURE — 93226 XTRNL ECG REC<48 HR SCAN A/R: CPT

## 2023-07-20 RX ORDER — ATORVASTATIN CALCIUM 40 MG/1
40 TABLET, FILM COATED ORAL DAILY
COMMUNITY
Start: 2023-05-10

## 2023-07-20 NOTE — PROGRESS NOTES
Cardiology Follow Up    Brand Mortimer  [id number]  1955  [facility] [place]  [address] [place] [address]      1. Essential hypertension        2. PAD (peripheral artery disease) (HCC)        3. Paroxysmal atrial fibrillation [facility])  Ambulatory referral to Cardiac Electrophysiology      4. Tobacco dependency        5. Pulmonary HTN ([address] [facility])  Ambulatory Referral to Cardiology          Discussion/Summary:  1. Paroxysmal atrial fibrillation on oral anticoagulation  2. Heart failure with preserved ejection fraction  3. Pulmonary hypertension with RV dysfunction possibly mixed picture of group 2 and group 3  4. Hypertension  5. CKD  6.   Current tobacco use down to 5 cigarettes/day    -Transthoracic echocardiogram 7/13/2023 showing left ventricular systolic function normal estimated LVEF 65% with moderately reduced right ventricular systolic function dilated left and right atria with aortic sclerosis, moderate to severe tricuspid regurgitation with significantly elevated PASP and mild to moderate pulmonic regurgitation.  -We will follow-up Holter monitor results  -Patient will continue Eliquis 5 mg twice daily, atorvastatin 40 mg daily, Bumex 1 mg daily and potassium 20 mEq daily and can use additional 1 mg tablet for worsening lower extremity edema, weight gain greater than 3 pounds in 1 day or 5 pounds in 1 week or symptoms of shortness of breath and let our office know if has to use more than 3 days in a row for monitoring of laboratory studies  -Patient will continue metoprolol succinate 75 mg twice daily, carvedilol 300 mg daily for rate control of atrial fibrillation  -We will have patient seen and evaluated by pulmonary hypertension clinic along with electrophysiology for additional assistance with treatment strategy  -We will see patient in 1 month or sooner if necessary  -We will check BMP in 1 week to monitor renal function and electrolytes  -Patient counseled on dietary and lifestyle modifications including following a low-salt, low-fat, heart healthy diet with sodium restriction to less than 1800 mg of sodium daily and fluid restriction to less than 1800 mL of fluid daily.  -Patient counseled if she were to have any warning or alarm type symptoms she is to seek emergency medical care immediately          History of Present Illness:  -Patient is a 64-year-old female with hypertension, history of lymphoma s/p chemotherapy in April 2019 with current tobacco use down to 5 cigarettes/day, CKD, obstructive lung disease hospitalized in September 2021 found at that time to have left hip fracture after tripping over her dog with significant peripheral vascular disease and severe aortoiliac occlusive disease with history of aortofemoral bypass in 2010, bilateral femoral-popliteal bypass in 2013 and in 2016 had left CFA angioplasty with stent placement who also has paroxysmal atrial fibrillation on oral anticoagulation and pulmonary hypertension. She was recently hospitalized in July 2023 for worsening volume overload and found to again be in atrial fibrillation with rapid ventricular response. Medical therapy was uptitrated and patient was able to be aggressively diuresed with IV furosemide and home Bumex was increased to 1 mg daily and presents to the office today for scheduled follow-up. -Currently in the office today patient denies any chest pain, palpitations, lightness or dizziness, loss of consciousness. She notes her shortness of breath is around her baseline back to improved from prior to hospitalization and notes lower extremity edema remains improved.     Patient Active Problem List   Diagnosis   • Constipation   • Carotid stenosis, asymptomatic, bilateral   • Depression with anxiety   • Essential hypertension   • Hyponatremia   • Tobacco dependency   • Lymphoma ([address])   • PAD (peripheral artery disease) (HCC)   • Paroxysmal atrial fibrillation (HCC)   • Peripheral vascular disease (HCC)   • Foot pain   • Aortoiliac occlusive disease (HCC)   • Bilateral lower extremity edema   • Lung nodule   • Insomnia   • Acute respiratory failure with hypoxia (HCC)   • Hypotension   • Atrial fibrillation with RVR (HCC)   • Buttock wound   • Stage 3a chronic kidney disease (HCC)   • Vitamin D deficiency   • Secondary hyperparathyroidism (HCC)   • Proteinuria   • COPD (chronic obstructive pulmonary disease) (HCC)   • Hypomagnesemia   • Acute on chronic heart failure with preserved ejection fraction [facility])     Past Medical History:   Diagnosis Date   • Bell's palsy    • Cancer ([address])     lymphoma   • Closed left hip fracture ([address]) 09/28/2021   • COPD (chronic obstructive pulmonary disease) (HCC)    • Diffuse large B cell lymphoma (HCC)    • Hyperlipidemia    • Hypertension    • PAD (peripheral artery disease) (HCC)    • PAF (paroxysmal atrial fibrillation) (HCC)    • PVD (peripheral vascular disease) (HCC)    • Sacral fracture ([address]) 06/25/2023     Social History     Socioeconomic History   • Marital status:      Spouse name: Not on file   • Number of children: Not on file   • Years of education: Not on file   • Highest education level: Not on file   Occupational History   • Not on file   Tobacco Use   • Smoking status: Every Day     Packs/day: 0.25     Years: 53.00     Total pack years: 13.25     Types: Cigarettes     Start date: 1/1/1969   • Smokeless tobacco: Never   Vaping Use   • Vaping Use: Never used   Substance and Sexual Activity   • Alcohol use:  Yes     Alcohol/week: 4.0 standard drinks of alcohol     Types: 4 Cans of beer per week     Comment: 4 CANS OF BEER DAILY   • Drug use: Yes     Frequency: 1.0 times per week     Types: Marijuana     Comment: medical marijuana 10/7/19   • Sexual activity: Not Currently   Other Topics Concern   • Not on file   Social History Narrative   • Not on file     Social Determinants of Health     Financial Resource Strain: Not on file   Food Insecurity: No Food Insecurity (7/13/2023)    Hunger Vital Sign    • Worried About Running Out of Food in the Last Year: Never true    • Ran Out of Food in the Last Year: Never true   Transportation Needs: No Transportation Needs (7/13/2023)    PRAPARE - Transportation    • Lack of Transportation (Medical): No    • Lack of Transportation (Non-Medical): No   Physical Activity: Not on file   Stress: Not on file   Social Connections: Not on file   Intimate Partner Violence: Not on file   Housing Stability: Low Risk  (7/13/2023)    Housing Stability Vital Sign    • Unable to Pay for Housing in the Last Year: No    • Number of Places Lived in the Last Year: 1    • Unstable Housing in the Last Year: No      Family History   Problem Relation Age of Onset   • Cancer Mother    • Breast cancer Mother    • Heart attack Father    • No Known Problems Sister    • No Known Problems Daughter    • No Known Problems Maternal Grandmother    • No Known Problems Maternal Grandfather    • No Known Problems Paternal Grandmother    • No Known Problems Paternal Grandfather    • Prostate cancer Brother    • No Known Problems Maternal Aunt    • No Known Problems Maternal Aunt    • No Known Problems Maternal Aunt    • No Known Problems Maternal Aunt    • No Known Problems Paternal Aunt    • No Known Problems Paternal Uncle      Past Surgical History:   Procedure Laterality Date   • ARTERIOGRAM Left 12/6/2018    Procedure: LEFT lower extremity angiography, with Left lower extremity run-off, stent and angioplasty of Left Common Femoral Artery (Left Brachial Access);   Surgeon: Linwood Pedersen MD;  Location: [facility];  Service: Vascular   • CARDIAC SURGERY     • CARPAL TUNNEL RELEASE     • CT NEEDLE BIOPSY LUNG  10/8/2019   • HYSTERECTOMY     • IR AORTAGRAM WITH RUN-OFF  12/6/2018   • OOPHORECTOMY     • AK OPTX FEM SHFT FX W/INSJ IMED IMPLT W/WO SCREW Left 9/29/2021    Procedure: INSERTION NAIL IM FEMUR ANTEGRADE (TROCHANTERIC);   Surgeon: Terrie Tsai DO;  Location: [facility] MAIN OR;  Service: Orthopedics   • TONSILLECTOMY         Current Outpatient Medications:   •  albuterol (ACCUNEB) 1.25 MG/3ML nebulizer solution, Take 1.25 mg by nebulization every 6 (six) hours as needed for wheezing, Disp: , Rfl:   •  ALPRAZolam (XANAX) 0.5 mg tablet, Take 0.25 mg by mouth 2 (two) times a day, Disp: , Rfl:   •  Anoro Ellipta 62.5-25 MCG/ACT inhaler, INHALE 1 PUFF DAILY, Disp: 60 each, Rfl: 3  •  apixaban (Eliquis) 5 mg, Take 1 tablet (5 mg total) by mouth 2 (two) times a day, Disp: 60 tablet, Rfl: 5  •  atorvastatin (LIPITOR) 40 mg tablet, Take 40 mg by mouth daily, Disp: , Rfl:   •  bumetanide (BUMEX) 1 mg tablet, Take 1 tablet (1 mg total) by mouth daily, Disp: 30 tablet, Rfl: 0  •  Cholecalciferol (VITAMIN D3) 2000 units TABS, Take 1 tablet by mouth daily, Disp: , Rfl:   •  diltiazem (CARDIZEM CD) 300 mg 24 hr capsule, Take 1 capsule (300 mg total) by mouth daily Do not start before July 18, 2023., Disp: 30 capsule, Rfl: 0  •  metoprolol succinate (TOPROL-XL) 25 mg 24 hr tablet, Take 3 tablets (75 mg total) by mouth daily Do not start before July 18, 2023., Disp: 30 tablet, Rfl: 0  •  mirtazapine (REMERON) 15 mg tablet, Take 7.5 mg by mouth daily at bedtime  , Disp: , Rfl:   •  potassium chloride (MICRO-K) 10 MEQ CR capsule, Take 2 capsules (20 mEq total) by mouth daily, Disp: 30 capsule, Rfl: 0  •  senna-docusate sodium (SENOKOT S) 8.6-50 mg per tablet, Take 1 tablet by mouth daily As needed for constipation, Disp: 20 tablet, Rfl: 0  Allergies   Allergen Reactions   • Oxycodone-Acetaminophen Itching and Rash     Itching, rash         Labs:  Admission on 07/13/2023, Discharged on 07/17/2023   Component Date Value   • WBC 07/13/2023 7.67    • RBC 07/13/2023 4.91    • Hemoglobin 07/13/2023 14.6    • Hematocrit 07/13/2023 44.4    • MCV 07/13/2023 90    • MCH 07/13/2023 29.7 • MCHC 07/13/2023 32.9    • RDW 07/13/2023 19.2 (H)    • MPV 07/13/2023 9.2    • Platelets 25/58/2237 243    • nRBC 07/13/2023 0    • Neutrophils Relative 07/13/2023 67    • Immat GRANS % 07/13/2023 1    • Lymphocytes Relative 07/13/2023 18    • Monocytes Relative 07/13/2023 12    • Eosinophils Relative 07/13/2023 1    • Basophils Relative 07/13/2023 1    • Neutrophils Absolute 07/13/2023 5.21    • Immature Grans Absolute 07/13/2023 0.05    • Lymphocytes Absolute 07/13/2023 1.37    • Monocytes Absolute 07/13/2023 0.90    • Eosinophils Absolute 07/13/2023 0.10    • Basophils Absolute 07/13/2023 0.04    • Sodium 07/13/2023 129 (L)    • Potassium 07/13/2023 3.6    • Chloride 07/13/2023 94 (L)    • CO2 07/13/2023 22    • ANION GAP 07/13/2023 13    • BUN 07/13/2023 21    • Creatinine 07/13/2023 1.40 (H)    • Glucose 07/13/2023 99    • Calcium 07/13/2023 8.6    • Corrected Calcium 07/13/2023 9.1    • AST 07/13/2023 20    • ALT 07/13/2023 22    • Alkaline Phosphatase 07/13/2023 227 (H)    • Total Protein 07/13/2023 6.2 (L)    • Albumin 07/13/2023 3.4 (L)    • Total Bilirubin 07/13/2023 1.01 (H)    • eGFR 07/13/2023 38    • Magnesium 07/13/2023 1.6 (L)    • BNP 07/13/2023 2,322 (H)    • SARS-CoV-2 07/13/2023 Negative    • INFLUENZA A PCR 07/13/2023 Negative    • INFLUENZA B PCR 07/13/2023 Negative    • RSV PCR 07/13/2023 Negative    • Color, UA 07/13/2023 Yellow    • Clarity, UA 07/13/2023 Clear    • Specific Gravity, UA 07/13/2023 1.015    • pH, UA 07/13/2023 6.0    • Leukocytes, UA 07/13/2023 Negative    • Nitrite, UA 07/13/2023 Negative    • Protein, UA 07/13/2023 1+ (A)    • Glucose, UA 07/13/2023 Negative    • Ketones, UA 07/13/2023 Negative    • Urobilinogen, UA 07/13/2023 0.2    • Bilirubin, UA 07/13/2023 Negative    • Occult Blood, UA 07/13/2023 Negative    • Ventricular Rate 07/13/2023 151    • Atrial Rate 07/13/2023 166    • QRSD Interval 07/13/2023 86    • QT Interval 07/13/2023 288    • QTC Interval 07/13/2023 456    • QRS Axis 07/13/2023 141    • T Wave Axis 07/13/2023 84    • LA size 07/13/2023 4.1    • Triscuspid Valve Regurgi* 07/13/2023 45.0    • Tricuspid valve peak reg* 07/13/2023 3.34    • LVPWd 07/13/2023 1.20    • Left Atrium Area-systoli* 07/13/2023 23.4    • Left Atrium Area-systoli* 07/13/2023 21.6    • Tricuspid annular plane * 07/13/2023 1.00    • TR Peak Joe 07/13/2023 3.3    • IVSd 07/13/2023 6.18    • LV DIASTOLIC VOLUME (MOD* 05/42/1407 57    • LEFT VENTRICLE SYSTOLIC * 82/51/5439 20    • Left ventricular stroke * 07/13/2023 37.00    • A4C EF 07/13/2023 76    • LA length (A2C) 07/13/2023 6.40    • LVIDd 07/13/2023 3.70    • IVS 07/13/2023 1.2    • LVIDS 07/13/2023 2.40    • FS 07/13/2023 35    • LA volume (BP) 07/13/2023 68    • Ao root 07/13/2023 2.70    • RVID d 07/13/2023 2.6    • LVOT diameter 07/13/2023 1.9    • RAA A4C 07/13/2023 20.6    • LVSV, 2D 07/13/2023 37    • LVOT area 07/13/2023 2.83    • LV EF 07/13/2023 65    • RBC, UA 07/13/2023 None Seen    • WBC, UA 07/13/2023 0-1    • Epithelial Cells 07/13/2023 Occasional    • Bacteria, UA 07/13/2023 Occasional    • Sodium 07/13/2023 130 (L)    • Potassium 07/13/2023 4.1    • Chloride 07/13/2023 96    • CO2 07/13/2023 22    • ANION GAP 07/13/2023 12    • BUN 07/13/2023 24    • Creatinine 07/13/2023 1.58 (H)    • Glucose 07/13/2023 232 (H)    • Calcium 07/13/2023 8.6    • eGFR 07/13/2023 32    • Magnesium 07/13/2023 2.0    • Magnesium 07/14/2023 1.9    • Sodium 07/14/2023 131 (L)    • Potassium 07/14/2023 3.8    • Chloride 07/14/2023 98    • CO2 07/14/2023 21    • ANION GAP 07/14/2023 12    • BUN 07/14/2023 26 (H)    • Creatinine 07/14/2023 1.49 (H)    • Glucose 07/14/2023 200 (H)    • Calcium 07/14/2023 8.5    • eGFR 07/14/2023 35    • WBC 07/14/2023 9.97    • RBC 07/14/2023 4.80    • Hemoglobin 07/14/2023 13.9    • Hematocrit 07/14/2023 42.6    • MCV 07/14/2023 89    • MCH 07/14/2023 29.0    • MCHC 07/14/2023 32.6    • RDW 07/14/2023 18. 8 (H)    • MPV 07/14/2023 9.9    • Platelets 32/42/6581 247    • Segmented % 07/14/2023 90 (H)    • Bands % 07/14/2023 2    • Lymphocytes % 07/14/2023 5 (L)    • Monocytes % 07/14/2023 3 (L)    • Eosinophils, % 07/14/2023 0    • Basophils % 07/14/2023 0    • Absolute Neutrophils 07/14/2023 9.17 (H)    • Lymphocytes Absolute 07/14/2023 0.50 (L)    • Monocytes Absolute 07/14/2023 0.30    • Eosinophils Absolute 07/14/2023 0.00    • Basophils Absolute 07/14/2023 0.00    • RBC Morphology 07/14/2023 Normal    • Platelet Estimate 31/45/5098 Adequate    • Sodium 07/15/2023 135    • Potassium 07/15/2023 3.4 (L)    • Chloride 07/15/2023 98    • CO2 07/15/2023 26    • ANION GAP 07/15/2023 11    • BUN 07/15/2023 27 (H)    • Creatinine 07/15/2023 1.44 (H)    • Glucose 07/15/2023 176 (H)    • Calcium 07/15/2023 8.8    • eGFR 07/15/2023 36    • Sodium 07/16/2023 136    • Potassium 07/16/2023 3.9    • Chloride 07/16/2023 97    • CO2 07/16/2023 30    • ANION GAP 07/16/2023 9    • BUN 07/16/2023 25    • Creatinine 07/16/2023 1.31 (H)    • Glucose 07/16/2023 101    • Calcium 07/16/2023 9.2    • eGFR 07/16/2023 41    • WBC 07/16/2023 9.52    • RBC 07/16/2023 4.98    • Hemoglobin 07/16/2023 14.4    • Hematocrit 07/16/2023 45.9    • MCV 07/16/2023 92    • MCH 07/16/2023 28.9    • MCHC 07/16/2023 31.4    • RDW 07/16/2023 19.7 (H)    • MPV 07/16/2023 9.4    • Platelets 49/62/4156 248    • nRBC 07/16/2023 0    • Neutrophils Relative 07/16/2023 82 (H)    • Immat GRANS % 07/16/2023 0    • Lymphocytes Relative 07/16/2023 10 (L)    • Monocytes Relative 07/16/2023 7    • Eosinophils Relative 07/16/2023 1    • Basophils Relative 07/16/2023 0    • Neutrophils Absolute 07/16/2023 7.71 (H)    • Immature Grans Absolute 07/16/2023 0.04    • Lymphocytes Absolute 07/16/2023 0.99    • Monocytes Absolute 07/16/2023 0.69    • Eosinophils Absolute 07/16/2023 0.06    • Basophils Absolute 07/16/2023 0.03    • Magnesium 07/17/2023 2.7    • Sodium 07/17/2023 137    • Potassium 07/17/2023 4.3    • Chloride 07/17/2023 93 (L)    • CO2 07/17/2023 33 (H)    • ANION GAP 07/17/2023 11    • BUN 07/17/2023 26 (H)    • Creatinine 07/17/2023 1.22    • Glucose 07/17/2023 107    • Calcium 07/17/2023 9.3    • eGFR 07/17/2023 44    Admission on 06/25/2023, Discharged on 06/27/2023   Component Date Value   • WBC 06/25/2023 8.43    • RBC 06/25/2023 5.15 (H)    • Hemoglobin 06/25/2023 14.7    • Hematocrit 06/25/2023 44.7    • MCV 06/25/2023 87    • MCH 06/25/2023 28.5    • MCHC 06/25/2023 32.9    • RDW 06/25/2023 17.2 (H)    • MPV 06/25/2023 9.7    • Platelets 86/08/8584 326    • nRBC 06/25/2023 0    • Neutrophils Relative 06/25/2023 77 (H)    • Immat GRANS % 06/25/2023 0    • Lymphocytes Relative 06/25/2023 12 (L)    • Monocytes Relative 06/25/2023 9    • Eosinophils Relative 06/25/2023 1    • Basophils Relative 06/25/2023 1    • Neutrophils Absolute 06/25/2023 6.48    • Immature Grans Absolute 06/25/2023 0.03    • Lymphocytes Absolute 06/25/2023 0.98    • Monocytes Absolute 06/25/2023 0.78    • Eosinophils Absolute 06/25/2023 0.10    • Basophils Absolute 06/25/2023 0.06    • SARS-CoV-2 06/25/2023 Negative    • INFLUENZA A PCR 06/25/2023 Negative    • INFLUENZA B PCR 06/25/2023 Negative    • RSV PCR 06/25/2023 Negative    • Sodium 06/25/2023 133 (L)    • Potassium 06/25/2023 2.4 (LL)    • Chloride 06/25/2023 93 (L)    • CO2 06/25/2023 23    • ANION GAP 06/25/2023 17    • BUN 06/25/2023 13    • Creatinine 06/25/2023 1.18    • Glucose 06/25/2023 112    • Calcium 06/25/2023 9.4    • eGFR 06/25/2023 46    • Ethanol Lvl 06/25/2023 21 (H)    • Ventricular Rate 06/25/2023 89    • Atrial Rate 06/25/2023 89    • PA Interval 06/25/2023 148    • QRSD Interval 06/25/2023 100    • QT Interval 06/25/2023 398    • QTC Interval 06/25/2023 484    • P Axis 06/25/2023 -6    • QRS [place] 06/25/2023 159    • T Wave Axis 06/25/2023 -26    • Sodium 06/26/2023 134 (L)    • Potassium 06/26/2023 3.2 (L)    • Chloride 06/26/2023 100    • CO2 06/26/2023 25    • ANION GAP 06/26/2023 9    • BUN 06/26/2023 12    • Creatinine 06/26/2023 1.02    • Glucose 06/26/2023 169 (H)    • Calcium 06/26/2023 8.3 (L)    • eGFR 06/26/2023 55    • WBC 06/26/2023 4.37    • RBC 06/26/2023 4.94    • Hemoglobin 06/26/2023 14.2    • Hematocrit 06/26/2023 43.7    • MCV 06/26/2023 89    • MCH 06/26/2023 28.7    • MCHC 06/26/2023 32.5    • RDW 06/26/2023 17.5 (H)    • MPV 06/26/2023 9.6    • Platelets 41/28/9756 280    • nRBC 06/26/2023 0    • Neutrophils Relative 06/26/2023 86 (H)    • Immat GRANS % 06/26/2023 1    • Lymphocytes Relative 06/26/2023 11 (L)    • Monocytes Relative 06/26/2023 2 (L)    • Eosinophils Relative 06/26/2023 0    • Basophils Relative 06/26/2023 0    • Neutrophils Absolute 06/26/2023 3.79    • Immature Grans Absolute 06/26/2023 0.03    • Lymphocytes Absolute 06/26/2023 0.46 (L)    • Monocytes Absolute 06/26/2023 0.08 (L)    • Eosinophils Absolute 06/26/2023 0.00    • Basophils Absolute 06/26/2023 0.01    • Magnesium 06/26/2023 1.5 (L)    • Phosphorus 06/26/2023 2.4    • POC Glucose 06/26/2023 151 (H)    • Sodium 06/27/2023 133 (L)    • Potassium 06/27/2023 3.7    • Chloride 06/27/2023 101    • CO2 06/27/2023 24    • ANION GAP 06/27/2023 8    • BUN 06/27/2023 17    • Creatinine 06/27/2023 1.05    • Glucose 06/27/2023 177 (H)    • Calcium 06/27/2023 8.4    • eGFR 06/27/2023 53    • WBC 06/27/2023 11.69 (H)    • RBC 06/27/2023 4.70    • Hemoglobin 06/27/2023 13.5    • Hematocrit 06/27/2023 42.0    • MCV 06/27/2023 89    • MCH 06/27/2023 28.7    • MCHC 06/27/2023 32.1    • RDW 06/27/2023 17.7 (H)    • MPV 06/27/2023 9.7    • Platelets 28/92/2988 295    • Magnesium 06/27/2023 2.1    • Segmented % 06/27/2023 92 (H)    • Bands % 06/27/2023 1    • Lymphocytes % 06/27/2023 7 (L)    • Monocytes % 06/27/2023 0 (L)    • Eosinophils, % 06/27/2023 0    • Basophils % 06/27/2023 0    • Absolute Neutrophils 06/27/2023 10.87 (H)    • Lymphocytes Absolute 06/27/2023 0.82    • Monocytes Absolute 06/27/2023 0.00    • Eosinophils Absolute 06/27/2023 0.00    • Basophils Absolute 06/27/2023 0.00    • RBC Morphology 06/27/2023 Normal    • Platelet Estimate 44/60/8415 Adequate    • Giant PLTs 06/27/2023 Present    • Procalcitonin 06/27/2023 0.07         Imaging: XR chest 1 view portable    Result Date: 7/13/2023  Narrative: CHEST INDICATION:   Shortness of breath. COMPARISON: Chest x-ray June 25, 2023, CT thorax March 2023 EXAM PERFORMED/VIEWS:  XR CHEST PORTABLE FINDINGS: Cardiac enlargement is stable. Nonspecific interstitial opacities are seen with no alveolar infiltrates. No pneumothorax or pleural effusion. Patient is status post cervical fusion. Impression: Cardiomegaly with interstitial thickening and resolution of right effusion. Workstation performed: [id number]     Echo follow up/limited w/ contrast if indicated    Result Date: 7/13/2023  Narrative: •  Left Ventricle: Left ventricular cavity size is normal. Wall thickness is moderately increased. The left ventricular ejection fraction is 65%. Systolic function is normal. Wall motion is normal. •  Right Ventricle: Right ventricular cavity size is dilated. Systolic function is moderately reduced. Abnormal tricuspid annular plane systolic excursion (TAPSE) < 1.7 cm. •  Left Atrium: The atrium is dilated. •  Right Atrium: The atrium is dilated. •  Aortic Valve: There is aortic valve sclerosis. •  Mitral Valve: There is mild annular calcification. •  Tricuspid Valve: There is moderate to severe regurgitation. The right ventricular systolic pressure is moderately to severely elevated. •  Pulmonic Valve: There is mild to moderate regurgitation. XR chest 1 view portable    Result Date: 6/26/2023  Narrative: CHEST INDICATION:   hypoxia. COMPARISON: CXR 10/6/2021 and chest CT 3/1/2023. EXAM PERFORMED/VIEWS:  XR CHEST PORTABLE. FINDINGS: Moderate cardiomegaly.  Small right effusion and moderate right base opacity. No pneumothorax. Upper abdomen normal. Bones normal for age. Cervicothoracic fusion. Old lower right rib fracture. Impression: Small right effusion and moderate right base opacity, question pneumonia/aspiration. Workstation performed: [id number]     CT spine lumbar without contrast    Result Date: 6/25/2023  Narrative: CT LUMBAR SPINE WITHOUT CONTRAST INDICATION:   Low back pain, trauma back pain. COMPARISON: MRI lumbar spine 12/4/2018 TECHNIQUE:  Contiguous axial images through the lumbar spine were obtained. Sagittal and coronal reconstructions were performed. IV Contrast: None Radiation dose length product (DLP) for this visit:  1463 mGy-cm . This examination, like all CT scans performed in the [facility], was performed utilizing techniques to minimize radiation dose exposure, including the use of iterative reconstruction and automated exposure control. IMAGE QUALITY:  Diagnostic. FINDINGS: ALIGNMENT:  There are 5 lumbar type vertebral bodies. Grade 1 degenerative anterolisthesis is noted. VERTEBRAE: There are nondisplaced fractures involving the sacrum, centrally involving the body (series 3: 83) and left sacral ala (series 3: 84). There is a transverse fracture through the S2 vertebral body. DEGENERATIVE CHANGES: Lower Thoracic spine:  Normal lower thoracic disc spaces. L1-2: Diffuse disc bulge without central canal or neural foraminal stenosis. L2-3: Diffuse disc bulge with bilateral ligamentum flavum and facet hypertrophy. There is mild narrowing of the central thecal sac, progressed. Mild bilateral neural foraminal stenosis. L3-4: Diffuse disc bulge with bilateral ligamentum flavum and facet hypertrophy. Mild narrowing of the central thecal sac, progressed. Mild bilateral neural foraminal stenosis. L4-5: Diffuse disc bulge with bilateral ligamentum flavum and facet hypertrophy. Moderate to severe narrowing of the central thecal sac similar to the prior MRI.  Mild bilateral neural foraminal stenosis. L5-S1: Grade 1 degenerative anterolisthesis with secondary unroofing of the posterior disc. No central canal or  subarticular/lateral recess narrowing. Moderate bilateral neural foraminal stenosis. Other: There are bilateral linear and renal calcifications, likely vascular in etiology. Calcified atheromatous plaque involving the abdominal aorta with ectasia. Moderate right pleural effusion with underlying compressive atelectasis, incompletely imaged. Impression: 1. Nondisplaced sacral fractures involving the body and left ala. There is a transverse fracture through the S2 vertebral body. 2. Multilevel degenerative disc disease, progressed since 12/4/2018. Moderate to severe narrowing of the thecal sac is again noted at the L4-5 level . 3. Atherosclerotic aortic calcifications with ectasia. Moderate right pleural effusion with underlying compressive atelectasis, incompletely imaged. The study was marked in [facility] for immediate notification. Workstation performed: [id number]       Review of Systems:  Review of Systems   Constitutional: Negative for chills, diaphoresis, fatigue and fever. HENT: Negative for trouble swallowing and voice change. Eyes: Negative for pain and redness. Respiratory: Negative for shortness of breath and wheezing. Cardiovascular: Negative for chest pain, palpitations and leg swelling. Gastrointestinal: Negative for abdominal pain, constipation, diarrhea, nausea and vomiting. Genitourinary: Negative for dysuria. Musculoskeletal: Positive for arthralgias. Negative for neck pain and neck stiffness. Neurological: Negative for dizziness, syncope, light-headedness and headaches. Psychiatric/Behavioral: Negative for agitation and confusion. All other systems reviewed and are negative.         Vitals:    07/20/23 1331   BP: 102/66   Pulse: (!) 51   SpO2: 94%   Weight: 52.2 kg (115 lb)   Height: 5' 2" (1.575 m)     Vitals:    07/20/23 1331 Weight: 52.2 kg (115 lb)     Height: 5' 2" (157.5 cm)     Physical Exam:  Physical Exam  Vitals reviewed. Constitutional:       General: She is not in acute distress. Appearance: She is not diaphoretic. HENT:      Head: Normocephalic and atraumatic. Eyes:      General:         Right eye: No discharge. Left eye: No discharge. Neck:      Comments: Trachea midline, minimal JVD present  Cardiovascular:      Heart sounds: Murmur (BLANCA) heard. Comments: Irregularly irregular rate and rhythm  Pulmonary:      Effort: Pulmonary effort is normal. No respiratory distress. Breath sounds: No wheezing. Comments: Decreased breath sounds bilaterally  Chest:      Chest wall: No tenderness. Abdominal:      General: Bowel sounds are normal.      Palpations: Abdomen is soft. Tenderness: There is no abdominal tenderness. There is no rebound. Musculoskeletal:      Right lower leg: Edema (1+) present. Left lower leg: Edema (trace) present. Skin:     General: Skin is warm and dry. Neurological:      Mental Status: She is alert. Comments: Awake, alert, able to answer questions appropriately, able to move extremities bilaterally.    Psychiatric:         Mood and Affect: Mood normal.         Behavior: Behavior normal.

## 2023-07-23 ENCOUNTER — HOSPITAL ENCOUNTER (EMERGENCY)
Facility: HOSPITAL | Age: 68
Discharge: HOME | End: 2023-07-23
Attending: EMERGENCY MEDICINE
Payer: COMMERCIAL

## 2023-07-23 ENCOUNTER — APPOINTMENT (EMERGENCY)
Dept: CT IMAGING | Facility: HOSPITAL | Age: 68
End: 2023-07-23
Payer: COMMERCIAL

## 2023-07-23 ENCOUNTER — APPOINTMENT (EMERGENCY)
Dept: VASCULAR ULTRASOUND | Facility: HOSPITAL | Age: 68
End: 2023-07-23
Payer: COMMERCIAL

## 2023-07-23 VITALS
OXYGEN SATURATION: 92 % | HEART RATE: 78 BPM | TEMPERATURE: 97.8 F | RESPIRATION RATE: 20 BRPM | DIASTOLIC BLOOD PRESSURE: 91 MMHG | SYSTOLIC BLOOD PRESSURE: 143 MMHG

## 2023-07-23 DIAGNOSIS — I73.9 PVD (PERIPHERAL VASCULAR DISEASE) (HCC): Primary | ICD-10-CM

## 2023-07-23 LAB
2HR DELTA HS TROPONIN: 0 NG/L
ALBUMIN SERPL BCP-MCNC: 4.3 G/DL (ref 3.5–5)
ALP SERPL-CCNC: 203 U/L (ref 34–104)
ALT SERPL W P-5'-P-CCNC: 20 U/L (ref 7–52)
ANION GAP SERPL CALCULATED.3IONS-SCNC: 10 MMOL/L
AST SERPL W P-5'-P-CCNC: 25 U/L (ref 13–39)
BASOPHILS # BLD AUTO: 0.04 THOUSANDS/ÂΜL (ref 0–0.1)
BASOPHILS NFR BLD AUTO: 1 % (ref 0–1)
BILIRUB SERPL-MCNC: 0.8 MG/DL (ref 0.2–1)
BUN SERPL-MCNC: 26 MG/DL (ref 5–25)
CALCIUM SERPL-MCNC: 10.1 MG/DL (ref 8.4–10.2)
CARDIAC TROPONIN I PNL SERPL HS: 33 NG/L
CARDIAC TROPONIN I PNL SERPL HS: 33 NG/L
CHLORIDE SERPL-SCNC: 98 MMOL/L (ref 96–108)
CO2 SERPL-SCNC: 29 MMOL/L (ref 21–32)
CREAT SERPL-MCNC: 1.62 MG/DL (ref 0.6–1.3)
EOSINOPHIL # BLD AUTO: 0.07 THOUSAND/ÂΜL (ref 0–0.61)
EOSINOPHIL NFR BLD AUTO: 1 % (ref 0–6)
ERYTHROCYTE [DISTWIDTH] IN BLOOD BY AUTOMATED COUNT: 18.6 % (ref 11.6–15.1)
GFR SERPL CREATININE-BSD FRML MDRD: 31 ML/MIN/1.73SQ M
GLUCOSE SERPL-MCNC: 105 MG/DL (ref 65–140)
HCT VFR BLD AUTO: 50.7 % (ref 34.8–46.1)
HGB BLD-MCNC: 16.3 G/DL (ref 11.5–15.4)
IMM GRANULOCYTES # BLD AUTO: 0.01 THOUSAND/UL (ref 0–0.2)
IMM GRANULOCYTES NFR BLD AUTO: 0 % (ref 0–2)
INR PPP: 1.72 (ref 0.84–1.19)
LACTATE SERPL-SCNC: 1.7 MMOL/L (ref 0.5–2)
LYMPHOCYTES # BLD AUTO: 1.23 THOUSANDS/ÂΜL (ref 0.6–4.47)
LYMPHOCYTES NFR BLD AUTO: 23 % (ref 14–44)
MCH RBC QN AUTO: 29.1 PG (ref 26.8–34.3)
MCHC RBC AUTO-ENTMCNC: 32.1 G/DL (ref 31.4–37.4)
MCV RBC AUTO: 91 FL (ref 82–98)
MONOCYTES # BLD AUTO: 0.64 THOUSAND/ÂΜL (ref 0.17–1.22)
MONOCYTES NFR BLD AUTO: 12 % (ref 4–12)
NEUTROPHILS # BLD AUTO: 3.47 THOUSANDS/ÂΜL (ref 1.85–7.62)
NEUTS SEG NFR BLD AUTO: 63 % (ref 43–75)
NRBC BLD AUTO-RTO: 0 /100 WBCS
PLATELET # BLD AUTO: 302 THOUSANDS/UL (ref 149–390)
PMV BLD AUTO: 10 FL (ref 8.9–12.7)
POTASSIUM SERPL-SCNC: 4.4 MMOL/L (ref 3.5–5.3)
PROT SERPL-MCNC: 7.4 G/DL (ref 6.4–8.4)
PROTHROMBIN TIME: 19.8 SECONDS (ref 11.6–14.5)
RBC # BLD AUTO: 5.6 MILLION/UL (ref 3.81–5.12)
SODIUM SERPL-SCNC: 137 MMOL/L (ref 135–147)
TSH SERPL DL<=0.05 MIU/L-ACNC: 5.04 UIU/ML (ref 0.45–4.5)
WBC # BLD AUTO: 5.46 THOUSAND/UL (ref 4.31–10.16)

## 2023-07-23 PROCEDURE — 85610 PROTHROMBIN TIME: CPT | Performed by: EMERGENCY MEDICINE

## 2023-07-23 PROCEDURE — 71250 CT THORAX DX C-: CPT

## 2023-07-23 PROCEDURE — 84443 ASSAY THYROID STIM HORMONE: CPT | Performed by: EMERGENCY MEDICINE

## 2023-07-23 PROCEDURE — 93923 UPR/LXTR ART STDY 3+ LVLS: CPT

## 2023-07-23 PROCEDURE — 99285 EMERGENCY DEPT VISIT HI MDM: CPT

## 2023-07-23 PROCEDURE — 99284 EMERGENCY DEPT VISIT MOD MDM: CPT | Performed by: EMERGENCY MEDICINE

## 2023-07-23 PROCEDURE — 85025 COMPLETE CBC W/AUTO DIFF WBC: CPT | Performed by: EMERGENCY MEDICINE

## 2023-07-23 PROCEDURE — 74176 CT ABD & PELVIS W/O CONTRAST: CPT

## 2023-07-23 PROCEDURE — 84439 ASSAY OF FREE THYROXINE: CPT | Performed by: EMERGENCY MEDICINE

## 2023-07-23 PROCEDURE — 83605 ASSAY OF LACTIC ACID: CPT | Performed by: EMERGENCY MEDICINE

## 2023-07-23 PROCEDURE — 36415 COLL VENOUS BLD VENIPUNCTURE: CPT | Performed by: EMERGENCY MEDICINE

## 2023-07-23 PROCEDURE — 84484 ASSAY OF TROPONIN QUANT: CPT | Performed by: EMERGENCY MEDICINE

## 2023-07-23 PROCEDURE — 93005 ELECTROCARDIOGRAM TRACING: CPT

## 2023-07-23 PROCEDURE — 80053 COMPREHEN METABOLIC PANEL: CPT | Performed by: EMERGENCY MEDICINE

## 2023-07-23 NOTE — ED PROVIDER NOTES
History  Chief Complaint   Patient presents with   • Foot Swelling     Pt c/o worsening bilateral feet swelling and red/purple color to her feet x 3 days     61-year-old female presents with what she describes as progressive worsening of her blue legs. Patient has bilateral 4+ pitting edema with discoloration from the knees down. I was unable to find Doppler pulses in either of her feet. CT dissection study and ankle-brachial index ultrasound was ordered      History provided by:  Patient   used: No    Medical Problem - Major  Severity:  Mild  Onset quality:  Gradual  Timing:  Constant  Progression:  Worsening  Chronicity:  New  Associated symptoms: no chest pain, no congestion, no headaches, no nausea and no vomiting        Prior to Admission Medications   Prescriptions Last Dose Informant Patient Reported? Taking? ALPRAZolam (XANAX) 0.5 mg tablet  Self Yes No   Sig: Take 0.25 mg by mouth 2 (two) times a day   Anoro Ellipta 62.5-25 MCG/ACT inhaler   No No   Sig: INHALE 1 PUFF DAILY   Cholecalciferol (VITAMIN D3) 2000 units TABS  Self Yes No   Sig: Take 1 tablet by mouth daily   albuterol (ACCUNEB) 1.25 MG/3ML nebulizer solution  Self Yes No   Sig: Take 1.25 mg by nebulization every 6 (six) hours as needed for wheezing   apixaban (Eliquis) 5 mg   No No   Sig: Take 1 tablet (5 mg total) by mouth 2 (two) times a day   atorvastatin (LIPITOR) 40 mg tablet   Yes No   Sig: Take 40 mg by mouth daily   bumetanide (BUMEX) 1 mg tablet   No No   Sig: Take 1 tablet (1 mg total) by mouth daily   diltiazem (CARDIZEM CD) 300 mg 24 hr capsule   No No   Sig: Take 1 capsule (300 mg total) by mouth daily Do not start before July 18, 2023. metoprolol succinate (TOPROL-XL) 25 mg 24 hr tablet   No No   Sig: Take 3 tablets (75 mg total) by mouth daily Do not start before July 18, 2023.    mirtazapine (REMERON) 15 mg tablet  Self Yes No   Sig: Take 7.5 mg by mouth daily at bedtime     potassium chloride (MICRO-K) 10 MEQ CR capsule   No No   Sig: Take 2 capsules (20 mEq total) by mouth daily   senna-docusate sodium (SENOKOT S) 8.6-50 mg per tablet  Self No No   Sig: Take 1 tablet by mouth daily As needed for constipation      Facility-Administered Medications: None       Past Medical History:   Diagnosis Date   • Bell's palsy    • Cancer [facility])     lymphoma   • Closed left hip fracture ([address]) 09/28/2021   • COPD (chronic obstructive pulmonary disease) ([facility])    • Diffuse large B cell lymphoma ([address])    • Hyperlipidemia    • Hypertension    • PAD (peripheral artery disease) ([facility])    • PAF (paroxysmal atrial fibrillation) ([address])    • PVD (peripheral vascular disease) ([address])    • Sacral fracture ([address]) 06/25/2023       Past Surgical History:   Procedure Laterality Date   • ARTERIOGRAM Left 12/6/2018    Procedure: LEFT lower extremity angiography, with Left lower extremity run-off, stent and angioplasty of Left Common Femoral Artery (Left Brachial Access); Surgeon: Sotero Pedersen MD;  Location:  MAIN OR;  Service: Vascular   • CARDIAC SURGERY     • CARPAL TUNNEL RELEASE     • CT NEEDLE BIOPSY LUNG  10/8/2019   • HYSTERECTOMY     • IR AORTAGRAM WITH RUN-OFF  12/6/2018   • OOPHORECTOMY     • OK OPTX FEM SHFT FX W/INSJ IMED IMPLT W/WO SCREW Left 9/29/2021    Procedure: INSERTION NAIL IM FEMUR ANTEGRADE (TROCHANTERIC);   Surgeon: Rivka Toussaint DO;  Location: [facility] MAIN OR;  Service: Orthopedics   • TONSILLECTOMY         Family History   Problem Relation Age of Onset   • Cancer Mother    • Breast cancer Mother    • Heart attack Father    • No Known Problems Sister    • No Known Problems Daughter    • No Known Problems Maternal Grandmother    • No Known Problems Maternal Grandfather    • No Known Problems Paternal Grandmother    • No Known Problems Paternal Grandfather    • Prostate cancer Brother    • No Known Problems Maternal Aunt    • No Known Problems Maternal Aunt    • No Known Problems Maternal Aunt    • No Known Problems Maternal Aunt    • No Known Problems Paternal Aunt    • No Known Problems Paternal Uncle      I have reviewed and agree with the history as documented. E-Cigarette/Vaping   • E-Cigarette Use Never User      E-Cigarette/Vaping Substances   • Nicotine No    • THC No    • CBD No    • Flavoring No    • Other No    • Unknown No      Social History     Tobacco Use   • Smoking status: Every Day     Packs/day: 0.25     Years: 53.00     Total pack years: 13.25     Types: Cigarettes     Start date: 1/1/1969   • Smokeless tobacco: Never   Vaping Use   • Vaping Use: Never used   Substance Use Topics   • Alcohol use: Yes     Alcohol/week: 4.0 standard drinks of alcohol     Types: 4 Cans of beer per week     Comment: 4 CANS OF BEER DAILY   • Drug use: Yes     Frequency: 1.0 times per week     Types: Marijuana     Comment: medical marijuana 10/7/19       Review of Systems   HENT: Negative for congestion. Cardiovascular: Negative for chest pain. Gastrointestinal: Negative for nausea and vomiting. Neurological: Negative for headaches. All other systems reviewed and are negative. Physical Exam  Physical Exam  Vitals and nursing note reviewed. Constitutional:       Appearance: She is well-developed. HENT:      Head: Normocephalic and atraumatic. Right Ear: External ear normal.      Left Ear: External ear normal.   Eyes:      Conjunctiva/sclera: Conjunctivae normal.   Neck:      Thyroid: No thyromegaly. Vascular: No JVD. Trachea: No tracheal deviation. Cardiovascular:      Rate and Rhythm: Normal rate. Pulmonary:      Effort: Pulmonary effort is normal.      Breath sounds: Normal breath sounds. No stridor. Abdominal:      General: There is no distension. Palpations: Abdomen is soft. There is no mass. Tenderness: There is no abdominal tenderness. There is no guarding. Hernia: No hernia is present. Musculoskeletal:         General: No tenderness or deformity. Normal range of motion. Right lower leg: Edema present. Left lower leg: Edema present. Lymphadenopathy:      Cervical: No cervical adenopathy. Skin:     General: Skin is warm. Coloration: Skin is not pale. Findings: No erythema or rash. Neurological:      Mental Status: She is alert and oriented to person, place, and time. Psychiatric:         Behavior: Behavior normal.         Vital Signs  ED Triage Vitals [07/23/23 1717]   Temperature Pulse Respirations Blood Pressure SpO2   97.8 °F (36.6 °C) 62 16 117/75 91 %      Temp Source Heart Rate Source Patient Position - Orthostatic VS BP Location FiO2 (%)   Temporal Monitor Sitting Right arm --      Pain Score       --           Vitals:    07/23/23 1717   BP: 117/75   Pulse: 62   Patient Position - Orthostatic VS: Sitting         Visual Acuity      ED Medications  Medications - No data to display    Diagnostic Studies  Results Reviewed     Procedure Component Value Units Date/Time    Lactic acid, plasma (w/reflex if result > 2.0) [[id number]]     Lab Status: No result Specimen: Blood     CBC and differential [[id number]]     Lab Status: No result Specimen: Blood     Comprehensive metabolic panel [[id number]]     Lab Status: No result Specimen: Blood     Protime-INR [[id number]]     Lab Status: No result Specimen: Blood     HS Troponin 0hr (reflex protocol) [[id number]]     Lab Status: No result Specimen: Blood     TSH, 3rd generation with Free T4 reflex [[id number]]     Lab Status: No result Specimen: Blood                  VAS DANIEL & waveform analysis, multiple levels    (Results Pending)   CTA dissection protocol chest/abdomen/pelvis    (Results Pending)              Procedures  Procedures         ED Course  ED Course as of 07/26/23 1445   Sun Jul 23, 2023   1801 Dissection study delayed due to GFR. After reading through pts chart this seems more chronic.                                                MDM    Disposition  Final diagnoses:   None     ED Disposition     None Follow-up Information    None         Patient's Medications   Discharge Prescriptions    No medications on file       No discharge procedures on file.     PDMP Review     None          ED Provider  Electronically Signed by           Luis Alfonso DO  07/26/23 3405

## 2023-07-24 LAB
ATRIAL RATE: 91 BPM
QRS AXIS: 141 DEGREES
QRSD INTERVAL: 88 MS
QT INTERVAL: 358 MS
QTC INTERVAL: 418 MS
T WAVE AXIS: 12 DEGREES
T4 FREE SERPL-MCNC: 1.17 NG/DL (ref 0.61–1.12)
VENTRICULAR RATE: 82 BPM

## 2023-07-24 PROCEDURE — 93923 UPR/LXTR ART STDY 3+ LVLS: CPT | Performed by: SURGERY

## 2023-07-24 PROCEDURE — 93010 ELECTROCARDIOGRAM REPORT: CPT | Performed by: INTERNAL MEDICINE

## 2023-08-03 ENCOUNTER — APPOINTMENT (OUTPATIENT)
Dept: LAB | Facility: CLINIC | Age: 68
End: 2023-08-03
Payer: COMMERCIAL

## 2023-08-03 DIAGNOSIS — I48.0 PAROXYSMAL ATRIAL FIBRILLATION (HCC): ICD-10-CM

## 2023-08-03 DIAGNOSIS — N18.32 STAGE 3B CHRONIC KIDNEY DISEASE (HCC): ICD-10-CM

## 2023-08-03 LAB
ALBUMIN SERPL BCP-MCNC: 3.3 G/DL (ref 3.5–5)
ALP SERPL-CCNC: 168 U/L (ref 46–116)
ALT SERPL W P-5'-P-CCNC: 18 U/L (ref 12–78)
ANION GAP SERPL CALCULATED.3IONS-SCNC: 10 MMOL/L
AST SERPL W P-5'-P-CCNC: 16 U/L (ref 5–45)
BILIRUB SERPL-MCNC: 0.87 MG/DL (ref 0.2–1)
BUN SERPL-MCNC: 17 MG/DL (ref 5–25)
CALCIUM ALBUM COR SERPL-MCNC: 10.2 MG/DL (ref 8.3–10.1)
CALCIUM SERPL-MCNC: 9.6 MG/DL (ref 8.3–10.1)
CHLORIDE SERPL-SCNC: 104 MMOL/L (ref 96–108)
CO2 SERPL-SCNC: 22 MMOL/L (ref 21–32)
CREAT SERPL-MCNC: 1.73 MG/DL (ref 0.6–1.3)
ERYTHROCYTE [DISTWIDTH] IN BLOOD BY AUTOMATED COUNT: 18.1 % (ref 11.6–15.1)
GFR SERPL CREATININE-BSD FRML MDRD: 29 ML/MIN/1.73SQ M
GLUCOSE SERPL-MCNC: 121 MG/DL (ref 65–140)
HCT VFR BLD AUTO: 49.7 % (ref 34.8–46.1)
HGB BLD-MCNC: 15.8 G/DL (ref 11.5–15.4)
MAGNESIUM SERPL-MCNC: 2.1 MG/DL (ref 1.6–2.6)
MCH RBC QN AUTO: 28.7 PG (ref 26.8–34.3)
MCHC RBC AUTO-ENTMCNC: 31.8 G/DL (ref 31.4–37.4)
MCV RBC AUTO: 90 FL (ref 82–98)
PHOSPHATE SERPL-MCNC: 3.1 MG/DL (ref 2.3–4.1)
PLATELET # BLD AUTO: 242 THOUSANDS/UL (ref 149–390)
PMV BLD AUTO: 11.9 FL (ref 8.9–12.7)
POTASSIUM SERPL-SCNC: 4.9 MMOL/L (ref 3.5–5.3)
PROT SERPL-MCNC: 6.9 G/DL (ref 6.4–8.4)
PTH-INTACT SERPL-MCNC: 145.7 PG/ML (ref 12–88)
RBC # BLD AUTO: 5.51 MILLION/UL (ref 3.81–5.12)
SODIUM SERPL-SCNC: 136 MMOL/L (ref 135–147)
URATE SERPL-MCNC: 9.9 MG/DL (ref 2–7.5)
WBC # BLD AUTO: 6.52 THOUSAND/UL (ref 4.31–10.16)

## 2023-08-03 PROCEDURE — 80053 COMPREHEN METABOLIC PANEL: CPT

## 2023-08-03 PROCEDURE — 84550 ASSAY OF BLOOD/URIC ACID: CPT

## 2023-08-03 PROCEDURE — 36415 COLL VENOUS BLD VENIPUNCTURE: CPT

## 2023-08-03 PROCEDURE — 85027 COMPLETE CBC AUTOMATED: CPT

## 2023-08-03 PROCEDURE — 83970 ASSAY OF PARATHORMONE: CPT

## 2023-08-03 PROCEDURE — 83735 ASSAY OF MAGNESIUM: CPT

## 2023-08-03 PROCEDURE — 84100 ASSAY OF PHOSPHORUS: CPT

## 2023-08-04 ENCOUNTER — TELEPHONE (OUTPATIENT)
Dept: CARDIOLOGY CLINIC | Facility: CLINIC | Age: 68
End: 2023-08-04

## 2023-08-04 DIAGNOSIS — I48.91 ATRIAL FIBRILLATION WITH RVR (HCC): ICD-10-CM

## 2023-08-04 RX ORDER — METOPROLOL SUCCINATE 50 MG/1
75 TABLET, EXTENDED RELEASE ORAL DAILY
Qty: 90 TABLET | Refills: 3 | Status: SHIPPED | OUTPATIENT
Start: 2023-08-04 | End: 2023-08-10

## 2023-08-04 NOTE — TELEPHONE ENCOUNTER
- Patient's daughter was able to call me back and informed me that patient has been out of her metoprolol which may have been a contributing factor with somewhat elevated rates during monitor. When I was speaking with patient's daughter she had noted that the patient's feet are changing color to purple and seems to be raising up her leg. I informed her that it is my recommendation she seek immediate emergency medical attention. Patient's daughter notes that the patient does not want to go to ER but I did inform her that it is my recommendation she do so immediately. Patient's daughter noted that she will try to convince her mother to go but in the meantime does have close follow-up with vascular team scheduled.

## 2023-08-04 NOTE — TELEPHONE ENCOUNTER
Attempted to call patient to go over recent Holter monitor results. Unfortunately I was not able to reach the patient but did leave a message on her phone with my name and office number to call back for a better time to speak.

## 2023-08-07 PROBLEM — I50.32 CHRONIC HEART FAILURE WITH PRESERVED EJECTION FRACTION (HCC): Status: ACTIVE | Noted: 2023-01-01

## 2023-08-07 PROBLEM — I89.0 LYMPHEDEMA: Status: ACTIVE | Noted: 2023-01-01

## 2023-08-07 NOTE — PROGRESS NOTES
Assessment/Plan:    PAD (peripheral artery disease) [facility])  66-year-old female current smoker with hx of  ABF bypass (LVH) 2010,  R fem-BK pop bypasses w/ vein ([facility]) 2013, L DFA-BK bpg ([facility]) 2016,  s/p L leg angio and SFA stent 12/6/18 by Dr.Lynne Pedersen. Returns to the office for b/l leg swelling with leg discoloration.    -Pt c/o L>R 2+ pitting edema    7/23/23 DANIEL demonstrates  Right  DANIEL: 0.97/ MP 98 mmHg/ GTP 80 mmHg     Left  DANIEL: 0.94/  mmHg/ GTP 91 mmHg  -Denies self-limiting claudication, denies rest pain, wounds/ tissue loss. -Reviewed LEAD with pt and family and discussed pathophysiology of peripheral arterial disease and indications for vascular intervention. No vascular intervention planned at this time. Pt exhibits good wound healing potential. Will continue to monitor with yearly LEAD and medical management at this time. Recommendations  -Go to the ED for CHF fluid overload evaluation   -Complete LEAD in one year and return to the office for review.  -Continue to take Eliquis daily.  -Continue to take Atorvastatin daily as per PCP. Chronic heart failure with preserved ejection fraction (HCC)  Wt Readings from Last 3 Encounters:   08/07/23 64.2 kg (141 lb 8.6 oz)   07/20/23 52.2 kg (115 lb)   07/17/23 50.6 kg (111 lb 8.8 oz)     Pt noted to have SpO2 in the 85-90%, decreased lung sounds, tachycardia, noted to have increased SOB. States she has not been weighing herself. Not on any oxygen in the office. States she did not take her Bumex today. However, she has been taking it several days in a row without informing cardiology of persistent usage without decrease in leg size. -Recommending evaluation in the ED for CHF volume overload with need for diuresis today. Pt is reluctant to go to ED. Educated on importance of evaluation today with impending respiratory failure eminent.    -ADT 21 placed for transfer to the ED      Lymphedema  -Educated patient and family on pathophysiology of lymphedema its progressive nature and tools that can be utilize to limit the progression of the disease. RX for compression given today. Educated to put on in the morning, wear all day and take off before bed at night.   -Recommended frequent leg elevation above the level of the heart 3-4 times a day for 15 min.    -Discussed risk factor modification, including weight loss, increase in exercise and low salt diet. Diagnoses and all orders for this visit:    Lymphedema  -     Compression Stocking    PAD (peripheral artery disease) ([address])  -     Ambulatory Referral to Vascular Surgery    Acute on chronic heart failure with preserved ejection fraction ([address])  -     Transfer to other facility          Subjective:      Patient ID: Wen Elias is a 77 y.o. female. Patient is new to our office and presents for BLE discoloration and swelling. She denies compression usage. She is a current smoker, 1/4 ppd. She is taking Eliquis and Atorvastatin. 78-year-old Female  with diffuse B-cell lymphoma, atrial fibrillation (on Xarelto), hx of Bell's Palsy, hx of DVT, HTN, PAD with hx aortobifemoral bypass as well as bilateral femoral to below-knee popliteal artery bypasses with vein (all [facility]). Presented with L foot rest pain s/p angiogram with L CFA PTA/stent by Dr. John Tovar Doctor. Pt is now referred to our office for b/l leg swelling.  -Pt in the office today in a wheelchair with her daughter. Pt reports no self limiting claudication, no rest pain, no wounds or tissue loss.   -Pt recently had a broken hip about a month ago  and shortly after she had b/l leg swelling that decreased while in the hospital and then worsened after discharge with recent discoloration. She reports she has bumex at home for b/l leg swelling and has not used it today.  She states she was instructed to call cardiology if she uses Bumex for more than three days in a row; however, denied compliance with cardiology's request. It appears that cardiology has recently made the recommendation for the pt to be evaluated in the ED in light of her increased b/l leg swelling.     -Pt and family given the recommendation at this time to be evaluated in the ED d/t low SpO2, increased SOB, increased leg swelling. Pt reluctant to be evaluated in the ED d/t high cost. Pt and family educated on congestive heart failure and electrolyte imbalance and importance of close monitoring with diuresis. Acceptable for ADT 21 transfer. --Reports she has been doing some leg elevation with a power assist lift chair with pillows. She has done this twice in the past several months. --Denies compression. She has been ambulating with the walker about 20 feet, then she must stop d/t SOB. Denies rest pain. Has been using Vaseline intensive care lotion daily. Taking eliquis and atorvastatin daily. Current smoker 5-6 cig/ day for about 50 years. The following portions of the patient's history were reviewed and updated as appropriate: allergies, current medications, past family history, past medical history, past social history, past surgical history and problem list.    Review of Systems   Constitutional: Negative. HENT: Negative. Eyes: Negative. Respiratory: Positive for cough and shortness of breath. Cardiovascular: Positive for leg swelling (BLE). Negative for chest pain. Gastrointestinal: Negative. Endocrine: Negative. Genitourinary: Negative. Musculoskeletal: Positive for gait problem. Skin: Positive for color change (BLE). Allergic/Immunologic: Negative. Neurological: Negative for dizziness and headaches. Hematological: Negative. Psychiatric/Behavioral: Negative. Objective:      /60 (BP Location: Left arm, Patient Position: Sitting, Cuff Size: Standard)   Pulse 90   Ht 5' 2" (1.575 m)   SpO2 (!) 80%   BMI 21.03 kg/m²          Physical Exam  Vitals and nursing note reviewed.  Exam conducted with a chaperone present. Constitutional:       Appearance: Normal appearance. HENT:      Head: Normocephalic and atraumatic. Cardiovascular:      Rate and Rhythm: Tachycardia present. Pulses:           Radial pulses are 2+ on the right side and 2+ on the left side. Dorsalis pedis pulses are 2+ on the right side and 2+ on the left side. Posterior tibial pulses are 2+ on the right side and 2+ on the left side. Pulmonary:      Comments: Diminished b/l breath sounds    Musculoskeletal:         General: No tenderness. Right lower le+ Pitting Edema present. Left lower le+ Pitting Edema present. Skin:     General: Skin is dry. Neurological:      General: No focal deficit present. Mental Status: She is alert and oriented to person, place, and time. Psychiatric:      Comments: Pt is agitated             I have reviewed and made appropriate changes to the review of systems input by the medical assistant.     Vitals:    23 1338   BP: 112/60   BP Location: Left arm   Patient Position: Sitting   Cuff Size: Standard   Pulse: 90   SpO2: (!) 80%   Height: 5' 2" (1.575 m)       Patient Active Problem List   Diagnosis   • Constipation   • Carotid stenosis, asymptomatic, bilateral   • Depression with anxiety   • Essential hypertension   • Hyponatremia   • Tobacco dependency   • Lymphoma ([facility])   • PAD (peripheral artery disease) ([facility])   • Paroxysmal atrial fibrillation ([facility])   • Peripheral vascular disease ([facility])   • Foot pain   • Aortoiliac occlusive disease ([facility])   • Bilateral lower extremity edema   • Lung nodule   • Insomnia   • Acute respiratory failure with hypoxia ([facility])   • Hypotension   • A-fib ([facility])   • Buttock wound   • Stage 3a chronic kidney disease ([facility])   • Vitamin D deficiency   • Secondary hyperparathyroidism ([facility])   • Proteinuria   • COPD (chronic obstructive pulmonary disease) ([facility])   • Hypomagnesemia   • Chronic heart failure with preserved ejection fraction ([facility])   • Lymphedema       Past Surgical History:   Procedure Laterality Date   • ARTERIOGRAM Left 12/6/2018    Procedure: LEFT lower extremity angiography, with Left lower extremity run-off, stent and angioplasty of Left Common Femoral Artery (Left Brachial Access); Surgeon: Chasity Pedersen MD;  Location: [facility] MAIN [facility];  Service: Vascular   • CARDIAC SURGERY     • CARPAL TUNNEL RELEASE     • CT NEEDLE BIOPSY LUNG  10/8/2019   • HYSTERECTOMY     • IR AORTAGRAM WITH RUN-OFF  12/6/2018   • OOPHORECTOMY     • DC OPTX FEM SHFT FX W/INSJ IMED IMPLT W/WO SCREW Left 9/29/2021    Procedure: INSERTION NAIL IM FEMUR ANTEGRADE (TROCHANTERIC); Surgeon: Kee Magana DO;  Location: [address] [place] [address] MAIN OR;  Service: Orthopedics   • TONSILLECTOMY         Family History   Problem Relation Age of Onset   • Cancer Mother    • Breast cancer Mother    • Heart attack Father    • No Known Problems Sister    • No Known Problems Daughter    • No Known Problems Maternal Grandmother    • No Known Problems Maternal Grandfather    • No Known Problems Paternal Grandmother    • No Known Problems Paternal Grandfather    • Prostate cancer Brother    • No Known Problems Maternal Aunt    • No Known Problems Maternal Aunt    • No Known Problems Maternal Aunt    • No Known Problems Maternal Aunt    • No Known Problems Paternal Aunt    • No Known Problems Paternal Uncle        Social History     Socioeconomic History   • Marital status:      Spouse name: Not on file   • Number of children: Not on file   • Years of education: Not on file   • Highest education level: Not on file   Occupational History   • Not on file   Tobacco Use   • Smoking status: Every Day     Packs/day: 0.25     Years: 53.00     Total pack years: 13.25     Types: Cigarettes     Start date: 1/1/1969   • Smokeless tobacco: Never   Vaping Use   • Vaping Use: Never used   Substance and Sexual Activity   • Alcohol use:  Yes     Alcohol/week: 4.0 standard drinks of alcohol     Types: 4 Cans of beer per week     Comment: 4 CANS OF BEER DAILY   • Drug use: Yes     Frequency: 1.0 times per week     Types: Marijuana     Comment: medical marijuana 10/7/19   • Sexual activity: Not Currently   Other Topics Concern   • Not on file   Social History Narrative   • Not on file     Social Determinants of Health     Financial Resource Strain: Not on file   Food Insecurity: No Food Insecurity (7/13/2023)    Hunger Vital Sign    • Worried About Running Out of Food in the Last Year: Never true    • Ran Out of Food in the Last Year: Never true   Transportation Needs: No Transportation Needs (7/13/2023)    PRAPARE - Transportation    • Lack of Transportation (Medical): No    • Lack of Transportation (Non-Medical): No   Physical Activity: Not on file   Stress: Not on file   Social Connections: Not on file   Intimate Partner Violence: Not on file   Housing Stability: Low Risk  (7/13/2023)    Housing Stability Vital Sign    • Unable to Pay for Housing in the Last Year: No    • Number of Places Lived in the Last Year: 1    • Unstable Housing in the Last Year: No       Allergies   Allergen Reactions   • Oxycodone-Acetaminophen Itching and Rash     Itching, rash       No current facility-administered medications for this visit.     Current Outpatient Medications:   •  ALPRAZolam (XANAX) 0.5 mg tablet, Take 0.25 mg by mouth 2 (two) times a day, Disp: , Rfl:   •  Anoro Ellipta 62.5-25 MCG/ACT inhaler, INHALE 1 PUFF DAILY, Disp: 60 each, Rfl: 3  •  apixaban (Eliquis) 5 mg, Take 1 tablet (5 mg total) by mouth 2 (two) times a day, Disp: 60 tablet, Rfl: 5  •  atorvastatin (LIPITOR) 40 mg tablet, Take 40 mg by mouth daily, Disp: , Rfl:   •  bumetanide (BUMEX) 1 mg tablet, Take 1 tablet (1 mg total) by mouth daily, Disp: 30 tablet, Rfl: 0  •  Cholecalciferol (VITAMIN D3) 2000 units TABS, Take 1 tablet by mouth daily, Disp: , Rfl:   •  diltiazem (CARDIZEM CD) 300 mg 24 hr capsule, Take 1 capsule (300 mg total) by mouth daily Do not start before July 18, 2023., Disp: 30 capsule, Rfl: 0  •  metoprolol succinate (TOPROL-XL) 50 mg 24 hr tablet, Take 1.5 tablets (75 mg total) by mouth daily, Disp: 90 tablet, Rfl: 3  •  mirtazapine (REMERON) 15 mg tablet, Take 7.5 mg by mouth daily at bedtime  , Disp: , Rfl:   •  potassium chloride (MICRO-K) 10 MEQ CR capsule, Take 2 capsules (20 mEq total) by mouth daily, Disp: 30 capsule, Rfl: 0  •  senna-docusate sodium (SENOKOT S) 8.6-50 mg per tablet, Take 1 tablet by mouth daily As needed for constipation, Disp: 20 tablet, Rfl: 0  •  albuterol (ACCUNEB) 1.25 MG/3ML nebulizer solution, Take 1.25 mg by nebulization every 6 (six) hours as needed for wheezing (Patient not taking: Reported on 8/7/2023), Disp: , Rfl:     Facility-Administered Medications Ordered in Other Visits:   •  diltiazem (CARDIZEM) 125 mg in sodium chloride 0.9 % 125 mL infusion, 1-15 mg/hr, Intravenous, Titrated, Denny HENDRICKSON Prechtel, DO  •  magnesium sulfate 2 g/50 mL IVPB (premix) 2 g, 2 g, Intravenous, Once, Chandu Patel MD, Last Rate: 25 mL/hr at 08/07/23 1810, 2 g at 08/07/23 1810  •  [START ON 8/8/2023] metoprolol succinate (TOPROL-XL) 24 hr tablet 75 mg, 75 mg, Oral, Daily, Scott Cisneros MD  I have spent a total time of 30 minutes on 08/07/23 in caring for this patient including Diagnostic results, Risks and benefits of tx options, Instructions for management, Patient and family education, Importance of tx compliance, Risk factor reductions, Impressions, Counseling / Coordination of care, Documenting in the medical record, Reviewing / ordering tests, medicine, procedures  , Obtaining or reviewing history   and Communicating with other healthcare professionals .

## 2023-08-07 NOTE — ASSESSMENT & PLAN NOTE
Patient has chronic A-fib  She will add Toprol-XL and diltiazem 2 to 3 days ago  Now presents with palpitations found to be in A-fib with RVR with a ventricular rate of 150 bpm.    No other complaints    She has not responded to a one-time dose of IV metoprolol nor adding oral Toprol-XL    Will add oral diltiazem and start a diltiazem drip and hopefully this will be controlled overnight    Ask cardiology to see her

## 2023-08-07 NOTE — H&P
[address] [facility]  H&P  Name: Aldair Stiles 77 y.o. female I MRN: [id number]  Unit/Bed#: ED-27 I Date of Admission: 8/7/2023   Date of Service: 8/7/2023 I Hospital Day: 0      Assessment/Plan   * A-fib [facility])  Assessment & Plan  Patient has chronic A-fib  She will add Toprol-XL and diltiazem 2 to 3 days ago  Now presents with palpitations found to be in A-fib with RVR with a ventricular rate of 150 bpm.    No other complaints    She has not responded to a one-time dose of IV metoprolol nor adding oral Toprol-XL    Will add oral diltiazem and start a diltiazem drip and hopefully this will be controlled overnight    Ask cardiology to see her    Chronic heart failure with preserved ejection fraction [facility])  Assessment & Plan  Wt Readings from Last 3 Encounters:   08/07/23 64.2 kg (141 lb 8.6 oz)   07/20/23 52.2 kg (115 lb)   07/17/23 50.6 kg (111 lb 8.8 oz)         She examines as euvolemic. Continue Toprol-XL and Bumex    Tobacco dependency  Assessment & Plan  Will add nicotine patch    Depression with anxiety  Assessment & Plan  Continue Xanax and Remeron           Chief Complaint:   Palpitations      History of Present Illness:    Aldair Stiles is a 77 y.o. female who presents with palpitations. Patient has paroxysmal atrial fibrillation. She ran out of her Toprol-XL and diltiazem 2 to 3 days ago. This morning she woke up with palpitations. No shortness of breath. No chest pain. She came to the ER ventricular rate was 150 bpm in which she was found to be in rapid atrial fibrillation. So far they have given her IV metoprolol and oral Toprol-XL. Heart rate still 130. She states she does feel better though that is a little slower. No other complaints. Not coughing up any mucus. No wheezing. No other changes to medicine other than running out of Toprol-XL and diltiazem. .      Review of Systems:    Review of Systems   Constitutional: Negative for chills and fever.    HENT: Negative for ear pain and sore throat. Eyes: Negative for pain and visual disturbance. Respiratory: Negative for cough and shortness of breath. Cardiovascular: Positive for palpitations. Negative for chest pain. Gastrointestinal: Negative for abdominal pain and vomiting. Genitourinary: Negative for dysuria and hematuria. Musculoskeletal: Negative for arthralgias and back pain. Skin: Negative for color change and rash. Neurological: Negative for seizures and syncope. All other systems reviewed and are negative. Past Medical and Surgical History:     Past Medical History:   Diagnosis Date   • Bell's palsy    • Cancer [facility])     lymphoma   • Closed left hip fracture ([address]) 09/28/2021   • COPD (chronic obstructive pulmonary disease) ([facility])    • Diffuse large B cell lymphoma (HCC)    • Hyperlipidemia    • Hypertension    • PAD (peripheral artery disease) ([facility])    • PAF (paroxysmal atrial fibrillation) ([address])    • PVD (peripheral vascular disease) ([address])    • Sacral fracture ([address]) 06/25/2023       Past Surgical History:   Procedure Laterality Date   • ARTERIOGRAM Left 12/6/2018    Procedure: LEFT lower extremity angiography, with Left lower extremity run-off, stent and angioplasty of Left Common Femoral Artery (Left Brachial Access); Surgeon: Amanuel Pedersen MD;  [facility];  Service: Vascular   • CARDIAC SURGERY     • CARPAL TUNNEL RELEASE     • CT NEEDLE BIOPSY LUNG  10/8/2019   • HYSTERECTOMY     • IR AORTAGRAM WITH RUN-OFF  12/6/2018   • OOPHORECTOMY     • OK OPTX FEM SHFT FX W/INSJ IMED IMPLT W/WO SCREW Left 9/29/2021    Procedure: INSERTION NAIL IM FEMUR ANTEGRADE (TROCHANTERIC); Surgeon: Myesha King DO;  Location: [facility] MAIN OR;  Service: Orthopedics   • TONSILLECTOMY           Home Medications:    Prior to Admission medications    Medication Sig Start Date End Date Taking?  Authorizing Provider   ALPRAZolam Ottoniel Villeda) 0.5 mg tablet Take 0.25 mg by mouth 2 (two) times a day 1/30/19  Yes Historical Provider, MD Hung Morris 62.5-25 MCG/ACT inhaler INHALE 1 PUFF DAILY 7/4/23  Yes Theresa Sales MD   apixaban (Eliquis) 5 mg Take 1 tablet (5 mg total) by mouth 2 (two) times a day 5/8/23  Yes Joe Danielle DO   atorvastatin (LIPITOR) 40 mg tablet Take 40 mg by mouth daily 5/10/23  Yes Historical Provider, MD   bumetanide (BUMEX) 1 mg tablet Take 1 tablet (1 mg total) by mouth daily 7/17/23  Yes Miles Good MD   Cholecalciferol (VITAMIN D3) 2000 units TABS Take 1 tablet by mouth daily   Yes Historical Provider, MD   diltiazem (CARDIZEM CD) 300 mg 24 hr capsule Take 1 capsule (300 mg total) by mouth daily Do not start before July 18, 2023. 7/18/23  Yes Miles Good MD   metoprolol succinate (TOPROL-XL) 50 mg 24 hr tablet Take 1.5 tablets (75 mg total) by mouth daily 8/4/23  Yes Joe Danielle DO   mirtazapine (REMERON) 15 mg tablet Take 7.5 mg by mouth daily at bedtime     Yes Historical Provider, MD   potassium chloride (MICRO-K) 10 MEQ CR capsule Take 2 capsules (20 mEq total) by mouth daily 7/17/23  Yes Miles Good MD   senna-docusate sodium (SENOKOT S) 8.6-50 mg per tablet Take 1 tablet by mouth daily As needed for constipation 10/9/18  Yes Petra Blount DO   albuterol (ACCUNEB) 1.25 MG/3ML nebulizer solution Take 1.25 mg by nebulization every 6 (six) hours as needed for wheezing  Patient not taking: Reported on 8/7/2023    Historical Provider, MD     I have reviewed home medications with patient personally. Allergies:    Allergies   Allergen Reactions   • Oxycodone-Acetaminophen Itching and Rash     Itching, rash         Social History:    Substance Use History:   Social History     Substance and Sexual Activity   Alcohol Use Yes   • Alcohol/week: 4.0 standard drinks of alcohol   • Types: 4 Cans of beer per week    Comment: 4 CANS OF BEER DAILY     Social History     Tobacco Use   Smoking Status Every Day   • Packs/day: 0.25   • Years: 53.00   • Total pack years: 13.25   • Types: Cigarettes   • Start date: 1/1/1969   Smokeless Tobacco Never     Social History     Substance and Sexual Activity   Drug Use Yes   • Frequency: 1.0 times per week   • Types: Marijuana    Comment: medical marijuana 10/7/19         Family History:    non-contributory      Physical Exam:     Vitals:   Blood Pressure: (!) 168/112 (08/07/23 1801)  Pulse: (!) 123 (08/07/23 1801)  Temperature: 97.5 °F (36.4 °C) (08/07/23 1643)  Temp Source: Oral (08/07/23 1643)  Respirations: 20 (08/07/23 1801)  Weight - Scale: 64.2 kg (141 lb 8.6 oz) (08/07/23 1626)  SpO2: 91 % (08/07/23 1801)    Physical Exam  Vitals and nursing note reviewed. HENT:      Head: Normocephalic and atraumatic. Eyes:      Pupils: Pupils are equal, round, and reactive to light. Cardiovascular:      Rate and Rhythm: Normal rate and regular rhythm. Heart sounds: No murmur heard. No friction rub. No gallop. Pulmonary:      Effort: Pulmonary effort is normal.      Breath sounds: Normal breath sounds. No wheezing or rales. Abdominal:      General: Bowel sounds are normal.      Palpations: Abdomen is soft. Tenderness: There is no abdominal tenderness. Musculoskeletal:      Right lower leg: No edema. Left lower leg: No edema. .    Additional Data:     Lab Results: I have personally reviewed pertinent reports. and I have personally reviewed pertinent films in PACS    Results from last 7 days   Lab Units 08/07/23  1631   WBC Thousand/uL 7.58   HEMOGLOBIN g/dL 16.1*   HEMATOCRIT % 49.6*   PLATELETS Thousands/uL 251   NEUTROS PCT % 76*   LYMPHS PCT % 12*   MONOS PCT % 11   EOS PCT % 0     Results from last 7 days   Lab Units 08/07/23  1631   POTASSIUM mmol/L 3.7   CHLORIDE mmol/L 92*   CO2 mmol/L 25   BUN mg/dL 17   CREATININE mg/dL 1.49*   CALCIUM mg/dL 9.5   ALK PHOS U/L 146*   ALT U/L 14   AST U/L 17                     Imaging: I have personally reviewed pertinent reports.       XR chest 1 view portable    (Results Pending)         EKG, Pathology, and Other Studies Reviewed on Admission:   · EKG: afb with RVR. Ventricular rate 133 bpm.  No ST changes. VTE Prophylaxis: Apixaban (Eliquis)        Anticipated Length of Stay:  Patient will be admitted on an Inpatient basis with an anticipated length of stay of greater than 2 midnights. Justification for Hospital Stay: Patient has A-fib with RVR patient is on IV diltiazem drip. Length of stay to be greater than 2 midnights          ** Please Note: This note has been constructed using a voice recognition system.  **

## 2023-08-07 NOTE — ASSESSMENT & PLAN NOTE
Wt Readings from Last 3 Encounters:   08/07/23 64.2 kg (141 lb 8.6 oz)   07/20/23 52.2 kg (115 lb)   07/17/23 50.6 kg (111 lb 8.8 oz)     Pt noted to have SpO2 in the 85-90%, decreased lung sounds, tachycardia, noted to have increased SOB. States she has not been weighing herself. Not on any oxygen in the office. States she did not take her Bumex today. However, she has been taking it several days in a row without informing cardiology of persistent usage without decrease in leg size. -Recommending evaluation in the ED for CHF volume overload with need for diuresis today. Pt is reluctant to go to ED. Educated on importance of evaluation today with impending respiratory failure eminent.    -ADT 21 placed for transfer to the ED

## 2023-08-07 NOTE — ASSESSMENT & PLAN NOTE
68-year-old female current smoker with hx of  ABF bypass (LVH) 2010,  R fem-BK pop bypasses w/ vein (LVH) 2013, L DFA-BK bpg (LVH) 2016,  s/p L leg angio and SFA stent 12/6/18 by Dr.Lynne Pedersen. Returns to the office for b/l leg swelling with leg discoloration.    -Pt c/o L>R 2+ pitting edema    7/23/23 DANIEL demonstrates  Right  DANIEL: 0.97/ MP 98 mmHg/ GTP 80 mmHg     Left  DANIEL: 0.94/  mmHg/ GTP 91 mmHg  -Denies self-limiting claudication, denies rest pain, wounds/ tissue loss. -Reviewed LEAD with pt and family and discussed pathophysiology of peripheral arterial disease and indications for vascular intervention. No vascular intervention planned at this time. Pt exhibits good wound healing potential. Will continue to monitor with yearly LEAD and medical management at this time. Recommendations  -Go to the ED for CHF fluid overload evaluation   -Complete LEAD in one year and return to the office for review.  -Continue to take Eliquis daily.  -Continue to take Atorvastatin daily as per PCP.

## 2023-08-07 NOTE — Clinical Note
Hello,  I saw this patient in the office today and we discussed that her oxygen is dipping down to the

## 2023-08-07 NOTE — ED PROVIDER NOTES
History  Chief Complaint   Patient presents with   • Shortness of Breath     Patient coming from heart and vascular following check up. Patient with increased SOB on exertion, increased leg swelling, decreased O2 sat in the 80s     78 YO female presents with shortness of breath that began overnight. Patient states it worsened this morning. She states she has had increased lower extremity edema B/L. She denies any associated chest pain or leg pain. She does have a Hx of paroxysmal atrial fibrillation and PAD. She takes Eliquis daily, states she has not missed a dose of this. She states she has had worsening shortness of breath when she has atrial fibrillation. Patient was evaluated at there PCP's just prior to arrival, found to have an oxygen saturation in the 80's. Patient has had improved oxygenation of NRB. Pt denies CP/F/C/N/V/D/C, no dysuria, burning on urination or blood in urine. History provided by:  Patient   used: No        Prior to Admission Medications   Prescriptions Last Dose Informant Patient Reported? Taking?    ALPRAZolam (XANAX) 0.5 mg tablet 8/6/2023 Self Yes Yes   Sig: Take 0.25 mg by mouth 2 (two) times a day   Anoro Ellipta 62.5-25 MCG/ACT inhaler 8/7/2023 Self No Yes   Sig: INHALE 1 PUFF DAILY   Cholecalciferol (VITAMIN D3) 2000 units TABS 8/7/2023 Self Yes Yes   Sig: Take 1 tablet by mouth daily   albuterol (ACCUNEB) 1.25 MG/3ML nebulizer solution Not Taking Self Yes No   Sig: Take 1.25 mg by nebulization every 6 (six) hours as needed for wheezing   Patient not taking: Reported on 8/7/2023   apixaban (Eliquis) 5 mg 8/7/2023 Self No Yes   Sig: Take 1 tablet (5 mg total) by mouth 2 (two) times a day   atorvastatin (LIPITOR) 40 mg tablet Not Taking Self Yes No   Sig: Take 40 mg by mouth daily   Patient not taking: Reported on 8/7/2023   bumetanide (BUMEX) 1 mg tablet 8/7/2023 Self No Yes   Sig: Take 1 tablet (1 mg total) by mouth daily   diltiazem (CARDIZEM CD) 300 mg 24 hr capsule 8/7/2023 Self No Yes   Sig: Take 1 capsule (300 mg total) by mouth daily Do not start before July 18, 2023. metoprolol succinate (TOPROL-XL) 50 mg 24 hr tablet 8/7/2023 Self No Yes   Sig: Take 1.5 tablets (75 mg total) by mouth daily   mirtazapine (REMERON) 15 mg tablet 8/6/2023 Self Yes Yes   Sig: Take 7.5 mg by mouth daily at bedtime     potassium chloride (MICRO-K) 10 MEQ CR capsule 8/7/2023 Self No Yes   Sig: Take 2 capsules (20 mEq total) by mouth daily   senna-docusate sodium (SENOKOT S) 8.6-50 mg per tablet 8/6/2023 Self No Yes   Sig: Take 1 tablet by mouth daily As needed for constipation      Facility-Administered Medications: None       Past Medical History:   Diagnosis Date   • Bell's palsy    • Cancer ([address])     lymphoma   • Closed left hip fracture ([address]) 09/28/2021   • COPD (chronic obstructive pulmonary disease) ([facility])    • Diffuse large B cell lymphoma ([address])    • Hyperlipidemia    • Hypertension    • PAD (peripheral artery disease) ([facility])    • PAF (paroxysmal atrial fibrillation) ([address])    • PVD (peripheral vascular disease) ([address])    • Sacral fracture ([address]) 06/25/2023       Past Surgical History:   Procedure Laterality Date   • ARTERIOGRAM Left 12/6/2018    Procedure: LEFT lower extremity angiography, with Left lower extremity run-off, stent and angioplasty of Left Common Femoral Artery (Left Brachial Access); Surgeon: Stefanie Pedersen MD;  Location:  MAIN OR;  Service: Vascular   • CARDIAC SURGERY     • CARPAL TUNNEL RELEASE     • CT NEEDLE BIOPSY LUNG  10/8/2019   • HYSTERECTOMY     • IR AORTAGRAM WITH RUN-OFF  12/6/2018   • OOPHORECTOMY     • MI OPTX FEM SHFT FX W/INSJ IMED IMPLT W/WO SCREW Left 9/29/2021    Procedure: INSERTION NAIL IM FEMUR ANTEGRADE (TROCHANTERIC);   Surgeon: Aiden Crook DO;  Location: [facility] MAIN OR;  Service: Orthopedics   • TONSILLECTOMY         Family History   Problem Relation Age of Onset   • Cancer Mother    • Breast cancer Mother    • Heart attack Father    • No Known Problems Sister    • No Known Problems Daughter    • No Known Problems Maternal Grandmother    • No Known Problems Maternal Grandfather    • No Known Problems Paternal Grandmother    • No Known Problems Paternal Grandfather    • Prostate cancer Brother    • No Known Problems Maternal Aunt    • No Known Problems Maternal Aunt    • No Known Problems Maternal Aunt    • No Known Problems Maternal Aunt    • No Known Problems Paternal Aunt    • No Known Problems Paternal Uncle      I have reviewed and agree with the history as documented. E-Cigarette/Vaping   • E-Cigarette Use Never User      E-Cigarette/Vaping Substances   • Nicotine No    • THC No    • CBD No    • Flavoring No    • Other No    • Unknown No      Social History     Tobacco Use   • Smoking status: Every Day     Packs/day: 0.25     Years: 53.00     Total pack years: 13.25     Types: Cigarettes     Start date: 1/1/1969   • Smokeless tobacco: Never   Vaping Use   • Vaping Use: Never used   Substance Use Topics   • Alcohol use: Yes     Alcohol/week: 4.0 standard drinks of alcohol     Types: 4 Cans of beer per week     Comment: 4 CANS OF BEER DAILY   • Drug use: Yes     Frequency: 1.0 times per week     Types: Marijuana     Comment: medical marijuana 10/7/19       Review of Systems   Constitutional: Negative for chills, fatigue and fever. HENT: Negative for dental problem. Eyes: Negative for visual disturbance. Respiratory: Positive for shortness of breath. Cardiovascular: Negative for chest pain. Gastrointestinal: Negative for abdominal pain, diarrhea and vomiting. Genitourinary: Negative for dysuria and frequency. Musculoskeletal: Negative for arthralgias. Skin: Negative for rash. Neurological: Negative for dizziness, weakness and light-headedness. Psychiatric/Behavioral: Negative for agitation, behavioral problems and confusion. All other systems reviewed and are negative.       Physical Exam  Physical Exam  Vitals and nursing note reviewed. Constitutional:       Appearance: Normal appearance. She is ill-appearing. HENT:      Head: Normocephalic and atraumatic. Eyes:      Extraocular Movements: Extraocular movements intact. Conjunctiva/sclera: Conjunctivae normal.   Cardiovascular:      Rate and Rhythm: Normal rate. Rhythm irregular. Pulmonary:      Effort: Pulmonary effort is normal.      Breath sounds: Wheezing present. Abdominal:      General: There is no distension. Musculoskeletal:         General: Normal range of motion. Cervical back: Normal range of motion. Right lower leg: Edema present. Left lower leg: Edema present. Comments: Bilateral lower extremity discoloration. Skin:     Findings: No rash. Neurological:      General: No focal deficit present. Mental Status: She is alert. Cranial Nerves: No cranial nerve deficit.    Psychiatric:         Mood and Affect: Mood normal.         Vital Signs  ED Triage Vitals   Temperature Pulse Respirations Blood Pressure SpO2   08/07/23 1643 08/07/23 1611 08/07/23 1611 08/07/23 1611 08/07/23 1611   97.5 °F (36.4 °C) 88 (!) 30 117/82 (!) 89 %      Temp Source Heart Rate Source Patient Position - Orthostatic VS BP Location FiO2 (%)   08/07/23 1643 08/07/23 1611 08/07/23 1700 08/07/23 1700 08/08/23 2140   Oral Monitor Lying Right arm 75      Pain Score       08/07/23 2046       No Pain           Vitals:    08/08/23 2019 08/09/23 0020 08/09/23 0340 08/09/23 0810   BP: 109/67 107/73 122/82 120/84   Pulse: 62 82 81 88   Patient Position - Orthostatic VS: Lying Lying Lying          Visual Acuity      ED Medications  Medications   ALPRAZolam (XANAX) tablet 0.25 mg (0.25 mg Oral Given 8/9/23 0810)   apixaban (ELIQUIS) tablet 5 mg (5 mg Oral Given 8/9/23 0810)   atorvastatin (LIPITOR) tablet 40 mg (40 mg Oral Given 8/9/23 0810)   cholecalciferol (VITAMIN D3) tablet 2,000 Units (11 Units Oral Given 8/9/23 0813)   diltiazem (CARDIZEM CD) 24 hr capsule 300 mg (300 mg Oral Given 8/9/23 0810)   mirtazapine (REMERON) tablet 7.5 mg (7.5 mg Oral Given 8/8/23 2121)   senna-docusate sodium (SENOKOT S) 8.6-50 mg per tablet 1 tablet (1 tablet Oral Given 8/9/23 0811)   nicotine (NICODERM CQ) 21 mg/24 hr TD 24 hr patch 1 patch (1 patch Transdermal Medication Applied 8/9/23 0827)   diltiazem (CARDIZEM) 125 mg in sodium chloride 0.9 % 125 mL infusion (7.5 mg/hr Intravenous Rate/Dose Change 8/9/23 0211)   metoprolol succinate (TOPROL-XL) 24 hr tablet 75 mg (75 mg Oral Given 8/9/23 0811)   ipratropium (ATROVENT) 0.02 % inhalation solution 0.5 mg (0.5 mg Nebulization Given 8/9/23 0717)   levalbuterol (XOPENEX) inhalation solution 0.63 mg (0.63 mg Nebulization Given 8/9/23 0717)   nystatin (MYCOSTATIN) powder (1 Application Topical Given 8/9/23 0828)   potassium chloride oral solution 40 mEq (40 mEq Oral Given 8/9/23 0811)   furosemide (LASIX) injection 80 mg (has no administration in time range)   potassium chloride 20 mEq IVPB (premix) (20 mEq Intravenous New Bag 8/9/23 0817)   calcium gluconate 2 g in sodium chloride 0.9% 100 mL (premix) (0 g Intravenous Stopped 8/7/23 1757)   diltiazem (CARDIZEM) injection 10 mg (10 mg Intravenous Given 8/7/23 1633)   metoprolol (LOPRESSOR) injection 5 mg (5 mg Intravenous Given 8/7/23 1701)   furosemide (LASIX) injection 40 mg (40 mg Intravenous Given 8/7/23 1804)   magnesium sulfate 2 g/50 mL IVPB (premix) 2 g (2 g Intravenous New Bag 8/7/23 1810)   metoprolol succinate (TOPROL-XL) 24 hr tablet 75 mg (75 mg Oral Given 8/8/23 0108)   furosemide (LASIX) injection 40 mg (40 mg Intravenous Given 8/8/23 1335)       Diagnostic Studies  Results Reviewed     Procedure Component Value Units Date/Time    HS Troponin I 2hr [[id number]]  (Abnormal) Collected: 08/07/23 1848    Lab Status: Final result Specimen: Blood from Arm, Left Updated: 08/07/23 2333     hs TnI 2hr 61 ng/L      Delta 2hr hsTnI 0 ng/L     HS Troponin I 4hr [[id number]] (Abnormal) Collected: 08/07/23 2101    Lab Status: Final result Specimen: Blood from Arm, Right Updated: 08/07/23 2147     hs TnI 4hr 64 ng/L      Delta 4hr hsTnI 3 ng/L     Basic metabolic panel [[id number]]  (Abnormal) Collected: 08/07/23 1631    Lab Status: Final result Specimen: Blood from Arm, Left Updated: 08/07/23 1727     Sodium 131 mmol/L      Potassium 3.7 mmol/L      Chloride 92 mmol/L      CO2 25 mmol/L      ANION GAP 14 mmol/L      BUN 17 mg/dL      Creatinine 1.49 mg/dL      Glucose 84 mg/dL      Calcium 9.5 mg/dL      eGFR 35 ml/min/1.73sq m     Narrative:      [place] guidelines for Chronic Kidney Disease (CKD):   •  Stage 1 with normal or high GFR (GFR > 90 mL/min/1.73 square meters)  •  Stage 2 Mild CKD (GFR = 60-89 mL/min/1.73 square meters)  •  Stage 3A Moderate CKD (GFR = 45-59 mL/min/1.73 square meters)  •  Stage 3B Moderate CKD (GFR = 30-44 mL/min/1.73 square meters)  •  Stage 4 Severe CKD (GFR = 15-29 mL/min/1.73 square meters)  •  Stage 5 End Stage CKD (GFR <15 mL/min/1.73 square meters)  Note: GFR calculation is accurate only with a steady state creatinine    Hepatic function panel [[id number]]  (Abnormal) Collected: 08/07/23 1631    Lab Status: Final result Specimen: Blood from Arm, Left Updated: 08/07/23 1726     Total Bilirubin 1.23 mg/dL      Bilirubin, Direct 0.44 mg/dL      Alkaline Phosphatase 146 U/L      AST 17 U/L      ALT 14 U/L      Total Protein 7.1 g/dL      Albumin 4.0 g/dL     HS Troponin 0hr (reflex protocol) [[id number]]  (Abnormal) Collected: 08/07/23 1631    Lab Status: Final result Specimen: Blood from Arm, Left Updated: 08/07/23 1726     hs TnI 0hr 61 ng/L     CBC and differential [[id number]]  (Abnormal) Collected: 08/07/23 1631    Lab Status: Final result Specimen: Blood from Arm, Left Updated: 08/07/23 1726     WBC 7.58 Thousand/uL      RBC 5.59 Million/uL      Hemoglobin 16.1 g/dL      Hematocrit 49.6 %      MCV 89 fL      MCH 28.8 pg MCHC 32.5 g/dL      RDW 17.3 %      MPV 10.8 fL      Platelets 323 Thousands/uL      nRBC 0 /100 WBCs      Neutrophils Relative 76 %      Immat GRANS % 0 %      Lymphocytes Relative 12 %      Monocytes Relative 11 %      Eosinophils Relative 0 %      Basophils Relative 1 %      Neutrophils Absolute 5.78 Thousands/µL      Immature Grans Absolute 0.03 Thousand/uL      Lymphocytes Absolute 0.91 Thousands/µL      Monocytes Absolute 0.80 Thousand/µL      Eosinophils Absolute 0.02 Thousand/µL      Basophils Absolute 0.04 Thousands/µL     Magnesium [[id number]]  (Abnormal) Collected: 08/07/23 1631    Lab Status: Final result Specimen: Blood from Arm, Left Updated: 08/07/23 1726     Magnesium 1.7 mg/dL     B-Type Natriuretic Peptide(BNP) [[id number]]  (Abnormal) Collected: 08/07/23 1631    Lab Status: Final result Specimen: Blood from Arm, Left Updated: 08/07/23 1726     BNP 2,205 pg/mL                  XR chest 1 view portable   Final Result by Abran Falcon MD (08/08 0430)      Vascular congestion with small bilateral pleural effusions. Workstation performed: [id number]                    Procedures  ECG 12 Lead Documentation Only    Date/Time: 8/7/2023 4:46 PM    Performed by: Kaylin Rascon MD  Authorized by: Kaylin Rascon MD    ECG reviewed by me, the ED Provider: yes    Patient location:  ED  Previous ECG:     Previous ECG:  Compared to current    Comparison ECG info:  7/23/2023    Similarity:  No change  Interpretation:     Interpretation: normal    Rate:     ECG rate:  133    ECG rate assessment: tachycardic    Rhythm:     Rhythm: atrial fibrillation    QRS:     QRS axis:  Normal    QRS intervals:  Normal  Conduction:     Conduction: normal    ST segments:     ST segments:  Normal  T waves:     T waves: normal               ED Course  ED Course as of 08/09/23 0903   [facility] Aug 07, 2023   1651 Patient states she has not taken her metoprolol in 2 days as she has run out. Medical Decision Making  1. Shortness of breath - Patient states she has had similar in the past. Will order ECG and troponin to rule out acute MI, CBC for anemia, metabolic panel for electrolyte abnormalities and dehydration,  LFT's to assess GB dysfunction, lipase for pancreatitis. Will check CXR and BNP for CHF. Will give cardizem, likely require diuretics. Atrial fibrillation [facility]): acute illness or injury  Congestive heart failure (CHF) ([address]): acute illness or injury  Respiratory failure ([address]): acute illness or injury  Tachycardia: acute illness or injury  Amount and/or Complexity of Data Reviewed  Labs: ordered. Radiology: ordered. Risk  Prescription drug management. Decision regarding hospitalization. Disposition  Final diagnoses:   Congestive heart failure (CHF) (HCC)   Atrial fibrillation (HCC)   Tachycardia   Respiratory failure ([address])     Time reflects when diagnosis was documented in both MDM as applicable and the Disposition within this note     Time User Action Codes Description Comment    8/7/2023  5:49 PM Kash Schaffer E Add [I50.9] Congestive heart failure (CHF) ([address])     8/7/2023  5:49 PM Kash Schaffer E Add [I48.91] Atrial fibrillation ([address])     8/7/2023  5:49 PM Kash Schaffer E Add [R00.0] Tachycardia     8/7/2023  5:49 PM Kash Schaffer E Add [J96.90] Respiratory failure [facility])       ED Disposition     ED Disposition   Admit    Condition   Stable    Date/Time   Mon Aug 7, 2023  5:48 PM    Comment   Case was discussed with ELISEO and the patient's admission status was agreed to be Admission Status: inpatient status to the service of Dr. Luca Fox .            Follow-up Information    None         Current Discharge Medication List      CONTINUE these medications which have NOT CHANGED    Details   ALPRAZolam (XANAX) 0.5 mg tablet Take 0.25 mg by mouth 2 (two) times a day      Anoro Ellipta 62.5-25 MCG/ACT inhaler INHALE 1 PUFF DAILY  Qty: 60 each, Refills: 3    Associated Diagnoses: Chronic obstructive pulmonary disease, unspecified COPD type ([facility])      apixaban (Eliquis) 5 mg Take 1 tablet (5 mg total) by mouth 2 (two) times a day  Qty: 60 tablet, Refills: 5    Associated Diagnoses: Atrial fibrillation with RVR ([facility])      bumetanide (BUMEX) 1 mg tablet Take 1 tablet (1 mg total) by mouth daily  Qty: 30 tablet, Refills: 0    Associated Diagnoses: Acute respiratory failure with hypoxemia ([facility])      Cholecalciferol (VITAMIN D3) 2000 units TABS Take 1 tablet by mouth daily      diltiazem (CARDIZEM CD) 300 mg 24 hr capsule Take 1 capsule (300 mg total) by mouth daily Do not start before July 18, 2023. Qty: 30 capsule, Refills: 0    Associated Diagnoses: Atrial fibrillation with RVR ([facility])      metoprolol succinate (TOPROL-XL) 50 mg 24 hr tablet Take 1.5 tablets (75 mg total) by mouth daily  Qty: 90 tablet, Refills: 3    Associated Diagnoses: Atrial fibrillation with RVR ([facility])      mirtazapine (REMERON) 15 mg tablet Take 7.5 mg by mouth daily at bedtime        potassium chloride (MICRO-K) 10 MEQ CR capsule Take 2 capsules (20 mEq total) by mouth daily  Qty: 30 capsule, Refills: 0    Associated Diagnoses: CHF exacerbation ([facility])      senna-docusate sodium (SENOKOT S) 8.6-50 mg per tablet Take 1 tablet by mouth daily As needed for constipation  Qty: 20 tablet, Refills: 0    Associated Diagnoses: Constipation      albuterol (ACCUNEB) 1.25 MG/3ML nebulizer solution Take 1.25 mg by nebulization every 6 (six) hours as needed for wheezing      atorvastatin (LIPITOR) 40 mg tablet Take 40 mg by mouth daily             No discharge procedures on file.     PDMP Review     None          ED Provider  Electronically Signed by           Miki Romero MD  08/09/23 8221

## 2023-08-07 NOTE — ASSESSMENT & PLAN NOTE
· Patient with history of chronic atrial fibrillation, presents with rapid ventricular response  · Maintained outpatient on Cardizem 300 mg daily and Toprol-XL 75 mg daily-recently ran out of medications, likely contributing to her A-fib with RVR presentation  · Continue anticoagulation with Eliquis  · Restarted home medications  · Continue Cardizem infusion-titrate to less than 100  · Continue telemetry monitoring  · Cardiology following

## 2023-08-07 NOTE — PATIENT INSTRUCTIONS
- Call the office if you experience any changes to your legs or feet such as new pain, redness or swelling.    - Stay active. Exercise everyday. Walking is the recommended exercise with a goal of 30 min 3-4 times a week. A healthy weight can assist in decreasing varicose vein symptoms.     - Wear Compression. Put them on in the morning, wear all day and take off before bed at night.     -Elevate your legs above the level of your heart. Elevate for 15 minutes 3-4 times a day.      -When looking at buying compression, look for "gradient compression" with a weight 15-20mmHg (light weight), knee high is fine.  -Try "[employer]"    -A good brand is Sigvaris, soft opaque, knee high.     -Tubigrip Size E

## 2023-08-07 NOTE — ASSESSMENT & PLAN NOTE
Wt Readings from Last 3 Encounters:   08/07/23 64.2 kg (141 lb 8.6 oz)   07/20/23 52.2 kg (115 lb)   07/17/23 50.6 kg (111 lb 8.8 oz)         She examines as euvolemic.   Continue Toprol-XL and Bumex

## 2023-08-07 NOTE — ASSESSMENT & PLAN NOTE
Wt Readings from Last 3 Encounters:   08/07/23 58.7 kg (129 lb 6.6 oz)   07/20/23 52.2 kg (115 lb)   07/17/23 50.6 kg (111 lb 8.8 oz)     · Patient presents with acute on chronic CHF  · Most recent weight at cardiology office 115 lbs, 129 lbs today  · Maintain outpatient on Bumex 1 mg daily  · Started on Lasix 40 mg IV twice daily  · Monitor intake and output  · Standing weights  · Cardiology following

## 2023-08-07 NOTE — ASSESSMENT & PLAN NOTE
-Educated patient and family on pathophysiology of lymphedema its progressive nature and tools that can be utilize to limit the progression of the disease. RX for compression given today. Educated to put on in the morning, wear all day and take off before bed at night.   -Recommended frequent leg elevation above the level of the heart 3-4 times a day for 15 min.    -Discussed risk factor modification, including weight loss, increase in exercise and low salt diet.

## 2023-08-08 NOTE — ASSESSMENT & PLAN NOTE
Lab Results   Component Value Date    EGFR 35 08/07/2023    EGFR 29 08/03/2023    EGFR 31 07/23/2023    CREATININE 1.49 (H) 08/07/2023    CREATININE 1.73 (H) 08/03/2023    CREATININE 1.62 (H) 07/23/2023     · Creatinine stable at 1.49  · Monitor with diuretics

## 2023-08-08 NOTE — DISCHARGE INSTR - OTHER ORDERS
Skin Care Plan:   1-Hydraguard lotion to bilateral heels, buttocks, and sacrum three times daily and as needed with incontinence care. 2-Elevate heels off of bed/chair surface to offload pressure. 3-Offloading air cushion in chair when out of bed. 4-Moisturize skin daily with skin nourishing lotion. 5-Turn/reposition every 2 hours while in bedes; and weight shift frequently while in chair for pressure re-distribution on skin. 6-Left breast and bilateral groin folds--cleanse with soap and water, pat dry. Apply Nystatin powder twice daily.

## 2023-08-08 NOTE — PLAN OF CARE
Problem: PHYSICAL THERAPY ADULT  Goal: Performs mobility at highest level of function for planned discharge setting. See evaluation for individualized goals. Description: Treatment/Interventions: Functional transfer training, LE strengthening/ROM, Endurance training, Therapeutic exercise, Patient/family training, Equipment eval/education, Bed mobility, Gait training, Compensatory technique education, Continued evaluation, Spoke to nursing, OT, Spoke to advanced practitioner          See flowsheet documentation for full assessment, interventions and recommendations. Note: Prognosis: Fair  Problem List: Decreased strength, Decreased endurance, Impaired balance, Decreased mobility  Assessment: Liliana Angulo is a 77 y.o. female who presents with palpitations. Patient has paroxysmal atrial fibrillation. She ran out of her Toprol-XL and diltiazem 2 to 3 days ago. This morning she woke up with palpitations. Admitted with AFib, CHF. PT consulted. Up and OOB as tolerated orders. Prior to admission resides with daughter. First floor set up. No use of home O2. Amb with RW PTA. Currently presents with functional limitations related to impairments in strength, balance, activity tolerance, functional mobility and ability to perform locomotion. Min A needed for bed mobility/supine to sit. O2 sats on 12L to 70%, improved with increase to 15L 85-87%. Nursing present and aware. Evaluation limited 2* hypoxia. Will need to further assess functional mobility with medical progress. The patient's AM-PAC Basic Mobility Inpatient Short Form Raw Score is 14. A Raw score of less than or equal to 16 suggests the patient may benefit from discharge to post-acute rehabilitation services. Please also refer to the recommendation of the Physical Therapist for safe discharge planning. At this time, STR is recommended. Will monitor disposition with progress. PT per POC 3-5x/wk.         PT Discharge Recommendation: Post acute rehabilitation services    See flowsheet documentation for full assessment.

## 2023-08-08 NOTE — PHYSICAL THERAPY NOTE
PT EVALUATION    77 y.o.    [id number]    Atrial fibrillation (HCC) [I48.91]  Respiratory failure (HCC) [J96.90]  SOB (shortness of breath) [R06.02]  Tachycardia [R00.0]  Congestive heart failure (CHF) (HCC) [I50.9]    Past Medical History:   Diagnosis Date    Bell's palsy     Cancer ([address])     lymphoma    Closed left hip fracture ([address]) 09/28/2021    COPD (chronic obstructive pulmonary disease) (HCC)     Diffuse large B cell lymphoma (HCC)     Hyperlipidemia     Hypertension     PAD (peripheral artery disease) (HCC)     PAF (paroxysmal atrial fibrillation) (HCC)     PVD (peripheral vascular disease) ([address])     Sacral fracture ([address]) 06/25/2023         Past Surgical History:   Procedure Laterality Date    ARTERIOGRAM Left 12/6/2018    Procedure: LEFT lower extremity angiography, with Left lower extremity run-off, stent and angioplasty of Left Common Femoral Artery (Left Brachial Access); Surgeon: Authur Schlatter, MD;  Location:  MAIN OR;  Service: Vascular    CARDIAC SURGERY      CARPAL TUNNEL RELEASE      CT NEEDLE BIOPSY LUNG  10/8/2019    HYSTERECTOMY      IR AORTAGRAM WITH RUN-OFF  12/6/2018    OOPHORECTOMY      CA OPTX FEM SHFT FX W/INSJ IMED IMPLT W/WO SCREW Left 9/29/2021    Procedure: INSERTION NAIL IM FEMUR ANTEGRADE (TROCHANTERIC); Surgeon: Sarah Grady DO;  Location: [address] [place] [address] MAIN OR;  Service: Orthopedics    TONSILLECTOMY          08/08/23 0950   PT Last Visit   PT Visit Date 08/08/23   Note Type   Note type Evaluation   Pain Assessment   Pain Score 5   Pain Location/Orientation Location: Leg;Orientation: Right   Restrictions/Precautions   Weight Bearing Precautions Per Order No   Other Precautions Cognitive; Chair Alarm; Bed Alarm;Multiple lines;Telemetry;O2;Fall Risk;Pain  (initially on 12L midflow, increased to 15L during session)   Home Living   Type of [address] [place] [address] Two level; Able to live on main level with bedroom/bathroom; Performs ADLs on one level;1/2 bath on main level   Bathroom Shower/Tub   (sponge bathing)   [address]   Additional Comments Staying with daughter. No o2 at baseline. Remains on first floor of daughters  3 LUIS FERNANDO without rail. Prior Function   Level of [place] Independent with ADLs; Independent with functional mobility; Needs assistance with IADLS   Lives With Daughter  (and son in [employer] work)   Receives Help From Family   IADLs Independent with medication management; Family/Friend/Other provides transportation; Family/Friend/Other provides meals   Falls in the last 6 months 1 to 4  (3)   Vocational Retired   Comments AMB with RW PTA. Staying on first floor. 3 LUIS FERNANDO. Sponge bathing   General   Additional Pertinent History Pt is 78 y/o female admitted with afib and CHF   Family/Caregiver Present No   Cognition   Overall Cognitive Status WFL   Arousal/Participation Responsive   Attention Attends with cues to redirect   Orientation Level Oriented X4   Following Commands Follows one step commands with increased time or repetition   Comments Flat affect   Subjective   Subjective " I just want to sleep"   RUE Assessment   RUE Assessment WFL  (as observed with functional reach and grasp.)   LUE Assessment   LUE Assessment WFL  (as observed with functional reach and grasp.)   RLE Assessment   RLE Assessment WFL   Strength RLE   RLE Overall Strength 3/5   LLE Assessment   LLE Assessment WFL   Strength LLE   LLE Overall Strength 3/5   Light Touch   RLE Light Touch Grossly intact   LLE Light Touch Grossly intact   Bed Mobility   Supine to Sit 4  Minimal assistance   Additional items Assist x 1; Increased time required;Verbal cues   Sit to Supine 4  Minimal assistance   Additional items Assist x 1; Increased time required;Verbal cues;LE management   Additional Comments Supine <>sit only to adjust linens and reposition. O2 sat noted on 12L to be 70%-RN made aware, increased to 15L with improvement to 85-87%. Transfers   Sit to Stand Unable to assess   Stand to Sit Unable to assess   Additional Comments OOB deferred 2* hypoxia. Nursing present and aware with increased o2 to 15L   Ambulation/Elevation   Gait pattern Not appropriate   Balance   Static Sitting Fair   Dynamic Sitting Fair -   Endurance Deficit   Endurance Deficit Yes   Endurance Deficit Description hypoxia. decreased activity tolerance   Activity Tolerance   Activity Tolerance Treatment limited secondary to medical complications (Comment); Other (Comment)  (hypoxia, increased O2 demands)   Medical Staff Made Aware Kavitha Knox-present. OT-Alejandra:Pt seen for co-evaluation/treatment with skilled Occupational Therapist 2* clinically unstable/unpredictable presentation, medical complexity, fall risk, functional/physical limitations, impaired functional balance, decreased safety awareness, limited activity tolerance which is decline from PLOF and may impact overall functional mobility/mobility safety. Nurse Made Aware yes-RN present. Assessment   Prognosis Fair   Problem List Decreased strength;Decreased endurance; Impaired balance;Decreased mobility   Assessment Malina Finley is a 77 y.o. female who presents with palpitations. Patient has paroxysmal atrial fibrillation. She ran out of her Toprol-XL and diltiazem 2 to 3 days ago. This morning she woke up with palpitations. Admitted with AFib, CHF. PT consulted. Up and OOB as tolerated orders. Prior to admission resides with daughter. First floor set up. No use of home O2. Amb with RW PTA. Currently presents with functional limitations related to impairments in strength, balance, activity tolerance, functional mobility and ability to perform locomotion. Min A needed for bed mobility/supine to sit. O2 sats on 12L to 70%, improved with increase to 15L 85-87%. Nursing present and aware. Evaluation limited 2* hypoxia. Will need to further assess functional mobility with medical progress.   The patient's AM-PAC Basic Mobility Inpatient Short Form Raw Score is 14. A Raw score of less than or equal to 16 suggests the patient may benefit from discharge to post-acute rehabilitation services. Please also refer to the recommendation of the Physical Therapist for safe discharge planning. At this time, STR is recommended. Will monitor disposition with progress. PT per POC 3-5x/wk. Goals   Patient Goals to sleep   STG Expiration Date 08/18/23   Short Term Goal #1 10 days: 1). Pt will perform bed mobility with Marija demonstrating appropriate technique 100% of the time in order to improve function. 2) Further assess transfers and ambulation and revise goals prn. 3)  Improve overall strength and balance 1/2 grade in order to optimize ability to perform functional tasks and reduce fall risk. 4) Increase activity tolerance to 30 minutes in order to improve endurance to functional tasks. 5) PT for ongoing patient and family/caregiver education, DME needs and d/c planning in order to promote highest level of function in least restrictive environment. Plan   Treatment/Interventions Functional transfer training;LE strengthening/ROM; Endurance training; Therapeutic exercise;Patient/family training;Equipment eval/education; Bed mobility;Gait training; Compensatory technique education;Continued evaluation;Spoke to nursing;OT;Spoke to advanced practitioner   PT Frequency 3-5x/wk   Recommendation   PT Discharge Recommendation Post acute rehabilitation services   AM-PAC Basic Mobility Inpatient   Turning in Flat Bed Without Bedrails 3   Lying on Back to Sitting on Edge of Flat Bed Without Bedrails 3   Moving Bed to Chair 2   Standing Up From Chair Using Arms 2   Walk in Room 2   Climb 3-5 Stairs With Railing 2   Basic Mobility Inpatient Raw Score 14   Basic Mobility Standardized Score 35.55   Highest Level Of Mobility   JH-HLM Goal 4: Move to chair/commode   JH-HLM Achieved 3: Sit at edge of bed   End of Consult   Patient Position at End of Consult Supine; All needs within reach;Bed/Chair alarm activated     Hx/personal factors: LUIS FERNANDO home environment, difficulty performing IADLs, fall risk , functional decline , new O2 requirements, and increase in O2 requirements , comorbidities    Examination:decreased strength , decreased balance, decreased activity tolerance, Fluctuating vitals, and decreased cardiovascular endurance. Clinical: unpredictable Ongoing medical status, Trending/abnormal lab values, Risk for falls, bed/chair alarm, Continuous monitoring, Telemetry, Consult pending, Decreased activity tolerance compared to baseline, Decline from PLOF., hypoxia, increased respiratory rate, new onset O2 use, and increased O2 use from baseline.      Complexity: high           Synetta Runner, PT

## 2023-08-08 NOTE — WOUND OSTOMY CARE
Consult Note - Wound   Luis De Leon 77 y.o. female MRN: [id number]  Unit/Bed#: E4 -01 Encounter: [id number]      History and Present Illness:  77year old female presented to the hospital with palpitations after running out of her Toprol-XL. Patient's history significant for atrial fibrillation, CHF, smoker, COPD, lymphoma, PAD, PVD. Wound care consultation received for MASD. Assessment Findings:   Patient agreeable to assessment. She is able to turn in bed for assessment with minimal assist x 1. Occasionally incontinent of urine with purewick in place. However, patient reports purewick not staying in place. Buttocks and sacrum intact--sacrum pink/light purple, entirely blanchable, dry, flaky, distal sacrum appears recently epithelialized. Bilateral heels intact, blanchable, preventative foam dressings in place. Lower extremities fredy, dry, flaky. Right breast fold intact. 1. MASD/intertriginous dermatitis with candidiasis to left breast and bilateral groin folds--left breast fold pink, foul smelling with flaky edges. No open areas or drainage at this time. Bilateral groin folds pink, fragile. No evidence of satellite lesions or open areas in groin folds. See flowsheet for wound details. Wound Care Plan:   1-Hydraguard lotion to bilateral buttocks and sacrum three times daily and as needed. 2-Elevate heels off of bed/chair surface to offload pressure. 3-Offloading air cushion in chair when out of bed. 4-Moisturize skin daily with skin nourishing cream.  5-Turn/reposition every 2 hours while in bedes; and weight shift frequently while in chair for pressure re-distribution on skin. 6-Bridge of nose--apply preventative Hydrocolloid dressing with Bipap use. Change dressing every 3 days and PRN. 7-Apply Allevyn Life foam dressing to bilateral heels for prevention. Henok with P.  Peel back at least daily for skin assessment and re-apply.   Change dressing every 3 days and PRN.  8-Left breast and bilateral groin folds--cleanse with soap and water, pat dry. Apply Nystatin powder twice daily. Wound care team will sign-off at this time. Plan of care reviewed with primary RN.     Wound 08/07/23 MASD Breast Lateral;Left;Lower (Active)   Wound Image   08/08/23 1100   Wound Description Dry;Pink 08/08/23 1100   Kim-wound Assessment Intact 08/08/23 1100   Dressing Open to air 08/08/23 1051 [facility] BSN, RN, [employer]

## 2023-08-08 NOTE — ASSESSMENT & PLAN NOTE
· Seen by outpatient vascular surgery office yesterday  · No indication for intervention at this time  · Continue anticoagulation with Eliquis and statin

## 2023-08-08 NOTE — PROGRESS NOTES
response  · Maintained outpatient on Cardizem 300 mg daily and Toprol-XL 75 mg daily-recently ran out of medications, likely contributing to her A-fib with RVR presentation  · Continue anticoagulation with Eliquis  · Restarted home medications  · Continue Cardizem infusion-titrate to less than 100  · Continue telemetry monitoring  · Cardiology following    Peripheral vascular disease ([address])  Assessment & Plan  · Seen by outpatient vascular surgery office yesterday  · No indication for intervention at this time  · Continue anticoagulation with Eliquis and statin    Tobacco dependency  Assessment & Plan  · Will add nicotine patch    Hyponatremia  Assessment & Plan  · Sodium 131 at time of admission  · Likely hypervolemic hyponatremia versus SIADH due to smoking  · Monitor with IV diuretics    Essential hypertension  Assessment & Plan  · BP controlled during admission  · Continue Cardizem, metoprolol and Lasix    Depression with anxiety  Assessment & Plan  · Continue Xanax and Remeron             VTE Pharmacologic Prophylaxis: VTE Score: 3 Moderate Risk (Score 3-4) - Pharmacological DVT Prophylaxis Ordered: apixaban (Eliquis). Patient Centered Rounds: I performed bedside rounds with nursing staff today. Discussions with Specialists or Other Care Team Provider: cardiology     Education and Discussions with Family / Patient: Attempted to update  (daughter) via phone. Left voicemail. Total Time Spent on Date of Encounter in care of patient: 35 minutes This time was spent on one or more of the following: performing physical exam; counseling and coordination of care; obtaining or reviewing history; documenting in the medical record; reviewing/ordering tests, medications or procedures; communicating with other healthcare professionals and discussing with patient's family/caregivers.     Current Length of Stay: 1 day(s)  Current Patient Status: Inpatient   Certification Statement: The patient will continue to require additional inpatient hospital stay due to acute respiratory failure with hypoxia requiring IV diuretics  Discharge Plan: Anticipate discharge in 48-72 hrs to discharge location to be determined pending rehab evaluations. Code Status: Level 1 - Full Code    Subjective:   Patient notes she is feeling very tired today. Denies any significant shortness of breath but was found to have significant hypoxia. Denies any pain, discomfort, tightness or wheezing. Objective:     Vitals:   Temp (24hrs), Av.9 °F (36.6 °C), Min:97.5 °F (36.4 °C), Max:98.4 °F (36.9 °C)    Temp:  [97.5 °F (36.4 °C)-98.4 °F (36.9 °C)] 98.4 °F (36.9 °C)  HR:  [] 80  Resp:  [18-30] 20  BP: (111-170)/() 121/81  SpO2:  [80 %-96 %] 91 %  Body mass index is 23.67 kg/m². Input and Output Summary (last 24 hours): Intake/Output Summary (Last 24 hours) at 2023 1126  Last data filed at 2023 1757  Gross per 24 hour   Intake 100 ml   Output --   Net 100 ml       Physical Exam:   Physical Exam  Vitals reviewed. Constitutional:       General: She is not in acute distress. HENT:      Head: Normocephalic and atraumatic. Eyes:      General: No scleral icterus. Conjunctiva/sclera: Conjunctivae normal.   Cardiovascular:      Rate and Rhythm: Normal rate and regular rhythm. Heart sounds: No murmur heard. Pulmonary:      Effort: Pulmonary effort is normal. No respiratory distress. Breath sounds: Wheezing (left upper) present. Abdominal:      General: Bowel sounds are normal. There is no distension. Palpations: Abdomen is soft. Tenderness: There is no abdominal tenderness. Musculoskeletal:      Cervical back: Neck supple. Right lower leg: No edema. Left lower leg: No edema. Skin:     General: Skin is warm and dry. Neurological:      Mental Status: She is alert and oriented to person, place, and time.    Psychiatric:         Mood and Affect: Mood normal.         Behavior: Behavior normal.         Additional Data:     Labs:  Results from last 7 days   Lab Units 08/07/23  1631   WBC Thousand/uL 7.58   HEMOGLOBIN g/dL 16.1*   HEMATOCRIT % 49.6*   PLATELETS Thousands/uL 251   NEUTROS PCT % 76*   LYMPHS PCT % 12*   MONOS PCT % 11   EOS PCT % 0     Results from last 7 days   Lab Units 08/07/23  1631   SODIUM mmol/L 131*   POTASSIUM mmol/L 3.7   CHLORIDE mmol/L 92*   CO2 mmol/L 25   BUN mg/dL 17   CREATININE mg/dL 1.49*   ANION GAP mmol/L 14   CALCIUM mg/dL 9.5   ALBUMIN g/dL 4.0   TOTAL BILIRUBIN mg/dL 1.23*   ALK PHOS U/L 146*   ALT U/L 14   AST U/L 17   GLUCOSE RANDOM mg/dL 84                       Lines/Drains:  Invasive Devices     Peripheral Intravenous Line  Duration           Peripheral IV 08/07/23 Left;Ventral (anterior) Forearm <1 day          Drain  Duration           External Urinary Catheter 26 days                  Telemetry:  Telemetry Orders (From admission, onward)             24 Hour Telemetry Monitoring  Continuous x 24 Hours (Telem)        Question:  Reason for 24 Hour Telemetry  Answer:  Decompensated CHF- and any one of the following: continuous diuretic infusion or total diuretic dose >200 mg daily, associated electrolyte derangement (I.e. K < 3.0), ionotropic drip (continuous infusion), hx of ventricular arrhythmia, or new EF < 35%                 Telemetry Reviewed: Atrial fibrillation.  HR averaging 80  Indication for Continued Telemetry Use: Arrthymias requiring medical therapy             Imaging: Reviewed radiology reports from this admission including: chest xray    Recent Cultures (last 7 days):         Last 24 Hours Medication List:   Current Facility-Administered Medications   Medication Dose Route Frequency Provider Last Rate   • ALPRAZolam  0.25 mg Oral BID Ethyl Bay Prechtel, DO     • apixaban  5 mg Oral BID Denny S Prechtel, DO     • atorvastatin  40 mg Oral Daily Denny S Prechtel, DO     • cholecalciferol  2,000 Units Oral Daily Ethyl Bay Prechtel, DO     • diltiazem  300 mg Oral Daily Ethyl Bay Prechtel, DO     • diltiazem  1-15 mg/hr Intravenous Titrated Ethyl Bay Prechtel, DO 5 mg/hr (08/08/23 1262)   • furosemide  40 mg Intravenous BID (diuretic) Kacy Espinoza PA-C     • ipratropium  0.5 mg Nebulization TID Ethyl Bay Prechtel, DO     • levalbuterol  0.63 mg Nebulization TID Ethyl Bay Prechtel, DO     • [START ON 8/9/2023] metoprolol succinate  75 mg Oral Daily Radha Foster PA-C     • mirtazapine  7.5 mg Oral HS Denny S Prechtel, DO     • nicotine  1 patch Transdermal Daily Denny S Prechtel, DO     • potassium chloride  20 mEq Oral Daily Denny S Prechtel, DO     • senna-docusate sodium  1 tablet Oral Daily Denny S Prechtel, DO     • umeclidinium-vilanterol  1 puff Inhalation Daily Batsheva Briggs,           Today, Patient Was Seen By: Janie Ramirez PA-C    **Please Note: This note may have been constructed using a voice recognition system. **

## 2023-08-08 NOTE — NURSING NOTE
Patient's daughter called looking for an immediate update from a doctor. RN explained to patient's daughter that the patient is on a diltiazem drip and controlled thus far. RN explained patient is not in any distress and is resting in bed comfortably at the moment. RN reached out to provider per daughter's request. Unfortunately provider is in the middle of an emergency at this time, this was explained to patient's daughter. Patient's daughter is upset and angry and tells the RN "well then I'm just going to keep calling then".

## 2023-08-08 NOTE — CONSULTS
Consultation Note - Cardiology   Crystal Piedra 77 y.o. female MRN: [id number]  Unit/Bed#: E4 -01 Encounter: [id number]  08/08/23  8:03 AM    Encounter date: 8/8  Patient name: Crystal Piedra 77 y.o. female  Encounter providers/authors:   Medical Student: Shabbir Worley, 4th year medical student, [employer]   Attending Physician: Dr. Kelvin Leigh attending physician: Dr. Baljinder Fletcher  Primary care provider: Jasson Ordaz MD  Date of admission: 8/8/2023  Length of stay: 2 days      Assessment/ Plan:  · Persistent atrial fibrillation on Eliquis with RVR due to medication noncompliance  · Acute on chronic diastolic heart failure  · LVEF 65%, moderate LVH, probable diastolic dysfunction with diastolic staging precluded by the presence of arrhythmia, RV dilatation with moderately reduced function, biatrial dilatation, AV sclerosis, mild MAC, moderate to severe TR with PASP 77 mmHg, July 2023  · Hypertension  · PVD  · CKD stage III  · COPD  · Tobacco use  · Depression/anxiety    PLAN:  Patient went to atrial fibrillation related to noncompliance with AV rafa blocking medications as she ran out and did not call the office until several days later  She had significant weight gain with bilateral airspace disease and some lower extremity edema  She had no chest pain concerning for angina and troponin elevation is minimal consistent with non-MI elevation from rapid atrial fibrillation; doubt ACS  Restart home AV rafa blockers with Toprol-XL 75 mg daily and diltiazem CD3 100 mg daily  Continue Eliquis for thromboembolic prophylaxis  Initiate Lasix 80 mg IV twice daily  Strict I/Os and daily weights  Keep potassium greater than 4 magnesium greater than 2; repletion as per primary team  Maintain BiPAP for now until patient further optimized; serial ABGs as clinically indicated  No repeat echocardiogram at this time  Supportive care      History of present illness:  Crystal Piedra is a 77y.o. year old female with history of paroxysmal atrial fibrillation, stage 3 CKD, heart failure with preserved ejection fraction, COPD, B-Cell lymphoma currently in remission, and PAD who presents with palpitations. She ran out of her Toprol-XL and diltiazem 2-3 days prior to admission. The morning of 8/7, she was awakened by palpitations. The patient is spoken with her primary cardiologist and was recommended to go directly to the emergency department due to worsening dyspnea, leg discoloration and swelling. At the time, she had refused. She subsequently followed up with vascular surgery. During her evaluation by vascular provider, the patient was observed with worsening swelling and discoloration of her lower extremities and recommended she go to the ED. On presentation she was not short of breath and had no chest pain, but her HR was 150 and EKG showed she was in Afib with rapid ventricular response. BNP was 2,205, trops were elevated to 64. Her sodium was 131, creatinine 1.49, up from her baseline of 1.3. She was started on IV metoprolol and oral Toprol-XL as well as a diltiazem drip to mild symptomatic relief and her HR dropped to 130. She was placed on BiPAP and given IV Lasix. Chest x-ray showed bilateral airspace disease with vascular congestion. T  cortez she reports fatigue, but denies shortness of breath, chest pain, chest tightness, or wheezing. She reports her legs still feel swollen. Review of Systems   Constitutional: Negative for chills, decreased appetite, fever, weight gain and weight loss. HENT: Negative for congestion and sore throat. Eyes: Negative for visual disturbance. Cardiovascular: Positive for leg swelling and palpitations. Negative for chest pain, dyspnea on exertion and near-syncope. Respiratory: Positive for cough and shortness of breath. Negative for chest tightness and wheezing. Hematologic/Lymphatic: Negative for bleeding problem. Skin: Positive for color change.  Negative for rash. Musculoskeletal: Negative for myalgias and neck pain. Gastrointestinal: Positive for constipation and diarrhea. Negative for abdominal pain, nausea and vomiting. Neurological: Negative for dizziness, headaches, light-headedness and weakness. Psychiatric/Behavioral: Negative for depression. Historical Information   Past Medical History:   Diagnosis Date   • Bell's palsy    • Cancer [facility])     lymphoma   • Closed left hip fracture ([address]) 09/28/2021   • COPD (chronic obstructive pulmonary disease) ([facility])    • Diffuse large B cell lymphoma (HCC)    • Hyperlipidemia    • Hypertension    • PAD (peripheral artery disease) ([facility])    • PAF (paroxysmal atrial fibrillation) ([address])    • PVD (peripheral vascular disease) ([address])    • Sacral fracture ([address]) 06/25/2023     Past Surgical History:   Procedure Laterality Date   • ARTERIOGRAM Left 12/6/2018    Procedure: LEFT lower extremity angiography, with Left lower extremity run-off, stent and angioplasty of Left Common Femoral Artery (Left Brachial Access); Surgeon: Venice Pedersen MD;  [facility];  Service: Vascular   • CARDIAC SURGERY     • CARPAL TUNNEL RELEASE     • CT NEEDLE BIOPSY LUNG  10/8/2019   • HYSTERECTOMY     • IR AORTAGRAM WITH RUN-OFF  12/6/2018   • OOPHORECTOMY     • AR OPTX FEM SHFT FX W/INSJ IMED IMPLT W/WO SCREW Left 9/29/2021    Procedure: INSERTION NAIL IM FEMUR ANTEGRADE (TROCHANTERIC);   Surgeon: Silvana Palmoino DO;  Location: [facility] MAIN OR;  Service: Orthopedics   • TONSILLECTOMY       Social History     Substance and Sexual Activity   Alcohol Use Yes   • Alcohol/week: 4.0 standard drinks of alcohol   • Types: 4 Cans of beer per week    Comment: 4 CANS OF BEER DAILY     Social History     Substance and Sexual Activity   Drug Use Yes   • Frequency: 1.0 times per week   • Types: Marijuana    Comment: medical marijuana 10/7/19     Social History     Tobacco Use   Smoking Status Every Day   • Packs/day: 0.25   • Years: 53.00   • Total pack years: 13.25   • Types: Cigarettes   • Start date: 1/1/1969   Smokeless Tobacco Never       Family History:   Family History   Problem Relation Age of Onset   • Cancer Mother    • Breast cancer Mother    • Heart attack Father    • No Known Problems Sister    • No Known Problems Daughter    • No Known Problems Maternal Grandmother    • No Known Problems Maternal Grandfather    • No Known Problems Paternal Grandmother    • No Known Problems Paternal Grandfather    • Prostate cancer Brother    • No Known Problems Maternal Aunt    • No Known Problems Maternal Aunt    • No Known Problems Maternal Aunt    • No Known Problems Maternal Aunt    • No Known Problems Paternal Aunt    • No Known Problems Paternal Uncle        Meds/Allergies   all current active meds have been reviewed  Allergies   Allergen Reactions   • Oxycodone-Acetaminophen Itching and Rash     Itching, rash       Objective   Vitals: Blood pressure 121/81, pulse 80, temperature 98.4 °F (36.9 °C), temperature source Tympanic, resp.  rate 20, height 5' 2" (1.575 m), weight 58.7 kg (129 lb 6.6 oz), SpO2 91 %., Body mass index is 23.67 kg/m².,   Orthostatic Blood Pressures    Flowsheet Row Most Recent Value   Blood Pressure 121/81 filed at 08/08/2023 0720   Patient Position - Orthostatic VS Lying filed at 08/08/2023 3425          Systolic (25ABT), DJY:220 , Min:111 , BUF:331     Diastolic (97HOU), GTS:93, Min:58, Max:112        Intake/Output Summary (Last 24 hours) at 8/8/2023 1771  Last data filed at 8/7/2023 1757  Gross per 24 hour   Intake 100 ml   Output --   Net 100 ml       Invasive Devices     Peripheral Intravenous Line  Duration           Peripheral IV 08/07/23 Left;Ventral (anterior) Forearm <1 day          Drain  Duration           External Urinary Catheter 26 days                  Physical Exam:  Gen: Awake, Alert, NAD  Head/eyes: AT/NC, pupils equal and round, Anicteric  ENT: mmm  Neck: Supple, +JVP, trachea midline  Resp: Coarse breath sounds bilaterally with wheezes and bibasilar rales  CV: irreg irreg +S1, S2, No m/r/g  Abd: Soft, NT/ND + BS  Ext: +1 pitting LE edema bilaterally  Neuro: Follows commands, moves all extermities  Psych: Appropriate affect, normal mood, pleasant attitude, non-combative  Skin: warm; no rash, erythema; +venous stasis changes on exposed skin        EKG:   Date: 8/7  Interpretation: Atrial fibrillation with rapid ventricular response with premature ventricular or aberrantly conducted complexes  Right axis deviation  Right ventricular hypertrophy  Septal infarct (cited on or before 23-JUL-2023)  Abnormal ECG  When compared with ECG of 07-AUG-2023 16:22,  No significant change was found    Imaging: I have personally reviewed pertinent reports. Telemetry:   AF w/ RVR--> CVR    ECHO: 7/13 - •  Left Ventricle: Left ventricular cavity size is normal. Wall thickness is moderately increased. The left ventricular ejection fraction is 65%. Systolic function is normal. Wall motion is normal.  •  Right Ventricle: Right ventricular cavity size is dilated. Systolic function is moderately reduced. Abnormal tricuspid annular plane systolic excursion (TAPSE) < 1.7 cm. •  Left Atrium: The atrium is dilated. •  Right Atrium: The atrium is dilated. •  Aortic Valve: There is aortic valve sclerosis. •  Mitral Valve: There is mild annular calcification. •  Tricuspid Valve: There is moderate to severe regurgitation. The right ventricular systolic pressure is moderately to severely elevated. •  Pulmonic Valve: There is mild to moderate regurgitation. CATH/STRESS TEST:   No    CXR: Vascular congestion with small bilateral pleural effusions.     Lab Results:     Cardiac Profile:   Results from last 7 days   Lab Units 08/07/23  2101 08/07/23  1848 08/07/23  1631   HS TNI 0HR ng/L  --   --  61*   HS TNI 2HR ng/L  --  61*  --    HSTNI D2 ng/L  --  0  --    HS TNI 4HR ng/L 64*  --   --    HSTNI D4 ng/L 3  --   --        BNP:  8/7 - 2,205    CBC with diff:   Results from last 7 days   Lab Units 08/07/23  1631 08/03/23  1425   WBC Thousand/uL 7.58 6.52   HEMOGLOBIN g/dL 16.1* 15.8*   HEMATOCRIT % 49.6* 49.7*   MCV fL 89 90   PLATELETS Thousands/uL 251 242   RBC Million/uL 5.59* 5.51*   MCH pg 28.8 28.7   MCHC g/dL 32.5 31.8   RDW % 17.3* 18.1*   MPV fL 10.8 11.9   NRBC AUTO /100 WBCs 0  --        CMP:   Results from last 7 days   Lab Units 08/07/23  1631 08/03/23  1425   POTASSIUM mmol/L 3.7 4.9   CHLORIDE mmol/L 92* 104   CO2 mmol/L 25 22   BUN mg/dL 17 17   CREATININE mg/dL 1.49* 1.73*   CALCIUM mg/dL 9.5 9.6   AST U/L 17 16   ALT U/L 14 18   ALK PHOS U/L 146* 168*   EGFR ml/min/1.73sq m 35 29       Ivelisse Kelly  MS4      Signed: Viktoriya Castellanos DO, FACC, FASQUE, FACP

## 2023-08-08 NOTE — ASSESSMENT & PLAN NOTE
· Sodium 131 at time of admission  · Likely hypervolemic hyponatremia versus SIADH due to smoking  · Monitor with IV diuretics

## 2023-08-08 NOTE — ASSESSMENT & PLAN NOTE
· Patient sent in by outpatient vascular surgery office for hypoxia, was found to be hypoxic to 80% in the outpatient office  · Patient with documented hypoxia this morning to 84% on 10 L mid flow  · Currently requiring 12 to 15 L mid flow to maintain saturations above 88%- trial bipap  · Likely secondary to acute CHF exacerbation due to untreated A-fib with RVR  · Continue IV diuretics  · Continue scheduled nebulizers  · Monitor intake and output with daily weights  · Treat A-fib with RVR with metoprolol and Cardizem  · Cardiology and pulmonology consults  · Wean oxygen as tolerated

## 2023-08-08 NOTE — OCCUPATIONAL THERAPY NOTE
Occupational Therapy Evaluation     Patient Name: Fern Emanuel  GIDFIDerickN Date: 8/8/2023  Problem List  Principal Problem:    Acute respiratory failure with hypoxia ([address])  Active Problems:    Depression with anxiety    Essential hypertension    Hyponatremia    Tobacco dependency    Peripheral vascular disease ([address])    A-fib (HCC)    Stage 3a chronic kidney disease (HCC)    COPD (chronic obstructive pulmonary disease) ([address])    Acute on chronic heart failure with preserved ejection fraction [facility])    Past Medical History  Past Medical History:   Diagnosis Date    Bell's palsy     Cancer ([address])     lymphoma    Closed left hip fracture ([address]) 09/28/2021    COPD (chronic obstructive pulmonary disease) (HCC)     Diffuse large B cell lymphoma (HCC)     Hyperlipidemia     Hypertension     PAD (peripheral artery disease) (HCC)     PAF (paroxysmal atrial fibrillation) (HCC)     PVD (peripheral vascular disease) ([address])     Sacral fracture ([address]) 06/25/2023     Past Surgical History  Past Surgical History:   Procedure Laterality Date    ARTERIOGRAM Left 12/6/2018    Procedure: LEFT lower extremity angiography, with Left lower extremity run-off, stent and angioplasty of Left Common Femoral Artery (Left Brachial Access); Surgeon: Valentino Koroma MD;  [facility];  Service: Vascular    CARDIAC SURGERY      CARPAL TUNNEL RELEASE      CT NEEDLE BIOPSY LUNG  10/8/2019    HYSTERECTOMY      IR AORTAGRAM WITH RUN-OFF  12/6/2018    OOPHORECTOMY      IN OPTX FEM SHFT FX W/INSJ IMED IMPLT W/WO SCREW Left 9/29/2021    Procedure: INSERTION NAIL IM FEMUR ANTEGRADE (TROCHANTERIC); Surgeon: Juan Crowell DO;  Location: [facility] MAIN OR;  Service: Orthopedics    TONSILLECTOMY             08/08/23 1009   OT Last Visit   OT Visit Date 08/08/23   Note Type   Note type Evaluation   Pain Assessment   Pain Assessment Tool 0-10   Pain Score 5   Pain Location/Orientation Orientation: Right;Location: Foot; Location: Leg   Patient's Stated Pain Goal No pain   Hospital Pain Intervention(s) Repositioned; Ambulation/increased activity; Emotional support; Rest   Multiple Pain Sites No   Restrictions/Precautions   Weight Bearing Precautions Per Order No   Other Precautions Chair Alarm; Bed Alarm;Multiple lines;Telemetry;O2;Fall Risk;Pain  (12L Midflow O2 at start of session, on 15L Midflow O2 at end of evaluation)   Home Living   Type of [address] [place] [address] Two level;1/2 bath on main level;Performs ADLs on one level; Able to live on main level with bedroom/bathroom;Stairs to enter without rails  (3 [address])   Bathroom Shower/Tub   (Sponge bathing)   [address]   Additional Comments Pt lives with sister in a one level house with 1 [address], however pt reports she has been staying with her dtr and KIRSTIN for a few weeks. Pt's dtr lives in a two level house with 3 [address]. Pt stays on 1st floor. (+) home alone while family works   Prior Function   Level of [place] Independent with ADLs; Independent with functional mobility; Needs assistance with IADLS   Lives With Daughter  (and son in [employer] work)   Receives Help From Family   IADLs Independent with medication management; Family/Friend/Other provides transportation; Family/Friend/Other provides meals   Falls in the last 6 months 1 to 4  (2)   Vocational Retired   Comments At baseline, pt reports I w/ ADLs and functional transfers/mobility w/ use of RW. Family assists w/ IADLs. (-) . (+) falls PTA. Lifestyle   Autonomy At baseline, pt reports I w/ ADLs and functional transfers/mobility w/ use of RW. Family assists w/ IADLs. (-) . (+) falls PTA.    Reciprocal Relationships Dtr, KIRSTIN   Service to Others Retired   ADL   Where Assessed Edge of bed   Eating Assistance 7  Independent   Grooming Assistance 5  [address] [address] 5  [address] 3  Moderate Assistance   [address] 5 Supervision/Setup   LB Dressing Assistance 3  Moderate Assistance   Toileting Assistance  3  Moderate Assistance   Functional Assistance 3  Moderate Assistance   Bed Mobility   Supine to Sit 4  Minimal assistance   Additional items Assist x 1;HOB elevated; Bedrails; Increased time required;Verbal cues   Sit to Supine 4  Minimal assistance   Additional items Assist x 1; Increased time required;Verbal cues;LE management   Additional Comments SpO2 upon return to supine: 70% on 12L Midflow O2, increasing to 85-87% on 15L Midflow O2. RN aware and present. Pt lying supine with bed alarm activated at end of session. Call bell and phone within reach. All needs met and pt reports no further questions for OT at this time. Transfers   Sit to Stand Unable to assess   Stand to Sit Unable to assess   Additional Comments OOB deferred 2* hypoxia   Balance   Static Sitting Fair   Dynamic Sitting Fair -   Activity Tolerance   Activity Tolerance Treatment limited secondary to medical complications (Comment); Other (Comment)  (Hypoxia)   Medical Staff Made Aware [facility], RN; Neeru Sheikh, KAI   Nurse Made Aware yes; RN present at end of evaluation   RUE Assessment   RUE Assessment WFL  (grossly WFL as observed w/ functional tasks)   LUE Assessment   LUE Assessment WFL  (grossly WFL as observed w/ functional tasks)   Hand Function   Gross Motor Coordination Functional   Fine Motor Coordination Functional   Sensation   Light Touch No apparent deficits   Proprioception   Proprioception No apparent deficits   Vision-Basic Assessment   Current Vision Wears glasses only for reading   Vision - Complex Assessment   Acuity Able to read clock/calendar on wall without difficulty; Able to read employee name badge without difficulty   Psychosocial   Psychosocial (WDL) WDL   Perception   Inattention/Neglect Appears intact   Cognition   Overall Cognitive Status WFL   Arousal/Participation Alert; Cooperative   Attention Attends with cues to redirect Orientation Level Oriented X4   Memory Decreased recall of precautions   Following Commands Follows one step commands with increased time or repetition   Assessment   Limitation Decreased ADL status; Decreased UE strength;Decreased Safe judgement during ADL;Decreased endurance;Decreased self-care trans;Decreased high-level ADLs   Prognosis Fair   Assessment Pt is a 77 y.o. female seen for OT evaluation s/p [facility] w/ A-Fib, Acute respiratory failure with hypoxia. Comorbidities affecting pt’s functional performance include a significant PMH of Bell's palsy, CA, COPD, HLD, HTN, PAD, PAF, PVD. Pt with active OT orders and activity orders for Up and OOB as tolerated. Pt lives with sister in a one level house with 1 [address], however pt reports she has been staying with her dtr and KIRSTIN for a few weeks. Pt's dtr lives in a two level house with 3 [address]. Pt stays on 1st floor. (+) home alone while family works. At baseline, pt reports I w/ ADLs and functional transfers/mobility w/ use of RW. Family assists w/ IADLs. (-) . (+) falls PTA. Upon evaluation, pt currently requires Supervision for UB ADLs, Mod A for LB ADLs, Mod A for toileting, and Min A for bed mobility (OOB deferred 2* hypoxia) 2* the following deficits impacting occupational performance: SOB, decreased strength , decreased balance, decreased activity tolerance, limited functional reach and increased pain. These impairments, as well at pt’s personal factors of: LU[address] home environment, limited home support, difficulty performing ADLs, difficulty performing transfers/mobility, fall risk , functional decline  and new O2 requirements limit pt’s ability to safely engage in all baseline areas of occupation.  Pt to continue to benefit from continued acute OT services during hospital stay to address defined deficits and to maximize level of functional independence in the following Occupational Performance areas: grooming, bathing/shower, toilet hygiene, dressing, medication management, health maintenance, functional mobility, community mobility and clothing management. From OT standpoint, recommend STR upon D/C. OT will continue to follow pt 3-5x/wk to address the following goals to  w/in 10-14 days:   Goals   Patient Goals To sleep   LTG Time Frame 10-14   Long Term Goal Please refer to LTGs listed below   Plan   Treatment Interventions ADL retraining;Functional transfer training;UE strengthening/ROM; Endurance training;Patient/family training;Equipment evaluation/education; Compensatory technique education;Continued evaluation; Activityengagement   Goal Expiration Date 23   OT Treatment Day 0   OT Frequency 3-5x/wk   Recommendation   OT Discharge Recommendation Post acute rehabilitation services   Additional Comments  The patient's raw score on the AM-PAC Daily Activity Inpatient Short Form is 16. A raw score of less than 19 suggests the patient may benefit from discharge to post-acute rehabilitation services. Please refer to the recommendation of the Occupational Therapist for safe discharge planning.    AM-PAC Daily Activity Inpatient   Lower Body Dressing 2   Bathing 2   Toileting 2   Upper Body Dressing 3   Grooming 3   Eating 4   Daily Activity Raw Score 16   Daily Activity Standardized Score (Calc for Raw Score >=11) 35.96   AM-PAC Applied Cognition Inpatient   Following a Speech/Presentation 4   Understanding Ordinary Conversation 4   Taking Medications 3   Remembering Where Things Are Placed or Put Away 4   Remembering List of 4-5 Errands 3   Taking Care of Complicated Tasks 3   Applied Cognition Raw Score 21   Applied Cognition Standardized Score 44.3       GOALS    Pt will improve activity tolerance to G for min 30 min txment sessions for increase engagement in functional tasks    Pt will complete bed mobility at a Mod I level w/ G balance/safety demonstrated to decrease caregiver assistance required     Pt will complete UB dressing/self care w/ mod I using adaptive device and DME as needed     Pt will complete LB dressing/self care w/ mod I using adaptive device and DME as needed    Pt will complete toileting w/ mod I w/ G hygiene/thoroughness using DME as needed    Pt will be attentive 100% of the time during ongoing cognitive assessment w/ G participation to assist w/ safe d/c planning/recommendations    Pt will demonstrate G carryover of pt/caregiver education and training as appropriate w/o cues w/ good tolerance to increase safety during functional tasks    Pt will demonstrate 100% carryover of energy conservation techniques t/o functional I/ADL/leisure tasks w/o cues s/p skilled education to increase endurance during functional tasks    Pt will increase BUE strength by 1MM grade via AROM/AAROM exercises to increase independence in ADLs and transfers    Pt will verbalize 3 potential fall hazards and identify appropriate compensatory techniques to decrease fall risk in home environment     Pt will tolerate continued OOB assessment and appropriate functional transfer/mobility goals will be established by OTR as applicable       DACIA Rodriguez/MELO

## 2023-08-08 NOTE — UTILIZATION REVIEW
Date: 8/9    Day 3: Has surpassed a 2nd midnight with active treatments and services, which include Cardizem drip for A fib w/ RVR, IV diuretics. Initial Clinical Review    Admission: Date/Time/Statement:   Admission Orders (From admission, onward)     Ordered        08/07/23 1750  INPATIENT ADMISSION  Once                      Orders Placed This Encounter   Procedures   • INPATIENT ADMISSION     Standing Status:   Standing     Number of Occurrences:   1     Order Specific Question:   Level of Care     Answer:   Med Surg [16]     Order Specific Question:   Estimated length of stay     Answer:   More than 2 Midnights     Order Specific Question:   Certification     Answer:   I certify that inpatient services are medically necessary for this patient for a duration of greater than two midnights. See H&P and MD Progress Notes for additional information about the patient's course of treatment. ED Arrival Information     Expected   8/7/2023     Arrival   8/7/2023 16:00    Acuity   Emergent            Means of arrival   Ambulance    Escorted by   [facility] ([address] [place] [address])    Service   Hospitalist    Admission type   Emergency            Arrival complaint   Lymphedema           Chief Complaint   Patient presents with   • Shortness of Breath     Patient coming from heart and vascular following check up. Patient with increased SOB on exertion, increased leg swelling, decreased O2 sat in the 80s       Initial Presentation: 77 y.o. female presents to the ED via EMS from OP Vascular office with c/o palpitations, has A fib w/ RVR 150s, ran out of meds 3 days ago, worsening SOB, increased BLE edema  PMH: chronic CHF on Bumex, preserved EF, tobacco use, depression/anxiety, A fib, PAD. In the ED she was hypoxic and tachypneic. She was placed on NRB mask. Labs - elevated troponins, Alk phos, BNP. Imaging - vascular congestion w/ small bilat pleural effusions.   Treated with IV calcium gluconate, IV Cardizem, IV Lopressor, IV Lasix, IV Mag. On exam no deficits noted. She did not convert to NSR. She is admitted to INPATIENT status A fib w/ RVR - Toprol XL, oral Cardizem and Cardizem drip, Cardio consult. Date: 8/8   Day 2:   Remains tachycardic today. Diminshed breath sounds. Feels fatigued. Is on 12 L [address] [facility] [address]. Has 1+ pitting edema. Started on IV Lasix 40 mg BID, scheduled nebs. Seen by wound care and wound care orders written. Tele, Cardizem drip. Seen by therapy today, recommending post d/c rehab. 8/8 Cardio Consult - Persistent A fib w/ RVR d/t med noncompliance, Acute on chronic dHF, HTN, significant weight gain, no CP, non MI troponin elevation, doubt ACS. Restart home AV rafa blockers Toprol XL, cardizem, Eliquis, IV Lasix 80 mg BID, continue BIPAP, no Echo at this time. 8/8 Pulmonary Consult -  acute hypoxemic respiratory failure secondary to pulmonary edema. This is triggered by the A-fib with RVR. Currently she is on BiPAP. Will continue diuresis, oxygen, bronchodilators, wean oxygen. COPD severe - nebs, resume Anoro at d/c. Continue Cardizem drip for A fib.       ED Triage Vitals   Temperature Pulse Respirations Blood Pressure SpO2   08/07/23 1643 08/07/23 1611 08/07/23 1611 08/07/23 1611 08/07/23 1611   97.5 °F (36.4 °C) 88 (!) 30 117/82 (!) 89 %      Temp Source Heart Rate Source Patient Position - Orthostatic VS BP Location FiO2 (%)   08/07/23 1643 08/07/23 1611 08/07/23 1700 08/07/23 1700 --   Oral Monitor Lying Right arm       Pain Score       08/07/23 2046       No Pain          Wt Readings from Last 1 Encounters:   08/08/23 54.8 kg (120 lb 13 oz)     Additional Vital Signs:   08/08/23 1153 -- -- -- -- -- 96 % -- -- Full face mask --   08/08/23 1129 -- -- -- -- -- 89 % Abnormal  15 L/min Non-rebreather mask -- --   08/08/23 1120 97 °F (36.1 °C) Abnormal  76 19 103/57 73 77 % Abnormal  15 L/min Mid flow nasal cannula -- Lying   08/08/23 0720 98.4 °F (36.9 °C) 80 20 121/81 -- 91 % 12 L/min Mid flow nasal cannula -- Lying   08/08/23 0500 -- 71 -- 114/64 -- 92 % 12 L/min Mid flow nasal cannula -- Lying   08/08/23 0331 -- 89 -- 111/63 -- 90 % 12 L/min Mid flow nasal cannula -- Lying   08/08/23 0320 -- -- -- -- -- 84 % Abnormal  10 L/min Mid flow nasal cannula -- --   08/08/23 0312 -- 91 -- 133/94 -- 92 % 10 L/min Mid flow nasal cannula -- Lying   08/08/23 0200 -- 115 Abnormal  -- 131/93 -- -- -- -- -- Lying   08/08/23 0108 -- 128 Abnormal  -- -- -- -- -- -- -- --   08/08/23 0029 -- 120 Abnormal  -- 131/99 -- 96 % 12 L/min Mid flow nasal cannula -- Lying   08/08/23 0000 -- 130 Abnormal  -- 144/94 -- 96 % 12 L/min Mid flow nasal cannula -- Lying   08/07/23 2339 -- 126 Abnormal  -- 123/91 104 -- -- -- -- Lying   08/07/23 2300 -- 130 Abnormal  -- 123/84 100 -- -- -- -- Lying   08/07/23 2150 -- 130 Abnormal  -- 128/58 103 -- -- -- -- Lying   08/07/23 2026 97.9 °F (36.6 °C) 111 Abnormal  18 123/91 103 96 % 11 L/min Mid flow nasal cannula -- Lying   08/07/23 1900 -- 127 Abnormal  24 Abnormal  170/98 114 91 % 12 L/min Mid flow nasal cannula -- Lying   08/07/23 1801 -- 123 Abnormal  20 168/112 Abnormal  -- 91 % 10 L/min Mid flow nasal cannula -- Lying   08/07/23 1730 -- 105 24 Abnormal  144/91 109 93 % 10 L/min Mid flow nasal cannula -- Lying   08/07/23 1700 -- 132 Abnormal  28 Abnormal  148/88 -- 94 % -- Mid flow nasal cannula -- Lying   08/07/23 1643 97.5 °F (36.4 °C) -- -- -- -- -- -- -- -- --   08/07/23 1615 -- 153 Abnormal  -- -- -- 93 % -- Non-rebreather mask -- --     Pertinent Labs/Diagnostic Test Results:     8/7 ECG - Atrial fibrillation with rapid ventricular response with premature ventricular or aberrantly conducted complexes  Right axis deviation  Right ventricular hypertrophy  Septal infarct (cited on or before 23-JUL-2023)  Abnormal ECG  8/7 ecg - Atrial fibrillation with rapid ventricular response with premature ventricular or aberrantly conducted complexes  Right axis deviation  Right ventricular hypertrophy  Septal infarct (cited on or before 23-JUL-2023)  Abnormal ECG    XR chest 1 view portable   Final Result by Esther Swenson MD (08/08 2312)      Vascular congestion with small bilateral pleural effusions.       BLE arterial duplex RESULTS PENDING               Results from last 7 days   Lab Units 08/07/23  1631 08/03/23  1425   WBC Thousand/uL 7.58 6.52   HEMOGLOBIN g/dL 16.1* 15.8*   HEMATOCRIT % 49.6* 49.7*   PLATELETS Thousands/uL 251 242   NEUTROS ABS Thousands/µL 5.78  --          Results from last 7 days   Lab Units 08/07/23  1631 08/03/23  1425   SODIUM mmol/L 131* 136   POTASSIUM mmol/L 3.7 4.9   CHLORIDE mmol/L 92* 104   CO2 mmol/L 25 22   ANION GAP mmol/L 14 10   BUN mg/dL 17 17   CREATININE mg/dL 1.49* 1.73*   EGFR ml/min/1.73sq m 35 29   CALCIUM mg/dL 9.5 9.6   MAGNESIUM mg/dL 1.7* 2.1   PHOSPHORUS mg/dL  --  3.1     Results from last 7 days   Lab Units 08/07/23  1631 08/03/23  1425   AST U/L 17 16   ALT U/L 14 18   ALK PHOS U/L 146* 168*   TOTAL PROTEIN g/dL 7.1 6.9   ALBUMIN g/dL 4.0 3.3*   TOTAL BILIRUBIN mg/dL 1.23* 0.87   BILIRUBIN DIRECT mg/dL 0.44*  --          Results from last 7 days   Lab Units 08/07/23  1631 08/03/23  1425   GLUCOSE RANDOM mg/dL 84 121      Results from last 7 days   Lab Units 08/08/23  1153   PH ART  7.434   PCO2 ART mm Hg 33.0*   PO2 ART mm Hg 56.6*   HCO3 ART mmol/L 21.6*   BASE EXC ART mmol/L -1.8   O2 CONTENT ART mL/dL 17.4   O2 HGB, ARTERIAL % 86.6*   ABG SOURCE  Radial, Right                 Results from last 7 days   Lab Units 08/07/23  2101 08/07/23  1848 08/07/23  1631   HS TNI 0HR ng/L  --   --  61*   HS TNI 2HR ng/L  --  61*  --    HSTNI D2 ng/L  --  0  --    HS TNI 4HR ng/L 64*  --   --    HSTNI D4 ng/L 3  --   --      Results from last 7 days   Lab Units 08/07/23  1631   BNP pg/mL 2,205*     ED Treatment:   Medication Administration from 08/07/2023 1500 to 08/07/2023 2021       Date/Time Order Dose Route Action     08/07/2023 1639 EDT calcium gluconate 2 g in sodium chloride 0.9% 100 mL (premix) 2 g Intravenous New Bag     08/07/2023 1633 EDT diltiazem (CARDIZEM) injection 10 mg 10 mg Intravenous Given     08/07/2023 1701 EDT metoprolol (LOPRESSOR) injection 5 mg 5 mg Intravenous Given     08/07/2023 1804 EDT furosemide (LASIX) injection 40 mg 40 mg Intravenous Given     08/07/2023 1810 EDT magnesium sulfate 2 g/50 mL IVPB (premix) 2 g 2 g Intravenous New Bag        Past Medical History:   Diagnosis Date   • Bell's palsy    • Cancer ([address])     lymphoma   • Closed left hip fracture ([address]) 09/28/2021   • COPD (chronic obstructive pulmonary disease) (HCC)    • Diffuse large B cell lymphoma (HCC)    • Hyperlipidemia    • Hypertension    • PAD (peripheral artery disease) (HCC)    • PAF (paroxysmal atrial fibrillation) (HCC)    • PVD (peripheral vascular disease) ([facility])    • Sacral fracture (HCC) 06/25/2023     Present on Admission:  • Tobacco dependency  • Depression with anxiety  • Acute on chronic heart failure with preserved ejection fraction (HCC)  • Acute respiratory failure with hypoxia (HCC)  • COPD (chronic obstructive pulmonary disease) (HCC)  • Essential hypertension  • Hyponatremia  • Peripheral vascular disease (HCC)  • Stage 3a chronic kidney disease (HCC)      Admitting Diagnosis: Atrial fibrillation (HCC) [I48.91]  Respiratory failure (HCC) [J96.90]  SOB (shortness of breath) [R06.02]  Tachycardia [R00.0]  Congestive heart failure (CHF) ([address]) [I50.9]  Age/Sex: 77 y.o. female  Admission Orders:  Scheduled Medications:  ALPRAZolam, 0.25 mg, Oral, BID  apixaban, 5 mg, Oral, BID  atorvastatin, 40 mg, Oral, Daily  cholecalciferol, 2,000 Units, Oral, Daily  diltiazem, 300 mg, Oral, Daily  furosemide, 80 mg, Intravenous, BID (diuretic)  ipratropium, 0.5 mg, Nebulization, TID  levalbuterol, 0.63 mg, Nebulization, TID  [START ON 8/9/2023] metoprolol succinate, 75 mg, Oral, Daily  mirtazapine, 7.5 mg, Oral, HS  nicotine, 1 patch, Transdermal, Daily  nystatin, , Topical, BID  potassium chloride, 20 mEq, Oral, BID  senna-docusate sodium, 1 tablet, Oral, Daily      Continuous IV Infusions:  diltiazem, 1-15 mg/hr, Intravenous, Titrated      PRN Meds:     Tele  Continuous pulse ox  Daily wt  Wound care consult. IP CONSULT TO CARDIOLOGY  IP CONSULT TO PULMONOLOGY    Network Utilization Review Department  ATTENTION: Please call with any questions or concerns to [phone] and carefully listen to the prompts so that you are directed to the right person. All voicemails are confidential.  Bernadette Esters all requests for admission clinical reviews, approved or denied determinations and any other requests to dedicated fax number below belonging to the campus where the patient is receiving treatment.  List of dedicated fax numbers for the Facilities:  [place] DENIALS (Administrative/Medical Necessity) [phone] [address] [facility] (Maternity/NICU/Pediatrics) [phone] [address] [phone]   [facility] [address] [facility] [phone]   [address] [place] [address] [id number] [place] [address] [phone]   [address] [facility] [address] [address] [facility] [address] [phone]

## 2023-08-08 NOTE — CONSULTS
Consultation - Pulmonary Medicine   Liliana Angulo 77 y.o. female MRN: [id number]  Unit/Bed#: E4 -01 Encounter: [id number]      Assessment/Plan:    1. Acute hypoxic respiratory failure  - in the setting of heart failure and fluid overload. CXR on admission revealed pul congestion. No leukocytosis and no signs of infection. Monitor off abx  - currently under Bipap support, FiO2 100%, 14/6 RR 16. Sat 94%. ABG before Bipap showed hypoxia but not hypercapnia. - will keep Bipap support for now. And slowly taper FiO2 first. Keep SpO2 > 88%  - will need to check ambulatory oxygenation upon discharge to determine whether needs oxygen at home    2. Severe COPD without acute exacerbation  - home regimen: Anoro 1 puff daily. Recent PFT showed FEV1 44% in 10/2022  - continue atrovent-xopenex TID. Can resume anoro upon discharge. 3. Atrial fibrilation/congestive heart failure exacerbation  - presents with AfRVR due to medication non compliance. Now on cardizem drip  - recent ECHO reviewed (7/2023): EF 65% with dilated RV and moderate to severe TR with PASP 77mmHg. - continue treatment as per cardio. Agree for more aggressive diuresis    4. Tobacco use  - hx of smoking 20 cigarettes per day for 40+ years. Recently quit for about 1 week due to worsening shortness of breath  - continue monitor        History of Present Illness   Physician Requesting Consult: Gonzalo Espana MD  Reason for Consult / Principal Problem: hypoxia  Hx and PE limited by: none  Chief Complaint: shortness of breath  HPI: Liliana Angulo is a 77 y.o. female, with hx of pAf on AC, tobacco use, COPD, hx of pulmonary nodules, diffuse large B cell lymphoma s/p chemotherapy in 2018,2019 (in remission, not underwent RT) and CKD, who presented to [address] [place] [address] with complaints of shortness of breath and palpitations.  She denies fever/productive cough, prior to admission, but noted to have progressive shortness of breath for the last 7 days as well as decrease urine output. She also ran out of her home BP medication prior to the ER visit. She was found to be in [place] as well as hypoxia that required 10L of HFNC support. Her CXR showed tiffany pul infiltrate on admission. At baseline she does not require oxygen at home. She was treated with diltiazem drip during hospitalization, as well as aggressive diuretics. However she had worsening work of breathing and desaturating into 80% under HFNC earlier today. We were consulted for acute hypoxic resp failure. Of note, patient follows with pulmonary office in 2022 due to multiple pulmonary GGOs. Bronchoscope/BAL  was performed and grows streoptococcus pneumoniae and no malignant cell. She had hx of COPD and is maintained on Anoro Ellipta 62.5/25mcg daily. Recent PFT showed FEV1/FVC 65% FEV1 44% in 10/2022. Upon evaluation, patient was breathing comfortably under Bipap 100% 14/6 RR 16 support. She was saturating 95%. She was not in stress. She felt her overall breathing has been improving. No cough or fever noted. Inpatient consult to Pulmonology     Performed by  Cole Plascencia MD   Authorized by Abel Banks PA-C             Review of Systems   Constitutional: Positive for activity change and fatigue. Negative for diaphoresis and fever. Respiratory: Positive for shortness of breath. Negative for wheezing. Cardiovascular: Positive for palpitations and leg swelling. Negative for chest pain. Gastrointestinal: Negative for abdominal pain, constipation, diarrhea and vomiting. Genitourinary: Positive for decreased urine volume. Negative for frequency. Musculoskeletal: Negative for back pain. Skin: Negative for pallor. Neurological: Negative for seizures, weakness and headaches. Psychiatric/Behavioral: Negative for confusion.        Historical Information   Past Medical History:   Diagnosis Date   • Bell's palsy    • Cancer [facility])     lymphoma   • Closed left hip fracture ([address]) 09/28/2021   • COPD (chronic obstructive pulmonary disease) (HCC)    • Diffuse large B cell lymphoma (HCC)    • Hyperlipidemia    • Hypertension    • PAD (peripheral artery disease) (HCC)    • PAF (paroxysmal atrial fibrillation) ([address])    • PVD (peripheral vascular disease) ([address])    • Sacral fracture ([address]) 06/25/2023     Past Surgical History:   Procedure Laterality Date   • ARTERIOGRAM Left 12/6/2018    Procedure: LEFT lower extremity angiography, with Left lower extremity run-off, stent and angioplasty of Left Common Femoral Artery (Left Brachial Access); Surgeon: Ivis Pedersen MD;  Location:  MAIN OR;  Service: Vascular   • CARDIAC SURGERY     • CARPAL TUNNEL RELEASE     • CT NEEDLE BIOPSY LUNG  10/8/2019   • HYSTERECTOMY     • IR AORTAGRAM WITH RUN-OFF  12/6/2018   • OOPHORECTOMY     • WI OPTX FEM SHFT FX W/INSJ IMED IMPLT W/WO SCREW Left 9/29/2021    Procedure: INSERTION NAIL IM FEMUR ANTEGRADE (TROCHANTERIC);   Surgeon: Miladys Crane DO;  Location: [address] [place] [address] MAIN OR;  Service: Orthopedics   • TONSILLECTOMY       Social History   Social History     Substance and Sexual Activity   Alcohol Use Yes   • Alcohol/week: 4.0 standard drinks of alcohol   • Types: 4 Cans of beer per week    Comment: 4 CANS OF BEER DAILY     Social History     Substance and Sexual Activity   Drug Use Yes   • Frequency: 1.0 times per week   • Types: Marijuana    Comment: medical marijuana 10/7/19     Social History     Tobacco Use   Smoking Status Every Day   • Packs/day: 0.25   • Years: 53.00   • Total pack years: 13.25   • Types: Cigarettes   • Start date: 1/1/1969   Smokeless Tobacco Never     E-Cigarette/Vaping   • E-Cigarette Use Never User      E-Cigarette/Vaping Substances   • Nicotine No    • THC No    • CBD No    • Flavoring No    • Other No    • Unknown No      Occupational History: non contributary    Family History: non-contributory    Meds/Allergies   all current active meds have been reviewed    Allergies   Allergen Reactions • Oxycodone-Acetaminophen Itching and Rash     Itching, rash       Objective   Vitals: Blood pressure 103/57, pulse 76, temperature (!) 97 °F (36.1 °C), temperature source Temporal, resp. rate 19, height 5' 2" (1.575 m), weight 54.8 kg (120 lb 13 oz), SpO2 96 %. on BIpap support,Body mass index is 22.1 kg/m². Intake/Output Summary (Last 24 hours) at 8/8/2023 1322  Last data filed at 8/7/2023 1757  Gross per 24 hour   Intake 100 ml   Output --   Net 100 ml     Invasive Devices     Peripheral Intravenous Line  Duration           Peripheral IV 08/07/23 Left;Ventral (anterior) Forearm <1 day          Drain  Duration           External Urinary Catheter 26 days                Physical Exam  Constitutional:       General: She is not in acute distress. Appearance: She is ill-appearing. Cardiovascular:      Rate and Rhythm: Normal rate. Rhythm irregular. Pulmonary:      Effort: Respiratory distress (mild) present. Breath sounds: Rales present. No decreased breath sounds or wheezing. Musculoskeletal:      Right lower leg: Edema present. Left lower leg: Edema present. Lab Results: I have personally reviewed pertinent lab results. Imaging Studies: I have personally reviewed pertinent reports. and I have personally reviewed pertinent films in PACS       EKG, Pathology, and Other Studies: I have personally reviewed pertinent reports. Pulmonary Results (PFTs, PSG): I have personally reviewed pertinent reports. VTE Prophylaxis: On eliquis    Code Status: Level 1 - Full Code        Portions of the record may have been created with voice recognition software. Occasional wrong word or "sound a like" substitutions may have occurred due to the inherent limitations of voice recognition software. Read the chart carefully and recognize, using context, where substitutions have occurred.

## 2023-08-08 NOTE — PLAN OF CARE
Problem: OCCUPATIONAL THERAPY ADULT  Goal: Performs self-care activities at highest level of function for planned discharge setting. See evaluation for individualized goals. Description: Treatment Interventions: ADL retraining, Functional transfer training, UE strengthening/ROM, Endurance training, Patient/family training, Equipment evaluation/education, Compensatory technique education, Continued evaluation, Activityengagement          See flowsheet documentation for full assessment, interventions and recommendations. Note: Limitation: Decreased ADL status, Decreased UE strength, Decreased Safe judgement during ADL, Decreased endurance, Decreased self-care trans, Decreased high-level ADLs  Prognosis: Fair  Assessment: Pt is a 77 y.o. female seen for OT evaluation s/p adm to [facility] on 8/7/2023 w/ A-Fib, Acute respiratory failure with hypoxia. Comorbidities affecting pt’s functional performance include a significant PMH of Bell's palsy, CA, COPD, HLD, HTN, PAD, PAF, PVD. Pt with active OT orders and activity orders for Up and OOB as tolerated. Pt lives with sister in a one level house with 1 LUIS FERNANDO, however pt reports she has been staying with her dtr and KIRSTIN for a few weeks. Pt's dtr lives in a two level house with 3 [address]. Pt stays on 1st floor. (+) home alone while family works. At baseline, pt reports I w/ ADLs and functional transfers/mobility w/ use of RW. Family assists w/ IADLs. (-) . (+) falls PTA. Upon evaluation, pt currently requires Supervision for UB ADLs, Mod A for LB ADLs, Mod A for toileting, and Min A for bed mobility (OOB deferred 2* hypoxia) 2* the following deficits impacting occupational performance: SOB, decreased strength , decreased balance, decreased activity tolerance, limited functional reach and increased pain.  These impairments, as well at pt’s personal factors of: LUIS FERNANDO home environment, limited home support, difficulty performing ADLs, difficulty performing transfers/mobility, fall risk , functional decline  and new O2 requirements limit pt’s ability to safely engage in all baseline areas of occupation. Pt to continue to benefit from continued acute OT services during hospital stay to address defined deficits and to maximize level of functional independence in the following Occupational Performance areas: grooming, bathing/shower, toilet hygiene, dressing, medication management, health maintenance, functional mobility, community mobility and clothing management. From OT standpoint, recommend STR upon D/C.  OT will continue to follow pt 3-5x/wk to address the following goals to  w/in 10-14 days:     OT Discharge Recommendation: Post acute rehabilitation services

## 2023-08-09 NOTE — PROGRESS NOTES
Cardiology - Progress Note  Malina Finley 77 y.o. female MRN: [id number]  Unit/Bed#: E4 -01 Encounter: [id number]  08/09/23  9:53 AM    Encounter date: 8/9  Patient name: Malina Thornton y.o. female  Encounter providers/authors: Dr. Abreu Lab Student: Yeimy Escobar  Attending Physician: Dr. Tamara Ferguson attending physician: Dr. Anuel Gannon  Primary care provider: Anne Marie Marcum MD  Date of admission: 8/7  Length of stay: 2 days      Assessment/ Plan:  • Persistent atrial fibrillation on Eliquis with RVR due to medication noncompliance  • Acute on chronic diastolic heart failure  ? LVEF 65%, moderate LVH, probable diastolic dysfunction with diastolic staging precluded by the presence of arrhythmia, RV dilatation with moderately reduced function, biatrial dilatation, AV sclerosis, mild MAC, moderate to severe TR with PASP 77 mmHg, July 2023  • Hypertension  • PVD  • CKD stage III  • COPD  • Tobacco use  • Depression/anxiety     PLAN:  Weights improved, but work of breathing has increased  Required high flow nasal cannula, but due to significant respiratory distress was placed on BiPAP  Heart rates are better controlled on home AV rafa blockers  Continue rate control with Toprol-XL 75 mg daily and diltiazem 300 mg daily  Continue Eliquis for thromboembolic prophylaxis  Give additional dose of Lasix 60 mg IV x 1 and start Lasix drip at 20 mg/h  Aggressive repletion of potassium with IV dosing and additional oral dosing now; keep potassium greater than 4 magnesium greater than 2  Management of pulmonary disease as per critical care  Poor prognosis    -    HPI: She is still requiring high level oxygen with high flow nasal cannula. Denies chest pain, pressure, tightness or squeezing. Denies lightheadedness, dizziness or palpitations. Admits to improving lower extremity swelling. Denies orthopnea or paroxysmal nocturnal dyspnea. Required straight cath overnight.     Review of Systems Constitutional: Negative for decreased appetite, fever, weight gain and weight loss. HENT: Negative for congestion and sore throat. Eyes: Negative for visual disturbance. Cardiovascular: Positive for dyspnea on exertion. Negative for chest pain, leg swelling, near-syncope and palpitations. Respiratory: Positive for shortness of breath. Negative for cough. Hematologic/Lymphatic: Negative for bleeding problem. Skin: Negative for rash. Musculoskeletal: Negative for myalgias and neck pain. Gastrointestinal: Negative for abdominal pain and nausea. Neurological: Negative for light-headedness and weakness. Psychiatric/Behavioral: Negative for depression. Objective   Vitals: Blood pressure 120/84, pulse 70, temperature 97.7 °F (36.5 °C), temperature source Temporal, resp. rate 18, height 5' 2" (1.575 m), weight 54.8 kg (120 lb 13 oz), SpO2 93 %. , Body mass index is 22.1 kg/m².,   Orthostatic Blood Pressures    Flowsheet Row Most Recent Value   Blood Pressure 120/84 filed at 08/09/2023 0810   Patient Position - Orthostatic VS Lying filed at 08/09/2023 6018          Systolic (25OGA), OSS:548 , Min:102 , AXC:400     Diastolic (18FJR), GBX:40, Min:57, Max:84        Intake/Output Summary (Last 24 hours) at 8/9/2023 2509  Last data filed at 8/9/2023 8280  Gross per 24 hour   Intake 269.83 ml   Output 1106 ml   Net -836.17 ml       Invasive Devices     Peripheral Intravenous Line  Duration           Peripheral IV 08/07/23 Left;Ventral (anterior) Forearm 1 day          Drain  Duration           External Urinary Catheter 27 days                  Physical Exam:  Gen: Awake, Alert, NAD  Head/eyes: AT/NC, pupils equal and round, Anicteric  ENT: mmm  Neck: Supple, +JVP, trachea midline  Resp: Decreased breath sounds bilaterally with bibasilar rales  CV: irreg irreg+S1, S2, No m/r/g  Abd: Soft, NT/ND + BS  Ext: trace pitting LE edema bilaterally  Neuro:  Follows commands, moves all extermities  Psych: blunted affect, normal mood, pleasant attitude, non-combative  Skin: warm; no rash, erythema or venous stasis changes on exposed skin        Telemetry:   AF w/ CVR    Lab Results:       CBC with diff:   Results from last 7 days   Lab Units 08/09/23  0611 08/07/23  1631 08/03/23  1425   WBC Thousand/uL 8.84 7.58 6.52   HEMOGLOBIN g/dL 15.0 16.1* 15.8*   HEMATOCRIT % 47.1* 49.6* 49.7*   MCV fL 88 89 90   PLATELETS Thousands/uL 219 251 242   RBC Million/uL 5.37* 5.59* 5.51*   MCH pg 27.9 28.8 28.7   MCHC g/dL 31.8 32.5 31.8   RDW % 17.2* 17.3* 18.1*   MPV fL 10.4 10.8 11.9   NRBC AUTO /100 WBCs  --  0  --        CMP:   Results from last 7 days   Lab Units 08/09/23  0611 08/07/23  1631 08/03/23  1425   POTASSIUM mmol/L 2.6* 3.7 4.9   CHLORIDE mmol/L 93* 92* 104   CO2 mmol/L 25 25 22   BUN mg/dL 21 17 17   CREATININE mg/dL 1.49* 1.49* 1.73*   CALCIUM mg/dL 8.7 9.5 9.6   AST U/L  --  17 16   ALT U/L  --  14 18   ALK PHOS U/L  --  146* 168*   EGFR ml/min/1.73sq m 35 35 29       Krista Helton  MS4      Signed: Tien De La Fuente DO, FACMULU, CHELSEA HERRMANN

## 2023-08-09 NOTE — PLAN OF CARE
Problem: MOBILITY - ADULT  Goal: Maintain or return to baseline ADL function  Description: INTERVENTIONS:  -  Assess patient's ability to carry out ADLs; assess patient's baseline for ADL function and identify physical deficits which impact ability to perform ADLs (bathing, care of mouth/teeth, toileting, grooming, dressing, etc.)  - Assess/evaluate cause of self-care deficits   - Assess range of motion  - Assess patient's mobility; develop plan if impaired  - Assess patient's need for assistive devices and provide as appropriate  - Encourage maximum independence but intervene and supervise when necessary  - Involve family in performance of ADLs  - Assess for home care needs following discharge   - Consider OT consult to assist with ADL evaluation and planning for discharge  - Provide patient education as appropriate  Outcome: Progressing  Goal: Maintains/Returns to pre admission functional level  Description: INTERVENTIONS:  - Perform BMAT or MOVE assessment daily.   - Set and communicate daily mobility goal to care team and patient/family/caregiver.    - Collaborate with rehabilitation services on mobility goals if consulted    - Out of bed for toileting  - Record patient progress and toleration of activity level   Outcome: Progressing     Problem: PAIN - ADULT  Goal: Verbalizes/displays adequate comfort level or baseline comfort level  Description: Interventions:  - Encourage patient to monitor pain and request assistance  - Assess pain using appropriate pain scale  - Administer analgesics based on type and severity of pain and evaluate response  - Implement non-pharmacological measures as appropriate and evaluate response  - Consider cultural and social influences on pain and pain management  - Notify physician/advanced practitioner if interventions unsuccessful or patient reports new pain  Outcome: Progressing     Problem: INFECTION - ADULT  Goal: Absence or prevention of progression during hospitalization  Description: INTERVENTIONS:  - Assess and monitor for signs and symptoms of infection  - Monitor lab/diagnostic results  - Monitor all insertion sites, i.e. indwelling lines, tubes, and drains  - Monitor endotracheal if appropriate and nasal secretions for changes in amount and color  - [place] appropriate cooling/warming therapies per order  - Administer medications as ordered  - Instruct and encourage patient and family to use good hand hygiene technique  - Identify and instruct in appropriate isolation precautions for identified infection/condition  Outcome: Progressing  Goal: Absence of fever/infection during neutropenic period  Description: INTERVENTIONS:  - Monitor WBC    Outcome: Progressing     Problem: SAFETY ADULT  Goal: Maintain or return to baseline ADL function  Description: INTERVENTIONS:  -  Assess patient's ability to carry out ADLs; assess patient's baseline for ADL function and identify physical deficits which impact ability to perform ADLs (bathing, care of mouth/teeth, toileting, grooming, dressing, etc.)  - Assess/evaluate cause of self-care deficits   - Assess range of motion  - Assess patient's mobility; develop plan if impaired  - Assess patient's need for assistive devices and provide as appropriate  - Encourage maximum independence but intervene and supervise when necessary  - Involve family in performance of ADLs  - Assess for home care needs following discharge   - Consider OT consult to assist with ADL evaluation and planning for discharge  - Provide patient education as appropriate  Outcome: Progressing  Goal: Maintains/Returns to pre admission functional level  Description: INTERVENTIONS:  - Perform BMAT or MOVE assessment daily.   - Set and communicate daily mobility goal to care team and patient/family/caregiver.    - Collaborate with rehabilitation services on mobility goals if consulted    - Out of bed for toileting  - Record patient progress and toleration of activity level   Outcome: Progressing  Goal: Patient will remain free of falls  Description: INTERVENTIONS:  - Educate patient/family on patient safety including physical limitations  - Instruct patient to call for assistance with activity   - Consult OT/PT to assist with strengthening/mobility   - Keep Call bell within reach  - Keep bed low and locked with side rails adjusted as appropriate  - Keep care items and personal belongings within reach  - Initiate and maintain comfort rounds    - Apply yellow socks and bracelet for high fall risk patients  - Consider moving patient to room near nurses station  Outcome: Progressing     Problem: DISCHARGE PLANNING  Goal: Discharge to home or other facility with appropriate resources  Description: INTERVENTIONS:  - Identify barriers to discharge w/patient and caregiver  - Arrange for needed discharge resources and transportation as appropriate  - Identify discharge learning needs (meds, wound care, etc.)  - Arrange for interpretive services to assist at discharge as needed  - Refer to Case Management Department for coordinating discharge planning if the patient needs post-hospital services based on physician/advanced practitioner order or complex needs related to functional status, cognitive ability, or social support system  Outcome: Progressing     Problem: Knowledge Deficit  Goal: Patient/family/caregiver demonstrates understanding of disease process, treatment plan, medications, and discharge instructions  Description: Complete learning assessment and assess knowledge base.   Interventions:  - Provide teaching at level of understanding  - Provide teaching via preferred learning methods  Outcome: Progressing     Problem: Prexisting or High Potential for Compromised Skin Integrity  Goal: Skin integrity is maintained or improved  Description: INTERVENTIONS:  - Identify patients at risk for skin breakdown  - Assess and monitor skin integrity  - Assess and monitor nutrition and hydration status  - Monitor labs   - Assess for incontinence   - Turn and reposition patient  - Assist with mobility/ambulation  - Relieve pressure over bony prominences  - Avoid friction and shearing  - Provide appropriate hygiene as needed including keeping skin clean and dry  - Evaluate need for skin moisturizer/barrier cream  - Collaborate with interdisciplinary team   - Patient/family teaching  - Consider wound care consult   Outcome: Progressing

## 2023-08-09 NOTE — ASSESSMENT & PLAN NOTE
· Patient with history of chronic atrial fibrillation, presents with rapid ventricular response  · Maintained outpatient on Cardizem 300 mg daily and Toprol-XL 75 mg daily-recently ran out of medications, likely contributing to her A-fib with RVR presentation  · Continue anticoagulation with Eliquis  · Continue metoprolol and Cardizem  · Required resumption of Cardizem infusion overnight  · Continue telemetry monitoring  · Cardiology following

## 2023-08-09 NOTE — PROGRESS NOTES
[facility]  Progress Note  Name: Ximena Kraus  MRN: [id number]  Unit/Bed#: E4 -01 I Date of Admission: 8/7/2023   Date of Service: 8/9/2023 I Hospital Day: 2    Assessment/Plan   * Acute respiratory failure with hypoxia [facility])  Assessment & Plan  · Patient sent in by outpatient vascular surgery office for hypoxia, was found to be hypoxic to 80% in the outpatient office  · Patient required BiPAP yesterday, currently refusing, remains on high flow  · Likely secondary to acute CHF exacerbation due to untreated A-fib with RVR  · Continue IV diuretics, unable to give this morning's dose due to potassium of 2.6  · Continue scheduled nebulizers  · Monitor intake and output with daily weights  · Treat A-fib with RVR with metoprolol and Cardizem  · Cardiology and pulmonology following    Acute on chronic heart failure with preserved ejection fraction (HCC)  Assessment & Plan  Wt Readings from Last 3 Encounters:   08/08/23 54.8 kg (120 lb 13 oz)   07/20/23 52.2 kg (115 lb)   07/17/23 50.6 kg (111 lb 8.8 oz)     · Patient presents with acute on chronic CHF  · Weight improved to 120 lbs today  · Maintain outpatient on Bumex 1 mg daily  · Continue Lasix 80 mg IV twice daily once potassium improved  · Monitor intake and output  · Standing weights  · Cardiology following    COPD (chronic obstructive pulmonary disease) ([address])  Assessment & Plan  · History of COPD with mild wheezing on exam  · Continue scheduled nebulizers    Stage 3a chronic kidney disease [facility])  Assessment & Plan  Lab Results   Component Value Date    EGFR 35 08/09/2023    EGFR 35 08/07/2023    EGFR 29 08/03/2023    CREATININE 1.49 (H) 08/09/2023    CREATININE 1.49 (H) 08/07/2023    CREATININE 1.73 (H) 08/03/2023     · Creatinine stable at 1.49  · Monitor with diuretics    A-fib ([address])  Assessment & Plan  · Patient with history of chronic atrial fibrillation, presents with rapid ventricular response  · Maintained outpatient on Cardizem 300 mg daily and Toprol-XL 75 mg daily-recently ran out of medications, likely contributing to her A-fib with RVR presentation  · Continue anticoagulation with Eliquis  · Continue metoprolol and Cardizem  · Required resumption of Cardizem infusion overnight  · Continue telemetry monitoring  · Cardiology following    Peripheral vascular disease ([address])  Assessment & Plan  · Seen by outpatient vascular surgery office yesterday  · No indication for intervention at this time  · Continue anticoagulation with Eliquis and statin    Tobacco dependency  Assessment & Plan  · Will add nicotine patch    Hyponatremia  Assessment & Plan  · Sodium 129 today  · Likely hypervolemic hyponatremia versus SIADH due to smoking  · Continue monitoring with IV diuretics    Hypokalemia  Assessment & Plan  · Potassium 2.6 this morning  · Secondary to diuresis  · Replete orally and IV  · Hold morning dose of diuretics  · Recheck this afternoon  · Magnesium WNL    Essential hypertension  Assessment & Plan  · BP controlled during admission  · Continue Cardizem, metoprolol and Lasix    Depression with anxiety  Assessment & Plan  · Continue Xanax and Remeron               VTE Pharmacologic Prophylaxis: VTE Score: 3 Moderate Risk (Score 3-4) - Pharmacological DVT Prophylaxis Ordered: apixaban (Eliquis). Patient Centered Rounds: I performed bedside rounds with nursing staff today. Discussions with Specialists or Other Care Team Provider: None    Education and Discussions with Family / Patient: Attempted to update  (daughter) via phone. Left voicemail.      Total Time Spent on Date of Encounter in care of patient: 45 minutes This time was spent on one or more of the following: performing physical exam; counseling and coordination of care; obtaining or reviewing history; documenting in the medical record; reviewing/ordering tests, medications or procedures; communicating with other healthcare professionals and discussing with patient's family/caregivers. Current Length of Stay: 2 day(s)  Current Patient Status: Inpatient   Certification Statement: The patient will continue to require additional inpatient hospital stay due to Acute respiratory failure with hypoxia requiring IV diuretics and supplemental oxygen  Discharge Plan: Anticipate discharge in >72 hrs to rehab facility. Code Status: Level 1 - Full Code    Subjective:   Patient reports she feels about the same as yesterday. Still with shortness of breath. Reports inability to tolerate BiPAP as it puts too much pressure on the back of her throat. Reports the high flow keeps falling off of her face. Did require straight cath overnight but notes she has been able to urinate since then. Objective:     Vitals:   Temp (24hrs), Av.4 °F (36.3 °C), Min:96.4 °F (35.8 °C), Max:98.9 °F (37.2 °C)    Temp:  [96.4 °F (35.8 °C)-98.9 °F (37.2 °C)] 97.7 °F (36.5 °C)  HR:  [62-88] 88  Resp:  [18-20] 18  BP: (102-122)/(57-84) 120/84  SpO2:  [77 %-96 %] 92 %  Body mass index is 22.1 kg/m². Input and Output Summary (last 24 hours): Intake/Output Summary (Last 24 hours) at 2023 0832  Last data filed at 2023 0817  Gross per 24 hour   Intake 269.83 ml   Output 1106 ml   Net -836.17 ml       Physical Exam:   Physical Exam  Vitals reviewed. Constitutional:       Appearance: She is ill-appearing. Comments: High flow nasal cannula   HENT:      Head: Normocephalic and atraumatic. Eyes:      General: No scleral icterus. Conjunctiva/sclera: Conjunctivae normal.   Cardiovascular:      Rate and Rhythm: Normal rate. Rhythm irregular. Heart sounds: No murmur heard. Pulmonary:      Breath sounds: Wheezing (Mild end expiratory) and rales present. Comments: Decreased throughout  Abdominal:      General: Bowel sounds are normal. There is no distension. Palpations: Abdomen is soft. Tenderness: There is no abdominal tenderness.    Musculoskeletal: Cervical back: Neck supple. Right lower leg: No edema. Left lower leg: No edema. Skin:     General: Skin is warm and dry. Neurological:      Mental Status: She is alert and oriented to person, place, and time. Psychiatric:         Mood and Affect: Mood normal.         Behavior: Behavior normal.          Additional Data:     Labs:  Results from last 7 days   Lab Units 08/09/23  0611 08/07/23  1631   WBC Thousand/uL 8.84 7.58   HEMOGLOBIN g/dL 15.0 16.1*   HEMATOCRIT % 47.1* 49.6*   PLATELETS Thousands/uL 219 251   NEUTROS PCT %  --  76*   LYMPHS PCT %  --  12*   MONOS PCT %  --  11   EOS PCT %  --  0     Results from last 7 days   Lab Units 08/09/23  0611 08/07/23  1631   SODIUM mmol/L 129* 131*   POTASSIUM mmol/L 2.6* 3.7   CHLORIDE mmol/L 93* 92*   CO2 mmol/L 25 25   BUN mg/dL 21 17   CREATININE mg/dL 1.49* 1.49*   ANION GAP mmol/L 11 14   CALCIUM mg/dL 8.7 9.5   ALBUMIN g/dL  --  4.0   TOTAL BILIRUBIN mg/dL  --  1.23*   ALK PHOS U/L  --  146*   ALT U/L  --  14   AST U/L  --  17   GLUCOSE RANDOM mg/dL 99 84                       Lines/Drains:  Invasive Devices     Peripheral Intravenous Line  Duration           Peripheral IV 08/07/23 Left;Ventral (anterior) Forearm 1 day          Drain  Duration           External Urinary Catheter 27 days                  Telemetry:  Telemetry Orders (From admission, onward)             24 Hour Telemetry Monitoring  Continuous x 24 Hours (Telem)        Question:  Reason for 24 Hour Telemetry  Answer:  Decompensated CHF- and any one of the following: continuous diuretic infusion or total diuretic dose >200 mg daily, associated electrolyte derangement (I.e. K < 3.0), ionotropic drip (continuous infusion), hx of ventricular arrhythmia, or new EF < 35%                 Telemetry Reviewed: Atrial fibrillation. HR averaging 90  Indication for Continued Telemetry Use: Arrthymias requiring medical therapy             Imaging: No pertinent imaging reviewed.     Recent Cultures (last 7 days):         Last 24 Hours Medication List:   Current Facility-Administered Medications   Medication Dose Route Frequency Provider Last Rate   • ALPRAZolam  0.25 mg Oral BID Ladoris Jump Prechtel, DO     • apixaban  5 mg Oral BID Denny S Prechtel, DO     • atorvastatin  40 mg Oral Daily Denny S Prechtel, DO     • cholecalciferol  2,000 Units Oral Daily Denny S Prechtel, DO     • diltiazem  300 mg Oral Daily Denny S Prechtel, DO     • diltiazem  1-15 mg/hr Intravenous Titrated Ladoris Jump Prechtel, DO 7.5 mg/hr (08/09/23 0211)   • furosemide  80 mg Intravenous BID (diuretic) Karla Collado PA-C     • ipratropium  0.5 mg Nebulization TID Ladoris Jump Prechtel, DO     • levalbuterol  0.63 mg Nebulization TID Ladoris Jump Prechtel, DO     • metoprolol succinate  75 mg Oral Daily Radha Foster PA-C     • mirtazapine  7.5 mg Oral HS Denny S Prechtel, DO     • nicotine  1 patch Transdermal Daily Denny S Prechtel, DO     • nystatin   Topical BID Jelena Gómez MD     • potassium chloride  20 mEq Intravenous Once Karla Collado PA-C 20 mEq (08/09/23 0817)   • potassium chloride  40 mEq Oral TID With Meals Karla Collado PA-C     • senna-docusate sodium  1 tablet Oral Daily Claudeen Bras, DO          Today, Patient Was Seen By: Kim Velasco PA-C    **Please Note: This note may have been constructed using a voice recognition system. **

## 2023-08-09 NOTE — CASE MANAGEMENT
Case Management Discharge Planning Note    Patient name Starla Gray  Location [facility] 4 /E4 MS 34 33 [id number]-* MRN [id number]  : 1955 Date 2023       Current Admission Date: 2023  Current Admission Diagnosis:Acute respiratory failure with hypoxia [facility])   Patient Active Problem List    Diagnosis Date Noted   • Lymphedema 2023   • Hypomagnesemia 2023   • Acute on chronic heart failure with preserved ejection fraction ([address]) 2023   • COPD (chronic obstructive pulmonary disease) ([address]) 2023   • Proteinuria 10/28/2022   • Stage 3a chronic kidney disease ([address]) 2022   • Vitamin D deficiency 2022   • Secondary hyperparathyroidism ([address]) 2022   • Buttock wound 10/07/2021   • A-fib ([address]) 10/01/2021   • Hypotension 2021   • Acute respiratory failure with hypoxia ([address]) 2021   • Insomnia 2021   • Lung nodule 2019   • Aortoiliac occlusive disease ([address]) 2019   • Bilateral lower extremity edema 2019   • Peripheral vascular disease ([address]) 2018   • Foot pain 2018   • Constipation 10/09/2018   • Depression with anxiety 2018   • Essential hypertension 2018   • Lymphoma ([address]) 2018   • Paroxysmal atrial fibrillation ([address]) 2018   • Hypokalemia 2018   • Hyponatremia 2018   • Carotid stenosis, asymptomatic, bilateral 2016   • Tobacco dependency 2015   • PAD (peripheral artery disease) ([address]) 2015      LOS (days): 2  Geometric Mean LOS (GMLOS) (days): 3.90  Days to GMLOS:2     OBJECTIVE:  Risk of Unplanned Readmission Score: 24.04     Current admission status: Inpatient   Preferred Pharmacy:   [facility]/pharmacy #[id number]- [place], [place] - [address] [place] [address]  Phone: [phone] Fax: [phone]    Primary Care Provider: Ady Chaudhary MD    Primary Insurance: [facility]  Secondary Insurance:     DISCHARGE DETAILS:  Additional Comments: CM left a voicemail with daughter. Patient needs a new PT note before she is able to go to [facility].  to follow through discharge.

## 2023-08-09 NOTE — ASSESSMENT & PLAN NOTE
Lab Results   Component Value Date    EGFR 35 08/09/2023    EGFR 35 08/07/2023    EGFR 29 08/03/2023    CREATININE 1.49 (H) 08/09/2023    CREATININE 1.49 (H) 08/07/2023    CREATININE 1.73 (H) 08/03/2023     · Creatinine stable at 1.49  · Monitor with diuretics

## 2023-08-09 NOTE — OCCUPATIONAL THERAPY NOTE
Occupational Therapy Cancel Note:    Patient Name: Sharri Cutler  CFTKA'A Date: 8/9/2023    Chart reviewed. Spoke to [address] [place] [address]. Pt now on HFNC + NRB and hypoxic. SpO2 in 60-70s per RN. Will cx and complete OT session at later time as medically appropriate.     Faizan Wade, OTR/L

## 2023-08-09 NOTE — PROGRESS NOTES
Progress Note - Critical Care   Mert Neal 77 y.o. female MRN: [id number]  Unit/Bed#: ICU 03 Encounter: [id number]    ______________________________________________________________________  Assessment and Plan:   · Acute on chronic hypoxemic respiratory failure. The patient is currently intubated. Will titrate PEEP and FiO2 to maintain O2 saturation above 88%. Will obtain arterial blood gases and adjust the mechanical ventilation according to the results. · Severe chronic obstructive pulmonary disease. Will treat with Solu-Medrol 40 mg IV every 12 and bronchodilators. · Acute on chronic diastolic congestive heart failure. She was diuresed. Earlier today she was borderline hypotensive. Diuretics were held. · Atrial fibrillation with rapid ventricular response. She was on diltiazem. Will hold the diltiazem until blood pressure is improving. · Hypotension. This is multifactorial in part due to the sedation medications used for intubation as well as probable intravascular volume depletion. Will bolus her with 500 mL IV fluids. Monitor blood pressure. · Chronic kidney disease. Will monitor urine output and creatinine. · Ongoing tobacco use until admission. · Peripheral vascular disease. · Once she is hemodynamically stable we will start her on tube feeding. Code Status: Level 1 - Full Code    Counseling / Coordination of Care  Total Critical Care time spent 90 minutes excluding procedures, teaching and family updates. ______________________________________________________________________    HPI/24hr events:   I was following the patient on the floor for pulmonary consultation. Her oxygen requirement has increased over the last 24 hours. Today we had a long discussion with the patient as documented earlier and she decided to become level 3 CODE STATUS with no BiPAP. As her hypoxia worsened she agreed to be on BiPAP until her family arrived.   The family requested to change her CODE STATUS. After discussing with them that this is what she has decided they asked me to talk to her again. The patient changed her mind and she wanted to be full code. For this reason we brought the patient down to the ICU and she was intubated. Currently she is intubated and sedated. Her blood pressure decreased after the intubation. ______________________________________________________________________    Physical Exam:  Physical examination:   HEENT: Normocephalic. Pupils are equal round and reactive to light. Oropharynx is intubated. She has dry mucosa. Lungs: Decreased breath sounds. No wheezing. Heart: Irregular rhythm. Abdomen: Soft and nontender. Extremities: 2+ edema. Skin: Chronic skin changes of both legs. Neuro: Grossly nonfocal before intubation. Currently she is sedated.        ______________________________________________________________________  Vitals:    23 1405 23 1454 23 1457 23 1523   BP:  93/52  (!) 84/53   BP Location:    Left arm   Pulse: 80   71   Resp:    (!) 24   Temp:    (!) 96.2 °F (35.7 °C)   TempSrc:    Temporal   SpO2: (!) 76%  (!) 66% (!) 59%   Weight:       Height:                  Temperature:   Temp (24hrs), Av.4 °F (36.3 °C), Min:96.2 °F (35.7 °C), Max:98.9 °F (37.2 °C)    Current Temperature: (!) 96.2 °F (35.7 °C)  Weights:   IBW (Ideal Body Weight): 50.1 kg    Body mass index is 22.1 kg/m². Weight (last 2 days)     Date/Time Weight    23 1118 54.8 (120.81)    23 2026 58.7 (129.41)    23 1626 64.2 (141.54)        Non-Invasive/Invasive Ventilation Settings:  Respiratory    Lab Data (Last 4 hours)    None         O2/Vent Data (Last 4 hours)       1345  1457        Non-Invasive Ventilation Mode HFNC (High flow) BiPAP                  Intake and Outputs:  I/O        07 07 07 07 0701  08/10 07    P. O.  120     I.V. (mL/kg)  149.8 (2.7)     IV Piggyback 100      Total Intake(mL/kg) 100 (1.7) 269.8 (4.9)     Urine (mL/kg/hr)  831 (0.6) 275 (0.5)    Total Output  831 275    Net +100 -561.2 -275           Unmeasured Urine Occurrence  2 x         Labs:   Results from last 7 days   Lab Units 08/09/23  0611 08/07/23  1631 08/03/23  1425   WBC Thousand/uL 8.84 7.58 6.52   HEMOGLOBIN g/dL 15.0 16.1* 15.8*   HEMATOCRIT % 47.1* 49.6* 49.7*   PLATELETS Thousands/uL 219 251 242   NEUTROS PCT %  --  76*  --    MONOS PCT %  --  11  --    EOS PCT %  --  0  --      Results from last 7 days   Lab Units 08/09/23  1321 08/09/23  0611 08/07/23  1631 08/03/23  1425   POTASSIUM mmol/L 4.8 2.6* 3.7 4.9   CHLORIDE mmol/L  --  93* 92* 104   CO2 mmol/L  --  25 25 22   BUN mg/dL  --  21 17 17   CREATININE mg/dL  --  1.49* 1.49* 1.73*   CALCIUM mg/dL  --  8.7 9.5 9.6   ALK PHOS U/L  --   --  146* 168*   ALT U/L  --   --  14 18   AST U/L  --   --  17 16     Results from last 7 days   Lab Units 08/09/23  0611 08/07/23  1631 08/03/23  1425   MAGNESIUM mg/dL 1.9 1.7* 2.1     Lab Results   Component Value Date    PHOS 3.1 08/03/2023    PHOS 2.4 06/26/2023    PHOS 1.9 (L) 10/06/2021          0   Lab Value Date/Time    TROPONINI 0.06 (H) 12/01/2018 0153    TROPONINI 0.05 (H) 11/30/2018 2156    TROPONINI 0.06 (H) 11/30/2018 1849         ABG:  Lab Results   Component Value Date    PHART 7.434 08/08/2023    MMO9VHG 33.0 (L) 08/08/2023    PO2ART 56.6 (LL) 08/08/2023    XDS6JSZ 21.6 (L) 08/08/2023    BEART -1.8 08/08/2023    SOURCE Radial, Right 08/08/2023     Micro:  Lab Results   Component Value Date    BLOODCX No Growth After 5 Days. 12/14/2018    BLOODCX No Growth After 5 Days. 12/14/2018    WOUNDCULT 1+ Growth of Escherichia coli (A) 10/05/2021    WOUNDCULT 1+ Growth of Klebsiella pneumoniae (A) 10/05/2021    WOUNDCULT 1+ Growth of 10/05/2021     Allergies:    Allergies   Allergen Reactions   • Oxycodone-Acetaminophen Itching and Rash     Itching, rash     Medications:   Scheduled Meds:  Current Facility-Administered Medications   Medication Dose Route Frequency Provider Last Rate   • ALPRAZolam  0.25 mg Oral BID Leopold Guard Prechtel, DO     • apixaban  5 mg Oral BID Leopold Guard Prechtel, DO     • atorvastatin  40 mg Oral Daily Denny S Prechtel, DO     • cholecalciferol  2,000 Units Oral Daily Denny S Prechtel, DO     • diltiazem  300 mg Oral Daily Denny S Prechtel, DO     • diltiazem  1-15 mg/hr Intravenous Titrated Leopold Guard Prechtel, DO Stopped (08/09/23 6496)   • fentanyl citrate (PF)          • furosemide  20 mg/hr Intravenous Continuous Collin Casiano PA-C     • [facility] Hold] ipratropium  0.5 mg Nebulization TID Leopold Guard Prechtel, DO     • [MAR Hold] levalbuterol  0.63 mg Nebulization TID Leopold Guard Prechtel, DO     • [MAR Hold] methylPREDNISolone sodium succinate  40 mg Intravenous Q12H [address] Brianna May MD     • [facility] Hold] metoprolol succinate  75 mg Oral Daily Radha Foster PA-C     • mirtazapine  7.5 mg Oral HS Denny S Prechtel, DO     • [MAR Hold] morphine injection  2 mg Intravenous Q4H PRN Bruno Thacker MD     • nicotine  1 patch Transdermal Daily Leopold Guard Prechtel, DO     • [MAR Hold] nystatin   Topical BID Bruno Thacker MD     • potassium chloride  40 mEq Oral TID With Meals Collin Casiano PA-C     • senna-docusate sodium  1 tablet Oral Daily Denny S Prechtel, DO       Continuous Infusions:diltiazem, 1-15 mg/hr, Last Rate: Stopped (08/09/23 0951)  furosemide, 20 mg/hr      PRN Meds:  fentanyl citrate (PF), ,   [MAR Hold] morphine injection, 2 mg, Q4H PRN      Invasive lines and devices: Invasive Devices     Peripheral Intravenous Line  Duration           Peripheral IV 08/07/23 Left;Ventral (anterior) Forearm 2 days          Drain  Duration           External Urinary Catheter 27 days                     Portions of the record may have been created with voice recognition software.   Occasional wrong word or "sound a like" substitutions may have occurred due to the inherent limitations of voice recognition software. Read the chart carefully and recognize, using context, where substitutions have occurred.     Kiki Thompson MD

## 2023-08-09 NOTE — QUICK NOTE
I spoke with the patient's daughter Neo Hudson regarding her mother's situation. The patient has severe COPD. She has severe congestive heart failure. Her prognosis appears poor. Patient discussed these issues with Dr. Dayanara Cortes earlier today and agreed to be on CODE BLUE level 3. I told Neo Parents that I agreed with this decision. I think that the patient's prognosis is poor. I think that aggressive treatment would just prolong the patient's suffering. Neo Parents did not seem settled with this decision and terminated our conversation to talk with the rest of her family.

## 2023-08-09 NOTE — PHYSICAL THERAPY NOTE
PHYSICAL THERAPY NOTE          Patient Name: Kylie Barber  KCIYW'R Date: 8/9/2023 08/09/23 1457   PT Last Visit   PT Visit Date 08/09/23   Note Type   Note Type Cancelled Session   Cancel Reasons Medical status  (Medically unstable for therapy at this time.   Will monitor for appropriateness.)     Myriam Coleman, PT

## 2023-08-09 NOTE — ASSESSMENT & PLAN NOTE
· Sodium 129 today  · Likely hypervolemic hyponatremia versus SIADH due to smoking  · Continue monitoring with IV diuretics

## 2023-08-09 NOTE — ASSESSMENT & PLAN NOTE
· Potassium 2.6 this morning  · Secondary to diuresis  · Replete orally and IV  · Hold morning dose of diuretics  · Recheck this afternoon  · Magnesium WNL

## 2023-08-09 NOTE — PLAN OF CARE
Problem: MOBILITY - ADULT  Goal: Maintain or return to baseline ADL function  Description: INTERVENTIONS:  -  Assess patient's ability to carry out ADLs; assess patient's baseline for ADL function and identify physical deficits which impact ability to perform ADLs (bathing, care of mouth/teeth, toileting, grooming, dressing, etc.)  - Assess/evaluate cause of self-care deficits   - Assess range of motion  - Assess patient's mobility; develop plan if impaired  - Assess patient's need for assistive devices and provide as appropriate  - Encourage maximum independence but intervene and supervise when necessary  - Involve family in performance of ADLs  - Assess for home care needs following discharge   - Consider OT consult to assist with ADL evaluation and planning for discharge  - Provide patient education as appropriate  Outcome: Progressing  Goal: Maintains/Returns to pre admission functional level  Description: INTERVENTIONS:  - Perform BMAT or MOVE assessment daily.   - Set and communicate daily mobility goal to care team and patient/family/caregiver. - Collaborate with rehabilitation services on mobility goals if consulted  - Perform Range of Motion  times a day. - Reposition patient every  hours.   - Dangle patient  times a day  - Stand patient  times a day  - Ambulate patient  times a day  - Out of bed to chair  times a day   - Out of bed for meals  times a day  - Out of bed for toileting  - Record patient progress and toleration of activity level   Outcome: Progressing     Problem: PAIN - ADULT  Goal: Verbalizes/displays adequate comfort level or baseline comfort level  Description: Interventions:  - Encourage patient to monitor pain and request assistance  - Assess pain using appropriate pain scale  - Administer analgesics based on type and severity of pain and evaluate response  - Implement non-pharmacological measures as appropriate and evaluate response  - Consider cultural and social influences on pain and pain management  - Notify physician/advanced practitioner if interventions unsuccessful or patient reports new pain  Outcome: Progressing     Problem: INFECTION - ADULT  Goal: Absence or prevention of progression during hospitalization  Description: INTERVENTIONS:  - Assess and monitor for signs and symptoms of infection  - Monitor lab/diagnostic results  - Monitor all insertion sites, i.e. indwelling lines, tubes, and drains  - Monitor endotracheal if appropriate and nasal secretions for changes in amount and color  - [place] appropriate cooling/warming therapies per order  - Administer medications as ordered  - Instruct and encourage patient and family to use good hand hygiene technique  - Identify and instruct in appropriate isolation precautions for identified infection/condition  Outcome: Progressing  Goal: Absence of fever/infection during neutropenic period  Description: INTERVENTIONS:  - Monitor WBC    Outcome: Progressing     Problem: SAFETY ADULT  Goal: Maintain or return to baseline ADL function  Description: INTERVENTIONS:  -  Assess patient's ability to carry out ADLs; assess patient's baseline for ADL function and identify physical deficits which impact ability to perform ADLs (bathing, care of mouth/teeth, toileting, grooming, dressing, etc.)  - Assess/evaluate cause of self-care deficits   - Assess range of motion  - Assess patient's mobility; develop plan if impaired  - Assess patient's need for assistive devices and provide as appropriate  - Encourage maximum independence but intervene and supervise when necessary  - Involve family in performance of ADLs  - Assess for home care needs following discharge   - Consider OT consult to assist with ADL evaluation and planning for discharge  - Provide patient education as appropriate  Outcome: Progressing  Goal: Maintains/Returns to pre admission functional level  Description: INTERVENTIONS:  - Perform BMAT or MOVE assessment daily.   - Set and communicate daily mobility goal to care team and patient/family/caregiver. - Collaborate with rehabilitation services on mobility goals if consulted  - Perform Range of Motion  times a day. - Reposition patient every  hours.   - Dangle patient  times a day  - Stand patient  times a day  - Ambulate patient  times a day  - Out of bed to chair  times a day   - Out of bed for meals  times a day  - Out of bed for toileting  - Record patient progress and toleration of activity level   Outcome: Progressing  Goal: Patient will remain free of falls  Description: INTERVENTIONS:  - Educate patient/family on patient safety including physical limitations  - Instruct patient to call for assistance with activity   - Consult OT/PT to assist with strengthening/mobility   - Keep Call bell within reach  - Keep bed low and locked with side rails adjusted as appropriate  - Keep care items and personal belongings within reach  - Initiate and maintain comfort rounds  - Make Fall Risk Sign visible to staff  - Offer Toileting every  Hours, in advance of need  - Initiate/Maintain alarm  - Obtain necessary fall risk management equipment:   - Apply yellow socks and bracelet for high fall risk patients  - Consider moving patient to room near nurses station  Outcome: Progressing     Problem: DISCHARGE PLANNING  Goal: Discharge to home or other facility with appropriate resources  Description: INTERVENTIONS:  - Identify barriers to discharge w/patient and caregiver  - Arrange for needed discharge resources and transportation as appropriate  - Identify discharge learning needs (meds, wound care, etc.)  - Arrange for interpretive services to assist at discharge as needed  - Refer to Case Management Department for coordinating discharge planning if the patient needs post-hospital services based on physician/advanced practitioner order or complex needs related to functional status, cognitive ability, or social support system  Outcome: Progressing Problem: Knowledge Deficit  Goal: Patient/family/caregiver demonstrates understanding of disease process, treatment plan, medications, and discharge instructions  Description: Complete learning assessment and assess knowledge base.   Interventions:  - Provide teaching at level of understanding  - Provide teaching via preferred learning methods  Outcome: Progressing

## 2023-08-09 NOTE — PROCEDURES
Intubation    Date/Time: 8/9/2023 5:41 PM    Performed by: LAURA Merchant  Authorized by: Susy Fonseca, [address] [place] [address]    Patient location:  Bedside  Consent:     Consent obtained:  Emergent situation    Consent given by:  Patient    Risks discussed:  Aspiration, hypoxia and death    Alternatives discussed:  Delayed treatment and alternative treatment  Universal protocol:     Immediately prior to procedure, a time out was called: yes      Patient identity confirmed:  Arm band and verbally with patient  Pre-procedure details:     Patient status:  Altered mental status    Mallampati score:  3    Pretreatment medications:  Etomidate    Paralytics:  None  Indications:     Indications for intubation: respiratory distress, respiratory failure, hypoxemia and hypercapnia    Procedure details:     Preoxygenation:  Bag valve mask    CPR in progress: no      Intubation method:  Oral    Oral intubation technique:  Direct    Laryngoscope blade: Mac 3    Tube size (mm):  7.0    Tube type:  Cuffed    Number of attempts:  2    Ventilation between attempts: yes      Cricoid pressure: yes      Tube visualized through cords: yes    Placement assessment:     ETT to lip:  20 cm    Tube secured with:  ETT bagley    Breath sounds:  Equal and absent over the epigastrium    Placement verification: condensation, CXR verification, direct visualization, equal breath sounds, ETCO2 detector and tube exhalation      CXR findings:  ETT in proper place    Ventilator settings:  [id number]. ET tube inserted one cm to 21cm at lip. Post-procedure details:     Patient tolerance of procedure:   Tolerated well, no immediate complications

## 2023-08-09 NOTE — ASSESSMENT & PLAN NOTE
Wt Readings from Last 3 Encounters:   08/08/23 54.8 kg (120 lb 13 oz)   07/20/23 52.2 kg (115 lb)   07/17/23 50.6 kg (111 lb 8.8 oz)     · Patient presents with acute on chronic CHF  · Weight improved to 120 lbs today  · Maintain outpatient on Bumex 1 mg daily  · Continue Lasix 80 mg IV twice daily once potassium improved  · Monitor intake and output  · Standing weights  · Cardiology following

## 2023-08-09 NOTE — ASSESSMENT & PLAN NOTE
· Patient sent in by outpatient vascular surgery office for hypoxia, was found to be hypoxic to 80% in the outpatient office  · Patient required BiPAP yesterday, currently refusing, remains on high flow  · Likely secondary to acute CHF exacerbation due to untreated A-fib with RVR  · Continue IV diuretics, unable to give this morning's dose due to potassium of 2.6  · Continue scheduled nebulizers  · Monitor intake and output with daily weights  · Treat A-fib with RVR with metoprolol and Cardizem  · Cardiology and pulmonology following

## 2023-08-09 NOTE — UTILIZATION REVIEW
Date: 8/9    Day 3: Has surpassed a 2nd midnight with active treatments and services, which include IV diuretics. See initial review completed by Risa Cervantes on 8/8.

## 2023-08-09 NOTE — RESPIRATORY THERAPY NOTE
Pt not tolerating bipap mask, says she doesn't want it on her face. Pt's sat was 83% on 15L midflow. Spoke w RN and placed pt on HFNC. Fio2 75% and 50L, pt sating 91%. Pt is comfortable.

## 2023-08-09 NOTE — PROGRESS NOTES
Progress Note - Pulmonary   Vester Mumtaz 77 y.o. female MRN: [id number]  Unit/Bed#: E4 -01 Encounter: [id number]    Assessment/Plan:       1. Acute hypoxic respiratory failure  - in the setting of heart failure and fluid overload. CXR on admission revealed pul congestion. No leukocytosis and no signs of infection. Monitor off abx  - slowly improving. Now off bipap. On HFNC 50L/75% and Sat 94%. Can continue to slow taper FiO2 but keep flow to maintain peep. Keep SpO2 > 88%  - will need to check ambulatory oxygenation upon discharge to determine whether needs oxygen at home     2. Severe COPD without acute exacerbation  - home regimen: Anoro 1 puff daily. Recent PFT showed FEV1 44% in 10/2022  - continue atrovent-xopenex TID. Can resume anoro upon discharge.     3. Atrial fibrilation/congestive heart failure exacerbation  - presents with AfRVR due to medication non compliance. Now on cardizem drip  - recent ECHO reviewed (7/2023): EF 65% with dilated RV and moderate to severe TR with PASP 77mmHg. - continue treatment as per cardio. Agree for more aggressive diuresis after repleting electrolyte imblanace (noted 831 UO within last 24 hrs)     4. Tobacco use  - hx of smoking 20 cigarettes per day for 40+ years. Recently quit for about 1 week due to worsening shortness of breath  - continue monitor           Chief Complaint:    Feeling the same. Still with shortness of breath    Subjective:    Seen and examined at bedside. Noted limited UO 8731ml for the past 24 hrs. RN notified with urinary retention overnight needing straight cath X1, Patient also cannot tolerate Bipap and now under HFNC 50L 705 support and easily dropping saturation if off HFNC. She still has shortness of breath and dyspnea in exertion. No fever, no cough, no sputum production noted    Objective:    Vitals: Blood pressure 122/82, pulse 81, temperature 97.7 °F (36.5 °C), temperature source Temporal, resp.  rate 18, height 5' 2" (1.575 m), weight 54.8 kg (120 lb 13 oz), SpO2 92 %. on HFNC 50L 70%,Body mass index is 22.1 kg/m². Intake/Output Summary (Last 24 hours) at 8/9/2023 0751  Last data filed at 8/9/2023 0136  Gross per 24 hour   Intake 269.83 ml   Output 831 ml   Net -561.17 ml       Invasive Devices     Peripheral Intravenous Line  Duration           Peripheral IV 08/07/23 Left;Ventral (anterior) Forearm 1 day          Drain  Duration           External Urinary Catheter 27 days                Physical Exam:     Physical Exam  Constitutional:       General: She is not in acute distress. Appearance: She is ill-appearing. Cardiovascular:      Rate and Rhythm: Normal rate. Rhythm irregular. Pulmonary:      Effort: Tachypnea (mild) present. Breath sounds: Rhonchi and rales present. No wheezing. Musculoskeletal:      Right lower leg: Edema (1+) present. Left lower leg: Edema (1+) present. Neurological:      General: No focal deficit present. Labs: I have personally reviewed pertinent lab results.         Imaging and other studies: I have personally reviewed pertinent films in PACS

## 2023-08-09 NOTE — QUICK NOTE
Evaluated patient again around 11am with Dr Sarah Mantilla. Patient was saturating 90% under HFNC 60L/100%. She had mild shortness of breath with increased work of breath. She could not tolerated Bipap last night and could not tolerate incentive spirometry. We had a long discussion with patient. She had worsening PASP and severe COPD with FEV1 44% in the past. She presented with worsening hypoxia refractory to diuretics treatment. We explored the options of intubation and mechanical ventilation, and patient declined. She also declined chest compression/Bipap support, and agreed with conservative treatment with HFNC. We will document her code status as level 3 - DNR/DNI. We will continue medical treatment including adding on steroid MTP 40mg Q12H.  I also rely the message to the hospitalist.

## 2023-08-09 NOTE — QUICK NOTE
Patient was found to have oxygen saturation in the 60s while on high flow nasal cannula and nonrebreather. Patient recently switched to level 3 DNR/DNI this morning per discussion with critical care/pulmonology. Discussed with patient's daughter, recommended family come to the hospital given patient's deterioration.   Patient agreeable to BiPAP until family arrived at the hospital.  Spoke with cardiology, recommended addition of Lasix drip at 20 mg/h and repeat echocardiogram.

## 2023-08-10 PROBLEM — J96.21 ACUTE ON CHRONIC RESPIRATORY FAILURE WITH HYPOXIA (HCC): Status: ACTIVE | Noted: 2021-09-29

## 2023-08-10 NOTE — PROGRESS NOTES
[address] [facility]  Interval Progress Note: Critical Care  Name: Song Rodríguez  MRN: [id number]  Unit/Bed#: ICU 03 I Date of Admission: 8/7/2023   Date of Service: 8/9/2023 I Hospital Day: 2    Interval Events:    -Pt kentrell to 30bpm and hypoxic on vent. Pt given epi push x1 and bicarb x1 and pt was bagged. HR improved and pulse ox improved to 80's. Bp improved. Stat labs sent. Cont vent management to optimize oxygenation. Peep increased to 14. Peak pressures and compliance ok. -Family was at bedside. Discussed with family the events and goals of care. They want to try and cont care until her brother gets back from [facility]. We also discussed no cpr and they want time to discuss. Pertinent New Data:   blood pressure, pulse, temperature, respirations and pulse oximetry    I have personally reviewed pertinent lab results. chest xrayI have personally reviewed pertinent reports. Assessment and Plan  · Diagnosis: acute on chronic hypoxemic respiratory failure  · Bradycardia- epi/bicarb given with improvement  · Plan: cont vent management, repeat abg, cont steroids      Billing Level:  During this visit, Critical care services were medically necessary as this patient had a high probability of imminent or life-threatening deterioration due to Unresponsive, acute respiratory failure and bradycardia, required my direct attention, intervention, and personal management to implement the following: vent management, directing of titration of vasoactive drugs, bedside management, test review, records review, case discussed with consultants , direct patient care and documentation of findings. I have personally provided 51 minutes on 08/09/23 of critical care time, exclusive of procedures, teaching, family meetings, and any prior time recorded by providers other than myself.     SIGNATURE: Evelia Guan PA-C

## 2023-08-10 NOTE — PROGRESS NOTES
[facility]  Interval Progress Note: Critical Care  Name: Bernice Mast  MRN: [id number]  Unit/Bed#: ICU 03 I Date of Admission: 8/7/2023   Date of Service: 8/10/2023 I Hospital Day: 3    Interval Events:    0227 Called to pt room for hypoxia and bradycardia. PEA arrest noted. Epi given x 2, bicarb, calcium with ROSC. Pt bagged to sats in the 80's. Stat labs sent. Pt was not hypotensive prior to event. Updated daughter at bedside. Updated Dr. Mariluz Anguiano. Hyperkalemia noted and albuterol, insulin and glucose given. Repeat accucheck pending. Pt given albumin 25% and 250ml isolyte bolus for hypotension at 0345     Pertinent New Data:   blood pressure, pulse, temperature, respirations and pulse oximetry    I have personally reviewed pertinent lab results. , CMP:   Lab Results   Component Value Date    SODIUM 135 08/10/2023    K 6.5 (HH) 08/10/2023    CL 98 08/10/2023    CO2 20 (L) 08/10/2023    BUN 25 08/10/2023    CREATININE 2.00 (H) 08/10/2023    CALCIUM 12.8 (HH) 08/10/2023    AST 20 08/09/2023    ALT 13 08/09/2023    ALKPHOS 115 (H) 08/09/2023    EGFR 24 08/10/2023   , ABG:   Lab Results   Component Value Date    PHART 7.277 (L) 08/10/2023    DDJ9OIB 41.1 08/10/2023    PO2ART 64.1 (L) 08/10/2023    MBR1NXR 18.7 (L) 08/10/2023    BEART -7.6 08/10/2023    SOURCE Artery 08/10/2023     chest xrayI have personally reviewed pertinent reports. Assessment and Plan  · Diagnosis: pea arrest, acute hypoxic respiratory failure, hyperkalemia, jimmy  · Plan: cont vent management, bag prn, increased peep and expiratory time.   · Monitor vitals, labs, I/o's  · Given alb 25%, albuterol, insulin and glucose  · May need diuresis in am if continued decrease uop/hyperkalemia and pt has been volume resuscitated      Billing Level:  During this visit, Critical care services were medically necessary as this patient had a high probability of imminent or life-threatening deterioration due to acute respiratory failure and acute kidney injury, required my direct attention, intervention, and personal management to implement the following: arterial puncture, vent management, directing of titration of vasoactive drugs, fluid management, bedside management, test review, case discussed with consultants , direct patient care and documentation of findings. I have personally provided 48 minutes on 08/10/23 of critical care time, exclusive of procedures, teaching, family meetings, and any prior time recorded by providers other than myself.     SIGNATURE: Mary Briscoe PA-C

## 2023-08-10 NOTE — ASSESSMENT & PLAN NOTE
Wt Readings from Last 3 Encounters:   08/10/23 54.8 kg (120 lb 13 oz)   07/20/23 52.2 kg (115 lb)   07/17/23 50.6 kg (111 lb 8.8 oz)     In the setting of atrial fibrillation and severe pulmonary hypertension  Ejection fraction as of July '23 was 65%  Patient previously on bumex drip but now discontinued due to borderline hypotension  Most recent cxr shows markedly enlarged cardiac silhouette but pulmonary congestion appears improved.

## 2023-08-10 NOTE — DISCHARGE SUMMARY
[facility]  Discharge- Dominique Silviano 1955, 77 y.o. female MRN: [id number]  Unit/Bed#: ICU 03 Encounter: [id number]  Primary Care Provider: Ba Car MD   Date and time admitted to hospital: 8/7/2023  4:00 PM    Acute on chronic heart failure with preserved ejection fraction [facility])  Assessment & Plan  Wt Readings from Last 3 Encounters:   08/10/23 54.8 kg (120 lb 13 oz)   07/20/23 52.2 kg (115 lb)   07/17/23 50.6 kg (111 lb 8.8 oz)     In the setting of atrial fibrillation and severe pulmonary hypertension  Ejection fraction as of July '23 was 65%  Patient previously on bumex drip but now discontinued due to borderline hypotension  Most recent cxr shows markedly enlarged cardiac silhouette but pulmonary congestion appears improved. COPD (chronic obstructive pulmonary disease) ([facility])  Assessment & Plan  Severe COPD   PFTs as of September 2022: FEV1/FVC Ratio:  65%, FEV1 0.95 L/44%, FVC 1.46 L/53%, DLCO 38%  Known history of tobacco use  No results for input(s): "PH", "CGY9EJXSHAHC", "OH6UIPDETCN", "DTG9TEKTAOVI" in the last 72 hours. Recent Labs     08/09/23 1954 08/10/23  0017 08/10/23  0245   PHART 7.329* 7.384 7.277*   IJQ9XKA 37.5 36.4 41.1   PO2ART 44.9* 48.3* 64.1*   IHU0NAQ 19.3* 21.2* 18.7*           A-fib ([facility])  Assessment & Plan    · Maintained outpatient on Cardizem 300 mg daily and Toprol-XL 75 mg daily-recently ran out of medications, likely contributing to her A-fib with RVR presentation  · Afib likely contributing to current respiratory status   · Patient had been poorly rate controlled through the night as she was not on any AV rafa blocker because of hypotension, however she is better controlled after sedation administered.   Would suspect control of her heart rate is related to anxiety and discomfort    * Acute on chronic respiratory failure with hypoxia ([facility])  Assessment & Plan  In the setting of acute on chronic diastolic heart failure and severe COPD  CODE STATUS changed to level 3 to level 1 on 8/9, resulting in intubation. Initial settings 20, 350, 100%, 10  No results for input(s): "PH", "YMO9RTWAHVBM", "BS5DXVSBQPL", "GFE2PZOTULBN" in the last 72 hours. Recent Labs     08/09/23  1954 08/10/23  0017 08/10/23  0245   PHART 7.329* 7.384 7.277*   OQW7NRD 37.5 36.4 41.1   PO2ART 44.9* 48.3* 64.1*   OSS5UBA 19.3* 21.2* 18.7*     Patient is now level 2, no compressions or shocks to be given. Family is hopeful that patient will survive until later this evening when other family members arrive. Prognosis is poor, unlikely to tolerate wean                Medical Problems     Resolved Problems  Date Reviewed: 8/10/2023   None         Admission Date:   Admission Orders (From admission, onward)     Ordered        08/07/23 1750  INPATIENT ADMISSION  Once                        Admitting Diagnosis: Atrial fibrillation ([address]) [I48.91]  Respiratory failure ([address]) [J96.90]  SOB (shortness of breath) [R06.02]  Tachycardia [R00.0]  Congestive heart failure (CHF) ([address]) [I50.9]    Procedures Performed:   Orders Placed This Encounter   Procedures   • ED ECG Documentation Only   • Intubation       Summary of Hospital Course: Patient is a 75-year-old female with a past medical history of paroxysmal atrial, heart failure preserved ejection fraction, fibrillation on anticoagulation, tobacco abuse, COPD and a history of pulmonary nodules, diffuse large B-cell lymphoma status postchemotherapy 2018 2019 and CKD. Patient presented at [facility] on the August 7, 2023 complaining of shortness of breath and palpitations. Patient was found to be hypoxic requiring 10 L high flow oxygen. chest x-ray on admission showed bilateral pulmonary infiltrate. She was additionally found to be in Afib RVR and subsequently put on Cardizem and diuretic drips. Patient continued to have increased work of breathing and desaturated to the 80s despite high flow nasal cannula. Pulmonology was consulted for acute hypoxic respiratory failure. She was placed on BiPAP but could not tolerate the mask. Oxygen requirements increased throughout the day and she wastitrated up to 50 L high flow on 75%. Patient decided to change code status to DNR/DNI until family arrived and convinced her to be level 1 full code. For this reason patient was brought to the ICU where she was intubated. Intubation was difficult due to small spastic airway. Initial vent settings were 20 350 100% and 10 but overnight she required increased PEEP and and experienced two episodes of PEA. ROSC was achieved at that time with bagging and chemical intervention. Patient seen at bed side the following morning and had increased work of breathing followed by apneic episodes. Patient became bradycardic with low pressures and pressors were briefly started. Despite attempts at chemical resuscitation, patients condition declined ultimately leading to her death. Significant Findings, Care, Treatment and Services Provided:     Complications: None    Condition at Time of Death: Poor    Final Diagnosis: Death secondary to acute hypoxic respiratory failure    Date, Time and Cause of Death    Date of Death: 8/10/23  Time of Death:  8:59 AM  Preliminary Cause of Death: Acute respiratory failure with hypoxia ([address])  Entered by: Katlin Foster MD[TK1.1]     Attribution     TK1. [id number] Cort[address] Ray MD 08/10/23 09:55          PCP: Venecia More MD    Disposition:

## 2023-08-10 NOTE — ASSESSMENT & PLAN NOTE
Severe COPD   PFTs as of September 2022: FEV1/FVC Ratio:  65%, FEV1 0.95 L/44%, FVC 1.46 L/53%, DLCO 38%  Known history of tobacco use  No results for input(s): "PH", "YVK1MAKVAYQB", "TE4BJYAWPXP", "MMH9FQWGSVRO" in the last 72 hours.   Recent Labs     08/09/23  1954 08/10/23  0017 08/10/23  0245   PHART 7.329* 7.384 7.277*   YRS7NHL 37.5 36.4 41.1   PO2ART 44.9* 48.3* 64.1*   VJL0PFZ 19.3* 21.2* 18.7*

## 2023-08-10 NOTE — NURSING NOTE
Pts family wants left wrist restraint removed. Patients family informed of the importance to protect patients airway. Verbalized understanding Verb Daughter stated " Ill be the restraint to hold her hand." Family at bedside.

## 2023-08-10 NOTE — ASSESSMENT & PLAN NOTE
In the setting of acute on chronic diastolic heart failure and severe COPD  CODE STATUS changed to level 3 to level 1 on 8/9, resulting in intubation. Initial settings 20, 350, 100%, 10  No results for input(s): "PH", "SEC4SMQGGKPI", "GT0RBIEDSEL", "MMG5YJBFBMOG" in the last 72 hours. Recent Labs     08/09/23  1954 08/10/23  0017 08/10/23  0245   PHART 7.329* 7.384 7.277*   NTX0NGK 37.5 36.4 41.1   PO2ART 44.9* 48.3* 64.1*   XTX3BQH 19.3* 21.2* 18.7*     Patient is now level 2, no compressions or shocks to be given. Family is hopeful that patient will survive until later this evening when other family members arrive.   Prognosis is poor, unlikely to tolerate wean

## 2023-08-10 NOTE — ASSESSMENT & PLAN NOTE
· Maintained outpatient on Cardizem 300 mg daily and Toprol-XL 75 mg daily-recently ran out of medications, likely contributing to her A-fib with RVR presentation  · Afib likely contributing to current respiratory status   · Patient had been poorly rate controlled through the night as she was not on any AV rafa blocker because of hypotension, however she is better controlled after sedation administered.   Would suspect control of her heart rate is related to anxiety and discomfort

## 2023-08-11 NOTE — UTILIZATION REVIEW
NOTIFICATION OF ADMISSION DISCHARGE   This is a Notification of Discharge from [facility]. Please be advised that this patient has been discharge from our facility. Below you will find the admission and discharge date and time including the patient’s disposition. UTILIZATION REVIEW CONTACT:  Marah Aguilar MA  Utilization   Network Utilization Review Department  Phone: [phone] x carefully listen to the prompts. All voicemails are confidential.  Email: [email or web address]     ADMISSION INFORMATION  PRESENTATION DATE: 2023  4:00 PM  OBERVATION ADMISSION DATE:   INPATIENT ADMISSION DATE: 23  5:50 PM   DISCHARGE DATE: 8/10/2023  2:21 PM   DISPOSITION:    IMPORTANT INFORMATION:  Send all requests for admission clinical reviews, approved or denied determinations and any other requests to dedicated fax number below belonging to the campus where the patient is receiving treatment.  List of dedicated fax numbers:  [place] DENIALS (Administrative/Medical Necessity) [phone] [address] [facility] (Maternity/NICU/Pediatrics) [phone]   [facility] [phone]   [facility] [phone] [address] [facility] [phone] [address] [facility] [phone]   [facility] [phone]   [address] [place], [place] [address] [address] [facility] [phone]   [address] [facility] [phone]   [address] [facility] [phone]   [address] [facility] [address] [facility] [phone]

## 2023-08-21 NOTE — CLINICAL RISK MANAGEMENT
Progress Note - Death in Restraints   Sebastian Adam 77 y.o. female MRN: [id number]  Unit/Bed#: ICU 03 Encounter: [id number]      Patient  within 24 hours of restraint  with Soft restraint right wrist and soft restraint left wrist. Death unrelated to use of restraints. This situation was tracked internally. CMS and PA-NEERAJ  notification not required.   Patient is a 72y old  Female who presents with a chief complaint of cardiac arrest (28 Oct 2021 08:34)    HPI:  History of Present Illness    Ms. Montoya is a 70 year old (ex smoker) female patient known to have:  - Depression. Home meds Amitryptyline 25mg QD   - Asthma. Home meds Albuterol PRN  - CAD s/p CABG. Follows with Dr Rico. Recent stress test 09/10/21: small-moderate reversible defect lateral/inferolateral LV wall. Last lipid profile 09/10 noted. Home meds Aspirin 81mg QD, Simvastatin 20mg QD, Toprol 50mg QD, Imdur 60mg QD, and Ranexa 500mg BID  - L4-L5 spinal stenosis. Home meds OC Q6h PRN  - Recently diagnosed lung cancer (and possible gastric cancer per patient) s/p CT guided right pleural mass biopsy on 10/08/21 by IR. Has not had the chance to follow up with a Pulmonologist or oncologist  - Hypertension. Home Amlor 5mg QD, Toprol 50mg QD, Imdur 60mg QD, and Ranexa 500mg BID  - Microcytic Anemia s/p EGD colonoscopy 21 revealing esophageal cyst, internal external hemorrhoids, and multiple polyps (one of which was 3cm in ileocecal area). Was supposed to follow outpatient for esophageal cyst and 3cm polyp removal but did not follow up  - PAD s/p Left AKA. Last lipid profile 09/10 noted. Home meds Aspirin 81mg QD, Simvastatin 20mg QD  - Of note, patient has not been taking medications recently due to worsening vomiting and abdominal discomfort    She presented to the ED on 10/17/21 for left sided chest pain.  History goes back to few months ago in August when the patient started complaining of intermittent episodes of left sided chest pain occurring mainly with exertion.   She sought medical attention and is s/p stress test on 09/10 as above.  Over the last week, patient has been having more frequent episodes of left sided chest pain that is pressure-like, increased on inspiration, radiating to back, and associated with SOB.  She reports associated nausea and vomiting but denies palpitations, diaphoresis, or syncope.      On review of systems, patient reports some epigastric discomfort that increases with food but denies any recent fever, chills, night sweats, URTI symptoms (cough, rhinorrhea, sore throat), urinary symptoms (urinary frequency, urgency, intermittence, dysuria, foul smelling urine, cloudy urine), change in bowel movements (diarrhea or constipation), abdominal pain, headache.   No sick contacts.  No recent travel or exposure to recent travelers.      Upon presentation to the ED, the patient was hemodynamically stable:  Vital Signs in ED   - /91mmHg  -  --> sinus tachycardia  - RR 16  - T 37.3    Investigations in ED  - CBC:             10.5   31.99 )-----------( 454      ( 17 Oct 2021 12:34 )             32.1     - Chemistry:  10-17    145  |  106  |  8<L>  ----------------------------<  110<H>  4.5   |  18  |  0.6<L>    Ca    9.3      17 Oct 2021 12:34  Mg     1.9     10-17    TPro  6.7  /  Alb  3.4<L>  /  TBili  0.6  /  DBili  x   /  AST  12  /  ALT  7   /  AlkPhos  86  1017    - Cardiac Markers:  CARDIAC MARKERS ( 17 Oct 2021 12:34 )  x     / 0.12 ng/mL / x     / x     / x          Imaging  - ECG revealed ST depression in inferior leads and elevation in AVR   - CT abdomen IC and angio chest PE protocol  1. No pulmonary embolus.  2. Stable right pleural nodularity and masses with interval increase in associated right pleural effusion and atelectasis.  3. Unchanged lingular 1.3 cm nodule.  4. Stable mediastinal lymphadenopathy.      - Patient was STEMI code in ED s/p cancellation  - She was evaluated by cardiology Dr Cao who recommended CT angio chest to rule out PE (came back negative)  - She will be admitted to the floor for telemetry monitoring                 (17 Oct 2021 20:53)       INTERVAL HPI/OVERNIGHT EVENTS:   Febrile (Tmax 102.4), cold RLE. Very weak RLE pulse on doppler. Vascular consulted. Pressure support held  hemodynamically stable     Subjective: Pt seen at bedside. She open her eyes when name called. She however, remains restless in bed. She is currently moderately sedated w/ Fentanyl. Ventilator support saturating at 100%.     ICU Vital Signs Last 24 Hrs  T(C): 37.6 (28 Oct 2021 08:00), Max: 39.1 (27 Oct 2021 16:00)  T(F): 99.7 (28 Oct 2021 08:00), Max: 102.4 (27 Oct 2021 16:00)  HR: 95 (28 Oct 2021 08:23) (83 - 107)  BP: 128/76 (28 Oct 2021 08:00) (81/59 - 197/98)  BP(mean): 96 (28 Oct 2021 08:00) (64 - 134)  ABP: --  ABP(mean): --  RR: 29 (28 Oct 2021 08:00) (12 - 61)  SpO2: 99% (28 Oct 2021 08:23) (96% - 100%)    I&O's Summary    27 Oct 2021 07:01  -  28 Oct 2021 07:00  --------------------------------------------------------  IN: 2432.7 mL / OUT: 1880 mL / NET: 552.7 mL    28 Oct 2021 07:01  -  28 Oct 2021 11:45  --------------------------------------------------------  IN: 40 mL / OUT: 75 mL / NET: -35 mL      Mode: AC/ CMV (Assist Control/ Continuous Mandatory Ventilation)  RR (machine): 12  TV (machine): 300  FiO2: 35  PEEP: 5  ITime: 1  MAP: 7  PIP: 16      Daily     Daily Weight in k.4 (28 Oct 2021 06:00)    Adult Advanced Hemodynamics Last 24 Hrs  CVP(mm Hg): 7 (28 Oct 2021 04:00) (7 - 299)  CVP(cm H2O): --  CO: --  CI: --  PA: --  PA(mean): --  PCWP: --  SVR: --  SVRI: --  PVR: --  PVRI: --    EKG/Telemetry Events:    MEDICATIONS  (STANDING):  acetaZOLAMIDE  IVPB 500 milliGRAM(s) IV Intermittent every 12 hours  aMIOdarone    Tablet 400 milliGRAM(s) Oral two times a day  aspirin  chewable 81 milliGRAM(s) Oral daily  atorvastatin 80 milliGRAM(s) Oral at bedtime  calcitriol   Capsule 0.5 MICROGram(s) Oral daily  chlorhexidine 0.12% Liquid 15 milliLiter(s) Oral Mucosa every 12 hours  chlorhexidine 4% Liquid 1 Application(s) Topical <User Schedule>  clopidogrel Tablet 75 milliGRAM(s) Oral daily  collagenase Ointment 1 Application(s) Topical daily  fentaNYL   Infusion. 0.5 MICROgram(s)/kG/Hr (3.1 mL/Hr) IV Continuous <Continuous>  heparin  Infusion 1100 Unit(s)/Hr (11 mL/Hr) IV Continuous <Continuous>  hydrocortisone sodium succinate Injectable 100 milliGRAM(s) IV Push every 8 hours  meropenem  IVPB 1000 milliGRAM(s) IV Intermittent every 8 hours  pantoprazole   Suspension 40 milliGRAM(s) Oral daily  polyethylene glycol 3350 17 Gram(s) Oral daily  potassium chloride  20 mEq/100 mL IVPB 20 milliEquivalent(s) IV Intermittent every 2 hours  senna 2 Tablet(s) Oral at bedtime  vancomycin  IVPB 1000 milliGRAM(s) IV Intermittent every 12 hours    MEDICATIONS  (PRN):  acetaminophen   Tablet .. 650 milliGRAM(s) Oral every 6 hours PRN Temp greater or equal to 38C (100.4F), Mild Pain (1 - 3)      PHYSICAL EXAM:  GENERAL: Elderly lady laying in bed on Vent support and OGT  HEAD:  Atraumatic, Normocephalic.   EYES: PERRLA, conjunctiva and sclera clear  NECK: Supple, No JVD,  NERVOUS SYSTEM:  mod sedated, restless   CHEST/LUNG: B/L good air entry; No rales, rhonchi, or wheezing. R IJ central line  HEART: S1S2 normal, no S3, Regular rate and rhythm; No murmurs  ABDOMEN: Soft, Nontender, Nondistended; Bowel sounds present  EXTREMITIES:  cold RLE, weak RLE pulse, discolored R foot. L AKA  LYMPH: No lymphadenopathy noted  SKIN: No rashes or lesions    LABS:                        8.1    43.40 )-----------( 418      ( 28 Oct 2021 04:30 )             25.2     10-28    137  |  87<L>  |  26<H>  ----------------------------<  135<H>  3.4<L>   |  36<H>  |  <0.5<L>    Ca    8.6      28 Oct 2021 04:30  Mg     2.2     10-28    TPro  5.3<L>  /  Alb  2.9<L>  /  TBili  0.7  /  DBili  x   /  AST  17  /  ALT  35  /  AlkPhos  82  10-28    LIVER FUNCTIONS - ( 28 Oct 2021 04:30 )  Alb: 2.9 g/dL / Pro: 5.3 g/dL / ALK PHOS: 82 U/L / ALT: 35 U/L / AST: 17 U/L / GGT: x             CAPILLARY BLOOD GLUCOSE        ABG - ( 28 Oct 2021 04:10 )  pH, Arterial: 7.55  pH, Blood: x     /  pCO2: 50    /  pO2: 121   / HCO3: 44    / Base Excess: 18.6  /  SaO2: 99.4                          RADIOLOGY & ADDITIONAL TESTS:  CXR:        Care Discussed with Consultants/Other Providers [ x] YES  [ ] NO
